# Patient Record
Sex: FEMALE | Race: WHITE | NOT HISPANIC OR LATINO | URBAN - METROPOLITAN AREA
[De-identification: names, ages, dates, MRNs, and addresses within clinical notes are randomized per-mention and may not be internally consistent; named-entity substitution may affect disease eponyms.]

---

## 2020-12-09 ENCOUNTER — INPATIENT (INPATIENT)
Facility: HOSPITAL | Age: 75
LOS: 47 days | Discharge: NURSING, REHAB OR LONG-TERM CARE | DRG: 20 | End: 2021-01-26
Attending: NEUROLOGICAL SURGERY | Admitting: NEUROLOGICAL SURGERY
Payer: MEDICARE

## 2020-12-09 ENCOUNTER — TRANSCRIPTION ENCOUNTER (OUTPATIENT)
Age: 75
End: 2020-12-09

## 2020-12-09 VITALS
HEART RATE: 77 BPM | WEIGHT: 110.01 LBS | SYSTOLIC BLOOD PRESSURE: 126 MMHG | TEMPERATURE: 98 F | RESPIRATION RATE: 18 BRPM | DIASTOLIC BLOOD PRESSURE: 89 MMHG | OXYGEN SATURATION: 96 %

## 2020-12-09 LAB
A1C WITH ESTIMATED AVERAGE GLUCOSE RESULT: 6.4 % — HIGH (ref 4–5.6)
ALBUMIN SERPL ELPH-MCNC: 3.9 G/DL — SIGNIFICANT CHANGE UP (ref 3.4–5)
ALP SERPL-CCNC: 124 U/L — HIGH (ref 40–120)
ALT FLD-CCNC: 28 U/L — SIGNIFICANT CHANGE UP (ref 12–42)
ANION GAP SERPL CALC-SCNC: 10 MMOL/L — SIGNIFICANT CHANGE UP (ref 9–16)
APTT BLD: 27.4 SEC — LOW (ref 27.5–35.5)
AST SERPL-CCNC: 32 U/L — SIGNIFICANT CHANGE UP (ref 15–37)
BASOPHILS # BLD AUTO: 0.06 K/UL — SIGNIFICANT CHANGE UP (ref 0–0.2)
BASOPHILS NFR BLD AUTO: 0.3 % — SIGNIFICANT CHANGE UP (ref 0–2)
BILIRUB SERPL-MCNC: 0.3 MG/DL — SIGNIFICANT CHANGE UP (ref 0.2–1.2)
BUN SERPL-MCNC: 22 MG/DL — SIGNIFICANT CHANGE UP (ref 7–23)
CALCIUM SERPL-MCNC: 9.4 MG/DL — SIGNIFICANT CHANGE UP (ref 8.5–10.5)
CHLORIDE SERPL-SCNC: 102 MMOL/L — SIGNIFICANT CHANGE UP (ref 96–108)
CHOLEST SERPL-MCNC: 201 MG/DL — HIGH
CO2 SERPL-SCNC: 26 MMOL/L — SIGNIFICANT CHANGE UP (ref 22–31)
CREAT SERPL-MCNC: 0.91 MG/DL — SIGNIFICANT CHANGE UP (ref 0.5–1.3)
EOSINOPHIL # BLD AUTO: 0 K/UL — SIGNIFICANT CHANGE UP (ref 0–0.5)
EOSINOPHIL NFR BLD AUTO: 0 % — SIGNIFICANT CHANGE UP (ref 0–6)
ESTIMATED AVERAGE GLUCOSE: 137 MG/DL — HIGH (ref 68–114)
GLUCOSE SERPL-MCNC: 163 MG/DL — HIGH (ref 70–99)
HCT VFR BLD CALC: 40.3 % — SIGNIFICANT CHANGE UP (ref 34.5–45)
HCT VFR BLD CALC: 41.5 % — SIGNIFICANT CHANGE UP (ref 34.5–45)
HDLC SERPL-MCNC: 66 MG/DL — SIGNIFICANT CHANGE UP
HGB BLD-MCNC: 12.4 G/DL — SIGNIFICANT CHANGE UP (ref 11.5–15.5)
HGB BLD-MCNC: 12.5 G/DL — SIGNIFICANT CHANGE UP (ref 11.5–15.5)
IMM GRANULOCYTES NFR BLD AUTO: 0.6 % — SIGNIFICANT CHANGE UP (ref 0–1.5)
INR BLD: 0.96 — SIGNIFICANT CHANGE UP (ref 0.88–1.16)
LIDOCAIN IGE QN: 121 U/L — SIGNIFICANT CHANGE UP (ref 73–393)
LIPID PNL WITH DIRECT LDL SERPL: 112 MG/DL — HIGH
LYMPHOCYTES # BLD AUTO: 1.21 K/UL — SIGNIFICANT CHANGE UP (ref 1–3.3)
LYMPHOCYTES # BLD AUTO: 6.4 % — LOW (ref 13–44)
MAGNESIUM SERPL-MCNC: 2 MG/DL — SIGNIFICANT CHANGE UP (ref 1.6–2.6)
MCHC RBC-ENTMCNC: 26.6 PG — LOW (ref 27–34)
MCHC RBC-ENTMCNC: 27.1 PG — SIGNIFICANT CHANGE UP (ref 27–34)
MCHC RBC-ENTMCNC: 30.1 GM/DL — LOW (ref 32–36)
MCHC RBC-ENTMCNC: 30.8 GM/DL — LOW (ref 32–36)
MCV RBC AUTO: 88 FL — SIGNIFICANT CHANGE UP (ref 80–100)
MCV RBC AUTO: 88.3 FL — SIGNIFICANT CHANGE UP (ref 80–100)
MONOCYTES # BLD AUTO: 0.83 K/UL — SIGNIFICANT CHANGE UP (ref 0–0.9)
MONOCYTES NFR BLD AUTO: 4.4 % — SIGNIFICANT CHANGE UP (ref 2–14)
NEUTROPHILS # BLD AUTO: 16.64 K/UL — HIGH (ref 1.8–7.4)
NEUTROPHILS NFR BLD AUTO: 88.3 % — HIGH (ref 43–77)
NON HDL CHOLESTEROL: 135 MG/DL — HIGH
NRBC # BLD: 0 /100 WBCS — SIGNIFICANT CHANGE UP (ref 0–0)
NRBC # BLD: 0 /100 WBCS — SIGNIFICANT CHANGE UP (ref 0–0)
PHOSPHATE SERPL-MCNC: 4.4 MG/DL — SIGNIFICANT CHANGE UP (ref 2.5–4.5)
PLATELET # BLD AUTO: 341 K/UL — SIGNIFICANT CHANGE UP (ref 150–400)
PLATELET # BLD AUTO: 390 K/UL — SIGNIFICANT CHANGE UP (ref 150–400)
POTASSIUM SERPL-MCNC: 4.8 MMOL/L — SIGNIFICANT CHANGE UP (ref 3.5–5.3)
POTASSIUM SERPL-SCNC: 4.8 MMOL/L — SIGNIFICANT CHANGE UP (ref 3.5–5.3)
PROT SERPL-MCNC: 8.4 G/DL — HIGH (ref 6.4–8.2)
PROTHROM AB SERPL-ACNC: 11.5 SEC — SIGNIFICANT CHANGE UP (ref 10.6–13.6)
RBC # BLD: 4.58 M/UL — SIGNIFICANT CHANGE UP (ref 3.8–5.2)
RBC # BLD: 4.7 M/UL — SIGNIFICANT CHANGE UP (ref 3.8–5.2)
RBC # FLD: 14.8 % — HIGH (ref 10.3–14.5)
RBC # FLD: 15 % — HIGH (ref 10.3–14.5)
SARS-COV-2 RNA SPEC QL NAA+PROBE: SIGNIFICANT CHANGE UP
SODIUM SERPL-SCNC: 138 MMOL/L — SIGNIFICANT CHANGE UP (ref 132–145)
TRIGL SERPL-MCNC: 114 MG/DL — SIGNIFICANT CHANGE UP
TROPONIN T SERPL-MCNC: 0.61 NG/ML — CRITICAL HIGH (ref 0–0.01)
WBC # BLD: 10.42 K/UL — SIGNIFICANT CHANGE UP (ref 3.8–10.5)
WBC # BLD: 18.86 K/UL — HIGH (ref 3.8–10.5)
WBC # FLD AUTO: 10.42 K/UL — SIGNIFICANT CHANGE UP (ref 3.8–10.5)
WBC # FLD AUTO: 18.86 K/UL — HIGH (ref 3.8–10.5)

## 2020-12-09 PROCEDURE — 71045 X-RAY EXAM CHEST 1 VIEW: CPT | Mod: 26

## 2020-12-09 PROCEDURE — 70450 CT HEAD/BRAIN W/O DYE: CPT | Mod: 26,59

## 2020-12-09 PROCEDURE — 74177 CT ABD & PELVIS W/CONTRAST: CPT | Mod: 26

## 2020-12-09 PROCEDURE — 99291 CRITICAL CARE FIRST HOUR: CPT | Mod: 24

## 2020-12-09 PROCEDURE — 99291 CRITICAL CARE FIRST HOUR: CPT

## 2020-12-09 PROCEDURE — 93010 ELECTROCARDIOGRAM REPORT: CPT

## 2020-12-09 PROCEDURE — 70498 CT ANGIOGRAPHY NECK: CPT | Mod: 26

## 2020-12-09 PROCEDURE — 70496 CT ANGIOGRAPHY HEAD: CPT | Mod: 26

## 2020-12-09 PROCEDURE — 99223 1ST HOSP IP/OBS HIGH 75: CPT

## 2020-12-09 RX ORDER — NIMODIPINE 60 MG/10ML
60 SOLUTION ORAL EVERY 4 HOURS
Refills: 0 | Status: DISCONTINUED | OUTPATIENT
Start: 2020-12-09 | End: 2020-12-10

## 2020-12-09 RX ORDER — NICARDIPINE HYDROCHLORIDE 30 MG/1
5 CAPSULE, EXTENDED RELEASE ORAL
Qty: 40 | Refills: 0 | Status: DISCONTINUED | OUTPATIENT
Start: 2020-12-09 | End: 2020-12-10

## 2020-12-09 RX ORDER — HYDROCORTISONE 20 MG
50 TABLET ORAL EVERY 8 HOURS
Refills: 0 | Status: DISCONTINUED | OUTPATIENT
Start: 2020-12-09 | End: 2020-12-11

## 2020-12-09 RX ORDER — ONDANSETRON 8 MG/1
4 TABLET, FILM COATED ORAL ONCE
Refills: 0 | Status: COMPLETED | OUTPATIENT
Start: 2020-12-09 | End: 2020-12-09

## 2020-12-09 RX ORDER — SODIUM CHLORIDE 9 MG/ML
1000 INJECTION INTRAMUSCULAR; INTRAVENOUS; SUBCUTANEOUS
Refills: 0 | Status: DISCONTINUED | OUTPATIENT
Start: 2020-12-09 | End: 2020-12-10

## 2020-12-09 RX ORDER — DEXTROSE 50 % IN WATER 50 %
15 SYRINGE (ML) INTRAVENOUS ONCE
Refills: 0 | Status: DISCONTINUED | OUTPATIENT
Start: 2020-12-09 | End: 2021-01-26

## 2020-12-09 RX ORDER — HYDROMORPHONE HYDROCHLORIDE 2 MG/ML
0.5 INJECTION INTRAMUSCULAR; INTRAVENOUS; SUBCUTANEOUS ONCE
Refills: 0 | Status: DISCONTINUED | OUTPATIENT
Start: 2020-12-09 | End: 2020-12-09

## 2020-12-09 RX ORDER — DEXTROSE 50 % IN WATER 50 %
25 SYRINGE (ML) INTRAVENOUS ONCE
Refills: 0 | Status: DISCONTINUED | OUTPATIENT
Start: 2020-12-09 | End: 2021-01-26

## 2020-12-09 RX ORDER — AMINOCAPROIC ACID 500 MG/1
5 TABLET ORAL ONCE
Refills: 0 | Status: DISCONTINUED | OUTPATIENT
Start: 2020-12-09 | End: 2020-12-10

## 2020-12-09 RX ORDER — AMINOCAPROIC ACID 500 MG/1
1 TABLET ORAL
Qty: 5 | Refills: 0 | Status: DISCONTINUED | OUTPATIENT
Start: 2020-12-10 | End: 2020-12-10

## 2020-12-09 RX ORDER — PANTOPRAZOLE SODIUM 20 MG/1
40 TABLET, DELAYED RELEASE ORAL DAILY
Refills: 0 | Status: DISCONTINUED | OUTPATIENT
Start: 2020-12-09 | End: 2020-12-11

## 2020-12-09 RX ORDER — LIDOCAINE HYDROCHLORIDE AND EPINEPHRINE 10; 10 MG/ML; UG/ML
10 INJECTION, SOLUTION INFILTRATION; PERINEURAL ONCE
Refills: 0 | Status: COMPLETED | OUTPATIENT
Start: 2020-12-09 | End: 2020-12-09

## 2020-12-09 RX ORDER — INSULIN LISPRO 100/ML
VIAL (ML) SUBCUTANEOUS
Refills: 0 | Status: DISCONTINUED | OUTPATIENT
Start: 2020-12-09 | End: 2021-01-03

## 2020-12-09 RX ORDER — LACOSAMIDE 50 MG/1
100 TABLET ORAL EVERY 12 HOURS
Refills: 0 | Status: DISCONTINUED | OUTPATIENT
Start: 2020-12-09 | End: 2020-12-10

## 2020-12-09 RX ORDER — FENTANYL CITRATE 50 UG/ML
50 INJECTION INTRAVENOUS ONCE
Refills: 0 | Status: DISCONTINUED | OUTPATIENT
Start: 2020-12-09 | End: 2020-12-09

## 2020-12-09 RX ORDER — SODIUM CHLORIDE 9 MG/ML
1000 INJECTION INTRAMUSCULAR; INTRAVENOUS; SUBCUTANEOUS ONCE
Refills: 0 | Status: COMPLETED | OUTPATIENT
Start: 2020-12-09 | End: 2020-12-09

## 2020-12-09 RX ORDER — GLUCAGON INJECTION, SOLUTION 0.5 MG/.1ML
1 INJECTION, SOLUTION SUBCUTANEOUS ONCE
Refills: 0 | Status: DISCONTINUED | OUTPATIENT
Start: 2020-12-09 | End: 2021-01-26

## 2020-12-09 RX ORDER — SODIUM CHLORIDE 9 MG/ML
1000 INJECTION, SOLUTION INTRAVENOUS
Refills: 0 | Status: DISCONTINUED | OUTPATIENT
Start: 2020-12-09 | End: 2020-12-10

## 2020-12-09 RX ORDER — ACETAMINOPHEN 500 MG
650 TABLET ORAL EVERY 6 HOURS
Refills: 0 | Status: DISCONTINUED | OUTPATIENT
Start: 2020-12-09 | End: 2020-12-16

## 2020-12-09 RX ORDER — SENNA PLUS 8.6 MG/1
2 TABLET ORAL AT BEDTIME
Refills: 0 | Status: DISCONTINUED | OUTPATIENT
Start: 2020-12-09 | End: 2020-12-21

## 2020-12-09 RX ORDER — MIDAZOLAM HYDROCHLORIDE 1 MG/ML
2 INJECTION, SOLUTION INTRAMUSCULAR; INTRAVENOUS ONCE
Refills: 0 | Status: DISCONTINUED | OUTPATIENT
Start: 2020-12-09 | End: 2020-12-09

## 2020-12-09 RX ORDER — CEFAZOLIN SODIUM 1 G
2000 VIAL (EA) INJECTION ONCE
Refills: 0 | Status: COMPLETED | OUTPATIENT
Start: 2020-12-09 | End: 2020-12-09

## 2020-12-09 RX ORDER — LEVETIRACETAM 250 MG/1
1000 TABLET, FILM COATED ORAL ONCE
Refills: 0 | Status: COMPLETED | OUTPATIENT
Start: 2020-12-09 | End: 2020-12-09

## 2020-12-09 RX ADMIN — Medication 50 MILLIGRAM(S): at 23:31

## 2020-12-09 RX ADMIN — HYDROMORPHONE HYDROCHLORIDE 0.5 MILLIGRAM(S): 2 INJECTION INTRAMUSCULAR; INTRAVENOUS; SUBCUTANEOUS at 21:24

## 2020-12-09 RX ADMIN — SODIUM CHLORIDE 1000 MILLILITER(S): 9 INJECTION INTRAMUSCULAR; INTRAVENOUS; SUBCUTANEOUS at 19:01

## 2020-12-09 RX ADMIN — LEVETIRACETAM 440 MILLIGRAM(S): 250 TABLET, FILM COATED ORAL at 21:24

## 2020-12-09 RX ADMIN — ONDANSETRON 4 MILLIGRAM(S): 8 TABLET, FILM COATED ORAL at 21:24

## 2020-12-09 NOTE — PROGRESS NOTE ADULT - ASSESSMENT
ASSESSMENT & PLAN    ASSESSMENT    75 yoF HH3 mF4, 3 mm L pcom aneurysm, communicating hydrocephalus; diffuse atherosclerosis; stunned myocardium (anterolateral, inferior T wave inversions)  PMHx:  1. HTN  2. COPD  3. asthma    PLAN -     NEURO -  -neurochecks q1h  -unsecured aneurysm  -sBP < 140 mmHg  -amicar  -if neurological deterioration, EVD tonight  -vimpat 100 mg BID for seizure prophylaxis  -XA in AM     CV -  -sBP 100-140 mmHg  -stunned myocardium on EKG  -pending troponins, TTE  -vasculopath, extensive atherosclerosis    PUL -  -on room air, oxygen saturations > 90%  -hydrocortisone 50 mg IV q8h, given COPDe  -pending CXR    ENDO -  -euglycemia goal  -lipids, thyroid panel    GI/ -  -NPO, gentle IV hydration    DIET -   -NPO    HEM/ONC -  -Hb trend ___    VTE Prophylaxis -  -SCDs    ID - afebrile, mild reactive leukocytosis, pending CXR    Social - will update son (aHrris Kelley)    *****    CORE MEASURES    SAH  1.  Nath and Jacobo Score = 3, mF4  2.  VTE Prophylaxis:  [ ] administered within 24 hours or admission OR [X] reason not done: fresh SAH  3.  Dysphagia Screening:  [X] performed before any oral meds / liquids / food  4.  Nimodipine treatment:  [X] administered within 24 hours of admission OR [] reason not done    *****    Quality Measures    FAST HUG checked    *****    ATTENDING ATTESTATION:  I was physically present for the key portions of the evaluation and management (E/M) service provided.  I agree with the above history, physical and plan, which I have reviewed and edited where appropriate.    Patient at high risk for neurological deterioriation or death due to:  bleeding, stroke, infections, MI.  Critical Care Time:  I have personally provided 60 minutes of critical care time excluding time spent on separate procedures.    *****     ASSESSMENT & PLAN    ASSESSMENT    75 yoF HH3 mF4, 3 mm L pcom aneurysm, communicating hydrocephalus; diffuse atherosclerosis; stunned myocardium (anterolateral, inferior T wave inversions)  PMHx:  1. HTN  2. COPD  3. asthma    PLAN -     NEURO -  -neurochecks q1h  -unsecured aneurysm  -sBP < 140 mmHg  -amicar  -if neurological deterioration, EVD tonight  -vimpat 100 mg BID for seizure prophylaxis  -XA in AM     CV -  -sBP 100-140 mmHg  -stunned myocardium on EKG  -pending troponins, TTE  -vasculopath, extensive atherosclerosis    PUL -  -on room air, oxygen saturations > 90%  -hydrocortisone 50 mg IV q8h, given COPDe  -pending CXR    ENDO -  -euglycemia goal  -lipids, thyroid panel    GI/ -  -NPO, gentle IV hydration    DIET -   -NPO    HEM/ONC -  -Hb trend ___    VTE Prophylaxis -  -SCDs    ID - afebrile, mild reactive leukocytosis, pending CXR    Social - will update son (Harris Kelley)    *****    CORE MEASURES    SAH  1.  Nath and Jacobo Score = 3, mF4  2.  VTE Prophylaxis:  [ ] administered within 24 hours or admission OR [X] reason not done: fresh SAH  3.  Dysphagia Screening:  [X] performed before any oral meds / liquids / food  4.  Nimodipine treatment:  [X] administered within 24 hours of admission OR [] reason not done    *****    Quality Measures    FAST HUG checked    *****   ASSESSMENT & PLAN    ASSESSMENT    75 yoF HH3 mF4, 3 mm L pcom aneurysm, communicating hydrocephalus; diffuse atherosclerosis; stunned myocardium (anterolateral, inferior T wave inversions)  PMHx:  1. HTN  2. COPD  3. asthma    PLAN -     NEURO -  -neurochecks q1h  -unsecured aneurysm  -sBP < 140 mmHg  -amicar infusion  -if neurological deterioration, EVD tonight _____  -vimpat 100 mg BID for seizure prophylaxis  -XA in AM     CV -  -sBP 100-140 mmHg  -stunned myocardium on EKG  -pending troponins, TTE  -vasculopath, extensive atherosclerosis    PUL -  -on room air, oxygen saturations > 90%  -hydrocortisone 50 mg IV q8h, given COPDe  -pending CXR    ENDO -  -euglycemia goal  -lipids, thyroid panel    GI/ -  -NPO, gentle IV hydration    DIET -   -NPO    HEM/ONC -  -Hb trend ___    VTE Prophylaxis -  -SCDs    ID - afebrile, mild reactive leukocytosis, pending CXR    Social - will update son (Harris Kelley)    *****    CORE MEASURES    SAH  1.  Nath and Jacobo Score = 3, mF4  2.  VTE Prophylaxis:  [ ] administered within 24 hours or admission OR [X] reason not done: fresh SAH  3.  Dysphagia Screening:  [X] performed before any oral meds / liquids / food  4.  Nimodipine treatment:  [X] administered within 24 hours of admission OR [] reason not done    *****    Quality Measures    FAST HUG checked    *****   ASSESSMENT & PLAN    ASSESSMENT    75 yoF HH3 mF4, 3 mm L pcom aneurysm, communicating hydrocephalus; diffuse atherosclerosis; stunned myocardium (anterolateral, inferior T wave inversions)  PMHx:  1. HTN  2. COPD  3. asthma    PLAN -     NEURO -  -neurochecks q1h  -unsecured aneurysm  -sBP < 140 mmHg  -amicar infusion  -if neurological deterioration, EVD placed, open at 15 cm (ICP < 5)  -vimpat 100 mg BID for seizure prophylaxis  -XA in AM     CV -  -sBP 100-140 mmHg  -stunned myocardium with pulmonary edema --> given lasix 20 mg IV X 1  -elevated troponins, pending TTE  -vasculopath, extensive atherosclerosis  -cardiology consult    PUL -  -nasal cannula 4 L/min, oxygen saturations > 90%  -hydrocortisone 50 mg IV q8h, given COPDe  -neuro-cardiogenic pulmonary edema as noted    ENDO -  -euglycemia goal  -, TSH 0.99, HbA1c 6.7    GI/ -  -NPO, gentle IV hydration    DIET -   -NPO    HEM/ONC -  -Hb trend 11.5    VTE Prophylaxis -  -SCDs    ID - afebrile, mild reactive leukocytosis    Social - will update son (Harris Kelley)    *****    CORE MEASURES    SAH  1.  Nath and Jacobo Score = 3, mF4  2.  VTE Prophylaxis:  [ ] administered within 24 hours or admission OR [X] reason not done: fresh SAH  3.  Dysphagia Screening:  [X] performed before any oral meds / liquids / food  4.  Nimodipine treatment:  [X] administered within 24 hours of admission OR [] reason not done    *****    Quality Measures    FAST HUG checked    *****   ASSESSMENT & PLAN    ASSESSMENT    75 yoF HH3 mF4, 3 mm L pcom aneurysm, communicating hydrocephalus; diffuse atherosclerosis; stunned myocardium (anterolateral, inferior T wave inversions)  PMHx:  1. HTN  2. COPD  3. asthma    PLAN -     NEURO -  -neurochecks q1h  -unsecured aneurysm  -sBP < 140 mmHg  -amicar infusion  -if neurological deterioration, EVD placed, open at 15 cm (ICP < 5)  -vimpat 100 mg BID for seizure prophylaxis  -XA in AM     CV -  -sBP 100-140 mmHg  -stunned myocardium with pulmonary edema --> given lasix 20 mg IV X 1  -elevated troponins, pending TTE  -vasculopath, extensive atherosclerosis  -cardiology consult    PUL -  -nasal cannula 4 L/min, oxygen saturations > 90%  -hydrocortisone 50 mg IV q8h, given COPDe  -neuro-cardiogenic pulmonary edema as noted    ENDO -  -euglycemia goal  -, TSH 0.99, HbA1c 6.7    GI/ -  -NPO, gentle IV hydration    DIET -   -NPO    HEM/ONC -  -Hb trend 11.5    VTE Prophylaxis -  -SCDs    ID - afebrile, mild reactive leukocytosis    Social - updated son (Harris Kelley)    *****    CORE MEASURES    SAH  1.  Nath and Jacobo Score = 3, mF4  2.  VTE Prophylaxis:  [ ] administered within 24 hours or admission OR [X] reason not done: fresh SAH  3.  Dysphagia Screening:  [X] performed before any oral meds / liquids / food  4.  Nimodipine treatment:  [X] administered within 24 hours of admission OR [] reason not done    *****    Quality Measures    FAST HUG checked    *****

## 2020-12-09 NOTE — CHART NOTE - NSCHARTNOTEFT_GEN_A_CORE
Neurosurgical Indications for Screening Dopplers on Admission:       1) Known hypercoagulation disorder (h/o VTE, thrombophilia, HIT, etc.)   2) Admitted from prolonged stay from another institution (straight forward ER transfers not included)  3) Presenting with significant leg immobility   4) Presenting with signs and symptoms of VTE?    5) With significant critical illness, Including "found down" for unknown period of time in HPI  6) With significant neurotrauma (TBI, SCI / TLS spine fractures)   7) Who are comatose   8) With known malignancy (e.g. glioblastoma multiforme, meningioma, etc.). Excludes IA chemo 23hr observation stays  9) On hemodialysis   10) Who have received platelet transfusion or antithrombotic reversal agents recently   11) Who have had recent major orthopedic surgery          Screening dopplers indicated?   Y X   N _    DVT Prophylaxis:  X SCD's   _ chemoprophylaxis

## 2020-12-09 NOTE — H&P ADULT - NSHPPHYSICALEXAM_GEN_ALL_CORE
GENERAL: Moderate distress, well-groomed, well-developed  HEAD:  Atraumatic, Normocephalic  EYES: EOMI, PERRLA, conjunctiva and sclera clear  ENMT: No tonsillar erythema, exudates, or enlargement; Moist mucous membranes, Good dentition, No lesions  NECK: Supple, No JVD, Normal thyroid  NERVOUS SYSTEM:  AAOx2 (self and place,) FC, speech coherent. Motor Strength 5/5 B/L upper and lower extremities; No drift. SILT throughout. DTRs 2+ intact and symmetric  CHEST/LUNG: Clear to percussion bilaterally; No rales, rhonchi, wheezing, or rubs  HEART: Regular rate and rhythm; No murmurs, rubs, or gallops  ABDOMEN: Soft, Nontender, Nondistended; Bowel sounds present  EXTREMITIES:  2+ Peripheral Pulses, No clubbing, cyanosis, or edema  LYMPH: No lymphadenopathy noted  SKIN: No rashes or lesions

## 2020-12-09 NOTE — H&P ADULT - NSHPSOCIALHISTORY_GEN_ALL_CORE
Current tobacco smoker (1-2 cigarettes daily, smoker>30 years)  Drinks alcohol socially  No illicit drug use

## 2020-12-09 NOTE — H&P ADULT - ASSESSMENT
76y/o F with PMHx sig for COPD, asthma, HLD, HTN, BIBEMS to [facility] from home after HHA discovered patient found down in floor covered in non-bloody vomitus, CTH reveals diffuse subarachnoid hemorrhage mainly at basilar cisterns with IVH and obstructive hydrocephalus, CTA concerning for 3mm left pcomm aneurysm and a 1.6mm outpouching of distal cavernous segment of the left internal carotid artery likely aneurysm. NIHSS2 HH3 MF4 BD1    PLAN:  NEURO:  -neuro/vital checks q1h  -continue vimpat for seizure prophylaxis  -stroke core measures  -nimodipine 60mg q6h  -placement of EVD, montior ICPs and output    CARDIOVASCULAR:  -elevated troponin, trend  --140  -cardene prn  -continue home lisinopril  -f/u echo    PULMONARY:  -room air no issues  -COPD: continue prednisone    RENAL:  -IVF while NPO    GI:  -bowel regimen  -PPI while on steroids    HEME:  -start amicar drip, discontinue 4 hours prior to angiogram  -coags WNL    ID:  -leukocytosis, monitor    ENDO:  -ISS    DVT PROPHYLAXIS:  [x] Venodynes                                [] Heparin/Lovenox  F/u LE doppler    DISPOSITION: ICU status, full code  D/w Dr. Serrano, Dr. Patel and Dr. Gonzalez

## 2020-12-09 NOTE — ED ADULT NURSE REASSESSMENT NOTE - NS ED NURSE REASSESS COMMENT FT1
Received pt from [facility]. Pending final CT results, endorsing headache at this time. MD Ortiz notified and aware.

## 2020-12-09 NOTE — ED ADULT NURSE REASSESSMENT NOTE - BEFAST ARM SIDE DRIFT
No - Acyclovir PO 30mg q8h  - Begin bactrim PO 5mg/kg/day divided BID on Friday  - Ethanol locks to unlocked lumen 4 hours/day

## 2020-12-09 NOTE — PROGRESS NOTE ADULT - ATTENDING COMMENTS
*****    ATTENDING ATTESTATION:  I was physically present for the key portions of the evaluation and management (E/M) service provided.  I agree with the above history, physical and plan, which I have reviewed and edited where appropriate.    Patient at high risk for neurological deterioriation or death due to:  bleeding, stroke, infections, MI.  Critical Care Time:  I have personally provided 60 minutes of critical care time excluding time spent on separate procedures.    ***** *****    ATTENDING ATTESTATION:  I was physically present for the key portions of the evaluation and management (E/M) service provided.  I agree with the above history, physical and plan, which I have reviewed and edited where appropriate.    Patient at high risk for neurological deterioration or death due to:  bleeding, stroke, infections, MI.  Critical Care Time:  I have personally provided 60 minutes of critical care time excluding time spent on separate procedures.    *****

## 2020-12-09 NOTE — ED ADULT TRIAGE NOTE - CHIEF COMPLAINT QUOTE
pt biba from home accompanied by hha for vomiting. pt arrives appearing pale; bgl on scene 242; pt states she is not diabetic

## 2020-12-09 NOTE — H&P ADULT - HISTORY OF PRESENT ILLNESS
76y/o F with PMHx sig for COPD, asthma, HLD, HTN, BIBEMS to [facility] from home after HHA discovered patient found down in floor covered in non-bloody vomitus. Perr HHA Pt went to the bathroom and was taking a long time, went in to check on her and found her sitting on floor, awake, however with vomitus on front of shirt. On arrival to [facility], patient was taken for stat head CT, which revealed diffuse subarachnoid hemorrhage mainly at basilar cisterns with IVH and obstructive hydrocephalus. Patient was given a bolus of 1g keppra and dilaudid pushes for generalized headache. CTA concerning for 3mm left pcomm aneurysm and a 1.6mm outpouching of distal cavernous segment of the left internal carotid artery likely aneurysm. NIHSS2 HH3 MF4. Patient was transferred to [facility] for further intervention. Patient currently reports headaches. Pt denies acute changes in vision, seizures, CP, SOB, weakness/paresthesias of arms or legs.

## 2020-12-09 NOTE — H&P ADULT - ATTENDING COMMENTS
Patient seen and examined by me 12/10/20. Initial plan as above, amicar until angio/embo of aneurysm, keppra seizure prophylaxis, SBP <140 until aneurysm secure, ICU care.    Bebeto Serrano M.D. Patient seen and examined by me 12/10/20. Presents with SAH, pcomm aneurysm on CTA, also hydrocephalus with altered mental status. Initial plan as above, EVD, amicar until angio/embo of aneurysm, keppra seizure prophylaxis, SBP <140 until aneurysm secure, ICU care.    Bebeto Serrano M.D.

## 2020-12-09 NOTE — ED PROVIDER NOTE - ENMT, MLM
Airway patent, Nasal mucosa clear. Mouth with normal mucosa. Throat has no vesicles, no oropharyngeal exudates and uvula is midline. Cranium NC/AT.

## 2020-12-09 NOTE — ED PROVIDER NOTE - OBJECTIVE STATEMENT
75 female pmhx htn with single episode of vomiting just pta. HHA states pt went to the br and was taking a long time, went in to check on her and found her sitting on floor, awake, but with vomitus on front of shirt (non-bloody). Pt arrives on EMS stretcher awake/alert, answering questions, c/o pancephalic ha. 75 female pmhx htn with single episode of vomiting just pta. HHA states pt went to the br and was taking a long time, went in to check on her and found her sitting on floor, awake, but with vomitus on front of shirt (non-bloody). Pt arrives on EMS stretcher awake/alert, answering questions, c/o pancephalic ha, ( not WHOL), describes as a " tightness" in her head. Denies visual disturbance or stiff neck. Denies cp/dyspnea/palpitations.

## 2020-12-09 NOTE — ED PROVIDER NOTE - CLINICAL SUMMARY MEDICAL DECISION MAKING FREE TEXT BOX
diff dx includes covid, pancreatitis, ICH, tension headache, sbo. Pt unable to tolerate po contrast, will proceed with IV contrast CT abd/pelvis. diff dx includes covid, pancreatitis, ICH, tension headache, sbo. Pt unable to tolerate po contrast, will proceed with non-contrast head CT,  IV contrast CT abd/pelvis.

## 2020-12-09 NOTE — ED ADULT NURSE NOTE - OBJECTIVE STATEMENT
75y female presents to ED c/o headache. Pt is poor historian, presents with aide, states that pt was in bathroom for prolonged time, and when aide entered bathroom, pt had vomited once. No blood or coffee ground emesis noted. Pt endorses having headache. Per aide, pt at baseline mental status. Denies diarrhea, chest pain, SOB.

## 2020-12-09 NOTE — ED ADULT NURSE REASSESSMENT NOTE - NS ED NURSE REASSESS COMMENT FT1
Second IV placed, VS per flowsheet, medicated per MAR for pain. Taken to CT for CTA, pending EMS transfer to [facility]. States improvement to headache but still some "tightness".

## 2020-12-09 NOTE — H&P ADULT - NSICDXPASTMEDICALHX_GEN_ALL_CORE_FT
PAST MEDICAL HISTORY:  Asthma     COPD (chronic obstructive pulmonary disease)     Hyperlipidemia     Hypertension

## 2020-12-09 NOTE — ED PROVIDER NOTE - PROGRESS NOTE DETAILS
S/O Dr. Ortiz with CT abd/pelvis/head pending. Pt remains awake/alert, no emesis observed. ct noted to have extensive SAH.  pt reevaluated immediately, bp 130s systolic, ao x 3, perrl, strength equal bl, pt c/o headache, additional analgesia ordered.  NSx consulted

## 2020-12-09 NOTE — PROGRESS NOTE ADULT - SUBJECTIVE AND OBJECTIVE BOX
=========================================================   NEUROCRITICAL CARE ATTENDING NOTE (WEDNESDAY, DECEMBER 9, 2020 EVENING)  =========================================================    NAME:  [CARA CANCHOLA]  MRN:  [[id number]]    SUMMARY:  75 yoF HH3 mF4, 3 mm L pcom aneurysm, communicating hydrocephalus; diffuse atherosclerosis; stunned myocardium (anterolateral, inferior T wave inversions)    [facility] HPI:  75 female pmhx HTN, COPD, asthma, vasculopath (with single episode of vomiting just pta. HHA states pt went to the br and was taking a long time, went in to check on her and found her sitting on floor, awake, but with vomitus on front of shirt (non-bloody). Pt arrives on EMS stretcher awake/alert, answering questions, c/o pancephalic ha, ( not WHOL), describes as a " tightness" in her head. Denies visual disturbance or stiff neck. Denies cp/dyspnea/palpitations.    Past Medical History:  asthma, COPD  Allergies:  None known.  Home Meds:  · 	ProAir HFA CFC free 90 mcg/inh inhalation aerosol: 2 puff(s) inhaled 4 times a day PRN  · 	predniSONE 50 mg oral tablet: 1 tab(s) orally once a day  · 	Flagyl 500 mg oral tablet: 1 tab(s) orally every 8 hours  · 	azithromycin 500 mg oral tablet: 1 tab(s) orally once a day    PHYSICAL EXAMINATION    ICU Vital Signs Last 24 Hrs  T(C): 36.5 (09 Dec 2020 21:54), Max: 36.7 (09 Dec 2020 18:03)  T(F): 97.7 (09 Dec 2020 21:54), Max: 98 (09 Dec 2020 18:03)  HR: 88 (09 Dec 2020 21:25) (76 - 88)  BP: 129/81 (09 Dec 2020 21:25) (126/89 - 140/74)  RR: 18 (09 Dec 2020 21:25) (16 - 18)  SpO2: 95% (09 Dec 2020 21:25) (95% - 96%)    NEUROLOGICAL EXAMINATION:  easily rousable, oriented X 2, speech clear, states headache throughout course of day, pupils 2.5 mm equal and reactive bilaterally, EOM intact, face symmetric, tongue midline, no pronator drift, no [place], follows 1st order commands, strength and sensation intact  EENT:  anicteric  CARDIOVASCULAR:  S1S2 JAMIE no carotid bruits  PULMONARY:  slightly decreased to bases, no adventitia  ABDOMEN:  soft, non-tender, intact bowel sounds  EXTREMITIES:  PPP, no ankle edema  SKIN:  no manifestations    I/O's      LABS:               12.4   10.42 )-----------( 341      ( 09 Dec 2020 18:43 )             40.3     12-09    138  |  102  |  22  ----------------------------<  163<H>  4.8   |  26  |  0.91    Ca    9.4      09 Dec 2020 18:43    TPro  8.4<H>  /  Alb  3.9  /  TBili  0.3  /  DBili  x   /  AST  32  /  ALT  28  /  AlkPhos  124<H>  12-09    troponins pending ___    CAPILLARY BLOOD GLUCOSE    POCT Blood Glucose.: 154 mg/dL (09 Dec 2020 18:13)    12.09 CT/CTA - left p com 3 mm aneurysm, left cav ICA infundibulum < 2 mm; extensive subarachnoid hemorrhage, concentrated at the basilar cisterns, with intraventricular extension of hemorrhage and obstructive hydrocephalus.  Sites of small vessel ischemic injury with lacunar infarcts in the right caudate nucleus, both thalami and cerebellar hemispheres. No CT evidence of territorial infarction at this time.  12.09 CXR pending  Baseline dopplers pending  TTE pending    EKG - T inversions inferior and anterolateral leads    IV FLUIDS:  []  DRIPS:  []  LINES:  []  DRAINS:  []  WOUNDS:  []    CODE STATUS:  [Full]  GOALS OF CARE:  [Aggressive]  DISPOSITION:  [ICU] =========================================================   NEUROCRITICAL CARE ATTENDING NOTE (WEDNESDAY, DECEMBER 9, 2020 EVENING)  =========================================================    NAME:  [CARA CANCHOLA]  MRN:  [[id number]]    SUMMARY:  75 yoF HH3 mF4, 3 mm L pcom aneurysm, communicating hydrocephalus; diffuse atherosclerosis; stunned myocardium (anterolateral, inferior T wave inversions)    [facility] HPI:  75 female pmhx HTN, COPD, asthma, vasculopath (with single episode of vomiting just pta. HHA states pt went to the br and was taking a long time, went in to check on her and found her sitting on floor, awake, but with vomitus on front of shirt (non-bloody). Pt arrives on EMS stretcher awake/alert, answering questions, c/o pancephalic ha, ( not WHOL), describes as a " tightness" in her head. Denies visual disturbance or stiff neck. Denies cp/dyspnea/palpitations.    Past Medical History:  asthma, COPD  Allergies:  None known.  Home Meds:  · 	ProAir HFA CFC free 90 mcg/inh inhalation aerosol: 2 puff(s) inhaled 4 times a day PRN  · 	predniSONE 50 mg oral tablet: 1 tab(s) orally once a day  · 	Flagyl 500 mg oral tablet: 1 tab(s) orally every 8 hours  · 	azithromycin 500 mg oral tablet: 1 tab(s) orally once a day    PHYSICAL EXAMINATION    ICU Vital Signs Last 24 Hrs  T(C): 36.5 (09 Dec 2020 21:54), Max: 36.7 (09 Dec 2020 18:03)  T(F): 97.7 (09 Dec 2020 21:54), Max: 98 (09 Dec 2020 18:03)  HR: 88 (09 Dec 2020 21:25) (76 - 88)  BP: 129/81 (09 Dec 2020 21:25) (126/89 - 140/74)  RR: 18 (09 Dec 2020 21:25) (16 - 18)  SpO2: 95% (09 Dec 2020 21:25) (95% - 96%)    NEUROLOGICAL EXAMINATION:  easily rousable, oriented X 2, speech clear, states headache throughout course of day, pupils 2.5 mm equal and reactive bilaterally, EOM intact, face symmetric, tongue midline, no pronator drift, no [place], follows 1st order commands, strength and sensation intact  EENT:  anicteric  CARDIOVASCULAR:  S1S2 JAMEI no carotid bruits  PULMONARY:  slightly decreased to bases, no adventitia  ABDOMEN:  soft, non-tender, intact bowel sounds  EXTREMITIES:  PPP, no ankle edema  SKIN:  no manifestations    I/O's      LABS:               12.4   10.42 )-----------( 341      ( 09 Dec 2020 18:43 )             40.3     12-09    138  |  102  |  22  ----------------------------<  163<H>  4.8   |  26  |  0.91    Ca    9.4      09 Dec 2020 18:43    TPro  8.4<H>  /  Alb  3.9  /  TBili  0.3  /  DBili  x   /  AST  32  /  ALT  28  /  AlkPhos  124<H>  12-09    troponins pending ___    CAPILLARY BLOOD GLUCOSE    POCT Blood Glucose.: 154 mg/dL (09 Dec 2020 18:13)    12.09 CT/CTA - left p com 3 mm aneurysm, left cav ICA infundibulum < 2 mm; extensive subarachnoid hemorrhage, concentrated at the basilar cisterns, with intraventricular extension of hemorrhage and obstructive hydrocephalus.  Sites of small vessel ischemic injury with lacunar infarcts in the right caudate nucleus, both thalami and cerebellar hemispheres. No CT evidence of territorial infarction at this time.  12.09 CXR pending  Baseline dopplers pending  TTE pending    EKG - T inversions inferior and anterolateral leads    IV FLUIDS:  NS 50 mL/h  DRIPS:  amicar infusion  LINES:  []  DRAINS:  []  WOUNDS:  []    CODE STATUS:  [Full]  GOALS OF CARE:  [Aggressive]  DISPOSITION:  [ICU] =========================================================   NEUROCRITICAL CARE ATTENDING NOTE (WEDNESDAY, DECEMBER 9, 2020 EVENING)  =========================================================    NAME:  [CARA CANCHOLA]  MRN:  [[id number]]    SUMMARY:  75 yoF HH3 mF4, 3 mm L pcom aneurysm, communicating hydrocephalus; diffuse atherosclerosis; stunned myocardium (anterolateral, inferior T wave inversions)    [facility] HPI:  75 female pmhx HTN, COPD, asthma, vasculopath (with single episode of vomiting just pta. HHA states pt went to the br and was taking a long time, went in to check on her and found her sitting on floor, awake, but with vomitus on front of shirt (non-bloody). Pt arrives on EMS stretcher awake/alert, answering questions, c/o pancephalic ha, ( not WHOL), describes as a " tightness" in her head. Denies visual disturbance or stiff neck. Denies cp/dyspnea/palpitations.    Past Medical History:  asthma, COPD  Allergies:  None known.  Home Meds:  · 	ProAir HFA CFC free 90 mcg/inh inhalation aerosol: 2 puff(s) inhaled 4 times a day PRN  · 	predniSONE 50 mg oral tablet: 1 tab(s) orally once a day  · 	Flagyl 500 mg oral tablet: 1 tab(s) orally every 8 hours  · 	azithromycin 500 mg oral tablet: 1 tab(s) orally once a day    Current Meds:  MEDICATIONS  (STANDING):  hydrocortisone sodium succinate Injectable 50 milliGRAM(s) IV Push every 8 hours  insulin lispro (ADMELOG) corrective regimen sliding scale   SubCutaneous three times a day before meals  lacosamide IVPB 100 milliGRAM(s) IV Intermittent every 12 hours  niCARdipine Infusion 5 mG/Hr (25 mL/Hr) IV Continuous <Continuous>  niMODipine 60 milliGRAM(s) Oral every 4 hours  senna 2 Tablet(s) Oral at bedtime  sodium chloride 0.9%. 1000 milliLiter(s) (50 mL/Hr) IV Continuous <Continuous>  pantoprazole 40 mg IV daily    MEDICATIONS  (PRN):  acetaminophen   Tablet .. 650 milliGRAM(s) Oral every 6 hours PRN Temp greater or equal to 38C (100.4F), Mild Pain (1 - 3)    PHYSICAL EXAMINATION    ICU Vital Signs Last 24 Hrs  T(C): 36.5 (09 Dec 2020 21:54), Max: 36.7 (09 Dec 2020 18:03)  T(F): 97.7 (09 Dec 2020 21:54), Max: 98 (09 Dec 2020 18:03)  HR: 88 (09 Dec 2020 21:25) (76 - 88)  BP: 129/81 (09 Dec 2020 21:25) (126/89 - 140/74)  RR: 18 (09 Dec 2020 21:25) (16 - 18)  SpO2: 95% (09 Dec 2020 21:25) (95% - 96%)    NEUROLOGICAL EXAMINATION:  easily rousable, oriented X 2, speech clear, states headache throughout course of day, pupils 2.5 mm equal and reactive bilaterally, EOM intact, face symmetric, tongue midline, no pronator drift, no [place], follows 1st order commands, strength and sensation intact  EENT:  anicteric  CARDIOVASCULAR:  S1S2 JAMIE no carotid bruits  PULMONARY:  slightly decreased to bases, no adventitia  ABDOMEN:  soft, non-tender, intact bowel sounds  EXTREMITIES:  PPP, no ankle edema  SKIN:  no manifestations    I/O's      LABS:               12.4   10.42 )-----------( 341      ( 09 Dec 2020 18:43 )             40.3     12-09    138  |  102  |  22  ----------------------------<  163<H>  4.8   |  26  |  0.91    Ca    9.4      09 Dec 2020 18:43    TPro  8.4<H>  /  Alb  3.9  /  TBili  0.3  /  DBili  x   /  AST  32  /  ALT  28  /  AlkPhos  124<H>  12-09    troponins pending ___    CAPILLARY BLOOD GLUCOSE    POCT Blood Glucose.: 154 mg/dL (09 Dec 2020 18:13)    12.09 CT/CTA - left p com 3 mm aneurysm, left cav ICA infundibulum < 2 mm; extensive subarachnoid hemorrhage, concentrated at the basilar cisterns, with intraventricular extension of hemorrhage and obstructive hydrocephalus.  Sites of small vessel ischemic injury with lacunar infarcts in the right caudate nucleus, both thalami and cerebellar hemispheres. No CT evidence of territorial infarction at this time.  12.09 CXR pending  Baseline dopplers pending  TTE pending    EKG - T inversions inferior and anterolateral leads    IV FLUIDS:  NS 50 mL/h  DRIPS:  amicar infusion  LINES:  []  DRAINS:  []  WOUNDS:  []    CODE STATUS:  [Full]  GOALS OF CARE:  [Aggressive]  DISPOSITION:  [ICU] =========================================================   NEUROCRITICAL CARE ATTENDING NOTE (WEDNESDAY, DECEMBER 9, 2020 EVENING)  =========================================================    NAME:  [CARA CANCHOLA]  MRN:  [[id number]]    SUMMARY:  75 yoF HH3 mF4, 3 mm L pcom aneurysm, communicating hydrocephalus; diffuse atherosclerosis; stunned myocardium (anterolateral, inferior T wave inversions)    [facility] HPI:  75 female pmhx HTN, COPD, asthma, vasculopath (with single episode of vomiting just pta. HHA states pt went to the br and was taking a long time, went in to check on her and found her sitting on floor, awake, but with vomitus on front of shirt (non-bloody). Pt arrives on EMS stretcher awake/alert, answering questions, c/o pancephalic ha, ( not WHOL), describes as a " tightness" in her head. Denies visual disturbance or stiff neck. Denies cp/dyspnea/palpitations.    Past Medical History:  asthma, COPD  Allergies:  None known.  Home Meds:  · 	ProAir HFA CFC free 90 mcg/inh inhalation aerosol: 2 puff(s) inhaled 4 times a day PRN  · 	predniSONE 50 mg oral tablet: 1 tab(s) orally once a day  · 	Flagyl 500 mg oral tablet: 1 tab(s) orally every 8 hours  · 	azithromycin 500 mg oral tablet: 1 tab(s) orally once a day    Current Meds:  MEDICATIONS  (STANDING):  amicar 5 g IV, 1 g/h stop at 04:00  hydrocortisone sodium succinate Injectable 50 milliGRAM(s) IV Push every 8 hours  insulin lispro (ADMELOG) corrective regimen sliding scale   SubCutaneous three times a day before meals  lacosamide IVPB 100 milliGRAM(s) IV Intermittent every 12 hours  niCARdipine Infusion 5 mG/Hr (25 mL/Hr) IV Continuous <Continuous>  niMODipine 60 milliGRAM(s) Oral every 4 hours  senna 2 Tablet(s) Oral at bedtime  sodium chloride 0.9%. 1000 milliLiter(s) (50 mL/Hr) IV Continuous <Continuous>  pantoprazole 40 mg IV daily    MEDICATIONS  (PRN):  acetaminophen   Tablet .. 650 milliGRAM(s) Oral every 6 hours PRN Temp greater or equal to 38C (100.4F), Mild Pain (1 - 3)    PHYSICAL EXAMINATION    ICU Vital Signs Last 24 Hrs  T(C): 36.5 (09 Dec 2020 21:54), Max: 36.7 (09 Dec 2020 18:03)  T(F): 97.7 (09 Dec 2020 21:54), Max: 98 (09 Dec 2020 18:03)  HR: 88 (09 Dec 2020 21:25) (76 - 88)  BP: 129/81 (09 Dec 2020 21:25) (126/89 - 140/74)  RR: 18 (09 Dec 2020 21:25) (16 - 18)  SpO2: 95% (09 Dec 2020 21:25) (95% - 96%)    NEUROLOGICAL EXAMINATION:  easily rousable, oriented X 2, speech clear, states headache throughout course of day, pupils 2.5 mm equal and reactive bilaterally, EOM intact, face symmetric, tongue midline, no pronator drift, no [place], follows 1st order commands, strength and sensation intact  EENT:  anicteric  CARDIOVASCULAR:  S1S2 JAMIE no carotid bruits  PULMONARY:  slightly decreased to bases, no adventitia  ABDOMEN:  soft, non-tender, intact bowel sounds  EXTREMITIES:  PPP, no ankle edema  SKIN:  no manifestations    I/O's      LABS:               12.4   10.42 )-----------( 341      ( 09 Dec 2020 18:43 )             40.3     12-09    138  |  102  |  22  ----------------------------<  163<H>  4.8   |  26  |  0.91    Ca    9.4      09 Dec 2020 18:43    TPro  8.4<H>  /  Alb  3.9  /  TBili  0.3  /  DBili  x   /  AST  32  /  ALT  28  /  AlkPhos  124<H>  12-09    troponins pending ___    CAPILLARY BLOOD GLUCOSE    POCT Blood Glucose.: 154 mg/dL (09 Dec 2020 18:13)    12.09 CT/CTA - left p com 3 mm aneurysm, left cav ICA infundibulum < 2 mm; extensive subarachnoid hemorrhage, concentrated at the basilar cisterns, with intraventricular extension of hemorrhage and obstructive hydrocephalus.  Sites of small vessel ischemic injury with lacunar infarcts in the right caudate nucleus, both thalami and cerebellar hemispheres. No CT evidence of territorial infarction at this time.  12.09 CXR pending  Baseline dopplers pending  TTE pending    EKG - T inversions inferior and anterolateral leads    IV FLUIDS:  NS 50 mL/h  DRIPS:  amicar infusion  LINES:  []  DRAINS:  []  WOUNDS:  []    CODE STATUS:  [Full]  GOALS OF CARE:  [Aggressive]  DISPOSITION:  [ICU] =========================================================   NEUROCRITICAL CARE ATTENDING NOTE (WEDNESDAY, DECEMBER 9, 2020 EVENING)  =========================================================    NAME:  [CARA CANCHOLA]  MRN:  [[id number]]    SUMMARY:  75 yoF HH3 mF4, 3 mm L pcom aneurysm, communicating hydrocephalus; diffuse atherosclerosis; stunned myocardium (anterolateral, inferior T wave inversions)    L[facility] HPI:  75 female pmhx HTN, COPD, asthma, vasculopath (with single episode of vomiting just pta. HHA states pt went to the br and was taking a long time, went in to check on her and found her sitting on floor, awake, but with vomitus on front of shirt (non-bloody). Pt arrives on EMS stretcher awake/alert, answering questions, c/o pancephalic ha, (not WHOL), describes as a " tightness" in her head. Denies visual disturbance or stiff neck. Denies cp/dyspnea/palpitations.    Past Medical History:  asthma, COPD  Allergies:  None known.  Home Meds:  · 	ProAir HFA CFC free 90 mcg/inh inhalation aerosol: 2 puff(s) inhaled 4 times a day PRN  · 	predniSONE 50 mg oral tablet: 1 tab(s) orally once a day  · 	Flagyl 500 mg oral tablet: 1 tab(s) orally every 8 hours  · 	azithromycin 500 mg oral tablet: 1 tab(s) orally once a day    Current Meds:  MEDICATIONS  (STANDING):  aminocaproic acid IVPB 5 Gram(s) IV Intermittent once  ceFAZolin   IVPB 2000 milliGRAM(s) IV Intermittent once  hydrocortisone sodium succinate Injectable 50 milliGRAM(s) IV Push every 8 hours  insulin lispro (ADMELOG) corrective regimen sliding scale   SubCutaneous Before meals and at bedtime  lacosamide IVPB 100 milliGRAM(s) IV Intermittent every 12 hours  niCARdipine Infusion 5 mG/Hr (25 mL/Hr) IV Continuous <Continuous>  niMODipine 60 milliGRAM(s) Oral every 4 hours  pantoprazole  Injectable 40 milliGRAM(s) IV Push daily  senna 2 Tablet(s) Oral at bedtime  sodium chloride 0.9%. 1000 milliLiter(s) (50 mL/Hr) IV Continuous <Continuous>    MEDICATIONS  (PRN):  acetaminophen   Tablet .. 650 milliGRAM(s) Oral every 6 hours PRN Temp greater or equal to 38C (100.4F), Mild Pain (1 - 3)    PHYSICAL EXAMINATION    ICU Vital Signs Last 24 Hrs  T(C): 36.5 (09 Dec 2020 21:54), Max: 36.7 (09 Dec 2020 18:03)  T(F): 97.7 (09 Dec 2020 21:54), Max: 98 (09 Dec 2020 18:03)  HR: 88 (09 Dec 2020 21:25) (76 - 88)  BP: 129/81 (09 Dec 2020 21:25) (126/89 - 140/74)  RR: 18 (09 Dec 2020 21:25) (16 - 18)  SpO2: 95% (09 Dec 2020 21:25) (95% - 96%)    NEUROLOGICAL EXAMINATION:  easily rousable, oriented X 2, speech clear, states headache throughout course of day, pupils 2.5 mm equal and reactive bilaterally, EOM intact, face symmetric, tongue midline, no pronator drift, no [place], follows 1st order commands, strength and sensation intact  EENT:  anicteric  CARDIOVASCULAR:  S1S2 JAMIE no carotid bruits  PULMONARY:  slightly decreased to bases, no adventitia  ABDOMEN:  soft, non-tender, intact bowel sounds  EXTREMITIES:  PPP, no ankle edema  SKIN:  no manifestations    I/O's      LABS:               12.4   10.42 )-----------( 341      ( 09 Dec 2020 18:43 )             40.3     12-09    138  |  102  |  22  ----------------------------<  163<H>  4.8   |  26  |  0.91    Ca    9.4      09 Dec 2020 18:43    TPro  8.4<H>  /  Alb  3.9  /  TBili  0.3  /  DBili  x   /  AST  32  /  ALT  28  /  AlkPhos  124<H>  12-09    troponins _____    CAPILLARY BLOOD GLUCOSE    POCT Blood Glucose.: 154 mg/dL (09 Dec 2020 18:13)    12.09 CT/CTA - left p com 3 mm aneurysm, left cav ICA infundibulum < 2 mm; extensive subarachnoid hemorrhage, concentrated at the basilar cisterns, with intraventricular extension of hemorrhage and obstructive hydrocephalus.  Sites of small vessel ischemic injury with lacunar infarcts in the right caudate nucleus, both thalami and cerebellar hemispheres. No CT evidence of territorial infarction at this time.  12.09 CXR pending  Baseline dopplers pending  TTE pending    EKG - T inversions inferior and anterolateral leads    IV FLUIDS:  NS 50 mL/h  DRIPS:  amicar infusion  LINES:  []  DRAINS:  []  WOUNDS:  []    CODE STATUS:  [Full]  GOALS OF CARE:  [Aggressive]  DISPOSITION:  [ICU] =========================================================   NEUROCRITICAL CARE ATTENDING NOTE (2020 EVENING)  =========================================================    NAME:  [CARA CANCHOLA]  MRN:  [[id number]]    SUMMARY:  75 yoF HH3 mF4, 3 mm L pcom aneurysm, communicating hydrocephalus; diffuse atherosclerosis; stunned myocardium (anterolateral, inferior T wave inversions) with neuro-cardiogenic pulmonary edema    LHG[facility] HPI:  75 female pmhx HTN, COPD, asthma, vasculopath (with single episode of vomiting just pta. HHA states pt went to the br and was taking a long time, went in to check on her and found her sitting on floor, awake, but with vomitus on front of shirt (non-bloody). Pt arrives on EMS stretcher awake/alert, answering questions, c/o pancephalic ha, (not WHOL), describes as a " tightness" in her head. Denies visual disturbance or stiff neck. Denies cp/dyspnea/palpitations.    Past Medical History:  asthma, COPD  Allergies:  None known.  Home Meds:  · 	ProAir HFA CFC free 90 mcg/inh inhalation aerosol: 2 puff(s) inhaled 4 times a day PRN  · 	predniSONE 50 mg oral tablet: 1 tab(s) orally once a day  · 	Flagyl 500 mg oral tablet: 1 tab(s) orally every 8 hours  · 	azithromycin 500 mg oral tablet: 1 tab(s) orally once a day    Current Meds:  MEDICATIONS  (STANDING):  aminocaproic acid IVPB 5 Gram(s) IV Intermittent once  ceFAZolin   IVPB 2000 milliGRAM(s) IV Intermittent once  hydrocortisone sodium succinate Injectable 50 milliGRAM(s) IV Push every 8 hours  insulin lispro (ADMELOG) corrective regimen sliding scale   SubCutaneous Before meals and at bedtime  lacosamide IVPB 100 milliGRAM(s) IV Intermittent every 12 hours  niCARdipine Infusion 5 mG/Hr (25 mL/Hr) IV Continuous <Continuous>  niMODipine 60 milliGRAM(s) Oral every 4 hours  pantoprazole  Injectable 40 milliGRAM(s) IV Push daily  senna 2 Tablet(s) Oral at bedtime  sodium chloride 0.9%. 1000 milliLiter(s) (50 mL/Hr) IV Continuous <Continuous>    MEDICATIONS  (PRN):  acetaminophen   Tablet .. 650 milliGRAM(s) Oral every 6 hours PRN Temp greater or equal to 38C (100.4F), Mild Pain (1 - 3)    PHYSICAL EXAMINATION    ICU Vital Signs Last 24 Hrs  T(C): 36.5 (09 Dec 2020 21:54), Max: 36.7 (09 Dec 2020 18:03)  T(F): 97.7 (09 Dec 2020 21:54), Max: 98 (09 Dec 2020 18:03)  HR: 88 (09 Dec 2020 21:25) (76 - 88)  BP: 129/81 (09 Dec 2020 21:25) (126/89 - 140/74)  RR: 18 (09 Dec 2020 21:25) (16 - 18)  SpO2: 95% (09 Dec 2020 21:25) (95% - 96%)    NEUROLOGICAL EXAMINATION:  easily rousable, oriented X 2, speech clear, states headache throughout course of day, pupils 2.5 mm equal and reactive bilaterally, EOM intact, face symmetric, tongue midline, no pronator drift, no [place], follows 1st order commands, strength and sensation intact  EENT:  anicteric  CARDIOVASCULAR:  S1S2 JAMIE no carotid bruits  PULMONARY:  slightly decreased to bases, no adventitia  ABDOMEN:  soft, non-tender, intact bowel sounds  EXTREMITIES:  PPP, no ankle edema  SKIN:  no manifestations    I/O's    20 @ 07:01  -  12-10-20 @ 05:54  --------------------------------------------------------  IN: 750 mL / OUT: 809 mL / NET: -59 mL    LABS:                        11.5   11.08 )-----------( 346      ( 10 Dec 2020 04:54 )             37.8     12-10    138  |  100  |  15  ----------------------------<  182<H>  4.7   |  25  |  0.79    Ca    9.4      10 Dec 2020 04:53  Phos  4.5     12-10  Mg     2.2     12-10    TPro  8.1  /  Alb  4.2  /  TBili  0.3  /  DBili  x   /  AST  32  /  ALT  20  /  AlkPhos  119  12-09    PT/INR - ( 09 Dec 2020 23:13 )   PT: 11.5 sec;   INR: 0.96     PTT - ( 09 Dec 2020 23:13 )  PTT:27.4 sec    Urinalysis Basic - ( 10 Dec 2020 04:17 )    Color: Yellow / Appearance: Clear / S.015 / pH: x  Gluc: x / Ketone: NEGATIVE  / Bili: Negative / Urobili: 0.2 E.U./dL   Blood: x / Protein: NEGATIVE mg/dL / Nitrite: NEGATIVE   Leuk Esterase: NEGATIVE / RBC: < 5 /HPF / WBC < 5 /HPF   Sq Epi: x / Non Sq Epi: 0-5 /HPF / Bacteria: Present /HPF    CARDIAC MARKERS ( 10 Dec 2020 04:54 )  x     / 0.69 ng/mL / 288 U/L / x     / 14.2 ng/mL  CARDIAC MARKERS ( 09 Dec 2020 23:13 )  x     / 0.61 ng/mL / x     / x     / x        CAPILLARY BLOOD GLUCOSE    POCT Blood Glucose.: 144 mg/dL (09 Dec 2020 23:50)  POCT Blood Glucose.: 154 mg/dL (09 Dec 2020 18:13)      12.09 CT/CTA - left p com 3 mm aneurysm, left cav ICA infundibulum < 2 mm; extensive subarachnoid hemorrhage, concentrated at the basilar cisterns, with intraventricular extension of hemorrhage and obstructive hydrocephalus.  Sites of small vessel ischemic injury with lacunar infarcts in the right caudate nucleus, both thalami and cerebellar hemispheres. No CT evidence of territorial infarction at this time.  12.09 CXR pulmonary edema  Baseline dopplers pending  TTE pending _____    EKG - T inversions inferior and anterolateral leads    IV FLUIDS:  NS 50 mL/h  DRIPS:  amicar infusion  LINES:  []  DRAINS:  []  WOUNDS:  []    CODE STATUS:  [Full]  GOALS OF CARE:  [Aggressive]  DISPOSITION:  [ICU] =========================================================   NEUROCRITICAL CARE ATTENDING NOTE (2020 EVENING)  =========================================================    NAME:  [CARA CANCHOLA]  MRN:  [[id number]]    SUMMARY:  75 yoF HH3 mF4, 3 mm L pcom aneurysm, communicating hydrocephalus; diffuse atherosclerosis; stunned myocardium (anterolateral, inferior T wave inversions) with neuro-cardiogenic pulmonary edema    [facility] HPI:  75 female pmhx HTN, COPD, asthma, vasculopath (with single episode of vomiting just pta. HHA states pt went to the br and was taking a long time, went in to check on her and found her sitting on floor, awake, but with vomitus on front of shirt (non-bloody). Pt arrives on EMS stretcher awake/alert, answering questions, c/o pancephalic ha, (not WHOL), describes as a " tightness" in her head. Denies visual disturbance or stiff neck. Denies cp/dyspnea/palpitations.    Past Medical History:  asthma, COPD  Allergies:  None known.  Home Meds:  · 	ProAir HFA CFC free 90 mcg/inh inhalation aerosol: 2 puff(s) inhaled 4 times a day PRN  · 	predniSONE 50 mg oral tablet: 1 tab(s) orally once a day  · 	Flagyl 500 mg oral tablet: 1 tab(s) orally every 8 hours  · 	azithromycin 500 mg oral tablet: 1 tab(s) orally once a day    Current Meds:  MEDICATIONS  (STANDING):  aminocaproic acid IVPB 5 Gram(s) IV Intermittent once  ceFAZolin   IVPB 2000 milliGRAM(s) IV Intermittent once  hydrocortisone sodium succinate Injectable 50 milliGRAM(s) IV Push every 8 hours  insulin lispro (ADMELOG) corrective regimen sliding scale   SubCutaneous Before meals and at bedtime  lacosamide IVPB 100 milliGRAM(s) IV Intermittent every 12 hours  niCARdipine Infusion 5 mG/Hr (25 mL/Hr) IV Continuous <Continuous>  niMODipine 60 milliGRAM(s) Oral every 4 hours  pantoprazole  Injectable 40 milliGRAM(s) IV Push daily  senna 2 Tablet(s) Oral at bedtime  sodium chloride 0.9%. 1000 milliLiter(s) (50 mL/Hr) IV Continuous <Continuous>    MEDICATIONS  (PRN):  acetaminophen   Tablet .. 650 milliGRAM(s) Oral every 6 hours PRN Temp greater or equal to 38C (100.4F), Mild Pain (1 - 3)    PHYSICAL EXAMINATION    ICU Vital Signs Last 24 Hrs  T(C): 36.5 (09 Dec 2020 21:54), Max: 36.7 (09 Dec 2020 18:03)  T(F): 97.7 (09 Dec 2020 21:54), Max: 98 (09 Dec 2020 18:03)  HR: 88 (09 Dec 2020 21:25) (76 - 88)  BP: 129/81 (09 Dec 2020 21:25) (126/89 - 140/74)  RR: 18 (09 Dec 2020 21:25) (16 - 18)  SpO2: 95% (09 Dec 2020 21:25) (95% - 96%)    NEUROLOGICAL EXAMINATION:  easily rousable, oriented X 2, speech clear, states headache throughout course of day, pupils 2.5 mm equal and reactive bilaterally, EOM intact, face symmetric, tongue midline, no pronator drift, no [place], follows 1st order commands, strength and sensation intact  EENT:  anicteric  CARDIOVASCULAR:  S1S2 JAMIE no carotid bruits  PULMONARY:  slightly decreased to bases, no adventitia --> repeat exam:  rhonchi bases R>L  ABDOMEN:  soft, non-tender, intact bowel sounds  EXTREMITIES:  PPP, no ankle edema  SKIN:  no manifestations    I/O's    20 @ 07:01  -  12-10-20 @ 05:54  --------------------------------------------------------  IN: 750 mL / OUT: 809 mL / NET: -59 mL    LABS:                        11.5   11.08 )-----------( 346      ( 10 Dec 2020 04:54 )             37.8     12-10    138  |  100  |  15  ----------------------------<  182<H>  4.7   |  25  |  0.79    Ca    9.4      10 Dec 2020 04:53  Phos  4.5     12-10  Mg     2.2     12-10    TPro  8.1  /  Alb  4.2  /  TBili  0.3  /  DBili  x   /  AST  32  /  ALT  20  /  AlkPhos  119  12-09    PT/INR - ( 09 Dec 2020 23:13 )   PT: 11.5 sec;   INR: 0.96     PTT - ( 09 Dec 2020 23:13 )  PTT:27.4 sec    Urinalysis Basic - ( 10 Dec 2020 04:17 )    Color: Yellow / Appearance: Clear / S.015 / pH: x  Gluc: x / Ketone: NEGATIVE  / Bili: Negative / Urobili: 0.2 E.U./dL   Blood: x / Protein: NEGATIVE mg/dL / Nitrite: NEGATIVE   Leuk Esterase: NEGATIVE / RBC: < 5 /HPF / WBC < 5 /HPF   Sq Epi: x / Non Sq Epi: 0-5 /HPF / Bacteria: Present /HPF    CARDIAC MARKERS ( 10 Dec 2020 04:54 )  x     / 0.69 ng/mL / 288 U/L / x     / 14.2 ng/mL  CARDIAC MARKERS ( 09 Dec 2020 23:13 )  x     / 0.61 ng/mL / x     / x     / x        CAPILLARY BLOOD GLUCOSE    POCT Blood Glucose.: 144 mg/dL (09 Dec 2020 23:50)  POCT Blood Glucose.: 154 mg/dL (09 Dec 2020 18:13)      12.09 CT/CTA - left p com 3 mm aneurysm, left cav ICA infundibulum < 2 mm; extensive subarachnoid hemorrhage, concentrated at the basilar cisterns, with intraventricular extension of hemorrhage and obstructive hydrocephalus.  Sites of small vessel ischemic injury with lacunar infarcts in the right caudate nucleus, both thalami and cerebellar hemispheres. No CT evidence of territorial infarction at this time.  12. CXR pulmonary edema  Baseline dopplers pending  TTE pending _____    EKG - T inversions inferior and anterolateral leads    IV FLUIDS:  NS 50 mL/h  DRIPS:  amicar infusion  LINES:  []  DRAINS:  []  WOUNDS:  []    CODE STATUS:  [Full]  GOALS OF CARE:  [Aggressive]  DISPOSITION:  [ICU] =========================================================   NEUROCRITICAL CARE ATTENDING NOTE (2020 EVENING)  =========================================================    NAME:  [CARA CANCHOLA]  MRN:  [[id number]]    SUMMARY:  75 yoF HH3 mF4, 3 mm L pcom aneurysm, communicating hydrocephalus; diffuse atherosclerosis; stunned myocardium (anterolateral, inferior T wave inversions) with neuro-cardiogenic pulmonary edema    [facility] HPI:  75 female pmhx HTN, COPD, asthma, vasculopath (with single episode of vomiting just pta. HHA states pt went to the br and was taking a long time, went in to check on her and found her sitting on floor, awake, but with vomitus on front of shirt (non-bloody). Pt arrives on EMS stretcher awake/alert, answering questions, c/o pancephalic ha, (not WHOL), describes as a " tightness" in her head. Denies visual disturbance or stiff neck. Denies cp/dyspnea/palpitations.    Past Medical History:  asthma, COPD  Allergies:  None known.  Home Meds:  · 	ProAir HFA CFC free 90 mcg/inh inhalation aerosol: 2 puff(s) inhaled 4 times a day PRN  · 	predniSONE 50 mg oral tablet: 1 tab(s) orally once a day  · 	Flagyl 500 mg oral tablet: 1 tab(s) orally every 8 hours  · 	azithromycin 500 mg oral tablet: 1 tab(s) orally once a day    Current Meds:  MEDICATIONS  (STANDING):  aminocaproic acid IVPB 5 Gram(s) IV Intermittent once  ceFAZolin   IVPB 2000 milliGRAM(s) IV Intermittent once  hydrocortisone sodium succinate Injectable 50 milliGRAM(s) IV Push every 8 hours  insulin lispro (ADMELOG) corrective regimen sliding scale   SubCutaneous Before meals and at bedtime  lacosamide IVPB 100 milliGRAM(s) IV Intermittent every 12 hours  niCARdipine Infusion 5 mG/Hr (25 mL/Hr) IV Continuous <Continuous>  niMODipine 60 milliGRAM(s) Oral every 4 hours  pantoprazole  Injectable 40 milliGRAM(s) IV Push daily  senna 2 Tablet(s) Oral at bedtime  sodium chloride 0.9%. 1000 milliLiter(s) (50 mL/Hr) IV Continuous <Continuous>    MEDICATIONS  (PRN):  acetaminophen   Tablet .. 650 milliGRAM(s) Oral every 6 hours PRN Temp greater or equal to 38C (100.4F), Mild Pain (1 - 3)    PHYSICAL EXAMINATION    ICU Vital Signs Last 24 Hrs  T(C): 36.5 (09 Dec 2020 21:54), Max: 36.7 (09 Dec 2020 18:03)  T(F): 97.7 (09 Dec 2020 21:54), Max: 98 (09 Dec 2020 18:03)  HR: 88 (09 Dec 2020 21:25) (76 - 88)  BP: 129/81 (09 Dec 2020 21:25) (126/89 - 140/74)  RR: 18 (09 Dec 2020 21:25) (16 - 18)  SpO2: 95% (09 Dec 2020 21:25) (95% - 96%)    NEUROLOGICAL EXAMINATION:  easily rousable, oriented X 2, speech clear, states headache throughout course of day, pupils 2.5 mm equal and reactive bilaterally, EOM intact, face symmetric, tongue midline, no pronator drift, no [place], follows 1st order commands, strength and sensation intact  EENT:  anicteric  CARDIOVASCULAR:  S1S2 JAMIE no carotid bruits  PULMONARY:  slightly decreased to bases, no adventitia --> repeat exam:  rhonchi bases R>L  ABDOMEN:  soft, non-tender, intact bowel sounds  EXTREMITIES:  PPP, no ankle edema  SKIN:  no manifestations    I/O's    20 @ 07:01  -  12-10-20 @ 05:54  --------------------------------------------------------  IN: 750 mL / OUT: 809 mL / NET: -59 mL    LABS:                        11.5   11.08 )-----------( 346      ( 10 Dec 2020 04:54 )             37.8     12-10    138  |  100  |  15  ----------------------------<  182<H>  4.7   |  25  |  0.79    Ca    9.4      10 Dec 2020 04:53  Phos  4.5     12-10  Mg     2.2     12-10    TPro  8.1  /  Alb  4.2  /  TBili  0.3  /  DBili  x   /  AST  32  /  ALT  20  /  AlkPhos  119  12-09    PT/INR - ( 09 Dec 2020 23:13 )   PT: 11.5 sec;   INR: 0.96     PTT - ( 09 Dec 2020 23:13 )  PTT:27.4 sec    Urinalysis Basic - ( 10 Dec 2020 04:17 )    Color: Yellow / Appearance: Clear / S.015 / pH: x  Gluc: x / Ketone: NEGATIVE  / Bili: Negative / Urobili: 0.2 E.U./dL   Blood: x / Protein: NEGATIVE mg/dL / Nitrite: NEGATIVE   Leuk Esterase: NEGATIVE / RBC: < 5 /HPF / WBC < 5 /HPF   Sq Epi: x / Non Sq Epi: 0-5 /HPF / Bacteria: Present /HPF    CARDIAC MARKERS ( 10 Dec 2020 04:54 )  x     / 0.69 ng/mL / 288 U/L / x     / 14.2 ng/mL  CARDIAC MARKERS ( 09 Dec 2020 23:13 )  x     / 0.61 ng/mL / x     / x     / x        CAPILLARY BLOOD GLUCOSE    POCT Blood Glucose.: 144 mg/dL (09 Dec 2020 23:50)  POCT Blood Glucose.: 154 mg/dL (09 Dec 2020 18:13)      12.09 CT/CTA - left p com 3 mm aneurysm, left cav ICA infundibulum < 2 mm; extensive subarachnoid hemorrhage, concentrated at the basilar cisterns, with intraventricular extension of hemorrhage and obstructive hydrocephalus.  Sites of small vessel ischemic injury with lacunar infarcts in the right caudate nucleus, both thalami and cerebellar hemispheres. No CT evidence of territorial infarction at this time.  12. CXR pulmonary edema  Baseline dopplers pending  TTE pending _____    EKG - T inversions inferior and anterolateral leads    IV FLUIDS:  NS 50 mL/h  DRIPS:  amicar infusion  LINES:  []  DRAINS:  EVD at 15 cm  WOUNDS:  []    CODE STATUS:  [Full]  GOALS OF CARE:  [Aggressive]  DISPOSITION:  [ICU]

## 2020-12-09 NOTE — H&P ADULT - NSHPLABSRESULTS_GEN_ALL_CORE
CTH 12/9/20:  Extensive subarachnoid hemorrhage, concentrated at the basilar cisterns, with intraventricular extension of hemorrhage and obstructive hydrocephalus. Findings may be secondary to ruptured aneurysm. Emergent neurosurgical consultation is advised.    Sitesof small vessel ischemic injury with lacunar infarcts in the right caudate nucleus, both thalami and cerebellar hemispheres. No CT evidence of territorial infarction at this time.    CTA head/neck 12/9/20:  3 x 3.1 mm inferolateral oriented aneurysm originating from the left P-comm. Additional 1.6 mm inferiorly oriented outpouching extending from the distal cavernous segment of the left internal carotid artery may represent an aneurysm or infundibulum, (series 602 image 43). Extensive calcified plaque of the cavernous segments of the bilateral internal carotid arteries likely resulting in degrees of mild to moderate stenosis.    Thick calcified plaque in the bilateral carotid bifurcations causing moderate to severe right and mild to moderate left stenosis. Further evaluation with carotid Doppler can be obtained further characterization.    Focal calcified plaque at the origin of the right vertebral artery off the subclavian artery resulting in high-grade stenosis/partial occlusion, (series 3 images [id number]).    Interlobular septal thickening and ground glass opacification in the bilateral upper lung zones suggestive of pulmonary edema.    Incidentally noted chronic deformity of the right humeral neck

## 2020-12-10 LAB
A1C WITH ESTIMATED AVERAGE GLUCOSE RESULT: 6.7 % — HIGH (ref 4–5.6)
ANION GAP SERPL CALC-SCNC: 13 MMOL/L — SIGNIFICANT CHANGE UP (ref 5–17)
ANION GAP SERPL CALC-SCNC: 14 MMOL/L — SIGNIFICANT CHANGE UP (ref 5–17)
BASOPHILS # BLD AUTO: 0.03 K/UL — SIGNIFICANT CHANGE UP (ref 0–0.2)
BASOPHILS NFR BLD AUTO: 0.2 % — SIGNIFICANT CHANGE UP (ref 0–2)
BLD GP AB SCN SERPL QL: NEGATIVE — SIGNIFICANT CHANGE UP
BLD GP AB SCN SERPL QL: NEGATIVE — SIGNIFICANT CHANGE UP
BUN SERPL-MCNC: 15 MG/DL — SIGNIFICANT CHANGE UP (ref 7–23)
BUN SERPL-MCNC: 16 MG/DL — SIGNIFICANT CHANGE UP (ref 7–23)
CALCIUM SERPL-MCNC: 8.5 MG/DL — SIGNIFICANT CHANGE UP (ref 8.4–10.5)
CALCIUM SERPL-MCNC: 9.4 MG/DL — SIGNIFICANT CHANGE UP (ref 8.4–10.5)
CHLORIDE SERPL-SCNC: 100 MMOL/L — SIGNIFICANT CHANGE UP (ref 96–108)
CHLORIDE SERPL-SCNC: 101 MMOL/L — SIGNIFICANT CHANGE UP (ref 96–108)
CK MB CFR SERPL CALC: 14.2 NG/ML — HIGH (ref 0–6.7)
CK SERPL-CCNC: 288 U/L — HIGH (ref 25–170)
CO2 SERPL-SCNC: 22 MMOL/L — SIGNIFICANT CHANGE UP (ref 22–31)
CO2 SERPL-SCNC: 25 MMOL/L — SIGNIFICANT CHANGE UP (ref 22–31)
CREAT SERPL-MCNC: 0.79 MG/DL — SIGNIFICANT CHANGE UP (ref 0.5–1.3)
CREAT SERPL-MCNC: 0.83 MG/DL — SIGNIFICANT CHANGE UP (ref 0.5–1.3)
EOSINOPHIL # BLD AUTO: 0 K/UL — SIGNIFICANT CHANGE UP (ref 0–0.5)
EOSINOPHIL NFR BLD AUTO: 0 % — SIGNIFICANT CHANGE UP (ref 0–6)
ESTIMATED AVERAGE GLUCOSE: 146 MG/DL — HIGH (ref 68–114)
GLUCOSE SERPL-MCNC: 174 MG/DL — HIGH (ref 70–99)
GLUCOSE SERPL-MCNC: 182 MG/DL — HIGH (ref 70–99)
HCT VFR BLD CALC: 31.9 % — LOW (ref 34.5–45)
HCT VFR BLD CALC: 37.8 % — SIGNIFICANT CHANGE UP (ref 34.5–45)
HGB BLD-MCNC: 11.5 G/DL — SIGNIFICANT CHANGE UP (ref 11.5–15.5)
HGB BLD-MCNC: 9.8 G/DL — LOW (ref 11.5–15.5)
IMM GRANULOCYTES NFR BLD AUTO: 0.4 % — SIGNIFICANT CHANGE UP (ref 0–1.5)
LYMPHOCYTES # BLD AUTO: 1.05 K/UL — SIGNIFICANT CHANGE UP (ref 1–3.3)
LYMPHOCYTES # BLD AUTO: 7.5 % — LOW (ref 13–44)
MAGNESIUM SERPL-MCNC: 2.2 MG/DL — SIGNIFICANT CHANGE UP (ref 1.6–2.6)
MCHC RBC-ENTMCNC: 26.6 PG — LOW (ref 27–34)
MCHC RBC-ENTMCNC: 27 PG — SIGNIFICANT CHANGE UP (ref 27–34)
MCHC RBC-ENTMCNC: 30.4 GM/DL — LOW (ref 32–36)
MCHC RBC-ENTMCNC: 30.7 GM/DL — LOW (ref 32–36)
MCV RBC AUTO: 86.7 FL — SIGNIFICANT CHANGE UP (ref 80–100)
MCV RBC AUTO: 88.7 FL — SIGNIFICANT CHANGE UP (ref 80–100)
MONOCYTES # BLD AUTO: 0.65 K/UL — SIGNIFICANT CHANGE UP (ref 0–0.9)
MONOCYTES NFR BLD AUTO: 4.6 % — SIGNIFICANT CHANGE UP (ref 2–14)
NEUTROPHILS # BLD AUTO: 12.28 K/UL — HIGH (ref 1.8–7.4)
NEUTROPHILS NFR BLD AUTO: 87.3 % — HIGH (ref 43–77)
NRBC # BLD: 0 /100 WBCS — SIGNIFICANT CHANGE UP (ref 0–0)
NRBC # BLD: 0 /100 WBCS — SIGNIFICANT CHANGE UP (ref 0–0)
PHOSPHATE SERPL-MCNC: 4.5 MG/DL — SIGNIFICANT CHANGE UP (ref 2.5–4.5)
PLATELET # BLD AUTO: 345 K/UL — SIGNIFICANT CHANGE UP (ref 150–400)
PLATELET # BLD AUTO: 346 K/UL — SIGNIFICANT CHANGE UP (ref 150–400)
POTASSIUM SERPL-MCNC: 4.2 MMOL/L — SIGNIFICANT CHANGE UP (ref 3.5–5.3)
POTASSIUM SERPL-MCNC: 4.7 MMOL/L — SIGNIFICANT CHANGE UP (ref 3.5–5.3)
POTASSIUM SERPL-SCNC: 4.2 MMOL/L — SIGNIFICANT CHANGE UP (ref 3.5–5.3)
POTASSIUM SERPL-SCNC: 4.7 MMOL/L — SIGNIFICANT CHANGE UP (ref 3.5–5.3)
RBC # BLD: 3.68 M/UL — LOW (ref 3.8–5.2)
RBC # BLD: 4.26 M/UL — SIGNIFICANT CHANGE UP (ref 3.8–5.2)
RBC # FLD: 15 % — HIGH (ref 10.3–14.5)
RBC # FLD: 15.2 % — HIGH (ref 10.3–14.5)
RH IG SCN BLD-IMP: POSITIVE — SIGNIFICANT CHANGE UP
RH IG SCN BLD-IMP: POSITIVE — SIGNIFICANT CHANGE UP
SODIUM SERPL-SCNC: 137 MMOL/L — SIGNIFICANT CHANGE UP (ref 135–145)
SODIUM SERPL-SCNC: 138 MMOL/L — SIGNIFICANT CHANGE UP (ref 135–145)
TROPONIN T SERPL-MCNC: 0.3 NG/ML — CRITICAL HIGH (ref 0–0.01)
TROPONIN T SERPL-MCNC: 0.69 NG/ML — CRITICAL HIGH (ref 0–0.01)
TSH SERPL-MCNC: 0.99 UIU/ML — SIGNIFICANT CHANGE UP (ref 0.35–4.94)
WBC # BLD: 11.08 K/UL — HIGH (ref 3.8–10.5)
WBC # BLD: 14.07 K/UL — HIGH (ref 3.8–10.5)
WBC # FLD AUTO: 11.08 K/UL — HIGH (ref 3.8–10.5)
WBC # FLD AUTO: 14.07 K/UL — HIGH (ref 3.8–10.5)

## 2020-12-10 PROCEDURE — 61624 TCAT PERM OCCLS/EMBOLJ CNS: CPT

## 2020-12-10 PROCEDURE — 71045 X-RAY EXAM CHEST 1 VIEW: CPT | Mod: 26

## 2020-12-10 PROCEDURE — 99223 1ST HOSP IP/OBS HIGH 75: CPT

## 2020-12-10 PROCEDURE — 99223 1ST HOSP IP/OBS HIGH 75: CPT | Mod: 57,25

## 2020-12-10 PROCEDURE — 75898 FOLLOW-UP ANGIOGRAPHY: CPT | Mod: 26

## 2020-12-10 PROCEDURE — 99291 CRITICAL CARE FIRST HOUR: CPT

## 2020-12-10 PROCEDURE — 75894 X-RAYS TRANSCATH THERAPY: CPT | Mod: 26

## 2020-12-10 PROCEDURE — 36224 PLACE CATH CAROTD ART: CPT | Mod: LT

## 2020-12-10 PROCEDURE — 70450 CT HEAD/BRAIN W/O DYE: CPT | Mod: 26

## 2020-12-10 PROCEDURE — 61107 TDH PNXR IMPLT VENTR CATH: CPT

## 2020-12-10 PROCEDURE — 76377 3D RENDER W/INTRP POSTPROCES: CPT | Mod: 26

## 2020-12-10 PROCEDURE — 99292 CRITICAL CARE ADDL 30 MIN: CPT

## 2020-12-10 PROCEDURE — 36556 INSERT NON-TUNNEL CV CATH: CPT

## 2020-12-10 PROCEDURE — 93306 TTE W/DOPPLER COMPLETE: CPT | Mod: 26

## 2020-12-10 RX ORDER — LISINOPRIL 2.5 MG/1
2.5 TABLET ORAL DAILY
Refills: 0 | Status: DISCONTINUED | OUTPATIENT
Start: 2020-12-10 | End: 2020-12-10

## 2020-12-10 RX ORDER — ALBUTEROL 90 UG/1
2 AEROSOL, METERED ORAL EVERY 6 HOURS
Refills: 0 | Status: DISCONTINUED | OUTPATIENT
Start: 2020-12-10 | End: 2020-12-10

## 2020-12-10 RX ORDER — MIDAZOLAM HYDROCHLORIDE 1 MG/ML
2 INJECTION, SOLUTION INTRAMUSCULAR; INTRAVENOUS ONCE
Refills: 0 | Status: DISCONTINUED | OUTPATIENT
Start: 2020-12-10 | End: 2020-12-10

## 2020-12-10 RX ORDER — IPRATROPIUM/ALBUTEROL SULFATE 18-103MCG
3 AEROSOL WITH ADAPTER (GRAM) INHALATION EVERY 6 HOURS
Refills: 0 | Status: DISCONTINUED | OUTPATIENT
Start: 2020-12-10 | End: 2020-12-30

## 2020-12-10 RX ORDER — MONTELUKAST 4 MG/1
10 TABLET, CHEWABLE ORAL DAILY
Refills: 0 | Status: DISCONTINUED | OUTPATIENT
Start: 2020-12-10 | End: 2021-01-26

## 2020-12-10 RX ORDER — NOREPINEPHRINE BITARTRATE/D5W 8 MG/250ML
0.05 PLASTIC BAG, INJECTION (ML) INTRAVENOUS
Qty: 8 | Refills: 0 | Status: DISCONTINUED | OUTPATIENT
Start: 2020-12-10 | End: 2020-12-12

## 2020-12-10 RX ORDER — FENTANYL CITRATE 50 UG/ML
25 INJECTION INTRAVENOUS ONCE
Refills: 0 | Status: DISCONTINUED | OUTPATIENT
Start: 2020-12-10 | End: 2020-12-10

## 2020-12-10 RX ORDER — SODIUM CHLORIDE 9 MG/ML
500 INJECTION INTRAMUSCULAR; INTRAVENOUS; SUBCUTANEOUS ONCE
Refills: 0 | Status: COMPLETED | OUTPATIENT
Start: 2020-12-10 | End: 2020-12-10

## 2020-12-10 RX ORDER — NIMODIPINE 60 MG/10ML
30 SOLUTION ORAL EVERY 4 HOURS
Refills: 0 | Status: DISCONTINUED | OUTPATIENT
Start: 2020-12-10 | End: 2020-12-11

## 2020-12-10 RX ORDER — AMINOCAPROIC ACID 500 MG/1
5 TABLET ORAL
Qty: 5 | Refills: 0 | Status: COMPLETED | OUTPATIENT
Start: 2020-12-10 | End: 2020-12-10

## 2020-12-10 RX ORDER — SIMVASTATIN 20 MG/1
40 TABLET, FILM COATED ORAL AT BEDTIME
Refills: 0 | Status: DISCONTINUED | OUTPATIENT
Start: 2020-12-10 | End: 2020-12-11

## 2020-12-10 RX ORDER — FUROSEMIDE 40 MG
20 TABLET ORAL ONCE
Refills: 0 | Status: COMPLETED | OUTPATIENT
Start: 2020-12-10 | End: 2020-12-10

## 2020-12-10 RX ORDER — SIMVASTATIN 20 MG/1
20 TABLET, FILM COATED ORAL AT BEDTIME
Refills: 0 | Status: DISCONTINUED | OUTPATIENT
Start: 2020-12-10 | End: 2020-12-10

## 2020-12-10 RX ORDER — SODIUM CHLORIDE 9 MG/ML
1000 INJECTION INTRAMUSCULAR; INTRAVENOUS; SUBCUTANEOUS
Refills: 0 | Status: DISCONTINUED | OUTPATIENT
Start: 2020-12-10 | End: 2020-12-10

## 2020-12-10 RX ORDER — MIDAZOLAM HYDROCHLORIDE 1 MG/ML
1 INJECTION, SOLUTION INTRAMUSCULAR; INTRAVENOUS ONCE
Refills: 0 | Status: DISCONTINUED | OUTPATIENT
Start: 2020-12-10 | End: 2020-12-10

## 2020-12-10 RX ORDER — LACOSAMIDE 50 MG/1
50 TABLET ORAL EVERY 12 HOURS
Refills: 0 | Status: DISCONTINUED | OUTPATIENT
Start: 2020-12-10 | End: 2020-12-11

## 2020-12-10 RX ADMIN — HYDROMORPHONE HYDROCHLORIDE 0.5 MILLIGRAM(S): 2 INJECTION INTRAMUSCULAR; INTRAVENOUS; SUBCUTANEOUS at 00:11

## 2020-12-10 RX ADMIN — FENTANYL CITRATE 25 MICROGRAM(S): 50 INJECTION INTRAVENOUS at 03:46

## 2020-12-10 RX ADMIN — Medication 20 MILLIGRAM(S): at 03:00

## 2020-12-10 RX ADMIN — NIMODIPINE 60 MILLIGRAM(S): 60 SOLUTION ORAL at 15:09

## 2020-12-10 RX ADMIN — Medication 4.68 MICROGRAM(S)/KG/MIN: at 16:10

## 2020-12-10 RX ADMIN — MIDAZOLAM HYDROCHLORIDE 2 MILLIGRAM(S): 1 INJECTION, SOLUTION INTRAMUSCULAR; INTRAVENOUS at 01:30

## 2020-12-10 RX ADMIN — MIDAZOLAM HYDROCHLORIDE 1 MILLIGRAM(S): 1 INJECTION, SOLUTION INTRAMUSCULAR; INTRAVENOUS at 18:30

## 2020-12-10 RX ADMIN — LACOSAMIDE 120 MILLIGRAM(S): 50 TABLET ORAL at 00:00

## 2020-12-10 RX ADMIN — MONTELUKAST 10 MILLIGRAM(S): 4 TABLET, CHEWABLE ORAL at 15:09

## 2020-12-10 RX ADMIN — Medication 2: at 11:21

## 2020-12-10 RX ADMIN — LIDOCAINE HYDROCHLORIDE AND EPINEPHRINE 10 MILLILITER(S): 10; 10 INJECTION, SOLUTION INFILTRATION; PERINEURAL at 02:00

## 2020-12-10 RX ADMIN — AMINOCAPROIC ACID 250 GM/HR: 500 TABLET ORAL at 01:00

## 2020-12-10 RX ADMIN — FENTANYL CITRATE 25 MICROGRAM(S): 50 INJECTION INTRAVENOUS at 18:30

## 2020-12-10 RX ADMIN — NIMODIPINE 30 MILLIGRAM(S): 60 SOLUTION ORAL at 22:00

## 2020-12-10 RX ADMIN — FENTANYL CITRATE 50 MICROGRAM(S): 50 INJECTION INTRAVENOUS at 01:30

## 2020-12-10 RX ADMIN — SODIUM CHLORIDE 500 MILLILITER(S): 9 INJECTION INTRAMUSCULAR; INTRAVENOUS; SUBCUTANEOUS at 15:40

## 2020-12-10 RX ADMIN — NIMODIPINE 30 MILLIGRAM(S): 60 SOLUTION ORAL at 19:08

## 2020-12-10 RX ADMIN — Medication 50 MILLIGRAM(S): at 06:15

## 2020-12-10 RX ADMIN — Medication 2: at 22:00

## 2020-12-10 RX ADMIN — Medication 50 MILLIGRAM(S): at 22:00

## 2020-12-10 RX ADMIN — Medication 2: at 17:15

## 2020-12-10 RX ADMIN — SIMVASTATIN 40 MILLIGRAM(S): 20 TABLET, FILM COATED ORAL at 21:55

## 2020-12-10 RX ADMIN — LACOSAMIDE 110 MILLIGRAM(S): 50 TABLET ORAL at 19:07

## 2020-12-10 RX ADMIN — Medication 100 MILLIGRAM(S): at 00:00

## 2020-12-10 RX ADMIN — PANTOPRAZOLE SODIUM 40 MILLIGRAM(S): 20 TABLET, DELAYED RELEASE ORAL at 11:16

## 2020-12-10 RX ADMIN — Medication 50 MILLIGRAM(S): at 14:21

## 2020-12-10 RX ADMIN — FENTANYL CITRATE 25 MICROGRAM(S): 50 INJECTION INTRAVENOUS at 19:09

## 2020-12-10 RX ADMIN — Medication 2: at 06:14

## 2020-12-10 RX ADMIN — FENTANYL CITRATE 25 MICROGRAM(S): 50 INJECTION INTRAVENOUS at 02:30

## 2020-12-10 RX ADMIN — SENNA PLUS 2 TABLET(S): 8.6 TABLET ORAL at 21:54

## 2020-12-10 RX ADMIN — AMINOCAPROIC ACID 50 GM/HR: 500 TABLET ORAL at 00:12

## 2020-12-10 RX ADMIN — FENTANYL CITRATE 50 MICROGRAM(S): 50 INJECTION INTRAVENOUS at 02:12

## 2020-12-10 NOTE — PROGRESS NOTE ADULT - SUBJECTIVE AND OBJECTIVE BOX
NEUROSURGERY POST OP NOTE:    POD#0 s/p angiogram, cerebral, with intracranial aneurysm embolization.       S: 76y/o F with PMHx sig for COPD, asthma, HLD, HTN, BIBEMS to [facility] from home after HHA discovered patient found down in floor covered in non-bloody vomitus. On arrival to [facility], patient was taken for stat head CT, which revealed diffuse subarachnoid hemorrhage mainly at basilar cisterns with IVH and obstructive hydrocephalus. Patient was given a bolus of 1g keppra and dilaudid pushes for generalized headache. CTA concerning for 3mm left pcomm aneurysm and a 1.6mm outpouching of distal cavernous segment of the left internal carotid artery likely aneurysm. NIHSS2 HH3 MF4. Patient was transferred to [facility] for further intervention. Patient currently reports headaches. Pt denies acute changes in vision, seizures, CP, SOB, weakness/paresthesias of arms or legs.    T(C): 36.8 (12-10-20 @ 14:03), Max: 37.3 (12-10-20 @ 10:56)  HR: 67 (12-10-20 @ 16:30) (66 - 88)  BP: 91/55 (12-10-20 @ 16:30) (75/42 - 149/64)  RR: 18 (12-10-20 @ 16:30) (16 - 22)  SpO2: 98% (12-10-20 @ 16:30) (90% - 100%)      12-09-20 @ 07:01  -  12-10-20 @ 07:00  --------------------------------------------------------  IN: 750 mL / OUT: 961 mL / NET: -211 mL    12-10-20 @ 07:01  -  12-10-20 @ 16:54  --------------------------------------------------------  IN: 250 mL / OUT: 469 mL / NET: -219 mL        acetaminophen   Tablet .. 650 milliGRAM(s) Oral every 6 hours PRN  albuterol/ipratropium for Nebulization. 3 milliLiter(s) Nebulizer every 6 hours  dextrose 40% Gel 15 Gram(s) Oral once  dextrose 50% Injectable 25 Gram(s) IV Push once  glucagon  Injectable 1 milliGRAM(s) IntraMuscular once  hydrocortisone sodium succinate Injectable 50 milliGRAM(s) IV Push every 8 hours  insulin lispro (ADMELOG) corrective regimen sliding scale   SubCutaneous Before meals and at bedtime  lacosamide IVPB 50 milliGRAM(s) IV Intermittent every 12 hours  montelukast 10 milliGRAM(s) Oral daily  niMODipine 30 milliGRAM(s) Oral every 4 hours  norepinephrine Infusion 0.05 MICROgram(s)/kG/Min IV Continuous <Continuous>  pantoprazole  Injectable 40 milliGRAM(s) IV Push daily  senna 2 Tablet(s) Oral at bedtime  simvastatin 40 milliGRAM(s) Oral at bedtime  sodium chloride 0.9% Bolus 500 milliLiter(s) IV Bolus once      RADIOLOGY:     Exam:  General: NAD, lying comfortably in bed  Skin: no rashes or lesions  HEENT: PERRL with pupil size 2mm , EOMI, conjunctiva and sclera clear  Lung: CTAB, no rales, rhonchi, or wheezing  Cardiovascular: RRR, no murmurs, gallops, or rubs  Abdomen: Soft, non-tender, non-distended, + BS  Extremities: 2+ radial pulses, 1+ PD pulses, no clubbing, cyanosis, or edema  Neurological: AAOx2 (self and year), CN II-XII intact, follows command, motor strength UE/LE 5/5 b/l, no drift, DTRs 2+ intact b/l, sensation to touch throughout    WOUND/DRAINS:  -R frontal EVD  -Peripheral lines x2 (right)  -Arterial line (left)  -R groin dressing C/D/I    DEVICES: n/a      Assessment:   This is a 76 yo Female with PMHx of COPD, asthma, HLD, HTN, BIBEMS to [facility] from home after HHA found patient down on the floor covered in non-bloody vomitus. CTH reveals diffuse subarachnoid hemorrhage mainly at basilar cisterns with IVH and obstructive hydrocephalus, CTA shows 3mm left pcomm aneurysm and a 1.6mm outpouching of distal cavernous segment of the left internal carotid artery likely aneurysm. NIHSS2 HH3 MF4 BD1. POD#0: Angiogram, cerebral, with intracranial aneurysm embolization.       Plan:  NEURO:   -Neuro checks q1h, PRN pain meds with Tylenol / Percocet  -SAH: TCD starting Day 4 for vasospasm surveillance, CTA, start nimodipine 60 mg po q4h to be given for 21 days;   -Hydrocephalus:  EVD inserted - decrease to 10 cm H20 post coiling  -Seizure prophylaxis (cortical ICH, associated SDH, unclear etiology):  lacosamide decrease to 50mg IV BID   -REHAB:  physical therapy evaluation and management      -EARLY MOB:  HOB elevated    PULM: Room air, incentive spirometry, COPD on prednisone at home, continue montelukast, albuterol PRN    CARDIO:  SBP goal 100-140mm Hg, nicardipine 5 mg/hour, titrate by 2.5 mg/hour every 15 min to a max of 15 mg/hour; baseline Echo, trend Troponin x1, cardiology consulted; -180mm Hg once aneurysm secured; d/c lisinopril     ENDO:  Blood sugar goals 140-180 mg/dL, cont insulin sliding scale, stress dose hydrocortisone x 24h then back to prednisone home dose; switch simvastatin to atorvastatin 40mg PO daily     GI:  PPI for GI prophylaxis while on steroids    DIET: NPO    RENAL: maintain euvolemia, accurate I/Os  HEM/ONC: no coagulopathy, no ASA / Plavix use  VTE Prophylaxis: SCDs, no DVT chemoprophylaxis for now as patient is high risk for bleed (fresh post-bleed), baseline LE Doppler for DVT suspected on admission (found down)  ID: afebrile, no leukocytosis  Social: will update family      Assessment:  Present when checked    []  GCS  E   V  M     Heart Failure: []Acute, [] acute on chronic , []chronic  Heart Failure:  [] Diastolic (HFpEF), [] Systolic (HFrEF), []Combined (HFpEF and HFrEF), [] RHF, [] Pulm HTN, [] Other    [] MICHAELLE, [] ATN, [] AIN, [] other  [] CKD1, [] CKD2, [] CKD 3, [] CKD 4, [] CKD 5, []ESRD    Encephalopathy: [] Metabolic, [] Hepatic, [] toxic, [] Neurological, [] Other    Abnormal Nurtitional Status: [] malnurtition (see nutrition note), [ ]underweight: BMI < 19, [] morbid obesity: BMI >40, [] Cachexia    [] Sepsis  [] hypovolemic shock,[] cardiogenic shock, [] hemorrhagic shock, [] neuogenic shock  [] Acute Respiratory Failure  []Cerebral edema, [] Brain compression/ herniation,   [] Functional quadriplegia  [] Acute blood loss anemia       NEUROSURGERY POST OP NOTE:    POD#0 s/p angiogram, cerebral, with intracranial aneurysm embolization.       S: 76y/o F with PMHx sig for COPD, asthma, HLD, HTN, BIBEMS to [facility] from home after HHA discovered patient found down in floor covered in non-bloody vomitus. On arrival to [facility], patient was taken for stat head CT, which revealed diffuse subarachnoid hemorrhage mainly at basilar cisterns with IVH and obstructive hydrocephalus. CTA concerning for 3mm left pcomm aneurysm and a 1.6mm outpouching of distal cavernous segment of the left internal carotid artery likely aneurysm. NIHSS2 HH3 MF4. Patient was transferred to [facility] for further intervention. Patient currently reports headaches. Pt denies acute changes in vision, seizures, CP, SOB, weakness/paresthesias of arms or legs.    T(C): 36.8 (12-10-20 @ 14:03), Max: 37.3 (12-10-20 @ 10:56)  HR: 67 (12-10-20 @ 16:30) (66 - 88)  BP: 91/55 (12-10-20 @ 16:30) (75/42 - 149/64)  RR: 18 (12-10-20 @ 16:30) (16 - 22)  SpO2: 98% (12-10-20 @ 16:30) (90% - 100%)      12-09-20 @ 07:01  -  12-10-20 @ 07:00  --------------------------------------------------------  IN: 750 mL / OUT: 961 mL / NET: -211 mL    12-10-20 @ 07:01  -  12-10-20 @ 16:54  --------------------------------------------------------  IN: 250 mL / OUT: 469 mL / NET: -219 mL        acetaminophen   Tablet .. 650 milliGRAM(s) Oral every 6 hours PRN  albuterol/ipratropium for Nebulization. 3 milliLiter(s) Nebulizer every 6 hours  dextrose 40% Gel 15 Gram(s) Oral once  dextrose 50% Injectable 25 Gram(s) IV Push once  glucagon  Injectable 1 milliGRAM(s) IntraMuscular once  hydrocortisone sodium succinate Injectable 50 milliGRAM(s) IV Push every 8 hours  insulin lispro (ADMELOG) corrective regimen sliding scale   SubCutaneous Before meals and at bedtime  lacosamide IVPB 50 milliGRAM(s) IV Intermittent every 12 hours  montelukast 10 milliGRAM(s) Oral daily  niMODipine 30 milliGRAM(s) Oral every 4 hours  norepinephrine Infusion 0.05 MICROgram(s)/kG/Min IV Continuous <Continuous>  pantoprazole  Injectable 40 milliGRAM(s) IV Push daily  senna 2 Tablet(s) Oral at bedtime  simvastatin 40 milliGRAM(s) Oral at bedtime  sodium chloride 0.9% Bolus 500 milliLiter(s) IV Bolus once      RADIOLOGY:     Exam:  General: NAD, lying comfortably in bed  Skin: no rashes or lesions  HEENT: PERRL with pupil size 2mm , EOMI, conjunctiva and sclera clear  Lung: CTAB, no rales, rhonchi, or wheezing  Cardiovascular: RRR, no murmurs, gallops, or rubs  Abdomen: Soft, non-tender, non-distended, + BS  Extremities: 2+ radial pulses, 1+ PD pulses, no clubbing, cyanosis, or edema  Neurological: AAOx2 (self and year), CN II-XII intact, follows command, motor strength UE/LE 5/5 b/l, no drift, DTRs 2+ intact b/l, sensation to touch throughout    WOUND/DRAINS:  -R frontal EVD  -Peripheral lines x2 (right)  -Arterial line (left)  -R groin dressing C/D/I    DEVICES: n/a      Assessment:   This is a 76 yo Female with PMHx of COPD, asthma, HLD, HTN, BIBEMS to [facility] from home after HHA found patient down on the floor covered in non-bloody vomitus. CTH reveals diffuse subarachnoid hemorrhage mainly at basilar cisterns with IVH and obstructive hydrocephalus, CTA shows 3mm left pcomm aneurysm and a 1.6mm outpouching of distal cavernous segment of the left internal carotid artery likely aneurysm. NIHSS2 HH3 MF4 BD1. POD#0: Angiogram, cerebral, with intracranial aneurysm embolization.       Plan:  NEURO:   -Neuro checks q1h, PRN pain meds with Tylenol / Percocet  -SAH: TCD starting Day 4 for vasospasm surveillance, CTA, start nimodipine 60 mg po q4h to be given for 21 days;   -Hydrocephalus:  EVD inserted - decrease to 10 cm H20 post coiling  -Seizure prophylaxis (cortical ICH, associated SDH, unclear etiology):  lacosamide decrease to 50mg IV BID   -REHAB:  physical therapy evaluation and management      -EARLY MOB:  HOB elevated    PULM: Room air, incentive spirometry, COPD on prednisone at home, continue montelukast, albuterol PRN    CARDIO:  SBP goal 100-140mm Hg, nicardipine 5 mg/hour, titrate by 2.5 mg/hour every 15 min to a max of 15 mg/hour; baseline Echo, trend Troponin x1, cardiology consulted; -180mm Hg once aneurysm secured; d/c lisinopril     ENDO:  Blood sugar goals 140-180 mg/dL, cont insulin sliding scale, stress dose hydrocortisone x 24h then back to prednisone home dose; switch simvastatin to atorvastatin 40mg PO daily     GI:  PPI for GI prophylaxis while on steroids    DIET: NPO    RENAL: maintain euvolemia, accurate I/Os  HEM/ONC: no coagulopathy, no ASA / Plavix use  VTE Prophylaxis: SCDs, no DVT chemoprophylaxis for now as patient is high risk for bleed (fresh post-bleed), baseline LE Doppler for DVT suspected on admission (found down)  ID: afebrile, no leukocytosis  Social: will update family      Assessment:  Present when checked    []  GCS  E   V  M     Heart Failure: []Acute, [] acute on chronic , []chronic  Heart Failure:  [] Diastolic (HFpEF), [] Systolic (HFrEF), []Combined (HFpEF and HFrEF), [] RHF, [] Pulm HTN, [] Other    [] MICHAELLE, [] ATN, [] AIN, [] other  [] CKD1, [] CKD2, [] CKD 3, [] CKD 4, [] CKD 5, []ESRD    Encephalopathy: [] Metabolic, [] Hepatic, [] toxic, [] Neurological, [] Other    Abnormal Nurtitional Status: [] malnurtition (see nutrition note), [ ]underweight: BMI < 19, [] morbid obesity: BMI >40, [] Cachexia    [] Sepsis  [] hypovolemic shock,[] cardiogenic shock, [] hemorrhagic shock, [] neuogenic shock  [] Acute Respiratory Failure  []Cerebral edema, [] Brain compression/ herniation,   [] Functional quadriplegia  [] Acute blood loss anemia       NEUROSURGERY POST OP NOTE:    HPI: 76y/o F with PMHx sig for COPD, asthma, HLD, HTN, BIBEMS to [facility] from home after HHA discovered patient found down in floor covered in non-bloody vomitus. On arrival to [facility], patient was taken for stat head CT, which revealed diffuse subarachnoid hemorrhage mainly at basilar cisterns with IVH and obstructive hydrocephalus. CTA concerning for 3mm left pcomm aneurysm and a 1.6mm outpouching of distal cavernous segment of the left internal carotid artery likely aneurysm. NIHSS2 HH3 MF4. Patient was transferred to [facility] for further intervention. Patient currently reports headaches. Pt denies acute changes in vision, seizures, CP, SOB, weakness/paresthesias of arms or legs.    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 Pt denies acute changes in vision, seizures, CP, SOB, weakness/paresthesias of arms or legs.    T(C): 36.8 (12-10-20 @ 14:03), Max: 37.3 (12-10-20 @ 10:56)  HR: 67 (12-10-20 @ 16:30) (66 - 88)  BP: 91/55 (12-10-20 @ 16:30) (75/42 - 149/64)  RR: 18 (12-10-20 @ 16:30) (16 - 22)  SpO2: 98% (12-10-20 @ 16:30) (90% - 100%)      12-09-20 @ 07:01  -  12-10-20 @ 07:00  --------------------------------------------------------  IN: 750 mL / OUT: 961 mL / NET: -211 mL    12-10-20 @ 07:01  -  12-10-20 @ 16:54  --------------------------------------------------------  IN: 250 mL / OUT: 469 mL / NET: -219 mL        acetaminophen   Tablet .. 650 milliGRAM(s) Oral every 6 hours PRN  albuterol/ipratropium for Nebulization. 3 milliLiter(s) Nebulizer every 6 hours  dextrose 40% Gel 15 Gram(s) Oral once  dextrose 50% Injectable 25 Gram(s) IV Push once  glucagon  Injectable 1 milliGRAM(s) IntraMuscular once  hydrocortisone sodium succinate Injectable 50 milliGRAM(s) IV Push every 8 hours  insulin lispro (ADMELOG) corrective regimen sliding scale   SubCutaneous Before meals and at bedtime  lacosamide IVPB 50 milliGRAM(s) IV Intermittent every 12 hours  montelukast 10 milliGRAM(s) Oral daily  niMODipine 30 milliGRAM(s) Oral every 4 hours  norepinephrine Infusion 0.05 MICROgram(s)/kG/Min IV Continuous <Continuous>  pantoprazole  Injectable 40 milliGRAM(s) IV Push daily  senna 2 Tablet(s) Oral at bedtime  simvastatin 40 milliGRAM(s) Oral at bedtime  sodium chloride 0.9% Bolus 500 milliLiter(s) IV Bolus once      RADIOLOGY:     Exam:  General: NAD, lying comfortably in bed  Skin: no rashes or lesions  HEENT: PERRL with pupil size 2mm , EOMI, conjunctiva and sclera clear  Lung: CTAB, no rales, rhonchi, or wheezing  Cardiovascular: RRR, no murmurs, gallops, or rubs  Abdomen: Soft, non-tender, non-distended, + BS  Extremities: 2+ radial pulses, 1+ PD pulses, no clubbing, cyanosis, or edema  Neurological: AAOx2 (self and year), CN II-XII intact, follows command, motor strength UE/LE 5/5 b/l, no drift, DTRs 2+ intact b/l, sensation to touch throughout. Speech clear.    WOUND/DRAINS:  -R frontal EVD, C/D/I.  -Peripheral lines x2 (right)  -Arterial line (left)  -R groin dressing C/D/I    DEVICES: n/a      Assessment: 76 yo Female with PMHx of COPD, asthma, HLD, HTN, BIBEMS to [facility] from home after HHA found patient down on the floor covered in non-bloody vomitus. CTH reveals diffuse subarachnoid hemorrhage mainly at basilar cisterns with IVH and obstructive hydrocephalus, CTA shows 3mm left pcomm aneurysm, now s/p bedside right frontal EVD placement 12/10, s/p cerebral angiogram for coil embolization of left PCOMM aneurysm 12/10, NIHSS2 HH3 MF4 BD#2.      Plan:  NEURO:   -Neuro checks q1h, PRN pain meds with Tylenol / Percocet  -EVD to 5cm above tragus, monitor CSF output and ICPs,  -Vimpat for seizure ppx,  -CTA BD#7,  -Nimodipine 30mg q4h ( hypotension with 60mg dose)    PULM:   Room air, incentive spirometry,  NC PRN low O2 saturation  COPD on prednisone at home, continue montelukast, albuterol PRN    CARDIO:    SBP goal 100-180mm.  Daily labs, electrolyte repletions PRN,  Continue Statin therapy for h/o HLD,  Hold anti-HTN, on Levophed for SBP > 100.  Cardiology consulted for EKG changes and Trops (now downtrending),  ECHO performed, EF 35%    ENDO:    Blood sugar goals 140-180 mg/dL, cont insulin sliding scale,   stress dose hydrocortisone x 24h then back to prednisone home dose for COPD    GI:    PPI for GI prophylaxis while on steroids  Advance PO diet as tolerated, stop IVF,  Stool softeners PRN    RENAL:  maintain euvolemia, accurate I/Os  Continue Soriano catheter at this time    ID:  Afebrile, no antibiotics at this time    DVT PROPHYLAXIS:  SCDs, no chemoppx at this time,  Baseline LE dopplers    Disposition:  COntinue NSICU care, full code,  Bedrest at this time,  Care plan d/w Family at bedside,  D/w Dr. Nino, Dr. Serrano.        Assessment:  Present when checked    []  GCS  E   V  M     Heart Failure: []Acute, [] acute on chronic , []chronic  Heart Failure:  [] Diastolic (HFpEF), [] Systolic (HFrEF), []Combined (HFpEF and HFrEF), [] RHF, [] Pulm HTN, [] Other    [] MICHAELLE, [] ATN, [] AIN, [] other  [] CKD1, [] CKD2, [] CKD 3, [] CKD 4, [] CKD 5, []ESRD    Encephalopathy: [] Metabolic, [] Hepatic, [] toxic, [] Neurological, [] Other    Abnormal Nurtitional Status: [] malnurtition (see nutrition note), [ ]underweight: BMI < 19, [] morbid obesity: BMI >40, [] Cachexia    [] Sepsis  [] hypovolemic shock,[] cardiogenic shock, [] hemorrhagic shock, [] neuogenic shock  [] Acute Respiratory Failure  []Cerebral edema, [] Brain compression/ herniation,   [] Functional quadriplegia  [] Acute blood loss anemia

## 2020-12-10 NOTE — PROCEDURE NOTE - GENERAL PROCEDURE DETAILS
Scalp Hair was clipped. The patient was prepped with ChloraPrep and Betadine. Incision and tunneling tract were infiltrated with 1% Xylocaine with epinephrine [id number]. Patient received antibiotics post procedure. She was draped in a sterile manner.

## 2020-12-10 NOTE — CONSULT NOTE ADULT - ASSESSMENT
ASSESSMENT:  75F PMH COPD, Asthma, HTN, HLD presented after being found down in vomit and admitted to neurosurgerical ICU for subarachnoid hemorrhage. Cardiology consulted in setting of elevated troponin and EKG changes to evaluate for r/o takostubo.     PLAN:  #Elevated troponin   ASSESSMENT:  75F PMH COPD, Asthma, HTN, HLD presented after being found down in vomit and admitted to neurosurgerical ICU for subarachnoid hemorrhage. Cardiology consulted in setting of elevated troponin and EKG changes to evaluate for r/o takostubo.     PLAN:  #Elevated troponin/TWI  Diffuse TWI in EKG, most likely cerebral TWI in setting of subarachnoid hemorrhage, troponin 0.61-> 0.69  Patient denies chest pain, no shortness of breath  Exam: No JVD, intermittent wheezes on exam, no LE edema  - obtain echocardiogram to evaluate for takotsubo      Recommendations final when signed by attending.   ASSESSMENT:  75F PMH COPD, Asthma, HTN, HLD presented after being found down in vomit and admitted to neurosurgerical ICU for subarachnoid hemorrhage. Cardiology consulted in setting of elevated troponin and EKG changes to evaluate for r/o takostubo.     PLAN:  #Elevated troponin/TWI  Diffuse TWI in EKG, most likely cerebral TWI in setting of subarachnoid hemorrhage, troponin 0.61-> 0.69  Patient denies chest pain, no shortness of breath  Exam: No JVD, intermittent wheezes on exam, no LE edema  - obtain echocardiogram to evaluate for takotsubo  - trend troponin to peak, serial ekgs      Recommendations final when signed by attending.

## 2020-12-10 NOTE — CONSULT NOTE ADULT - SUBJECTIVE AND OBJECTIVE BOX
Cardiology Consult    HPI: 75F PMH COPD, Asthma, HTN, HLD presented after being found down in vomit. At [facility], CTH demonstrated diffuse subarachnoid hemorrhage at basilar cisterns with IVH and obstructive hydrocephalus and CTA demonstrating 3mm L pCOMM aneurysm. Patient transferred to neurosurgical ICU for further management of subarachnoid bleed. Noted troponin elevation of 0.61 -> 0.69 with EKG demonstrating:     Telemetry:    OBJECTIVE  Vitals:  T(C): 37.1 (12-10-20 @ 04:53), Max: 37.1 (12-10-20 @ 04:53)  HR: 72 (12-10-20 @ 05:00) (72 - 88)  BP: 116/58 (12-10-20 @ 04:00) (102/57 - 149/64)  RR: 18 (12-10-20 @ 04:00) (16 - 22)  SpO2: 100% (12-10-20 @ 05:00) (94% - 100%)  Wt(kg): --    I/O:  I&O's Summary    09 Dec 2020 07:01  -  10 Dec 2020 06:35  --------------------------------------------------------  IN: 750 mL / OUT: 809 mL / NET: -59 mL        PHYSICAL EXAM:  Appearance: NAD. Speaking in full sentences.   HEENT: No pallor noted.  Conjunctiva clear b/l. Moist oral mucosa.  Cardiovascular: RRR with no murmurs.  Respiratory: Lungs CTAB.   Gastrointestinal:  Soft, nontender. Non-distended. Non-rigid.	  Extremities: No edema b/l. No erythema b/l. LE WWP b/l.  Vascular: DP intact  Neurologic:  Alert and awake. Moving all extremities. Following commands.   	  LABS:                        11.5   11.08 )-----------( 346      ( 10 Dec 2020 04:54 )             37.8     12-10    138  |  100  |  15  ----------------------------<  182<H>  4.7   |  25  |  0.79    Ca    9.4      10 Dec 2020 04:53  Phos  4.5     12-10  Mg     2.2     12-10    TPro  8.1  /  Alb  4.2  /  TBili  0.3  /  DBili  x   /  AST  32  /  ALT  20  /  AlkPhos  119  12-09    PT/INR - ( 09 Dec 2020 23:13 )   PT: 11.5 sec;   INR: 0.96          PTT - ( 09 Dec 2020 23:13 )  PTT:27.4 sec  Urinalysis Basic - ( 10 Dec 2020 04:17 )    Color: Yellow / Appearance: Clear / S.015 / pH: x  Gluc: x / Ketone: NEGATIVE  / Bili: Negative / Urobili: 0.2 E.U./dL   Blood: x / Protein: NEGATIVE mg/dL / Nitrite: NEGATIVE   Leuk Esterase: NEGATIVE / RBC: < 5 /HPF / WBC < 5 /HPF   Sq Epi: x / Non Sq Epi: 0-5 /HPF / Bacteria: Present /HPF        RADIOLOGY & ADDITIONAL TESTS:  Reviewed .    MEDICATIONS  (STANDING):  aminocaproic acid Infusion 1 Gm/Hr (50 mL/Hr) IV Continuous <Continuous>  dextrose 40% Gel 15 Gram(s) Oral once  dextrose 5%. 1000 milliLiter(s) (50 mL/Hr) IV Continuous <Continuous>  dextrose 50% Injectable 25 Gram(s) IV Push once  glucagon  Injectable 1 milliGRAM(s) IntraMuscular once  hydrocortisone sodium succinate Injectable 50 milliGRAM(s) IV Push every 8 hours  insulin lispro (ADMELOG) corrective regimen sliding scale   SubCutaneous Before meals and at bedtime  lacosamide IVPB 100 milliGRAM(s) IV Intermittent every 12 hours  lisinopril 2.5 milliGRAM(s) Oral daily  montelukast 10 milliGRAM(s) Oral daily  niCARdipine Infusion 5 mG/Hr (25 mL/Hr) IV Continuous <Continuous>  niMODipine 60 milliGRAM(s) Oral every 4 hours  pantoprazole  Injectable 40 milliGRAM(s) IV Push daily  senna 2 Tablet(s) Oral at bedtime  simvastatin 20 milliGRAM(s) Oral at bedtime    MEDICATIONS  (PRN):  acetaminophen   Tablet .. 650 milliGRAM(s) Oral every 6 hours PRN Temp greater or equal to 38C (100.4F), Mild Pain (1 - 3)  ALBUTerol    90 MICROgram(s) HFA Inhaler 2 Puff(s) Inhalation every 6 hours PRN Shortness of Breath and/or Wheezing     Cardiology Consult    HPI: 75F PMH COPD, Asthma, HTN, HLD presented after being found down in vomit. At [facility], CTH demonstrated diffuse subarachnoid hemorrhage at basilar cisterns with IVH and obstructive hydrocephalus and CTA demonstrating 3mm L pCOMM aneurysm. Patient transferred to neurosurgical ICU for further management of subarachnoid bleed. Noted troponin elevation of 0.61 -> 0.69 with EKG demonstrating: NSR with diffuse TWI- V1-6, I, II/III and avF. Cardiology consulted in setting of elevated troponin and ekg changes for evaluation to r/o takotsubo.   Patient reports feeling overall lousy.  denies chest pain, no shortness of breath.     Telemetry:    OBJECTIVE  Vitals:  T(C): 37.1 (12-10-20 @ 04:53), Max: 37.1 (12-10-20 @ 04:53)  HR: 72 (12-10-20 @ 05:00) (72 - 88)  BP: 116/58 (12-10-20 @ 04:00) (102/57 - 149/64)  RR: 18 (12-10-20 @ 04:00) (16 - 22)  SpO2: 100% (12-10-20 @ 05:00) (94% - 100%)  Wt(kg): --    I/O:  I&O's Summary    09 Dec 2020 07:01  -  10 Dec 2020 06:35  --------------------------------------------------------  IN: 750 mL / OUT: 809 mL / NET: -59 mL        PHYSICAL EXAM:  Appearance: NAD. Speaking in full sentences.   HEENT: No pallor noted.  Conjunctiva clear b/l. Moist oral mucosa.  Cardiovascular: RRR with no murmurs.  Respiratory: Lungs CTAB.   Gastrointestinal:  Soft, nontender. Non-distended. Non-rigid.	  Extremities: No edema b/l. No erythema b/l. LE WWP b/l.  Vascular: DP intact  Neurologic:  Alert and awake. Moving all extremities. Following commands.   	  LABS:                        11.5   11.08 )-----------( 346      ( 10 Dec 2020 04:54 )             37.8     12-10    138  |  100  |  15  ----------------------------<  182<H>  4.7   |  25  |  0.79    Ca    9.4      10 Dec 2020 04:53  Phos  4.5     12-10  Mg     2.2     12-10    TPro  8.1  /  Alb  4.2  /  TBili  0.3  /  DBili  x   /  AST  32  /  ALT  20  /  AlkPhos  119  12-09    PT/INR - ( 09 Dec 2020 23:13 )   PT: 11.5 sec;   INR: 0.96          PTT - ( 09 Dec 2020 23:13 )  PTT:27.4 sec  Urinalysis Basic - ( 10 Dec 2020 04:17 )    Color: Yellow / Appearance: Clear / S.015 / pH: x  Gluc: x / Ketone: NEGATIVE  / Bili: Negative / Urobili: 0.2 E.U./dL   Blood: x / Protein: NEGATIVE mg/dL / Nitrite: NEGATIVE   Leuk Esterase: NEGATIVE / RBC: < 5 /HPF / WBC < 5 /HPF   Sq Epi: x / Non Sq Epi: 0-5 /HPF / Bacteria: Present /HPF        RADIOLOGY & ADDITIONAL TESTS:  Reviewed .    MEDICATIONS  (STANDING):  aminocaproic acid Infusion 1 Gm/Hr (50 mL/Hr) IV Continuous <Continuous>  dextrose 40% Gel 15 Gram(s) Oral once  dextrose 5%. 1000 milliLiter(s) (50 mL/Hr) IV Continuous <Continuous>  dextrose 50% Injectable 25 Gram(s) IV Push once  glucagon  Injectable 1 milliGRAM(s) IntraMuscular once  hydrocortisone sodium succinate Injectable 50 milliGRAM(s) IV Push every 8 hours  insulin lispro (ADMELOG) corrective regimen sliding scale   SubCutaneous Before meals and at bedtime  lacosamide IVPB 100 milliGRAM(s) IV Intermittent every 12 hours  lisinopril 2.5 milliGRAM(s) Oral daily  montelukast 10 milliGRAM(s) Oral daily  niCARdipine Infusion 5 mG/Hr (25 mL/Hr) IV Continuous <Continuous>  niMODipine 60 milliGRAM(s) Oral every 4 hours  pantoprazole  Injectable 40 milliGRAM(s) IV Push daily  senna 2 Tablet(s) Oral at bedtime  simvastatin 20 milliGRAM(s) Oral at bedtime    MEDICATIONS  (PRN):  acetaminophen   Tablet .. 650 milliGRAM(s) Oral every 6 hours PRN Temp greater or equal to 38C (100.4F), Mild Pain (1 - 3)  ALBUTerol    90 MICROgram(s) HFA Inhaler 2 Puff(s) Inhalation every 6 hours PRN Shortness of Breath and/or Wheezing     Cardiology Consult    HPI: 75F PMH COPD, Asthma, HTN, HLD presented after being found down in vomit. At [facility], CTH demonstrated diffuse subarachnoid hemorrhage at basilar cisterns with IVH and obstructive hydrocephalus and CTA demonstrating 3mm L pCOMM aneurysm. Patient transferred to neurosurgical ICU for further management of subarachnoid bleed. Noted troponin elevation of 0.61 -> 0.69 with EKG demonstrating: NSR with diffuse TWI- V1-6, I, II/III and avF. Cardiology consulted in setting of elevated troponin and ekg changes for evaluation to r/o takotsubo.   Patient reports feeling overall lousy.  denies chest pain, no shortness of breath.     Telemetry: Episode of SVT rate up to 150 that broke spontaneously around 10-11PM    OBJECTIVE  Vitals:  T(C): 37.1 (12-10-20 @ 04:53), Max: 37.1 (12-10-20 @ 04:53)  HR: 72 (12-10-20 @ 05:00) (72 - 88)  BP: 116/58 (12-10-20 @ 04:00) (102/57 - 149/64)  RR: 18 (12-10-20 @ 04:00) (16 - 22)  SpO2: 100% (12-10-20 @ 05:00) (94% - 100%)  Wt(kg): --    I/O:  I&O's Summary    09 Dec 2020 07:01  -  10 Dec 2020 06:35  --------------------------------------------------------  IN: 750 mL / OUT: 809 mL / NET: -59 mL        PHYSICAL EXAM:  Appearance: NAD  HEENT: Drain/bandage on R side of head, no JVD  Cardiovascular: RRR, S1 S2  Respiratory: Lungs with intermittent wheezes  Extremities: No edema b/l. No erythema b/l. LE WWP b/l.DP intact  Neurologic:  Alert and awake. Following commands.   	  LABS:                        11.5   11.08 )-----------( 346      ( 10 Dec 2020 04:54 )             37.8     12-10    138  |  100  |  15  ----------------------------<  182<H>  4.7   |  25  |  0.79    Ca    9.4      10 Dec 2020 04:53  Phos  4.5     12-10  Mg     2.2     12-10    TPro  8.1  /  Alb  4.2  /  TBili  0.3  /  DBili  x   /  AST  32  /  ALT  20  /  AlkPhos  119  12-09    PT/INR - ( 09 Dec 2020 23:13 )   PT: 11.5 sec;   INR: 0.96          PTT - ( 09 Dec 2020 23:13 )  PTT:27.4 sec  Urinalysis Basic - ( 10 Dec 2020 04:17 )    Color: Yellow / Appearance: Clear / S.015 / pH: x  Gluc: x / Ketone: NEGATIVE  / Bili: Negative / Urobili: 0.2 E.U./dL   Blood: x / Protein: NEGATIVE mg/dL / Nitrite: NEGATIVE   Leuk Esterase: NEGATIVE / RBC: < 5 /HPF / WBC < 5 /HPF   Sq Epi: x / Non Sq Epi: 0-5 /HPF / Bacteria: Present /HPF        RADIOLOGY & ADDITIONAL TESTS:  Reviewed .    MEDICATIONS  (STANDING):  aminocaproic acid Infusion 1 Gm/Hr (50 mL/Hr) IV Continuous <Continuous>  dextrose 40% Gel 15 Gram(s) Oral once  dextrose 5%. 1000 milliLiter(s) (50 mL/Hr) IV Continuous <Continuous>  dextrose 50% Injectable 25 Gram(s) IV Push once  glucagon  Injectable 1 milliGRAM(s) IntraMuscular once  hydrocortisone sodium succinate Injectable 50 milliGRAM(s) IV Push every 8 hours  insulin lispro (ADMELOG) corrective regimen sliding scale   SubCutaneous Before meals and at bedtime  lacosamide IVPB 100 milliGRAM(s) IV Intermittent every 12 hours  lisinopril 2.5 milliGRAM(s) Oral daily  montelukast 10 milliGRAM(s) Oral daily  niCARdipine Infusion 5 mG/Hr (25 mL/Hr) IV Continuous <Continuous>  niMODipine 60 milliGRAM(s) Oral every 4 hours  pantoprazole  Injectable 40 milliGRAM(s) IV Push daily  senna 2 Tablet(s) Oral at bedtime  simvastatin 20 milliGRAM(s) Oral at bedtime    MEDICATIONS  (PRN):  acetaminophen   Tablet .. 650 milliGRAM(s) Oral every 6 hours PRN Temp greater or equal to 38C (100.4F), Mild Pain (1 - 3)  ALBUTerol    90 MICROgram(s) HFA Inhaler 2 Puff(s) Inhalation every 6 hours PRN Shortness of Breath and/or Wheezing

## 2020-12-10 NOTE — PROGRESS NOTE ADULT - SUBJECTIVE AND OBJECTIVE BOX
=================================  NEUROCRITICAL CARE ATTENDING NOTE  =================================    CARA CANCHOLA   MRN-[id number]  Summary:  75y/F with COPD, asthma, dyslipidemia Hypertension found down.  Brought to [facility] where imaging showed SAH basilar cisterns with IVH and obstructive hydrocephalus L Pcomm aneurysm.  Given levetiracetam, hydromorphone.  NIHSS 2 HH3 MF4, transferred to [facility] for further management.      COURSE IN THE HOSPITAL:   admitted to [facility]    Past Medical History: Hyperlipidemia Hypertension COPD (chronic obstructive pulmonary disease) Asthma  Allergies:  No Known Allergies  Home meds:   ·	famotidine 20 mg oral tablet: 1 tab(s) orally once a day  ·	lisinopril 2.5 mg oral tablet: 1 tab(s) orally once a day  ·	montelukast 10 mg oral tablet: 1 tab(s) orally once a day  ·	predniSONE 50 mg oral tablet: 1 tab(s) orally once a day  ·	ProAir HFA CFC free 90 mcg/inh inhalation aerosol: 2 puff(s) inhaled 4 times a day PRN  ·	simvastatin 20 mg oral tablet: 1 tab(s) orally once a day (at bedtime)    PHYSICAL EXAMINATION  T(C): 37.1 (12-10 @ 04:53), Max: 37.1 (12-10 @ 04:53) HR: 72 (12-10 @ 05:00) (72 - 88) BP: 116/58 (12-10 @ 04:00) (102/57 - 149/64) RR: 18 (-10 @ 04:00) (16 - 22) SpO2: 100% (12-10 @ 05:00) (94% - 100%)  NEUROLOGIC EXAMINATION:  Patient is    GENERAL: not intubated, not in cardiorespiratory distress  EENT:  anicteric  CARDIOVASCULAR: (+) S1 S2, normal rate and regular rhythm  PULMONARY: clear to auscultation bilaterally  ABDOMEN: soft, nontender with normoactive bowel sounds  EXTREMITIES: no edema  SKIN: no rash    LABS:  CAPILLARY BLOOD GLUCOSE 161 144 154                11.5   11.08 )-----------( 346      ( 10 Dec 2020 04:54 )             37.8     138  |  100  |  15  ----------------------------<  182<H>  4.7   |  25  |  0.79    Ca    9.4      10 Dec 2020 04:53  Phos  4.5     12-10  Mg     2.2     12-10    TPro  8.1  /  Alb  4.2  /  TBili  0.3  /  DBili  x   /  AST  32  /  ALT  20  /  AlkPhos  119    Tro 0.69 from 0.61     @ 07:01  -  12-10 @ 06:36  IN: 750 mL / OUT: 809 mL / NET: -59 mL    HbA1C = 6.7 (12-10) 6.4 ()  LDL = 112 ()   HDL = 66 ()  TG = 114 ()   TSH = 0.990 ()    Bacteriology:  CSF studies:  EEG:  Neuroimagin/10 CT head:  EVD placement, stable   CTA:  L pcomm aneurysm, L ICA (distal cavernous) aneurysm vs infundibulum, extensive calcified plaque cavernous bilateral ICA with mild to mod stenosis; thick plaque bilateral carotid bifurcations - mod to severe R and mild to mod L stenosis, focal calcified plaque R VA off subclavian artery - high grate stenosis / partial occlusion, upper lung bilateral opacification - pulmo edema, chronic deformity R humeral neck   CT head:  SAH, basilar cisterns, IV extension, obstructive hydrocephalusSVID, lacunar infarcts R caudate, both thalami, cerebellar hemispheres, no CT evidence of territorial infarction  Other imagin/09 CT abd:  small paraesophageal hiatal hernia, gastritis; ?impaction, septal thickening bilateral lower lobes ?pulmo edema, hepatic steatosis    MEDICATIONS: lacosamide 100 IV q12h lisinopril 2.5mg PO daily nimodipine 60mg PO q4h albuterol PRN montelukast 10mg PO daily pntoprazole 40 daily senna HS hydrocortisone 50 IV q8h mod ISS simvastatin 20mg PO HS     IV FLUIDS:  DRIPS:  ·	aminocaproic acid Infusion 1 Gm/Hr IV Continuous <Continuous>  ·	niCARdipine Infusion 5 mG/Hr IV Continuous <Continuous>  DIET:  Lines:  Drains:      Wounds:    CODE STATUS:  Full Code                       GOALS OF CARE:  aggressive                      DISPOSITION:  ICU  NIHSS 2 HH3 MF4 =================================  NEUROCRITICAL CARE ATTENDING NOTE  =================================    CARA CANCHOLA   MRN-[id number]  Summary:  75y/F with COPD, asthma, dyslipidemia Hypertension found down.  Brought to [facility] where imaging showed SAH basilar cisterns with IVH and obstructive hydrocephalus L Pcomm aneurysm.  Given levetiracetam, hydromorphone.  NIHSS 2 HH3 MF4, transferred to [facility] for further management.      COURSE IN THE HOSPITAL:   admitted to [facility], EVD inserted; given 20 lasix for pulmo edema, Tinversion on CT    Past Medical History: Hyperlipidemia Hypertension COPD (chronic obstructive pulmonary disease) Asthma  Allergies:  No Known Allergies  Home meds:   ·	famotidine 20 mg oral tablet: 1 tab(s) orally once a day  ·	lisinopril 2.5 mg oral tablet: 1 tab(s) orally once a day  ·	montelukast 10 mg oral tablet: 1 tab(s) orally once a day  ·	predniSONE 50 mg oral tablet: 1 tab(s) orally once a day  ·	ProAir HFA CFC free 90 mcg/inh inhalation aerosol: 2 puff(s) inhaled 4 times a day PRN  ·	simvastatin 20 mg oral tablet: 1 tab(s) orally once a day (at bedtime)    PHYSICAL EXAMINATION  T(C): 37.1 (12-10 @ 04:53), Max: 37.1 (12-10 @ 04:53) HR: 72 (12-10 @ 05:00) (72 - 88) BP: 116/58 (12-10 @ 04:00) (102/57 - 149/64) RR: 18 (12-10 @ 04:00) (16 - 22) SpO2: 100% (12-10 @ 05:00) (94% - 100%)  NEUROLOGIC EXAMINATION:  Patient is  alert, oriented x2, moving all 4s  GENERAL: not intubated, not in cardiorespiratory distress  EENT:  anicteric  CARDIOVASCULAR: (+) S1 S2, normal rate and regular rhythm  PULMONARY: clear to auscultation bilaterally  ABDOMEN: soft, nontender with normoactive bowel sounds  EXTREMITIES: no edema  SKIN: no rash    LABS:  CAPILLARY BLOOD GLUCOSE 161 144 154                11.5   11.08 )-----------( 346      ( 10 Dec 2020 04:54 )             37.8     138  |  100  |  15  ----------------------------<  182<H>  4.7   |  25  |  0.79    Ca    9.4      10 Dec 2020 04:53  Phos  4.5     12-10  Mg     2.2     12-10    TPro  8.1  /  Alb  4.2  /  TBili  0.3  /  DBili  x   /  AST  32  /  ALT  20  /  AlkPhos  119    Tro 0.69 from 0.61     @ 07:01  -  12-10 @ 06:36  IN: 750 mL / OUT: 809 mL / NET: -59 mL    HbA1C = 6.7 (12-10) 6.4 ()  LDL = 112 ()   HDL = 66 ()  TG = 114 ()   TSH = 0.990 ()    Bacteriology:  CSF studies:  EEG:  Neuroimagin/10 CT head:  EVD placement, stable   CTA:  L pcomm aneurysm, L ICA (distal cavernous) aneurysm vs infundibulum, extensive calcified plaque cavernous bilateral ICA with mild to mod stenosis; thick plaque bilateral carotid bifurcations - mod to severe R and mild to mod L stenosis, focal calcified plaque R VA off subclavian artery - high grate stenosis / partial occlusion, upper lung bilateral opacification - pulmo edema, chronic deformity R humeral neck   CT head:  SAH, basilar cisterns, IV extension, obstructive hydrocephalus SVID, lacunar infarcts R caudate, both thalami, cerebellar hemispheres, no CT evidence of territorial infarction  Other imagin/09 CT abd:  small paraesophageal hiatal hernia, gastritis; ?impaction, septal thickening bilateral lower lobes ?pulmo edema, hepatic steatosis    MEDICATIONS: lacosamide 100 IV q12h lisinopril 2.5mg PO daily nimodipine 60mg PO q4h albuterol PRN montelukast 10mg PO daily pantoprazole 40 daily senna HS hydrocortisone 50 IV q8h mod ISS simvastatin 20mg PO HS     IV FLUIDS:  DRIPS:  ·	aminocaproic acid Infusion 1 Gm/Hr IV Continuous <Continuous>  ·	niCARdipine Infusion 5 mG/Hr IV Continuous <Continuous>  DIET:  Lines:  Drains:      Wounds:    CODE STATUS:  Full Code                       GOALS OF CARE:  aggressive                      DISPOSITION:  ICU  NIHSS 2 HH3 MF4

## 2020-12-10 NOTE — CONSULT NOTE ADULT - ATTENDING COMMENTS
Please refer to CVD fellow note written above for details.  I reviewed the fellow's documentation, and I have personally seen and examined the patient. I have reviewed vitals, labs, medications, cardiac studies and additional imaging. I agree with the findings and plans as written above

## 2020-12-10 NOTE — PROGRESS NOTE ADULT - ASSESSMENT
75y/Female with:  aneursymal subarachnoid hemorrhage, L pcomm aneurysm, L ICA (distal cavernous aneurysm vs infundibulum, brain compression, cerebral edema  osbtructive hydrocephalus  lacunar infarcts R caudate, B thalami, cerebellar hemispheres ?artery to artery vs cardioembolic?  atherosclerotic cardiovascular disease; mod to severe bilateral ICA stenosis (R>L), R VA stenosis  Hypertension dyslipidemia   COPD asthma  newly diagnosed Diabetes Mellitus?  troponin leak    PLAN: Day 1 = 12-09 ; Day 4 =  12/12; Day 21 = 12/29  NEURO: neurochecks q1h, PRN pain meds with Tylenol / Percocet  SAH:  TCD starting Day 4 for vasospasm surveillance, CTA, conventional angio, start nimodipine 60 mg po q4h to be given for 21 days; definitive aneurysm repair per Neurosurgery or Neurointerventional  Hydrocephalus:  EVD inserted - decrease to 10 cm H20 post coiling  discontinue Aminocaproic acid  Seizure prophylaxis (cortical ICH, associated SDH, unclear etiology):  lacosamide decrease to 50mg IV BID   REHAB:  physical therapy evaluation and management    EARLY MOB:  HOB elevated    PULM:  Room air, incentive spirometry, COPD on prednisone at home, continue montelukast, albuterol PRN  CARDIO:  SBP goal 100-140mm Hg, nicardipine 5 mg/hour, titrate by 2.5 mg/hour every 15 min to a max of 15 mg/hour; baseline Echo, trend Troponin x1, cardiology consulted; -180mm Hg once aneurysm secured; d/c lisioopril   ENDO:  Blood sugar goals 140-180 mg/dL, cont insulin sliding scale, stress dose hydrocortisone x 24h then back to prednisone home dose; switch simvastatin to atorvastatin 40mg PO daily   GI:  PPI for GI prophylaxis while on steroids  DIET: NPO  RENAL: maintain euvolemia, accurate Is and Os  HEM/ONC: no coagulopathy (INR= ), no ASA / Plavix use  VTE Prophylaxis: SCDs, no DVT chemoprophylaxis for now as patient is high risk for bleed (fresh post-bleed), baseline LE Doppler for DVT suspected on admission (found down)  ID: afebrile, no leukocytosis  Social: will update family    CORE MEASURES  1.  Nath and Jacobo Score = 3  2.  VTE prophylaxis:  [ ] administered within 24 hours of admission OR [X] reason not done: fresh bleed, unsecured aneurysm.  3.  Dysphagia screening:   [X] performed before any oral meds / liquids / food  4.  Nimodipine treatment:  [X] administered within 24 hours of admission OR [ ] reason not done:    ATTENDING ATTESTATION:  I was physically present for the key portions of the evaluation and management (E/M) service provided.  I agree with the above history, physical and plan, which I have reviewed and edited where appropriate.    Patient at high risk for neurological deterioration or death due to:  ICU delirium, aspiration PNA, DVT / PE.  Critical care time:  I have personally provided 30 minutes of critical care time, excluding time spent on separate procedures.      Plan discussed with RN, house staff.

## 2020-12-10 NOTE — PROGRESS NOTE ADULT - ASSESSMENT
75y/Female with:  aneursymal subarachnoid hemorrhage, L pcomm aneurysm, L ICA (distal cavernous aneurysm vs infundibulum, brain compression, cerebral edema  osbtructive hydrocephalus  lacunar infarcts R caudate, B thalami, cerebellar hemispheres ?artery to artery vs cardioembolic?  atherosclerotic cardiovascular disease; mod to severe bilateral ICA stenosis (R>L), R VA stenosis  Hypertension dyslipidemia   COPD asthma  newly diagnosed Diabetes Mellitus?  troponin leak    PLAN: Day 1 = 12-09 ; Day 4 =  12/12; Day 21 = 12/29  NEURO: neurochecks q1h, PRN pain meds with Tylenol / Percocet  SAH:  TCD starting Day 4 for vasospasm surveillance, CTA, conventional angio, start nimodipine 60 mg po q4h to be given for 21 days; definitive aneurysm repair per Neurosurgery or Neurointerventional  Hydrocephalus:  EVD insertion   Seizure prophylaxis (cortical ICH, associated SDH, unclear etiology):  levetiracetam 500mg IV BID until aneurysm is secured  REHAB:  physical therapy evaluation and management    EARLY MOB:  HOB elevated    PULM:  Room air, incentive spirometry  CARDIO:  SBP goal 100-140mm Hg, nicardipine 5 mg/hour, titrate by 2.5 mg/hour every 15 min to a max of 15 mg/hour; [place]?; INSERT CENTRAL LINE WITHIN 12 HOURS; baseline Echo, trend Troponin x1  ENDO:  Blood sugar goals 140-180 mg/dL, cont insulin sliding scale, stress dose hydrocortisone  GI:  PPI for GI prophylaxis  DIET: NPO  RENAL: maintain euvolemia, accurate Is and Os  HEM/ONC: no coagulopathy (INR= ), no ASA / Plavix use  VTE Prophylaxis: SCDs  ID: afebrile, no leukocytosis  Social: will update family    CORE MEASURES  1.  Nath and Jacobo Score = 3  2.  VTE prophylaxis:  [ ] administered within 24 hours of admission OR [X] reason not done: fresh bleed, unsecured aneurysm.  3.  Dysphagia screening:   [X] performed before any oral meds / liquids / food  4.  Nimodipine treatment:  [X] administered within 24 hours of admission OR [ ] reason not done:    ATTENDING ATTESTATION:  I was physically present for the key portions of the evaluation and management (E/M) service provided.  I agree with the above history, physical and plan, which I have reviewed and edited where appropriate.    Patient at high risk for neurological deterioration or death due to:  ICU delirium, aspiration PNA, DVT / PE.  Critical care time:  I have personally provided 45 minutes of critical care time, excluding time spent on separate procedures.      Plan discussed with RN, house staff. 75y/Female with:  aneursymal subarachnoid hemorrhage, L pcomm aneurysm, L ICA (distal cavernous aneurysm vs infundibulum, brain compression, cerebral edema  osbtructive hydrocephalus  lacunar infarcts R caudate, B thalami, cerebellar hemispheres ?artery to artery vs cardioembolic?  atherosclerotic cardiovascular disease; mod to severe bilateral ICA stenosis (R>L), R VA stenosis  Hypertension dyslipidemia   COPD asthma  newly diagnosed Diabetes Mellitus?  troponin leak    PLAN: Day 1 = 12-09 ; Day 4 =  12/12; Day 21 = 12/29  NEURO: neurochecks q1h, PRN pain meds with Tylenol / Percocet  SAH:  TCD starting Day 4 for vasospasm surveillance, CTA, conventional angio, start nimodipine 60 mg po q4h to be given for 21 days; definitive aneurysm repair per Neurosurgery or Neurointerventional  Hydrocephalus:  EVD insertion   Seizure prophylaxis (cortical ICH, associated SDH, unclear etiology):  levetiracetam 500mg IV BID until aneurysm is secured  REHAB:  physical therapy evaluation and management    EARLY MOB:  HOB elevated    PULM:  Room air, incentive spirometry  CARDIO:  SBP goal 100-140mm Hg, nicardipine 5 mg/hour, titrate by 2.5 mg/hour every 15 min to a max of 15 mg/hour; [place]?; INSERT CENTRAL LINE WITHIN 12 HOURS; baseline Echo, trend Troponin x1, cardiology consulted  ENDO:  Blood sugar goals 140-180 mg/dL, cont insulin sliding scale, stress dose hydrocortisone  GI:  PPI for GI prophylaxis  DIET: NPO  RENAL: maintain euvolemia, accurate Is and Os  HEM/ONC: no coagulopathy (INR= ), no ASA / Plavix use  VTE Prophylaxis: SCDs  ID: afebrile, no leukocytosis  Social: will update family    CORE MEASURES  1.  Nath and Jacobo Score = 3  2.  VTE prophylaxis:  [ ] administered within 24 hours of admission OR [X] reason not done: fresh bleed, unsecured aneurysm.  3.  Dysphagia screening:   [X] performed before any oral meds / liquids / food  4.  Nimodipine treatment:  [X] administered within 24 hours of admission OR [ ] reason not done:    ATTENDING ATTESTATION:  I was physically present for the key portions of the evaluation and management (E/M) service provided.  I agree with the above history, physical and plan, which I have reviewed and edited where appropriate.    Patient at high risk for neurological deterioration or death due to:  ICU delirium, aspiration PNA, DVT / PE.  Critical care time:  I have personally provided 45 minutes of critical care time, excluding time spent on separate procedures.      Plan discussed with RN, house staff. 75y/Female with:  aneursymal subarachnoid hemorrhage, L pcomm aneurysm, L ICA (distal cavernous aneurysm vs infundibulum, brain compression, cerebral edema  osbtructive hydrocephalus  lacunar infarcts R caudate, B thalami, cerebellar hemispheres ?artery to artery vs cardioembolic?  atherosclerotic cardiovascular disease; mod to severe bilateral ICA stenosis (R>L), R VA stenosis  Hypertension dyslipidemia   COPD asthma  newly diagnosed Diabetes Mellitus?  troponin leak    PLAN: Day 1 = 12-09 ; Day 4 =  12/12; Day 21 = 12/29  NEURO: neurochecks q1h, PRN pain meds with Tylenol / Percocet  SAH:  TCD starting Day 4 for vasospasm surveillance, CTA, conventional angio, start nimodipine 60 mg po q4h to be given for 21 days; definitive aneurysm repair per Neurosurgery or Neurointerventional  Hydrocephalus:  EVD inserted - decrease to 10 cm H20 post coiling  discontinue Aminocaproic acid  Seizure prophylaxis (cortical ICH, associated SDH, unclear etiology):  lacosamide decrease to 50mg IV BID   REHAB:  physical therapy evaluation and management    EARLY MOB:  HOB elevated    PULM:  Room air, incentive spirometry, COPD on prednisone at home, continue montelukast, albuterol PRN  CARDIO:  SBP goal 100-140mm Hg, nicardipine 5 mg/hour, titrate by 2.5 mg/hour every 15 min to a max of 15 mg/hour; baseline Echo, trend Troponin x1, cardiology consulted; -180mm Hg once aneurysm secured; d/c lisioopril   ENDO:  Blood sugar goals 140-180 mg/dL, cont insulin sliding scale, stress dose hydrocortisone x 24h then back to prednisone home dose; switch simvastatin to atorvastatin 40mg PO daily   GI:  PPI for GI prophylaxis while on steroids  DIET: NPO  RENAL: maintain euvolemia, accurate Is and Os  HEM/ONC: no coagulopathy (INR= ), no ASA / Plavix use  VTE Prophylaxis: SCDs, no DVT chemoprophylaxis for now as patient is high risk for bleed (fresh post-bleed), baseline LE Doppler for DVT suspected on admission (found down)  ID: afebrile, no leukocytosis  Social: will update family    CORE MEASURES  1.  Nath and Jacobo Score = 3  2.  VTE prophylaxis:  [ ] administered within 24 hours of admission OR [X] reason not done: fresh bleed, unsecured aneurysm.  3.  Dysphagia screening:   [X] performed before any oral meds / liquids / food  4.  Nimodipine treatment:  [X] administered within 24 hours of admission OR [ ] reason not done:    ATTENDING ATTESTATION:  I was physically present for the key portions of the evaluation and management (E/M) service provided.  I agree with the above history, physical and plan, which I have reviewed and edited where appropriate.    Patient at high risk for neurological deterioration or death due to:  ICU delirium, aspiration PNA, DVT / PE.  Critical care time:  I have personally provided 45 minutes of critical care time, excluding time spent on separate procedures.      Plan discussed with RN, house staff.

## 2020-12-10 NOTE — PROCEDURE NOTE - ADDITIONAL PROCEDURE DETAILS
Incision made just to the right of the right mid pupillary line 10 cm behind the nasion. A self-retaining retractor was placed. Rosa hole was drilled with the cranial twist drill. The dura was punctured with a spinal needle. A ventricular catheter with the stylet was passed using the ipsilateral medial canthus and tragus as land marks.  The catheter was advanced to 5cm at the cortex and 6cm to the skull. CSF egress was identified.  A trochar was used to tunnel the catheter 4-5cm posterior to the insertion site.  Catheter was secured to the scalp with 3.0 nylon and staples.   Incision was closed with sutures.  The ventricular catheter was attached to the external ventricular drainage chamber in a sterile fashion

## 2020-12-10 NOTE — PROGRESS NOTE ADULT - SUBJECTIVE AND OBJECTIVE BOX
=================================  NEUROCRITICAL CARE ATTENDING NOTE  =================================    CARA CANCHOLA   MRN-[id number]  Summary:  75y/F with COPD, asthma, dyslipidemia Hypertension found down.  Brought to [facility] where imaging showed SAH basilar cisterns with IVH and obstructive hydrocephalus L Pcomm aneurysm.  Given levetiracetam, hydromorphone.  NIHSS 2 HH3 MF4, transferred to [facility] for further management.      COURSE IN THE HOSPITAL:   admitted to [facility], EVD inserted; given 20 lasix for pulmo edema, Tinversion on CT    Past Medical History: Hyperlipidemia Hypertension COPD (chronic obstructive pulmonary disease) Asthma  Allergies:  No Known Allergies  Home meds:   ·	famotidine 20 mg oral tablet: 1 tab(s) orally once a day  ·	lisinopril 2.5 mg oral tablet: 1 tab(s) orally once a day  ·	montelukast 10 mg oral tablet: 1 tab(s) orally once a day  ·	predniSONE 50 mg oral tablet: 1 tab(s) orally once a day  ·	ProAir HFA CFC free 90 mcg/inh inhalation aerosol: 2 puff(s) inhaled 4 times a day PRN  ·	simvastatin 20 mg oral tablet: 1 tab(s) orally once a day (at bedtime)    PHYSICAL EXAMINATION  T(C): 37.1 (12-10 @ 04:53), Max: 37.1 (12-10 @ 04:53) HR: 72 (12-10 @ 05:00) (72 - 88) BP: 116/58 (12-10 @ 04:00) (102/57 - 149/64) RR: 18 (12-10 @ 04:00) (16 - 22) SpO2: 100% (12-10 @ 05:00) (94% - 100%)  NEUROLOGIC EXAMINATION:  Patient is  alert, oriented x2, moving all 4s  GENERAL: not intubated, not in cardiorespiratory distress  EENT:  anicteric  CARDIOVASCULAR: (+) S1 S2, normal rate and regular rhythm  PULMONARY: clear to auscultation bilaterally  ABDOMEN: soft, nontender with normoactive bowel sounds  EXTREMITIES: no edema  SKIN: no rash    LABS:  CAPILLARY BLOOD GLUCOSE 161 144 154                11.5   11.08 )-----------( 346      ( 10 Dec 2020 04:54 )             37.8     138  |  100  |  15  ----------------------------<  182<H>  4.7   |  25  |  0.79    Ca    9.4      10 Dec 2020 04:53  Phos  4.5     12-10  Mg     2.2     12-10    TPro  8.1  /  Alb  4.2  /  TBili  0.3  /  DBili  x   /  AST  32  /  ALT  20  /  AlkPhos  119    Tro 0.69 from 0.61     @ 07:01  -  12-10 @ 06:36  IN: 750 mL / OUT: 809 mL / NET: -59 mL    HbA1C = 6.7 (12-10) 6.4 ()  LDL = 112 ()   HDL = 66 ()  TG = 114 ()   TSH = 0.990 ()    Bacteriology:  CSF studies:  EEG:  Neuroimagin/10 CT head:  EVD placement, stable   CTA:  L pcomm aneurysm, L ICA (distal cavernous) aneurysm vs infundibulum, extensive calcified plaque cavernous bilateral ICA with mild to mod stenosis; thick plaque bilateral carotid bifurcations - mod to severe R and mild to mod L stenosis, focal calcified plaque R VA off subclavian artery - high grate stenosis / partial occlusion, upper lung bilateral opacification - pulmo edema, chronic deformity R humeral neck   CT head:  SAH, basilar cisterns, IV extension, obstructive hydrocephalus SVID, lacunar infarcts R caudate, both thalami, cerebellar hemispheres, no CT evidence of territorial infarction  Other imagin/09 CT abd:  small paraesophageal hiatal hernia, gastritis; ?impaction, septal thickening bilateral lower lobes ?pulmo edema, hepatic steatosis    MEDICATIONS: lacosamide 100 IV q12h lisinopril 2.5mg PO daily nimodipine 60mg PO q4h albuterol PRN montelukast 10mg PO daily pantoprazole 40 daily senna HS hydrocortisone 50 IV q8h mod ISS simvastatin 20mg PO HS     IV FLUIDS:  DRIPS:  ·	aminocaproic acid Infusion 1 Gm/Hr IV Continuous <Continuous>  ·	niCARdipine Infusion 5 mG/Hr IV Continuous <Continuous>  DIET:  Lines:  Drains:      Wounds:    CODE STATUS:  Full Code                       GOALS OF CARE:  aggressive                      DISPOSITION:  ICU  NIHSS 2 HH3 MF4

## 2020-12-11 LAB
ANION GAP SERPL CALC-SCNC: 10 MMOL/L — SIGNIFICANT CHANGE UP (ref 5–17)
BUN SERPL-MCNC: 15 MG/DL — SIGNIFICANT CHANGE UP (ref 7–23)
CALCIUM SERPL-MCNC: 8.7 MG/DL — SIGNIFICANT CHANGE UP (ref 8.4–10.5)
CHLORIDE SERPL-SCNC: 107 MMOL/L — SIGNIFICANT CHANGE UP (ref 96–108)
CO2 SERPL-SCNC: 24 MMOL/L — SIGNIFICANT CHANGE UP (ref 22–31)
CREAT SERPL-MCNC: 0.69 MG/DL — SIGNIFICANT CHANGE UP (ref 0.5–1.3)
GLUCOSE SERPL-MCNC: 185 MG/DL — HIGH (ref 70–99)
HCT VFR BLD CALC: 27.9 % — LOW (ref 34.5–45)
HGB BLD-MCNC: 8.6 G/DL — LOW (ref 11.5–15.5)
MAGNESIUM SERPL-MCNC: 2 MG/DL — SIGNIFICANT CHANGE UP (ref 1.6–2.6)
MCHC RBC-ENTMCNC: 26.9 PG — LOW (ref 27–34)
MCHC RBC-ENTMCNC: 30.8 GM/DL — LOW (ref 32–36)
MCV RBC AUTO: 87.2 FL — SIGNIFICANT CHANGE UP (ref 80–100)
NRBC # BLD: 0 /100 WBCS — SIGNIFICANT CHANGE UP (ref 0–0)
PHOSPHATE SERPL-MCNC: 2.9 MG/DL — SIGNIFICANT CHANGE UP (ref 2.5–4.5)
PLATELET # BLD AUTO: 353 K/UL — SIGNIFICANT CHANGE UP (ref 150–400)
POTASSIUM SERPL-MCNC: 3.6 MMOL/L — SIGNIFICANT CHANGE UP (ref 3.5–5.3)
POTASSIUM SERPL-SCNC: 3.6 MMOL/L — SIGNIFICANT CHANGE UP (ref 3.5–5.3)
RBC # BLD: 3.2 M/UL — LOW (ref 3.8–5.2)
RBC # FLD: 15.3 % — HIGH (ref 10.3–14.5)
SODIUM SERPL-SCNC: 141 MMOL/L — SIGNIFICANT CHANGE UP (ref 135–145)
WBC # BLD: 16.84 K/UL — HIGH (ref 3.8–10.5)
WBC # FLD AUTO: 16.84 K/UL — HIGH (ref 3.8–10.5)

## 2020-12-11 PROCEDURE — 71045 X-RAY EXAM CHEST 1 VIEW: CPT | Mod: 26

## 2020-12-11 PROCEDURE — 99291 CRITICAL CARE FIRST HOUR: CPT

## 2020-12-11 PROCEDURE — 99233 SBSQ HOSP IP/OBS HIGH 50: CPT

## 2020-12-11 PROCEDURE — 93970 EXTREMITY STUDY: CPT | Mod: 26

## 2020-12-11 PROCEDURE — 99024 POSTOP FOLLOW-UP VISIT: CPT

## 2020-12-11 PROCEDURE — 99232 SBSQ HOSP IP/OBS MODERATE 35: CPT

## 2020-12-11 RX ORDER — POTASSIUM CHLORIDE 20 MEQ
40 PACKET (EA) ORAL ONCE
Refills: 0 | Status: COMPLETED | OUTPATIENT
Start: 2020-12-11 | End: 2020-12-11

## 2020-12-11 RX ORDER — ENOXAPARIN SODIUM 100 MG/ML
40 INJECTION SUBCUTANEOUS EVERY 24 HOURS
Refills: 0 | Status: DISCONTINUED | OUTPATIENT
Start: 2020-12-11 | End: 2020-12-29

## 2020-12-11 RX ORDER — PREGABALIN 225 MG/1
1000 CAPSULE ORAL DAILY
Refills: 0 | Status: DISCONTINUED | OUTPATIENT
Start: 2020-12-11 | End: 2021-01-26

## 2020-12-11 RX ORDER — LACOSAMIDE 50 MG/1
50 TABLET ORAL
Refills: 0 | Status: DISCONTINUED | OUTPATIENT
Start: 2020-12-11 | End: 2020-12-16

## 2020-12-11 RX ORDER — ENOXAPARIN SODIUM 100 MG/ML
40 INJECTION SUBCUTANEOUS
Refills: 0 | Status: DISCONTINUED | OUTPATIENT
Start: 2020-12-11 | End: 2020-12-11

## 2020-12-11 RX ORDER — ENOXAPARIN SODIUM 100 MG/ML
40 INJECTION SUBCUTANEOUS EVERY 24 HOURS
Refills: 0 | Status: DISCONTINUED | OUTPATIENT
Start: 2020-12-11 | End: 2020-12-11

## 2020-12-11 RX ORDER — ATORVASTATIN CALCIUM 80 MG/1
40 TABLET, FILM COATED ORAL AT BEDTIME
Refills: 0 | Status: DISCONTINUED | OUTPATIENT
Start: 2020-12-11 | End: 2021-01-26

## 2020-12-11 RX ORDER — HEPARIN SODIUM 5000 [USP'U]/ML
5000 INJECTION INTRAVENOUS; SUBCUTANEOUS EVERY 12 HOURS
Refills: 0 | Status: DISCONTINUED | OUTPATIENT
Start: 2020-12-11 | End: 2020-12-11

## 2020-12-11 RX ORDER — PANTOPRAZOLE SODIUM 20 MG/1
40 TABLET, DELAYED RELEASE ORAL
Refills: 0 | Status: DISCONTINUED | OUTPATIENT
Start: 2020-12-11 | End: 2020-12-17

## 2020-12-11 RX ADMIN — Medication 2: at 21:59

## 2020-12-11 RX ADMIN — Medication 3 MILLILITER(S): at 05:48

## 2020-12-11 RX ADMIN — Medication 4.68 MICROGRAM(S)/KG/MIN: at 10:50

## 2020-12-11 RX ADMIN — NIMODIPINE 30 MILLIGRAM(S): 60 SOLUTION ORAL at 10:50

## 2020-12-11 RX ADMIN — Medication 40 MILLIEQUIVALENT(S): at 07:00

## 2020-12-11 RX ADMIN — Medication 2: at 06:24

## 2020-12-11 RX ADMIN — Medication 650 MILLIGRAM(S): at 22:49

## 2020-12-11 RX ADMIN — NIMODIPINE 30 MILLIGRAM(S): 60 SOLUTION ORAL at 06:22

## 2020-12-11 RX ADMIN — Medication 650 MILLIGRAM(S): at 10:51

## 2020-12-11 RX ADMIN — SENNA PLUS 2 TABLET(S): 8.6 TABLET ORAL at 21:59

## 2020-12-11 RX ADMIN — Medication 650 MILLIGRAM(S): at 11:30

## 2020-12-11 RX ADMIN — Medication 650 MILLIGRAM(S): at 23:10

## 2020-12-11 RX ADMIN — MONTELUKAST 10 MILLIGRAM(S): 4 TABLET, CHEWABLE ORAL at 11:03

## 2020-12-11 RX ADMIN — ATORVASTATIN CALCIUM 40 MILLIGRAM(S): 80 TABLET, FILM COATED ORAL at 21:59

## 2020-12-11 RX ADMIN — Medication 50 MILLIGRAM(S): at 06:21

## 2020-12-11 RX ADMIN — Medication 8: at 10:56

## 2020-12-11 RX ADMIN — Medication 3 MILLILITER(S): at 15:55

## 2020-12-11 RX ADMIN — Medication 3 MILLILITER(S): at 10:51

## 2020-12-11 RX ADMIN — LACOSAMIDE 50 MILLIGRAM(S): 50 TABLET ORAL at 17:55

## 2020-12-11 RX ADMIN — Medication 2: at 16:25

## 2020-12-11 RX ADMIN — NIMODIPINE 30 MILLIGRAM(S): 60 SOLUTION ORAL at 14:05

## 2020-12-11 RX ADMIN — ENOXAPARIN SODIUM 40 MILLIGRAM(S): 100 INJECTION SUBCUTANEOUS at 21:59

## 2020-12-11 RX ADMIN — PANTOPRAZOLE SODIUM 40 MILLIGRAM(S): 20 TABLET, DELAYED RELEASE ORAL at 06:22

## 2020-12-11 RX ADMIN — LACOSAMIDE 110 MILLIGRAM(S): 50 TABLET ORAL at 06:22

## 2020-12-11 RX ADMIN — NIMODIPINE 30 MILLIGRAM(S): 60 SOLUTION ORAL at 02:17

## 2020-12-11 NOTE — PROGRESS NOTE ADULT - SUBJECTIVE AND OBJECTIVE BOX
HPI: 74y/o F with PMHx sig for COPD, asthma, HLD, HTN, BIBEMS to [facility] from home after HHA discovered patient found down in floor covered in non-bloody vomitus. On arrival to [facility], patient was taken for stat head CT, which revealed diffuse subarachnoid hemorrhage mainly at basilar cisterns with IVH and obstructive hydrocephalus. CTA concerning for 3mm left pcomm aneurysm and a 1.6mm outpouching of distal cavernous segment of the left internal carotid artery likely aneurysm. NIHSS2 HH3 MF4. Patient was transferred to [facility] for further intervention. Patient currently reports headaches. Pt denies acute changes in vision, seizures, CP, SOB, weakness/paresthesias of arms or legs.    Hospital Course:   : BD1 Patient admitted for SAH HH3, MF4.   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 EVD at 5, on Levo overnight    Vital Signs Last 24 Hrs  T(C): 37.1 (11 Dec 2020 01:35), Max: 37.3 (10 Dec 2020 10:56)  T(F): 98.8 (11 Dec 2020 01:35), Max: 99.1 (10 Dec 2020 10:56)  HR: 76 (11 Dec 2020 01:00) (66 - 88)  BP: 91/55 (10 Dec 2020 16:30) (75/42 - 149/64)  BP(mean): 68 (10 Dec 2020 16:30) (54 - 97)  RR: 16 (11 Dec 2020 01:00) (16 - 22)  SpO2: 100% (11 Dec 2020 01:00) (90% - 100%)    I&O's Detail    09 Dec 2020 07:01  -  10 Dec 2020 07:00  --------------------------------------------------------  IN:    Aminocaproic Acid: 250 mL    Aminocaproic Acid: 150 mL    IV PiggyBack: 100 mL    sodium chloride 0.9%: 250 mL  Total IN: 750 mL    OUT:    External Ventricular Device (mL): 11 mL    Indwelling Catheter - Urethral (mL): 950 mL    NiCARdipine: 0 mL  Total OUT: 961 mL    Total NET: -211 mL      10 Dec 2020 07:  -  11 Dec 2020 01:51  --------------------------------------------------------  IN:    IV PiggyBack: 50 mL    Norepinephrine: 103.4 mL    Oral Fluid: 60 mL    sodium chloride 0.9%: 100 mL    sodium chloride 0.9%: 150 mL    Sodium Chloride 0.9% Bolus: 500 mL  Total IN: 963.4 mL    OUT:    Aminocaproic Acid: 0 mL    External Ventricular Device (mL): 108 mL    Indwelling Catheter - Urethral (mL): 1115 mL    NiCARdipine: 0 mL  Total OUT: 1223 mL    Total NET: -259.6 mL        I&O's Summary    09 Dec 2020 07:  -  10 Dec 2020 07:00  --------------------------------------------------------  IN: 750 mL / OUT: 961 mL / NET: -211 mL    10 Dec 2020 07:01  -  11 Dec 2020 01:51  --------------------------------------------------------  IN: 963.4 mL / OUT: 1223 mL / NET: -259.6 mL        PHYSICAL EXAM:  Gen: laying in hospital bed, NAD  Neurological: AA+Ox2, OE spont, FC  CN II-XII: PERRL, EOMI  Motor exam: MAEx4, 5/5 strength throughout  SILT in UE and LE b/l  Cardiovascular: RRR  Respiratory: CTAB  Gastrointestinal: soft, NT/ND  Groin site C/D/I  DP 2+ b/l    TUBES/LINES:  [] CVC  [x] A-line  [] Lumbar Drain  [x] Ventriculostomy: EVD at 5  [] Other    DIET:  [] NPO  [x] Mechanical  [] Tube feeds    LABS:                        9.8    14.07 )-----------( 345      ( 10 Dec 2020 11:09 )             31.9     12-10    137  |  101  |  16  ----------------------------<  174<H>  4.2   |  22  |  0.83    Ca    8.5      10 Dec 2020 11:09  Phos  4.5     12-10  Mg     2.2     12-10    TPro  8.1  /  Alb  4.2  /  TBili  0.3  /  DBili  x   /  AST  32  /  ALT  20  /  AlkPhos  119  12-09    PT/INR - ( 09 Dec 2020 23:13 )   PT: 11.5 sec;   INR: 0.96          PTT - ( 09 Dec 2020 23:13 )  PTT:27.4 sec  Urinalysis Basic - ( 10 Dec 2020 04:17 )    Color: Yellow / Appearance: Clear / S.015 / pH: x  Gluc: x / Ketone: NEGATIVE  / Bili: Negative / Urobili: 0.2 E.U./dL   Blood: x / Protein: NEGATIVE mg/dL / Nitrite: NEGATIVE   Leuk Esterase: NEGATIVE / RBC: < 5 /HPF / WBC < 5 /HPF   Sq Epi: x / Non Sq Epi: 0-5 /HPF / Bacteria: Present /HPF      CARDIAC MARKERS ( 10 Dec 2020 15:54 )  x     / 0.30 ng/mL / x     / x     / x      CARDIAC MARKERS ( 10 Dec 2020 04:54 )  x     / 0.69 ng/mL / 288 U/L / x     / 14.2 ng/mL  CARDIAC MARKERS ( 09 Dec 2020 23:13 )  x     / 0.61 ng/mL / x     / x     / x          CAPILLARY BLOOD GLUCOSE      POCT Blood Glucose.: 163 mg/dL (10 Dec 2020 21:50)  POCT Blood Glucose.: 175 mg/dL (10 Dec 2020 16:58)  POCT Blood Glucose.: 172 mg/dL (10 Dec 2020 11:17)  POCT Blood Glucose.: 161 mg/dL (10 Dec 2020 06:07)      Drug Levels: [] N/A    CSF Analysis: [] N/A      Allergies    No Known Allergies    Intolerances      MEDICATIONS:  Antibiotics:    Neuro:  acetaminophen   Tablet .. 650 milliGRAM(s) Oral every 6 hours PRN  lacosamide IVPB 50 milliGRAM(s) IV Intermittent every 12 hours    Anticoagulation:    OTHER:  albuterol/ipratropium for Nebulization. 3 milliLiter(s) Nebulizer every 6 hours  dextrose 40% Gel 15 Gram(s) Oral once  dextrose 50% Injectable 25 Gram(s) IV Push once  glucagon  Injectable 1 milliGRAM(s) IntraMuscular once  hydrocortisone sodium succinate Injectable 50 milliGRAM(s) IV Push every 8 hours  insulin lispro (ADMELOG) corrective regimen sliding scale   SubCutaneous Before meals and at bedtime  montelukast 10 milliGRAM(s) Oral daily  niMODipine 30 milliGRAM(s) Oral every 4 hours  norepinephrine Infusion 0.05 MICROgram(s)/kG/Min IV Continuous <Continuous>  pantoprazole  Injectable 40 milliGRAM(s) IV Push daily  senna 2 Tablet(s) Oral at bedtime  simvastatin 40 milliGRAM(s) Oral at bedtime    IVF:    CULTURES:    RADIOLOGY & ADDITIONAL TESTS:      ASSESSMENT:  74 yo Female with PMHx of COPD, asthma, HLD, HTN, BIBEMS to [facility] from home after HHA found patient down on the floor covered in non-bloody vomitus. CTH reveals diffuse subarachnoid hemorrhage mainly at basilar cisterns with IVH and obstructive hydrocephalus, CTA shows 3mm left pcomm aneurysm, now s/p bedside right frontal EVD placement 12/10, s/p cerebral angiogram for coil embolization of left PCOMM aneurysm 12/10, NIHSS2 HH3 MF4 BD#3.    HEADACHE    Handoff    Hyperlipidemia    Hypertension    COPD (chronic obstructive pulmonary disease)    Asthma    COPD (chronic obstructive pulmonary disease)    Asthma    SAH (subarachnoid hemorrhage)    SAH (subarachnoid hemorrhage)    Subarachnoid bleed    Angiogram, cerebral, with intracranial aneurysm embolization    No significant past surgical history    HEADACHE    90+    [email or web address]        PLAN:  NEURO:  - neuro checks  - vitals checks  - pain control  - EVD at 5, monitor ICP and output  - cont Vimpat  - cont Nimodipine  - CTA BD#7    CARDIOVASCULAR:  - -180  - Levophed PRN  - cardiology following, recs appreciated: serial ekg's  - echo c/w takotsubo    PULMONARY:  - NS 2L PRN  - cont montelukast  - cont albuterol  - dc hydrocortisone and restart prednisone?    RENAL:  - de anda in place  - maintain euvolemia    GI:  - regular diet  - bowel regimen  - GI ppx: protonix    HEME:  - monitor H/H    ID:  - afebrile    ENDO:  - ISS    DVT PROPHYLAXIS: [x] Venodynes [] Heparin/Lovenox      DISPOSITION: ICU status, full code, dispo pending    d/w Dr. Serrano      Assessment:  Present when checked    []  GCS  E   V  M     Heart Failure: []Acute, [] acute on chronic , []chronic  Heart Failure:  [] Diastolic (HFpEF), [] Systolic (HFrEF), []Combined (HFpEF and HFrEF), [] RHF, [] Pulm HTN, [] Other    [] MICHAELLE, [] ATN, [] AIN, [] other  [] CKD1, [] CKD2, [] CKD 3, [] CKD 4, [] CKD 5, []ESRD    Encephalopathy: [] Metabolic, [] Hepatic, [] toxic, [] Neurological, [] Other    Abnormal Nurtitional Status: [] malnurtition (see nutrition note), [ ]underweight: BMI < 19, [] morbid obesity: BMI >40, [] Cachexia    [] Sepsis  [] hypovolemic shock,[] cardiogenic shock, [] hemorrhagic shock, [] neuogenic shock  [] Acute Respiratory Failure  []Cerebral edema, [] Brain compression/ herniation,   [] Functional quadriplegia  [] Acute blood loss anemia

## 2020-12-11 NOTE — PROGRESS NOTE ADULT - ASSESSMENT
75y/Female with:  aneursymal subarachnoid hemorrhage, L pcomm aneurysm, L parophthalmic aneurysm; brain compression, cerebral edema  osbtructive hydrocephalus  lacunar infarcts R caudate, B thalami, cerebellar hemispheres ?artery to artery vs cardioembolic?  atherosclerotic cardiovascular disease; mod to severe bilateral ICA stenosis (R>L), R VA stenosis  Hypertension dyslipidemia   COPD asthma  newly diagnosed Diabetes Mellitus?  troponin leak    PLAN: Day 1 = 12-09 ; Day 4 =  12/12; Day 21 = 12/29  NEURO: neurochecks q1h, PRN pain meds with Tylenol / Percocet  SAH:  TCD starting Day 4 for vasospasm surveillance, CTA, conventional angio, start nimodipine 60 mg po q4h to be given for 21 days; definitive aneurysm repair per Neurosurgery or Neurointerventional  Hydrocephalus:  EVD inserted - decrease to 10 cm H20 post coiling  discontinue Aminocaproic acid  Seizure prophylaxis (cortical ICH, associated SDH, unclear etiology):  lacosamide decrease to 50mg IV BID   REHAB:  physical therapy evaluation and management    EARLY MOB:  HOB elevated    PULM:  Room air, incentive spirometry, COPD on prednisone at home, continue montelukast, albuterol PRN  CARDIO:  SBP goal 100-140mm Hg, nicardipine 5 mg/hour, titrate by 2.5 mg/hour every 15 min to a max of 15 mg/hour; baseline Echo, trend Troponin x1, cardiology consulted; -180mm Hg once aneurysm secured; d/c lisioopril   ENDO:  Blood sugar goals 140-180 mg/dL, cont insulin sliding scale, stress dose hydrocortisone x 24h then back to prednisone home dose; switch simvastatin to atorvastatin 40mg PO daily   GI:  PPI for GI prophylaxis while on steroids  DIET: NPO  RENAL: maintain euvolemia, accurate Is and Os  HEM/ONC: no coagulopathy (INR= ), no ASA / Plavix use  VTE Prophylaxis: SCDs, no DVT chemoprophylaxis for now as patient is high risk for bleed (fresh post-bleed), baseline LE Doppler for DVT suspected on admission (found down)  ID: afebrile, no leukocytosis  Social: will update family    CORE MEASURES  1.  Nath and Jacobo Score = 3  2.  VTE prophylaxis:  [ ] administered within 24 hours of admission OR [X] reason not done: fresh bleed, unsecured aneurysm.  3.  Dysphagia screening:   [X] performed before any oral meds / liquids / food  4.  Nimodipine treatment:  [X] administered within 24 hours of admission OR [ ] reason not done:    ATTENDING ATTESTATION:  I was physically present for the key portions of the evaluation and management (E/M) service provided.  I agree with the above history, physical and plan, which I have reviewed and edited where appropriate.    Patient at high risk for neurological deterioration or death due to:  ICU delirium, aspiration PNA, DVT / PE.  Critical care time:  I have personally provided 45 minutes of critical care time, excluding time spent on separate procedures.      Plan discussed with RN, house staff. 75y/Female with:  aneursymal subarachnoid hemorrhage, L pcomm aneurysm, L parophthalmic aneurysm; brain compression, cerebral edema  osbtructive hydrocephalus  lacunar infarcts R caudate, B thalami, cerebellar hemispheres ?artery to artery vs cardioembolic?  atherosclerotic cardiovascular disease; mod to severe bilateral ICA stenosis (R>L), R VA stenosis  Hypertension dyslipidemia   COPD asthma  newly diagnosed Diabetes Mellitus?  troponin leak    PLAN: Day 1 = 12-09 ; Day 4 =  12/12; Day 21 = 12/29  NEURO: neurochecks q1h, PRN pain meds with Tylenol / Percocet  SAH:  TCD starting Day 4 for vasospasm surveillance, CTA on day 7, nimodipine 30 mg po q4h (SBP dropping) for 21 days   Hydrocephalus:  EVD 5 cm H20 open to drain, monitor ICPs  Seizure prophylaxis (cortical ICH, associated SDH, unclear etiology):  lacosamide 50mg IV BID   REHAB:  physical therapy evaluation and management    EARLY MOB:  HOB elevated    hydrocortisone sodium succinate Injectable   50 (12-10 @ 14:21)   50 (12-10 @ 22:00)   50 (12-11 @ 06:21)  niMODipine   60 (12-10 @ 15:09)   30 (12-10 @ 19:08)   30 (12-10 @ 22:00)   30 (12-11 @ 02:17)   30 (12-11 @ 06:22)      PULM:  Room air, incentive spirometry, COPD on prednisone at home, continue montelukast, albuterol PRN  CARDIO:  SBP goal 100-140mm Hg, EF 35%, troponins downtrending, levophed to SBP goals  ENDO:  Blood sugar goals 140-180 mg/dL, cont insulin sliding scale, prednisone home dose; switch simvastatin to atorvastatin 40mg PO daily   GI:  PPI for GI prophylaxis while on steroids  DIET: advance as tolerated  RENAL: maintain euvolemia, accurate Is and Os  HEM/ONC: dropping Hb, anemia work-up  VTE Prophylaxis: SCDs, no DVT chemoprophylaxis for now as patient is high risk for bleed (fresh post-bleed, hb dropping), baseline LE Doppler for DVT suspected on admission (found down)  ID: afebrile, worsening leukocytosis  Social: will update family    CORE MEASURES  1.  Nath and Jacobo Score = 3  2.  VTE prophylaxis:  [ ] administered within 24 hours of admission OR [X] reason not done: fresh bleed, unsecured aneurysm.  3.  Dysphagia screening:   [X] performed before any oral meds / liquids / food  4.  Nimodipine treatment:  [X] administered within 24 hours of admission OR [ ] reason not done:    ATTENDING ATTESTATION:  I was physically present for the key portions of the evaluation and management (E/M) service provided.  I agree with the above history, physical and plan, which I have reviewed and edited where appropriate.    Patient at high risk for neurological deterioration or death due to:  ICU delirium, aspiration PNA, DVT / PE.  Critical care time:  I have personally provided 45 minutes of critical care time, excluding time spent on separate procedures.      Plan discussed with RN, house staff. 75y/Female with:  aneursymal subarachnoid hemorrhage, L pcomm aneurysm, L parophthalmic aneurysm; brain compression, cerebral edema  osbtructive hydrocephalus  lacunar infarcts R caudate, B thalami, cerebellar hemispheres ?artery to artery vs cardioembolic?  atherosclerotic cardiovascular disease; mod to severe bilateral ICA stenosis (R>L), R VA stenosis  Hypertension dyslipidemia   COPD asthma  newly diagnosed Diabetes Mellitus?  troponin leak    PLAN: Day 1 = 12-09 ; Day 4 =  12/12; Day 21 = 12/29  NEURO: neurochecks q1h, PRN pain meds with Tylenol / Percocet  SAH:  TCD starting Day 4 for vasospasm surveillance, CTA on day 7, nimodipine 30 mg po q4h (SBP dropping) for 21 days   Hydrocephalus:  EVD 5 cm H20 open to drain, monitor ICPs  Seizure prophylaxis (cortical ICH, associated SDH, unclear etiology):  lacosamide 50mg PO BID   REHAB:  physical therapy evaluation and management    EARLY MOB:  HOB elevated    PULM:  Room air, incentive spirometry, COPD on prednisone at home, continue montelukast, ipratropium / albuterol PRN  CARDIO:  SBP goal 90-180mm Hg, EF 35%, troponins downtrending, levophed to SBP goals  ENDO:  Blood sugar goals 140-180 mg/dL, cont insulin sliding scale, prednisone home dose; switch simvastatin to atorvastatin 40mg PO daily   GI:  PPI for GI prophylaxis while on steroids  DIET: switch CCD  RENAL: maintain euvolemia, accurate Is and Os; d/c Soriano   HEM/ONC: dropping Hb, anemia work-up - FOBT iron profile b12 folate  VTE Prophylaxis: SCDs, no DVT chemoprophylaxis for now as patient is high risk for bleed (fresh post-bleed, hb dropping), baseline LE Doppler for DVT suspected on admission (found down)  ID: afebrile, worsening leukocytosis  Social: will update family    CORE MEASURES  1.  Nath and Jacobo Score = 3  2.  VTE prophylaxis:  [ ] administered within 24 hours of admission OR [X] reason not done: fresh bleed, unsecured aneurysm.  3.  Dysphagia screening:   [X] performed before any oral meds / liquids / food  4.  Nimodipine treatment:  [X] administered within 24 hours of admission OR [ ] reason not done:    ATTENDING ATTESTATION:  I was physically present for the key portions of the evaluation and management (E/M) service provided.  I agree with the above history, physical and plan, which I have reviewed and edited where appropriate.    Patient at high risk for neurological deterioration or death due to:  ICU delirium, aspiration PNA, DVT / PE.  Critical care time:  I have personally provided 45 minutes of critical care time, excluding time spent on separate procedures.      Plan discussed with RN, house staff.

## 2020-12-11 NOTE — DIETITIAN INITIAL EVALUATION ADULT. - OTHER INFO
74 yo Female with PMHx of COPD, asthma, HLD, HTN, BIBEMS to [facility] from home after HHA found patient down on the floor covered in non-bloody vomitus. CTH reveals diffuse subarachnoid hemorrhage mainly at basilar cisterns with IVH and obstructive hydrocephalus, CTA shows 3mm left pcomm aneurysm, now s/p bedside right frontal EVD placement 12/10, s/p cerebral angiogram for coil embolization of left PCOMM aneurysm 12/10, NIHSS2 HH3 MF4 BD#3.    Pt seen resting in bed, noted to be hard of hearing. Satting % on 2L nasal canula. Levophed running. MAP 72. Denies pain, N/V. Last BM was a few days ago per pt (endorses regular BM at baseline). Abdomen soft/nontender/nondistended. Greg score 14. Pt on CSTCHO diet, tolerating well, consuming >/=75% of meals. Pt endorses good appetite PTA, follows a regular diet, NKFA. Pt states her WQB=357hi, pt thinks she weighs 103lb now, noted documented weight is 110lb (12/9). Of note, A1c 6.7%, no documented hx of diabetes but when asked, pt states she has been told she has diabetes. As pt having a difficult time hearing RD at this time, provided with diet education handouts to read through; RD f/u with diet reinforcement and answer any of pt's questions on f/u. Please see below for full nutritional recommendations- d/w team. RD to monitor and f/u per protocol.

## 2020-12-11 NOTE — PROGRESS NOTE ADULT - ASSESSMENT
75F w/PMHx COPD, Asthma, HTN, HLD a/w SAH w/IVH and obstructive hydrocephalus s/p EVD followed by cerebral angiogram for coil embolization of L pcomm aneurysm on 12/10. Noted to have mild trop elev w/diffuse deep TWI in precordium and inferior leads, cards c/s for further recs.    - TTE 12/10 w/mod LVSD (EF 35%) w/akinesis of mid-myocardial and apical segments and preserved contractility in basal segments, consistent w/Takotsubo stress CM  - No active CP, no dynamic EKG changes, no ectopy on tele.  Trop peaked at 0.69, low suspicion for acute plaque rupture, no need to further trend.   - Please repeat TTE prior to d/c. Non-urgent ischemic evaluation can likely be pursued as outpatient unless e/o deterioration during admission  - PASP mildly elev at 38 mmHg, but appears WWP/euvolemic on exam. No evidence of decompensated HF. Pt autoregulating volume well, net -500cc yesterday. No indication for diuretics at this time  - Pt became hypotensive after receiving nimodipine (to prevent cerebral vasospasm), now on levo gtt  - When normotensive and stable from neuro standpoint, recommend starting GDMT for likely stress-induced CM w/HFrEF. Can start w/Lopressor 12.5mg PO BID    Pt seen and d/w consult attending.    --  Javy Bocye MD  Cardiology PGY-6

## 2020-12-11 NOTE — DIETITIAN INITIAL EVALUATION ADULT. - OTHER CALCULATIONS
ABW used to calculate energy needs due to pt's current body weight within % IBW (112%). Needs adjusted for age, post-op. Aim for higher end of kcal range. Fluid needs per team.

## 2020-12-11 NOTE — DIETITIAN INITIAL EVALUATION ADULT. - PERSON TAUGHT/METHOD
written material/patient instructed/verbal instruction/provided diet education handouts regarding diabetes/encouraged continued adequate PO intake with good emphasis on lean protein intake- pt appeared receptive

## 2020-12-11 NOTE — PROGRESS NOTE ADULT - ATTENDING COMMENTS
Patient seen and examined by me 12/11/20. Doing well post-aneurysm embo. Non-focal exam. EVD@5, working, ICP low. Continue spasm watch, EVD drainage.    Bebeto Serrano M.D.

## 2020-12-11 NOTE — PROGRESS NOTE ADULT - SUBJECTIVE AND OBJECTIVE BOX
=================================  NEUROCRITICAL CARE ATTENDING NOTE  =================================    CARA CANCHOLA   MRN-[id number]  Summary:  75y/F with COPD, asthma, dyslipidemia Hypertension found down.  Brought to [facility] where imaging showed SAH basilar cisterns with IVH and obstructive hydrocephalus L Pcomm aneurysm.  Given levetiracetam, hydromorphone.  NIHSS 2 HH3 MF4, transferred to [facility] for further management.      COURSE IN THE HOSPITAL:   admitted to [facility], EVD inserted; given 20 lasix for pulmo edema, Tinversion on CT    Past Medical History: Hyperlipidemia Hypertension COPD (chronic obstructive pulmonary disease) Asthma  Allergies:  No Known Allergies  Home meds:   ·	famotidine 20 mg oral tablet: 1 tab(s) orally once a day  ·	lisinopril 2.5 mg oral tablet: 1 tab(s) orally once a day  ·	montelukast 10 mg oral tablet: 1 tab(s) orally once a day  ·	predniSONE 50 mg oral tablet: 1 tab(s) orally once a day  ·	ProAir HFA CFC free 90 mcg/inh inhalation aerosol: 2 puff(s) inhaled 4 times a day PRN  ·	simvastatin 20 mg oral tablet: 1 tab(s) orally once a day (at bedtime)    PHYSICAL EXAMINATION  T(C): 37.1 (12-10 @ 04:53), Max: 37.1 (12-10 @ 04:53) HR: 72 (12-10 @ 05:00) (72 - 88) BP: 116/58 (12-10 @ 04:00) (102/57 - 149/64) RR: 18 (12-10 @ 04:00) (16 - 22) SpO2: 100% (12-10 @ 05:00) (94% - 100%)  NEUROLOGIC EXAMINATION:  Patient is  alert, oriented x2, moving all 4s  GENERAL: not intubated, not in cardiorespiratory distress  EENT:  anicteric  CARDIOVASCULAR: (+) S1 S2, normal rate and regular rhythm  PULMONARY: clear to auscultation bilaterally  ABDOMEN: soft, nontender with normoactive bowel sounds  EXTREMITIES: no edema  SKIN: no rash    LABS:  CAPILLARY BLOOD GLUCOSE 161 144 154                11.5   11.08 )-----------( 346      ( 10 Dec 2020 04:54 )             37.8     138  |  100  |  15  ----------------------------<  182<H>  4.7   |  25  |  0.79    Ca    9.4      10 Dec 2020 04:53  Phos  4.5     12-10  Mg     2.2     12-10    TPro  8.1  /  Alb  4.2  /  TBili  0.3  /  DBili  x   /  AST  32  /  ALT  20  /  AlkPhos  119    Tro 0.69 from 0.61     @ 07:01  -  12-10 @ 06:36  IN: 750 mL / OUT: 809 mL / NET: -59 mL    HbA1C = 6.7 (12-10) 6.4 ()  LDL = 112 ()   HDL = 66 ()  TG = 114 ()   TSH = 0.990 ()    Bacteriology:  CSF studies:  EEG:  Neuroimagin/10 CT head:  EVD placement, stable   CTA:  L pcomm aneurysm, L ICA (distal cavernous) aneurysm vs infundibulum, extensive calcified plaque cavernous bilateral ICA with mild to mod stenosis; thick plaque bilateral carotid bifurcations - mod to severe R and mild to mod L stenosis, focal calcified plaque R VA off subclavian artery - high grate stenosis / partial occlusion, upper lung bilateral opacification - pulmo edema, chronic deformity R humeral neck   CT head:  SAH, basilar cisterns, IV extension, obstructive hydrocephalus SVID, lacunar infarcts R caudate, both thalami, cerebellar hemispheres, no CT evidence of territorial infarction  Other imagin/09 CT abd:  small paraesophageal hiatal hernia, gastritis; ?impaction, septal thickening bilateral lower lobes ?pulmo edema, hepatic steatosis    MEDICATIONS: lacosamide 100 IV q12h lisinopril 2.5mg PO daily nimodipine 60mg PO q4h albuterol PRN montelukast 10mg PO daily pantoprazole 40 daily senna HS hydrocortisone 50 IV q8h mod ISS simvastatin 20mg PO HS     IV FLUIDS:  DRIPS:  ·	aminocaproic acid Infusion 1 Gm/Hr IV Continuous <Continuous>  ·	niCARdipine Infusion 5 mG/Hr IV Continuous <Continuous>  DIET:  Lines:  Drains:      Wounds:    CODE STATUS:  Full Code                       GOALS OF CARE:  aggressive                      DISPOSITION:  ICU  NIHSS 2 HH3 MF4 =================================  NEUROCRITICAL CARE ATTENDING NOTE  =================================    CARA CANCHOLA   MRN-[id number]  Summary:  75y/F with COPD, asthma, dyslipidemia Hypertension found down.  Brought to [facility] where imaging showed SAH basilar cisterns with IVH and obstructive hydrocephalus L Pcomm aneurysm.  Given levetiracetam, hydromorphone.  NIHSS 2 HH3 MF4, transferred to [facility] for further management.      COURSE IN THE HOSPITAL:   admitted to [facility], EVD inserted; given 20 lasix for pulmo edema, T inversion on CT  12/10 No significant events overnight.     Past Medical History: Hyperlipidemia Hypertension COPD (chronic obstructive pulmonary disease) Asthma  Allergies:  No Known Allergies  Home meds:   ·	famotidine 20 mg oral tablet: 1 tab(s) orally once a day  ·	lisinopril 2.5 mg oral tablet: 1 tab(s) orally once a day  ·	montelukast 10 mg oral tablet: 1 tab(s) orally once a day  ·	predniSONE 50 mg oral tablet: 1 tab(s) orally once a day  ·	ProAir HFA CFC free 90 mcg/inh inhalation aerosol: 2 puff(s) inhaled 4 times a day PRN  ·	simvastatin 20 mg oral tablet: 1 tab(s) orally once a day (at bedtime)    PHYSICAL EXAMINATION  T(C): 37.1 ( @ 05:54), Max: 37.3 (12-10 @ 10:56) HR: 77 ( @ 07:00) (66 - 82) BP: 91/55 (12-10 @ 16:30) (75/42 - 108/62) RR: 16 ( @ 07:00) (15 - 22) SpO2: 94% ( @ 07:00) (90% - 100%)   NEUROLOGIC EXAMINATION:  Patient is  alert, oriented x2, CLEOPATRA, no facial droop, moving all 4s  GENERAL: not intubated, not in cardiorespiratory distress  EENT:  anicteric  CARDIOVASCULAR: (+) S1 S2, normal rate and regular rhythm  PULMONARY: clear to auscultation bilaterally  ABDOMEN: soft, nontender with normoactive bowel sounds  EXTREMITIES: no edema  SKIN: no rash    LABS:   CAPILLARY BLOOD GLUCOSE 174 163 175 172 - received 8 units insulin lispro cover    (14)    8.6   (9.8)  16.84 )-----------( 353      ( 11 Dec 2020 05:40 )             27.9     141  |  107  |  15  ----------------------------<  185<H>  3.6   |  24  |  0.69    Ca    8.7      11 Dec 2020 05:40  Phos  2.9       Mg     2.0         TPro  8.1  /  Alb  4.2  /  TBili  0.3  /  DBili  x   /  AST  32  /  ALT  20  /  AlkPhos  119  12-09    12-10 @ 07:01  -   @ 07:00  IN: 1023.4 mL / OUT: 1516 mL / NET: -492.6 mL    HbA1C = 6.7 (12-10) 6.4 ()  LDL = 112 ()   HDL = 66 ()  TG = 114 ()   TSH = 0.990 ()    Bacteriology:  CSF studies:  EEG:  Neuroimagin/10 CT head:  EVD placement, stable   CTA:  L pcomm aneurysm, L ICA (distal cavernous) aneurysm vs infundibulum, extensive calcified plaque cavernous bilateral ICA with mild to mod stenosis; thick plaque bilateral carotid bifurcations - mod to severe R and mild to mod L stenosis, focal calcified plaque R VA off subclavian artery - high grate stenosis / partial occlusion, upper lung bilateral opacification - pulmo edema, chronic deformity R humeral neck   CT head:  SAH, basilar cisterns, IV extension, obstructive hydrocephalus SVID, lacunar infarcts R caudate, both thalami, cerebellar hemispheres, no CT evidence of territorial infarction  Other imagin/09 CT abd:  small paraesophageal hiatal hernia, gastritis; ?impaction, septal thickening bilateral lower lobes ?pulmo edema, hepatic steatosis    MEDICATIONS:   lacosamide 50mg PO BID nimodipine 30mg PO q4h ipratropium / albuterol q6h montelukast 10mg PO daily pantoprazole 40 dailiy senna HS hydrocortisone 50 IV q8h mod ISS simvastatin 40mg PO HS     IV FLUIDS:  DRIPS:  ·	norepinephrine Infusion 0.05 MICROgram(s)/kG/Min (4.68 mL/Hr) IV Continuous <Continuous>  DIET:  Lines:  Drains:      Wounds:    CODE STATUS:  Full Code                       GOALS OF CARE:  aggressive                      DISPOSITION:  ICU  NIHSS 2 HH3 MF4 =================================  NEUROCRITICAL CARE ATTENDING NOTE  =================================    CARA CANCHOLA   MRN-[id number]  Summary:  75y/F with COPD, asthma, dyslipidemia Hypertension found down.  Brought to [facility] where imaging showed SAH basilar cisterns with IVH and obstructive hydrocephalus L Pcomm aneurysm.  Given levetiracetam, hydromorphone.  NIHSS 2 HH3 MF4, transferred to [facility] for further management.      COURSE IN THE HOSPITAL:   admitted to [facility], EVD inserted; given 20 lasix for pulmo edema, T inversion on CT  12/10 No significant events overnight.     Past Medical History: Hyperlipidemia Hypertension COPD (chronic obstructive pulmonary disease) Asthma  Allergies:  No Known Allergies  Home meds:   ·	famotidine 20 mg oral tablet: 1 tab(s) orally once a day  ·	lisinopril 2.5 mg oral tablet: 1 tab(s) orally once a day  ·	montelukast 10 mg oral tablet: 1 tab(s) orally once a day  ·	predniSONE 50 mg oral tablet: 1 tab(s) orally once a day  ·	ProAir HFA CFC free 90 mcg/inh inhalation aerosol: 2 puff(s) inhaled 4 times a day PRN  ·	simvastatin 20 mg oral tablet: 1 tab(s) orally once a day (at bedtime)    PHYSICAL EXAMINATION  T(C): 37.1 ( @ 05:54), Max: 37.3 (12-10 @ 10:56) HR: 77 ( @ 07:00) (66 - 82) BP: 91/55 (12-10 @ 16:30) (75/42 - 108/62) RR: 16 ( @ 07:00) (15 - 22) SpO2: 94% ( @ 07:00) (90% - 100%)   NEUROLOGIC EXAMINATION:  Patient is  alert, oriented x2-3, CLEOPATRA, no facial droop, moving all 4s  GENERAL: not intubated, not in cardiorespiratory distress  EENT:  anicteric  CARDIOVASCULAR: (+) S1 S2, normal rate and regular rhythm  PULMONARY: clear to auscultation bilaterally  ABDOMEN: soft, nontender with normoactive bowel sounds  EXTREMITIES: no edema  SKIN: no rash    LABS:   CAPILLARY BLOOD GLUCOSE 174 163 175 172 - received 8 units insulin lispro cover    (14)    8.6   (9.8)  16.84 )-----------( 353      ( 11 Dec 2020 05:40 )             27.9     141  |  107  |  15  ----------------------------<  185<H>  3.6   |  24  |  0.69    Ca    8.7      11 Dec 2020 05:40  Phos  2.9       Mg     2.0         TPro  8.1  /  Alb  4.2  /  TBili  0.3  /  DBili  x   /  AST  32  /  ALT  20  /  AlkPhos  119  12-09    12-10 @ 07:01  -   @ 07:00  IN: 1023.4 mL / OUT: 1516 mL / NET: -492.6 mL    HbA1C = 6.7 (12-10) 6.4 ()  LDL = 112 ()   HDL = 66 ()  TG = 114 ()   TSH = 0.990 ()    Bacteriology:  CSF studies:  EEG:  Neuroimagin/10 CT head:  EVD placement, stable   CTA:  L pcomm aneurysm, L ICA (distal cavernous) aneurysm vs infundibulum, extensive calcified plaque cavernous bilateral ICA with mild to mod stenosis; thick plaque bilateral carotid bifurcations - mod to severe R and mild to mod L stenosis, focal calcified plaque R VA off subclavian artery - high grate stenosis / partial occlusion, upper lung bilateral opacification - pulmo edema, chronic deformity R humeral neck   CT head:  SAH, basilar cisterns, IV extension, obstructive hydrocephalus SVID, lacunar infarcts R caudate, both thalami, cerebellar hemispheres, no CT evidence of territorial infarction  Other imagin/09 CT abd:  small paraesophageal hiatal hernia, gastritis; ?impaction, septal thickening bilateral lower lobes ?pulmo edema, hepatic steatosis    MEDICATIONS:   lacosamide 50mg PO BID nimodipine 30mg PO q4h ipratropium / albuterol q6h montelukast 10mg PO daily pantoprazole 40 dailiy senna HS hydrocortisone 50 IV q8h mod ISS simvastatin 40mg PO HS     IV FLUIDS:  DRIPS:  ·	norepinephrine Infusion 0.05 MICROgram(s)/kG/Min (4.68 mL/Hr) IV Continuous <Continuous>  DIET:  Lines:  Drains:      Wounds:    CODE STATUS:  Full Code                       GOALS OF CARE:  aggressive                      DISPOSITION:  ICU  NIHSS 2 HH3 MF4

## 2020-12-11 NOTE — PROGRESS NOTE ADULT - SUBJECTIVE AND OBJECTIVE BOX
INTERVAL EVENTS:    Pt seen/examined at bedside, no ZAFAR o/n. S/p cerebral angio for coil embolization of L pcomm aneurysm yesterday. Reports improvement in HA. Denies CP, palp, SOB or other cardiac complaint.    MEDICATIONS  (STANDING):  albuterol/ipratropium for Nebulization. 3 milliLiter(s) Nebulizer every 6 hours  atorvastatin 40 milliGRAM(s) Oral at bedtime  cyanocobalamin 1000 MICROGram(s) Oral daily  dextrose 40% Gel 15 Gram(s) Oral once  dextrose 50% Injectable 25 Gram(s) IV Push once  enoxaparin Injectable 40 milliGRAM(s) SubCutaneous every 24 hours  glucagon  Injectable 1 milliGRAM(s) IntraMuscular once  insulin lispro (ADMELOG) corrective regimen sliding scale   SubCutaneous Before meals and at bedtime  lacosamide 50 milliGRAM(s) Oral two times a day  montelukast 10 milliGRAM(s) Oral daily  norepinephrine Infusion 0.05 MICROgram(s)/kG/Min (4.68 mL/Hr) IV Continuous <Continuous>  pantoprazole    Tablet 40 milliGRAM(s) Oral before breakfast  predniSONE   Tablet 50 milliGRAM(s) Oral daily  senna 2 Tablet(s) Oral at bedtime    MEDICATIONS  (PRN):  acetaminophen   Tablet .. 650 milliGRAM(s) Oral every 6 hours PRN Temp greater or equal to 38C (100.4F), Mild Pain (1 - 3)      Vital Signs Last 24 Hrs  T(C): 36.4 (11 Dec 2020 17:00), Max: 37.1 (11 Dec 2020 01:35)  T(F): 97.5 (11 Dec 2020 17:00), Max: 98.8 (11 Dec 2020 01:35)  HR: 82 (11 Dec 2020 18:00) (71 - 87)  BP: 105/54 (11 Dec 2020 18:00) (105/54 - 105/54)  BP(mean): 76 (11 Dec 2020 18:00) (76 - 76)  RR: 18 (11 Dec 2020 18:00) (15 - 22)  SpO2: 96% (11 Dec 2020 18:00) (92% - 100%)     PHYSICAL EXAM:  GEN: Awake, alert. NAD.   HEENT: NCAT, PERRL, EOMI. Mucosa moist. No JVD.  RESP: CTA b/l  CV: RRR. Normal S1/S2. No m/r/g.  ABD: Soft. NT/ND. BS+  EXT: Warm. No edema, clubbing, or cyanosis.   NEURO: AAOx3. No focal deficits.     LABS:                        8.6    16.84 )-----------( 353      ( 11 Dec 2020 05:40 )             27.9     12-11    141  |  107  |  15  ----------------------------<  185<H>  3.6   |  24  |  0.69    Ca    8.7      11 Dec 2020 05:40  Phos  2.9     12-11  Mg     2.0     12-11    TPro  8.1  /  Alb  4.2  /  TBili  0.3  /  DBili  x   /  AST  32  /  ALT  20  /  AlkPhos  119  12-09    CARDIAC MARKERS ( 10 Dec 2020 15:54 )  x     / 0.30 ng/mL / x     / x     / x      CARDIAC MARKERS ( 10 Dec 2020 04:54 )  x     / 0.69 ng/mL / 288 U/L / x     / 14.2 ng/mL  CARDIAC MARKERS ( 09 Dec 2020 23:13 )  x     / 0.61 ng/mL / x     / x     / x          PT/INR - ( 09 Dec 2020 23:13 )   PT: 11.5 sec;   INR: 0.96          PTT - ( 09 Dec 2020 23:13 )  PTT:27.4 sec  Urinalysis Basic - ( 10 Dec 2020 04:17 )    Color: Yellow / Appearance: Clear / S.015 / pH: x  Gluc: x / Ketone: NEGATIVE  / Bili: Negative / Urobili: 0.2 E.U./dL   Blood: x / Protein: NEGATIVE mg/dL / Nitrite: NEGATIVE   Leuk Esterase: NEGATIVE / RBC: < 5 /HPF / WBC < 5 /HPF   Sq Epi: x / Non Sq Epi: 0-5 /HPF / Bacteria: Present /HPF      I&O's Summary    10 Dec 2020 07:01  -  11 Dec 2020 07:00  --------------------------------------------------------  IN: 1023.4 mL / OUT: 1516 mL / NET: -492.6 mL    11 Dec 2020 07:01  -  11 Dec 2020 18:33  --------------------------------------------------------  IN: 118.4 mL / OUT: 392 mL / NET: -273.6 mL      BNP  RADIOLOGY & ADDITIONAL STUDIES:    TELEMETRY: NSR 70s-80s, no events    EKG:

## 2020-12-12 LAB
ANION GAP SERPL CALC-SCNC: 9 MMOL/L — SIGNIFICANT CHANGE UP (ref 5–17)
BUN SERPL-MCNC: 17 MG/DL — SIGNIFICANT CHANGE UP (ref 7–23)
CALCIUM SERPL-MCNC: 8.3 MG/DL — LOW (ref 8.4–10.5)
CHLORIDE SERPL-SCNC: 105 MMOL/L — SIGNIFICANT CHANGE UP (ref 96–108)
CO2 SERPL-SCNC: 26 MMOL/L — SIGNIFICANT CHANGE UP (ref 22–31)
CREAT SERPL-MCNC: 0.72 MG/DL — SIGNIFICANT CHANGE UP (ref 0.5–1.3)
FERRITIN SERPL-MCNC: 22 NG/ML — SIGNIFICANT CHANGE UP (ref 15–150)
GLUCOSE SERPL-MCNC: 172 MG/DL — HIGH (ref 70–99)
HCT VFR BLD CALC: 27.5 % — LOW (ref 34.5–45)
HGB BLD-MCNC: 8.5 G/DL — LOW (ref 11.5–15.5)
IRON SATN MFR SERPL: 24 UG/DL — LOW (ref 30–160)
IRON SATN MFR SERPL: 7 % — LOW (ref 14–50)
MAGNESIUM SERPL-MCNC: 2 MG/DL — SIGNIFICANT CHANGE UP (ref 1.6–2.6)
MCHC RBC-ENTMCNC: 27.6 PG — SIGNIFICANT CHANGE UP (ref 27–34)
MCHC RBC-ENTMCNC: 30.9 GM/DL — LOW (ref 32–36)
MCV RBC AUTO: 89.3 FL — SIGNIFICANT CHANGE UP (ref 80–100)
NRBC # BLD: 0 /100 WBCS — SIGNIFICANT CHANGE UP (ref 0–0)
PHOSPHATE SERPL-MCNC: 2.3 MG/DL — LOW (ref 2.5–4.5)
PLATELET # BLD AUTO: 283 K/UL — SIGNIFICANT CHANGE UP (ref 150–400)
POTASSIUM SERPL-MCNC: 3.6 MMOL/L — SIGNIFICANT CHANGE UP (ref 3.5–5.3)
POTASSIUM SERPL-SCNC: 3.6 MMOL/L — SIGNIFICANT CHANGE UP (ref 3.5–5.3)
RBC # BLD: 3.08 M/UL — LOW (ref 3.8–5.2)
RBC # FLD: 15.5 % — HIGH (ref 10.3–14.5)
SODIUM SERPL-SCNC: 140 MMOL/L — SIGNIFICANT CHANGE UP (ref 135–145)
TIBC SERPL-MCNC: 336 UG/DL — SIGNIFICANT CHANGE UP (ref 220–430)
TRANSFERRIN SERPL-MCNC: 284 MG/DL — SIGNIFICANT CHANGE UP (ref 200–360)
UIBC SERPL-MCNC: 312 UG/DL — SIGNIFICANT CHANGE UP (ref 110–370)
WBC # BLD: 13.99 K/UL — HIGH (ref 3.8–10.5)
WBC # FLD AUTO: 13.99 K/UL — HIGH (ref 3.8–10.5)

## 2020-12-12 PROCEDURE — 99233 SBSQ HOSP IP/OBS HIGH 50: CPT

## 2020-12-12 PROCEDURE — 99291 CRITICAL CARE FIRST HOUR: CPT

## 2020-12-12 RX ORDER — SODIUM,POTASSIUM PHOSPHATES 278-250MG
1 POWDER IN PACKET (EA) ORAL
Refills: 0 | Status: COMPLETED | OUTPATIENT
Start: 2020-12-12 | End: 2020-12-13

## 2020-12-12 RX ORDER — CHLORHEXIDINE GLUCONATE 213 G/1000ML
1 SOLUTION TOPICAL
Refills: 0 | Status: DISCONTINUED | OUTPATIENT
Start: 2020-12-12 | End: 2021-01-26

## 2020-12-12 RX ORDER — SODIUM CHLORIDE 9 MG/ML
250 INJECTION INTRAMUSCULAR; INTRAVENOUS; SUBCUTANEOUS ONCE
Refills: 0 | Status: COMPLETED | OUTPATIENT
Start: 2020-12-12 | End: 2020-12-12

## 2020-12-12 RX ORDER — FERROUS SULFATE 325(65) MG
325 TABLET ORAL DAILY
Refills: 0 | Status: DISCONTINUED | OUTPATIENT
Start: 2020-12-12 | End: 2021-01-26

## 2020-12-12 RX ADMIN — Medication 2: at 10:52

## 2020-12-12 RX ADMIN — Medication 1 TABLET(S): at 21:35

## 2020-12-12 RX ADMIN — SODIUM CHLORIDE 500 MILLILITER(S): 9 INJECTION INTRAMUSCULAR; INTRAVENOUS; SUBCUTANEOUS at 09:45

## 2020-12-12 RX ADMIN — LACOSAMIDE 50 MILLIGRAM(S): 50 TABLET ORAL at 17:49

## 2020-12-12 RX ADMIN — Medication 3 MILLILITER(S): at 21:50

## 2020-12-12 RX ADMIN — Medication 1 TABLET(S): at 17:49

## 2020-12-12 RX ADMIN — SENNA PLUS 2 TABLET(S): 8.6 TABLET ORAL at 21:35

## 2020-12-12 RX ADMIN — Medication 3 MILLILITER(S): at 17:00

## 2020-12-12 RX ADMIN — Medication 2: at 06:41

## 2020-12-12 RX ADMIN — Medication 3 MILLILITER(S): at 02:22

## 2020-12-12 RX ADMIN — PREGABALIN 1000 MICROGRAM(S): 225 CAPSULE ORAL at 12:00

## 2020-12-12 RX ADMIN — Medication 1 TABLET(S): at 10:48

## 2020-12-12 RX ADMIN — Medication 3 MILLILITER(S): at 06:04

## 2020-12-12 RX ADMIN — PANTOPRAZOLE SODIUM 40 MILLIGRAM(S): 20 TABLET, DELAYED RELEASE ORAL at 06:13

## 2020-12-12 RX ADMIN — Medication 50 MILLIGRAM(S): at 06:12

## 2020-12-12 RX ADMIN — Medication 325 MILLIGRAM(S): at 12:00

## 2020-12-12 RX ADMIN — MONTELUKAST 10 MILLIGRAM(S): 4 TABLET, CHEWABLE ORAL at 12:01

## 2020-12-12 RX ADMIN — Medication 1 TABLET(S): at 12:00

## 2020-12-12 RX ADMIN — LACOSAMIDE 50 MILLIGRAM(S): 50 TABLET ORAL at 06:12

## 2020-12-12 RX ADMIN — ENOXAPARIN SODIUM 40 MILLIGRAM(S): 100 INJECTION SUBCUTANEOUS at 21:35

## 2020-12-12 RX ADMIN — ATORVASTATIN CALCIUM 40 MILLIGRAM(S): 80 TABLET, FILM COATED ORAL at 21:35

## 2020-12-12 RX ADMIN — Medication 2: at 23:30

## 2020-12-12 RX ADMIN — Medication 4.68 MICROGRAM(S)/KG/MIN: at 07:19

## 2020-12-12 NOTE — PROGRESS NOTE ADULT - SUBJECTIVE AND OBJECTIVE BOX
HPI: 74y/o F with PMHx sig for COPD, asthma, HLD, HTN, BIBEMS to [facility] from home after HHA discovered patient found down in floor covered in non-bloody vomitus. On arrival to [facility], patient was taken for stat head CT, which revealed diffuse subarachnoid hemorrhage mainly at basilar cisterns with IVH and obstructive hydrocephalus. CTA concerning for 3mm left pcomm aneurysm and a 1.6mm outpouching of distal cavernous segment of the left internal carotid artery likely aneurysm. NIHSS2 HH3 MF4. Patient was transferred to [facility] for further intervention. Patient currently reports headaches. Pt denies acute changes in vision, seizures, CP, SOB, weakness/paresthesias of arms or legs.    Hospital Course:   : BD1 Patient admitted for SAH HH3, MF4.   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 EVD at 5, on Levo overnight, nimodipine discontinued d/t hypotension  : BD4 POD#2, VIVIEN overnight, neuro stable, passed TOV    Vital Signs Last 24 Hrs  T(C): 36.6 (12 Dec 2020 00:54), Max: 37.1 (11 Dec 2020 05:54)  T(F): 97.9 (12 Dec 2020 00:54), Max: 98.7 (11 Dec 2020 05:54)  HR: 85 (12 Dec 2020 03:00) (71 - 90)  BP: 134/58 (12 Dec 2020 03:00) (105/54 - 136/75)  BP(mean): 84 (12 Dec 2020 03:00) (76 - 99)  RR: 18 (12 Dec 2020 03:00) (15 - 22)  SpO2: 94% (12 Dec 2020 03:00) (92% - 100%)    I&O's Detail    10 Dec 2020 07:01  -  11 Dec 2020 07:00  --------------------------------------------------------  IN:    IV PiggyBack: 50 mL    Norepinephrine: 163.4 mL    Oral Fluid: 60 mL    sodium chloride 0.9%: 100 mL    sodium chloride 0.9%: 150 mL    Sodium Chloride 0.9% Bolus: 500 mL  Total IN: 1023.4 mL    OUT:    Aminocaproic Acid: 0 mL    External Ventricular Device (mL): 156 mL    Indwelling Catheter - Urethral (mL): 1360 mL    NiCARdipine: 0 mL  Total OUT: 1516 mL    Total NET: -492.6 mL      11 Dec 2020 07:  -  12 Dec 2020 04:01  --------------------------------------------------------  IN:    Norepinephrine: 196.5 mL  Total IN: 196.5 mL    OUT:    External Ventricular Device (mL): 155 mL    Indwelling Catheter - Urethral (mL): 310 mL    Voided (mL): 500 mL  Total OUT: 965 mL    Total NET: -768.5 mL        I&O's Summary    10 Dec 2020 07:  -  11 Dec 2020 07:00  --------------------------------------------------------  IN: 1023.4 mL / OUT: 1516 mL / NET: -492.6 mL    11 Dec 2020 07:  -  12 Dec 2020 04:01  --------------------------------------------------------  IN: 196.5 mL / OUT: 965 mL / NET: -768.5 mL        PHYSICAL EXAM:  Gen: laying in hospital bed, NAD  Neurological: AA+Ox2 (not oriented to time), OE spont, FC  CN II-XII: PERRL, EOMI  Motor exam: MAEx4, 5/5 strength throughout  SILT in UE and LE b/l  Cardiovascular: RRR  Respiratory: CTAB  Gastrointestinal: soft, NT/ND  Groin site C/D/I  DP 2+ b/l        TUBES/LINES:  [] CVC  [x] A-line  [] Lumbar Drain  [x] Ventriculostomy - EVD at 5cmH2O  [] Other    DIET:  [] NPO  [x] Mechanical  [] Tube feeds    LABS:                        8.6    16.84 )-----------( 353      ( 11 Dec 2020 05:40 )             27.9     12-11    141  |  107  |  15  ----------------------------<  185<H>  3.6   |  24  |  0.69    Ca    8.7      11 Dec 2020 05:40  Phos  2.9     12-11  Mg     2.0     12-11        Urinalysis Basic - ( 10 Dec 2020 04:17 )    Color: Yellow / Appearance: Clear / S.015 / pH: x  Gluc: x / Ketone: NEGATIVE  / Bili: Negative / Urobili: 0.2 E.U./dL   Blood: x / Protein: NEGATIVE mg/dL / Nitrite: NEGATIVE   Leuk Esterase: NEGATIVE / RBC: < 5 /HPF / WBC < 5 /HPF   Sq Epi: x / Non Sq Epi: 0-5 /HPF / Bacteria: Present /HPF      CARDIAC MARKERS ( 10 Dec 2020 15:54 )  x     / 0.30 ng/mL / x     / x     / x      CARDIAC MARKERS ( 10 Dec 2020 04:54 )  x     / 0.69 ng/mL / 288 U/L / x     / 14.2 ng/mL      CAPILLARY BLOOD GLUCOSE      POCT Blood Glucose.: 169 mg/dL (11 Dec 2020 21:11)  POCT Blood Glucose.: 198 mg/dL (11 Dec 2020 16:19)  POCT Blood Glucose.: 314 mg/dL (11 Dec 2020 10:55)  POCT Blood Glucose.: 304 mg/dL (11 Dec 2020 10:53)  POCT Blood Glucose.: 174 mg/dL (11 Dec 2020 06:10)      Drug Levels: [] N/A    CSF Analysis: [] N/A      Allergies    No Known Allergies    Intolerances      MEDICATIONS:  Antibiotics:    Neuro:  acetaminophen   Tablet .. 650 milliGRAM(s) Oral every 6 hours PRN  lacosamide 50 milliGRAM(s) Oral two times a day    Anticoagulation:  enoxaparin Injectable 40 milliGRAM(s) SubCutaneous every 24 hours    OTHER:  albuterol/ipratropium for Nebulization. 3 milliLiter(s) Nebulizer every 6 hours  atorvastatin 40 milliGRAM(s) Oral at bedtime  dextrose 40% Gel 15 Gram(s) Oral once  dextrose 50% Injectable 25 Gram(s) IV Push once  glucagon  Injectable 1 milliGRAM(s) IntraMuscular once  insulin lispro (ADMELOG) corrective regimen sliding scale   SubCutaneous Before meals and at bedtime  montelukast 10 milliGRAM(s) Oral daily  norepinephrine Infusion 0.05 MICROgram(s)/kG/Min IV Continuous <Continuous>  pantoprazole    Tablet 40 milliGRAM(s) Oral before breakfast  predniSONE   Tablet 50 milliGRAM(s) Oral daily  senna 2 Tablet(s) Oral at bedtime    IVF:  cyanocobalamin 1000 MICROGram(s) Oral daily    CULTURES:    RADIOLOGY & ADDITIONAL TESTS:      ASSESSMENT:  74 yo Female with PMHx of COPD, asthma, HLD, HTN, BIBEMS to [facility] from home after HHA found patient down on the floor covered in non-bloody vomitus. CTH reveals diffuse subarachnoid hemorrhage mainly at basilar cisterns with IVH and obstructive hydrocephalus, CTA shows 3mm left pcomm aneurysm, now s/p bedside right frontal EVD placement 12/10, s/p cerebral angiogram for coil embolization of left PCOMM aneurysm 12/10, NIHSS2 HH3 MF4 BD#4.    HEADACHE    Handoff    Hyperlipidemia    Hypertension    COPD (chronic obstructive pulmonary disease)    Asthma    COPD (chronic obstructive pulmonary disease)    Asthma    SAH (subarachnoid hemorrhage)    SAH (subarachnoid hemorrhage)    Subarachnoid bleed    Angiogram, cerebral, with intracranial aneurysm embolization    No significant past surgical history    HEADACHE    90+    [email or web address]        PLAN:  NEURO:  - neuro checks  - vitals checks  - pain control  - EVD at 5, monitor ICP and output  - cont Vimpat  - nimodipine discontinued   - CTA BD#7    CARDIOVASCULAR:  - -180  - Levophed PRN  - cardiology following, recs appreciated:  repeat TTE prior to discharge, start beta blocker (lopressor 12.5mg BID) when normotensive and neuro stable  - echo c/w takotsubo    PULMONARY:  - NS 2L PRN  - cont montelukast  - cont albuterol  - prednisone 50mg daily    RENAL:  - de anda d/c'd , passed TOV  - maintain euvolemia    GI:  - regular diet  - bowel regimen  - GI ppx: protonix    HEME:  - monitor H/H    ID:  - afebrile    ENDO:  - ISS    DVT PROPHYLAXIS: [x] Venodynes [] Heparin/Lovenox      DISPOSITION: ICU status, full code, dispo pending    d/w Dr. Serrano      Assessment:  Present when checked    []  GCS  E   V  M     Heart Failure: []Acute, [] acute on chronic , []chronic  Heart Failure:  [] Diastolic (HFpEF), [] Systolic (HFrEF), []Combined (HFpEF and HFrEF), [] RHF, [] Pulm HTN, [] Other    [] MICHAELLE, [] ATN, [] AIN, [] other  [] CKD1, [] CKD2, [] CKD 3, [] CKD 4, [] CKD 5, []ESRD    Encephalopathy: [] Metabolic, [] Hepatic, [] toxic, [] Neurological, [] Other    Abnormal Nurtitional Status: [] malnurtition (see nutrition note), [ ]underweight: BMI < 19, [] morbid obesity: BMI >40, [] Cachexia    [] Sepsis  [] hypovolemic shock,[] cardiogenic shock, [] hemorrhagic shock, [] neuogenic shock  [] Acute Respiratory Failure  []Cerebral edema, [] Brain compression/ herniation,   [] Functional quadriplegia  [] Acute blood loss anemia

## 2020-12-12 NOTE — PROGRESS NOTE ADULT - SUBJECTIVE AND OBJECTIVE BOX
=================================  NEUROCRITICAL CARE ATTENDING NOTE  =================================    CARA CANCHOLA   MRN-[id number]  Summary:  75y/F with COPD, asthma, dyslipidemia Hypertension found down.  Brought to [facility] where imaging showed SAH basilar cisterns with IVH and obstructive hydrocephalus L Pcomm aneurysm.  Given levetiracetam, hydromorphone.  NIHSS 2 HH3 MF4, transferred to [facility] for further management.      COURSE IN THE HOSPITAL:   admitted to [facility], EVD inserted; given 20 lasix for pulmo edema, T inversion on CT  12/10 No significant events overnight.    No significant events overnight.     Past Medical History: Hyperlipidemia Hypertension COPD (chronic obstructive pulmonary disease) Asthma  Allergies:  No Known Allergies  Home meds:   ·	famotidine 20 mg oral tablet: 1 tab(s) orally once a day  ·	lisinopril 2.5 mg oral tablet: 1 tab(s) orally once a day  ·	montelukast 10 mg oral tablet: 1 tab(s) orally once a day  ·	predniSONE 50 mg oral tablet: 1 tab(s) orally once a day  ·	ProAir HFA CFC free 90 mcg/inh inhalation aerosol: 2 puff(s) inhaled 4 times a day PRN  ·	simvastatin 20 mg oral tablet: 1 tab(s) orally once a day (at bedtime)    PHYSICAL EXAMINATION  T(C): 37.1 ( @ 05:00), Max: 37.1 ( @ 05:00) HR: 78 ( @ 07:00) (71 - 90) BP: 134/58 ( @ 03:00) (105/54 - 136/75) RR: 17 ( @ 07:00) (15 - 22) SpO2: 94% ( @ 07:00) (92% - 100%)   NEUROLOGIC EXAMINATION:  Patient is  alert, oriented x2-3, CLEOPATRA, no facial droop, moving all 4s  GENERAL: not intubated, not in cardiorespiratory distress  EENT:  anicteric  CARDIOVASCULAR: (+) S1 S2, normal rate and regular rhythm  PULMONARY: clear to auscultation bilaterally  ABDOMEN: soft, nontender with normoactive bowel sounds  EXTREMITIES: no edema  SKIN: no rash    LABS:   CAPILLARY BLOOD GLUCOSE [id number] -given 14 units coverage                        8.5    13.99 )-----------( 283      ( 12 Dec 2020 05:11 )             27.5     140  |  105  |  17  ----------------------------<  172<H>  3.6   |  26  |  0.72    Ca    8.3<L>      12 Dec 2020 05:11  Phos  2.3       Mg     2.0      @ 07:01  -   @ 07:00  IN: 219.5 mL / OUT: 988 mL / NET: -768.5 mL    HbA1C = 6.7 (12-10) 6.4 ()  LDL = 112 ()   HDL = 66 ()  TG = 114 ()   TSH = 0.990 ()    Bacteriology:  CSF studies:  EEG:  Neuroimagin/10 CT head:  EVD placement, stable   CTA:  L pcomm aneurysm, L ICA (distal cavernous) aneurysm vs infundibulum, extensive calcified plaque cavernous bilateral ICA with mild to mod stenosis; thick plaque bilateral carotid bifurcations - mod to severe R and mild to mod L stenosis, focal calcified plaque R VA off subclavian artery - high grate stenosis / partial occlusion, upper lung bilateral opacification - pulmo edema, chronic deformity R humeral neck   CT head:  SAH, basilar cisterns, IV extension, obstructive hydrocephalus SVID, lacunar infarcts R caudate, both thalami, cerebellar hemispheres, no CT evidence of territorial infarction  Other imagin/09 CT abd:  small paraesophageal hiatal hernia, gastritis; ?impaction, septal thickening bilateral lower lobes ?pulmo edema, hepatic steatosis    MEDICATIONS:   lacosamide 50mg PO BID nimodipine 30mg PO q4h ipratropium / albuterol q6h montelukast 10mg PO daily pantoprazole 40 dailiy senna HS hydrocortisone 50 IV q8h mod ISS simvastatin 40mg PO HS   MEDICATIONS: 12-12  SQL [id number] ipratropium / albuterol montelukast 10mgPOdailypantoprazole 40dailysennaHS atorvastatin 40mg PO HS mod ISS prednisone 50mg PO daily cyanocobalamin 1000 daily     IV FLUIDS:  DRIPS:  ·	norepinephrine Infusion 0.05 MICROgram(s)/kG/Min (4.68 mL/Hr) IV Continuous <Continuous>  DIET:  Lines:  Drains:      Wounds:    CODE STATUS:  Full Code                       GOALS OF CARE:  aggressive                      DISPOSITION:  ICU  NIHSS 2 HH3 MF4 =================================  NEUROCRITICAL CARE ATTENDING NOTE  =================================    CARA CANCHOLA   MRN-[id number]  Summary:  75y/F with COPD, asthma, dyslipidemia Hypertension found down.  Brought to [facility] where imaging showed SAH basilar cisterns with IVH and obstructive hydrocephalus L Pcomm aneurysm.  Given levetiracetam, hydromorphone.  NIHSS 2 HH3 MF4, transferred to [facility] for further management.      COURSE IN THE HOSPITAL:   admitted to [facility], EVD inserted; given 20 lasix for pulmo edema, T inversion on CT  12/10 No significant events overnight.    No significant events overnight.    No significant events overnight.     Past Medical History: Hyperlipidemia Hypertension COPD (chronic obstructive pulmonary disease) Asthma  Allergies:  No Known Allergies  Home meds:   ·	famotidine 20 mg oral tablet: 1 tab(s) orally once a day  ·	lisinopril 2.5 mg oral tablet: 1 tab(s) orally once a day  ·	montelukast 10 mg oral tablet: 1 tab(s) orally once a day  ·	predniSONE 50 mg oral tablet: 1 tab(s) orally once a day  ·	ProAir HFA CFC free 90 mcg/inh inhalation aerosol: 2 puff(s) inhaled 4 times a day PRN  ·	simvastatin 20 mg oral tablet: 1 tab(s) orally once a day (at bedtime)    PHYSICAL EXAMINATION  T(C): 37.1 ( @ 05:00), Max: 37.1 (- @ 05:00) HR: 78 ( @ 07:00) (71 - 90) BP: 134/58 ( @ 03:00) (105/54 - 136/75) RR: 17 ( @ 07:00) (15 - 22) SpO2: 94% ( @ 07:00) (92% - 100%)   NEUROLOGIC EXAMINATION:  Patient is  alert, oriented x2-3, CLEOPATRA, no facial droop, moving all 4s  GENERAL: not intubated, not in cardiorespiratory distress  EENT:  anicteric  CARDIOVASCULAR: (+) S1 S2, normal rate and regular rhythm  PULMONARY: clear to auscultation bilaterally  ABDOMEN: soft, nontender with normoactive bowel sounds  EXTREMITIES: no edema  SKIN: no rash    LABS:   CAPILLARY BLOOD GLUCOSE [id number] -given 14 units coverage             (16.4)    8.5  (8.6)  13.99 )-----------( 283      ( 12 Dec 2020 05:11 )             27.5     140  |  105  |  17  ----------------------------<  172<H>  3.6   |  26  |  0.72    Ca    8.3<L>      12 Dec 2020 05:11  Phos  2.3       Mg     2.0      @ 07:01  -   @ 07:00  IN: 219.5 mL / OUT: 988 mL / NET: -768.5 mL    HbA1C = 6.7 (12-10) 6.4 ()  LDL = 112 ()   HDL = 66 ()  TG = 114 ()   TSH = 0.990 ()    Bacteriology:  CSF studies:  EEG:  Neuroimagin/10 CT head:  EVD placement, stable   CTA:  L pcomm aneurysm, L ICA (distal cavernous) aneurysm vs infundibulum, extensive calcified plaque cavernous bilateral ICA with mild to mod stenosis; thick plaque bilateral carotid bifurcations - mod to severe R and mild to mod L stenosis, focal calcified plaque R VA off subclavian artery - high grate stenosis / partial occlusion, upper lung bilateral opacification - pulmo edema, chronic deformity R humeral neck   CT head:  SAH, basilar cisterns, IV extension, obstructive hydrocephalus SVID, lacunar infarcts R caudate, both thalami, cerebellar hemispheres, no CT evidence of territorial infarction  Other imagin/11 LE Doppler: NEG   CT abd:  small paraesophageal hiatal hernia, gastritis; ?impaction, septal thickening bilateral lower lobes ?pulmo edema, hepatic steatosis    MEDICATIONS:   SQL 40 daily lacosamide 50 mgPO BID ipratropium / albuterol montelukast 10mg PO daily pantoprazole 40 daily senna HS atorvastatin 40mg PO HS mod ISS prednisone 50mg PO daily cyanocobalamin 1000 daily     IV FLUIDS: IVL  DRIPS:  ·	norepinephrine Infusion 0.05 MICROgram(s)/kG/Min (4.68 mL/Hr) IV Continuous <Continuous> - 0.02 (weaning)  DIET: CCD  Lines: Erin R IJ  Drains:      Wounds:    CODE STATUS:  Full Code                       GOALS OF CARE:  aggressive                      DISPOSITION:  ICU  NIHSS 2 HH3 MF4

## 2020-12-12 NOTE — PROGRESS NOTE ADULT - ASSESSMENT
75y/Female with:  aneursymal subarachnoid hemorrhage, L pcomm aneurysm, L parophthalmic aneurysm; brain compression, cerebral edema  osbtructive hydrocephalus  lacunar infarcts R caudate, B thalami, cerebellar hemispheres ?artery to artery vs cardioembolic?  atherosclerotic cardiovascular disease; mod to severe bilateral ICA stenosis (R>L), R VA stenosis  Hypertension dyslipidemia   COPD asthma  newly diagnosed Diabetes Mellitus?  troponin leak    PLAN: Day 1 = 12-09 ; Day 4 =  12/12; Day 21 = 12/29  NEURO: neurochecks q1h, PRN pain meds with Tylenol / Percocet  SAH:  TCD starting Day 4 for vasospasm surveillance, CTA on day 7, nimodipine 30 mg po q4h (SBP dropping) for 21 days   Hydrocephalus:  EVD 5 cm H20 open to drain, monitor ICPs  Seizure prophylaxis (cortical ICH, associated SDH, unclear etiology):  lacosamide 50mg PO BID   REHAB:  physical therapy evaluation and management    EARLY MOB:  HOB elevated    PULM:  Room air, incentive spirometry, COPD on prednisone at home, continue montelukast, ipratropium / albuterol PRN  CARDIO:  SBP goal 90-180mm Hg, EF 35%, troponins downtrending, levophed to SBP goals  ENDO:  Blood sugar goals 140-180 mg/dL, cont insulin sliding scale, prednisone home dose; switch simvastatin to atorvastatin 40mg PO daily   GI:  PPI for GI prophylaxis while on steroids  DIET: switch CCD  RENAL: maintain euvolemia, accurate Is and Os; d/c Soriano   HEM/ONC: dropping Hb, anemia work-up - FOBT iron profile b12 folate  VTE Prophylaxis: SCDs, no DVT chemoprophylaxis for now as patient is high risk for bleed (fresh post-bleed, hb dropping), baseline LE Doppler for DVT suspected on admission (found down)  ID: afebrile, worsening leukocytosis  Social: will update family    CORE MEASURES  1.  Nath and Jacobo Score = 3  2.  VTE prophylaxis:  [ ] administered within 24 hours of admission OR [X] reason not done: fresh bleed, unsecured aneurysm.  3.  Dysphagia screening:   [X] performed before any oral meds / liquids / food  4.  Nimodipine treatment:  [X] administered within 24 hours of admission OR [ ] reason not done:    ATTENDING ATTESTATION:  I was physically present for the key portions of the evaluation and management (E/M) service provided.  I agree with the above history, physical and plan, which I have reviewed and edited where appropriate.    Patient at high risk for neurological deterioration or death due to:  ICU delirium, aspiration PNA, DVT / PE.  Critical care time:  I have personally provided 45 minutes of critical care time, excluding time spent on separate procedures.      Plan discussed with RN, house staff. 75y/Female with:  aneursymal subarachnoid hemorrhage, L pcomm aneurysm, L parophthalmic aneurysm; brain compression, cerebral edema  osbtructive hydrocephalus  lacunar infarcts R caudate, B thalami, cerebellar hemispheres ?artery to artery vs cardioembolic?  atherosclerotic cardiovascular disease; mod to severe bilateral ICA stenosis (R>L), R VA stenosis  Hypertension dyslipidemia   COPD asthma  newly diagnosed Diabetes Mellitus?  troponin leak    PLAN: Day 1 = 12-09 ; Day 4 =  12/12; Day 21 = 12/29  NEURO: neurochecks q1h, PRN pain meds with Tylenol / Percocet  SAH:  TCD starting Day 4 for vasospasm surveillance, CTA on 12/15, nimodipine discontinued due to pressor requirements  Hydrocephalus:  EVD 5 cm H20 open to drain, monitor ICPs  Seizure prophylaxis (cortical ICH, associated SDH, unclear etiology):  lacosamide 50mg PO BID   REHAB:  physical therapy evaluation and management    EARLY MOB:  HOB elevated    PULM:  Room air, incentive spirometry, continue montelukast, ipratropium / albuterol PRN; on further history - denies taking prednisone, will d/c   CARDIO:  SBP goal 90-180mm Hg, EF 35%, repeat echo next week, troponins downtrending, levophed taper to off  ENDO:  Blood sugar goals 140-180 mg/dL, cont insulin sliding scale, atorvastatin 40mg   GI:  PPI for GI prophylaxis ([facility] eds)  DIET: CCD  RENAL: maintain euvolemia, accurate Is and Os  HEM/ONC: Hb stable, iron profile c/w iron deficiency, start iron FeSO4 325 TID; FOBT  VTE Prophylaxis: SCDs, SQL  ID: afebrile, improving leukocytosis  Social: will update family    CORE MEASURES  1.  Nath and Jacobo Score = 3  2.  VTE prophylaxis:  [ ] administered within 24 hours of admission OR [X] reason not done: fresh bleed, unsecured aneurysm.  3.  Dysphagia screening:   [X] performed before any oral meds / liquids / food  4.  Nimodipine treatment:  [X] administered within 24 hours of admission OR [ ] reason not done:    ATTENDING ATTESTATION:  I was physically present for the key portions of the evaluation and management (E/M) service provided.  I agree with the above history, physical and plan, which I have reviewed and edited where appropriate.    Patient at high risk for neurological deterioration or death due to:  ICU delirium, aspiration PNA, DVT / PE.  Critical care time:  I have personally provided 45 minutes of critical care time, excluding time spent on separate procedures.      Plan discussed with RN, house staff.

## 2020-12-13 LAB
ANION GAP SERPL CALC-SCNC: 12 MMOL/L — SIGNIFICANT CHANGE UP (ref 5–17)
BUN SERPL-MCNC: 20 MG/DL — SIGNIFICANT CHANGE UP (ref 7–23)
CALCIUM SERPL-MCNC: 8.3 MG/DL — LOW (ref 8.4–10.5)
CHLORIDE SERPL-SCNC: 105 MMOL/L — SIGNIFICANT CHANGE UP (ref 96–108)
CO2 SERPL-SCNC: 25 MMOL/L — SIGNIFICANT CHANGE UP (ref 22–31)
CREAT SERPL-MCNC: 0.89 MG/DL — SIGNIFICANT CHANGE UP (ref 0.5–1.3)
GLUCOSE SERPL-MCNC: 161 MG/DL — HIGH (ref 70–99)
HCT VFR BLD CALC: 27.7 % — LOW (ref 34.5–45)
HGB BLD-MCNC: 8.6 G/DL — LOW (ref 11.5–15.5)
MAGNESIUM SERPL-MCNC: 2 MG/DL — SIGNIFICANT CHANGE UP (ref 1.6–2.6)
MCHC RBC-ENTMCNC: 27.7 PG — SIGNIFICANT CHANGE UP (ref 27–34)
MCHC RBC-ENTMCNC: 31 GM/DL — LOW (ref 32–36)
MCV RBC AUTO: 89.1 FL — SIGNIFICANT CHANGE UP (ref 80–100)
NRBC # BLD: 0 /100 WBCS — SIGNIFICANT CHANGE UP (ref 0–0)
PHOSPHATE SERPL-MCNC: 3.4 MG/DL — SIGNIFICANT CHANGE UP (ref 2.5–4.5)
PLATELET # BLD AUTO: 243 K/UL — SIGNIFICANT CHANGE UP (ref 150–400)
POTASSIUM SERPL-MCNC: 3.6 MMOL/L — SIGNIFICANT CHANGE UP (ref 3.5–5.3)
POTASSIUM SERPL-SCNC: 3.6 MMOL/L — SIGNIFICANT CHANGE UP (ref 3.5–5.3)
RBC # BLD: 3.11 M/UL — LOW (ref 3.8–5.2)
RBC # FLD: 15.5 % — HIGH (ref 10.3–14.5)
SODIUM SERPL-SCNC: 142 MMOL/L — SIGNIFICANT CHANGE UP (ref 135–145)
WBC # BLD: 10.15 K/UL — SIGNIFICANT CHANGE UP (ref 3.8–10.5)
WBC # FLD AUTO: 10.15 K/UL — SIGNIFICANT CHANGE UP (ref 3.8–10.5)

## 2020-12-13 PROCEDURE — 70450 CT HEAD/BRAIN W/O DYE: CPT | Mod: 26

## 2020-12-13 PROCEDURE — 99291 CRITICAL CARE FIRST HOUR: CPT

## 2020-12-13 RX ORDER — MIDAZOLAM HYDROCHLORIDE 1 MG/ML
1 INJECTION, SOLUTION INTRAMUSCULAR; INTRAVENOUS ONCE
Refills: 0 | Status: DISCONTINUED | OUTPATIENT
Start: 2020-12-13 | End: 2020-12-13

## 2020-12-13 RX ORDER — LIDOCAINE HCL 20 MG/ML
10 VIAL (ML) INJECTION ONCE
Refills: 0 | Status: COMPLETED | OUTPATIENT
Start: 2020-12-13 | End: 2020-12-13

## 2020-12-13 RX ORDER — SODIUM CHLORIDE 9 MG/ML
250 INJECTION INTRAMUSCULAR; INTRAVENOUS; SUBCUTANEOUS ONCE
Refills: 0 | Status: COMPLETED | OUTPATIENT
Start: 2020-12-13 | End: 2020-12-13

## 2020-12-13 RX ORDER — CEFAZOLIN SODIUM 1 G
2000 VIAL (EA) INJECTION ONCE
Refills: 0 | Status: DISCONTINUED | OUTPATIENT
Start: 2020-12-13 | End: 2020-12-13

## 2020-12-13 RX ORDER — LIDOCAINE HCL 20 MG/ML
10 VIAL (ML) INJECTION ONCE
Refills: 0 | Status: DISCONTINUED | OUTPATIENT
Start: 2020-12-13 | End: 2020-12-18

## 2020-12-13 RX ORDER — POTASSIUM CHLORIDE 20 MEQ
40 PACKET (EA) ORAL ONCE
Refills: 0 | Status: DISCONTINUED | OUTPATIENT
Start: 2020-12-13 | End: 2020-12-13

## 2020-12-13 RX ORDER — CEFAZOLIN SODIUM 1 G
2000 VIAL (EA) INJECTION ONCE
Refills: 0 | Status: COMPLETED | OUTPATIENT
Start: 2020-12-13 | End: 2020-12-13

## 2020-12-13 RX ORDER — FENTANYL CITRATE 50 UG/ML
25 INJECTION INTRAVENOUS ONCE
Refills: 0 | Status: DISCONTINUED | OUTPATIENT
Start: 2020-12-13 | End: 2020-12-13

## 2020-12-13 RX ADMIN — FENTANYL CITRATE 25 MICROGRAM(S): 50 INJECTION INTRAVENOUS at 13:18

## 2020-12-13 RX ADMIN — SENNA PLUS 2 TABLET(S): 8.6 TABLET ORAL at 22:14

## 2020-12-13 RX ADMIN — PREGABALIN 1000 MICROGRAM(S): 225 CAPSULE ORAL at 13:27

## 2020-12-13 RX ADMIN — LACOSAMIDE 50 MILLIGRAM(S): 50 TABLET ORAL at 05:36

## 2020-12-13 RX ADMIN — Medication 10 MILLILITER(S): at 13:57

## 2020-12-13 RX ADMIN — ATORVASTATIN CALCIUM 40 MILLIGRAM(S): 80 TABLET, FILM COATED ORAL at 22:14

## 2020-12-13 RX ADMIN — PANTOPRAZOLE SODIUM 40 MILLIGRAM(S): 20 TABLET, DELAYED RELEASE ORAL at 06:51

## 2020-12-13 RX ADMIN — FENTANYL CITRATE 25 MICROGRAM(S): 50 INJECTION INTRAVENOUS at 13:48

## 2020-12-13 RX ADMIN — Medication 2: at 06:52

## 2020-12-13 RX ADMIN — SODIUM CHLORIDE 250 MILLILITER(S): 9 INJECTION INTRAMUSCULAR; INTRAVENOUS; SUBCUTANEOUS at 11:00

## 2020-12-13 RX ADMIN — Medication 650 MILLIGRAM(S): at 17:00

## 2020-12-13 RX ADMIN — Medication 650 MILLIGRAM(S): at 06:58

## 2020-12-13 RX ADMIN — Medication 3 MILLILITER(S): at 05:34

## 2020-12-13 RX ADMIN — Medication 3 MILLILITER(S): at 22:22

## 2020-12-13 RX ADMIN — Medication 3 MILLILITER(S): at 15:07

## 2020-12-13 RX ADMIN — FENTANYL CITRATE 25 MICROGRAM(S): 50 INJECTION INTRAVENOUS at 13:57

## 2020-12-13 RX ADMIN — LACOSAMIDE 50 MILLIGRAM(S): 50 TABLET ORAL at 17:49

## 2020-12-13 RX ADMIN — MIDAZOLAM HYDROCHLORIDE 1 MILLIGRAM(S): 1 INJECTION, SOLUTION INTRAMUSCULAR; INTRAVENOUS at 13:18

## 2020-12-13 RX ADMIN — CHLORHEXIDINE GLUCONATE 1 APPLICATION(S): 213 SOLUTION TOPICAL at 06:53

## 2020-12-13 RX ADMIN — Medication 2: at 16:31

## 2020-12-13 RX ADMIN — Medication 100 MILLIGRAM(S): at 21:06

## 2020-12-13 RX ADMIN — Medication 650 MILLIGRAM(S): at 06:51

## 2020-12-13 RX ADMIN — Medication 1 TABLET(S): at 06:58

## 2020-12-13 RX ADMIN — Medication 325 MILLIGRAM(S): at 13:27

## 2020-12-13 RX ADMIN — Medication 650 MILLIGRAM(S): at 16:31

## 2020-12-13 RX ADMIN — SODIUM CHLORIDE 250 MILLILITER(S): 9 INJECTION INTRAMUSCULAR; INTRAVENOUS; SUBCUTANEOUS at 12:41

## 2020-12-13 RX ADMIN — MONTELUKAST 10 MILLIGRAM(S): 4 TABLET, CHEWABLE ORAL at 13:27

## 2020-12-13 RX ADMIN — FENTANYL CITRATE 25 MICROGRAM(S): 50 INJECTION INTRAVENOUS at 14:27

## 2020-12-13 RX ADMIN — ENOXAPARIN SODIUM 40 MILLIGRAM(S): 100 INJECTION SUBCUTANEOUS at 22:10

## 2020-12-13 NOTE — PROGRESS NOTE ADULT - SUBJECTIVE AND OBJECTIVE BOX
Hospital Course:   12/9: BD1 Patient admitted for SAH HH3, MF4.   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 EVD at 5, on Levo overnight, nimodipine discontinued d/t hypotension  12/12: BD4 POD#2, VIVIEN overnight, neuro stable, passed TOV  12/13: BD5 POD#3: VIVIEN x 24 hrs      Vital Signs Last 24 Hrs  T(C): 36.9 (12 Dec 2020 22:00), Max: 37.1 (12 Dec 2020 05:00)  T(F): 98.4 (12 Dec 2020 22:00), Max: 98.7 (12 Dec 2020 05:00)  HR: 94 (13 Dec 2020 00:00) (76 - 94)  BP: 126/62 (13 Dec 2020 00:00) (126/62 - 156/65)  BP(mean): 88 (13 Dec 2020 00:00) (82 - 109)  RR: 16 (13 Dec 2020 00:00) (16 - 20)  SpO2: 94% (13 Dec 2020 00:00) (92% - 100%)    I&O's Detail    11 Dec 2020 07:01  -  12 Dec 2020 07:00  --------------------------------------------------------  IN:    Norepinephrine: 239.5 mL  Total IN: 239.5 mL    OUT:    External Ventricular Device (mL): 196 mL    Indwelling Catheter - Urethral (mL): 310 mL    Voided (mL): 800 mL  Total OUT: 1306 mL    Total NET: -1066.5 mL      12 Dec 2020 07:01  -  13 Dec 2020 00:29  --------------------------------------------------------  IN:    Norepinephrine: 15.6 mL    Oral Fluid: 960 mL    Sodium Chloride 0.9% Bolus: 250 mL  Total IN: 1225.6 mL    OUT:    External Ventricular Device (mL): 153 mL    Voided (mL): 2000 mL  Total OUT: 2153 mL    Total NET: -927.4 mL        I&O's Summary    11 Dec 2020 07:01  -  12 Dec 2020 07:00  --------------------------------------------------------  IN: 239.5 mL / OUT: 1306 mL / NET: -1066.5 mL    12 Dec 2020 07:01  -  13 Dec 2020 00:29  --------------------------------------------------------  IN: 1225.6 mL / OUT: 2153 mL / NET: -927.4 mL        PHYSICAL EXAM:  Gen: laying in hospital bed, NAD  Neurological: AA+Ox2 (not oriented to time), OE spont, FC  CN II-XII: PERRL, EOMI  Motor exam: MAEx4, 5/5 strength throughout  SILT in UE and LE b/l  Cardiovascular: RRR  Respiratory: CTAB  Gastrointestinal: soft, NT/ND    TUBES/LINES:  [x] CVC  [x] A-line  [] Lumbar Drain  [xc] Ventriculostomy  [] Other    DIET:  [] NPO  [x] Mechanical  [] Tube feeds    LABS:                        8.5    13.99 )-----------( 283      ( 12 Dec 2020 05:11 )             27.5     12-12    140  |  105  |  17  ----------------------------<  172<H>  3.6   |  26  |  0.72    Ca    8.3<L>      12 Dec 2020 05:11  Phos  2.3     12-12  Mg     2.0     12-12              CAPILLARY BLOOD GLUCOSE      POCT Blood Glucose.: 159 mg/dL (12 Dec 2020 23:30)  POCT Blood Glucose.: 134 mg/dL (12 Dec 2020 17:36)  POCT Blood Glucose.: 163 mg/dL (12 Dec 2020 10:34)  POCT Blood Glucose.: 164 mg/dL (12 Dec 2020 06:38)      Drug Levels: [] N/A    CSF Analysis: [] N/A      Allergies    No Known Allergies    Intolerances      MEDICATIONS:  Antibiotics:    Neuro:  acetaminophen   Tablet .. 650 milliGRAM(s) Oral every 6 hours PRN  lacosamide 50 milliGRAM(s) Oral two times a day    Anticoagulation:  enoxaparin Injectable 40 milliGRAM(s) SubCutaneous every 24 hours    OTHER:  albuterol/ipratropium for Nebulization. 3 milliLiter(s) Nebulizer every 6 hours  atorvastatin 40 milliGRAM(s) Oral at bedtime  chlorhexidine 2% Cloths 1 Application(s) Topical <User Schedule>  dextrose 40% Gel 15 Gram(s) Oral once  dextrose 50% Injectable 25 Gram(s) IV Push once  glucagon  Injectable 1 milliGRAM(s) IntraMuscular once  insulin lispro (ADMELOG) corrective regimen sliding scale   SubCutaneous Before meals and at bedtime  montelukast 10 milliGRAM(s) Oral daily  pantoprazole    Tablet 40 milliGRAM(s) Oral before breakfast  senna 2 Tablet(s) Oral at bedtime    IVF:  cyanocobalamin 1000 MICROGram(s) Oral daily  ferrous    sulfate 325 milliGRAM(s) Oral daily  potassium phosphate / sodium phosphate Tablet (K-PHOS No. 2) 1 Tablet(s) Oral four times a day with meals    CULTURES:    RADIOLOGY & ADDITIONAL TESTS:      ASSESSMENT:  74 yo Female with PMHx of COPD, asthma, HLD, HTN, BIBEMS to [facility] from home after HHA found patient down on the floor covered in non-bloody vomitus. CTH reveals diffuse subarachnoid hemorrhage mainly at basilar cisterns with IVH and obstructive hydrocephalus, CTA shows 3mm left pcomm aneurysm, now s/p bedside right frontal EVD placement 12/10, s/p cerebral angiogram for coil embolization of left PCOMM aneurysm 12/10, NIHSS2 HH3 MF4 BD#4.    HEADACHE    Handoff    Hyperlipidemia    Hypertension    COPD (chronic obstructive pulmonary disease)    Asthma    COPD (chronic obstructive pulmonary disease)    Asthma    SAH (subarachnoid hemorrhage)    SAH (subarachnoid hemorrhage)    Subarachnoid bleed    Angiogram, cerebral, with intracranial aneurysm embolization    No significant past surgical history    HEADACHE    90+    [email or web address]        PLAN:  NEURO:  - neuro checks  - vitals checks  - pain control  - EVD at 5, monitor ICP and output  - cont Vimpat  - nimodipine discontinued 12/11  - CTA BD#7    CARDIOVASCULAR:  - -180  - Levophed PRN  - cardiology following, recs appreciated:  repeat TTE prior to discharge, start beta blocker (lopressor 12.5mg BID) when normotensive and neuro stable  - echo c/w takotsubo    PULMONARY:  - NS 2L PRN  - cont montelukast  - cont albuterol  - prednisone 50mg daily    RENAL:  - voids  - maintain euvolemia    GI:  - regular diet  - bowel regimen  - GI ppx: protonix    HEME:  - monitor H/H    ID:  - afebrile    ENDO:  - ISS    DVT PROPHYLAXIS: [x] Venodynes [x] Heparin/Lovenox      DISPOSITION: ICU status, full code, dispo pending    d/w Dr. Serrano

## 2020-12-13 NOTE — PROGRESS NOTE ADULT - SUBJECTIVE AND OBJECTIVE BOX
=================================  NEUROCRITICAL CARE ATTENDING NOTE  =================================    CARA CANCHOLA   MRN-[id number]  Summary:  75y/F with COPD, asthma, dyslipidemia Hypertension found down.  Brought to [facility] where imaging showed SAH basilar cisterns with IVH and obstructive hydrocephalus L Pcomm aneurysm.  Given levetiracetam, hydromorphone.  NIHSS 2 HH3 MF4, transferred to [facility] for further management.      COURSE IN THE HOSPITAL:   admitted to [facility], EVD inserted; given 20 lasix for pulmo edema, T inversion on CT  12/10 No significant events overnight.    No significant events overnight.    No significant events overnight.    No significant events overnight.     Past Medical History: Hyperlipidemia Hypertension COPD (chronic obstructive pulmonary disease) Asthma  Allergies:  No Known Allergies  Home meds:   ·	famotidine 20 mg oral tablet: 1 tab(s) orally once a day  ·	lisinopril 2.5 mg oral tablet: 1 tab(s) orally once a day  ·	montelukast 10 mg oral tablet: 1 tab(s) orally once a day  ·	predniSONE 50 mg oral tablet: 1 tab(s) orally once a day  ·	ProAir HFA CFC free 90 mcg/inh inhalation aerosol: 2 puff(s) inhaled 4 times a day PRN  ·	simvastatin 20 mg oral tablet: 1 tab(s) orally once a day (at bedtime)    PHYSICAL EXAMINATION  T(C): 37.2 ( @ 06:10), Max: 37.2 ( @ 02:28)  HR: 80 ( @ 07:00) (76 - 94) BP: 113/55 ( @ 07:00) (113/55 - 156/65) RR: 15 ( @ 07:00) (14 - 20) SpO2: 94% ( @ 07:00) (93% - 100%)   NEUROLOGIC EXAMINATION:  Patient is  alert, oriented x2-3, CLEOPATAR, no facial droop, moving all 4s  GENERAL: not intubated, not in cardiorespiratory distress  EENT:  anicteric  CARDIOVASCULAR: (+) S1 S2, normal rate and regular rhythm  PULMONARY: clear to auscultation bilaterally  ABDOMEN: soft, nontender with normoactive bowel sounds  EXTREMITIES: no edema  SKIN: no rash    LABS:   CAPILLARY BLOOD GLUCOSE 152 159 134 163                8.6    10.15 )-----------( 243      ( 13 Dec 2020 05:10 )             27.7     142  |  105  |  20  ----------------------------<  161<H>  3.6   |  25  |  0.89    Ca    8.3<L>      13 Dec 2020 05:10  Phos  3.4       Mg     2.0      @ 07:01  -   @ 07:00  IN: 1225.6 mL / OUT: 2194 mL / NET: -968.4 mL    HbA1C = 6.7 (12-10) 6.4 ()  LDL = 112 ()   HDL = 66 ()  TG = 114 ()   TSH = 0.990 ()    Bacteriology:  CSF studies:  EEG:  Neuroimagin/10 CT head:  EVD placement, stable   CTA:  L pcomm aneurysm, L ICA (distal cavernous) aneurysm vs infundibulum, extensive calcified plaque cavernous bilateral ICA with mild to mod stenosis; thick plaque bilateral carotid bifurcations - mod to severe R and mild to mod L stenosis, focal calcified plaque R VA off subclavian artery - high grate stenosis / partial occlusion, upper lung bilateral opacification - pulmo edema, chronic deformity R humeral neck   CT head:  SAH, basilar cisterns, IV extension, obstructive hydrocephalus SVID, lacunar infarcts R caudate, both thalami, cerebellar hemispheres, no CT evidence of territorial infarction  Other imagin/11 LE Doppler: NEG   CT abd:  small paraesophageal hiatal hernia, gastritis; ?impaction, septal thickening bilateral lower lobes ?pulmo edema, hepatic steatosis    MEDICATIONS:   SQL 40 daily lacosamide 50mg PO BID ipratropium / albuterol q6h montelukast 10mg PO daily pantoprazole 40 daily senna HS atorvastatin 40mg PO HS mod ISS Peridex cyanocobalamin 1000 PO daily ferrous sulfate 325 mg PO daily     IV FLUIDS: IVL  DRIPS:  ·	norepinephrine Infusion 0.05 MICROgram(s)/kG/Min (4.68 mL/Hr) IV Continuous <Continuous> - 0.02 (weaning)  DIET: CCD  Lines: Erin R IJ  Drains:      Wounds:    CODE STATUS:  Full Code                       GOALS OF CARE:  aggressive                      DISPOSITION:  ICU  NIHSS 2 HH3 MF4 =================================  NEUROCRITICAL CARE ATTENDING NOTE  =================================    CARA CANCHOLA   MRN-[id number]  Summary:  75y/F with COPD, asthma, dyslipidemia Hypertension found down.  Brought to [facility] where imaging showed SAH basilar cisterns with IVH and obstructive hydrocephalus L Pcomm aneurysm.  Given levetiracetam, hydromorphone.  NIHSS 2 HH3 MF4, transferred to [facility] for further management.      COURSE IN THE HOSPITAL:   admitted to [facility], EVD inserted; given 20 lasix for pulmo edema, T inversion on CT  12/10 No significant events overnight.    No significant events overnight.    No significant events overnight.    No significant events overnight. drain stopped working at 730 a.m., off levophed     Past Medical History: Hyperlipidemia Hypertension COPD (chronic obstructive pulmonary disease) Asthma  Allergies:  No Known Allergies  Home meds:   ·	famotidine 20 mg oral tablet: 1 tab(s) orally once a day  ·	lisinopril 2.5 mg oral tablet: 1 tab(s) orally once a day  ·	montelukast 10 mg oral tablet: 1 tab(s) orally once a day  ·	predniSONE 50 mg oral tablet: 1 tab(s) orally once a day  ·	ProAir HFA CFC free 90 mcg/inh inhalation aerosol: 2 puff(s) inhaled 4 times a day PRN  ·	simvastatin 20 mg oral tablet: 1 tab(s) orally once a day (at bedtime)    PHYSICAL EXAMINATION  T(C): 37.2 ( @ 06:10), Max: 37.2 ( @ 02:28)  HR: 80 ( @ 07:00) (76 - 94) BP: 113/55 (- @ 07:00) (113/55 - 156/65) RR: 15 ( @ 07:00) (14 - 20) SpO2: 94% ( @ 07:00) (93% - 100%)   NEUROLOGIC EXAMINATION:  Patient is  alert, oriented x2-3, CLEOPATRA, no facial droop, moving all 4s  GENERAL: not intubated, not in cardiorespiratory distress  EENT:  anicteric  CARDIOVASCULAR: (+) S1 S2, normal rate and regular rhythm  PULMONARY: clear to auscultation bilaterally  ABDOMEN: soft, nontender with normoactive bowel sounds  EXTREMITIES: no edema  SKIN: no rash    LABS:   CAPILLARY BLOOD GLUCOSE 152 159 134 163  - 6 units insulin lispro over 24 h               8.6    10.15 )-----------( 243      ( 13 Dec 2020 05:10 )             27.7     142  |  105  |  20  ----------------------------<  161<H>  3.6   |  25  |  0.89    Ca    8.3<L>      13 Dec 2020 05:10  Phos  3.4       Mg     2.0      @ 07:01  -   @ 07:00  IN: 1225.6 mL / OUT: 2194 mL / NET: -968.4 mL    HbA1C = 6.7 (12-10) 6.4 ()  LDL = 112 ()   HDL = 66 ()  TG = 114 ()   TSH = 0.990 ()    Bacteriology:  CSF studies:  EEG:  Neuroimagin/10 CT head:  EVD placement, stable   CTA:  L pcomm aneurysm, L ICA (distal cavernous) aneurysm vs infundibulum, extensive calcified plaque cavernous bilateral ICA with mild to mod stenosis; thick plaque bilateral carotid bifurcations - mod to severe R and mild to mod L stenosis, focal calcified plaque R VA off subclavian artery - high grate stenosis / partial occlusion, upper lung bilateral opacification - pulmo edema, chronic deformity R humeral neck   CT head:  SAH, basilar cisterns, IV extension, obstructive hydrocephalus SVID, lacunar infarcts R caudate, both thalami, cerebellar hemispheres, no CT evidence of territorial infarction  Other imagin/11 LE Doppler: NEG   CT abd:  small paraesophageal hiatal hernia, gastritis; ?impaction, septal thickening bilateral lower lobes ?pulmo edema, hepatic steatosis    MEDICATIONS:   SQL 40 daily lacosamide 50mg PO BID ipratropium / albuterol q6h montelukast 10mg PO daily pantoprazole 40 daily senna HS atorvastatin 40mg PO HS mod ISS Peridex cyanocobalamin 1000 PO daily ferrous sulfate 325 mg PO daily     IV FLUIDS: IVL  DRIPS:  ·	norepinephrine Infusion 0.05 MICROgram(s)/kG/Min (4.68 mL/Hr) IV Continuous <Continuous> - 0.02 (weaning) - OFF  DIET: CCD  Lines: Erin GUNTER  Drains:     EVD at 5cm H20  Wounds:    CODE STATUS:  Full Code                       GOALS OF CARE:  aggressive                      DISPOSITION:  ICU  NIHSS 2 HH3 MF4

## 2020-12-13 NOTE — PROGRESS NOTE ADULT - ASSESSMENT
75y/Female with:  aneursymal subarachnoid hemorrhage, L pcomm aneurysm, L parophthalmic aneurysm; brain compression, cerebral edema  osbtructive hydrocephalus  lacunar infarcts R caudate, B thalami, cerebellar hemispheres ?artery to artery vs cardioembolic?  atherosclerotic cardiovascular disease; mod to severe bilateral ICA stenosis (R>L), R VA stenosis  Hypertension dyslipidemia   COPD asthma  newly diagnosed Diabetes Mellitus?  troponin leak    PLAN: Day 1 = 12-09 ; Day 4 =  12/12; Day 21 = 12/29  NEURO: neurochecks q1h, PRN pain meds with Tylenol / Percocet  SAH:  TCD starting Day 4 for vasospasm surveillance, CTA on 12/15, nimodipine discontinued due to pressor requirements  Hydrocephalus:  EVD 5 cm H20 open to drain, monitor ICPs  Seizure prophylaxis (cortical ICH, associated SDH, unclear etiology):  lacosamide 50mg PO BID   REHAB:  physical therapy evaluation and management    EARLY MOB:  HOB elevated    PULM:  Room air, incentive spirometry, continue montelukast, ipratropium / albuterol PRN; on further history - denies taking prednisone, will d/c   CARDIO:  SBP goal 90-180mm Hg, EF 35%, repeat echo next week, troponins downtrending, levophed taper to off  ENDO:  Blood sugar goals 140-180 mg/dL, cont insulin sliding scale, atorvastatin 40mg   GI:  PPI for GI prophylaxis ([facility] eds)  DIET: CCD  RENAL: maintain euvolemia, accurate Is and Os  HEM/ONC: Hb stable, iron profile c/w iron deficiency, start iron FeSO4 325 TID; FOBT  VTE Prophylaxis: SCDs, SQL  ID: afebrile, improving leukocytosis  Social: will update family    CORE MEASURES  1.  Nath and Jacobo Score = 3  2.  VTE prophylaxis:  [ ] administered within 24 hours of admission OR [X] reason not done: fresh bleed, unsecured aneurysm.  3.  Dysphagia screening:   [X] performed before any oral meds / liquids / food  4.  Nimodipine treatment:  [X] administered within 24 hours of admission OR [ ] reason not done:    ATTENDING ATTESTATION:  I was physically present for the key portions of the evaluation and management (E/M) service provided.  I agree with the above history, physical and plan, which I have reviewed and edited where appropriate.    Patient at high risk for neurological deterioration or death due to:  ICU delirium, aspiration PNA, DVT / PE.  Critical care time:  I have personally provided 45 minutes of critical care time, excluding time spent on separate procedures.      Plan discussed with RN, house staff. 75y/Female with:  aneursymal subarachnoid hemorrhage, L pcomm aneurysm, L parophthalmic aneurysm; brain compression, cerebral edema  osbtructive hydrocephalus  lacunar infarcts R caudate, B thalami, cerebellar hemispheres ?artery to artery vs cardioembolic?  atherosclerotic cardiovascular disease; mod to severe bilateral ICA stenosis (R>L), R VA stenosis  Hypertension dyslipidemia   COPD asthma  newly diagnosed Diabetes Mellitus?  troponin leak    PLAN: Day 1 = 12-09 ; Day 4 =  12/12; Day 21 = 12/29  NEURO: neurochecks q1h, PRN pain meds with Tylenol / Percocet  SAH:  TCD starting Day 4 for vasospasm surveillance, CTA on 12/15, nimodipine discontinued due to pressor requirements  Hydrocephalus:  EVD 5 cm H20 open to drain, monitor ICPs; CT scan this morning  Seizure prophylaxis (cortical ICH, associated SDH, unclear etiology):  lacosamide 50mg PO BID   REHAB:  physical therapy evaluation and management    EARLY MOB:  HOB elevated    PULM:  Room air, incentive spirometry, continue montelukast, ipratropium / albuterol PRN; on further history - denies taking prednisone, will d/c   CARDIO:  SBP goal 90-180mm Hg, EF 35%, repeat echo next week  ENDO:  Blood sugar goals 140-180 mg/dL, cont insulin sliding scale, atorvastatin 40mg   GI:  PPI for GI prophylaxis (home meds)  DIET: CCD  RENAL: maintain euvolemia, accurate Is and Os  HEM/ONC: Hb stable, iron profile c/w iron deficiency, iron FeSO4 325 TID; FOBT  VTE Prophylaxis: SCDs, SQL  ID: afebrile, improving leukocytosis  Social: will update family    CORE MEASURES  1.  Nath and Jacobo Score = 3  2.  VTE prophylaxis:  [ ] administered within 24 hours of admission OR [X] reason not done: fresh bleed, unsecured aneurysm.  3.  Dysphagia screening:   [X] performed before any oral meds / liquids / food  4.  Nimodipine treatment:  [X] administered within 24 hours of admission OR [ ] reason not done:    ATTENDING ATTESTATION:  I was physically present for the key portions of the evaluation and management (E/M) service provided.  I agree with the above history, physical and plan, which I have reviewed and edited where appropriate.    Patient at high risk for neurological deterioration or death due to:  ICU delirium, aspiration PNA, DVT / PE.  Critical care time:  I have personally provided 45 minutes of critical care time, excluding time spent on separate procedures.      Plan discussed with RN, house staff.

## 2020-12-13 NOTE — PROCEDURE NOTE - ADDITIONAL PROCEDURE DETAILS
Risks and benefits were explained to the patient and his family and consent was obtained. Patient was placed in supine position with head elevated to 30 degrees and midline. Pre-incisional antibotic was given. Skin was prepped and draped in usual sterile fashion. Lidocaine was infiltrated in the marked area. Incision from previous EVD was opened. Through previous EVD, armida hole, a new EVD catheter was soft passes passed to 6 cm at the skin and active egress of CSF was noted after the first attempt. CSF did not appear under high pressure. The catheter was tunneled under the skin and was firmly affixed to the skin using silk suture. The incision was closed with a running lock nylon suture and occlusive dressing was placed over the insertion site. The catheter was connected to a closed collection system and set for a continuous drainage at 5 cm H2O.  The patient tolerated the procedure well.

## 2020-12-14 LAB
ANION GAP SERPL CALC-SCNC: 18 MMOL/L — HIGH (ref 5–17)
BUN SERPL-MCNC: 15 MG/DL — SIGNIFICANT CHANGE UP (ref 7–23)
CALCIUM SERPL-MCNC: 9 MG/DL — SIGNIFICANT CHANGE UP (ref 8.4–10.5)
CHLORIDE SERPL-SCNC: 98 MMOL/L — SIGNIFICANT CHANGE UP (ref 96–108)
CO2 SERPL-SCNC: 21 MMOL/L — LOW (ref 22–31)
CREAT SERPL-MCNC: 0.69 MG/DL — SIGNIFICANT CHANGE UP (ref 0.5–1.3)
GLUCOSE SERPL-MCNC: 150 MG/DL — HIGH (ref 70–99)
HCT VFR BLD CALC: 34.9 % — SIGNIFICANT CHANGE UP (ref 34.5–45)
HGB BLD-MCNC: 10.9 G/DL — LOW (ref 11.5–15.5)
MAGNESIUM SERPL-MCNC: 2 MG/DL — SIGNIFICANT CHANGE UP (ref 1.6–2.6)
MCHC RBC-ENTMCNC: 27.5 PG — SIGNIFICANT CHANGE UP (ref 27–34)
MCHC RBC-ENTMCNC: 31.2 GM/DL — LOW (ref 32–36)
MCV RBC AUTO: 87.9 FL — SIGNIFICANT CHANGE UP (ref 80–100)
NRBC # BLD: 0 /100 WBCS — SIGNIFICANT CHANGE UP (ref 0–0)
PHOSPHATE SERPL-MCNC: 3 MG/DL — SIGNIFICANT CHANGE UP (ref 2.5–4.5)
PLATELET # BLD AUTO: 351 K/UL — SIGNIFICANT CHANGE UP (ref 150–400)
POTASSIUM SERPL-MCNC: 3.8 MMOL/L — SIGNIFICANT CHANGE UP (ref 3.5–5.3)
POTASSIUM SERPL-SCNC: 3.8 MMOL/L — SIGNIFICANT CHANGE UP (ref 3.5–5.3)
RBC # BLD: 3.97 M/UL — SIGNIFICANT CHANGE UP (ref 3.8–5.2)
RBC # FLD: 15.2 % — HIGH (ref 10.3–14.5)
SODIUM SERPL-SCNC: 137 MMOL/L — SIGNIFICANT CHANGE UP (ref 135–145)
WBC # BLD: 12.11 K/UL — HIGH (ref 3.8–10.5)
WBC # FLD AUTO: 12.11 K/UL — HIGH (ref 3.8–10.5)

## 2020-12-14 PROCEDURE — 99292 CRITICAL CARE ADDL 30 MIN: CPT

## 2020-12-14 PROCEDURE — 71045 X-RAY EXAM CHEST 1 VIEW: CPT | Mod: 26

## 2020-12-14 PROCEDURE — 99291 CRITICAL CARE FIRST HOUR: CPT

## 2020-12-14 PROCEDURE — 99232 SBSQ HOSP IP/OBS MODERATE 35: CPT

## 2020-12-14 PROCEDURE — 70450 CT HEAD/BRAIN W/O DYE: CPT | Mod: 26

## 2020-12-14 PROCEDURE — 99233 SBSQ HOSP IP/OBS HIGH 50: CPT

## 2020-12-14 RX ORDER — HYDRALAZINE HCL 50 MG
10 TABLET ORAL EVERY 4 HOURS
Refills: 0 | Status: DISCONTINUED | OUTPATIENT
Start: 2020-12-14 | End: 2020-12-14

## 2020-12-14 RX ORDER — POLYETHYLENE GLYCOL 3350 17 G/17G
17 POWDER, FOR SOLUTION ORAL DAILY
Refills: 0 | Status: DISCONTINUED | OUTPATIENT
Start: 2020-12-14 | End: 2020-12-21

## 2020-12-14 RX ORDER — LABETALOL HCL 100 MG
10 TABLET ORAL EVERY 4 HOURS
Refills: 0 | Status: DISCONTINUED | OUTPATIENT
Start: 2020-12-14 | End: 2020-12-21

## 2020-12-14 RX ADMIN — Medication 3 MILLILITER(S): at 17:49

## 2020-12-14 RX ADMIN — SENNA PLUS 2 TABLET(S): 8.6 TABLET ORAL at 22:13

## 2020-12-14 RX ADMIN — LACOSAMIDE 50 MILLIGRAM(S): 50 TABLET ORAL at 19:23

## 2020-12-14 RX ADMIN — ENOXAPARIN SODIUM 40 MILLIGRAM(S): 100 INJECTION SUBCUTANEOUS at 22:13

## 2020-12-14 RX ADMIN — LACOSAMIDE 50 MILLIGRAM(S): 50 TABLET ORAL at 06:37

## 2020-12-14 RX ADMIN — Medication 325 MILLIGRAM(S): at 11:27

## 2020-12-14 RX ADMIN — Medication 10 MILLIGRAM(S): at 04:30

## 2020-12-14 RX ADMIN — PANTOPRAZOLE SODIUM 40 MILLIGRAM(S): 20 TABLET, DELAYED RELEASE ORAL at 06:37

## 2020-12-14 RX ADMIN — MONTELUKAST 10 MILLIGRAM(S): 4 TABLET, CHEWABLE ORAL at 11:27

## 2020-12-14 RX ADMIN — POLYETHYLENE GLYCOL 3350 17 GRAM(S): 17 POWDER, FOR SOLUTION ORAL at 11:27

## 2020-12-14 RX ADMIN — Medication 3 MILLILITER(S): at 23:21

## 2020-12-14 RX ADMIN — Medication 3 MILLILITER(S): at 09:50

## 2020-12-14 RX ADMIN — PREGABALIN 1000 MICROGRAM(S): 225 CAPSULE ORAL at 11:27

## 2020-12-14 RX ADMIN — ATORVASTATIN CALCIUM 40 MILLIGRAM(S): 80 TABLET, FILM COATED ORAL at 22:13

## 2020-12-14 RX ADMIN — CHLORHEXIDINE GLUCONATE 1 APPLICATION(S): 213 SOLUTION TOPICAL at 06:37

## 2020-12-14 NOTE — PROGRESS NOTE ADULT - ATTENDING COMMENTS
See CVD Fellow note written above, for details. I reviewed the fellow's documentation. I have personally seen and examined this patient today on bedside consult rounds. I have reviewed vitals, labs, medications, cardiac studies and additional imaging.  I agree with the findings and plans as written above with the following additions/amendments:  Tele: sinus tachycardia  GBMT for takotsubo Stress CM:  Recommend Toprol 25 XL daily  Recommend Losartan 25 XL daily  Consider CCTA for coronary eval prior to dc  Recommend repeat TTE on day prior to dc  MARGARETTE Betancur.  Cardiology Attending

## 2020-12-14 NOTE — PROGRESS NOTE ADULT - ASSESSMENT
75F w/PMHx COPD, Asthma, HTN, HLD a/w SAH w/IVH and obstructive hydrocephalus s/p EVD followed by cerebral angiogram for coil embolization of L pcomm aneurysm on 12/10. Cardiology following for newly diagnosed compensated HFrEF    #HFrEF: Ischemic vs Stress induced cardiomyopathy: Euvolemic, Normotensive, compensated  - Recommend starting Toprol XL 25 mg po qd, Losatan 25 mg po qd. Please space their dosing 4-6 hrs apart. Hold for SBP <110 and HR < 50  - IV labetalol PRN for additional BP control, HR is acceptable.   - Will require ischemic HAZEL with either CCTA or LHC prior to DC. Will also get a repeat TTE prior to DC now that GDMT has been initiated  - Replete electrolytes to maintain K>4, Mg>2  - Incentive Spirometry. Encourage patient to get out of bed.    Please refer to the cardiology attending addendum section for final recommendations  Thank you for allowing to participate in the care of this patient    DELORES Lance  Cardiology Fellow, PGY 7

## 2020-12-14 NOTE — PROGRESS NOTE ADULT - ATTENDING COMMENTS
Patient seen and examined by me 12/14/20. EVD clogged overnight, replaced with soft pass. Non-focal exam. Continue EVD drainage @level 5. CT today re-eval intraventricular catheter position. CTA on bleed day 7 to evaluate for spasm. Euvolemia, nimodipine, AED.    Bebeto Serrano M.D.

## 2020-12-14 NOTE — PROGRESS NOTE ADULT - SUBJECTIVE AND OBJECTIVE BOX
HPI:  76y/o F with PMHx sig for COPD, asthma, HLD, HTN, BIBEMS to [facility] from home after HHA discovered patient found down in floor covered in non-bloody vomitus. Perr HHA Pt went to the bathroom and was taking a long time, went in to check on her and found her sitting on floor, awake, however with vomitus on front of shirt. On arrival to [facility], patient was taken for stat head CT, which revealed diffuse subarachnoid hemorrhage mainly at basilar cisterns with IVH and obstructive hydrocephalus. Patient was given a bolus of 1g keppra and dilaudid pushes for generalized headache. CTA concerning for 3mm left pcomm aneurysm and a 1.6mm outpouching of distal cavernous segment of the left internal carotid artery likely aneurysm. NIHSS2 3 MF4. Patient was transferred to [facility] for further intervention. Patient currently reports headaches. Pt denies acute changes in vision, seizures, CP, SOB, weakness/paresthesias of arms or legs. (09 Dec 2020 23:37)    OVERNIGHT EVENTS: VIVIEN o/n, neuro stable    Hospital Course:   12/9: BD1 Patient admitted for SAH HH3, MF4.   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 EVD at 5, on Levo overnight, nimodipine discontinued d/t hypotension  12/12: BD4 POD#2, VIVIEN overnight, neuro stable, passed TOV  12/13: BD5 POD#3: VIVIEN x 24 hrs  12/14: BD6 POD#4. VIVIEN o/n, neuro stable. EVD at 5cm H2O    Vital Signs Last 24 Hrs  T(C): 36.4 (13 Dec 2020 22:22), Max: 37.2 (13 Dec 2020 06:10)  T(F): 97.5 (13 Dec 2020 22:22), Max: 98.9 (13 Dec 2020 06:10)  HR: 106 (14 Dec 2020 03:00) (70 - 106)  BP: 178/79 (14 Dec 2020 03:00) (113/55 - 178/79)  BP(mean): 114 (14 Dec 2020 03:00) (79 - 114)  RR: 17 (14 Dec 2020 03:00) (14 - 19)  SpO2: 95% (14 Dec 2020 03:00) (86% - 99%)    I&O's Summary    12 Dec 2020 07:01  -  13 Dec 2020 07:00  --------------------------------------------------------  IN: 1225.6 mL / OUT: 2794 mL / NET: -1568.4 mL    13 Dec 2020 07:01  -  14 Dec 2020 03:50  --------------------------------------------------------  IN: 550 mL / OUT: 742 mL / NET: -192 mL        PHYSICAL EXAM:  GEN: laying in bed, appears well, NAD  NEURO: AOx3. FC, OE spont, speech intact, face symmetric. CNII-XII intact. PERRL, EOMI. No pronator drift. MAEx4. 5/5 strength throughout. SILT  CV: RRR +S1/S2  PULM: CTAB  GI: Abd soft, NT/ND  EXT: ext warm, dry, nontender  WOUND: EVD site c/d/i    TUBES/LINES:  [] Soriano  [] Lumbar Drain  [] Wound Drains  [x] ventriculostomy      DIET:  [] NPO  [x] Mechanical  [] Tube feeds    LABS:                        8.6    10.15 )-----------( 243      ( 13 Dec 2020 05:10 )             27.7     12-13    142  |  105  |  20  ----------------------------<  161<H>  3.6   |  25  |  0.89    Ca    8.3<L>      13 Dec 2020 05:10  Phos  3.4     12-13  Mg     2.0     12-13              CAPILLARY BLOOD GLUCOSE      POCT Blood Glucose.: 103 mg/dL (13 Dec 2020 21:28)  POCT Blood Glucose.: 168 mg/dL (13 Dec 2020 16:17)  POCT Blood Glucose.: 142 mg/dL (13 Dec 2020 10:55)  POCT Blood Glucose.: 152 mg/dL (13 Dec 2020 06:47)      Drug Levels: [] N/A    CSF Analysis: [] N/A      Allergies    No Known Allergies    Intolerances      MEDICATIONS:  Antibiotics:    Neuro:  acetaminophen   Tablet .. 650 milliGRAM(s) Oral every 6 hours PRN  lacosamide 50 milliGRAM(s) Oral two times a day    Anticoagulation:  enoxaparin Injectable 40 milliGRAM(s) SubCutaneous every 24 hours    OTHER:  albuterol/ipratropium for Nebulization. 3 milliLiter(s) Nebulizer every 6 hours  atorvastatin 40 milliGRAM(s) Oral at bedtime  chlorhexidine 2% Cloths 1 Application(s) Topical <User Schedule>  dextrose 40% Gel 15 Gram(s) Oral once  dextrose 50% Injectable 25 Gram(s) IV Push once  glucagon  Injectable 1 milliGRAM(s) IntraMuscular once  insulin lispro (ADMELOG) corrective regimen sliding scale   SubCutaneous Before meals and at bedtime  lidocaine 2% (Preservative-free) Injectable 10 milliLiter(s) Local Injection once  montelukast 10 milliGRAM(s) Oral daily  pantoprazole    Tablet 40 milliGRAM(s) Oral before breakfast  senna 2 Tablet(s) Oral at bedtime    IVF:  cyanocobalamin 1000 MICROGram(s) Oral daily  ferrous    sulfate 325 milliGRAM(s) Oral daily    CULTURES:    RADIOLOGY & ADDITIONAL TESTS:      ASSESSMENT:  74 yo Female with PMHx of COPD, asthma, HLD, HTN, BIBEMS to [facility] from home after HHA found patient down on the floor covered in non-bloody vomitus. CTH reveals diffuse subarachnoid hemorrhage mainly at basilar cisterns with IVH and obstructive hydrocephalus, CTA shows 3mm left pcomm aneurysm, now s/p bedside right frontal EVD placement 12/10, s/p cerebral angiogram for coil embolization of left PCOMM aneurysm 12/10, NIHSS2 HH3 MF4    HEADACHE    Handoff    Hyperlipidemia    Hypertension    COPD (chronic obstructive pulmonary disease)    Asthma    COPD (chronic obstructive pulmonary disease)    Asthma    SAH (subarachnoid hemorrhage)    SAH (subarachnoid hemorrhage)    Subarachnoid bleed    Angiogram, cerebral, with intracranial aneurysm embolization    No significant past surgical history    HEADACHE    90+    [email or web address]        PLAN:  NEURO:  - neuro checks q1h  - pain control   - EVD at 5cm H2O, monitor ICP/output  - cont vimpat seizure ppx  - no nimodipine due to labile SBP  - CTA BD#7 12/15    CARDIOVASCULAR:  - -180  - off levo  - cardiology following for HF/takotsubo, recommend repeat TTE before d/c, lopressor 12.5 bid when hemodynamically/neuro stable  - cont lipitor    PULMONARY:  - satting well RA  - IS  - cont duonebs, singulair    RENAL:  - strict i/os  - limit fluids due to HF  - repletions prn    GI:  - regular diet  - bowel regimen  - gi ppx protonix    HEME:  - cont PO iron  - downtrending h/h, cont monitoring  - SCDs/SQL  - send fecal occult blood w/ next bm    ID:  - afebrile    ENDO:  - glucose goal 140-180  - ISS    DVT PROPHYLAXIS:  [x] Venodynes                                [x] Heparin/Lovenox    DISPOSITION: ICU status, full code, dispo pending    D/w Dr. Serrano    Assessment:  Present when checked    []  GCS  E   V  M     Heart Failure: []Acute, [] acute on chronic , []chronic  Heart Failure:  [] Diastolic (HFpEF), [] Systolic (HFrEF), []Combined (HFpEF and HFrEF), [] RHF, [] Pulm HTN, [] Other    [] MCIHAELLE, [] ATN, [] AIN, [] other  [] CKD1, [] CKD2, [] CKD 3, [] CKD 4, [] CKD 5, []ESRD    Encephalopathy: [] Metabolic, [] Hepatic, [] toxic, [] Neurological, [] Other    Abnormal Nurtitional Status: [] malnurtition (see nutrition note), [ ]underweight: BMI < 19, [] morbid obesity: BMI >40, [] Cachexia    [] Sepsis  [] hypovolemic shock,[] cardiogenic shock, [] hemorrhagic shock, [] neuogenic shock  [] Acute Respiratory Failure  []Cerebral edema, [] Brain compression/ herniation,   [] Functional quadriplegia  [] Acute blood loss anemia

## 2020-12-14 NOTE — PROGRESS NOTE ADULT - SUBJECTIVE AND OBJECTIVE BOX
Cardiology fellow Progress note    Subjective/Interval events: Overnight events reviewed. Patient denies chest pain, shortness of breath, orthopnea, paroxysmal nocturnal dyspnea, ankle swelling, lightheadedness, dizziness. Patient intermittent tachycardia. Concern for Sinus tachycardia vs Atrial tachycardia, no 12 lead EKG available at the time of the event    ROS: A 10-point review of systems was otherwise negative.    PHYSICAL EXAM:  T(C): 37.4 (12-14-20 @ 18:05), Max: 37.4 (12-14-20 @ 08:59)  HR: 98 (12-14-20 @ 19:00) (77 - 114)  BP: 116/63 (12-14-20 @ 19:00) (104/56 - 196/79)  RR: 17 (12-14-20 @ 19:00) (15 - 20)  SpO2: 96% (12-14-20 @ 19:00) (92% - 100%)    GEN: Awake, Breathing comfortably on NC. NAD.   HEENT: Mucosa moist. No JVD.   RESP: CTA b/l  CV: RRR, normal s1/s2. No m/r/g.  ABD: Soft, NTND. BS+  EXT: Warm. No edema, PP 2+  NEURO: AAOx3. No focal deficits.      I&O's Summary    13 Dec 2020 07:01  -  14 Dec 2020 07:00  --------------------------------------------------------  IN: 550 mL / OUT: 2174 mL / NET: -1624 mL    14 Dec 2020 07:01  -  14 Dec 2020 19:43  --------------------------------------------------------  IN: 0 mL / OUT: 63 mL / NET: -63 mL    Cardiac Diagnostics  TELEMETRY: Sinus vs atrial tachycardia	    ECG: None available 	  TTE 12/10: mod LVSD (EF 35%) w/akinesis of mid-myocardial and apical segments and preserved contractility in basal segments, consistent w/Takotsubo stress CM

## 2020-12-14 NOTE — PROGRESS NOTE ADULT - ASSESSMENT
75y/Female with:  aneursymal subarachnoid hemorrhage, L pcomm aneurysm, L parophthalmic aneurysm; brain compression, cerebral edema  osbtructive hydrocephalus  lacunar infarcts R caudate, B thalami, cerebellar hemispheres ?artery to artery vs cardioembolic?  atherosclerotic cardiovascular disease; mod to severe bilateral ICA stenosis (R>L), R VA stenosis  Hypertension dyslipidemia   COPD asthma  newly diagnosed Diabetes Mellitus?  troponin leak    PLAN: Day 1 = 12-09 ; Day 4 =  12/12; Day 21 = 12/29  NEURO: neurochecks q1h, PRN pain meds with Tylenol / Percocet  SAH:  TCD starting Day 4 for vasospasm surveillance, CTA on 12/15, nimodipine discontinued due to pressor requirements  Hydrocephalus:  EVD 5 cm H20 open to drain, monitor ICPs; CT scan this morning  Seizure prophylaxis (cortical ICH, associated SDH, unclear etiology):  lacosamide 50mg PO BID   REHAB:  physical therapy evaluation and management    EARLY MOB:  HOB elevated    PULM:  Room air, incentive spirometry, continue montelukast, ipratropium / albuterol PRN; on further history - denies taking prednisone, will d/c   CARDIO:  SBP goal 90-180mm Hg, EF 35%, repeat echo next week  ENDO:  Blood sugar goals 140-180 mg/dL, cont insulin sliding scale, atorvastatin 40mg   GI:  PPI for GI prophylaxis (home meds)  DIET: CCD  RENAL: maintain euvolemia, accurate Is and Os  HEM/ONC: Hb stable, iron profile c/w iron deficiency, iron FeSO4 325 TID; FOBT  VTE Prophylaxis: SCDs, SQL  ID: afebrile, improving leukocytosis  Social: will update family    CORE MEASURES  1.  Nath and Jacobo Score = 3  2.  VTE prophylaxis:  [ ] administered within 24 hours of admission OR [X] reason not done: fresh bleed, unsecured aneurysm.  3.  Dysphagia screening:   [X] performed before any oral meds / liquids / food  4.  Nimodipine treatment:  [X] administered within 24 hours of admission OR [ ] reason not done:    ATTENDING ATTESTATION:  I was physically present for the key portions of the evaluation and management (E/M) service provided.  I agree with the above history, physical and plan, which I have reviewed and edited where appropriate.    Patient at high risk for neurological deterioration or death due to:  ICU delirium, aspiration PNA, DVT / PE.  Critical care time:  I have personally provided 45 minutes of critical care time, excluding time spent on separate procedures.      Plan discussed with RN, house staff. 75y/Female with:  aneursymal subarachnoid hemorrhage, L pcomm aneurysm, L parophthalmic aneurysm; brain compression, cerebral edema  osbtructive hydrocephalus  lacunar infarcts R caudate, B thalami, cerebellar hemispheres ?artery to artery vs cardioembolic?  atherosclerotic cardiovascular disease; mod to severe bilateral ICA stenosis (R>L), R VA stenosis  Hypertension dyslipidemia   COPD asthma  newly diagnosed Diabetes Mellitus?  troponin leak    PLAN: Day 1 = 12-09 ; Day 4 =  12/12; Day 21 = 12/29  NEURO: neurochecks q1h, PRN pain meds with Tylenol / Percocet  CT scan   SAH:  TCD starting Day 4 for vasospasm surveillance, CTA on 12/15, nimodipine discontinued due to pressor requirements  Hydrocephalus:  EVD 5 cm H20 open to drain, monitor ICPs  Seizure prophylaxis (cortical ICH, associated SDH, unclear etiology):  lacosamide 50mg PO BID   REHAB:  physical therapy evaluation and management    EARLY MOB:  HOB elevated    PULM:  Room air, incentive spirometry, continue montelukast, ipratropium / albuterol PRN   CARDIO:  SBP goal 90-180mm Hg, EF 35%, repeat TTE  ENDO:  Blood sugar goals 140-180 mg/dL, cont insulin sliding scale, atorvastatin 40mg   GI:  PPI for GI prophylaxis (home meds); bowel regimen  DIET: CCD  RENAL: maintain euvolemia, accurate Is and Os  HEM/ONC: Hb stable, iron profile c/w iron deficiency, iron FeSO4 325 TID; FOBT  VTE Prophylaxis: SCDs, SQL  ID: afebrile, leukocytosis  Social: will update family    CORE MEASURES  1.  Nath and Jacobo Score = 3  2.  VTE prophylaxis:  [ ] administered within 24 hours of admission OR [X] reason not done: fresh bleed, unsecured aneurysm.  3.  Dysphagia screening:   [X] performed before any oral meds / liquids / food  4.  Nimodipine treatment:  [X] administered within 24 hours of admission OR [ ] reason not done:    ATTENDING ATTESTATION:  I was physically present for the key portions of the evaluation and management (E/M) service provided.  I agree with the above history, physical and plan, which I have reviewed and edited where appropriate.    Patient at high risk for neurological deterioration or death due to:  ICU delirium, aspiration PNA, DVT / PE.  Critical care time:  I have personally provided 45 minutes of critical care time, excluding time spent on separate procedures.      Plan discussed with RN, house staff. 75y/Female with:  aneursymal subarachnoid hemorrhage, L pcomm aneurysm, L parophthalmic aneurysm; brain compression, cerebral edema  osbtructive hydrocephalus  lacunar infarcts R caudate, B thalami, cerebellar hemispheres ?artery to artery vs cardioembolic?  atherosclerotic cardiovascular disease; mod to severe bilateral ICA stenosis (R>L), R VA stenosis  Hypertension dyslipidemia   COPD asthma  newly diagnosed Diabetes Mellitus?  troponin leak    PLAN: Day 1 = 12-09 ; Day 4 =  12/12; Day 21 = 12/29  NEURO: neurochecks q1h, PRN pain meds with Tylenol / Percocet  CT scan   SAH:  TCD starting Day 4 for vasospasm surveillance, CTA on 12/15, nimodipine discontinued due to pressor requirements  Hydrocephalus:  EVD 5 cm H20 open to drain, monitor ICPs  Seizure prophylaxis (cortical ICH, associated SDH, unclear etiology):  lacosamide 50mg PO BID   REHAB:  physical therapy evaluation and management    EARLY MOB:  HOB elevated    PULM:  Room air, incentive spirometry, continue montelukast, ipratropium / albuterol PRN   CARDIO:  SBP goal 90-180mm Hg, EF 35%, repeat TTE  ENDO:  Blood sugar goals 140-180 mg/dL, cont insulin sliding scale, atorvastatin 40mg   GI:  PPI for GI prophylaxis (home meds); bowel regimen  DIET: CCD  RENAL: maintain euvolemia, accurate Is and Os  HEM/ONC: Hb stable, iron profile c/w iron deficiency, iron FeSO4 325 TID; FOBT  VTE Prophylaxis: SCDs, SQL  ID: afebrile, leukocytosis  Social: will update family    CORE MEASURES  1.  Nath and Jacobo Score = 3  2.  VTE prophylaxis:  [ ] administered within 24 hours of admission OR [X] reason not done: fresh bleed, unsecured aneurysm.  3.  Dysphagia screening:   [X] performed before any oral meds / liquids / food  4.  Nimodipine treatment:  [X] administered within 24 hours of admission OR [ ] reason not done:    ATTENDING ATTESTATION:  I was physically present for the key portions of the evaluation and management (E/M) service provided.  I agree with the above history, physical and plan, which I have reviewed and edited where appropriate.    Patient at high risk for neurological deterioration or death due to:  ICU delirium, aspiration PNA, DVT / PE.  Critical care time:  I have personally provided 45 minutes of critical care time, excluding time spent on separate procedures.      Plan discussed with RN, house staff.    Additional critical care time:  I have personally provided 30 minutes of critical care time, excluding time spent on separate procedures.    Total critical care time: 75 minutes

## 2020-12-14 NOTE — PROGRESS NOTE ADULT - SUBJECTIVE AND OBJECTIVE BOX
=================================  NEUROCRITICAL CARE ATTENDING NOTE  =================================    CARA CANCHOLA   MRN-[id number]  Summary:  75y/F with COPD, asthma, dyslipidemia Hypertension found down.  Brought to [facility] where imaging showed SAH basilar cisterns with IVH and obstructive hydrocephalus L Pcomm aneurysm.  Given levetiracetam, hydromorphone.  NIHSS 2 HH3 MF4, transferred to [facility] for further management.      COURSE IN THE HOSPITAL:   admitted to [facility], EVD inserted; given 20 lasix for pulmo edema, T inversion on CT  12/10 No significant events overnight.    No significant events overnight.    No significant events overnight.    No significant events overnight. drain stopped working at 730 a.m., off levophed    No significant events overnight.     Past Medical History: Hyperlipidemia Hypertension COPD (chronic obstructive pulmonary disease) Asthma  Allergies:  No Known Allergies  Home meds:   ·	famotidine 20 mg oral tablet: 1 tab(s) orally once a day  ·	lisinopril 2.5 mg oral tablet: 1 tab(s) orally once a day  ·	montelukast 10 mg oral tablet: 1 tab(s) orally once a day  ·	predniSONE 50 mg oral tablet: 1 tab(s) orally once a day  ·	ProAir HFA CFC free 90 mcg/inh inhalation aerosol: 2 puff(s) inhaled 4 times a day PRN  ·	simvastatin 20 mg oral tablet: 1 tab(s) orally once a day (at bedtime)    PHYSICAL EXAMINATION  T(C): 37.3 ( @ 06:00), Max: 37.3 ( @ 06:00) HR: 99 ( @ 06:01) (70 - 114) BP: 145/65 ( @ 06:01) (113/55 - 196/79) RR: 16 ( @ 06:01) (15 - 20) SpO2: 93% ( @ 06:01) (86% - 99%)   NEUROLOGIC EXAMINATION:  Patient is  alert, oriented x2-3, CLEOPATRA, no facial droop, moving all 4s  GENERAL: not intubated, not in cardiorespiratory distress  EENT:  anicteric  CARDIOVASCULAR: (+) S1 S2, normal rate and regular rhythm  PULMONARY: clear to auscultation bilaterally  ABDOMEN: soft, nontender with normoactive bowel sounds  EXTREMITIES: no edema  SKIN: no rash    LABS:   CAPILLARY BLOOD GLUCOSE 146 103 168 142 152                8.6    10.15 )-----------( 243      ( 13 Dec 2020 05:10 )             27.7     142  |  105  |  20  ----------------------------<  161<H>  3.6   |  25  |  0.89    Ca    8.3<L>      13 Dec 2020 05:10  Phos  3.4     12  Mg     2.0      @ 07:01  -   @ 07:00  IN: 1225.6 mL / OUT: 2794 mL / NET: -1568.4 mL    HbA1C = 6.7 (12-10) 6.4 ()  LDL = 112 ()   HDL = 66 ()  TG = 114 ()   TSH = 0.990 ()    Bacteriology:  CSF studies:  EEG:  Neuroimagin/10 CT head:  EVD placement, stable   CTA:  L pcomm aneurysm, L ICA (distal cavernous) aneurysm vs infundibulum, extensive calcified plaque cavernous bilateral ICA with mild to mod stenosis; thick plaque bilateral carotid bifurcations - mod to severe R and mild to mod L stenosis, focal calcified plaque R VA off subclavian artery - high grate stenosis / partial occlusion, upper lung bilateral opacification - pulmo edema, chronic deformity R humeral neck   CT head:  SAH, basilar cisterns, IV extension, obstructive hydrocephalus SVID, lacunar infarcts R caudate, both thalami, cerebellar hemispheres, no CT evidence of territorial infarction  Other imagin/11 LE Doppler: NEG   CT abd:  small paraesophageal hiatal hernia, gastritis; ?impaction, septal thickening bilateral lower lobes ?pulmo edema, hepatic steatosis    MEDICATIONS:   SQL 40 daily lacosamide 50mg PO BID ipratropium / albuterol q6h montelukast 10mg PO daily pantoprazole 40 daily senna HS atorvastatin 40mg PO HS mod ISS Peridex cyanocobalamin 1000 PO daily ferrous sulfate 325 mg PO daily   MEDICATIONS: 12-14  SQl 40 daily lacosamide 50mg PO BID ipratropium / albuterol q6h montelukast 10mg PO daily pantoprazol e40 daily senna HS atorvastatin 40mgPO HS mod ISS Peridex cyanocobalamin 1000 PO daily ferrous sulfate 325mg PO daily     IV FLUIDS: IVL  DRIPS:  ·	norepinephrine Infusion 0.05 MICROgram(s)/kG/Min (4.68 mL/Hr) IV Continuous <Continuous> - 0.02 (weaning) - OFF  DIET: CCD  Lines: Erin GUNTER  Drains:     EVD at 5cm H20  Wounds:    CODE STATUS:  Full Code                       GOALS OF CARE:  aggressive                      DISPOSITION:  ICU  NIHSS 2 HH3 MF4 =================================  NEUROCRITICAL CARE ATTENDING NOTE  =================================    CARA CANCHOLA   MRN-[id number]  Summary:  75y/F with COPD, asthma, dyslipidemia Hypertension found down.  Brought to [facility] where imaging showed SAH basilar cisterns with IVH and obstructive hydrocephalus L Pcomm aneurysm.  Given levetiracetam, hydromorphone.  NIHSS 2 HH3 MF4, transferred to [facility] for further management.      COURSE IN THE HOSPITAL:   admitted to [facility], EVD inserted; given 20 lasix for pulmo edema, T inversion on CT  12/10 No significant events overnight.    No significant events overnight.    No significant events overnight.    No significant events overnight. drain stopped working at 730 a.m., off levophed    No significant events overnight.     Past Medical History: Hyperlipidemia Hypertension COPD (chronic obstructive pulmonary disease) Asthma  Allergies:  No Known Allergies  Home meds:   ·	famotidine 20 mg oral tablet: 1 tab(s) orally once a day  ·	lisinopril 2.5 mg oral tablet: 1 tab(s) orally once a day  ·	montelukast 10 mg oral tablet: 1 tab(s) orally once a day  ·	predniSONE 50 mg oral tablet: 1 tab(s) orally once a day  ·	ProAir HFA CFC free 90 mcg/inh inhalation aerosol: 2 puff(s) inhaled 4 times a day PRN  ·	simvastatin 20 mg oral tablet: 1 tab(s) orally once a day (at bedtime)    PHYSICAL EXAMINATION  T(C): 37.3 ( @ 06:00), Max: 37.3 ( @ 06:00) HR: 99 ( @ 06:01) (70 - 114) BP: 145/65 ( @ 06:01) (113/55 - 196/79) RR: 16 ( @ 06:01) (15 - 20) SpO2: 93% ( @ 06:01) (86% - 99%)   NEUROLOGIC EXAMINATION:  Patient is  alert, oriented x2-3, CLEOPATRA, no facial droop, moving all 4s  GENERAL: not intubated, not in cardiorespiratory distress  EENT:  anicteric  CARDIOVASCULAR: (+) S1 S2, normal rate and regular rhythm  PULMONARY: clear to auscultation bilaterally  ABDOMEN: soft, nontender with normoactive bowel sounds  EXTREMITIES: no edema  SKIN: no rash    LABS:   CAPILLARY BLOOD GLUCOSE 146 103 168 142             (10)     10.9   12.11 )-----------( 351      ( 14 Dec 2020 06:37 )             34.9     137  |  98  |  15  ----------------------------<  150<H>  3.8   |  21<L>  |  0.69    Ca    9.0      14 Dec 2020 06:37  Phos  3.0       Mg     2.0      @ 07:01  -   @ 07:00  IN: 550 mL / OUT: 2174 mL / NET: -1624 mL    HbA1C = 6.7 (12-10) 6.4 ()  LDL = 112 ()   HDL = 66 ()  TG = 114 ()   TSH = 0.990 ()    Bacteriology:  CSF studies:  EEG:  Neuroimagin/10 CT head:  EVD placement, stable   CTA:  L pcomm aneurysm, L ICA (distal cavernous) aneurysm vs infundibulum, extensive calcified plaque cavernous bilateral ICA with mild to mod stenosis; thick plaque bilateral carotid bifurcations - mod to severe R and mild to mod L stenosis, focal calcified plaque R VA off subclavian artery - high grate stenosis / partial occlusion, upper lung bilateral opacification - pulmo edema, chronic deformity R humeral neck   CT head:  SAH, basilar cisterns, IV extension, obstructive hydrocephalus SVID, lacunar infarcts R caudate, both thalami, cerebellar hemispheres, no CT evidence of territorial infarction  Other imagin/11 LE Doppler: NEG   CT abd:  small paraesophageal hiatal hernia, gastritis; ?impaction, septal thickening bilateral lower lobes ?pulmo edema, hepatic steatosis    MEDICATIONS:   SQL 40 daily lacosamide 50mg PO BID ipratropium / albuterol q6h montelukast 10mg PO daily pantoprazole 40 daily senna HS atorvastatin 40mgPO HS mod ISS Peridex cyanocobalamin 1000 PO daily ferrous sulfate 325mg PO daily     IV FLUIDS: IVL  DRIPS:  DIET: CCD  Lines: R IJ  Drains:     EVD at 5cm H20   EVD at 5cm H20 ICP < 10  Wounds:    CODE STATUS:  Full Code                       GOALS OF CARE:  aggressive                      DISPOSITION:  ICU  NIHSS 2 HH3 MF4

## 2020-12-15 ENCOUNTER — TRANSCRIPTION ENCOUNTER (OUTPATIENT)
Age: 75
End: 2020-12-15

## 2020-12-15 LAB
ANION GAP SERPL CALC-SCNC: 16 MMOL/L — SIGNIFICANT CHANGE UP (ref 5–17)
BUN SERPL-MCNC: 24 MG/DL — HIGH (ref 7–23)
CALCIUM SERPL-MCNC: 9.4 MG/DL — SIGNIFICANT CHANGE UP (ref 8.4–10.5)
CHLORIDE SERPL-SCNC: 99 MMOL/L — SIGNIFICANT CHANGE UP (ref 96–108)
CO2 SERPL-SCNC: 21 MMOL/L — LOW (ref 22–31)
CREAT SERPL-MCNC: 0.68 MG/DL — SIGNIFICANT CHANGE UP (ref 0.5–1.3)
GLUCOSE SERPL-MCNC: 125 MG/DL — HIGH (ref 70–99)
HCT VFR BLD CALC: 34.6 % — SIGNIFICANT CHANGE UP (ref 34.5–45)
HGB BLD-MCNC: 10.9 G/DL — LOW (ref 11.5–15.5)
MAGNESIUM SERPL-MCNC: 2.1 MG/DL — SIGNIFICANT CHANGE UP (ref 1.6–2.6)
MCHC RBC-ENTMCNC: 27.2 PG — SIGNIFICANT CHANGE UP (ref 27–34)
MCHC RBC-ENTMCNC: 31.5 GM/DL — LOW (ref 32–36)
MCV RBC AUTO: 86.3 FL — SIGNIFICANT CHANGE UP (ref 80–100)
NRBC # BLD: 0 /100 WBCS — SIGNIFICANT CHANGE UP (ref 0–0)
PHOSPHATE SERPL-MCNC: 3.4 MG/DL — SIGNIFICANT CHANGE UP (ref 2.5–4.5)
PLATELET # BLD AUTO: 366 K/UL — SIGNIFICANT CHANGE UP (ref 150–400)
POTASSIUM SERPL-MCNC: 4.2 MMOL/L — SIGNIFICANT CHANGE UP (ref 3.5–5.3)
POTASSIUM SERPL-SCNC: 4.2 MMOL/L — SIGNIFICANT CHANGE UP (ref 3.5–5.3)
RBC # BLD: 4.01 M/UL — SIGNIFICANT CHANGE UP (ref 3.8–5.2)
RBC # FLD: 15.6 % — HIGH (ref 10.3–14.5)
SODIUM SERPL-SCNC: 136 MMOL/L — SIGNIFICANT CHANGE UP (ref 135–145)
WBC # BLD: 15.18 K/UL — HIGH (ref 3.8–10.5)
WBC # FLD AUTO: 15.18 K/UL — HIGH (ref 3.8–10.5)

## 2020-12-15 PROCEDURE — 99291 CRITICAL CARE FIRST HOUR: CPT

## 2020-12-15 PROCEDURE — 99024 POSTOP FOLLOW-UP VISIT: CPT

## 2020-12-15 PROCEDURE — 99233 SBSQ HOSP IP/OBS HIGH 50: CPT

## 2020-12-15 PROCEDURE — 70498 CT ANGIOGRAPHY NECK: CPT | Mod: 26

## 2020-12-15 PROCEDURE — 99292 CRITICAL CARE ADDL 30 MIN: CPT

## 2020-12-15 PROCEDURE — 70496 CT ANGIOGRAPHY HEAD: CPT | Mod: 26

## 2020-12-15 RX ORDER — NOREPINEPHRINE BITARTRATE/D5W 8 MG/250ML
0.05 PLASTIC BAG, INJECTION (ML) INTRAVENOUS
Qty: 8 | Refills: 0 | Status: DISCONTINUED | OUTPATIENT
Start: 2020-12-15 | End: 2020-12-16

## 2020-12-15 RX ORDER — ACETAMINOPHEN 500 MG
750 TABLET ORAL ONCE
Refills: 0 | Status: COMPLETED | OUTPATIENT
Start: 2020-12-15 | End: 2020-12-15

## 2020-12-15 RX ADMIN — Medication 750 MILLIGRAM(S): at 20:40

## 2020-12-15 RX ADMIN — Medication 3 MILLILITER(S): at 05:27

## 2020-12-15 RX ADMIN — Medication 3 MILLILITER(S): at 16:53

## 2020-12-15 RX ADMIN — Medication 400 MILLIGRAM(S): at 19:40

## 2020-12-15 RX ADMIN — ENOXAPARIN SODIUM 40 MILLIGRAM(S): 100 INJECTION SUBCUTANEOUS at 22:57

## 2020-12-15 RX ADMIN — Medication 3 MILLILITER(S): at 09:56

## 2020-12-15 RX ADMIN — CHLORHEXIDINE GLUCONATE 1 APPLICATION(S): 213 SOLUTION TOPICAL at 06:30

## 2020-12-15 RX ADMIN — PANTOPRAZOLE SODIUM 40 MILLIGRAM(S): 20 TABLET, DELAYED RELEASE ORAL at 06:31

## 2020-12-15 RX ADMIN — LACOSAMIDE 50 MILLIGRAM(S): 50 TABLET ORAL at 06:31

## 2020-12-15 NOTE — PROGRESS NOTE ADULT - ASSESSMENT
75y/Female with:  aneursymal subarachnoid hemorrhage, L pcomm aneurysm, L parophthalmic aneurysm; brain compression, cerebral edema  osbtructive hydrocephalus  lacunar infarcts R caudate, B thalami, cerebellar hemispheres ?artery to artery vs cardioembolic?  atherosclerotic cardiovascular disease; mod to severe bilateral ICA stenosis (R>L), R VA stenosis  Hypertension dyslipidemia   COPD asthma  newly diagnosed Diabetes Mellitus?  troponin leak    PLAN: Day 1 = 12-09 ; Day 4 =  12/12; Day 21 = 12/29  NEURO: neurochecks q1h, PRN pain meds with Tylenol / Percocet  CT scan   SAH:  TCD starting Day 4 for vasospasm surveillance, CTA on 12/15, nimodipine discontinued due to pressor requirements  Hydrocephalus:  EVD 5 cm H20 open to drain, monitor ICPs  Seizure prophylaxis (cortical ICH, associated SDH, unclear etiology):  lacosamide 50mg PO BID   REHAB:  physical therapy evaluation and management    EARLY MOB:  HOB elevated    PULM:  Room air, incentive spirometry, continue montelukast, ipratropium / albuterol PRN   CARDIO:  SBP goal 90-180mm Hg, EF 35%, repeat TTE  ENDO:  Blood sugar goals 140-180 mg/dL, cont insulin sliding scale, atorvastatin 40mg   GI:  PPI for GI prophylaxis (home meds); bowel regimen  DIET: CCD  RENAL: maintain euvolemia, accurate Is and Os  HEM/ONC: Hb stable, iron profile c/w iron deficiency, iron FeSO4 325 TID; FOBT  VTE Prophylaxis: SCDs, SQL  ID: afebrile, leukocytosis  Social: will update family    CORE MEASURES  1.  Nath and Jacobo Score = 3  2.  VTE prophylaxis:  [ ] administered within 24 hours of admission OR [X] reason not done: fresh bleed, unsecured aneurysm.  3.  Dysphagia screening:   [X] performed before any oral meds / liquids / food  4.  Nimodipine treatment:  [X] administered within 24 hours of admission OR [ ] reason not done:    ATTENDING ATTESTATION:  I was physically present for the key portions of the evaluation and management (E/M) service provided.  I agree with the above history, physical and plan, which I have reviewed and edited where appropriate.    Patient at high risk for neurological deterioration or death due to:  ICU delirium, aspiration PNA, DVT / PE.  Critical care time:  I have personally provided 45 minutes of critical care time, excluding time spent on separate procedures.      Plan discussed with RN, house staff.   75y/Female with:  aneursymal subarachnoid hemorrhage, L pcomm aneurysm, L parophthalmic aneurysm; brain compression, cerebral edema  osbtructive hydrocephalus  lacunar infarcts R caudate, B thalami, cerebellar hemispheres ?artery to artery vs cardioembolic?  atherosclerotic cardiovascular disease; mod to severe bilateral ICA stenosis (R>L), R VA stenosis  Hypertension dyslipidemia   COPD asthma  newly diagnosed Diabetes Mellitus?  troponin leak    PLAN: Day 1 = 12-09 ; Day 4 =  12/12; Day 21 = 12/29  NEURO: neurochecks q1h, PRN pain meds with Tylenol / Percocet  CTA today, if VSP present, IA verapamil +/- low dose norepinephrine  SAH:  nimodipine discontinued due to pressor requirements  Hydrocephalus:  EVD 5 cm H20 open to drain, monitor ICPs  Seizure prophylaxis (cortical ICH, associated SDH, unclear etiology):  lacosamide 50mg PO BID   REHAB:  physical therapy evaluation and management    EARLY MOB:  HOB elevated    PULM:  Room air, incentive spirometry, continue montelukast, ipratropium / albuterol PRN   CARDIO:  SBP goal 90-180mm Hg, EF 35%, repeat TTE as per Cardiology (once outside VSP window, will start MTP/ARB)  ENDO:  Blood sugar goals 140-180 mg/dL, cont insulin sliding scale, atorvastatin 40mg   GI:  PPI for GI prophylaxis (home meds); bowel regimen  DIET: CCD  RENAL: maintain euvolemia, accurate Is and Os  HEM/ONC: Hb stable, iron profile c/w iron deficiency, iron FeSO4 325 TID; FOBT  VTE Prophylaxis: SCDs, SQL  ID: afebrile, leukocytosis  Social: will update family    CORE MEASURES  1.  Nath and Jacobo Score = 3  2.  VTE prophylaxis:  [ ] administered within 24 hours of admission OR [X] reason not done: fresh bleed, unsecured aneurysm.  3.  Dysphagia screening:   [X] performed before any oral meds / liquids / food  4.  Nimodipine treatment:  [X] administered within 24 hours of admission OR [ ] reason not done:    ATTENDING ATTESTATION:  I was physically present for the key portions of the evaluation and management (E/M) service provided.  I agree with the above history, physical and plan, which I have reviewed and edited where appropriate.    Patient at high risk for neurological deterioration or death due to:  ICU delirium, aspiration PNA, DVT / PE.  Critical care time:  I have personally provided 45 minutes of critical care time, excluding time spent on separate procedures.      Plan discussed with RN, house staff.   75y/Female with:  aneursymal subarachnoid hemorrhage, L pcomm aneurysm, L parophthalmic aneurysm; brain compression, cerebral edema  osbtructive hydrocephalus  lacunar infarcts R caudate, B thalami, cerebellar hemispheres ?artery to artery vs cardioembolic?  atherosclerotic cardiovascular disease; mod to severe bilateral ICA stenosis (R>L), R VA stenosis  Hypertension dyslipidemia   COPD asthma  newly diagnosed Diabetes Mellitus?  troponin leak    PLAN: Day 1 = 12-09 ; Day 4 =  12/12; Day 21 = 12/29  NEURO: neurochecks q1h, PRN pain meds with Tylenol / Percocet  CTA today, if VSP present, IA verapamil +/- low dose norepinephrine  SAH:  nimodipine discontinued due to pressor requirements  Hydrocephalus:  EVD 5 cm H20 open to drain, monitor ICPs  Seizure prophylaxis (cortical ICH, associated SDH, unclear etiology):  lacosamide 50mg PO BID   REHAB:  physical therapy evaluation and management    EARLY MOB:  HOB elevated    PULM:  Room air, incentive spirometry, continue montelukast, ipratropium / albuterol PRN   CARDIO:  SBP goal 100-180mm Hg, EF 35%, repeat TTE as per Cardiology (once outside VSP window, will start MTP/ARB)  ENDO:  Blood sugar goals 140-180 mg/dL, cont insulin sliding scale, atorvastatin 40mg   GI:  PPI for GI prophylaxis (home meds); bowel regimen  DIET: CCD  RENAL: maintain euvolemia, accurate Is and Os  HEM/ONC: Hb stable, iron profile c/w iron deficiency, iron FeSO4 325 TID; FOBT  VTE Prophylaxis: SCDs, SQL  ID: afebrile, leukocytosis  Social: will update family    CORE MEASURES  1.  Nath and Jacobo Score = 3  2.  VTE prophylaxis:  [ ] administered within 24 hours of admission OR [X] reason not done: fresh bleed, unsecured aneurysm.  3.  Dysphagia screening:   [X] performed before any oral meds / liquids / food  4.  Nimodipine treatment:  [X] administered within 24 hours of admission OR [ ] reason not done:    ATTENDING ATTESTATION:  I was physically present for the key portions of the evaluation and management (E/M) service provided.  I agree with the above history, physical and plan, which I have reviewed and edited where appropriate.    Patient at high risk for neurological deterioration or death due to:  ICU delirium, aspiration PNA, DVT / PE.  Critical care time:  I have personally provided 45 minutes of critical care time, excluding time spent on separate procedures.      Plan discussed with RN, house staff.   75y/Female with:  aneursymal subarachnoid hemorrhage, L pcomm aneurysm, L parophthalmic aneurysm; brain compression, cerebral edema  osbtructive hydrocephalus  lacunar infarcts R caudate, B thalami, cerebellar hemispheres ?artery to artery vs cardioembolic?  atherosclerotic cardiovascular disease; mod to severe bilateral ICA stenosis (R>L), R VA stenosis  Hypertension dyslipidemia   COPD asthma  newly diagnosed Diabetes Mellitus?  troponin leak    PLAN: Day 1 = 12-09 ; Day 4 =  12/12; Day 21 = 12/29  NEURO: neurochecks q1h, PRN pain meds with Tylenol / Percocet  CTA today, if VSP present (mild right M1 VSP), low dose norepinephrine +/- IA verapamil  SAH:  nimodipine discontinued due to pressor requirements  Hydrocephalus:  EVD 5 cm H20 open to drain, monitor ICPs  Seizure prophylaxis (cortical ICH, associated SDH, unclear etiology):  lacosamide 50mg PO BID   REHAB:  physical therapy evaluation and management    EARLY MOB:  HOB elevated    PULM:  Room air, incentive spirometry, continue montelukast, ipratropium / albuterol PRN   CARDIO:  SBP goal 100-180mm Hg, EF 35%, repeat TTE as per Cardiology (once outside VSP window, will start MTP/ARB)  ENDO:  Blood sugar goals 140-180 mg/dL, cont insulin sliding scale, atorvastatin 40mg   GI:  PPI for GI prophylaxis (home meds); bowel regimen  DIET: CCD  RENAL: maintain euvolemia, accurate Is and Os  HEM/ONC: Hb stable, iron profile c/w iron deficiency, iron FeSO4 325 TID; FOBT  VTE Prophylaxis: SCDs, SQL  ID: afebrile, leukocytosis  Social: will update family    CORE MEASURES  1.  Nath and Jacobo Score = 3  2.  VTE prophylaxis:  [ ] administered within 24 hours of admission OR [X] reason not done: fresh bleed, unsecured aneurysm.  3.  Dysphagia screening:   [X] performed before any oral meds / liquids / food  4.  Nimodipine treatment:  [X] administered within 24 hours of admission OR [ ] reason not done:    ATTENDING ATTESTATION:  I was physically present for the key portions of the evaluation and management (E/M) service provided.  I agree with the above history, physical and plan, which I have reviewed and edited where appropriate.    Patient at high risk for neurological deterioration or death due to:  ICU delirium, aspiration PNA, DVT / PE.  Critical care time:  I have personally provided 45 minutes of critical care time, excluding time spent on separate procedures.      Plan discussed with RN, house staff.

## 2020-12-15 NOTE — PROGRESS NOTE ADULT - ASSESSMENT
75y/Female with:  aneursymal subarachnoid hemorrhage, L pcomm aneurysm, L parophthalmic aneurysm; brain compression, cerebral edema  osbtructive hydrocephalus  lacunar infarcts R caudate, B thalami, cerebellar hemispheres ?artery to artery vs cardioembolic?  atherosclerotic cardiovascular disease; mod to severe bilateral ICA stenosis (R>L), R VA stenosis  Hypertension dyslipidemia   COPD asthma  newly diagnosed Diabetes Mellitus?  troponin leak    PLAN: Day 1 = 12-09 ; Day 4 =  12/12; Day 21 = 12/29  NEURO: neurochecks q1h, PRN pain meds with Tylenol / Percocet  s/p angio; HDA with levophed  SAH:  nimodipine discontinued due to pressor requirements  Hydrocephalus:  EVD 5 cm H20 open to drain, monitor ICPs  Seizure prophylaxis (cortical ICH, associated SDH, unclear etiology):  lacosamide 50mg PO BID   REHAB:  physical therapy evaluation and management    EARLY MOB:  HOB elevated    PULM:  Room air, incentive spirometry, continue montelukast, ipratropium / albuterol PRN   CARDIO:  SBP goal 90-180mm Hg, EF 35%, repeat TTE; levophed  ENDO:  Blood sugar goals 140-180 mg/dL, cont insulin sliding scale, atorvastatin 40mg   GI:  PPI for GI prophylaxis (home meds); bowel regimen  DIET: CCD  RENAL: maintain euvolemia, accurate Is and Os  HEM/ONC: Hb stable, iron profile c/w iron deficiency, iron FeSO4 325 TID; FOBT  VTE Prophylaxis: SCDs, SQL  ID: afebrile, leukocytosis  Social: will update family    CORE MEASURES  1.  Nath and Jacobo Score = 3  2.  VTE prophylaxis:  [ ] administered within 24 hours of admission OR [X] reason not done: fresh bleed, unsecured aneurysm.  3.  Dysphagia screening:   [X] performed before any oral meds / liquids / food  4.  Nimodipine treatment:  [X] administered within 24 hours of admission OR [ ] reason not done:    ATTENDING ATTESTATION:  I was physically present for the key portions of the evaluation and management (E/M) service provided.  I agree with the above history, physical and plan, which I have reviewed and edited where appropriate.    Patient at high risk for neurological deterioration or death due to:  ICU delirium, aspiration PNA, DVT / PE.  Critical care time:  I have personally provided 45 minutes of critical care time, excluding time spent on separate procedures.      Plan discussed with RN, house staff.    Additional critical care time:  I have personally provided 30 minutes of critical care time, excluding time spent on separate procedures.    Total critical care time: 75 minutes   75y/Female with:  aneursymal subarachnoid hemorrhage, L pcomm aneurysm, L parophthalmic aneurysm; brain compression, cerebral edema  osbtructive hydrocephalus  lacunar infarcts R caudate, B thalami, cerebellar hemispheres ?artery to artery vs cardioembolic?  atherosclerotic cardiovascular disease; mod to severe bilateral ICA stenosis (R>L), R VA stenosis  Hypertension dyslipidemia   COPD asthma  newly diagnosed Diabetes Mellitus?  troponin leak    PLAN: Day 1 = 12-09 ; Day 4 =  12/12; Day 21 = 12/29  NEURO: neurochecks q1h, PRN pain meds with Tylenol / Percocet  s/p angio; HDA with levophed  SAH:  nimodipine discontinued due to pressor requirements  Hydrocephalus:  EVD 5 cm H20 open to drain, monitor ICPs  Seizure prophylaxis (cortical ICH, associated SDH, unclear etiology):  lacosamide 50mg PO BID   REHAB:  physical therapy evaluation and management    EARLY MOB:  HOB elevated    PULM:  Room air, incentive spirometry, continue montelukast, ipratropium / albuterol PRN   CARDIO:  SBP goal 90-180mm Hg, EF 35%, repeat TTE; levophed  ENDO:  Blood sugar goals 140-180 mg/dL, cont insulin sliding scale, atorvastatin 40mg   GI:  PPI for GI prophylaxis (home meds); bowel regimen  DIET: CCD  RENAL: maintain euvolemia, accurate Is and Os  HEM/ONC: Hb stable, iron profile c/w iron deficiency, iron FeSO4 325 TID; FOBT  VTE Prophylaxis: SCDs, SQL  ID: afebrile, leukocytosis  Social: will update family    CORE MEASURES  1.  Nath and Jacobo Score = 3  2.  VTE prophylaxis:  [ ] administered within 24 hours of admission OR [X] reason not done: fresh bleed, unsecured aneurysm.  3.  Dysphagia screening:   [X] performed before any oral meds / liquids / food  4.  Nimodipine treatment:  [X] administered within 24 hours of admission OR [ ] reason not done:    ATTENDING ATTESTATION:  I was physically present for the key portions of the evaluation and management (E/M) service provided.  I agree with the above history, physical and plan, which I have reviewed and edited where appropriate.    Patient at high risk for neurological deterioration or death due to:  ICU delirium, aspiration PNA, DVT / PE.  Critical care time:  I have personally provided 30 minutes of critical care time, excluding time spent on separate procedures.      Plan discussed with RN, house staff.

## 2020-12-15 NOTE — PROCEDURE NOTE - ADDITIONAL PROCEDURE DETAILS
The Patient's right wrist was cleansed with antiseptic solution then draped in the usual fashion.  The pulse in the right radial artery was palpated.  The skin overlying the pulse was infiltrated with 1% lidocaine ( 1 ml).  Using the Seldinger technique an 20 gauge angiocatheter was inserted  into the artery with no complications.  The catheter was sutured to the skin.      Complications: none    I certify that a TIME OUT took place prior to prep and drape, verbally confirming correct patient identify, correct site and side, presence of MD/PA/NP's jake is applicable, agreement on procedure to be done, correct patient position and availability.

## 2020-12-15 NOTE — PROGRESS NOTE ADULT - SUBJECTIVE AND OBJECTIVE BOX
Incomplete Note with preliminary recs.                                                                                       Cardiology fellow Progress note    Subjective/Interval events: Overnight events reviewed. Patient denies chest pain, shortness of breath, orthopnea, paroxysmal nocturnal dyspnea, ankle swelling, lightheadedness, dizziness. HR better controlled today with the initiation of BB. Hypertensive to the 150s. Cr stable.     ROS: A 10-point review of systems was otherwise negative.    PHYSICAL EXAM:  T(C): 37.2 (12-15-20 @ 05:05), Max: 37.4 (12-14-20 @ 18:05)  HR: 103 (12-15-20 @ 08:00) (93 - 114)  BP: 151/73 (12-15-20 @ 08:00) (104/56 - 151/73)  RR: 16 (12-15-20 @ 08:00) (14 - 18)  SpO2: 92% (12-15-20 @ 08:00) (91% - 100%)    GEN: Awake, Breathing comfortably on. NAD.   HEENT: Mucosa moist. No JVD.   RESP: CTA b/l  CV: RRR, normal s1/s2. No m/r/g.  ABD: Soft, NTND. BS+  EXT: Warm. No edema, PP 2+  NEURO: AAOx3. No focal deficits.      I&O's Summary    14 Dec 2020 07:01  -  15 Dec 2020 07:00  --------------------------------------------------------  IN: 0 mL / OUT: 630 mL / NET: -630 mL    15 Dec 2020 07:01  -  15 Dec 2020 09:54  --------------------------------------------------------  IN: 0 mL / OUT: 4 mL / NET: -4 mL    Cardiac Diagnostics  TELEMETRY: Intermittent sinus tachycardia 	    ECG(12/14/20): NSR, LVH. Diffuse TWI (Wellen's sign) improving since 12/12.   	  TTE 12/10: mod LVSD (EF 35%) w/akinesis of mid-myocardial and apical segments and preserved contractility in basal segments, consistent w/Takotsubo stress CM       Incomplete Note with preliminary recs.                                                                                       Cardiology fellow Progress note    Subjective/Interval events: Patient significantly altered today, concern for cerebral vasospam and patient is scheduled for CTA. HR better controlled today with the initiation of BB. Hypertensive to the 150s. Cr stable.     ROS: A 10-point review of systems was otherwise negative.    PHYSICAL EXAM:  T(C): 37.2 (12-15-20 @ 05:05), Max: 37.4 (12-14-20 @ 18:05)  HR: 103 (12-15-20 @ 08:00) (93 - 114)  BP: 151/73 (12-15-20 @ 08:00) (104/56 - 151/73)  RR: 16 (12-15-20 @ 08:00) (14 - 18)  SpO2: 92% (12-15-20 @ 08:00) (91% - 100%)    GEN: lethargic, non-responsive to commands or painful stimuli  HEENT: Mucosa moist. No JVD.   RESP: b/l crackles. No Inspiratory effort  CV: tachycardic normal s1/s2. No m/r/g.  ABD: Soft, NTND. BS+  EXT: Warm. No edema, PP 2+        I&O's Summary    14 Dec 2020 07:01  -  15 Dec 2020 07:00  --------------------------------------------------------  IN: 0 mL / OUT: 630 mL / NET: -630 mL    15 Dec 2020 07:01  -  15 Dec 2020 09:54  --------------------------------------------------------  IN: 0 mL / OUT: 4 mL / NET: -4 mL    Cardiac Diagnostics  TELEMETRY: Intermittent sinus tachycardia 	    ECG(12/14/20): NSR, LVH. Diffuse TWI (Wellen's sign) improving since 12/12.   	  TTE 12/10: mod LVSD (EF 35%) w/akinesis of mid-myocardial and apical segments and preserved contractility in basal segments, consistent w/Takotsubo stress CM         Incomplete Note with preliminary recs.                                                                                       Cardiology fellow Progress note    Subjective/Interval events: Patient significantly altered today, concern for cerebral vasospam and patient is scheduled for Cerebral angiogram. HR better controlled today with the initiation of BB. Hypertensive to the 150s. Cr stable.     ROS: A 10-point review of systems was otherwise negative.    PHYSICAL EXAM:  T(C): 37.2 (12-15-20 @ 05:05), Max: 37.4 (12-14-20 @ 18:05)  HR: 103 (12-15-20 @ 08:00) (93 - 114)  BP: 151/73 (12-15-20 @ 08:00) (104/56 - 151/73)  RR: 16 (12-15-20 @ 08:00) (14 - 18)  SpO2: 92% (12-15-20 @ 08:00) (91% - 100%)    GEN: lethargic, non-responsive to commands or painful stimuli  HEENT: Mucosa moist. No JVD.   RESP: b/l crackles. No Inspiratory effort  CV: tachycardic normal s1/s2. No m/r/g.  ABD: Soft, NTND. BS+  EXT: Warm. No edema, PP 2+        I&O's Summary    14 Dec 2020 07:01  -  15 Dec 2020 07:00  --------------------------------------------------------  IN: 0 mL / OUT: 630 mL / NET: -630 mL    15 Dec 2020 07:01  -  15 Dec 2020 09:54  --------------------------------------------------------  IN: 0 mL / OUT: 4 mL / NET: -4 mL    Cardiac Diagnostics  TELEMETRY: Intermittent sinus tachycardia 	    ECG(12/14/20): NSR, LVH. Diffuse TWI (Wellen's sign) improving since 12/12.   	  TTE 12/10: mod LVSD (EF 35%) w/akinesis of mid-myocardial and apical segments and preserved contractility in basal segments, consistent w/Takotsubo stress CM                                                                                               Cardiology fellow Progress note    Subjective/Interval events: Patient significantly altered today, concern for cerebral vasospam and patient is scheduled for Cerebral angiogram. HR better controlled today with the initiation of BB. Hypertensive to the 150s. Cr stable.     ROS: A 10-point review of systems was otherwise negative.    PHYSICAL EXAM:  T(C): 37.2 (12-15-20 @ 05:05), Max: 37.4 (12-14-20 @ 18:05)  HR: 103 (12-15-20 @ 08:00) (93 - 114)  BP: 151/73 (12-15-20 @ 08:00) (104/56 - 151/73)  RR: 16 (12-15-20 @ 08:00) (14 - 18)  SpO2: 92% (12-15-20 @ 08:00) (91% - 100%)    GEN: lethargic, non-responsive to commands or painful stimuli  HEENT: Mucosa moist. No JVD.   RESP: b/l crackles. No Inspiratory effort  CV: tachycardic normal s1/s2. No m/r/g.  ABD: Soft, NTND. BS+  EXT: Warm. No edema, PP 2+        I&O's Summary    14 Dec 2020 07:01  -  15 Dec 2020 07:00  --------------------------------------------------------  IN: 0 mL / OUT: 630 mL / NET: -630 mL    15 Dec 2020 07:01  -  15 Dec 2020 09:54  --------------------------------------------------------  IN: 0 mL / OUT: 4 mL / NET: -4 mL    Cardiac Diagnostics  TELEMETRY: Intermittent sinus tachycardia 	    ECG(12/14/20): NSR, LVH. Diffuse TWI (Wellen's sign) improving since 12/12.   	  TTE 12/10: mod LVSD (EF 35%) w/akinesis of mid-myocardial and apical segments and preserved contractility in basal segments, consistent w/Takotsubo stress CM

## 2020-12-15 NOTE — PROGRESS NOTE ADULT - SUBJECTIVE AND OBJECTIVE BOX
=================================  NEUROCRITICAL CARE ATTENDING NOTE  =================================    CARA CANCHOLA   MRN-[id number]  Summary:  75y/F with COPD, asthma, dyslipidemia Hypertension found down.  Brought to [facility] where imaging showed SAH basilar cisterns with IVH and obstructive hydrocephalus L Pcomm aneurysm.  Given levetiracetam, hydromorphone.  NIHSS 2 HH3 MF4, transferred to [facility] for further management.      COURSE IN THE HOSPITAL:   admitted to [facility], EVD inserted; given 20 lasix for pulmo edema, T inversion on CT  12/10 No significant events overnight.    No significant events overnight.    No significant events overnight.    No significant events overnight. drain stopped working at 730 a.m., off levophed    No significant events overnight.   12/15 confusion    Past Medical History: Hyperlipidemia Hypertension COPD (chronic obstructive pulmonary disease) Asthma  Allergies:  No Known Allergies  Home meds:   ·	famotidine 20 mg oral tablet: 1 tab(s) orally once a day  ·	lisinopril 2.5 mg oral tablet: 1 tab(s) orally once a day  ·	montelukast 10 mg oral tablet: 1 tab(s) orally once a day  ·	predniSONE 50 mg oral tablet: 1 tab(s) orally once a day  ·	ProAir HFA CFC free 90 mcg/inh inhalation aerosol: 2 puff(s) inhaled 4 times a day PRN  ·	simvastatin 20 mg oral tablet: 1 tab(s) orally once a day (at bedtime)    PHYSICAL EXAMINATION  T(C): 36.7 (12-15 @ 17:52), Max: 37.3 (12-15 @ 10:00) HR: 110 (12-15 @ 19:00) (93 - 111) BP: 127/71 (12-15 @ 19:00) (102/70 - 151/73) RR: 16 (12-15 @ 19:00) (14 - 20) SpO2: 93% (12-15 @ 19:00) (81% - 97%)   NEUROLOGIC EXAMINATION:  Patient is  alert, oriented x2-3, CLEOPATRA, no facial droop, moving all 4s  GENERAL: not intubated, not in cardiorespiratory distress  EENT:  anicteric  CARDIOVASCULAR: (+) S1 S2, tachy rate and regular rhythm  PULMONARY: clear to auscultation bilaterally  ABDOMEN: soft, nontender with normoactive bowel sounds  EXTREMITIES: no edema  SKIN: no rash    LABS: 12-15  CAPILLARY BLOOD GLUCOSE 142 139 130 118     (12.11)  10.9 (10.9)  15.18 )-----------( 366      ( 15 Dec 2020 05:41 )             34.6     136  |  99  |  24<H>  ----------------------------<  125<H>  4.2   |  21<L>  |  0.68    Ca    9.4      15 Dec 2020 05:41  Phos  3.4     12-15  Mg     2.1     12-15    12-14 @ 07:01  -  12-15 @ 07:00  IN: 0 mL / OUT: 630 mL / NET: -630 mL    HbA1C = 6.7 (12-10) 6.4 ()  LDL = 112 ()   HDL = 66 ()  TG = 114 ()   TSH = 0.990 ()    Bacteriology:  CSF studies:  EEG:  Neuroimagin/10 CT head:  EVD placement, stable   CTA:  L pcomm aneurysm, L ICA (distal cavernous) aneurysm vs infundibulum, extensive calcified plaque cavernous bilateral ICA with mild to mod stenosis; thick plaque bilateral carotid bifurcations - mod to severe R and mild to mod L stenosis, focal calcified plaque R VA off subclavian artery - high grate stenosis / partial occlusion, upper lung bilateral opacification - pulmo edema, chronic deformity R humeral neck   CT head:  SAH, basilar cisterns, IV extension, obstructive hydrocephalus SVID, lacunar infarcts R caudate, both thalami, cerebellar hemispheres, no CT evidence of territorial infarction  Other imagin/11 LE Doppler: NEG   CT abd:  small paraesophageal hiatal hernia, gastritis; ?impaction, septal thickening bilateral lower lobes ?pulmo edema, hepatic steatosis    MEDICATIONS: 12-15  SQL 40 daily lacosamide 50mg PO BID ipratropium / albuterol q6h montelukast 10mg PO daily bisacodyl 5mg PO HS pantoprazole 40 daily polyethylene glycol 17G daily senna HS atorvastatin 40mgPO HS mod ISS Peridex cyanocobalamin 1000 PO daily ferrous sulfate 325mg PO daily     IV FLUIDS: IVL  DRIPS:  ·	norepinephrine Infusion 0.05 MICROgram(s)/kG/Min (4.68 mL/Hr) IV Continuous <Continuous>  DIET: CCD  Lines: R IJ  Drains:     EVD at 5cm H20   EVD at 5cm H20 ICP < 10  Wounds:    CODE STATUS:  Full Code                       GOALS OF CARE:  aggressive                      DISPOSITION:  ICU  NIHSS 2 HH3 MF4

## 2020-12-15 NOTE — PROGRESS NOTE ADULT - SUBJECTIVE AND OBJECTIVE BOX
HPI:  76y/o F with PMHx sig for COPD, asthma, HLD, HTN, BIBEMS to [facility] [place] from home after HHA discovered patient found down in floor covered in non-bloody vomitus. Perr HHA Pt went to the bathroom and was taking a long time, went in to check on her and found her sitting on floor, awake, however with vomitus on front of shirt. On arrival to [facility] [place], patient was taken for stat head CT, which revealed diffuse subarachnoid hemorrhage mainly at basilar cisterns with IVH and obstructive hydrocephalus. Patient was given a bolus of 1g keppra and dilaudid pushes for generalized headache. CTA concerning for 3mm left pcomm aneurysm and a 1.6mm outpouching of distal cavernous segment of the left internal carotid artery likely aneurysm. NIHSS2 HH3 MF4. Patient was transferred to [facility] for further intervention. Patient currently reports headaches. Pt denies acute changes in vision, seizures, CP, SOB, weakness/paresthesias of arms or legs. (09 Dec 2020 23:37)    Hospital Course:   Hospital Course:   12/9: BD1 Patient admitted for SAH HH3, MF4.   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 EVD at 5, on Levo overnight, nimodipine discontinued d/t hypotension  12/12: BD4 POD#2, VIVIEN overnight, neuro stable, passed TOV  12/13: BD5 POD#3: VIVIEN x 24 hrs  12/14: BD6 POD#4. VIVIEN o/n, neuro stable. EVD at 5cm H2O        Vital Signs Last 24 Hrs  T(C): 37 (15 Dec 2020 01:17), Max: 37.4 (14 Dec 2020 08:59)  T(F): 98.6 (15 Dec 2020 01:17), Max: 99.3 (14 Dec 2020 08:59)  HR: 100 (15 Dec 2020 03:00) (85 - 114)  BP: 143/62 (15 Dec 2020 03:00) (104/56 - 196/79)  BP(mean): 89 (15 Dec 2020 03:00) (70 - 117)  RR: 15 (15 Dec 2020 03:00) (14 - 20)  SpO2: 91% (15 Dec 2020 03:00) (91% - 100%)    I&O's Detail    13 Dec 2020 07:01  -  14 Dec 2020 07:00  --------------------------------------------------------  IN:    IV PiggyBack: 50 mL    Sodium Chloride 0.9% Bolus: 500 mL  Total IN: 550 mL    OUT:    External Ventricular Device (mL): 124 mL    Voided (mL): 2050 mL  Total OUT: 2174 mL    Total NET: -1624 mL      14 Dec 2020 07:01  -  15 Dec 2020 03:45  --------------------------------------------------------  IN:  Total IN: 0 mL    OUT:    External Ventricular Device (mL): 107 mL    Voided (mL): 500 mL  Total OUT: 607 mL    Total NET: -607 mL        I&O's Summary    13 Dec 2020 07:01  -  14 Dec 2020 07:00  --------------------------------------------------------  IN: 550 mL / OUT: 2174 mL / NET: -1624 mL    14 Dec 2020 07:01  -  15 Dec 2020 03:45  --------------------------------------------------------  IN: 0 mL / OUT: 607 mL / NET: -607 mL        PHYSICAL EXAM:  Neurological:    Motor exam:         [] Upper extremity              Bi(c5)  WE(c6)  EE(c7)   FF(c8)                                                R         5/5        5/5        5/5       5/5                                               L          5/5        5/5        5/5       5/5         [] Lower extremeity          HF(l2)   KE(l3)    TA(l4)   EHL(l5)  GS(s1)                                                 R        5/5        5/5        5/5       5/5         5/5                                               L         5/5        5/5       5/5       5/5          5/5                                                        [] warm well perfused; capillary refill <3 seconds     Sensation: [] intact to light touch  [] decreased:       Cardiovascular:  Respiratory:  Gastrointestinal:  Genitourinary:  Extremities:    Incision/Wound:    DEVICE/DRAIN DRESSING:    TUBES/LINES:  [] CVC  [] A-line  [] Lumbar Drain  [] Ventriculostomy  [] Other    DIET:  [] NPO  [] Mechanical  [] Tube feeds    LABS:                        10.9   12.11 )-----------( 351      ( 14 Dec 2020 06:37 )             34.9     12-14    137  |  98  |  15  ----------------------------<  150<H>  3.8   |  21<L>  |  0.69    Ca    9.0      14 Dec 2020 06:37  Phos  3.0     12-14  Mg     2.0     12-14              CAPILLARY BLOOD GLUCOSE      POCT Blood Glucose.: 118 mg/dL (14 Dec 2020 21:45)  POCT Blood Glucose.: 138 mg/dL (14 Dec 2020 19:07)  POCT Blood Glucose.: 160 mg/dL (14 Dec 2020 17:13)  POCT Blood Glucose.: 137 mg/dL (14 Dec 2020 12:12)  POCT Blood Glucose.: 146 mg/dL (14 Dec 2020 06:22)      Drug Levels: [] N/A    CSF Analysis: [] N/A      Allergies    No Known Allergies    Intolerances      MEDICATIONS:  Antibiotics:    Neuro:  acetaminophen   Tablet .. 650 milliGRAM(s) Oral every 6 hours PRN  lacosamide 50 milliGRAM(s) Oral two times a day    Anticoagulation:  enoxaparin Injectable 40 milliGRAM(s) SubCutaneous every 24 hours    OTHER:  albuterol/ipratropium for Nebulization. 3 milliLiter(s) Nebulizer every 6 hours  atorvastatin 40 milliGRAM(s) Oral at bedtime  chlorhexidine 2% Cloths 1 Application(s) Topical <User Schedule>  dextrose 40% Gel 15 Gram(s) Oral once  dextrose 50% Injectable 25 Gram(s) IV Push once  glucagon  Injectable 1 milliGRAM(s) IntraMuscular once  insulin lispro (ADMELOG) corrective regimen sliding scale   SubCutaneous Before meals and at bedtime  labetalol Injectable 10 milliGRAM(s) IV Push every 4 hours PRN  lidocaine 2% (Preservative-free) Injectable 10 milliLiter(s) Local Injection once  montelukast 10 milliGRAM(s) Oral daily  pantoprazole    Tablet 40 milliGRAM(s) Oral before breakfast  polyethylene glycol 3350 17 Gram(s) Oral daily  senna 2 Tablet(s) Oral at bedtime    IVF:  cyanocobalamin 1000 MICROGram(s) Oral daily  ferrous    sulfate 325 milliGRAM(s) Oral daily    CULTURES:    RADIOLOGY & ADDITIONAL TESTS:      ASSESSMENT:  75y Female s/p    HEADACHE    Handoff    Hyperlipidemia    Hypertension    COPD (chronic obstructive pulmonary disease)    Asthma    COPD (chronic obstructive pulmonary disease)    Asthma    SAH (subarachnoid hemorrhage)    SAH (subarachnoid hemorrhage)    Subarachnoid bleed    Angiogram, cerebral, with intracranial aneurysm embolization    No significant past surgical history    HEADACHE    90+    [email or web address]        PLAN:  NEURO:    CARDIOVASCULAR:    PULMONARY:    RENAL:    GI:    HEME:    ID:    ENDO:    DVT PROPHYLAXIS:  [] Venodynes                                [] Heparin/Lovenox    FALL RISK:  [] Low Risk                                    [] Impulsive    DISPOSITION:       Assessment:  Present when checked    []  GCS  E   V  M     Heart Failure: []Acute, [] acute on chronic , []chronic  Heart Failure:  [] Diastolic (HFpEF), [] Systolic (HFrEF), []Combined (HFpEF and HFrEF), [] RHF, [] Pulm HTN, [] Other    [] MICHAELLE, [] ATN, [] AIN, [] other  [] CKD1, [] CKD2, [] CKD 3, [] CKD 4, [] CKD 5, []ESRD    Encephalopathy: [] Metabolic, [] Hepatic, [] toxic, [] Neurological, [] Other    Abnormal Nurtitional Status: [] malnurtition (see nutrition note), [ ]underweight: BMI < 19, [] morbid obesity: BMI >40, [] Cachexia    [] Sepsis  [] hypovolemic shock,[] cardiogenic shock, [] hemorrhagic shock, [] neuogenic shock  [] Acute Respiratory Failure  []Cerebral edema, [] Brain compression/ herniation,   [] Functional quadriplegia  [] Acute blood loss anemia   HPI:  76y/o F with PMHx sig for COPD, asthma, HLD, HTN, BIBEMS to [facility] from home after HHA discovered patient found down in floor covered in non-bloody vomitus. Perr HHA Pt went to the bathroom and was taking a long time, went in to check on her and found her sitting on floor, awake, however with vomitus on front of shirt. On arrival to [facility], patient was taken for stat head CT, which revealed diffuse subarachnoid hemorrhage mainly at basilar cisterns with IVH and obstructive hydrocephalus. Patient was given a bolus of 1g keppra and dilaudid pushes for generalized headache. CTA concerning for 3mm left pcomm aneurysm and a 1.6mm outpouching of distal cavernous segment of the left internal carotid artery likely aneurysm. NIHSS2 HH3 MF4. Patient was transferred to [facility] for further intervention. Patient currently reports headaches. Pt denies acute changes in vision, seizures, CP, SOB, weakness/paresthesias of arms or legs. (09 Dec 2020 23:37)    Hospital Course:   Hospital Course:   12/9: BD1 Patient admitted for SAH HH3, MF4.   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 EVD at 5, on Levo overnight, nimodipine discontinued d/t hypotension  12/12: BD4 POD#2, VIVIEN overnight, neuro stable, passed TOV  12/13: BD5 POD#3: VIVIEN x 24 hrs  12/14: BD6 POD#4. VIVIEN o/n, neuro stable. EVD at 5cm H2O  12/15: BD7 POD #5 VIVIEN overnight, neuro stable. EVD remains at 5. Plan for CTA today.         Vital Signs Last 24 Hrs  T(C): 37 (15 Dec 2020 01:17), Max: 37.4 (14 Dec 2020 08:59)  T(F): 98.6 (15 Dec 2020 01:17), Max: 99.3 (14 Dec 2020 08:59)  HR: 100 (15 Dec 2020 03:00) (85 - 114)  BP: 143/62 (15 Dec 2020 03:00) (104/56 - 196/79)  BP(mean): 89 (15 Dec 2020 03:00) (70 - 117)  RR: 15 (15 Dec 2020 03:00) (14 - 20)  SpO2: 91% (15 Dec 2020 03:00) (91% - 100%)    I&O's Detail    13 Dec 2020 07:01  -  14 Dec 2020 07:00  --------------------------------------------------------  IN:    IV PiggyBack: 50 mL    Sodium Chloride 0.9% Bolus: 500 mL  Total IN: 550 mL    OUT:    External Ventricular Device (mL): 124 mL    Voided (mL): 2050 mL  Total OUT: 2174 mL    Total NET: -1624 mL      14 Dec 2020 07:01  -  15 Dec 2020 03:45  --------------------------------------------------------  IN:  Total IN: 0 mL    OUT:    External Ventricular Device (mL): 107 mL    Voided (mL): 500 mL  Total OUT: 607 mL    Total NET: -607 mL        I&O's Summary    13 Dec 2020 07:01  -  14 Dec 2020 07:00  --------------------------------------------------------  IN: 550 mL / OUT: 2174 mL / NET: -1624 mL    14 Dec 2020 07:01  -  15 Dec 2020 03:45  --------------------------------------------------------  IN: 0 mL / OUT: 607 mL / NET: -607 mL        PHYSICAL EXAM:  Gen: laying in hospital bed, NAD  Neurological: AA+Ox3, OE spont, FC  CN II-XII: PERRL, EOMI  Motor exam: MAEx4, 5/5 strength throughout  SILT in UE and LE b/l  Cardiovascular: RRR  Respiratory: CTAB  Gastrointestinal: soft, NT/ND  EVD site C/D/I    TUBES/LINES:  [] CVC  [] A-line  [] Lumbar Drain  [x] Ventriculostomy: EVD at 5  [] Other    DIET:  [] NPO  [x] Mechanical  [] Tube feeds    LABS:                        10.9   12.11 )-----------( 351      ( 14 Dec 2020 06:37 )             34.9     12-14    137  |  98  |  15  ----------------------------<  150<H>  3.8   |  21<L>  |  0.69    Ca    9.0      14 Dec 2020 06:37  Phos  3.0     12-14  Mg     2.0     12-14              CAPILLARY BLOOD GLUCOSE      POCT Blood Glucose.: 118 mg/dL (14 Dec 2020 21:45)  POCT Blood Glucose.: 138 mg/dL (14 Dec 2020 19:07)  POCT Blood Glucose.: 160 mg/dL (14 Dec 2020 17:13)  POCT Blood Glucose.: 137 mg/dL (14 Dec 2020 12:12)  POCT Blood Glucose.: 146 mg/dL (14 Dec 2020 06:22)      Drug Levels: [] N/A    CSF Analysis: [] N/A      Allergies    No Known Allergies    Intolerances      MEDICATIONS:  Antibiotics:    Neuro:  acetaminophen   Tablet .. 650 milliGRAM(s) Oral every 6 hours PRN  lacosamide 50 milliGRAM(s) Oral two times a day    Anticoagulation:  enoxaparin Injectable 40 milliGRAM(s) SubCutaneous every 24 hours    OTHER:  albuterol/ipratropium for Nebulization. 3 milliLiter(s) Nebulizer every 6 hours  atorvastatin 40 milliGRAM(s) Oral at bedtime  chlorhexidine 2% Cloths 1 Application(s) Topical <User Schedule>  dextrose 40% Gel 15 Gram(s) Oral once  dextrose 50% Injectable 25 Gram(s) IV Push once  glucagon  Injectable 1 milliGRAM(s) IntraMuscular once  insulin lispro (ADMELOG) corrective regimen sliding scale   SubCutaneous Before meals and at bedtime  labetalol Injectable 10 milliGRAM(s) IV Push every 4 hours PRN  lidocaine 2% (Preservative-free) Injectable 10 milliLiter(s) Local Injection once  montelukast 10 milliGRAM(s) Oral daily  pantoprazole    Tablet 40 milliGRAM(s) Oral before breakfast  polyethylene glycol 3350 17 Gram(s) Oral daily  senna 2 Tablet(s) Oral at bedtime    IVF:  cyanocobalamin 1000 MICROGram(s) Oral daily  ferrous    sulfate 325 milliGRAM(s) Oral daily    CULTURES:    RADIOLOGY & ADDITIONAL TESTS:      ASSESSMENT:  74 yo Female with PMHx of COPD, asthma, HLD, HTN, BIBEMS to [facility] from home after HHA found patient down on the floor covered in non-bloody vomitus. CTH reveals diffuse subarachnoid hemorrhage mainly at basilar cisterns with IVH and obstructive hydrocephalus, CTA shows 3mm left pcomm aneurysm, now s/p bedside right frontal EVD placement 12/10, s/p cerebral angiogram for coil embolization of left PCOMM aneurysm 12/10, NIHSS2 HH3 MF4      HEADACHE    Handoff    Hyperlipidemia    Hypertension    COPD (chronic obstructive pulmonary disease)    Asthma    COPD (chronic obstructive pulmonary disease)    Asthma    SAH (subarachnoid hemorrhage)    SAH (subarachnoid hemorrhage)    Subarachnoid bleed    Angiogram, cerebral, with intracranial aneurysm embolization    No significant past surgical history    HEADACHE    90+    SysAdmin_VisitLink        PLAN:  NEURO:  - neuro checks  - vitals checks  - pain control  - EVD at 5, monitor ICP and output  - cont Vimpat  - hold Nimodipine for hypotension    CARDIOVASCULAR:  - SBP goal 100-180  - cardiology following for takotsubo: repeat TTE before discharge, daily EKG    PULMONARY:  - on room air, no issues  - cont duonebs    RENAL:  - strict I's and O's  - limit fluids due to takotsubo    GI:  - regular diet  - bowel regimen    HEME:  - cont iron  - monitor H/H    ID:  - Afebrile    ENDO:  - ISS    DVT PROPHYLAXIS: [x] Venodynes   [x] Heparin/Lovenox      DISPOSITION: ICU status, full code, dispo pending    d/w Dr. Serrano       Assessment:  Present when checked    []  GCS  E   V  M     Heart Failure: []Acute, [] acute on chronic , []chronic  Heart Failure:  [] Diastolic (HFpEF), [] Systolic (HFrEF), []Combined (HFpEF and HFrEF), [] RHF, [] Pulm HTN, [] Other    [] MICHAELLE, [] ATN, [] AIN, [] other  [] CKD1, [] CKD2, [] CKD 3, [] CKD 4, [] CKD 5, []ESRD    Encephalopathy: [] Metabolic, [] Hepatic, [] toxic, [] Neurological, [] Other    Abnormal Nurtitional Status: [] malnurtition (see nutrition note), [ ]underweight: BMI < 19, [] morbid obesity: BMI >40, [] Cachexia    [] Sepsis  [] hypovolemic shock,[] cardiogenic shock, [] hemorrhagic shock, [] neuogenic shock  [] Acute Respiratory Failure  []Cerebral edema, [] Brain compression/ herniation,   [] Functional quadriplegia  [] Acute blood loss anemia

## 2020-12-15 NOTE — PROGRESS NOTE ADULT - SUBJECTIVE AND OBJECTIVE BOX
=================================  NEUROCRITICAL CARE ATTENDING NOTE  =================================    CARA CANCHOLA   MRN-[id number]  Summary:  75y/F with COPD, asthma, dyslipidemia Hypertension found down.  Brought to [facility] where imaging showed SAH basilar cisterns with IVH and obstructive hydrocephalus L Pcomm aneurysm.  Given levetiracetam, hydromorphone.  NIHSS 2 HH3 MF4, transferred to [facility] for further management.      COURSE IN THE HOSPITAL:   admitted to [facility], EVD inserted; given 20 lasix for pulmo edema, T inversion on CT  12/10 No significant events overnight.    No significant events overnight.    No significant events overnight.    No significant events overnight. drain stopped working at 730 a.m., off levophed    No significant events overnight.   12/15 CTA ___ then adjust cardiac meds as required ___ net negative secondary to cardiac ___    Past Medical History: Hyperlipidemia Hypertension COPD (chronic obstructive pulmonary disease) Asthma  Allergies:  No Known Allergies  Home meds:   ·	famotidine 20 mg oral tablet: 1 tab(s) orally once a day  ·	lisinopril 2.5 mg oral tablet: 1 tab(s) orally once a day  ·	montelukast 10 mg oral tablet: 1 tab(s) orally once a day  ·	predniSONE 50 mg oral tablet: 1 tab(s) orally once a day  ·	ProAir HFA CFC free 90 mcg/inh inhalation aerosol: 2 puff(s) inhaled 4 times a day PRN  ·	simvastatin 20 mg oral tablet: 1 tab(s) orally once a day (at bedtime)    Current Meds  MEDICATIONS  (STANDING):  albuterol/ipratropium for Nebulization. 3 milliLiter(s) Nebulizer every 6 hours  atorvastatin 40 milliGRAM(s) Oral at bedtime  cyanocobalamin 1000 MICROGram(s) Oral daily  enoxaparin Injectable 40 milliGRAM(s) SubCutaneous every 24 hours  ferrous    sulfate 325 milliGRAM(s) Oral daily  insulin lispro (ADMELOG) corrective regimen sliding scale   SubCutaneous Before meals and at bedtime  lacosamide 50 milliGRAM(s) Oral two times a day  lidocaine 2% (Preservative-free) Injectable 10 milliLiter(s) Local Injection once  montelukast 10 milliGRAM(s) Oral daily  pantoprazole    Tablet 40 milliGRAM(s) Oral before breakfast  polyethylene glycol 3350 17 Gram(s) Oral daily  senna 2 Tablet(s) Oral at bedtime  -low dose MTP as appropriate if neg CTA  -if VSP, low dose NE  -aim negative balance    MEDICATIONS  (PRN):  acetaminophen   Tablet .. 650 milliGRAM(s) Oral every 6 hours PRN Temp greater or equal to 38C (100.4F), Mild Pain (1 - 3)  labetalol Injectable 10 milliGRAM(s) IV Push every 4 hours PRN Systolic blood pressure > 180    PHYSICAL EXAMINATION         NEUROLOGIC EXAMINATION:  Patient is  alert, oriented x2-3, CLEOPATRA, no facial droop, moving all 4s  GENERAL: not intubated, not in cardiorespiratory distress  EENT:  anicteric  CARDIOVASCULAR: (+) S1 S2, normal rate and regular rhythm  PULMONARY: clear to auscultation bilaterally  ABDOMEN: soft, nontender with normoactive bowel sounds  EXTREMITIES: no edema  SKIN: no rash    LABS:         HbA1C = 6.7 (12-10) 6.4 ()  LDL = 112 ()   HDL = 66 ()  TG = 114 ()   TSH = 0.990 ()    Bacteriology:  CSF studies:  EEG:  Neuroimagin/10 CT head:  EVD placement, stable   CTA:  L pcomm aneurysm, L ICA (distal cavernous) aneurysm vs infundibulum, extensive calcified plaque cavernous bilateral ICA with mild to mod stenosis; thick plaque bilateral carotid bifurcations - mod to severe R and mild to mod L stenosis, focal calcified plaque R VA off subclavian artery - high grate stenosis / partial occlusion, upper lung bilateral opacification - pulmo edema, chronic deformity R humeral neck   CT head:  SAH, basilar cisterns, IV extension, obstructive hydrocephalus SVID, lacunar infarcts R caudate, both thalami, cerebellar hemispheres, no CT evidence of territorial infarction  Other imagin/11 LE Doppler: NEG   CT abd:  small paraesophageal hiatal hernia, gastritis; ?impaction, septal thickening bilateral lower lobes ?pulmo edema, hepatic steatosis    IV FLUIDS: IVL  DRIPS:  DIET: CCD  Lines: R IJ  Drains:     EVD at 5cm H20   EVD at 5cm H20 ICP < 10  Wounds:    CODE STATUS:  Full Code                       GOALS OF CARE:  aggressive                      DISPOSITION:  ICU  NIHSS 2 HH3 MF4 ==================================================   NEUROCRITICAL CARE ATTENDING NOTE (TUESDAY, DECEMBER 15, 2020)  ==================================================    CARA CANCHOLA   MRN-[id number]  Summary:  75y/F with COPD, asthma, dyslipidemia Hypertension found down.  Brought to [facility] where imaging showed SAH basilar cisterns with IVH and obstructive hydrocephalus L Pcomm aneurysm.  Given levetiracetam, hydromorphone.  NIHSS 2 HH3 MF4, transferred to [facility] for further management.      COURSE IN THE HOSPITAL:   admitted to [facility], EVD inserted; given 20 lasix for pulmo edema, T inversion on CT  12/10 No significant events overnight.    No significant events overnight.    No significant events overnight.    No significant events overnight. drain stopped working at 730 a.m., off levophed    No significant events overnight.   12/15 CTA ___ then adjust cardiac meds as required ___ net negative secondary to cardiac ___    Past Medical History: Hyperlipidemia Hypertension COPD (chronic obstructive pulmonary disease) Asthma  Allergies:  No Known Allergies  Home meds:   ·	famotidine 20 mg oral tablet: 1 tab(s) orally once a day  ·	lisinopril 2.5 mg oral tablet: 1 tab(s) orally once a day  ·	montelukast 10 mg oral tablet: 1 tab(s) orally once a day  ·	predniSONE 50 mg oral tablet: 1 tab(s) orally once a day  ·	ProAir HFA CFC free 90 mcg/inh inhalation aerosol: 2 puff(s) inhaled 4 times a day PRN  ·	simvastatin 20 mg oral tablet: 1 tab(s) orally once a day (at bedtime)    Current Meds  MEDICATIONS  (STANDING):  albuterol/ipratropium for Nebulization. 3 milliLiter(s) Nebulizer every 6 hours  atorvastatin 40 milliGRAM(s) Oral at bedtime  cyanocobalamin 1000 MICROGram(s) Oral daily  enoxaparin Injectable 40 milliGRAM(s) SubCutaneous every 24 hours  ferrous    sulfate 325 milliGRAM(s) Oral daily  insulin lispro (ADMELOG) corrective regimen sliding scale   SubCutaneous Before meals and at bedtime  lacosamide 50 milliGRAM(s) Oral two times a day  lidocaine 2% (Preservative-free) Injectable 10 milliLiter(s) Local Injection once  montelukast 10 milliGRAM(s) Oral daily  pantoprazole    Tablet 40 milliGRAM(s) Oral before breakfast  polyethylene glycol 3350 17 Gram(s) Oral daily  senna 2 Tablet(s) Oral at bedtime  -low dose MTP as appropriate if neg CTA  -if VSP, low dose NE  -aim negative balance    MEDICATIONS  (PRN):  acetaminophen   Tablet .. 650 milliGRAM(s) Oral every 6 hours PRN Temp greater or equal to 38C (100.4F), Mild Pain (1 - 3)  labetalol Injectable 10 milliGRAM(s) IV Push every 4 hours PRN Systolic blood pressure > 180    PHYSICAL EXAMINATION         NEUROLOGIC EXAMINATION:  Patient is  alert, oriented x2-3, CLEOPATRA, no facial droop, moving all 4s  GENERAL: not intubated, not in cardiorespiratory distress  EENT:  anicteric  CARDIOVASCULAR: (+) S1 S2, normal rate and regular rhythm  PULMONARY: clear to auscultation bilaterally  ABDOMEN: soft, nontender with normoactive bowel sounds  EXTREMITIES: no edema  SKIN: no rash    I/O's    20 @ 07:  -  20 @ 07:00  --------------------------------------------------------  IN: 550 mL / OUT: 2174 mL / NET: -1624 mL    20 @ 07:01  -  12-15-20 @ 06:33  --------------------------------------------------------  IN: 0 mL / OUT: 627 mL / NET: -627 mL    LABS:               10.9   15.18 )-----------( 366      ( 15 Dec 2020 05:41 )             34.6     -    137  |  98  |  15  ----------------------------<  150<H>  3.8   |  21<L>  |  0.69    Ca    9.0      14 Dec 2020 06:37  Phos  3.0       Mg     2.0         CAPILLARY BLOOD GLUCOSE    POCT Blood Glucose.: 130 mg/dL (15 Dec 2020 06:28)  POCT Blood Glucose.: 118 mg/dL (14 Dec 2020 21:45)  POCT Blood Glucose.: 138 mg/dL (14 Dec 2020 19:07)  POCT Blood Glucose.: 160 mg/dL (14 Dec 2020 17:13)  POCT Blood Glucose.: 137 mg/dL (14 Dec 2020 12:12)    HbA1C = 6.7 (12-10) 6.4 ()  LDL = 112 ()   HDL = 66 ()  TG = 114 ()   TSH = 0.990 ()    Bacteriology:  CSF studies:  EEG:  Neuroimagin/10 CT head:  EVD placement, stable   CTA:  L pcomm aneurysm, L ICA (distal cavernous) aneurysm vs infundibulum, extensive calcified plaque cavernous bilateral ICA with mild to mod stenosis; thick plaque bilateral carotid bifurcations - mod to severe R and mild to mod L stenosis, focal calcified plaque R VA off subclavian artery - high grate stenosis / partial occlusion, upper lung bilateral opacification - pulmo edema, chronic deformity R humeral neck   CT head:  SAH, basilar cisterns, IV extension, obstructive hydrocephalus SVID, lacunar infarcts R caudate, both thalami, cerebellar hemispheres, no CT evidence of territorial infarction  Other imagin/11 LE Doppler: NEG   CT abd:  small paraesophageal hiatal hernia, gastritis; ?impaction, septal thickening bilateral lower lobes ?pulmo edema, hepatic steatosis    IV FLUIDS: IVL  DRIPS:  DIET: CCD  Lines: R IJ  Drains:     EVD at 5cm H20   EVD at 5cm H20 ICP < 10  Wounds:    CODE STATUS:  Full Code                       GOALS OF CARE:  aggressive                      DISPOSITION:  ICU  NIHSS 2 3 4 ==================================================   NEUROCRITICAL CARE ATTENDING NOTE (TUESDAY, DECEMBER 15, 2020)  ==================================================    CARA CANCHOLA   MRN-[id number]  Summary:  75y/F with COPD, asthma, dyslipidemia Hypertension found down.  Brought to [facility] where imaging showed SAH basilar cisterns with IVH and obstructive hydrocephalus L Pcomm aneurysm.  Given levetiracetam, hydromorphone.  NIHSS 2 HH3 MF4, transferred to [facility] for further management.      COURSE IN THE HOSPITAL:   admitted to [facility], EVD inserted; given 20 lasix for pulmo edema, T inversion on CT  12/10 No significant events overnight.    No significant events overnight.    No significant events overnight.    No significant events overnight. drain stopped working at 730 a.m., off levophed    No significant events overnight.   12/15 Noted confusion, pending CTA    Past Medical History: Hyperlipidemia Hypertension COPD (chronic obstructive pulmonary disease) Asthma  Allergies:  No Known Allergies  Home meds:   ·	famotidine 20 mg oral tablet: 1 tab(s) orally once a day  ·	lisinopril 2.5 mg oral tablet: 1 tab(s) orally once a day  ·	montelukast 10 mg oral tablet: 1 tab(s) orally once a day  ·	predniSONE 50 mg oral tablet: 1 tab(s) orally once a day  ·	ProAir HFA CFC free 90 mcg/inh inhalation aerosol: 2 puff(s) inhaled 4 times a day PRN  ·	simvastatin 20 mg oral tablet: 1 tab(s) orally once a day (at bedtime)    Current Meds  MEDICATIONS  (STANDING):  albuterol/ipratropium for Nebulization. 3 milliLiter(s) Nebulizer every 6 hours  atorvastatin 40 milliGRAM(s) Oral at bedtime  bisacodyl 5 milliGRAM(s) Oral at bedtime  cyanocobalamin 1000 MICROGram(s) Oral daily  enoxaparin Injectable 40 milliGRAM(s) SubCutaneous every 24 hours  ferrous    sulfate 325 milliGRAM(s) Oral daily  glucagon  Injectable 1 milliGRAM(s) IntraMuscular once  insulin lispro (ADMELOG) corrective regimen sliding scale   SubCutaneous Before meals and at bedtime  lacosamide 50 milliGRAM(s) Oral two times a day  lidocaine 2% (Preservative-free) Injectable 10 milliLiter(s) Local Injection once  montelukast 10 milliGRAM(s) Oral daily  pantoprazole    Tablet 40 milliGRAM(s) Oral before breakfast  polyethylene glycol 3350 17 Gram(s) Oral daily  senna 2 Tablet(s) Oral at bedtime    MEDICATIONS  (PRN):  acetaminophen   Tablet .. 650 milliGRAM(s) Oral every 6 hours PRN Temp greater or equal to 38C (100.4F), Mild Pain (1 - 3)  labetalol Injectable 10 milliGRAM(s) IV Push every 4 hours PRN Systolic blood pressure > 180    PHYSICAL EXAMINATION    ICU Vital Signs Last 24 Hrs  T(C): 37.3 (15 Dec 2020 10:00), Max: 37.4 (14 Dec 2020 18:05)  T(F): 99.1 (15 Dec 2020 10:00), Max: 99.3 (14 Dec 2020 18:05)  HR: 105 (15 Dec 2020 12:00) (93 - 108)  BP: 128/62 (15 Dec 2020 12:00) (104/56 - 151/73)  BP(mean): 89 (15 Dec 2020 12:00) (70 - 118)  RR: 17 (15 Dec 2020 12:00) (14 - 20)  SpO2: 95% (15 Dec 2020 12:00) (91% - 100%)    NEUROLOGIC EXAMINATION:  alert, oriented x2-3, CLEOPATRA, EOMI, no facial droop, no pronator drift, no [place], follows simple commands X 4 bilaterally  GENERAL: not intubated, not in cardiorespiratory distress  EENT:  anicteric  CARDIOVASCULAR: (+) S1 S2, JAMIE  PULMONARY: clear to auscultation bilaterally  ABDOMEN: soft, nontender with normoactive bowel sounds  EXTREMITIES: no edema  SKIN: no rash    I/O's    20 @ 07:01  -  20 @ 07:00  --------------------------------------------------------  IN: 550 mL / OUT: 2174 mL / NET: -1624 mL    20 @ 07:01  -  12-15-20 @ 06:33  --------------------------------------------------------  IN: 0 mL / OUT: 627 mL / NET: -627 mL    LABS:               10.9   15.18 )-----------( 366      ( 15 Dec 2020 05:41 )             34.6     12-14    137  |  98  |  15  ----------------------------<  150<H>  3.8   |  21<L>  |  0.69    Ca    9.0      14 Dec 2020 06:37  Phos  3.0       Mg     2.0         CAPILLARY BLOOD GLUCOSE    POCT Blood Glucose.: 130 mg/dL (15 Dec 2020 06:28)  POCT Blood Glucose.: 118 mg/dL (14 Dec 2020 21:45)  POCT Blood Glucose.: 138 mg/dL (14 Dec 2020 19:07)  POCT Blood Glucose.: 160 mg/dL (14 Dec 2020 17:13)  POCT Blood Glucose.: 137 mg/dL (14 Dec 2020 12:12)    HbA1C = 6.7 (12-10) 6.4 ()  LDL = 112 ()   HDL = 66 ()  TG = 114 ()   TSH = 0.990 ()    Bacteriology:  CSF studies:  EEG:  Neuroimagin/10 CT head:  EVD placement, stable   CTA:  L pcomm aneurysm, L ICA (distal cavernous) aneurysm vs infundibulum, extensive calcified plaque cavernous bilateral ICA with mild to mod stenosis; thick plaque bilateral carotid bifurcations - mod to severe R and mild to mod L stenosis, focal calcified plaque R VA off subclavian artery - high grate stenosis / partial occlusion, upper lung bilateral opacification - pulmo edema, chronic deformity R humeral neck   CT head:  SAH, basilar cisterns, IV extension, obstructive hydrocephalus SVID, lacunar infarcts R caudate, both thalami, cerebellar hemispheres, no CT evidence of territorial infarction  Other imagin/11 LE Doppler: NEG   CT abd:  small paraesophageal hiatal hernia, gastritis; ?impaction, septal thickening bilateral lower lobes ?pulmo edema, hepatic steatosis    IV FLUIDS: IVL  DRIPS:  DIET: CCD  Lines: R IJ  Drains:     EVD at 5cm H20   EVD at 5cm H20 ICP < 10  12/15 EVD at 5cm H20 ICP 1-5, CSF 2-8 mL/h  Wounds:    CODE STATUS:  Full Code                       GOALS OF CARE:  aggressive                      DISPOSITION:  ICU  NIHSS 2 HH3 MF4 ==================================================   NEUROCRITICAL CARE ATTENDING NOTE (TUESDAY, DECEMBER 15, 2020)  ==================================================    CARA CANCHOLA   MRN-[id number]  Summary:  75y/F with COPD, asthma, dyslipidemia Hypertension found down.  Brought to [facility] where imaging showed SAH basilar cisterns with IVH and obstructive hydrocephalus L Pcomm aneurysm.  Given levetiracetam, hydromorphone.  NIHSS 2 HH3 MF4, transferred to [facility] for further management.      COURSE IN THE HOSPITAL:   admitted to [facility], EVD inserted; given 20 lasix for pulmo edema, T inversion on CT  12/10 No significant events overnight.    No significant events overnight.    No significant events overnight.    No significant events overnight. drain stopped working at 730 a.m., off levophed    No significant events overnight.   12/15 Noted confusion, pending CTA    Past Medical History: Hyperlipidemia Hypertension COPD (chronic obstructive pulmonary disease) Asthma  Allergies:  No Known Allergies  Home meds:   ·	famotidine 20 mg oral tablet: 1 tab(s) orally once a day  ·	lisinopril 2.5 mg oral tablet: 1 tab(s) orally once a day  ·	montelukast 10 mg oral tablet: 1 tab(s) orally once a day  ·	predniSONE 50 mg oral tablet: 1 tab(s) orally once a day  ·	ProAir HFA CFC free 90 mcg/inh inhalation aerosol: 2 puff(s) inhaled 4 times a day PRN  ·	simvastatin 20 mg oral tablet: 1 tab(s) orally once a day (at bedtime)    Current Meds  MEDICATIONS  (STANDING):  albuterol/ipratropium for Nebulization. 3 milliLiter(s) Nebulizer every 6 hours  atorvastatin 40 milliGRAM(s) Oral at bedtime  bisacodyl 5 milliGRAM(s) Oral at bedtime  cyanocobalamin 1000 MICROGram(s) Oral daily  enoxaparin Injectable 40 milliGRAM(s) SubCutaneous every 24 hours  ferrous    sulfate 325 milliGRAM(s) Oral daily  glucagon  Injectable 1 milliGRAM(s) IntraMuscular once  insulin lispro (ADMELOG) corrective regimen sliding scale   SubCutaneous Before meals and at bedtime  lacosamide 50 milliGRAM(s) Oral two times a day  lidocaine 2% (Preservative-free) Injectable 10 milliLiter(s) Local Injection once  montelukast 10 milliGRAM(s) Oral daily  pantoprazole    Tablet 40 milliGRAM(s) Oral before breakfast  polyethylene glycol 3350 17 Gram(s) Oral daily  senna 2 Tablet(s) Oral at bedtime    MEDICATIONS  (PRN):  acetaminophen   Tablet .. 650 milliGRAM(s) Oral every 6 hours PRN Temp greater or equal to 38C (100.4F), Mild Pain (1 - 3)  labetalol Injectable 10 milliGRAM(s) IV Push every 4 hours PRN Systolic blood pressure > 180    PHYSICAL EXAMINATION    ICU Vital Signs Last 24 Hrs  T(C): 37.3 (15 Dec 2020 10:00), Max: 37.4 (14 Dec 2020 18:05)  T(F): 99.1 (15 Dec 2020 10:00), Max: 99.3 (14 Dec 2020 18:05)  HR: 105 (15 Dec 2020 12:00) (93 - 108)  BP: 128/62 (15 Dec 2020 12:00) (104/56 - 151/73)  BP(mean): 89 (15 Dec 2020 12:00) (70 - 118)  RR: 17 (15 Dec 2020 12:00) (14 - 20)  SpO2: 95% (15 Dec 2020 12:00) (91% - 100%)    NEUROLOGIC EXAMINATION:  alert, oriented x2-3, CLEOPATRA, EOMI, no facial droop, no pronator drift, no [place], follows simple commands X 4 bilaterally  GENERAL: not intubated, not in cardiorespiratory distress  EENT:  anicteric  CARDIOVASCULAR: (+) S1 S2, JAMIE  PULMONARY: clear to auscultation bilaterally  ABDOMEN: soft, nontender with normoactive bowel sounds  EXTREMITIES: no edema  SKIN: no rash    I/O's    20 @ 07:01  -  20 @ 07:00  --------------------------------------------------------  IN: 550 mL / OUT: 2174 mL / NET: -1624 mL    20 @ 07:01  -  12-15-20 @ 06:33  --------------------------------------------------------  IN: 0 mL / OUT: 627 mL / NET: -627 mL    LABS:               10.9   15.18 )-----------( 366      ( 15 Dec 2020 05:41 )             34.6         137  |  98  |  15  ----------------------------<  150<H>  3.8   |  21<L>  |  0.69    Ca    9.0      14 Dec 2020 06:37  Phos  3.0       Mg     2.0         CAPILLARY BLOOD GLUCOSE    POCT Blood Glucose.: 130 mg/dL (15 Dec 2020 06:28)  POCT Blood Glucose.: 118 mg/dL (14 Dec 2020 21:45)  POCT Blood Glucose.: 138 mg/dL (14 Dec 2020 19:07)  POCT Blood Glucose.: 160 mg/dL (14 Dec 2020 17:13)  POCT Blood Glucose.: 137 mg/dL (14 Dec 2020 12:12)    HbA1C = 6.7 (12-10) 6.4 ()  LDL = 112 ()   HDL = 66 ()  TG = 114 ()   TSH = 0.990 ()    Bacteriology:  CSF studies:  EEG:  Neuroimagin/15 CTA head:  Multifocal moderate stenosis the right PCA. Mild stenosis of the left PCA which were present on prior CTA head and neck 2020 and therefore may reflect intracranial atherosclerosis rather than vasospasm.  New mild stenosis of the mid right M1 segment.  Interval increase in size of the lateral and third ventricles since prior CT head 2020.  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  12/10 CT head:  EVD placement, stable   CTA:  L pcomm aneurysm, L ICA (distal cavernous) aneurysm vs infundibulum, extensive calcified plaque cavernous bilateral ICA with mild to mod stenosis; thick plaque bilateral carotid bifurcations - mod to severe R and mild to mod L stenosis, focal calcified plaque R VA off subclavian artery - high grate stenosis / partial occlusion, upper lung bilateral opacification - pulmo edema, chronic deformity R humeral neck   CT head:  SAH, basilar cisterns, IV extension, obstructive hydrocephalus SVID, lacunar infarcts R caudate, both thalami, cerebellar hemispheres, no CT evidence of territorial infarction  Other imagin/11 LE Doppler: NEG   CT abd:  small paraesophageal hiatal hernia, gastritis; ?impaction, septal thickening bilateral lower lobes ?pulmo edema, hepatic steatosis    IV FLUIDS: IVL  DRIPS:  DIET: CCD  Lines: R IJ  Drains:     EVD at 5cm H20   EVD at 5cm H20 ICP < 10  12/15 EVD at 5cm H20 ICP 1-5, CSF 2-8 mL/h  Wounds:    CODE STATUS:  Full Code                       GOALS OF CARE:  aggressive                      DISPOSITION:  ICU  NIHSS 2 HH3 MF4

## 2020-12-15 NOTE — PROGRESS NOTE ADULT - ATTENDING COMMENTS
stress cardiomyopathy repeat echo prior to discharge and if ef still reduced will do CCTA  she is on lopressor IV  add losartan 25 mg po daily when allowable  on atorvastatin her cerebral plaque

## 2020-12-15 NOTE — PROGRESS NOTE ADULT - ASSESSMENT
75F w/PMHx COPD, Asthma, HTN, HLD a/w SAH w/IVH and obstructive hydrocephalus s/p EVD followed by cerebral angiogram for coil embolization of L pcomm aneurysm on 12/10. Cardiology following for newly diagnosed compensated HFrEF    #HFrEF: Ischemic vs Stress induced cardiomyopathy: Euvolemic, Normotensive, compensated  - Increase Toprol XL 50 mg po qd,   - c/w Losatan 25 mg po qd. Please space their dosing 4-6 hrs apart. Hold for SBP <110 and HR < 50  - IV labetalol PRN for additional BP control, HR is acceptable.   - Will require ischemic HAZEL with either CCTA or LHC prior to DC. Will also get a repeat TTE prior to DC now that GDMT has been initiated  - Replete electrolytes to maintain K>4, Mg>2  - Incentive Spirometry. Encourage patient to get out of bed.    Please refer to the cardiology attending addendum section for final recommendations  Thank you for allowing to participate in the care of this patient    DELORES Lance  Cardiology Fellow, PGY 7 75F w/PMHx COPD, Asthma, HTN, HLD a/w SAH w/IVH and obstructive hydrocephalus s/p EVD followed by cerebral angiogram for coil embolization of L pcomm aneurysm on 12/10. Cardiology following for newly diagnosed compensated HFrEF    #HFrEF: Ischemic vs Stress induced cardiomyopathy: Euvolemic, Normotensive, compensated  - If patient has cerebral vasospasm, it is reasonable to hold her ACE and BB to maintain higher cerebral perfusion pressures of SBP of 150  - Patient likely to receive Intra-cerebral verapamil if there is cerebral vasospasm. OK to use augment BP with low doses of norepinephrine if required  - Will require ischemic HAZEL with either CCTA or LHC prior to DC. Will also get a repeat TTE prior to DC now that GDMT has been initiated  - Replete electrolytes to maintain K>4, Mg>2  - Incentive Spirometry. Encourage patient to get out of bed.    Please refer to the cardiology attending addendum section for final recommendations  Thank you for allowing to participate in the care of this patient    DELORES Lance  Cardiology Fellow, PGY 7

## 2020-12-16 ENCOUNTER — RESULT REVIEW (OUTPATIENT)
Age: 75
End: 2020-12-16

## 2020-12-16 LAB
ANION GAP SERPL CALC-SCNC: 15 MMOL/L — SIGNIFICANT CHANGE UP (ref 5–17)
ANION GAP SERPL CALC-SCNC: 18 MMOL/L — HIGH (ref 5–17)
ANION GAP SERPL CALC-SCNC: 19 MMOL/L — HIGH (ref 5–17)
APPEARANCE CSF: SIGNIFICANT CHANGE UP
APPEARANCE CSF: SIGNIFICANT CHANGE UP
APPEARANCE SPUN FLD: ABNORMAL
APPEARANCE SPUN FLD: ABNORMAL
APPEARANCE UR: CLEAR — SIGNIFICANT CHANGE UP
APTT BLD: 81.3 SEC — HIGH (ref 27.5–35.5)
BACTERIA # UR AUTO: PRESENT /HPF
BILIRUB UR-MCNC: NEGATIVE — SIGNIFICANT CHANGE UP
BLD GP AB SCN SERPL QL: NEGATIVE — SIGNIFICANT CHANGE UP
BUN SERPL-MCNC: 21 MG/DL — SIGNIFICANT CHANGE UP (ref 7–23)
BUN SERPL-MCNC: 29 MG/DL — HIGH (ref 7–23)
BUN SERPL-MCNC: 31 MG/DL — HIGH (ref 7–23)
CALCIUM SERPL-MCNC: 6.9 MG/DL — LOW (ref 8.4–10.5)
CALCIUM SERPL-MCNC: 8.8 MG/DL — SIGNIFICANT CHANGE UP (ref 8.4–10.5)
CALCIUM SERPL-MCNC: 9.2 MG/DL — SIGNIFICANT CHANGE UP (ref 8.4–10.5)
CHLORIDE SERPL-SCNC: 112 MMOL/L — HIGH (ref 96–108)
CHLORIDE SERPL-SCNC: 97 MMOL/L — SIGNIFICANT CHANGE UP (ref 96–108)
CHLORIDE SERPL-SCNC: 98 MMOL/L — SIGNIFICANT CHANGE UP (ref 96–108)
CO2 SERPL-SCNC: 16 MMOL/L — LOW (ref 22–31)
CO2 SERPL-SCNC: 18 MMOL/L — LOW (ref 22–31)
CO2 SERPL-SCNC: 19 MMOL/L — LOW (ref 22–31)
COLOR CSF: ABNORMAL
COLOR CSF: ABNORMAL
COLOR SPEC: YELLOW — SIGNIFICANT CHANGE UP
COMMENT - URINE: SIGNIFICANT CHANGE UP
CREAT SERPL-MCNC: 0.69 MG/DL — SIGNIFICANT CHANGE UP (ref 0.5–1.3)
CREAT SERPL-MCNC: 0.71 MG/DL — SIGNIFICANT CHANGE UP (ref 0.5–1.3)
CREAT SERPL-MCNC: 0.79 MG/DL — SIGNIFICANT CHANGE UP (ref 0.5–1.3)
CSF COMMENTS: SIGNIFICANT CHANGE UP
CSF COMMENTS: SIGNIFICANT CHANGE UP
DIFF PNL FLD: ABNORMAL
EPI CELLS # UR: SIGNIFICANT CHANGE UP /HPF (ref 0–5)
GLUCOSE CSF-MCNC: 87 MG/DL — HIGH (ref 40–70)
GLUCOSE CSF-MCNC: 91 MG/DL — HIGH (ref 40–70)
GLUCOSE SERPL-MCNC: 133 MG/DL — HIGH (ref 70–99)
GLUCOSE SERPL-MCNC: 134 MG/DL — HIGH (ref 70–99)
GLUCOSE SERPL-MCNC: 153 MG/DL — HIGH (ref 70–99)
GLUCOSE UR QL: NEGATIVE — SIGNIFICANT CHANGE UP
GRAM STN FLD: SIGNIFICANT CHANGE UP
GRAM STN FLD: SIGNIFICANT CHANGE UP
HCT VFR BLD CALC: 34.3 % — LOW (ref 34.5–45)
HCT VFR BLD CALC: 38.3 % — SIGNIFICANT CHANGE UP (ref 34.5–45)
HGB BLD-MCNC: 10.7 G/DL — LOW (ref 11.5–15.5)
HGB BLD-MCNC: 11.8 G/DL — SIGNIFICANT CHANGE UP (ref 11.5–15.5)
INR BLD: 1.21 — HIGH (ref 0.88–1.16)
KETONES UR-MCNC: 40 MG/DL
LEUKOCYTE ESTERASE UR-ACNC: NEGATIVE — SIGNIFICANT CHANGE UP
LYMPHOCYTES # CSF: 3 % — LOW (ref 40–80)
LYMPHOCYTES # CSF: 3 % — LOW (ref 40–80)
MAGNESIUM SERPL-MCNC: 2.4 MG/DL — SIGNIFICANT CHANGE UP (ref 1.6–2.6)
MCHC RBC-ENTMCNC: 27.2 PG — SIGNIFICANT CHANGE UP (ref 27–34)
MCHC RBC-ENTMCNC: 27.2 PG — SIGNIFICANT CHANGE UP (ref 27–34)
MCHC RBC-ENTMCNC: 30.8 GM/DL — LOW (ref 32–36)
MCHC RBC-ENTMCNC: 31.2 GM/DL — LOW (ref 32–36)
MCV RBC AUTO: 87.3 FL — SIGNIFICANT CHANGE UP (ref 80–100)
MCV RBC AUTO: 88.2 FL — SIGNIFICANT CHANGE UP (ref 80–100)
MONOS+MACROS NFR CSF: 1 % — LOW (ref 15–45)
MONOS+MACROS NFR CSF: 2 % — LOW (ref 15–45)
NEUTROPHILS # CSF: 5 % — SIGNIFICANT CHANGE UP (ref 0–6)
NEUTROPHILS # CSF: 5 % — SIGNIFICANT CHANGE UP (ref 0–6)
NITRITE UR-MCNC: NEGATIVE — SIGNIFICANT CHANGE UP
NRBC # BLD: 0 /100 WBCS — SIGNIFICANT CHANGE UP (ref 0–0)
NRBC # BLD: 0 /100 WBCS — SIGNIFICANT CHANGE UP (ref 0–0)
NRBC NFR CSF: 10 /UL — HIGH (ref 0–5)
NRBC NFR CSF: 9 /UL — HIGH (ref 0–5)
PH UR: 5.5 — SIGNIFICANT CHANGE UP (ref 5–8)
PHOSPHATE SERPL-MCNC: 3 MG/DL — SIGNIFICANT CHANGE UP (ref 2.5–4.5)
PLATELET # BLD AUTO: 261 K/UL — SIGNIFICANT CHANGE UP (ref 150–400)
PLATELET # BLD AUTO: 412 K/UL — HIGH (ref 150–400)
POTASSIUM SERPL-MCNC: 3.7 MMOL/L — SIGNIFICANT CHANGE UP (ref 3.5–5.3)
POTASSIUM SERPL-MCNC: 3.8 MMOL/L — SIGNIFICANT CHANGE UP (ref 3.5–5.3)
POTASSIUM SERPL-MCNC: 3.9 MMOL/L — SIGNIFICANT CHANGE UP (ref 3.5–5.3)
POTASSIUM SERPL-SCNC: 3.7 MMOL/L — SIGNIFICANT CHANGE UP (ref 3.5–5.3)
POTASSIUM SERPL-SCNC: 3.8 MMOL/L — SIGNIFICANT CHANGE UP (ref 3.5–5.3)
POTASSIUM SERPL-SCNC: 3.9 MMOL/L — SIGNIFICANT CHANGE UP (ref 3.5–5.3)
PROT CSF-MCNC: 12 MG/DL — LOW (ref 15–45)
PROT CSF-MCNC: 29 MG/DL — SIGNIFICANT CHANGE UP (ref 15–45)
PROT UR-MCNC: NEGATIVE MG/DL — SIGNIFICANT CHANGE UP
PROTHROM AB SERPL-ACNC: 14.4 SEC — HIGH (ref 10.6–13.6)
RBC # BLD: 3.93 M/UL — SIGNIFICANT CHANGE UP (ref 3.8–5.2)
RBC # BLD: 4.34 M/UL — SIGNIFICANT CHANGE UP (ref 3.8–5.2)
RBC # CSF: 8800 /UL — HIGH (ref 0–0)
RBC # CSF: HIGH /UL (ref 0–0)
RBC # FLD: 15.7 % — HIGH (ref 10.3–14.5)
RBC # FLD: 16.2 % — HIGH (ref 10.3–14.5)
RBC CASTS # UR COMP ASSIST: < 5 /HPF — SIGNIFICANT CHANGE UP
RH IG SCN BLD-IMP: POSITIVE — SIGNIFICANT CHANGE UP
SODIUM SERPL-SCNC: 134 MMOL/L — LOW (ref 135–145)
SODIUM SERPL-SCNC: 135 MMOL/L — SIGNIFICANT CHANGE UP (ref 135–145)
SODIUM SERPL-SCNC: 143 MMOL/L — SIGNIFICANT CHANGE UP (ref 135–145)
SP GR SPEC: 1.02 — SIGNIFICANT CHANGE UP (ref 1–1.03)
SPECIMEN SOURCE: SIGNIFICANT CHANGE UP
SPECIMEN SOURCE: SIGNIFICANT CHANGE UP
TUBE TYPE: SIGNIFICANT CHANGE UP
TUBE TYPE: SIGNIFICANT CHANGE UP
UROBILINOGEN FLD QL: 0.2 E.U./DL — SIGNIFICANT CHANGE UP
VIT B12 SERPL-MCNC: 1259 PG/ML — HIGH (ref 232–1245)
WBC # BLD: 15.91 K/UL — HIGH (ref 3.8–10.5)
WBC # BLD: 18.67 K/UL — HIGH (ref 3.8–10.5)
WBC # FLD AUTO: 15.91 K/UL — HIGH (ref 3.8–10.5)
WBC # FLD AUTO: 18.67 K/UL — HIGH (ref 3.8–10.5)
WBC UR QL: < 5 /HPF — SIGNIFICANT CHANGE UP

## 2020-12-16 PROCEDURE — 71045 X-RAY EXAM CHEST 1 VIEW: CPT | Mod: 26,76

## 2020-12-16 PROCEDURE — 36245 INS CATH ABD/L-EXT ART 1ST: CPT | Mod: GC

## 2020-12-16 PROCEDURE — 70498 CT ANGIOGRAPHY NECK: CPT | Mod: 26

## 2020-12-16 PROCEDURE — 99291 CRITICAL CARE FIRST HOUR: CPT | Mod: 24

## 2020-12-16 PROCEDURE — 75710 ARTERY X-RAYS ARM/LEG: CPT | Mod: 26,59,GC

## 2020-12-16 PROCEDURE — 35372 RECHANNELING OF ARTERY: CPT | Mod: GC

## 2020-12-16 PROCEDURE — 71045 X-RAY EXAM CHEST 1 VIEW: CPT | Mod: 26,77

## 2020-12-16 PROCEDURE — 88304 TISSUE EXAM BY PATHOLOGIST: CPT | Mod: 26

## 2020-12-16 PROCEDURE — 36415 COLL VENOUS BLD VENIPUNCTURE: CPT

## 2020-12-16 PROCEDURE — 36225 PLACE CATH SUBCLAVIAN ART: CPT | Mod: LT

## 2020-12-16 PROCEDURE — 99024 POSTOP FOLLOW-UP VISIT: CPT

## 2020-12-16 PROCEDURE — 99292 CRITICAL CARE ADDL 30 MIN: CPT

## 2020-12-16 PROCEDURE — 36600 WITHDRAWAL OF ARTERIAL BLOOD: CPT

## 2020-12-16 PROCEDURE — 35371 RECHANNELING OF ARTERY: CPT | Mod: 59,GC

## 2020-12-16 PROCEDURE — 70496 CT ANGIOGRAPHY HEAD: CPT | Mod: 26

## 2020-12-16 PROCEDURE — 36223 PLACE CATH CAROTID/INOM ART: CPT | Mod: 50

## 2020-12-16 RX ORDER — LACOSAMIDE 50 MG/1
100 TABLET ORAL
Refills: 0 | Status: DISCONTINUED | OUTPATIENT
Start: 2020-12-17 | End: 2020-12-18

## 2020-12-16 RX ORDER — ACETAMINOPHEN 500 MG
650 TABLET ORAL EVERY 6 HOURS
Refills: 0 | Status: DISCONTINUED | OUTPATIENT
Start: 2020-12-16 | End: 2020-12-24

## 2020-12-16 RX ORDER — LACOSAMIDE 50 MG/1
200 TABLET ORAL ONCE
Refills: 0 | Status: DISCONTINUED | OUTPATIENT
Start: 2020-12-16 | End: 2020-12-16

## 2020-12-16 RX ORDER — SODIUM CHLORIDE 9 MG/ML
250 INJECTION INTRAMUSCULAR; INTRAVENOUS; SUBCUTANEOUS ONCE
Refills: 0 | Status: COMPLETED | OUTPATIENT
Start: 2020-12-16 | End: 2020-12-16

## 2020-12-16 RX ORDER — PROPOFOL 10 MG/ML
5 INJECTION, EMULSION INTRAVENOUS
Qty: 1000 | Refills: 0 | Status: DISCONTINUED | OUTPATIENT
Start: 2020-12-16 | End: 2020-12-17

## 2020-12-16 RX ORDER — PIPERACILLIN AND TAZOBACTAM 4; .5 G/20ML; G/20ML
3.38 INJECTION, POWDER, LYOPHILIZED, FOR SOLUTION INTRAVENOUS EVERY 6 HOURS
Refills: 0 | Status: DISCONTINUED | OUTPATIENT
Start: 2020-12-16 | End: 2020-12-21

## 2020-12-16 RX ORDER — POTASSIUM CHLORIDE 20 MEQ
40 PACKET (EA) ORAL ONCE
Refills: 0 | Status: COMPLETED | OUTPATIENT
Start: 2020-12-16 | End: 2020-12-16

## 2020-12-16 RX ORDER — CEFTRIAXONE 500 MG/1
INJECTION, POWDER, FOR SOLUTION INTRAMUSCULAR; INTRAVENOUS
Refills: 0 | Status: DISCONTINUED | OUTPATIENT
Start: 2020-12-16 | End: 2020-12-16

## 2020-12-16 RX ORDER — VANCOMYCIN HCL 1 G
1000 VIAL (EA) INTRAVENOUS EVERY 12 HOURS
Refills: 0 | Status: DISCONTINUED | OUTPATIENT
Start: 2020-12-16 | End: 2020-12-16

## 2020-12-16 RX ORDER — SODIUM CHLORIDE 9 MG/ML
2 INJECTION INTRAMUSCULAR; INTRAVENOUS; SUBCUTANEOUS EVERY 12 HOURS
Refills: 0 | Status: DISCONTINUED | OUTPATIENT
Start: 2020-12-16 | End: 2020-12-17

## 2020-12-16 RX ORDER — SODIUM CHLORIDE 9 MG/ML
1000 INJECTION INTRAMUSCULAR; INTRAVENOUS; SUBCUTANEOUS
Refills: 0 | Status: DISCONTINUED | OUTPATIENT
Start: 2020-12-16 | End: 2020-12-17

## 2020-12-16 RX ORDER — PIPERACILLIN AND TAZOBACTAM 4; .5 G/20ML; G/20ML
3.38 INJECTION, POWDER, LYOPHILIZED, FOR SOLUTION INTRAVENOUS ONCE
Refills: 0 | Status: COMPLETED | OUTPATIENT
Start: 2020-12-16 | End: 2020-12-16

## 2020-12-16 RX ORDER — SODIUM CHLORIDE 5 G/100ML
500 INJECTION, SOLUTION INTRAVENOUS
Refills: 0 | Status: DISCONTINUED | OUTPATIENT
Start: 2020-12-16 | End: 2020-12-16

## 2020-12-16 RX ORDER — SODIUM CHLORIDE 9 MG/ML
500 INJECTION INTRAMUSCULAR; INTRAVENOUS; SUBCUTANEOUS ONCE
Refills: 0 | Status: COMPLETED | OUTPATIENT
Start: 2020-12-16 | End: 2020-12-16

## 2020-12-16 RX ORDER — LACOSAMIDE 50 MG/1
100 TABLET ORAL
Refills: 0 | Status: DISCONTINUED | OUTPATIENT
Start: 2020-12-16 | End: 2020-12-16

## 2020-12-16 RX ORDER — CEFTRIAXONE 500 MG/1
1000 INJECTION, POWDER, FOR SOLUTION INTRAMUSCULAR; INTRAVENOUS ONCE
Refills: 0 | Status: COMPLETED | OUTPATIENT
Start: 2020-12-16 | End: 2020-12-16

## 2020-12-16 RX ORDER — CHLORHEXIDINE GLUCONATE 213 G/1000ML
15 SOLUTION TOPICAL EVERY 12 HOURS
Refills: 0 | Status: DISCONTINUED | OUTPATIENT
Start: 2020-12-16 | End: 2020-12-17

## 2020-12-16 RX ORDER — VANCOMYCIN HCL 1 G
750 VIAL (EA) INTRAVENOUS ONCE
Refills: 0 | Status: COMPLETED | OUTPATIENT
Start: 2020-12-16 | End: 2020-12-16

## 2020-12-16 RX ORDER — FLUDROCORTISONE ACETATE 0.1 MG/1
0.1 TABLET ORAL EVERY 24 HOURS
Refills: 0 | Status: DISCONTINUED | OUTPATIENT
Start: 2020-12-16 | End: 2020-12-19

## 2020-12-16 RX ORDER — NOREPINEPHRINE BITARTRATE/D5W 8 MG/250ML
0.05 PLASTIC BAG, INJECTION (ML) INTRAVENOUS
Qty: 8 | Refills: 0 | Status: DISCONTINUED | OUTPATIENT
Start: 2020-12-16 | End: 2020-12-17

## 2020-12-16 RX ORDER — VANCOMYCIN HCL 1 G
750 VIAL (EA) INTRAVENOUS EVERY 24 HOURS
Refills: 0 | Status: DISCONTINUED | OUTPATIENT
Start: 2020-12-17 | End: 2020-12-19

## 2020-12-16 RX ORDER — VANCOMYCIN HCL 1 G
VIAL (EA) INTRAVENOUS
Refills: 0 | Status: DISCONTINUED | OUTPATIENT
Start: 2020-12-16 | End: 2020-12-19

## 2020-12-16 RX ADMIN — SODIUM CHLORIDE 40 MILLILITER(S): 9 INJECTION INTRAMUSCULAR; INTRAVENOUS; SUBCUTANEOUS at 23:22

## 2020-12-16 RX ADMIN — Medication 2: at 07:54

## 2020-12-16 RX ADMIN — Medication 3 MILLILITER(S): at 21:30

## 2020-12-16 RX ADMIN — SODIUM CHLORIDE 1000 MILLILITER(S): 9 INJECTION INTRAMUSCULAR; INTRAVENOUS; SUBCUTANEOUS at 09:43

## 2020-12-16 RX ADMIN — SODIUM CHLORIDE 15 MILLILITER(S): 5 INJECTION, SOLUTION INTRAVENOUS at 04:03

## 2020-12-16 RX ADMIN — PIPERACILLIN AND TAZOBACTAM 200 GRAM(S): 4; .5 INJECTION, POWDER, LYOPHILIZED, FOR SOLUTION INTRAVENOUS at 13:00

## 2020-12-16 RX ADMIN — PIPERACILLIN AND TAZOBACTAM 200 GRAM(S): 4; .5 INJECTION, POWDER, LYOPHILIZED, FOR SOLUTION INTRAVENOUS at 23:08

## 2020-12-16 RX ADMIN — Medication 3 MILLILITER(S): at 13:39

## 2020-12-16 RX ADMIN — SODIUM CHLORIDE 2 GRAM(S): 9 INJECTION INTRAMUSCULAR; INTRAVENOUS; SUBCUTANEOUS at 09:42

## 2020-12-16 RX ADMIN — POLYETHYLENE GLYCOL 3350 17 GRAM(S): 17 POWDER, FOR SOLUTION ORAL at 12:04

## 2020-12-16 RX ADMIN — SODIUM CHLORIDE 250 MILLILITER(S): 9 INJECTION INTRAMUSCULAR; INTRAVENOUS; SUBCUTANEOUS at 14:50

## 2020-12-16 RX ADMIN — Medication 3 MILLILITER(S): at 05:42

## 2020-12-16 RX ADMIN — Medication 650 MILLIGRAM(S): at 13:00

## 2020-12-16 RX ADMIN — CEFTRIAXONE 100 MILLIGRAM(S): 500 INJECTION, POWDER, FOR SOLUTION INTRAMUSCULAR; INTRAVENOUS at 09:42

## 2020-12-16 RX ADMIN — CHLORHEXIDINE GLUCONATE 1 APPLICATION(S): 213 SOLUTION TOPICAL at 07:30

## 2020-12-16 RX ADMIN — FLUDROCORTISONE ACETATE 0.1 MILLIGRAM(S): 0.1 TABLET ORAL at 09:42

## 2020-12-16 RX ADMIN — SENNA PLUS 2 TABLET(S): 8.6 TABLET ORAL at 22:57

## 2020-12-16 RX ADMIN — ATORVASTATIN CALCIUM 40 MILLIGRAM(S): 80 TABLET, FILM COATED ORAL at 22:57

## 2020-12-16 RX ADMIN — Medication 10 MILLIGRAM(S): at 21:25

## 2020-12-16 RX ADMIN — SODIUM CHLORIDE 500 MILLILITER(S): 9 INJECTION INTRAMUSCULAR; INTRAVENOUS; SUBCUTANEOUS at 21:45

## 2020-12-16 RX ADMIN — Medication 250 MILLIGRAM(S): at 14:49

## 2020-12-16 RX ADMIN — PREGABALIN 1000 MICROGRAM(S): 225 CAPSULE ORAL at 12:04

## 2020-12-16 RX ADMIN — MONTELUKAST 10 MILLIGRAM(S): 4 TABLET, CHEWABLE ORAL at 12:04

## 2020-12-16 RX ADMIN — PROPOFOL 1.5 MICROGRAM(S)/KG/MIN: 10 INJECTION, EMULSION INTRAVENOUS at 23:16

## 2020-12-16 RX ADMIN — Medication 10 MILLIGRAM(S): at 12:04

## 2020-12-16 RX ADMIN — ENOXAPARIN SODIUM 40 MILLIGRAM(S): 100 INJECTION SUBCUTANEOUS at 22:57

## 2020-12-16 RX ADMIN — Medication 650 MILLIGRAM(S): at 11:53

## 2020-12-16 NOTE — CHART NOTE - NSCHARTNOTEFT_GEN_A_CORE
Admitting Diagnosis:   Patient is a 75y old  Female who presents with a chief complaint of SAH (16 Dec 2020 03:03)    PAST MEDICAL & SURGICAL HISTORY:  Hyperlipidemia    Hypertension    COPD (chronic obstructive pulmonary disease)    Asthma    No significant past surgical history    Current Nutrition Order: CSTCHO diet with Glucerna TID (660 kcal, 30gm protein)    PO Intake: Good (%) [   ]  Fair (50-75%) [   ] Poor (<25%) [   ]-N/A    GI Issues: no N/V noted, no BM during admission (ordered for dulcolax, miralax, senna), abdomen soft/nontender/nondistended per flow sheet    Pain: none apparent at this time    Skin Integrity: Greg score 13    Labs:   12-16    135  |  98  |  29<H>  ----------------------------<  134<H>  3.9   |  18<L>  |  0.71    Ca    8.8      16 Dec 2020 05:31  Phos  3.0     12-16  Mg     2.4     12-16    CAPILLARY BLOOD GLUCOSE  POCT Blood Glucose.: 156 mg/dL (16 Dec 2020 07:45)  POCT Blood Glucose.: 134 mg/dL (15 Dec 2020 22:32)  POCT Blood Glucose.: 142 mg/dL (15 Dec 2020 16:47)  POCT Blood Glucose.: 139 mg/dL (15 Dec 2020 11:32)    Medications:  MEDICATIONS  (STANDING):  albuterol/ipratropium for Nebulization. 3 milliLiter(s) Nebulizer every 6 hours  atorvastatin 40 milliGRAM(s) Oral at bedtime  bisacodyl 5 milliGRAM(s) Oral at bedtime  chlorhexidine 2% Cloths 1 Application(s) Topical <User Schedule>  cyanocobalamin 1000 MICROGram(s) Oral daily  dextrose 40% Gel 15 Gram(s) Oral once  dextrose 50% Injectable 25 Gram(s) IV Push once  enoxaparin Injectable 40 milliGRAM(s) SubCutaneous every 24 hours  ferrous    sulfate 325 milliGRAM(s) Oral daily  glucagon  Injectable 1 milliGRAM(s) IntraMuscular once  insulin lispro (ADMELOG) corrective regimen sliding scale   SubCutaneous Before meals and at bedtime  lacosamide 50 milliGRAM(s) Oral two times a day  lidocaine 2% (Preservative-free) Injectable 10 milliLiter(s) Local Injection once  montelukast 10 milliGRAM(s) Oral daily  norepinephrine Infusion 0.05 MICROgram(s)/kG/Min (4.68 mL/Hr) IV Continuous <Continuous>  pantoprazole    Tablet 40 milliGRAM(s) Oral before breakfast  polyethylene glycol 3350 17 Gram(s) Oral daily  potassium chloride   Solution 40 milliEquivalent(s) Oral once  senna 2 Tablet(s) Oral at bedtime  sodium chloride 3%. 500 milliLiter(s) (15 mL/Hr) IV Continuous <Continuous>    MEDICATIONS  (PRN):  acetaminophen   Tablet .. 650 milliGRAM(s) Oral every 6 hours PRN Temp greater or equal to 38C (100.4F), Mild Pain (1 - 3)  labetalol Injectable 10 milliGRAM(s) IV Push every 4 hours PRN Systolic blood pressure > 180    Weight:  Daily     Daily     Weight Change:     Nutrition Focused Physical Exam: Completed [   ]  Not Pertinent [   ]  Muscle Wasting- Temporal [   ]  Clavicle/Pectoral [   ]  Shoulder/Deltoid [   ]  Scapula [   ]  Interosseous [   ]  Quadriceps [   ]  Gastrocnemius [   ]  Fat Wasting- Orbital [   ]  Buccal [   ]  Triceps [   ]  Rib [   ]  Suspect [PCM] 2/2 to physical assessment, [poor intake], and [wt loss]; please see malnutrition chart note.    Anthropometric Measurements:    Weight Assessment:  · Height for BMI (FEET)	4 Feet  · Height for BMI (INCHES)	11 Inch(s)  · Height for BMI (CENTIMETERS)	149.86 Centimeter(s)  · Weight for BMI (lbs)	110 lb  · Weight for BMI (kg)	49.9 kg  · Body Mass Index	22.2  · Ideal Body Weight (lbs)	98  · Ideal Body Weight (kg)	44.4    Nutrition Prescription:   Estimated Energy Needs:  ABW used to calculate energy needs due to pt's current body weight within % IBW (112%). Needs adjusted for age, post-op. Aim for higher end of kcal range. Fluid needs per team.  · Weight (lbs)	110 lb  · Weight (kg)	49.8 kg  · Enter From (mulu/kg)	20  · Enter To (mulu/kg)	25  · Calculated From (mulu/kg)	996  · Calculated To (mulu/kg)	1245     Estimated Protein Needs:  · Weight (lbs)	110 lb  · Weight (kg)	49.8 kg  · Enter From (g/kg)	1.2  · Enter To (g/kg)	1.4  · Calculated From (g/kg)	59.76  · Calculated To (g/kg)	69.72    Subjective: 74 yo Female with PMHx of COPD, asthma, HLD, HTN, BIBEMS to [facility] from home after HHA found patient down on the floor covered in non-bloody vomitus. CTH reveals diffuse subarachnoid hemorrhage mainly at basilar cisterns with IVH and obstructive hydrocephalus, CTA shows 3mm left pcomm aneurysm, now s/p bedside right frontal EVD placement 12/10, s/p cerebral angiogram for coil embolization of left PCOMM aneurysm 12/10, NIHSS2 HH3 MF4. EVD at 0.    Pt seen resting in bed, noted to be hard of hearing. Satting 90-94% on room air. Infusions running: 3% at 15mL/hr, Levophed. MAP 99.     Previous Nutrition Diagnosis:  Food & Nutrition Related Knowledge Deficit RT organ dysfunction AEB possibly new dx diabetes, A1c 6.7%  Active [   ]  Resolved [   ]    Goal: Pt to state 2 main concepts from diet education by f/u    Recommendations:    Education:     Risk Level: High [   ] Moderate [   ] Low [   ] Admitting Diagnosis:   Patient is a 75y old  Female who presents with a chief complaint of SAH (16 Dec 2020 03:03)    PAST MEDICAL & SURGICAL HISTORY:  Hyperlipidemia    Hypertension    COPD (chronic obstructive pulmonary disease)    Asthma    No significant past surgical history    Current Nutrition Order: CSTCHO diet with Glucerna TID (660 kcal, 30gm protein)    PO Intake: Good (%) [   ]  Fair (50-75%) [   ] Poor (<25%) [   ]-N/A    GI Issues: no N/V noted, no BM during admission (ordered for dulcolax, miralax, senna), abdomen soft/nontender/nondistended per flow sheet    Pain: none apparent at this time    Skin Integrity: Greg score 13    Labs:   12-16    135  |  98  |  29<H>  ----------------------------<  134<H>  3.9   |  18<L>  |  0.71    Ca    8.8      16 Dec 2020 05:31  Phos  3.0     12-16  Mg     2.4     12-16    CAPILLARY BLOOD GLUCOSE  POCT Blood Glucose.: 156 mg/dL (16 Dec 2020 07:45)  POCT Blood Glucose.: 134 mg/dL (15 Dec 2020 22:32)  POCT Blood Glucose.: 142 mg/dL (15 Dec 2020 16:47)  POCT Blood Glucose.: 139 mg/dL (15 Dec 2020 11:32)    Medications:  MEDICATIONS  (STANDING):  albuterol/ipratropium for Nebulization. 3 milliLiter(s) Nebulizer every 6 hours  atorvastatin 40 milliGRAM(s) Oral at bedtime  bisacodyl 5 milliGRAM(s) Oral at bedtime  chlorhexidine 2% Cloths 1 Application(s) Topical <User Schedule>  cyanocobalamin 1000 MICROGram(s) Oral daily  dextrose 40% Gel 15 Gram(s) Oral once  dextrose 50% Injectable 25 Gram(s) IV Push once  enoxaparin Injectable 40 milliGRAM(s) SubCutaneous every 24 hours  ferrous    sulfate 325 milliGRAM(s) Oral daily  glucagon  Injectable 1 milliGRAM(s) IntraMuscular once  insulin lispro (ADMELOG) corrective regimen sliding scale   SubCutaneous Before meals and at bedtime  lacosamide 50 milliGRAM(s) Oral two times a day  lidocaine 2% (Preservative-free) Injectable 10 milliLiter(s) Local Injection once  montelukast 10 milliGRAM(s) Oral daily  norepinephrine Infusion 0.05 MICROgram(s)/kG/Min (4.68 mL/Hr) IV Continuous <Continuous>  pantoprazole    Tablet 40 milliGRAM(s) Oral before breakfast  polyethylene glycol 3350 17 Gram(s) Oral daily  potassium chloride   Solution 40 milliEquivalent(s) Oral once  senna 2 Tablet(s) Oral at bedtime  sodium chloride 3%. 500 milliLiter(s) (15 mL/Hr) IV Continuous <Continuous>    MEDICATIONS  (PRN):  acetaminophen   Tablet .. 650 milliGRAM(s) Oral every 6 hours PRN Temp greater or equal to 38C (100.4F), Mild Pain (1 - 3)  labetalol Injectable 10 milliGRAM(s) IV Push every 4 hours PRN Systolic blood pressure > 180    Weight:  49.9kg (12/9)    Weight Change: no updated weights, please obtain current weight and trend bi-weekly weights    Nutrition Focused Physical Exam: Completed [   ]  Not Pertinent [ X ]    Anthropometric Measurements:    Weight Assessment:  · Height for BMI (FEET)	4 Feet  · Height for BMI (INCHES)	11 Inch(s)  · Height for BMI (CENTIMETERS)	149.86 Centimeter(s)  · Weight for BMI (lbs)	110 lb  · Weight for BMI (kg)	49.9 kg  · Body Mass Index	22.2  · Ideal Body Weight (lbs)	98  · Ideal Body Weight (kg)	44.4    Nutrition Prescription:   Estimated Energy Needs:  ABW used to calculate energy needs due to pt's current body weight within % IBW (112%). Needs adjusted for age, post-op. Aim for higher end of kcal range. Fluid needs per team.  · Weight (lbs)	110 lb  · Weight (kg)	49.8 kg  · Enter From (mulu/kg)	20  · Enter To (mulu/kg)	25  · Calculated From (mulu/kg)	996  · Calculated To (mulu/kg)	1245     Estimated Protein Needs:  · Weight (lbs)	110 lb  · Weight (kg)	49.8 kg  · Enter From (g/kg)	1.2  · Enter To (g/kg)	1.4  · Calculated From (g/kg)	59.76  · Calculated To (g/kg)	69.72    Subjective: 76 yo Female with PMHx of COPD, asthma, HLD, HTN, BIBEMS to [facility] from home after HHA found patient down on the floor covered in non-bloody vomitus. CTH reveals diffuse subarachnoid hemorrhage mainly at basilar cisterns with IVH and obstructive hydrocephalus, CTA shows 3mm left pcomm aneurysm, now s/p bedside right frontal EVD placement 12/10, s/p cerebral angiogram for coil embolization of left PCOMM aneurysm 12/10, NIHSS2 HH3 MF4. EVD at 0.     Pt seen resting in bed, noted to be hard of hearing. Satting 90-94% on room air. Infusions running: Levophed. MAP 99. Plan to give salt tabs via NGT per RN, Na goal unclear at this time, current level=135. Altered mental status per RN. Failed RN dysphagia screen yesterday after CTA, NGT placed this AM for medications and TF. Please see below for full nutritional recommendations- d/w team. RD to monitor and f/u per protocol.    Previous Nutrition Diagnosis:  Food & Nutrition Related Knowledge Deficit RT organ dysfunction AEB possibly new dx diabetes, A1c 6.7%  Active [   ]  Resolved [   ]-N/A    PES: Inadequate energy intake RT inability to safely swallow with current mental status AEB pt failed RN dysphagia screen 12/15    Goal: Meet >/=75%EER via most appropriate route with good tolerance within 24-48h    Recommendations:  1. As medically appropriate, recommend Jevity 1.2 start at 20mL/hr increase 10-20mL q4h or as tolerated to goal of 40mL/hr x24h with Prostat SF 1x/day via NGT (960mL TV, 1252 kcal, 68gm protein, 774mL free water, 96% RDI,     Education:     Risk Level: High [   ] Moderate [   ] Low [   ] Admitting Diagnosis:   Patient is a 75y old  Female who presents with a chief complaint of SAH (16 Dec 2020 03:03)    PAST MEDICAL & SURGICAL HISTORY:  Hyperlipidemia    Hypertension    COPD (chronic obstructive pulmonary disease)    Asthma    No significant past surgical history    Current Nutrition Order: CSTCHO diet with Glucerna TID (660 kcal, 30gm protein)- pt not eating, NGT placed this AM and plan to start TF    PO Intake: Good (%) [   ]  Fair (50-75%) [   ] Poor (<25%) [   ]-N/A    GI Issues: no vomiting noted, unable to assess for nausea 2/2 clinical status, no BM during admission (ordered for dulcolax, miralax, senna), abdomen soft/nontender/nondistended per flow sheet    Pain: none apparent at this time    Skin Integrity: Greg score 13    Labs:   12-16    135  |  98  |  29<H>  ----------------------------<  134<H>  3.9   |  18<L>  |  0.71    Ca    8.8      16 Dec 2020 05:31  Phos  3.0     12-16  Mg     2.4     12-16    CAPILLARY BLOOD GLUCOSE  POCT Blood Glucose.: 156 mg/dL (16 Dec 2020 07:45)  POCT Blood Glucose.: 134 mg/dL (15 Dec 2020 22:32)  POCT Blood Glucose.: 142 mg/dL (15 Dec 2020 16:47)  POCT Blood Glucose.: 139 mg/dL (15 Dec 2020 11:32)    Medications:  MEDICATIONS  (STANDING):  albuterol/ipratropium for Nebulization. 3 milliLiter(s) Nebulizer every 6 hours  atorvastatin 40 milliGRAM(s) Oral at bedtime  bisacodyl 5 milliGRAM(s) Oral at bedtime  chlorhexidine 2% Cloths 1 Application(s) Topical <User Schedule>  cyanocobalamin 1000 MICROGram(s) Oral daily  dextrose 40% Gel 15 Gram(s) Oral once  dextrose 50% Injectable 25 Gram(s) IV Push once  enoxaparin Injectable 40 milliGRAM(s) SubCutaneous every 24 hours  ferrous    sulfate 325 milliGRAM(s) Oral daily  glucagon  Injectable 1 milliGRAM(s) IntraMuscular once  insulin lispro (ADMELOG) corrective regimen sliding scale   SubCutaneous Before meals and at bedtime  lacosamide 50 milliGRAM(s) Oral two times a day  lidocaine 2% (Preservative-free) Injectable 10 milliLiter(s) Local Injection once  montelukast 10 milliGRAM(s) Oral daily  norepinephrine Infusion 0.05 MICROgram(s)/kG/Min (4.68 mL/Hr) IV Continuous <Continuous>  pantoprazole    Tablet 40 milliGRAM(s) Oral before breakfast  polyethylene glycol 3350 17 Gram(s) Oral daily  potassium chloride   Solution 40 milliEquivalent(s) Oral once  senna 2 Tablet(s) Oral at bedtime  sodium chloride 3%. 500 milliLiter(s) (15 mL/Hr) IV Continuous <Continuous>    MEDICATIONS  (PRN):  acetaminophen   Tablet .. 650 milliGRAM(s) Oral every 6 hours PRN Temp greater or equal to 38C (100.4F), Mild Pain (1 - 3)  labetalol Injectable 10 milliGRAM(s) IV Push every 4 hours PRN Systolic blood pressure > 180    Weight:  49.9kg (12/9)    Weight Change: no updated weights, please obtain current weight and trend bi-weekly weights    Nutrition Focused Physical Exam: Completed [   ]  Not Pertinent [ X ]    Anthropometric Measurements:    Weight Assessment:  · Height for BMI (FEET)	4 Feet  · Height for BMI (INCHES)	11 Inch(s)  · Height for BMI (CENTIMETERS)	149.86 Centimeter(s)  · Weight for BMI (lbs)	110 lb  · Weight for BMI (kg)	49.9 kg  · Body Mass Index	22.2  · Ideal Body Weight (lbs)	98  · Ideal Body Weight (kg)	44.4    Nutrition Prescription:   Estimated Energy Needs:  ABW used to calculate energy needs due to pt's current body weight within % IBW (112%). Needs adjusted for age, post-op. Aim for higher end of kcal range. Fluid needs per team.  · Weight (lbs)	110 lb  · Weight (kg)	49.8 kg  · Enter From (mulu/kg)	20  · Enter To (mulu/kg)	25  · Calculated From (mulu/kg)	996  · Calculated To (mulu/kg)	1245     Estimated Protein Needs:  · Weight (lbs)	110 lb  · Weight (kg)	49.8 kg  · Enter From (g/kg)	1.2  · Enter To (g/kg)	1.4  · Calculated From (g/kg)	59.76  · Calculated To (g/kg)	69.72    Subjective: 74 yo Female with PMHx of COPD, asthma, HLD, HTN, BIBEMS to [facility] from home after HHA found patient down on the floor covered in non-bloody vomitus. CTH reveals diffuse subarachnoid hemorrhage mainly at basilar cisterns with IVH and obstructive hydrocephalus, CTA shows 3mm left pcomm aneurysm, now s/p bedside right frontal EVD placement 12/10, s/p cerebral angiogram for coil embolization of left PCOMM aneurysm 12/10, NIHSS2 HH3 MF4. EVD at 0.     Pt seen resting in bed, noted to be hard of hearing. Satting 90-94% on room air. Infusions running: Levophed. MAP 99. Plan to give salt tabs via NGT per RN, Na goal unclear at this time, current level=135. Altered mental status per RN. Failed RN dysphagia screen yesterday after CTA, NGT placed this AM for medications and TF. Please see below for full nutritional recommendations- d/w team. RD to monitor and f/u per protocol.    Previous Nutrition Diagnosis:  Food & Nutrition Related Knowledge Deficit RT organ dysfunction AEB possibly new dx diabetes, A1c 6.7%  Active [   ]  Resolved [   ]-N/A    PES: Inadequate energy intake RT inability to safely swallow with current mental status AEB pt failed RN dysphagia screen 12/15    Goal: Meet >/=75%EER via most appropriate route with good tolerance within 24-48h    Recommendations:  1. As medically appropriate, recommend Jevity 1.2 start at 20mL/hr increase 10-20mL q4h or as tolerated to goal of 40mL/hr x24h with Prostat SF 1x/day via NGT (960mL TV, 1252 kcal, 68gm protein, 774mL free water, 96% RDI, ~25 kcal/kg ABW, ~1.4gm protein/kg ABW)  >>water flushes per team  >>order for volume based feeding protocol, monitor tolerance, maintain aspiration precautions  2. Monitor labs (BMP, lytes, [id number]; replete lytes prn  3. Obtain current weight and trend bi-weekly weights  4. BG control/insulin regimen per team    Education: N/A 2/2 clinical status    Risk Level: High [ X ] Moderate [   ] Low [   ]

## 2020-12-16 NOTE — PROGRESS NOTE ADULT - ASSESSMENT
75y/Female with:  aneursymal subarachnoid hemorrhage, L pcomm aneurysm, L parophthalmic aneurysm; brain compression, cerebral edema  osbtructive hydrocephalus  lacunar infarcts R caudate, B thalami, cerebellar hemispheres ?artery to artery vs cardioembolic?  atherosclerotic cardiovascular disease; mod to severe bilateral ICA stenosis (R>L), R VA stenosis  Hypertension dyslipidemia   COPD asthma  newly diagnosed Diabetes Mellitus?  troponin leak    PLAN: Day 1 = 12-09 ; Day 4 =  12/12; Day 21 = 12/29  NEURO: neurochecks q1h, PRN pain meds with Tylenol / Percocet  CTA today, if VSP present (mild right M1 VSP), low dose norepinephrine +/- IA verapamil  SAH:  nimodipine discontinued due to pressor requirements  Hydrocephalus:  EVD 5 cm H20 open to drain, monitor ICPs  Seizure prophylaxis (cortical ICH, associated SDH, unclear etiology):  lacosamide 50mg PO BID   REHAB:  physical therapy evaluation and management    EARLY MOB:  HOB elevated    PULM:  Room air, incentive spirometry, continue montelukast, ipratropium / albuterol PRN   CARDIO:  SBP goal 100-180mm Hg, EF 35%, repeat TTE as per Cardiology (once outside VSP window, will start MTP/ARB)  ENDO:  Blood sugar goals 140-180 mg/dL, cont insulin sliding scale, atorvastatin 40mg   GI:  PPI for GI prophylaxis (home meds); bowel regimen  DIET: CCD  RENAL: maintain euvolemia, accurate Is and Os  HEM/ONC: Hb stable, iron profile c/w iron deficiency, iron FeSO4 325 TID; FOBT  VTE Prophylaxis: SCDs, SQL  ID: afebrile, leukocytosis  Social: will update family    CORE MEASURES  1.  Nath and Jacobo Score = 3  2.  VTE prophylaxis:  [ ] administered within 24 hours of admission OR [X] reason not done: fresh bleed, unsecured aneurysm.  3.  Dysphagia screening:   [X] performed before any oral meds / liquids / food  4.  Nimodipine treatment:  [X] administered within 24 hours of admission OR [ ] reason not done:    ATTENDING ATTESTATION:  I was physically present for the key portions of the evaluation and management (E/M) service provided.  I agree with the above history, physical and plan, which I have reviewed and edited where appropriate.    Patient at high risk for neurological deterioration or death due to:  ICU delirium, aspiration PNA, DVT / PE.  Critical care time:  I have personally provided 45 minutes of critical care time, excluding time spent on separate procedures.      Plan discussed with RN, house staff.   75y/Female with:  aneursymal subarachnoid hemorrhage, L pcomm aneurysm, L parophthalmic aneurysm; brain compression, cerebral edema  osbtructive hydrocephalus  lacunar infarcts R caudate, B thalami, cerebellar hemispheres ?artery to artery vs cardioembolic?  atherosclerotic cardiovascular disease; mod to severe bilateral ICA stenosis (R>L), R VA stenosis  Hypertension dyslipidemia   COPD asthma  newly diagnosed Diabetes Mellitus?  troponin leak    PLAN: Day 1 = 12-09 ; Day 4 =  12/12; Day 21 = 12/29  NEURO: neurochecks q1h, PRN pain meds with Tylenol / Percocet  CTA repeat today - lack of response yesterday with BP augmentation with norepinephrine infusion - may require IA verapamil  SAH:  nimodipine discontinued due to pressor requirements  Hydrocephalus:  EVD 0 cm H20 open to drain, monitor ICPs  Seizure prophylaxis (cortical ICH, associated SDH, unclear etiology):  lacosamide 50mg PO BID  - vEEG  REHAB:  physical therapy evaluation and management    EARLY MOB:  HOB elevated    PULM:  Room air, incentive spirometry, continue montelukast, ipratropium / albuterol PRN   CARDIO:  SBP goal 100-180mm Hg, EF 35%, repeat TTE as per Cardiology (once outside VSP window, will start MTP/ARB)  ENDO:  Blood sugar goals 140-180 mg/dL, cont insulin sliding scale, atorvastatin 40mg   GI:  PPI for GI prophylaxis (home meds); bowel regimen  DIET: CCD  RENAL: IV NS 40 mL/h  HEM/ONC: Hb stable, iron profile c/w iron deficiency, iron FeSO4 325 TID; FOBT  VTE Prophylaxis: SCDs, SQL  ID: febrile, leukocytosis, pancultures negative so far, empiric vancomycin, piperacillin-tazobactam started  Social: will update family    CORE MEASURES  1.  Nath and Jacobo Score = 3  2.  VTE prophylaxis:  [ ] administered within 24 hours of admission OR [X] reason not done: fresh bleed, unsecured aneurysm.  3.  Dysphagia screening:   [X] performed before any oral meds / liquids / food  4.  Nimodipine treatment:  [X] administered within 24 hours of admission OR [ ] reason not done:    ATTENDING ATTESTATION:  I was physically present for the key portions of the evaluation and management (E/M) service provided.  I agree with the above history, physical and plan, which I have reviewed and edited where appropriate.    Patient at high risk for neurological deterioration or death due to:  ICU delirium, aspiration PNA, DVT / PE.  Critical care time:  I have personally provided 45 minutes of critical care time, excluding time spent on separate procedures.      Plan discussed with RN, house staff.   75y/Female with:  aneursymal subarachnoid hemorrhage, L pcomm aneurysm, L parophthalmic aneurysm; brain compression, cerebral edema  osbtructive hydrocephalus  lacunar infarcts R caudate, B thalami, cerebellar hemispheres ?artery to artery vs cardioembolic?  atherosclerotic cardiovascular disease; mod to severe bilateral ICA stenosis (R>L), R VA stenosis  Hypertension dyslipidemia   COPD asthma  newly diagnosed Diabetes Mellitus?  troponin leak    PLAN: Day 1 = 12-09 ; Day 4 =  12/12; Day 21 = 12/29  NEURO: neurochecks q1h, PRN pain meds with Tylenol / Percocet  CTA repeat today - lack of response yesterday with BP augmentation with norepinephrine infusion - may require IA verapamil  SAH:  nimodipine discontinued due to pressor requirements  Hydrocephalus:  EVD 0 cm H20 open to drain, monitor ICPs  Seizure prophylaxis (cortical ICH, associated SDH, unclear etiology):  lacosamide 200 mg load, increase dose to 100 mg BID (multifocal spikes, no epileptiform activity)  REHAB:  physical therapy evaluation and management    EARLY MOB:  HOB elevated    PULM:  Room air, incentive spirometry, continue montelukast, ipratropium / albuterol PRN   CARDIO:  SBP goal 100-180mm Hg, EF 35%, repeat TTE as per Cardiology (once outside VSP window, will start MTP/ARB)  ENDO:  Blood sugar goals 140-180 mg/dL, cont insulin sliding scale, atorvastatin 40mg   GI:  PPI for GI prophylaxis (home meds); bowel regimen  DIET: CCD  RENAL: IV NS 40 mL/h  HEM/ONC: Hb stable, iron profile c/w iron deficiency, iron FeSO4 325 TID; FOBT  VTE Prophylaxis: SCDs, SQL  ID: febrile, leukocytosis, pancultures negative so far, empiric vancomycin, piperacillin-tazobactam started  Social: will update family    CORE MEASURES  1.  Nath and Jacobo Score = 3  2.  VTE prophylaxis:  [ ] administered within 24 hours of admission OR [X] reason not done: fresh bleed, unsecured aneurysm.  3.  Dysphagia screening:   [X] performed before any oral meds / liquids / food  4.  Nimodipine treatment:  [X] administered within 24 hours of admission OR [ ] reason not done:    ATTENDING ATTESTATION:  I was physically present for the key portions of the evaluation and management (E/M) service provided.  I agree with the above history, physical and plan, which I have reviewed and edited where appropriate.    Patient at high risk for neurological deterioration or death due to:  ICU delirium, aspiration PNA, DVT / PE.  Critical care time:  I have personally provided 45 minutes of critical care time, excluding time spent on separate procedures.      Plan discussed with RN, house staff.

## 2020-12-16 NOTE — PROGRESS NOTE ADULT - SUBJECTIVE AND OBJECTIVE BOX
Cardiology fellow Progress note    Subjective/Interval events: Patient febrile o/n with a Tmax of 101. Unclear source. Started on broad spectrum Abx. Not requiring pressors at this time.     ROS: A 10-point review of systems was otherwise negative.    ICU Vital Signs Last 24 Hrs  T(C): 38.5 (16 Dec 2020 11:41), Max: 38.9 (15 Dec 2020 19:00)  T(F): 101.3 (16 Dec 2020 11:41), Max: 102.1 (15 Dec 2020 19:00)  HR: 104 (16 Dec 2020 12:00) (86 - 116)  BP: 161/69 (16 Dec 2020 12:00) (125/90 - 173/77)  BP(mean): 99 (16 Dec 2020 12:00) (87 - 124)  ABP: 92/57 (16 Dec 2020 12:00) (84/57 - 209/201)  ABP(mean): 73 (16 Dec 2020 12:00) (69 - 206)  RR: 25 (16 Dec 2020 12:00) (16 - 34)  SpO2: 94% (16 Dec 2020 12:00) (81% - 98%)    GEN: lethargic, non-responsive to commands or painful stimuli  HEENT: Mucosa moist. No JVD.   RESP: b/l crackles. No Inspiratory effort  CV: tachycardic normal s1/s2. No m/r/g.  ABD: Soft, NTND. BS+  EXT: Warm. No edema, PP 2+    I&O's Summary    15 Dec 2020 07:01  -  16 Dec 2020 07:00  --------------------------------------------------------  IN: 86.6 mL / OUT: 1123 mL / NET: -1036.4 mL    16 Dec 2020 07:01  -  16 Dec 2020 13:56  --------------------------------------------------------  IN: 1107.2 mL / OUT: 320 mL / NET: 787.2 mL    Cardiac Diagnostics  TELEMETRY: Intermittent sinus tachycardia 	    ECG(12/14/20): NSR, LVH. Diffuse TWI (Wellen's sign) improving since 12/12.   	  TTE 12/10: mod LVSD (EF 35%) w/akinesis of mid-myocardial and apical segments and preserved contractility in basal segments, consistent w/Takotsubo stress CM    CXR: Clear lungs

## 2020-12-16 NOTE — PROGRESS NOTE ADULT - ASSESSMENT
75F w/PMHx COPD, Asthma, HTN, HLD a/w SAH w/IVH and obstructive hydrocephalus s/p EVD followed by cerebral angiogram for coil embolization of L pcomm aneurysm on 12/10. 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   - Will also get a repeat TTE prior to DC to assess changes in LV function  - c.w lipitor 40 mg for cerebral artery plaque  - Replete electrolytes to maintain K>4, Mg>2  - Incentive Spirometry. Encourage patient to get out of bed.    Incomplete Note with preliminary recs. Please refer to Attending addendum for final recs  Thank you for allowing to participate in the care of this patient    DELORES Lance  Cardiology Fellow, PGY 7

## 2020-12-16 NOTE — PROGRESS NOTE ADULT - SUBJECTIVE AND OBJECTIVE BOX
=================================  NEUROCRITICAL CARE ATTENDING NOTE  =================================    CARA CANCHOLA   MRN-[id number]  Summary:  75y/F with COPD, asthma, dyslipidemia Hypertension found down.  Brought to [facility] where imaging showed SAH basilar cisterns with IVH and obstructive hydrocephalus L Pcomm aneurysm.  Given levetiracetam, hydromorphone.  NIHSS 2 HH3 MF4, transferred to [facility] for further management.      COURSE IN THE HOSPITAL:   admitted to [facility], EVD inserted; given 20 lasix for pulmo edema, T inversion on CT  12/10 No significant events overnight.    No significant events overnight.    No significant events overnight.    No significant events overnight. drain stopped working at 730 a.m., off levophed    No significant events overnight.   12/15 confusion    Past Medical History: Hyperlipidemia Hypertension COPD (chronic obstructive pulmonary disease) Asthma  Allergies:  No Known Allergies  Home meds:   ·	famotidine 20 mg oral tablet: 1 tab(s) orally once a day  ·	lisinopril 2.5 mg oral tablet: 1 tab(s) orally once a day  ·	montelukast 10 mg oral tablet: 1 tab(s) orally once a day  ·	predniSONE 50 mg oral tablet: 1 tab(s) orally once a day  ·	ProAir HFA CFC free 90 mcg/inh inhalation aerosol: 2 puff(s) inhaled 4 times a day PRN  ·	simvastatin 20 mg oral tablet: 1 tab(s) orally once a day (at bedtime)    PHYSICAL EXAMINATION  T(C): 36.7 (12-15 @ 17:52), Max: 37.3 (12-15 @ 10:00) HR: 110 (12-15 @ 19:00) (93 - 111) BP: 127/71 (12-15 @ 19:00) (102/70 - 151/73) RR: 16 (12-15 @ 19:00) (14 - 20) SpO2: 93% (12-15 @ 19:00) (81% - 97%)   NEUROLOGIC EXAMINATION:  Patient is  alert, oriented x2-3, CLEOPATRA, no facial droop, moving all 4s  GENERAL: not intubated, not in cardiorespiratory distress  EENT:  anicteric  CARDIOVASCULAR: (+) S1 S2, tachy rate and regular rhythm  PULMONARY: clear to auscultation bilaterally  ABDOMEN: soft, nontender with normoactive bowel sounds  EXTREMITIES: no edema  SKIN: no rash    LABS: 12-15  CAPILLARY BLOOD GLUCOSE 142 139 130 118     (12.11)  10.9 (10.9)  15.18 )-----------( 366      ( 15 Dec 2020 05:41 )             34.6     136  |  99  |  24<H>  ----------------------------<  125<H>  4.2   |  21<L>  |  0.68    Ca    9.4      15 Dec 2020 05:41  Phos  3.4     12-15  Mg     2.1     12-15    12-14 @ 07:01  -  12-15 @ 07:00  IN: 0 mL / OUT: 630 mL / NET: -630 mL    HbA1C = 6.7 (12-10) 6.4 ()  LDL = 112 ()   HDL = 66 ()  TG = 114 ()   TSH = 0.990 ()    Bacteriology:  CSF studies:  EEG:  Neuroimagin/10 CT head:  EVD placement, stable   CTA:  L pcomm aneurysm, L ICA (distal cavernous) aneurysm vs infundibulum, extensive calcified plaque cavernous bilateral ICA with mild to mod stenosis; thick plaque bilateral carotid bifurcations - mod to severe R and mild to mod L stenosis, focal calcified plaque R VA off subclavian artery - high grate stenosis / partial occlusion, upper lung bilateral opacification - pulmo edema, chronic deformity R humeral neck   CT head:  SAH, basilar cisterns, IV extension, obstructive hydrocephalus SVID, lacunar infarcts R caudate, both thalami, cerebellar hemispheres, no CT evidence of territorial infarction  Other imagin/11 LE Doppler: NEG   CT abd:  small paraesophageal hiatal hernia, gastritis; ?impaction, septal thickening bilateral lower lobes ?pulmo edema, hepatic steatosis    MEDICATIONS: 12-15  SQL 40 daily lacosamide 50mg PO BID ipratropium / albuterol q6h montelukast 10mg PO daily bisacodyl 5mg PO HS pantoprazole 40 daily polyethylene glycol 17G daily senna HS atorvastatin 40mgPO HS mod ISS Peridex cyanocobalamin 1000 PO daily ferrous sulfate 325mg PO daily     IV FLUIDS: IVL  DRIPS:  ·	norepinephrine Infusion 0.05 MICROgram(s)/kG/Min (4.68 mL/Hr) IV Continuous <Continuous>  DIET: CCD  Lines: R IJ  Drains:     EVD at 5cm H20   EVD at 5cm H20 ICP < 10  Wounds:    CODE STATUS:  Full Code                       GOALS OF CARE:  aggressive                      DISPOSITION:  ICU  NIHSS 2 HH3 MF4 =================================  NEUROCRITICAL CARE ATTENDING NOTE  =================================    CARA CANCHOLA   MRN-[id number]  Summary:  75y/F with COPD, asthma, dyslipidemia Hypertension found down.  Brought to [facility] where imaging showed SAH basilar cisterns with IVH and obstructive hydrocephalus L Pcomm aneurysm.  Given levetiracetam, hydromorphone.  NIHSS 2 HH3 MF4, transferred to [facility] for further management.      COURSE IN THE HOSPITAL:   admitted to [facility], EVD inserted; given 20 lasix for pulmo edema, T inversion on CT  12/10 No significant events overnight.    No significant events overnight.    No significant events overnight.    No significant events overnight. drain stopped working at 730 a.m., off levophed    No significant events overnight.   12/15 confusion   lethargic, angio - no severe spasm, given IA verapamil, complicated by femoral artery injury from retained Proglide catheter, underwent R groin cutdown, repair with patch angioplasty     Past Medical History: Hyperlipidemia Hypertension COPD (chronic obstructive pulmonary disease) Asthma  Allergies:  No Known Allergies  Home meds:   ·	famotidine 20 mg oral tablet: 1 tab(s) orally once a day  ·	lisinopril 2.5 mg oral tablet: 1 tab(s) orally once a day  ·	montelukast 10 mg oral tablet: 1 tab(s) orally once a day  ·	predniSONE 50 mg oral tablet: 1 tab(s) orally once a day  ·	ProAir HFA CFC free 90 mcg/inh inhalation aerosol: 2 puff(s) inhaled 4 times a day PRN  ·	simvastatin 20 mg oral tablet: 1 tab(s) orally once a day (at bedtime)    PHYSICAL EXAMINATION  T(C): 36.9 ( @ 21:30), Max: 38.5 ( @ 11:41) HR: 73 ( @ 23:30) (60 - 116) BP: 147/65 ( @ 23:30) (88/50 - 211/99) RR: 22 ( @ 23:30) (13 - 37) SpO2: 100% ( @ 23:30) (90% - 100%)   NEUROLOGIC EXAMINATION:  Patient is  awake, alert, follows commands, pupils equal and oriented, moving all 4s  GENERAL: intubated, full vent support  EENT:  anicteric  CARDIOVASCULAR: (+) S1 S2, tachy rate and regular rhythm  PULMONARY: clear to auscultation bilaterally  ABDOMEN: soft, nontender with normoactive bowel sounds  EXTREMITIES: no edema, R groin no hematoma, no R DP/TP pulses, but able to insonate TP pulse with doppler device  SKIN: no rash    LABS:   CAPILLARY BLOOD GLUCOSE 120 122 156      (18.67)  11.8   15.91 )-----------( 261      ( 16 Dec 2020 21:25 )             38.3     143  |  112<H>  |  21  ----------------------------<  153<H>  3.7   |  16<L>  |  0.69    Ca    6.9<L>      16 Dec 2020 21:25  Phos  3.0       Mg     2.4     12-16    12-15 @ 07:01  -   @ 07:00  IN: 86.6 mL / OUT: 1123 mL / NET: -1036.4 mL    HbA1C = 6.7 (12-10) 6.4 ()  LDL = 112 ()   HDL = 66 ()  TG = 114 ()   TSH = 0.990 ()    Bacteriology:  CSF studies:  EEG:  Neuroimagin/10 CT head:  EVD placement, stable   CTA:  L pcomm aneurysm, L ICA (distal cavernous) aneurysm vs infundibulum, extensive calcified plaque cavernous bilateral ICA with mild to mod stenosis; thick plaque bilateral carotid bifurcations - mod to severe R and mild to mod L stenosis, focal calcified plaque R VA off subclavian artery - high grate stenosis / partial occlusion, upper lung bilateral opacification - pulmo edema, chronic deformity R humeral neck   CT head:  SAH, basilar cisterns, IV extension, obstructive hydrocephalus SVID, lacunar infarcts R caudate, both thalami, cerebellar hemispheres, no CT evidence of territorial infarction  Other imagin/11 LE Doppler: NEG   CT abd:  small paraesophageal hiatal hernia, gastritis; ?impaction, septal thickening bilateral lower lobes ?pulmo edema, hepatic steatosis    MEDICATIONS: 12-15  SQL 40 daily lacosamide 50mg PO BID ipratropium / albuterol q6h montelukast 10mg PO daily bisacodyl 5mg PO HS pantoprazole 40 daily polyethylene glycol 17G daily senna HS atorvastatin 40mgPO HS mod ISS Peridex cyanocobalamin 1000 PO daily ferrous sulfate 325mg PO daily     MEDICATIONS: 12-16  SQL 40 daily piperacillin/tazobactam 3.375 IV q6h vancomycin 750 IV q24h lacosamide 100mg PO BID ipratropium / albuterol q6h montelukast 10mg PO daily bisacodyl 10 [place] daily pantoprazole 40 daily polyethylene glycol 17G daily senna HS atorvastatin 10mg PO HS fludrocortisone 0.1mg PO daily mod ISS Peridex cyanocobalamin 1G PO daily ferrous sulfate 325mg PO daily salt 2G PO q12h     IV FLUIDS: IVL  DRIPS:  ·	norepinephrine Infusion 0.05 MICROgram(s)/kG/Min (4.68 mL/Hr) IV Continuous <Continuous>  ·	propofol Infusion 5 MICROgram(s)/kG/Min (1.5 mL/Hr) IV Continuous <Continuous>  DIET: CCD  Lines: R IJ  Drains:     EVD at 5cm H20   EVD at 5cm H20 ICP < 10   EVD at 5cm H20   Wounds:    CODE STATUS:  Full Code                       GOALS OF CARE:  aggressive                      DISPOSITION:  ICU  NIHSS 2 HH3 MF4

## 2020-12-16 NOTE — PROGRESS NOTE ADULT - SUBJECTIVE AND OBJECTIVE BOX
====================================================   NEUROCRITICAL CARE ATTENDING NOTE (2020)  ====================================================    CARA CANCHOLA   MRN-[id number]  Summary:  75y/F with COPD, asthma, dyslipidemia Hypertension found down.  Brought to [facility] where imaging showed SAH basilar cisterns with IVH and obstructive hydrocephalus L Pcomm aneurysm.  Given levetiracetam, hydromorphone.  NIHSS 2 HH3 MF4, transferred to [facility] for further management.      COURSE IN THE HOSPITAL:   admitted to [facility], EVD inserted; given 20 lasix for pulmo edema, T inversion on CT  12/10 No significant events overnight.    No significant events overnight.    No significant events overnight.    No significant events overnight. drain stopped working at 730 a.m., off levophed    No significant events overnight.   12/15 Noted confusion, CTA mild spasm   _____    Past Medical History: Hyperlipidemia Hypertension COPD (chronic obstructive pulmonary disease) Asthma  Allergies:  No Known Allergies  Home meds:   ·	famotidine 20 mg oral tablet: 1 tab(s) orally once a day  ·	lisinopril 2.5 mg oral tablet: 1 tab(s) orally once a day  ·	montelukast 10 mg oral tablet: 1 tab(s) orally once a day  ·	predniSONE 50 mg oral tablet: 1 tab(s) orally once a day  ·	ProAir HFA CFC free 90 mcg/inh inhalation aerosol: 2 puff(s) inhaled 4 times a day PRN  ·	simvastatin 20 mg oral tablet: 1 tab(s) orally once a day (at bedtime)    Current Meds        PHYSICAL EXAMINATION    ICU Vital Signs Last 24 Hrs      NEUROLOGIC EXAMINATION:  alert, oriented x2-3, CLEOPATRA, EOMI, no facial droop, no pronator drift, no [place], follows simple commands X 4 bilaterally  GENERAL: not intubated, not in cardiorespiratory distress  EENT:  anicteric  CARDIOVASCULAR: (+) S1 S2, JAMIE  PULMONARY: clear to auscultation bilaterally  ABDOMEN: soft, nontender with normoactive bowel sounds  EXTREMITIES: no edema  SKIN: no rash          HbA1C = 6.7 (12-10) 6.4 ()  LDL = 112 ()   HDL = 66 (-)  TG = 114 ()   TSH = 0.990 ()    Bacteriology:  CSF studies:  EEG:  Neuroimagin/15 CTA head:  Multifocal moderate stenosis the right PCA. Mild stenosis of the left PCA which were present on prior CTA head and neck 2020 and therefore may reflect intracranial atherosclerosis rather than vasospasm.  New mild stenosis of the mid right M1 segment.  Interval increase in size of the lateral and third ventricles since prior CT head 2020.  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  12/10 CT head:  EVD placement, stable   CTA:  L pcomm aneurysm, L ICA (distal cavernous) aneurysm vs infundibulum, extensive calcified plaque cavernous bilateral ICA with mild to mod stenosis; thick plaque bilateral carotid bifurcations - mod to severe R and mild to mod L stenosis, focal calcified plaque R VA off subclavian artery - high grate stenosis / partial occlusion, upper lung bilateral opacification - pulmo edema, chronic deformity R humeral neck   CT head:  SAH, basilar cisterns, IV extension, obstructive hydrocephalus SVID, lacunar infarcts R caudate, both thalami, cerebellar hemispheres, no CT evidence of territorial infarction  Other imagin/11 LE Doppler: NEG   CT abd:  small paraesophageal hiatal hernia, gastritis; ?impaction, septal thickening bilateral lower lobes ?pulmo edema, hepatic steatosis    IV FLUIDS: IVL  DRIPS:  DIET: CCD  Lines: R IJ  Drains:     EVD at 5cm H20   EVD at 5cm H20 ICP < 10  12/15 EVD at 5cm H20 ICP 1-5, CSF 2-8 mL/h  Wounds:    CODE STATUS:  Full Code                       GOALS OF CARE:  aggressive                      DISPOSITION:  ICU  NIHSS 2 HH3 MF4 ====================================================   NEUROCRITICAL CARE ATTENDING NOTE (2020)  ====================================================    CARA CANCHOLA   MRN-[id number]  Summary:  75y/F with COPD, asthma, dyslipidemia Hypertension found down.  Brought to [facility] where imaging showed SAH basilar cisterns with IVH and obstructive hydrocephalus L Pcomm aneurysm.  Given levetiracetam, hydromorphone.  NIHSS 2 HH3 MF4, transferred to [facility] for further management.      COURSE IN THE HOSPITAL:   admitted to [facility], EVD inserted; given 20 lasix for pulmo edema, T inversion on CT  12/10 No significant events overnight.    No significant events overnight.    No significant events overnight.    No significant events overnight. drain stopped working at 730 a.m., off levophed    No significant events overnight.   12/15 Noted confusion, CTA mild spasm   _____    Past Medical History: Hyperlipidemia Hypertension COPD (chronic obstructive pulmonary disease) Asthma  Allergies:  No Known Allergies  Home meds:   ·	famotidine 20 mg oral tablet: 1 tab(s) orally once a day  ·	lisinopril 2.5 mg oral tablet: 1 tab(s) orally once a day  ·	montelukast 10 mg oral tablet: 1 tab(s) orally once a day  ·	predniSONE 50 mg oral tablet: 1 tab(s) orally once a day  ·	ProAir HFA CFC free 90 mcg/inh inhalation aerosol: 2 puff(s) inhaled 4 times a day PRN  ·	simvastatin 20 mg oral tablet: 1 tab(s) orally once a day (at bedtime)    Current Meds        PHYSICAL EXAMINATION    ICU Vital Signs Last 24 Hrs  T(C): 36.8 (16 Dec 2020 03:53), Max: 38.9 (15 Dec 2020 19:00)  T(F): 98.2 (16 Dec 2020 03:53), Max: 102.1 (15 Dec 2020 19:00)  HR: 103 (16 Dec 2020 05:00) (86 - 111)  BP: 160/70 (16 Dec 2020 05:00) (102/70 - 169/74)  BP(mean): 100 (16 Dec 2020 05:00) (81 - 124)  ABP: 162/65 (16 Dec 2020 05:00) (102/62 - 164/60)  ABP(mean): 98 (16 Dec 2020 05:00) (82 - 98)  RR: 31 (16 Dec 2020 05:00) (16 - 31)  SpO2: 91% (16 Dec 2020 05:00) (81% - 98%)    NEUROLOGIC EXAMINATION:  alert, oriented x2-3, CLEOPATRA, EOMI, no facial droop, no pronator drift, no [place], follows simple commands X 4 bilaterally  GENERAL: not intubated, not in cardiorespiratory distress  EENT:  anicteric  CARDIOVASCULAR: (+) S1 S2, JAMIE  PULMONARY: clear to auscultation bilaterally  ABDOMEN: soft, nontender with normoactive bowel sounds  EXTREMITIES: no edema  SKIN: no rash    I/O's    20 @ 07:01  -  12-15-20 @ 07:00  --------------------------------------------------------  IN: 0 mL / OUT: 630 mL / NET: -630 mL    12-15-20 @ 07:01  -  20 @ 06:44  --------------------------------------------------------  IN: 86.6 mL / OUT: 1098 mL / NET: -1011.4 mL    LABS:                        10.7   18.67 )-----------( 412      ( 16 Dec 2020 05:31 )             34.3     12-16    135  |  98  |  29<H>  ----------------------------<  134<H>  3.9   |  18<L>  |  0.71    Ca    8.8      16 Dec 2020 05:31  Phos  3.0       Mg     2.4         CAPILLARY BLOOD GLUCOSE    POCT Blood Glucose.: 134 mg/dL (15 Dec 2020 22:32)  POCT Blood Glucose.: 142 mg/dL (15 Dec 2020 16:47)  POCT Blood Glucose.: 139 mg/dL (15 Dec 2020 11:32)    HbA1C = 6.7 (12-10) 6.4 ()  LDL = 112 ()   HDL = 66 ()  TG = 114 ()   TSH = 0.990 ()    Bacteriology:    Urinalysis Basic - ( 16 Dec 2020 03:26 )    Color: Yellow / Appearance: Clear / S.020 / pH: x  Gluc: x / Ketone: 40 mg/dL  / Bili: Negative / Urobili: 0.2 E.U./dL   Blood: x / Protein: NEGATIVE mg/dL / Nitrite: NEGATIVE   Leuk Esterase: NEGATIVE / RBC: < 5 /HPF / WBC < 5 /HPF   Sq Epi: x / Non Sq Epi: 0-5 /HPF / Bacteria: Present /HPF    Culture - CSF with Gram Stain (collected 20 @ 00:47)  Source: .CSF CSF  Gram Stain (20 @ 01:16):    Rare White blood cells    No organisms seen    CSF studies:  EEG:  Neuroimagin/15 CTA head:  Multifocal moderate stenosis the right PCA. Mild stenosis of the left PCA which were present on prior CTA head and neck 2020 and therefore may reflect intracranial atherosclerosis rather than vasospasm.  New mild stenosis of the mid right M1 segment.  Interval increase in size of the lateral and third ventricles since prior CT head 2020.  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  12/10 CT head:  EVD placement, stable   CTA:  L pcomm aneurysm, L ICA (distal cavernous) aneurysm vs infundibulum, extensive calcified plaque cavernous bilateral ICA with mild to mod stenosis; thick plaque bilateral carotid bifurcations - mod to severe R and mild to mod L stenosis, focal calcified plaque R VA off subclavian artery - high grate stenosis / partial occlusion, upper lung bilateral opacification - pulmo edema, chronic deformity R humeral neck   CT head:  SAH, basilar cisterns, IV extension, obstructive hydrocephalus SVID, lacunar infarcts R caudate, both thalami, cerebellar hemispheres, no CT evidence of territorial infarction  Other imagin/11 LE Doppler: NEG   CT abd:  small paraesophageal hiatal hernia, gastritis; ?impaction, septal thickening bilateral lower lobes ?pulmo edema, hepatic steatosis    IV FLUIDS: IVL  DRIPS:  DIET: CCD  Lines: R IJ  Drains:     EVD at 5cm H20   EVD at 5cm H20 ICP < 10  12/15 EVD at 5cm H20 ICP 1-5, CSF 2-8 mL/h  Wounds:    CODE STATUS:  Full Code                       GOALS OF CARE:  aggressive                      DISPOSITION:  ICU  NIHSS 2 HH3 MF4 ====================================================   NEUROCRITICAL CARE ATTENDING NOTE (2020)  ====================================================    CARA CANCHOLA   MRN-[id number]  Summary:  75y/F with COPD, asthma, dyslipidemia Hypertension found down.  Brought to [facility] where imaging showed SAH basilar cisterns with IVH and obstructive hydrocephalus L Pcomm aneurysm.  Given levetiracetam, hydromorphone.  NIHSS 2 HH3 MF4, transferred to [facility] for further management.      COURSE IN THE HOSPITAL:   admitted to [facility], EVD inserted; given 20 lasix for pulmo edema, T inversion on CT  12/10 No significant events overnight.    No significant events overnight.    No significant events overnight.    No significant events overnight. drain stopped working at 730 a.m., off levophed    No significant events overnight.   12/15 Noted confusion, CTA mild spasm   Increasing lethargy, febrile    Past Medical History: Hyperlipidemia Hypertension COPD (chronic obstructive pulmonary disease) Asthma  Allergies:  No Known Allergies  Home meds:   ·	famotidine 20 mg oral tablet: 1 tab(s) orally once a day  ·	lisinopril 2.5 mg oral tablet: 1 tab(s) orally once a day  ·	montelukast 10 mg oral tablet: 1 tab(s) orally once a day  ·	predniSONE 50 mg oral tablet: 1 tab(s) orally once a day  ·	ProAir HFA CFC free 90 mcg/inh inhalation aerosol: 2 puff(s) inhaled 4 times a day PRN  ·	simvastatin 20 mg oral tablet: 1 tab(s) orally once a day (at bedtime)    Current Meds  MEDICATIONS  (STANDING):  albuterol/ipratropium for Nebulization. 3 milliLiter(s) Nebulizer every 6 hours  atorvastatin 40 milliGRAM(s) Oral at bedtime  bisacodyl Suppository 10 milliGRAM(s) Rectal daily  cyanocobalamin 1000 MICROGram(s) Oral daily  enoxaparin Injectable 40 milliGRAM(s) SubCutaneous every 24 hours  ferrous    sulfate 325 milliGRAM(s) Oral daily  fludroCORTISONE 0.1 milliGRAM(s) Oral every 24 hours  insulin lispro (ADMELOG) corrective regimen sliding scale   SubCutaneous Before meals and at bedtime  lacosamide 50 milliGRAM(s) Oral two times a day  montelukast 10 milliGRAM(s) Oral daily  norepinephrine Infusion 0.05 MICROgram(s)/kG/Min (4.68 mL/Hr) IV Continuous <Continuous>  pantoprazole    Tablet 40 milliGRAM(s) Oral before breakfast  piperacillin/tazobactam IVPB.. 3.375 Gram(s) IV Intermittent every 6 hours  polyethylene glycol 3350 17 Gram(s) Oral daily  senna 2 Tablet(s) Oral at bedtime  sodium chloride 2 Gram(s) Oral every 12 hours  sodium chloride 0.9% Bolus 250 milliLiter(s) IV Bolus once  sodium chloride 0.9%. 1000 milliLiter(s) (40 mL/Hr) IV Continuous <Continuous>  vancomycin  IVPB 750 milliGRAM(s) IV Intermittent once    MEDICATIONS  (PRN):  acetaminophen    Suspension .. 650 milliGRAM(s) Oral every 6 hours PRN Temp greater or equal to 38C (100.4F), Mild Pain (1 - 3)  labetalol Injectable 10 milliGRAM(s) IV Push every 4 hours PRN Systolic blood pressure > 180    PHYSICAL EXAMINATION    ICU Vital Signs Last 24 Hrs  T(C): 36.8 (16 Dec 2020 03:53), Max: 38.9 (15 Dec 2020 19:00)  T(F): 98.2 (16 Dec 2020 03:53), Max: 102.1 (15 Dec 2020 19:00)  HR: 103 (16 Dec 2020 05:00) (86 - 111)  BP: 160/70 (16 Dec 2020 05:00) (102/70 - 169/74)  BP(mean): 100 (16 Dec 2020 05:00) (81 - 124)  ABP: 162/65 (16 Dec 2020 05:00) (102/62 - 164/60)  ABP(mean): 98 (16 Dec 2020 05:00) (82 - 98)  RR: 31 (16 Dec 2020 05:00) (16 - 31)  SpO2: 91% (16 Dec 2020 05:00) (81% - 98%)    NEUROLOGIC EXAMINATION:  lethargic, mumbles, oriented to self in AM, CLEOPATRA, intermittent left gaze (non-sustained), no facial droop, no pronator drift, no [place], did not follow commands  GENERAL: not intubated, not in cardiorespiratory distress  EENT:  anicteric  CARDIOVASCULAR: (+) S1 S2, JAMIE  PULMONARY: clear to auscultation bilaterally  ABDOMEN: soft, nontender with normoactive bowel sounds  EXTREMITIES: no edema  SKIN: no rash    I/O's    20 @ 07:01  -  12-15-20 @ 07:00  --------------------------------------------------------  IN: 0 mL / OUT: 630 mL / NET: -630 mL    12-15-20 @ 07:01  -  20 @ 06:44  --------------------------------------------------------  IN: 86.6 mL / OUT: 1098 mL / NET: -1011.4 mL    LABS:                        10.7   18.67 )-----------( 412      ( 16 Dec 2020 05:31 )             34.3     12-    135  |  98  |  29<H>  ----------------------------<  134<H>  3.9   |  18<L>  |  0.71    Ca    8.8      16 Dec 2020 05:31  Phos  3.0       Mg     2.4         CAPILLARY BLOOD GLUCOSE    POCT Blood Glucose.: 134 mg/dL (15 Dec 2020 22:32)  POCT Blood Glucose.: 142 mg/dL (15 Dec 2020 16:47)  POCT Blood Glucose.: 139 mg/dL (15 Dec 2020 11:32)    HbA1C = 6.7 (12-10) 6.4 ()  LDL = 112 ()   HDL = 66 ()  TG = 114 ()   TSH = 0.990 ()    Bacteriology:    Urinalysis Basic - ( 16 Dec 2020 03:26 )    Color: Yellow / Appearance: Clear / S.020 / pH: x  Gluc: x / Ketone: 40 mg/dL  / Bili: Negative / Urobili: 0.2 E.U./dL   Blood: x / Protein: NEGATIVE mg/dL / Nitrite: NEGATIVE   Leuk Esterase: NEGATIVE / RBC: < 5 /HPF / WBC < 5 /HPF   Sq Epi: x / Non Sq Epi: 0-5 /HPF / Bacteria: Present /HPF    Culture - CSF with Gram Stain (collected 20 @ 00:47)  Source: .CSF CSF  Gram Stain (20 @ 01:16):    Rare White blood cells    No organisms seen    CSF studies:  EEG:  Neuroimagin/15 CTA head:  Multifocal moderate stenosis the right PCA. Mild stenosis of the left PCA which were present on prior CTA head and neck 2020 and therefore may reflect intracranial atherosclerosis rather than vasospasm.  New mild stenosis of the mid right M1 segment.  Interval increase in size of the lateral and third ventricles since prior CT head 2020.  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  12/10 CT head:  EVD placement, stable   CTA:  L pcomm aneurysm, L ICA (distal cavernous) aneurysm vs infundibulum, extensive calcified plaque cavernous bilateral ICA with mild to mod stenosis; thick plaque bilateral carotid bifurcations - mod to severe R and mild to mod L stenosis, focal calcified plaque R VA off subclavian artery - high grate stenosis / partial occlusion, upper lung bilateral opacification - pulmo edema, chronic deformity R humeral neck   CT head:  SAH, basilar cisterns, IV extension, obstructive hydrocephalus SVID, lacunar infarcts R caudate, both thalami, cerebellar hemispheres, no CT evidence of territorial infarction  Other imagin/11 LE Doppler: NEG   CT abd:  small paraesophageal hiatal hernia, gastritis; ?impaction, septal thickening bilateral lower lobes ?pulmo edema, hepatic steatosis    IV FLUIDS: IV NS 40 mL/h  DRIPS:  DIET: CCD  Lines: R IJ  Drains:     EVD at 5cm H20   EVD at 5cm H20 ICP < 10  12/15 EVD at 5cm H20 ICP 1-5, CSF 2-8 mL/h   EVD at 0cm H20 ICP 2-7 CSF 4-20 mL/h    CODE STATUS:  Full Code                       GOALS OF CARE:  aggressive                      DISPOSITION:  ICU  NIHSS 2 HH3 MF4 ====================================================   NEUROCRITICAL CARE ATTENDING NOTE (2020)  ====================================================    CARA CANCHOLA   MRN-[id number]  Summary:  75y/F with COPD, asthma, dyslipidemia Hypertension found down.  Brought to [facility] where imaging showed SAH basilar cisterns with IVH and obstructive hydrocephalus L Pcomm aneurysm.  Given levetiracetam, hydromorphone.  NIHSS 2 HH3 MF4, transferred to [facility] for further management.      COURSE IN THE HOSPITAL:   admitted to [facility], EVD inserted; given 20 lasix for pulmo edema, T inversion on CT  12/10 No significant events overnight.    No significant events overnight.    No significant events overnight.    No significant events overnight. drain stopped working at 730 a.m., off levophed    No significant events overnight.   12/15 Noted confusion, CTA mild spasm   Increasing lethargy, febrile    Past Medical History: Hyperlipidemia Hypertension COPD (chronic obstructive pulmonary disease) Asthma  Allergies:  No Known Allergies  Home meds:   ·	famotidine 20 mg oral tablet: 1 tab(s) orally once a day  ·	lisinopril 2.5 mg oral tablet: 1 tab(s) orally once a day  ·	montelukast 10 mg oral tablet: 1 tab(s) orally once a day  ·	predniSONE 50 mg oral tablet: 1 tab(s) orally once a day  ·	ProAir HFA CFC free 90 mcg/inh inhalation aerosol: 2 puff(s) inhaled 4 times a day PRN  ·	simvastatin 20 mg oral tablet: 1 tab(s) orally once a day (at bedtime)    Current Meds  MEDICATIONS  (STANDING):  albuterol/ipratropium for Nebulization. 3 milliLiter(s) Nebulizer every 6 hours  atorvastatin 40 milliGRAM(s) Oral at bedtime  bisacodyl Suppository 10 milliGRAM(s) Rectal daily  cyanocobalamin 1000 MICROGram(s) Oral daily  enoxaparin Injectable 40 milliGRAM(s) SubCutaneous every 24 hours  ferrous    sulfate 325 milliGRAM(s) Oral daily  fludroCORTISONE 0.1 milliGRAM(s) Oral every 24 hours  insulin lispro (ADMELOG) corrective regimen sliding scale   SubCutaneous Before meals and at bedtime  lacosamide 50 milliGRAM(s) Oral two times a day  montelukast 10 milliGRAM(s) Oral daily  norepinephrine Infusion 0.05 MICROgram(s)/kG/Min (4.68 mL/Hr) IV Continuous <Continuous>  pantoprazole    Tablet 40 milliGRAM(s) Oral before breakfast  piperacillin/tazobactam IVPB.. 3.375 Gram(s) IV Intermittent every 6 hours  polyethylene glycol 3350 17 Gram(s) Oral daily  senna 2 Tablet(s) Oral at bedtime  sodium chloride 2 Gram(s) Oral every 12 hours  sodium chloride 0.9% Bolus 250 milliLiter(s) IV Bolus once  sodium chloride 0.9%. 1000 milliLiter(s) (40 mL/Hr) IV Continuous <Continuous>  vancomycin  IVPB 750 milliGRAM(s) IV Intermittent once    MEDICATIONS  (PRN):  acetaminophen    Suspension .. 650 milliGRAM(s) Oral every 6 hours PRN Temp greater or equal to 38C (100.4F), Mild Pain (1 - 3)  labetalol Injectable 10 milliGRAM(s) IV Push every 4 hours PRN Systolic blood pressure > 180    PHYSICAL EXAMINATION    ICU Vital Signs Last 24 Hrs  T(C): 36.8 (16 Dec 2020 03:53), Max: 38.9 (15 Dec 2020 19:00)  T(F): 98.2 (16 Dec 2020 03:53), Max: 102.1 (15 Dec 2020 19:00)  HR: 103 (16 Dec 2020 05:00) (86 - 111)  BP: 160/70 (16 Dec 2020 05:00) (102/70 - 169/74)  BP(mean): 100 (16 Dec 2020 05:00) (81 - 124)  ABP: 162/65 (16 Dec 2020 05:00) (102/62 - 164/60)  ABP(mean): 98 (16 Dec 2020 05:00) (82 - 98)  RR: 31 (16 Dec 2020 05:00) (16 - 31)  SpO2: 91% (16 Dec 2020 05:00) (81% - 98%)    NEUROLOGIC EXAMINATION:  lethargic, mumbles, oriented to self in AM, CLEOPATRA, intermittent left gaze (non-sustained), no facial droop, no pronator drift, no [place], did not follow commands  GENERAL: not intubated, not in cardiorespiratory distress  EENT:  anicteric  CARDIOVASCULAR: (+) S1 S2, JAMIE  PULMONARY: clear to auscultation bilaterally  ABDOMEN: soft, nontender with normoactive bowel sounds  EXTREMITIES: no edema  SKIN: no rash    I/O's    20 @ 07:01  -  12-15-20 @ 07:00  --------------------------------------------------------  IN: 0 mL / OUT: 630 mL / NET: -630 mL    12-15-20 @ 07:01  -  20 @ 06:44  --------------------------------------------------------  IN: 86.6 mL / OUT: 1098 mL / NET: -1011.4 mL    LABS:                        10.7   18.67 )-----------( 412      ( 16 Dec 2020 05:31 )             34.3     12-    135  |  98  |  29<H>  ----------------------------<  134<H>  3.9   |  18<L>  |  0.71    Ca    8.8      16 Dec 2020 05:31  Phos  3.0       Mg     2.4         CAPILLARY BLOOD GLUCOSE    POCT Blood Glucose.: 134 mg/dL (15 Dec 2020 22:32)  POCT Blood Glucose.: 142 mg/dL (15 Dec 2020 16:47)  POCT Blood Glucose.: 139 mg/dL (15 Dec 2020 11:32)    HbA1C = 6.7 (12-10) 6.4 ()  LDL = 112 ()   HDL = 66 ()  TG = 114 ()   TSH = 0.990 ()    Bacteriology:    Urinalysis Basic - ( 16 Dec 2020 03:26 )    Color: Yellow / Appearance: Clear / S.020 / pH: x  Gluc: x / Ketone: 40 mg/dL  / Bili: Negative / Urobili: 0.2 E.U./dL   Blood: x / Protein: NEGATIVE mg/dL / Nitrite: NEGATIVE   Leuk Esterase: NEGATIVE / RBC: < 5 /HPF / WBC < 5 /HPF   Sq Epi: x / Non Sq Epi: 0-5 /HPF / Bacteria: Present /HPF    Culture - CSF with Gram Stain (collected 20 @ 00:47)  Source: .CSF CSF  Gram Stain (20 @ 01:16):    Rare White blood cells    No organisms seen    CSF studies:  EE/16:  Multifocal spikes on EEG, no epileptiform activity  Neuroimagin/15 CTA head:  Multifocal moderate stenosis the right PCA. Mild stenosis of the left PCA which were present on prior CTA head and neck 2020 and therefore may reflect intracranial atherosclerosis rather than vasospasm.  New mild stenosis of the mid right M1 segment.  Interval increase in size of the lateral and third ventricles since prior CT head 2020.  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  12/10 CT head:  EVD placement, stable   CTA:  L pcomm aneurysm, L ICA (distal cavernous) aneurysm vs infundibulum, extensive calcified plaque cavernous bilateral ICA with mild to mod stenosis; thick plaque bilateral carotid bifurcations - mod to severe R and mild to mod L stenosis, focal calcified plaque R VA off subclavian artery - high grate stenosis / partial occlusion, upper lung bilateral opacification - pulmo edema, chronic deformity R humeral neck   CT head:  SAH, basilar cisterns, IV extension, obstructive hydrocephalus SVID, lacunar infarcts R caudate, both thalami, cerebellar hemispheres, no CT evidence of territorial infarction  Other imagin/11 LE Doppler: NEG   CT abd:  small paraesophageal hiatal hernia, gastritis; ?impaction, septal thickening bilateral lower lobes ?pulmo edema, hepatic steatosis    IV FLUIDS: IV NS 40 mL/h  DRIPS:  DIET: CCD  Lines: R IJ  Drains:     EVD at 5cm H20   EVD at 5cm H20 ICP < 10  12/15 EVD at 5cm H20 ICP 1-5, CSF 2-8 mL/h   EVD at 0cm H20 ICP 2-7 CSF 4-20 mL/h    CODE STATUS:  Full Code                       GOALS OF CARE:  aggressive                      DISPOSITION:  ICU  NIHSS 2 HH3 MF4

## 2020-12-16 NOTE — PROGRESS NOTE ADULT - SUBJECTIVE AND OBJECTIVE BOX
Vascular Surgery Post-Op Note    Procedure: right groin cutdown with removal of fractured Proglide catheter in its entirety. R deep femoral artery was injured. R CFA/PFA was repaired with patch angioplasty with Photofix patch after endarterectomy. Completion angio showed occlusion of PFA distal to patch, so this area was endarterectomized again with new photofix patch angioplasty. Palpable pulse confirmed in R SFA     Diagnosis/Indication: Injury CFA and DF    Surgeon: Maykel    S: Pt has no complaints. Denies CP, SOB, EPPS, calf tenderness. Pain controlled with medication.    O:  T(C): 36.5 (12-17-20 @ 01:00), Max: 36.5 (12-17-20 @ 01:00)  T(F): 97.7 (12-17-20 @ 01:00), Max: 97.7 (12-17-20 @ 01:00)  HR: 78 (12-17-20 @ 03:00) (73 - 81)  BP: 128/59 (12-17-20 @ 03:00) (105/55 - 155/67)  RR: 20 (12-17-20 @ 03:00) (16 - 23)  SpO2: 100% (12-17-20 @ 03:00) (100% - 100%)  Wt(kg): --                        11.8   15.91 )-----------( 261      ( 16 Dec 2020 21:25 )             38.3     12-16    143  |  112<H>  |  21  ----------------------------<  153<H>  3.7   |  16<L>  |  0.69    Ca    6.9<L>      16 Dec 2020 21:25  Phos  3.0     12-16  Mg     2.4     12-16      Gen: NAD, resting comfortably in bed  C/V: NSR  Pulm: Nonlabored breathing, no respiratory distress  Abd: soft, NT/ND  Extrem: WWP, no calf edema, SCDs in place to LLE  vascular: Biphasic PT no DP RLE    A/P: 75yFemale s/p above procedure  Diet: per primary/icu team

## 2020-12-16 NOTE — PROGRESS NOTE ADULT - SUBJECTIVE AND OBJECTIVE BOX
HPI:  76y/o F with PMHx sig for COPD, asthma, HLD, HTN, BIBEMS to [facility] from home after HHA discovered patient found down in floor covered in non-bloody vomitus. Perr HHA Pt went to the bathroom and was taking a long time, went in to check on her and found her sitting on floor, awake, however with vomitus on front of shirt. On arrival to [facility], patient was taken for stat head CT, which revealed diffuse subarachnoid hemorrhage mainly at basilar cisterns with IVH and obstructive hydrocephalus. Patient was given a bolus of 1g keppra and dilaudid pushes for generalized headache. CTA concerning for 3mm left pcomm aneurysm and a 1.6mm outpouching of distal cavernous segment of the left internal carotid artery likely aneurysm. NIHSS2 HH3 MF4. Patient was transferred to [facility] for further intervention. Patient currently reports headaches. Pt denies acute changes in vision, seizures, CP, SOB, weakness/paresthesias of arms or legs. (09 Dec 2020 23:37)      Hospital Course:   12/9: BD1 Patient admitted for SAH HH3, MF4.   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 EVD at 5, on Levo overnight, nimodipine discontinued d/t hypotension  12/12: BD4 POD#2, VIVIEN overnight, neuro stable, passed TOV  12/13: BD5 POD#3: VIVIEN x 24 hrs  12/14: BD6 POD#4. VIVIEN o/n, neuro stable. EVD at 5cm H2O  12/15: BD7 POD #5 VIVIEN overnight, neuro stable. EVD remains at 5. Plan for CTA today.   12/16: BD8 POD #6 VIVIEN overnight, neuro stable, EVD at 0, on Levo, started on 3% at 15 overnight       Vital Signs Last 24 Hrs  T(C): 37.5 (15 Dec 2020 22:00), Max: 38.9 (15 Dec 2020 19:00)  T(F): 99.5 (15 Dec 2020 22:00), Max: 102.1 (15 Dec 2020 19:00)  HR: 99 (16 Dec 2020 02:02) (86 - 111)  BP: 157/70 (16 Dec 2020 02:02) (102/70 - 165/68)  BP(mean): 100 (16 Dec 2020 02:02) (81 - 118)  RR: 18 (16 Dec 2020 02:02) (14 - 20)  SpO2: 92% (16 Dec 2020 02:02) (81% - 98%)    I&O's Detail    14 Dec 2020 07:01  -  15 Dec 2020 07:00  --------------------------------------------------------  IN:  Total IN: 0 mL    OUT:    External Ventricular Device (mL): 130 mL    Voided (mL): 500 mL  Total OUT: 630 mL    Total NET: -630 mL      15 Dec 2020 07:01  -  16 Dec 2020 03:03  --------------------------------------------------------  IN:    Norepinephrine: 32.8 mL  Total IN: 32.8 mL    OUT:    External Ventricular Device (mL): 126 mL    Intermittent Catheterization - Urethral (mL): 450 mL  Total OUT: 576 mL    Total NET: -543.2 mL        I&O's Summary    14 Dec 2020 07:01  -  15 Dec 2020 07:00  --------------------------------------------------------  IN: 0 mL / OUT: 630 mL / NET: -630 mL    15 Dec 2020 07:01  -  16 Dec 2020 03:03  --------------------------------------------------------  IN: 32.8 mL / OUT: 576 mL / NET: -543.2 mL        PHYSICAL EXAM:  Gen: laying in hospital bed, NAD  Neurological: lethargic, easily arousable, AA+Ox3, OE spont, FC  CN II-XII: PERRL, EOMI  Motor exam: MAEx4, 5/5 strength throughout  SILT in UE and LE b/l  Cardiovascular: RRR  Respiratory: CTAB  Gastrointestinal: soft, NT/ND  EVD site C/D/I        TUBES/LINES:  [] CVC  [x] A-line  [] Lumbar Drain  [x] Ventriculostomy, EVD at 0  [] Other    DIET:  [] NPO  [x] Mechanical  [] Tube feeds    LABS:                        10.9   15.18 )-----------( 366      ( 15 Dec 2020 05:41 )             34.6     12-16    134<L>  |  97  |  31<H>  ----------------------------<  133<H>  3.8   |  19<L>  |  0.79    Ca    9.2      16 Dec 2020 00:11  Phos  3.4     12-15  Mg     2.1     12-15              CAPILLARY BLOOD GLUCOSE      POCT Blood Glucose.: 134 mg/dL (15 Dec 2020 22:32)  POCT Blood Glucose.: 142 mg/dL (15 Dec 2020 16:47)  POCT Blood Glucose.: 139 mg/dL (15 Dec 2020 11:32)  POCT Blood Glucose.: 130 mg/dL (15 Dec 2020 06:28)      Drug Levels: [] N/A    CSF Analysis: [] N/A  RBC Count - Spinal Fluid: 8800 /uL (12-16 @ 00:39)  CSF Lymphocytes: 3 % (12-16 @ 00:39)  Glucose, CSF: 87 mg/dL (12-16 @ 00:39)  Protein, CSF: 12 mg/dL (12-16 @ 00:39)      Allergies    No Known Allergies    Intolerances      MEDICATIONS:  Antibiotics:    Neuro:  acetaminophen   Tablet .. 650 milliGRAM(s) Oral every 6 hours PRN  lacosamide 50 milliGRAM(s) Oral two times a day    Anticoagulation:  enoxaparin Injectable 40 milliGRAM(s) SubCutaneous every 24 hours    OTHER:  albuterol/ipratropium for Nebulization. 3 milliLiter(s) Nebulizer every 6 hours  atorvastatin 40 milliGRAM(s) Oral at bedtime  bisacodyl 5 milliGRAM(s) Oral at bedtime  chlorhexidine 2% Cloths 1 Application(s) Topical <User Schedule>  dextrose 40% Gel 15 Gram(s) Oral once  dextrose 50% Injectable 25 Gram(s) IV Push once  glucagon  Injectable 1 milliGRAM(s) IntraMuscular once  insulin lispro (ADMELOG) corrective regimen sliding scale   SubCutaneous Before meals and at bedtime  labetalol Injectable 10 milliGRAM(s) IV Push every 4 hours PRN  lidocaine 2% (Preservative-free) Injectable 10 milliLiter(s) Local Injection once  montelukast 10 milliGRAM(s) Oral daily  norepinephrine Infusion 0.05 MICROgram(s)/kG/Min IV Continuous <Continuous>  pantoprazole    Tablet 40 milliGRAM(s) Oral before breakfast  polyethylene glycol 3350 17 Gram(s) Oral daily  senna 2 Tablet(s) Oral at bedtime    IVF:  cyanocobalamin 1000 MICROGram(s) Oral daily  ferrous    sulfate 325 milliGRAM(s) Oral daily    CULTURES:    RADIOLOGY & ADDITIONAL TESTS:      ASSESSMENT:  74 yo Female with PMHx of COPD, asthma, HLD, HTN, BIBEMS to [facility] from home after HHA found patient down on the floor covered in non-bloody vomitus. CTH reveals diffuse subarachnoid hemorrhage mainly at basilar cisterns with IVH and obstructive hydrocephalus, CTA shows 3mm left pcomm aneurysm, now s/p bedside right frontal EVD placement 12/10, s/p cerebral angiogram for coil embolization of left PCOMM aneurysm 12/10, NIHSS2 HH3 MF4      HEADACHE    Handoff    Hyperlipidemia    Hypertension    COPD (chronic obstructive pulmonary disease)    Asthma    COPD (chronic obstructive pulmonary disease)    Asthma    SAH (subarachnoid hemorrhage)    SAH (subarachnoid hemorrhage)    Subarachnoid bleed    Angiogram, cerebral, with intracranial aneurysm embolization    No significant past surgical history    HEADACHE    90+    [email or web address]        PLAN:  NEURO:  - neuro checks  - vitals checks  - pain control  - EVD at 0, monitor ICP and output  - cont Vimpat   - Hold Nimodipine for hypotension    CARDIOVASCULAR:  - Levo for -160  - cardiology following for takotusbo: TTE before discharge, daily EKG, ischemic work up with CCTA or LHC before discharge     PULMONARY:  - on room air, no issues    RENAL:  - 3% at 15  - limit fluids due to takotsubo    GI:  - regular diet  - bowel regimen    HEME:  - cont iron  - monitor H/H    ID:  - febrile overnight, f/u pan cx    ENDO:  - iSS    DVT PROPHYLAXIS:  [x] Venodynes                                [x] Heparin/Lovenox      DISPOSITION: ICU status, full code, dispo pending    d/w Dr. Serrano      Assessment:  Present when checked    []  GCS  E   V  M     Heart Failure: []Acute, [] acute on chronic , []chronic  Heart Failure:  [] Diastolic (HFpEF), [] Systolic (HFrEF), []Combined (HFpEF and HFrEF), [] RHF, [] Pulm HTN, [] Other    [] MICHAELLE, [] ATN, [] AIN, [] other  [] CKD1, [] CKD2, [] CKD 3, [] CKD 4, [] CKD 5, []ESRD    Encephalopathy: [] Metabolic, [] Hepatic, [] toxic, [] Neurological, [] Other    Abnormal Nurtitional Status: [] malnurtition (see nutrition note), [ ]underweight: BMI < 19, [] morbid obesity: BMI >40, [] Cachexia    [] Sepsis  [] hypovolemic shock,[] cardiogenic shock, [] hemorrhagic shock, [] neuogenic shock  [] Acute Respiratory Failure  []Cerebral edema, [] Brain compression/ herniation,   [] Functional quadriplegia  [] Acute blood loss anemia

## 2020-12-16 NOTE — PROGRESS NOTE ADULT - ASSESSMENT
75y/Female with:  aneursymal subarachnoid hemorrhage, L pcomm aneurysm, L parophthalmic aneurysm; brain compression, cerebral edema  osbtructive hydrocephalus  lacunar infarcts R caudate, B thalami, cerebellar hemispheres ?artery to artery vs cardioembolic?  atherosclerotic cardiovascular disease; mod to severe bilateral ICA stenosis (R>L), R VA stenosis  Hypertension dyslipidemia   COPD asthma  newly diagnosed Diabetes Mellitus?  troponin leak    PLAN: Day 1 = 12-09 ; Day 4 =  12/12; Day 21 = 12/29  NEURO: neurochecks q1h, PRN pain meds with Tylenol / Percocet  s/p angio; HDA with levophed  SAH:  nimodipine discontinued due to pressor requirements  Hydrocephalus:  EVD 5 cm H20 open to drain, monitor ICPs  Seizure prophylaxis (cortical ICH, associated SDH, unclear etiology):  lacosamide 50mg PO BID   REHAB:  physical therapy evaluation and management    EARLY MOB:  HOB elevated    PULM:  Room air, incentive spirometry, continue montelukast, ipratropium / albuterol PRN   CARDIO:  SBP goal 90-180mm Hg, EF 35%, repeat TTE; levophed  ENDO:  Blood sugar goals 140-180 mg/dL, cont insulin sliding scale, atorvastatin 40mg   GI:  PPI for GI prophylaxis (home meds); bowel regimen  DIET: CCD  RENAL: maintain euvolemia, accurate Is and Os  HEM/ONC: Hb stable, iron profile c/w iron deficiency, iron FeSO4 325 TID; FOBT  VTE Prophylaxis: SCDs, SQL  ID: afebrile, leukocytosis  Social: will update family    CORE MEASURES  1.  Nath and Jacobo Score = 3  2.  VTE prophylaxis:  [ ] administered within 24 hours of admission OR [X] reason not done: fresh bleed, unsecured aneurysm.  3.  Dysphagia screening:   [X] performed before any oral meds / liquids / food  4.  Nimodipine treatment:  [X] administered within 24 hours of admission OR [ ] reason not done:    ATTENDING ATTESTATION:  I was physically present for the key portions of the evaluation and management (E/M) service provided.  I agree with the above history, physical and plan, which I have reviewed and edited where appropriate.    Patient at high risk for neurological deterioration or death due to:  ICU delirium, aspiration PNA, DVT / PE.  Critical care time:  I have personally provided 30 minutes of critical care time, excluding time spent on separate procedures.      Plan discussed with RN, house staff.   75y/Female with:  aneursymal subarachnoid hemorrhage, L pcomm aneurysm, L parophthalmic aneurysm; brain compression, cerebral edema  osbtructive hydrocephalus  lacunar infarcts R caudate, B thalami, cerebellar hemispheres ?artery to artery vs cardioembolic?  atherosclerotic cardiovascular disease; mod to severe bilateral ICA stenosis (R>L), R VA stenosis  Hypertension dyslipidemia   COPD asthma  newly diagnosed Diabetes Mellitus?  troponin leak    PLAN: Day 1 = 12-09 ; Day 4 =  12/12; Day 21 = 12/29  NEURO: neurochecks q1h, PRN pain meds with Tylenol / Percocet  s/p angio; no vasospasm - no indication for HDA  SAH:  nimodipine discontinued due to pressor requirements  Hydrocephalus:  EVD 5 cm H20 open to drain, monitor ICPs  Seizure prophylaxis (cortical ICH, associated SDH, unclear etiology):  lacosamide 100mg PO BID   REHAB:  physical therapy evaluation and management    EARLY MOB:  HOB elevated    PULM:  Room air, incentive spirometry, continue montelukast, ipratropium / albuterol standing x24h  CARDIO:  SBP goal 100-180mm Hg, EF 35%, repeat TTE; levophed to keep MAP goals  ENDO:  Blood sugar goals 140-180 mg/dL, cont insulin sliding scale, atorvastatin 40mg   GI:  PPI for GI prophylaxis (home meds); bowel regimen  DIET: NPO for now  RENAL: maintain euvolemia, accurate Is and Os, NS@40cc/hr; Na goal 140-145, continue salt tabs and fludrocortisone  HEM/ONC: Hb stable, iron profile c/w iron deficiency, iron FeSO4 325 TID; FOBT  VTE Prophylaxis: SCDs, no DVT chemoprophylaxis for now as patient is high risk for bleed (fresh post-vascular repair), recheck PTT  ID: afebrile, leukocytosis; continue piperacillin/tazobactam vancomuycin; f/u culture results  Social: will update family    CORE MEASURES  1.  Nath and Jacobo Score = 3  2.  VTE prophylaxis:  [ ] administered within 24 hours of admission OR [X] reason not done: fresh bleed, unsecured aneurysm.  3.  Dysphagia screening:   [X] performed before any oral meds / liquids / food  4.  Nimodipine treatment:  [X] administered within 24 hours of admission OR [ ] reason not done:    ATTENDING ATTESTATION:  I was physically present for the key portions of the evaluation and management (E/M) service provided.  I agree with the above history, physical and plan, which I have reviewed and edited where appropriate.    Patient at high risk for neurological deterioration or death due to:  ICU delirium, aspiration PNA, DVT / PE.  Critical care time:  I have personally provided 30 minutes of critical care time, excluding time spent on separate procedures.      Plan discussed with RN, house staff.

## 2020-12-17 LAB
ANION GAP SERPL CALC-SCNC: 15 MMOL/L — SIGNIFICANT CHANGE UP (ref 5–17)
ANION GAP SERPL CALC-SCNC: 17 MMOL/L — SIGNIFICANT CHANGE UP (ref 5–17)
APTT BLD: 28.3 SEC — SIGNIFICANT CHANGE UP (ref 27.5–35.5)
BUN SERPL-MCNC: 16 MG/DL — SIGNIFICANT CHANGE UP (ref 7–23)
BUN SERPL-MCNC: 18 MG/DL — SIGNIFICANT CHANGE UP (ref 7–23)
CALCIUM SERPL-MCNC: 7.4 MG/DL — LOW (ref 8.4–10.5)
CALCIUM SERPL-MCNC: 7.6 MG/DL — LOW (ref 8.4–10.5)
CHLORIDE SERPL-SCNC: 114 MMOL/L — HIGH (ref 96–108)
CHLORIDE SERPL-SCNC: 116 MMOL/L — HIGH (ref 96–108)
CO2 SERPL-SCNC: 15 MMOL/L — LOW (ref 22–31)
CO2 SERPL-SCNC: 16 MMOL/L — LOW (ref 22–31)
CREAT SERPL-MCNC: 0.64 MG/DL — SIGNIFICANT CHANGE UP (ref 0.5–1.3)
CREAT SERPL-MCNC: 0.68 MG/DL — SIGNIFICANT CHANGE UP (ref 0.5–1.3)
GLUCOSE SERPL-MCNC: 138 MG/DL — HIGH (ref 70–99)
GLUCOSE SERPL-MCNC: 161 MG/DL — HIGH (ref 70–99)
HCT VFR BLD CALC: 37.5 % — SIGNIFICANT CHANGE UP (ref 34.5–45)
HGB BLD-MCNC: 11.8 G/DL — SIGNIFICANT CHANGE UP (ref 11.5–15.5)
MAGNESIUM SERPL-MCNC: 2.1 MG/DL — SIGNIFICANT CHANGE UP (ref 1.6–2.6)
MCHC RBC-ENTMCNC: 27.2 PG — SIGNIFICANT CHANGE UP (ref 27–34)
MCHC RBC-ENTMCNC: 31.5 GM/DL — LOW (ref 32–36)
MCV RBC AUTO: 86.4 FL — SIGNIFICANT CHANGE UP (ref 80–100)
NRBC # BLD: 0 /100 WBCS — SIGNIFICANT CHANGE UP (ref 0–0)
PHOSPHATE SERPL-MCNC: 2.2 MG/DL — LOW (ref 2.5–4.5)
PLATELET # BLD AUTO: 262 K/UL — SIGNIFICANT CHANGE UP (ref 150–400)
POTASSIUM SERPL-MCNC: 3.4 MMOL/L — LOW (ref 3.5–5.3)
POTASSIUM SERPL-MCNC: 3.5 MMOL/L — SIGNIFICANT CHANGE UP (ref 3.5–5.3)
POTASSIUM SERPL-SCNC: 3.4 MMOL/L — LOW (ref 3.5–5.3)
POTASSIUM SERPL-SCNC: 3.5 MMOL/L — SIGNIFICANT CHANGE UP (ref 3.5–5.3)
PROCALCITONIN SERPL-MCNC: 0.23 NG/ML — HIGH (ref 0.02–0.1)
RBC # BLD: 4.34 M/UL — SIGNIFICANT CHANGE UP (ref 3.8–5.2)
RBC # FLD: 17.4 % — HIGH (ref 10.3–14.5)
SODIUM SERPL-SCNC: 146 MMOL/L — HIGH (ref 135–145)
SODIUM SERPL-SCNC: 147 MMOL/L — HIGH (ref 135–145)
WBC # BLD: 13.63 K/UL — HIGH (ref 3.8–10.5)
WBC # FLD AUTO: 13.63 K/UL — HIGH (ref 3.8–10.5)

## 2020-12-17 PROCEDURE — 95720 EEG PHY/QHP EA INCR W/VEEG: CPT

## 2020-12-17 PROCEDURE — 99291 CRITICAL CARE FIRST HOUR: CPT | Mod: 24

## 2020-12-17 PROCEDURE — 93970 EXTREMITY STUDY: CPT | Mod: 26

## 2020-12-17 PROCEDURE — 99233 SBSQ HOSP IP/OBS HIGH 50: CPT

## 2020-12-17 RX ORDER — POTASSIUM CHLORIDE 20 MEQ
20 PACKET (EA) ORAL ONCE
Refills: 0 | Status: COMPLETED | OUTPATIENT
Start: 2020-12-17 | End: 2020-12-17

## 2020-12-17 RX ORDER — MILRINONE LACTATE 1 MG/ML
2000 INJECTION, SOLUTION INTRAVENOUS ONCE
Refills: 0 | Status: COMPLETED | OUTPATIENT
Start: 2020-12-17 | End: 2020-12-17

## 2020-12-17 RX ORDER — SODIUM CHLORIDE 9 MG/ML
1 INJECTION INTRAMUSCULAR; INTRAVENOUS; SUBCUTANEOUS EVERY 12 HOURS
Refills: 0 | Status: DISCONTINUED | OUTPATIENT
Start: 2020-12-17 | End: 2020-12-18

## 2020-12-17 RX ORDER — POTASSIUM PHOSPHATE, MONOBASIC POTASSIUM PHOSPHATE, DIBASIC 236; 224 MG/ML; MG/ML
30 INJECTION, SOLUTION INTRAVENOUS ONCE
Refills: 0 | Status: COMPLETED | OUTPATIENT
Start: 2020-12-17 | End: 2020-12-17

## 2020-12-17 RX ORDER — MILRINONE LACTATE 1 MG/ML
2000 INJECTION, SOLUTION INTRAVENOUS ONCE
Refills: 0 | Status: DISCONTINUED | OUTPATIENT
Start: 2020-12-17 | End: 2020-12-17

## 2020-12-17 RX ORDER — MILRINONE LACTATE 1 MG/ML
0.5 INJECTION, SOLUTION INTRAVENOUS
Qty: 20 | Refills: 0 | Status: DISCONTINUED | OUTPATIENT
Start: 2020-12-17 | End: 2020-12-18

## 2020-12-17 RX ORDER — AMANTADINE HCL 100 MG
200 CAPSULE ORAL
Refills: 0 | Status: DISCONTINUED | OUTPATIENT
Start: 2020-12-17 | End: 2020-12-18

## 2020-12-17 RX ORDER — MILRINONE LACTATE 1 MG/ML
0.38 INJECTION, SOLUTION INTRAVENOUS
Qty: 20 | Refills: 0 | Status: DISCONTINUED | OUTPATIENT
Start: 2020-12-17 | End: 2020-12-17

## 2020-12-17 RX ORDER — MILRINONE LACTATE 1 MG/ML
0.04 INJECTION, SOLUTION INTRAVENOUS
Qty: 20 | Refills: 0 | Status: DISCONTINUED | OUTPATIENT
Start: 2020-12-17 | End: 2020-12-17

## 2020-12-17 RX ADMIN — SODIUM CHLORIDE 2 GRAM(S): 9 INJECTION INTRAMUSCULAR; INTRAVENOUS; SUBCUTANEOUS at 06:27

## 2020-12-17 RX ADMIN — PIPERACILLIN AND TAZOBACTAM 200 GRAM(S): 4; .5 INJECTION, POWDER, LYOPHILIZED, FOR SOLUTION INTRAVENOUS at 11:49

## 2020-12-17 RX ADMIN — MILRINONE LACTATE 312 MICROGRAM(S): 1 INJECTION, SOLUTION INTRAVENOUS at 08:40

## 2020-12-17 RX ADMIN — MILRINONE LACTATE 5.61 MICROGRAM(S)/KG/MIN: 1 INJECTION, SOLUTION INTRAVENOUS at 08:40

## 2020-12-17 RX ADMIN — Medication 325 MILLIGRAM(S): at 11:48

## 2020-12-17 RX ADMIN — LACOSAMIDE 100 MILLIGRAM(S): 50 TABLET ORAL at 19:01

## 2020-12-17 RX ADMIN — CHLORHEXIDINE GLUCONATE 1 APPLICATION(S): 213 SOLUTION TOPICAL at 06:27

## 2020-12-17 RX ADMIN — PIPERACILLIN AND TAZOBACTAM 200 GRAM(S): 4; .5 INJECTION, POWDER, LYOPHILIZED, FOR SOLUTION INTRAVENOUS at 06:26

## 2020-12-17 RX ADMIN — Medication 3 MILLILITER(S): at 10:37

## 2020-12-17 RX ADMIN — Medication 2: at 11:20

## 2020-12-17 RX ADMIN — ATORVASTATIN CALCIUM 40 MILLIGRAM(S): 80 TABLET, FILM COATED ORAL at 22:23

## 2020-12-17 RX ADMIN — Medication 20 MILLIEQUIVALENT(S): at 15:14

## 2020-12-17 RX ADMIN — Medication 650 MILLIGRAM(S): at 12:15

## 2020-12-17 RX ADMIN — POTASSIUM PHOSPHATE, MONOBASIC POTASSIUM PHOSPHATE, DIBASIC 83.33 MILLIMOLE(S): 236; 224 INJECTION, SOLUTION INTRAVENOUS at 10:37

## 2020-12-17 RX ADMIN — Medication 200 MILLIGRAM(S): at 15:14

## 2020-12-17 RX ADMIN — Medication 650 MILLIGRAM(S): at 12:49

## 2020-12-17 RX ADMIN — LACOSAMIDE 100 MILLIGRAM(S): 50 TABLET ORAL at 06:26

## 2020-12-17 RX ADMIN — ENOXAPARIN SODIUM 40 MILLIGRAM(S): 100 INJECTION SUBCUTANEOUS at 22:23

## 2020-12-17 RX ADMIN — MILRINONE LACTATE 7.49 MICROGRAM(S)/KG/MIN: 1 INJECTION, SOLUTION INTRAVENOUS at 21:26

## 2020-12-17 RX ADMIN — FLUDROCORTISONE ACETATE 0.1 MILLIGRAM(S): 0.1 TABLET ORAL at 07:48

## 2020-12-17 RX ADMIN — PANTOPRAZOLE SODIUM 40 MILLIGRAM(S): 20 TABLET, DELAYED RELEASE ORAL at 06:26

## 2020-12-17 RX ADMIN — MONTELUKAST 10 MILLIGRAM(S): 4 TABLET, CHEWABLE ORAL at 11:49

## 2020-12-17 RX ADMIN — PREGABALIN 1000 MICROGRAM(S): 225 CAPSULE ORAL at 11:49

## 2020-12-17 RX ADMIN — MILRINONE LACTATE 312 MICROGRAM(S): 1 INJECTION, SOLUTION INTRAVENOUS at 10:52

## 2020-12-17 RX ADMIN — CHLORHEXIDINE GLUCONATE 15 MILLILITER(S): 213 SOLUTION TOPICAL at 06:26

## 2020-12-17 RX ADMIN — Medication 3 MILLILITER(S): at 22:24

## 2020-12-17 RX ADMIN — POLYETHYLENE GLYCOL 3350 17 GRAM(S): 17 POWDER, FOR SOLUTION ORAL at 11:48

## 2020-12-17 RX ADMIN — Medication 3 MILLILITER(S): at 04:11

## 2020-12-17 RX ADMIN — PIPERACILLIN AND TAZOBACTAM 200 GRAM(S): 4; .5 INJECTION, POWDER, LYOPHILIZED, FOR SOLUTION INTRAVENOUS at 19:01

## 2020-12-17 RX ADMIN — Medication 250 MILLIGRAM(S): at 11:47

## 2020-12-17 RX ADMIN — Medication 4: at 22:50

## 2020-12-17 RX ADMIN — SODIUM CHLORIDE 1 GRAM(S): 9 INJECTION INTRAMUSCULAR; INTRAVENOUS; SUBCUTANEOUS at 19:00

## 2020-12-17 NOTE — PROGRESS NOTE ADULT - ASSESSMENT
75y/Female with:  aneursymal subarachnoid hemorrhage, L pcomm aneurysm, L parophthalmic aneurysm; brain compression, cerebral edema  osbtructive hydrocephalus  lacunar infarcts R caudate, B thalami, cerebellar hemispheres ?artery to artery vs cardioembolic?  atherosclerotic cardiovascular disease; mod to severe bilateral ICA stenosis (R>L), R VA stenosis  Hypertension dyslipidemia   COPD asthma  newly diagnosed Diabetes Mellitus?  troponin leak    PLAN: Day 1 = 12-09 ; Day 4 =  12/12; Day 21 = 12/29  NEURO: neurochecks q1h, PRN pain meds with Tylenol / Percocet  CTA repeat today - lack of response yesterday with BP augmentation with norepinephrine infusion - received IA verapamil (will bridge with _____)  SAH:  nimodipine discontinued due to pressor requirements  Hydrocephalus:  EVD 0 cm H20 open to drain, monitor ICPs  Seizure prophylaxis (cortical ICH, associated SDH, unclear etiology):  lacosamide 200 mg load, increase dose to 100 mg BID (multifocal spikes, no epileptiform activity)  REHAB:  physical therapy evaluation and management    EARLY MOB:  HOB elevated    PULM:  Room air, incentive spirometry, continue montelukast, ipratropium / albuterol PRN   CARDIO:  SBP goal 100-180mm Hg, EF 35%, repeat TTE as per Cardiology (once outside VSP window, will start MTP/ARB)  ENDO:  Blood sugar goals 140-180 mg/dL, cont insulin sliding scale, atorvastatin 40mg   GI:  PPI for GI prophylaxis (home meds); bowel regimen  DIET: CCD  RENAL: IV NS 40 mL/h  HEM/ONC: Hb stable, iron profile c/w iron deficiency, iron FeSO4 325 TID; FOBT  VTE Prophylaxis: SCDs, SQL  ID: febrile, leukocytosis, pancultures negative so far, empiric vancomycin, piperacillin-tazobactam started  Social: will update family    CORE MEASURES  1.  Nath and Jacobo Score = 3  2.  VTE prophylaxis:  [ ] administered within 24 hours of admission OR [X] reason not done: fresh bleed, unsecured aneurysm.  3.  Dysphagia screening:   [X] performed before any oral meds / liquids / food  4.  Nimodipine treatment:  [X] administered within 24 hours of admission OR [ ] reason not done:    ATTENDING ATTESTATION:  I was physically present for the key portions of the evaluation and management (E/M) service provided.  I agree with the above history, physical and plan, which I have reviewed and edited where appropriate.    Patient at high risk for neurological deterioration or death due to:  ICU delirium, aspiration PNA, DVT / PE.  Critical care time:  I have personally provided 45 minutes of critical care time, excluding time spent on separate procedures.      Plan discussed with RN, house staff.   75y/Female with:  aneursymal subarachnoid hemorrhage, L pcomm aneurysm, L parophthalmic aneurysm; brain compression, cerebral edema  osbtructive hydrocephalus  lacunar infarcts R caudate, B thalami, cerebellar hemispheres ?artery to artery vs cardioembolic?  atherosclerotic cardiovascular disease; mod to severe bilateral ICA stenosis (R>L), R VA stenosis  Hypertension dyslipidemia   COPD asthma  newly diagnosed Diabetes Mellitus?  troponin leak    PLAN: Day 1 = 12-09 ; Day 4 =  12/12; Day 21 = 12/29  NEURO: neurochecks q1h, PRN pain meds with Tylenol / Percocet  Clinical improvement with IA verapamil overnight, transitioned to IV milrinone (tolerates well), start neurostimulant amantidine 200 mg BID  SAH:  nimodipine discontinued due to pressor requirements  Hydrocephalus:  EVD 0 cm H20 open to drain  Seizure prophylaxis (cortical ICH, associated SDH, unclear etiology):  lacosamide 200 mg load, increase dose to 100 mg BID (multifocal spikes, no epileptiform activity)  REHAB:  physical therapy evaluation and management    EARLY MOB:  HOB elevated    PULM:  Room air, incentive spirometry, continue montelukast, ipratropium / albuterol PRN   CARDIO:  SBP goal 100-180mm Hg, EF 35%, repeat TTE as per Cardiology (once outside VSP window, will start MTP/ARB)  ENDO:  Blood sugar goals 140-180 mg/dL, cont insulin sliding scale, atorvastatin 40mg   GI:  PPI for GI prophylaxis (home meds); bowel regimen  DIET: CCD  RENAL: IV NS 40 mL/h  HEM/ONC: Hb stable, iron profile c/w iron deficiency, iron FeSO4 325 TID  VTE Prophylaxis: SCDs, SQL  ID: febrile, leukocytosis, pancultures negative so far, empiric vancomycin, piperacillin-tazobactam (assess daily)  Social: updated family    *****    CORE MEASURES  1.  Nath and Jacobo Score = 3  2.  VTE prophylaxis:  [ ] administered within 24 hours of admission OR [X] reason not done: fresh bleed, unsecured aneurysm.  3.  Dysphagia screening:   [X] performed before any oral meds / liquids / food  4.  Nimodipine treatment:  [X] administered within 24 hours of admission OR [ ] reason not done:    ATTENDING ATTESTATION:  I was physically present for the key portions of the evaluation and management (E/M) service provided.  I agree with the above history, physical and plan, which I have reviewed and edited where appropriate.    Patient at high risk for neurological deterioration or death due to:  ICU delirium, aspiration PNA, DVT / PE.  Critical care time:  I have personally provided 45 minutes of critical care time, excluding time spent on separate procedures.      Plan discussed with RN, house staff.

## 2020-12-17 NOTE — PROGRESS NOTE ADULT - ASSESSMENT
Plan per Neuro:    NEURO:  - neuro checks  - vitals checks  - pain control  - EVD at 0, monitor ICP and output  - cont Vimpat   - Hold Nimodipine for hypotension  - propofol for sedation    CARDIOVASCULAR:  - -180  - cardiology following for takotusbo: TTE before discharge, daily EKG, ischemic work up with CCTA or LHC before discharge     PULMONARY:  - on room air, no issues    RENAL:  - de anda in place  - NS at 40    GI:  - NPO for extubation  - bowel regimen    HEME:  - cont iron  - monitor H/H    ID:  - cont Vanc/Zosyn    ENDO:    DVT PROPHYLAXIS:  [x] Venodynes                                [x] Heparin/Lovenox   76yo F POD#1 s/p cerebral angio for verapamil c/b device malfunction of Proglide, s/p right groin cutdown for removal of proglide catheter and femoral artery repair.  VIVIEN overnight, neuro stable, EVD at 0. patient remained intubated overnight, on propofol for sedation, and vEEG    -Right groin monitoring  -Pulse checks (Monophasic PT at baseline)  -Continue vancomycin x48hrs post op

## 2020-12-17 NOTE — PROGRESS NOTE ADULT - SUBJECTIVE AND OBJECTIVE BOX
Neurosurgery progress note/ post op check:    HPI:  74y/o F with PMHx sig for COPD, asthma, HLD, HTN, BIBEMS to [facility] from home after HHA discovered patient found down in floor covered in non-bloody vomitus. Perr HHA Pt went to the bathroom and was taking a long time, went in to check on her and found her sitting on floor, awake, however with vomitus on front of shirt. On arrival to [facility], patient was taken for stat head CT, which revealed diffuse subarachnoid hemorrhage mainly at basilar cisterns with IVH and obstructive hydrocephalus. Patient was given a bolus of 1g keppra and dilaudid pushes for generalized headache. CTA concerning for 3mm left pcomm aneurysm and a 1.6mm outpouching of distal cavernous segment of the left internal carotid artery likely aneurysm. NIHSS2 3 MF4. Patient was transferred to [facility] for further intervention. Patient currently reports headaches. Pt denies acute changes in vision, seizures, CP, SOB, weakness/paresthesias of arms or legs. (09 Dec 2020 23:37)    Hospital Course:   : BD1 Patient admitted for SAH HH3, MF4.   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 EVD at 5, on Levo overnight, nimodipine discontinued d/t hypotension  12: BD4 POD#2, VIVIEN overnight, neuro stable, passed TOV  12: BD5 POD#3: VIVIEN x 24 hrs  : BD6 POD#4. VIVIEN o/n, neuro stable. EVD at 5cm H2O  12/15: BD7 POD #5 VIVIEN overnight, neuro stable. EVD remains at 5. Plan for CTA today.   : BD8 POD #6 VIVIEN overnight, neuro stable, EVD at 0, on Levo, started on 3% at 15 overnight   : BD9 POD#1 s/p cerebral angio for verapamil c/b device malfunction of Proglide, s/p right groin cutdown for removal of proglide catheter and femoral artery repair.  VIVIEN overnight, neuro stable, EVD at 0. patient remained intubated overnight, on propofol for sedation, and vEEG    Vital Signs Last 24 Hrs  T(C): 37.8 (17 Dec 2020 04:06), Max: 38.5 (16 Dec 2020 11:41)  T(F): 100.1 (17 Dec 2020 04:06), Max: 101.3 (16 Dec 2020 11:41)  HR: 84 (17 Dec 2020 04:16) (60 - 116)  BP: 141/67 (17 Dec 2020 04:00) (88/50 - 211/99)  BP(mean): 96 (17 Dec 2020 04:00) (65 - 142)  RR: 20 (17 Dec 2020 04:16) (13 - 37)  SpO2: 100% (17 Dec 2020 04:16) (90% - 100%)    I&O's Detail    15 Dec 2020 07:01  -  16 Dec 2020 07:00  --------------------------------------------------------  IN:    Norepinephrine: 41.6 mL    sodium chloride 3%: 45 mL  Total IN: 86.6 mL    OUT:    External Ventricular Device (mL): 173 mL    Intermittent Catheterization - Urethral (mL): 950 mL  Total OUT: 1123 mL    Total NET: -1036.4 mL      16 Dec 2020 07:01  -  17 Dec 2020 05:02  --------------------------------------------------------  IN:    Enteral Tube Flush: 180 mL    IV PiggyBack: 750 mL    Norepinephrine: 1.5 mL    Norepinephrine: 2.2 mL    Propofol: 22.5 mL    sodium chloride 0.9%: 360 mL    Sodium Chloride 0.9% Bolus: 250 mL    sodium chloride 3%: 15 mL  Total IN: 1581.2 mL    OUT:    External Ventricular Device (mL): 129 mL    Indwelling Catheter - Urethral (mL): 925 mL    Intermittent Catheterization - Urethral (mL): 275 mL  Total OUT: 1329 mL    Total NET: 252.2 mL        I&O's Summary    15 Dec 2020 07:01  -  16 Dec 2020 07:00  --------------------------------------------------------  IN: 86.6 mL / OUT: 1123 mL / NET: -1036.4 mL    16 Dec 2020 07:01  -  17 Dec 2020 05:02  --------------------------------------------------------  IN: 1581.2 mL / OUT: 1329 mL / NET: 252.2 mL        PHYSICAL EXAM:  Gen: laying in hospital bed, NAD, +intubated, sedated on propofol  Neurological: OE to verbal stimulus, FC  CN II-XII: PERRL, EOMI  Motor exam: MAEx4, good strength throughout  SILT in UE and LE b/l  Cardiovascular: RRR  Respiratory: CTAB  Gastrointestinal: soft, NT/ND  EVD site C/D/I  Groin site: C/D/I  Pulses?    TUBES/LINES:  [] CVC  [] A-line  [] Lumbar Drain  [x] Ventriculostomy: EVD at 0  [] Other    DIET:  [x] NPO  [] Mechanical  [] Tube feeds    LABS:                        11.8   15.91 )-----------( 261      ( 16 Dec 2020 21:25 )             38.3     12-16    143  |  112<H>  |  21  ----------------------------<  153<H>  3.7   |  16<L>  |  0.69    Ca    6.9<L>      16 Dec 2020 21:25  Phos  3.0     12-16  Mg     2.4     12-16      PT/INR - ( 16 Dec 2020 21:25 )   PT: 14.4 sec;   INR: 1.21          PTT - ( 16 Dec 2020 21:25 )  PTT:81.3 sec  Urinalysis Basic - ( 16 Dec 2020 03:26 )    Color: Yellow / Appearance: Clear / S.020 / pH: x  Gluc: x / Ketone: 40 mg/dL  / Bili: Negative / Urobili: 0.2 E.U./dL   Blood: x / Protein: NEGATIVE mg/dL / Nitrite: NEGATIVE   Leuk Esterase: NEGATIVE / RBC: < 5 /HPF / WBC < 5 /HPF   Sq Epi: x / Non Sq Epi: 0-5 /HPF / Bacteria: Present /HPF          CAPILLARY BLOOD GLUCOSE      POCT Blood Glucose.: 120 mg/dL (16 Dec 2020 23:12)  POCT Blood Glucose.: 122 mg/dL (16 Dec 2020 11:12)  POCT Blood Glucose.: 156 mg/dL (16 Dec 2020 07:45)      Drug Levels: [] N/A    CSF Analysis: [] N/A  Glucose, CSF: 91 mg/dL ( @ 12:03)  Protein, CSF: 29 mg/dL ( @ 12:03)  RBC Count - Spinal Fluid: 86584 /uL ( @ 12:03)  CSF Lymphocytes: 3 % ( @ 12:03)      Allergies    No Known Allergies    Intolerances      MEDICATIONS:  Antibiotics:  piperacillin/tazobactam IVPB.. 3.375 Gram(s) IV Intermittent every 6 hours  vancomycin  IVPB 750 milliGRAM(s) IV Intermittent every 24 hours  vancomycin  IVPB        Neuro:  acetaminophen    Suspension .. 650 milliGRAM(s) Oral every 6 hours PRN  lacosamide 100 milliGRAM(s) Oral two times a day  propofol Infusion 5 MICROgram(s)/kG/Min IV Continuous <Continuous>    Anticoagulation:  enoxaparin Injectable 40 milliGRAM(s) SubCutaneous every 24 hours    OTHER:  albuterol/ipratropium for Nebulization. 3 milliLiter(s) Nebulizer every 6 hours  atorvastatin 40 milliGRAM(s) Oral at bedtime  bisacodyl Suppository 10 milliGRAM(s) Rectal daily  chlorhexidine 0.12% Liquid 15 milliLiter(s) Oral Mucosa every 12 hours  chlorhexidine 2% Cloths 1 Application(s) Topical <User Schedule>  dextrose 40% Gel 15 Gram(s) Oral once  dextrose 50% Injectable 25 Gram(s) IV Push once  fludroCORTISONE 0.1 milliGRAM(s) Oral every 24 hours  glucagon  Injectable 1 milliGRAM(s) IntraMuscular once  insulin lispro (ADMELOG) corrective regimen sliding scale   SubCutaneous Before meals and at bedtime  labetalol Injectable 10 milliGRAM(s) IV Push every 4 hours PRN  lidocaine 2% (Preservative-free) Injectable 10 milliLiter(s) Local Injection once  montelukast 10 milliGRAM(s) Oral daily  norepinephrine Infusion 0.05 MICROgram(s)/kG/Min IV Continuous <Continuous>  pantoprazole    Tablet 40 milliGRAM(s) Oral before breakfast  polyethylene glycol 3350 17 Gram(s) Oral daily  senna 2 Tablet(s) Oral at bedtime    IVF:  cyanocobalamin 1000 MICROGram(s) Oral daily  ferrous    sulfate 325 milliGRAM(s) Oral daily  sodium chloride 2 Gram(s) Oral every 12 hours  sodium chloride 0.9%. 1000 milliLiter(s) IV Continuous <Continuous>    CULTURES:  Culture Results:   No growth at 1 day. ( @ 01:18)  Culture Results:   No growth at 1 day. ( @ 01:18)    RADIOLOGY & ADDITIONAL TESTS:      ASSESSMENT:  76 yo Female with PMHx of COPD, asthma, HLD, HTN, BIBEMS to [facility] from home after HHA found patient down on the floor covered in non-bloody vomitus. CTH reveals diffuse subarachnoid hemorrhage mainly at basilar cisterns with IVH and obstructive hydrocephalus, CTA shows 3mm left pcomm aneurysm, now s/p bedside right frontal EVD placement 12/10, s/p cerebral angiogram for coil embolization of left PCOMM aneurysm 12/10, now POD #1 s/p cerebral angio for verapamil c/b device malfunction of Proglide, s/p right groin cutdown for removal of proglide catheter and femoral artery repair (2020), NIHSS2 HH3 MF4      HEADACHE    Handoff    Hyperlipidemia    Hypertension    COPD (chronic obstructive pulmonary disease)    Asthma    COPD (chronic obstructive pulmonary disease)    Asthma    Injury of right femoral artery    Cerebral artery vasospasm    SAH (subarachnoid hemorrhage)    Injury of femoral artery    Cerebral artery vasospasm    SAH (subarachnoid hemorrhage)    Subarachnoid bleed    Vascular surgery procedure    Angiogram, carotid and cerebral, bilateral    Angiogram, cerebral, with intracranial aneurysm embolization    No significant past surgical history    HEADACHE    90+    SysAdmin_VisitLink        PLAN:  NEURO:  - neuro checks  - vitals checks  - pain control  - EVD at 0, monitor ICP and output  - cont Vimpat   - Hold Nimodipine for hypotension  - propofol for sedation    CARDIOVASCULAR:  - -180  - cardiology following for takotusbo: TTE before discharge, daily EKG, ischemic work up with CCTA or LHC before discharge     PULMONARY:  - on room air, no issues    RENAL:  - de anda in place  - NS at 40    GI:  - NPO for extubation  - bowel regimen    HEME:  - cont iron  - monitor H/H    ID:  - cont Vanc/Zosyn    ENDO:    DVT PROPHYLAXIS:  [x] Venodynes                                [x] Heparin/Lovenox    DISPOSITION:     d/w Dr. Serrano    Assessment:  Present when checked    []  GCS  E   V  M     Heart Failure: []Acute, [] acute on chronic , []chronic  Heart Failure:  [] Diastolic (HFpEF), [] Systolic (HFrEF), []Combined (HFpEF and HFrEF), [] RHF, [] Pulm HTN, [] Other    [] MICHAELLE, [] ATN, [] AIN, [] other  [] CKD1, [] CKD2, [] CKD 3, [] CKD 4, [] CKD 5, []ESRD    Encephalopathy: [] Metabolic, [] Hepatic, [] toxic, [] Neurological, [] Other    Abnormal Nurtitional Status: [] malnurtition (see nutrition note), [ ]underweight: BMI < 19, [] morbid obesity: BMI >40, [] Cachexia    [] Sepsis  [] hypovolemic shock,[] cardiogenic shock, [] hemorrhagic shock, [] neuogenic shock  [] Acute Respiratory Failure  []Cerebral edema, [] Brain compression/ herniation,   [] Functional quadriplegia  [] Acute blood loss anemia   Neurosurgery progress note/ post op check:    HPI:  74y/o F with PMHx sig for COPD, asthma, HLD, HTN, BIBEMS to [facility] from home after HHA discovered patient found down in floor covered in non-bloody vomitus. Perr HHA Pt went to the bathroom and was taking a long time, went in to check on her and found her sitting on floor, awake, however with vomitus on front of shirt. On arrival to [facility], patient was taken for stat head CT, which revealed diffuse subarachnoid hemorrhage mainly at basilar cisterns with IVH and obstructive hydrocephalus. Patient was given a bolus of 1g keppra and dilaudid pushes for generalized headache. CTA concerning for 3mm left pcomm aneurysm and a 1.6mm outpouching of distal cavernous segment of the left internal carotid artery likely aneurysm. NIHSS2 3 MF4. Patient was transferred to [facility] for further intervention. Patient currently reports headaches. Pt denies acute changes in vision, seizures, CP, SOB, weakness/paresthesias of arms or legs. (09 Dec 2020 23:37)    Hospital Course:   : BD1 Patient admitted for SAH HH3, MF4.   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 EVD at 5, on Levo overnight, nimodipine discontinued d/t hypotension  12: BD4 POD#2, VIVIEN overnight, neuro stable, passed TOV  12: BD5 POD#3: VIVIEN x 24 hrs  : BD6 POD#4. VIVIEN o/n, neuro stable. EVD at 5cm H2O  12/15: BD7 POD #5 VIVIEN overnight, neuro stable. EVD remains at 5. Plan for CTA today.   : BD8 POD #6 VIVIEN overnight, neuro stable, EVD at 0, on Levo, started on 3% at 15 overnight   : BD9 POD#1 s/p cerebral angio for verapamil c/b device malfunction of Proglide, s/p right groin cutdown for removal of proglide catheter and femoral artery repair.  VIVIEN overnight, neuro stable, EVD at 0. patient remained intubated overnight, on propofol for sedation, and vEEG    Vital Signs Last 24 Hrs  T(C): 37.8 (17 Dec 2020 04:06), Max: 38.5 (16 Dec 2020 11:41)  T(F): 100.1 (17 Dec 2020 04:06), Max: 101.3 (16 Dec 2020 11:41)  HR: 84 (17 Dec 2020 04:16) (60 - 116)  BP: 141/67 (17 Dec 2020 04:00) (88/50 - 211/99)  BP(mean): 96 (17 Dec 2020 04:00) (65 - 142)  RR: 20 (17 Dec 2020 04:16) (13 - 37)  SpO2: 100% (17 Dec 2020 04:16) (90% - 100%)    I&O's Detail    15 Dec 2020 07:01  -  16 Dec 2020 07:00  --------------------------------------------------------  IN:    Norepinephrine: 41.6 mL    sodium chloride 3%: 45 mL  Total IN: 86.6 mL    OUT:    External Ventricular Device (mL): 173 mL    Intermittent Catheterization - Urethral (mL): 950 mL  Total OUT: 1123 mL    Total NET: -1036.4 mL      16 Dec 2020 07:01  -  17 Dec 2020 05:02  --------------------------------------------------------  IN:    Enteral Tube Flush: 180 mL    IV PiggyBack: 750 mL    Norepinephrine: 1.5 mL    Norepinephrine: 2.2 mL    Propofol: 22.5 mL    sodium chloride 0.9%: 360 mL    Sodium Chloride 0.9% Bolus: 250 mL    sodium chloride 3%: 15 mL  Total IN: 1581.2 mL    OUT:    External Ventricular Device (mL): 129 mL    Indwelling Catheter - Urethral (mL): 925 mL    Intermittent Catheterization - Urethral (mL): 275 mL  Total OUT: 1329 mL    Total NET: 252.2 mL        I&O's Summary    15 Dec 2020 07:01  -  16 Dec 2020 07:00  --------------------------------------------------------  IN: 86.6 mL / OUT: 1123 mL / NET: -1036.4 mL    16 Dec 2020 07:01  -  17 Dec 2020 05:02  --------------------------------------------------------  IN: 1581.2 mL / OUT: 1329 mL / NET: 252.2 mL        PHYSICAL EXAM:  Gen: laying in hospital bed, NAD, +intubated, sedated on propofol  Neurological: OE to verbal stimulus, FC  CN II-XII: PERRL, EOMI  Motor exam: MAEx4, good strength throughout  SILT in UE and LE b/l  Cardiovascular: RRR  Respiratory: CTAB  Gastrointestinal: soft, NT/ND  EVD site C/D/I  Groin site: C/D/I  Pulses: b/l PT not palpable, pulse detected with doppler    TUBES/LINES:  [] CVC  [] A-line  [] Lumbar Drain  [x] Ventriculostomy: EVD at 0  [] Other    DIET:  [x] NPO  [] Mechanical  [] Tube feeds    LABS:                        11.8   15.91 )-----------( 261      ( 16 Dec 2020 21:25 )             38.3     12-16    143  |  112<H>  |  21  ----------------------------<  153<H>  3.7   |  16<L>  |  0.69    Ca    6.9<L>      16 Dec 2020 21:25  Phos  3.0     12-16  Mg     2.4     12-16      PT/INR - ( 16 Dec 2020 21:25 )   PT: 14.4 sec;   INR: 1.21          PTT - ( 16 Dec 2020 21:25 )  PTT:81.3 sec  Urinalysis Basic - ( 16 Dec 2020 03:26 )    Color: Yellow / Appearance: Clear / S.020 / pH: x  Gluc: x / Ketone: 40 mg/dL  / Bili: Negative / Urobili: 0.2 E.U./dL   Blood: x / Protein: NEGATIVE mg/dL / Nitrite: NEGATIVE   Leuk Esterase: NEGATIVE / RBC: < 5 /HPF / WBC < 5 /HPF   Sq Epi: x / Non Sq Epi: 0-5 /HPF / Bacteria: Present /HPF          CAPILLARY BLOOD GLUCOSE      POCT Blood Glucose.: 120 mg/dL (16 Dec 2020 23:12)  POCT Blood Glucose.: 122 mg/dL (16 Dec 2020 11:12)  POCT Blood Glucose.: 156 mg/dL (16 Dec 2020 07:45)      Drug Levels: [] N/A    CSF Analysis: [] N/A  Glucose, CSF: 91 mg/dL ( @ 12:03)  Protein, CSF: 29 mg/dL ( @ 12:03)  RBC Count - Spinal Fluid: 59321 /uL ( @ 12:03)  CSF Lymphocytes: 3 % ( @ 12:03)      Allergies    No Known Allergies    Intolerances      MEDICATIONS:  Antibiotics:  piperacillin/tazobactam IVPB.. 3.375 Gram(s) IV Intermittent every 6 hours  vancomycin  IVPB 750 milliGRAM(s) IV Intermittent every 24 hours  vancomycin  IVPB        Neuro:  acetaminophen    Suspension .. 650 milliGRAM(s) Oral every 6 hours PRN  lacosamide 100 milliGRAM(s) Oral two times a day  propofol Infusion 5 MICROgram(s)/kG/Min IV Continuous <Continuous>    Anticoagulation:  enoxaparin Injectable 40 milliGRAM(s) SubCutaneous every 24 hours    OTHER:  albuterol/ipratropium for Nebulization. 3 milliLiter(s) Nebulizer every 6 hours  atorvastatin 40 milliGRAM(s) Oral at bedtime  bisacodyl Suppository 10 milliGRAM(s) Rectal daily  chlorhexidine 0.12% Liquid 15 milliLiter(s) Oral Mucosa every 12 hours  chlorhexidine 2% Cloths 1 Application(s) Topical <User Schedule>  dextrose 40% Gel 15 Gram(s) Oral once  dextrose 50% Injectable 25 Gram(s) IV Push once  fludroCORTISONE 0.1 milliGRAM(s) Oral every 24 hours  glucagon  Injectable 1 milliGRAM(s) IntraMuscular once  insulin lispro (ADMELOG) corrective regimen sliding scale   SubCutaneous Before meals and at bedtime  labetalol Injectable 10 milliGRAM(s) IV Push every 4 hours PRN  lidocaine 2% (Preservative-free) Injectable 10 milliLiter(s) Local Injection once  montelukast 10 milliGRAM(s) Oral daily  norepinephrine Infusion 0.05 MICROgram(s)/kG/Min IV Continuous <Continuous>  pantoprazole    Tablet 40 milliGRAM(s) Oral before breakfast  polyethylene glycol 3350 17 Gram(s) Oral daily  senna 2 Tablet(s) Oral at bedtime    IVF:  cyanocobalamin 1000 MICROGram(s) Oral daily  ferrous    sulfate 325 milliGRAM(s) Oral daily  sodium chloride 2 Gram(s) Oral every 12 hours  sodium chloride 0.9%. 1000 milliLiter(s) IV Continuous <Continuous>    CULTURES:  Culture Results:   No growth at 1 day. ( @ 01:18)  Culture Results:   No growth at 1 day. ( @ 01:18)    RADIOLOGY & ADDITIONAL TESTS:      ASSESSMENT:  74 yo Female with PMHx of COPD, asthma, HLD, HTN, BIBEMS to [facility] from home after HHA found patient down on the floor covered in non-bloody vomitus. CTH reveals diffuse subarachnoid hemorrhage mainly at basilar cisterns with IVH and obstructive hydrocephalus, CTA shows 3mm left pcomm aneurysm, now s/p bedside right frontal EVD placement 12/10, s/p cerebral angiogram for coil embolization of left PCOMM aneurysm 12/10, now POD #1 s/p cerebral angio for verapamil c/b device malfunction of Proglide, s/p right groin cutdown for removal of proglide catheter and femoral artery repair (2020), NIHSS2 HH3 MF4      HEADACHE    Handoff    Hyperlipidemia    Hypertension    COPD (chronic obstructive pulmonary disease)    Asthma    COPD (chronic obstructive pulmonary disease)    Asthma    Injury of right femoral artery    Cerebral artery vasospasm    SAH (subarachnoid hemorrhage)    Injury of femoral artery    Cerebral artery vasospasm    SAH (subarachnoid hemorrhage)    Subarachnoid bleed    Vascular surgery procedure    Angiogram, carotid and cerebral, bilateral    Angiogram, cerebral, with intracranial aneurysm embolization    No significant past surgical history    HEADACHE    90+    SysAdmin_VisitLink        PLAN:  NEURO:  - neuro checks  - vitals checks  - pain control  - EVD at 0, monitor ICP and output  - cont Vimpat   - Hold Nimodipine for hypotension  - propofol for sedation    CARDIOVASCULAR:  - -180  - cardiology following for takotusbo: TTE before discharge, daily EKG, ischemic work up with CCTA or LHC before discharge     PULMONARY:  - on room air, no issues    RENAL:  - de anda in place  - NS at 40    GI:  - NPO for extubation  - bowel regimen    HEME:  - cont iron  - monitor H/H    ID:  - cont Vanc/Zosyn    ENDO:    DVT PROPHYLAXIS:  [x] Venodynes                                [x] Heparin/Lovenox    DISPOSITION:     d/w Dr. Serrano    Assessment:  Present when checked    []  GCS  E   V  M     Heart Failure: []Acute, [] acute on chronic , []chronic  Heart Failure:  [] Diastolic (HFpEF), [] Systolic (HFrEF), []Combined (HFpEF and HFrEF), [] RHF, [] Pulm HTN, [] Other    [] MICHAELLE, [] ATN, [] AIN, [] other  [] CKD1, [] CKD2, [] CKD 3, [] CKD 4, [] CKD 5, []ESRD    Encephalopathy: [] Metabolic, [] Hepatic, [] toxic, [] Neurological, [] Other    Abnormal Nurtitional Status: [] malnurtition (see nutrition note), [ ]underweight: BMI < 19, [] morbid obesity: BMI >40, [] Cachexia    [] Sepsis  [] hypovolemic shock,[] cardiogenic shock, [] hemorrhagic shock, [] neuogenic shock  [] Acute Respiratory Failure  []Cerebral edema, [] Brain compression/ herniation,   [] Functional quadriplegia  [] Acute blood loss anemia

## 2020-12-17 NOTE — PROGRESS NOTE ADULT - SUBJECTIVE AND OBJECTIVE BOX
Patient seen and examined at bedside.      Vital Signs Last 24 Hrs  T(C): 37.8 (17 Dec 2020 04:06), Max: 38.5 (16 Dec 2020 11:41)  T(F): 100.1 (17 Dec 2020 04:06), Max: 101.3 (16 Dec 2020 11:41)  HR: 83 (17 Dec 2020 08:00) (60 - 116)  BP: 113/55 (17 Dec 2020 08:00) (88/50 - 211/99)  BP(mean): 77 (17 Dec 2020 08:00) (65 - 142)  RR: 20 (17 Dec 2020 08:00) (13 - 37)  SpO2: 100% (17 Dec 2020 08:00) (91% - 100%)  I&O's Summary    16 Dec 2020 07:01  -  17 Dec 2020 07:00  --------------------------------------------------------  IN: 1702.7 mL / OUT: 1454 mL / NET: 248.7 mL    17 Dec 2020 07:01  -  17 Dec 2020 09:02  --------------------------------------------------------  IN: 90 mL / OUT: 41 mL / NET: 49 mL      Gen: laying in hospital bed, NAD, +intubated, sedated on propofol  C/V: NSR  Pulm: Nonlabored breathing, vented  Abd: soft, NT/ND  Extrem: right groin soft, dressing with serous saturation, no active bleeding  Vascular: Right foot warm, biphasic PT, no DP RLE.     TUBES/LINES:  [] CVC  [] A-line  [] Lumbar Drain  [x] Ventriculostomy: EVD at 0  [] Other    DIET:  [x] NPO  [] Mechanical  [] Tube feeds    LABS:                        11.8   13.63 )-----------( 262      ( 17 Dec 2020 05:25 )             37.5     12-17    146<H>  |  114<H>  |  18  ----------------------------<  138<H>  3.5   |  15<L>  |  0.64    Ca    7.6<L>      17 Dec 2020 05:25  Phos  2.2     12-17  Mg     2.1     12-17      PT/INR - ( 16 Dec 2020 21:25 )   PT: 14.4 sec;   INR: 1.21          PTT - ( 17 Dec 2020 05:25 )  PTT:28.3 sec    Radiology and Additional Studies:

## 2020-12-17 NOTE — PROGRESS NOTE ADULT - SUBJECTIVE AND OBJECTIVE BOX
Cardiology fellow Progress note    Subjective/Interval events: Patient still is in cerebral vasospam, primary team initiated Milrinone for vasodilation. HRs are reasonable and acceptable.     ROS: A 10-point review of systems was otherwise negative.    ICU Vital Signs Last 24 Hrs  T(C): 38.2 (17 Dec 2020 11:35), Max: 38.2 (17 Dec 2020 11:35)  T(F): 100.7 (17 Dec 2020 11:35), Max: 100.7 (17 Dec 2020 11:35)  HR: 102 (17 Dec 2020 12:00) (60 - 105)  BP: 117/56 (17 Dec 2020 12:00) (88/50 - 211/99)  BP(mean): 80 (17 Dec 2020 12:00) (65 - 142)  RR: 24 (17 Dec 2020 12:00) (13 - 37)  SpO2: 96% (17 Dec 2020 12:00) (91% - 100%)      GEN: lethargic, non-responsive to commands or painful stimuli  HEENT: Mucosa moist. No JVD.   RESP: No crackles, b/l expiratory wheezing, Decreased BS on the right lower lung base.   CV: tachycardic normal s1/s2. No m/r/g.  ABD: Soft, NTND. BS+  EXT: Warm. No edema, PP 2+    I&O's Summary    16 Dec 2020 07:01  -  17 Dec 2020 07:00  --------------------------------------------------------  IN: 1702.7 mL / OUT: 1454 mL / NET: 248.7 mL    17 Dec 2020 07:01  -  17 Dec 2020 12:37  --------------------------------------------------------  IN: 892.6 mL / OUT: 184 mL / NET: 708.6 mL      Cardiac Diagnostics  TELEMETRY: Intermittent sinus tachycardia 	    ECG(12/14/20): NSR, LVH. Diffuse TWI (Wellen's sign) improving since 12/12.   	  TTE 12/10: mod LVSD (EF 35%) w/akinesis of mid-myocardial and apical segments and preserved contractility in basal segments, consistent w/Takotsubo stress CM    CXR: New RLL pleural Effusion, may have hidden infiltrate.

## 2020-12-17 NOTE — EEG REPORT - NS EEG TEXT BOX
Day 1: 12/16/2020 @ 9:38:09 AM to next morning @ 07:00 am Background:  continuous, with predominantly theta>delta frequencies. Symmetry:  No persistent asymmetries of voltage or frequency. Posterior Dominant Rhythm:  absent  Organization: Rudimentary  Voltage:  Normal (20+ uV) Variability: Yes. 		Reactivity: Yes. N2 sleep: Rudimentary but likely N2 transients present. Spontaneous Activity:  Frequent right frontal sharp transients, maximal at Fp2/F4 which fluctuate through the recording and are present more when awake. Rare multifocal sharp transients.  Periodic/rhythmic activity:  Rare right frontal lateralized rhythmic delta activity at 1.5 Hz for very brief duration.  Events:  No electrographic seizures or significant clinical events. Provocations:  Hyperventilation and Photic stimulation: was not performed.  Daily Summary:   Rare right frontal lateralized rhythmic delta activity (LRDA) for very brief duration, suggestive of right frontal cortical hyper-excitability.  Occasional – frequent right frontal sharp waves suggestive of cortical hyper-excitability in this region. Occasional multifocal sharp waves suggestive of multifocal epileptiform potentials.  Moderate-severe generalized slowing suggestive of a similar degree of diffuse or multifocal  dysfunction.

## 2020-12-17 NOTE — PROGRESS NOTE ADULT - ASSESSMENT
75F w/PMHx COPD, Asthma, HTN, HLD a/w SAH w/IVH and obstructive hydrocephalus s/p EVD followed by cerebral angiogram for coil embolization of L pcomm aneurysm on 12/10. 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   - Can continue Milrinone 0.5 mcg/kg/min. Watch out for tachyarrhythmias.   - Recommend daily CXRs to monitor for pulm edema. has a new RLL pleural effusion, suspicious for an underlying infiltrate.   - Will also get a repeat TTE prior to DC to assess changes in LV function  - c.w lipitor 40 mg for cerebral artery plaque  - Replete electrolytes to maintain K>4, Mg>2  - Incentive Spirometry. Encourage patient to get out of bed.    Incomplete Note with preliminary recs. Please refer to Attending addendum for final recs  Thank you for allowing to participate in the care of this patient    DELORES Lance  Cardiology Fellow, PGY 7 75F w/PMHx COPD, Asthma, HTN, HLD a/w SAH w/IVH and obstructive hydrocephalus s/p EVD followed by cerebral angiogram for coil embolization of L pcomm aneurysm on 12/10. 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   - Can continue Milrinone 0.5 mcg/kg/min for cerebral vasodilatation to help the cerebral vasospasm. Watch out for tachyarrhythmias.   - Recommend daily CXRs to monitor for pulm edema. has a new RLL pleural effusion, suspicious for an underlying infiltrate.   - Will also get a repeat TTE prior to DC to assess changes in LV function  - c.w lipitor 40 mg for cerebral artery plaque  - Replete electrolytes to maintain K>4, Mg>2  - Incentive Spirometry. Encourage patient to get out of bed.    Incomplete Note with preliminary recs. Please refer to Attending addendum for final recs  Thank you for allowing to participate in the care of this patient    DELORES Lance  Cardiology Fellow, PGY 7 75F w/PMHx COPD, Asthma, HTN, HLD a/w SAH w/IVH and obstructive hydrocephalus s/p EVD followed by cerebral angiogram for coil embolization of L pcomm aneurysm on 12/10. 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   - Can continue Milrinone 0.5 mcg/kg/min for cerebral vasodilatation to help the cerebral vasospasm. Watch out for tachyarrhythmias.   - Recommend daily CXRs to monitor for pulm edema. has a new RLL pleural effusion, suspicious for an underlying infiltrate.   - Will also get a repeat TTE prior to DC to assess changes in LV function  - c.w lipitor 40 mg for cerebral artery plaque  - Replete electrolytes to maintain K>4, Mg>2  - Incentive Spirometry. Encourage patient to get out of bed when patient has a mental status and safe    Incomplete Note with preliminary recs. Please refer to Attending addendum for final recs  Thank you for allowing to participate in the care of this patient    DELORES Lance  Cardiology Fellow, PGY 7

## 2020-12-17 NOTE — PROGRESS NOTE ADULT - ATTENDING COMMENTS
on high dose milrinone for vasospasm no arrhythmias appear to tolerate it well  will continue to follow

## 2020-12-17 NOTE — PROGRESS NOTE ADULT - SUBJECTIVE AND OBJECTIVE BOX
===================================================   NEUROCRITICAL CARE ATTENDING NOTE (THURSDAY, 2020)  ===================================================    CARA CANCHOLA   MRN-[id number]  Summary:  75y/F with COPD, asthma, dyslipidemia Hypertension found down.  Brought to [facility] where imaging showed SAH basilar cisterns with IVH and obstructive hydrocephalus L Pcomm aneurysm.  Given levetiracetam, hydromorphone.  NIHSS 2 HH3 MF4, transferred to [facility] for further management.      COURSE IN THE HOSPITAL:   admitted to [facility], EVD inserted; given 20 lasix for pulmo edema, T inversion on CT  12/10 No significant events overnight.    No significant events overnight.    No significant events overnight.    No significant events overnight. drain stopped working at 730 a.m., off levophed    No significant events overnight.   12/15 Noted confusion, CTA mild spasm   lethargic, angio - no severe spasm, given IA verapamil, complicated by femoral artery injury from retained Proglide catheter, underwent R groin cutdown, repair with patch angioplasty   _____    Past Medical History: Hyperlipidemia Hypertension COPD (chronic obstructive pulmonary disease) Asthma  Allergies:  No Known Allergies  Home meds:   ·	famotidine 20 mg oral tablet: 1 tab(s) orally once a day  ·	lisinopril 2.5 mg oral tablet: 1 tab(s) orally once a day  ·	montelukast 10 mg oral tablet: 1 tab(s) orally once a day  ·	predniSONE 50 mg oral tablet: 1 tab(s) orally once a day  ·	ProAir HFA CFC free 90 mcg/inh inhalation aerosol: 2 puff(s) inhaled 4 times a day PRN  ·	simvastatin 20 mg oral tablet: 1 tab(s) orally once a day (at bedtime)    Current Meds:  MEDICATIONS  (STANDING):  albuterol/ipratropium for Nebulization. 3 milliLiter(s) Nebulizer every 6 hours  atorvastatin 40 milliGRAM(s) Oral at bedtime  bisacodyl Suppository 10 milliGRAM(s) Rectal daily  cyanocobalamin 1000 MICROGram(s) Oral daily  enoxaparin Injectable 40 milliGRAM(s) SubCutaneous every 24 hours  ferrous    sulfate 325 milliGRAM(s) Oral daily  fludroCORTISONE 0.1 milliGRAM(s) Oral every 24 hours  insulin lispro (ADMELOG) corrective regimen sliding scale   SubCutaneous Before meals and at bedtime  lacosamide 100 milliGRAM(s) Oral two times a day  lidocaine 2% (Preservative-free) Injectable 10 milliLiter(s) Local Injection once  montelukast 10 milliGRAM(s) Oral daily  norepinephrine Infusion 0.05 MICROgram(s)/kG/Min (4.68 mL/Hr) IV Continuous <Continuous>  pantoprazole    Tablet 40 milliGRAM(s) Oral before breakfast  piperacillin/tazobactam IVPB.. 3.375 Gram(s) IV Intermittent every 6 hours  polyethylene glycol 3350 17 Gram(s) Oral daily  propofol Infusion 5 MICROgram(s)/kG/Min (1.5 mL/Hr) IV Continuous <Continuous>  senna 2 Tablet(s) Oral at bedtime  sodium chloride 2 Gram(s) Oral every 12 hours  sodium chloride 0.9%. 1000 milliLiter(s) (40 mL/Hr) IV Continuous <Continuous>  vancomycin  IVPB 750 milliGRAM(s) IV Intermittent every 24 hours    MEDICATIONS  (PRN):  acetaminophen    Suspension .. 650 milliGRAM(s) Oral every 6 hours PRN Temp greater or equal to 38C (100.4F), Mild Pain (1 - 3)  labetalol Injectable 10 milliGRAM(s) IV Push every 4 hours PRN Systolic blood pressure > 180    PHYSICAL EXAMINATION    ICU Vital Signs Last 24 Hrs      NEUROLOGIC EXAMINATION:  lethargic, mumbles, oriented to self in AM, CLEOPATRA, intermittent left gaze (non-sustained), no facial droop, no pronator drift, no [place], did not follow commands  GENERAL:   Mode: AC  RR (machine): 12  TV (machine): 400  FiO2: 50  PEEP: 10  ITime: 1  MAP: 14  PIP: 24    EENT:  anicteric  CARDIOVASCULAR: (+) S1 S2, JAMIE  PULMONARY: clear to auscultation bilaterally  ABDOMEN: soft, nontender with normoactive bowel sounds  EXTREMITIES: no edema  SKIN: no rash    I/O's    20 @ 07:01  -  12-15-20 @ 07:00  --------------------------------------------------------  IN: 0 mL / OUT: 630 mL / NET: -630 mL    12-15-20 @ 07:01  -  20 @ 06:44  --------------------------------------------------------  IN: 86.6 mL / OUT: 1098 mL / NET: -1011.4 mL    LABS:                   HbA1C = 6.7 (12-10) 6.4 ()  LDL = 112 ()   HDL = 66 ()  TG = 114 ()   TSH = 0.990 ()    Bacteriology:    Urinalysis Basic - ( 16 Dec 2020 03:26 )    Color: Yellow / Appearance: Clear / S.020 / pH: x  Gluc: x / Ketone: 40 mg/dL  / Bili: Negative / Urobili: 0.2 E.U./dL   Blood: x / Protein: NEGATIVE mg/dL / Nitrite: NEGATIVE   Leuk Esterase: NEGATIVE / RBC: < 5 /HPF / WBC < 5 /HPF   Sq Epi: x / Non Sq Epi: 0-5 /HPF / Bacteria: Present /HPF    Culture - CSF with Gram Stain (collected 20 @ 00:47)  Source: .CSF CSF  Gram Stain (20 @ 01:16):    Rare White blood cells    No organisms seen    CSF studies:  EE/16:  Multifocal spikes on EEG, no epileptiform activity  Neuroimagin/16 XA:  complete and persistent left Pcomm aneurysm occlusion with microcoils; mild vasospasm in right A1 and mild vasospasm in left MAGGIE.  Intra arterial verapamil infusion (10 mg right CCA and 15 mg left CCA)  12/15 CTA head:  Multifocal moderate stenosis the right PCA. Mild stenosis of the left PCA which were present on prior CTA head and neck 2020 and therefore may reflect intracranial atherosclerosis rather than vasospasm.  New mild stenosis of the mid right M1 segment.  Interval increase in size of the lateral and third ventricles since prior CT head 2020.  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  12/10 CT head:  EVD placement, stable   CTA:  L pcomm aneurysm, L ICA (distal cavernous) aneurysm vs infundibulum, extensive calcified plaque cavernous bilateral ICA with mild to mod stenosis; thick plaque bilateral carotid bifurcations - mod to severe R and mild to mod L stenosis, focal calcified plaque R VA off subclavian artery - high grate stenosis / partial occlusion, upper lung bilateral opacification - pulmo edema, chronic deformity R humeral neck   CT head:  SAH, basilar cisterns, IV extension, obstructive hydrocephalus SVID, lacunar infarcts R caudate, both thalami, cerebellar hemispheres, no CT evidence of territorial infarction  Other imagin/11 LE Doppler: NEG   CT abd:  small paraesophageal hiatal hernia, gastritis; ?impaction, septal thickening bilateral lower lobes ?pulmo edema, hepatic steatosis    IV FLUIDS: IV NS 40 mL/h  DRIPS:  DIET: CCD  Lines: R IJ  Drains:     EVD at 5cm H20   EVD at 5cm H20 ICP < 10  12/15 EVD at 5cm H20 ICP 1-5, CSF 2-8 mL/h   EVD at 0cm H20 ICP 2-7 CSF 4-20 mL/h   _____    CODE STATUS:  Full Code                       GOALS OF CARE:  aggressive                      DISPOSITION:  ICU  NIHSS 2 HH3 MF4 ===================================================   NEUROCRITICAL CARE ATTENDING NOTE (THURSDAY, 2020)  ===================================================    CARA CANCHOLA   MRN-[id number]  Summary:  75y/F with COPD, asthma, dyslipidemia Hypertension found down.  Brought to [facility] where imaging showed SAH basilar cisterns with IVH and obstructive hydrocephalus L Pcomm aneurysm.  Given levetiracetam, hydromorphone.  NIHSS 2 HH3 MF4, transferred to [facility] for further management.      COURSE IN THE HOSPITAL:   admitted to [facility], EVD inserted; given 20 lasix for pulmo edema, T inversion on CT  12/10 No significant events overnight.    No significant events overnight.    No significant events overnight.    No significant events overnight. drain stopped working at 730 a.m., off levophed    No significant events overnight.   12/15 Noted confusion, CTA mild spasm   lethargic, angio - no severe spasm, given IA verapamil, complicated by femoral artery injury from retained Proglide catheter, underwent R groin cutdown, repair with patch angioplasty   _____    Past Medical History: Hyperlipidemia Hypertension COPD (chronic obstructive pulmonary disease) Asthma  Allergies:  No Known Allergies  Home meds:   ·	famotidine 20 mg oral tablet: 1 tab(s) orally once a day  ·	lisinopril 2.5 mg oral tablet: 1 tab(s) orally once a day  ·	montelukast 10 mg oral tablet: 1 tab(s) orally once a day  ·	predniSONE 50 mg oral tablet: 1 tab(s) orally once a day  ·	ProAir HFA CFC free 90 mcg/inh inhalation aerosol: 2 puff(s) inhaled 4 times a day PRN  ·	simvastatin 20 mg oral tablet: 1 tab(s) orally once a day (at bedtime)    Current Meds:  MEDICATIONS  (STANDING):      PHYSICAL EXAMINATION    ICU Vital Signs Last 24 Hrs  T(C): 37.8 (17 Dec 2020 04:06), Max: 38.5 (16 Dec 2020 11:41)  T(F): 100.1 (17 Dec 2020 04:06), Max: 101.3 (16 Dec 2020 11:41)  HR: 90 (17 Dec 2020 06:00) (60 - 116)  BP: 154/68 (17 Dec 2020 06:00) (88/50 - 211/99)  BP(mean): 98 (17 Dec 2020 06:00) (65 - 142)  ABP: 92/57 (16 Dec 2020 12:00) (84/57 - 209/201)  ABP(mean): 73 (16 Dec 2020 12:00) (69 - 206)  RR: 24 (17 Dec 2020 06:00) (13 - 37)  SpO2: 99% (17 Dec 2020 06:00) (91% - 100%)    NEUROLOGIC EXAMINATION:  lethargic, mumbles, oriented to self in AM, CLEOPATRA, intermittent left gaze (non-sustained), no facial droop, no pronator drift, no [place], did not follow commands  GENERAL: Mode: AC, RR (machine): 12, TV (machine): 400, FiO2: 50, PEEP: 10, ITime: 1, MAP: 15, PIP: 24  EENT:  anicteric  CARDIOVASCULAR: (+) S1 S2, JAMIE  PULMONARY: clear to auscultation bilaterally  ABDOMEN: soft, nontender with normoactive bowel sounds  EXTREMITIES: no edema  SKIN: no rash    I/O's    12-15-20 @ 07:01  -  20 @ 07:00  --------------------------------------------------------  IN: 86.6 mL / OUT: 1123 mL / NET: -1036.4 mL    20 @ 07:01  -  20 @ 06:35  --------------------------------------------------------  IN: 1702.7 mL / OUT: 1354 mL / NET: 348.7 mL    LABS:                        11.8   13.63 )-----------( 262      ( 17 Dec 2020 05:25 )             37.5         146<H>  |  114<H>  |  18  ----------------------------<  138<H>  3.5   |  15<L>  |  0.64    Ca    7.6<L>      17 Dec 2020 05:25  Phos  2.2       Mg     2.1         PT/INR - ( 16 Dec 2020 21:25 )   PT: 14.4 sec;   INR: 1.21     PTT - ( 17 Dec 2020 05:25 )  PTT:28.3 sec    CAPILLARY BLOOD GLUCOSE    POCT Blood Glucose.: 136 mg/dL (17 Dec 2020 06:17)  POCT Blood Glucose.: 120 mg/dL (16 Dec 2020 23:12)  POCT Blood Glucose.: 122 mg/dL (16 Dec 2020 11:12)  POCT Blood Glucose.: 156 mg/dL (16 Dec 2020 07:45)    HbA1C = 6.7 (12-10) 6.4 ()  LDL = 112 ()   HDL = 66 ()  TG = 114 ()   TSH = 0.990 ()    Bacteriology:    Urinalysis Basic - ( 16 Dec 2020 03:26 )    Color: Yellow / Appearance: Clear / S.020 / pH: x  Gluc: x / Ketone: 40 mg/dL  / Bili: Negative / Urobili: 0.2 E.U./dL   Blood: x / Protein: NEGATIVE mg/dL / Nitrite: NEGATIVE   Leuk Esterase: NEGATIVE / RBC: < 5 /HPF / WBC < 5 /HPF   Sq Epi: x / Non Sq Epi: 0-5 /HPF / Bacteria: Present /HPF    Culture - CSF with Gram Stain (collected 20 @ 12:10)  Source: .CSF CSF  Gram Stain (20 @ 12:40):    No organisms seen    No polymorphonuclear cells seen    CSF studies:  EE/16:  Multifocal spikes on EEG, no epileptiform activity  Neuroimagin/16 XA:  complete and persistent left Pcomm aneurysm occlusion with microcoils; mild vasospasm in right A1 and mild vasospasm in left MAGGIE.  Intra arterial verapamil infusion (10 mg right CCA and 15 mg left CCA)  12/15 CTA head:  Multifocal moderate stenosis the right PCA. Mild stenosis of the left PCA which were present on prior CTA head and neck 2020 and therefore may reflect intracranial atherosclerosis rather than vasospasm.  New mild stenosis of the mid right M1 segment.  Interval increase in size of the lateral and third ventricles since prior CT head 2020.  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  12/10 CT head:  EVD placement, stable   CTA:  L pcomm aneurysm, L ICA (distal cavernous) aneurysm vs infundibulum, extensive calcified plaque cavernous bilateral ICA with mild to mod stenosis; thick plaque bilateral carotid bifurcations - mod to severe R and mild to mod L stenosis, focal calcified plaque R VA off subclavian artery - high grate stenosis / partial occlusion, upper lung bilateral opacification - pulmo edema, chronic deformity R humeral neck   CT head:  SAH, basilar cisterns, IV extension, obstructive hydrocephalus SVID, lacunar infarcts R caudate, both thalami, cerebellar hemispheres, no CT evidence of territorial infarction  Other imagin/11 LE Doppler: NEG   CT abd:  small paraesophageal hiatal hernia, gastritis; ?impaction, septal thickening bilateral lower lobes ?pulmo edema, hepatic steatosis    IV FLUIDS: IV NS 40 mL/h  DRIPS:  DIET: CCD  Lines: R IJ  Drains:     EVD at 5cm H20   EVD at 5cm H20 ICP < 10  12/15 EVD at 5cm H20 ICP 1-5, CSF 2-8 mL/h   EVD at 0cm H20 ICP 2-7 CSF 4-20 mL/h   _____    CODE STATUS:  Full Code                       GOALS OF CARE:  aggressive                      DISPOSITION:  ICU  NIHSS 2 HH3 MF4 ===================================================   NEUROCRITICAL CARE ATTENDING NOTE (THURSDAY, 2020)  ===================================================    CARA CANCHOLA   MRN-[id number]  Summary:  75y/F with COPD, asthma, dyslipidemia Hypertension found down.  Brought to [facility] where imaging showed SAH basilar cisterns with IVH and obstructive hydrocephalus L Pcomm aneurysm.  Given levetiracetam, hydromorphone.  NIHSS 2 HH3 MF4, transferred to [facility] for further management.      COURSE IN THE HOSPITAL:   admitted to [facility], EVD inserted; given 20 lasix for pulmo edema, T inversion on CT  12/10 No significant events overnight.    No significant events overnight.    No significant events overnight.    No significant events overnight. drain stopped working at 730 a.m., off levophed    No significant events overnight.   12/15 Noted confusion, CTA mild spasm   lethargic, angio - no severe spasm, given IA verapamil, underwent femoral endarterectomy with repair with patch angioplasty   improved post verapamil, transitioned to milrinone, currently tolerating extubation    Past Medical History: Hyperlipidemia Hypertension COPD (chronic obstructive pulmonary disease) Asthma  Allergies:  No Known Allergies  Home meds:   ·	famotidine 20 mg oral tablet: 1 tab(s) orally once a day  ·	lisinopril 2.5 mg oral tablet: 1 tab(s) orally once a day  ·	montelukast 10 mg oral tablet: 1 tab(s) orally once a day  ·	predniSONE 50 mg oral tablet: 1 tab(s) orally once a day  ·	ProAir HFA CFC free 90 mcg/inh inhalation aerosol: 2 puff(s) inhaled 4 times a day PRN  ·	simvastatin 20 mg oral tablet: 1 tab(s) orally once a day (at bedtime)    Current Meds:  MEDICATIONS  (STANDING):  albuterol/ipratropium for Nebulization. 3 milliLiter(s) Nebulizer every 6 hours  amantadine 200 milliGRAM(s) Oral <User Schedule>  atorvastatin 40 milliGRAM(s) Oral at bedtime  bisacodyl Suppository 10 milliGRAM(s) Rectal daily  cyanocobalamin 1000 MICROGram(s) Oral daily  enoxaparin Injectable 40 milliGRAM(s) SubCutaneous every 24 hours  ferrous    sulfate 325 milliGRAM(s) Oral daily  fludroCORTISONE 0.1 milliGRAM(s) Oral every 24 hours  insulin lispro (ADMELOG) corrective regimen sliding scale   SubCutaneous Before meals and at bedtime  lacosamide 100 milliGRAM(s) Oral two times a day  milrinone Infusion 0.5 MICROgram(s)/kG/Min (7.49 mL/Hr) IV Continuous <Continuous>  montelukast 10 milliGRAM(s) Oral daily  pantoprazole    Tablet 40 milliGRAM(s) Oral before breakfast  piperacillin/tazobactam IVPB.. 3.375 Gram(s) IV Intermittent every 6 hours  polyethylene glycol 3350 17 Gram(s) Oral daily  potassium chloride   Solution 20 milliEquivalent(s) Oral once  senna 2 Tablet(s) Oral at bedtime  sodium chloride 1 Gram(s) Oral every 12 hours  vancomycin  IVPB 750 milliGRAM(s) IV Intermittent every 24 hours    MEDICATIONS  (PRN):  acetaminophen    Suspension .. 650 milliGRAM(s) Oral every 6 hours PRN Temp greater or equal to 38C (100.4F), Mild Pain (1 - 3)  labetalol Injectable 10 milliGRAM(s) IV Push every 4 hours PRN Systolic blood pressure > 180    PHYSICAL EXAMINATION    ICU Vital Signs Last 24 Hrs  T(C): 37.8 (17 Dec 2020 04:06), Max: 38.5 (16 Dec 2020 11:41)  T(F): 100.1 (17 Dec 2020 04:06), Max: 101.3 (16 Dec 2020 11:41)  HR: 90 (17 Dec 2020 06:00) (60 - 116)  BP: 154/68 (17 Dec 2020 06:00) (88/50 - 211/99)  BP(mean): 98 (17 Dec 2020 06:00) (65 - 142)  ABP: 92/57 (16 Dec 2020 12:00) (84/57 - 209/201)  ABP(mean): 73 (16 Dec 2020 12:00) (69 - 206)  RR: 24 (17 Dec 2020 06:00) (13 - 37)  SpO2: 99% (17 Dec 2020 06:00) (91% - 100%)    NEUROLOGIC EXAMINATION:  oriented to self in AM, CLEOPATRA, resolved left gaze, no facial droop, intermittent follows 1st order commands  Mode: AC, RR (machine): 12, TV (machine): 400, FiO2: 50, PEEP: 10, ITime: 1, MAP: 15, PIP: 24  EENT:  anicteric  CARDIOVASCULAR: (+) S1 S2, JAMIE  PULMONARY: clear to auscultation bilaterally, no [place]  ABDOMEN: soft, nontender with normoactive bowel sounds  EXTREMITIES: no edema, pulses present by Dopplers  SKIN: no rash    I/O's    12-15-20 @ 07:01  -  20 @ 07:00  --------------------------------------------------------  IN: 86.6 mL / OUT: 1123 mL / NET: -1036.4 mL    20 @ 07:01  -  20 @ 06:35  --------------------------------------------------------  IN: 1702.7 mL / OUT: 1354 mL / NET: 348.7 mL    LABS:                        11.8   13.63 )-----------( 262      ( 17 Dec 2020 05:25 )             37.5     12-17    146<H>  |  114<H>  |  18  ----------------------------<  138<H>  3.5   |  15<L>  |  0.64    Ca    7.6<L>      17 Dec 2020 05:25  Phos  2.2     -  Mg     2.1     -17    PT/INR - ( 16 Dec 2020 21:25 )   PT: 14.4 sec;   INR: 1.21     PTT - ( 17 Dec 2020 05:25 )  PTT:28.3 sec    CAPILLARY BLOOD GLUCOSE    POCT Blood Glucose.: 136 mg/dL (17 Dec 2020 06:17)  POCT Blood Glucose.: 120 mg/dL (16 Dec 2020 23:12)  POCT Blood Glucose.: 122 mg/dL (16 Dec 2020 11:12)  POCT Blood Glucose.: 156 mg/dL (16 Dec 2020 07:45)    HbA1C = 6.7 (-10) 6.4 ()  LDL = 112 ()   HDL = 66 (-)  TG = 114 ()   TSH = 0.990 ()    Bacteriology:    Culture - CSF with Gram Stain . (20 @ 12:10)    Gram Stain:   No organisms seen  No polymorphonuclear cells seen    Specimen Source: .CSF CSF    Culture Results:   No growth to date    Culture - Blood (20 @ 01:18)    Specimen Source: .Blood Blood X 2    Culture Results:   No growth at 1 day.    Urinalysis Basic - ( 16 Dec 2020 03:26 )    Color: Yellow / Appearance: Clear / S.020 / pH: x  Gluc: x / Ketone: 40 mg/dL  / Bili: Negative / Urobili: 0.2 E.U./dL   Blood: x / Protein: NEGATIVE mg/dL / Nitrite: NEGATIVE   Leuk Esterase: NEGATIVE / RBC: < 5 /HPF / WBC < 5 /HPF   Sq Epi: x / Non Sq Epi: 0-5 /HPF / Bacteria: Present /HPF    CSF studies:  EE/16:  Multifocal spikes on EEG, no epileptiform activity  Neuroimagin/16 XA:  complete and persistent left Pcomm aneurysm occlusion with microcoils; mild vasospasm in right A1 and mild vasospasm in left MAGGIE.  Intra arterial verapamil infusion (10 mg right CCA and 15 mg left CCA)  12/15 CTA head:  Multifocal moderate stenosis the right PCA. Mild stenosis of the left PCA which were present on prior CTA head and neck 2020 and therefore may reflect intracranial atherosclerosis rather than vasospasm.  New mild stenosis of the mid right M1 segment.  Interval increase in size of the lateral and third ventricles since prior CT head 2020.  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  12/10 CT head:  EVD placement, stable   CTA:  L pcomm aneurysm, L ICA (distal cavernous) aneurysm vs infundibulum, extensive calcified plaque cavernous bilateral ICA with mild to mod stenosis; thick plaque bilateral carotid bifurcations - mod to severe R and mild to mod L stenosis, focal calcified plaque R VA off subclavian artery - high grate stenosis / partial occlusion, upper lung bilateral opacification - pulmo edema, chronic deformity R humeral neck   CT head:  SAH, basilar cisterns, IV extension, obstructive hydrocephalus SVID, lacunar infarcts R caudate, both thalami, cerebellar hemispheres, no CT evidence of territorial infarction  Other imagin/11 LE Doppler: NEG   CT abd:  small paraesophageal hiatal hernia, gastritis; ?impaction, septal thickening bilateral lower lobes ?pulmo edema, hepatic steatosis    IV FLUIDS: IV NS 40 mL/h  DRIPS:  DIET: CCD  Lines: R IJ  Drains:     EVD at 5cm H20   EVD at 5cm H20 ICP < 10  12/15 EVD at 5cm H20 ICP 1-5, CSF 2-8 mL/h   EVD at 0cm H20 ICP 2-7 CSF 4-20 mL/h   EVD at 0cm H20 ICP 1-6 CSF 4-20 mL/h    CODE STATUS:  Full Code                       GOALS OF CARE:  aggressive                      DISPOSITION:  ICU  NIHSS 2 HH3 MF4

## 2020-12-18 LAB
ANION GAP SERPL CALC-SCNC: 13 MMOL/L — SIGNIFICANT CHANGE UP (ref 5–17)
BUN SERPL-MCNC: 9 MG/DL — SIGNIFICANT CHANGE UP (ref 7–23)
CALCIUM SERPL-MCNC: 7.9 MG/DL — LOW (ref 8.4–10.5)
CHLORIDE SERPL-SCNC: 113 MMOL/L — HIGH (ref 96–108)
CO2 SERPL-SCNC: 20 MMOL/L — LOW (ref 22–31)
CREAT SERPL-MCNC: 0.55 MG/DL — SIGNIFICANT CHANGE UP (ref 0.5–1.3)
GLUCOSE SERPL-MCNC: 176 MG/DL — HIGH (ref 70–99)
HCT VFR BLD CALC: 32.5 % — LOW (ref 34.5–45)
HGB BLD-MCNC: 10.4 G/DL — LOW (ref 11.5–15.5)
MAGNESIUM SERPL-MCNC: 2.1 MG/DL — SIGNIFICANT CHANGE UP (ref 1.6–2.6)
MCHC RBC-ENTMCNC: 27.6 PG — SIGNIFICANT CHANGE UP (ref 27–34)
MCHC RBC-ENTMCNC: 32 GM/DL — SIGNIFICANT CHANGE UP (ref 32–36)
MCV RBC AUTO: 86.2 FL — SIGNIFICANT CHANGE UP (ref 80–100)
NRBC # BLD: 0 /100 WBCS — SIGNIFICANT CHANGE UP (ref 0–0)
PHOSPHATE SERPL-MCNC: 1 MG/DL — CRITICAL LOW (ref 2.5–4.5)
PLATELET # BLD AUTO: 251 K/UL — SIGNIFICANT CHANGE UP (ref 150–400)
POTASSIUM SERPL-MCNC: 3.3 MMOL/L — LOW (ref 3.5–5.3)
POTASSIUM SERPL-SCNC: 3.3 MMOL/L — LOW (ref 3.5–5.3)
RBC # BLD: 3.77 M/UL — LOW (ref 3.8–5.2)
RBC # FLD: 17.6 % — HIGH (ref 10.3–14.5)
SODIUM SERPL-SCNC: 146 MMOL/L — HIGH (ref 135–145)
VANCOMYCIN TROUGH SERPL-MCNC: <4 UG/ML — LOW (ref 10–20)
WBC # BLD: 11.94 K/UL — HIGH (ref 3.8–10.5)
WBC # FLD AUTO: 11.94 K/UL — HIGH (ref 3.8–10.5)

## 2020-12-18 PROCEDURE — 99291 CRITICAL CARE FIRST HOUR: CPT | Mod: 24

## 2020-12-18 PROCEDURE — 99024 POSTOP FOLLOW-UP VISIT: CPT

## 2020-12-18 PROCEDURE — 99233 SBSQ HOSP IP/OBS HIGH 50: CPT

## 2020-12-18 PROCEDURE — 71045 X-RAY EXAM CHEST 1 VIEW: CPT | Mod: 26

## 2020-12-18 PROCEDURE — 36620 INSERTION CATHETER ARTERY: CPT

## 2020-12-18 PROCEDURE — 99232 SBSQ HOSP IP/OBS MODERATE 35: CPT

## 2020-12-18 PROCEDURE — 95720 EEG PHY/QHP EA INCR W/VEEG: CPT

## 2020-12-18 RX ORDER — POTASSIUM CHLORIDE 20 MEQ
20 PACKET (EA) ORAL
Refills: 0 | Status: COMPLETED | OUTPATIENT
Start: 2020-12-18 | End: 2020-12-18

## 2020-12-18 RX ORDER — SODIUM CHLORIDE 9 MG/ML
1000 INJECTION INTRAMUSCULAR; INTRAVENOUS; SUBCUTANEOUS
Refills: 0 | Status: DISCONTINUED | OUTPATIENT
Start: 2020-12-18 | End: 2020-12-19

## 2020-12-18 RX ORDER — SODIUM CHLORIDE 9 MG/ML
1000 INJECTION INTRAMUSCULAR; INTRAVENOUS; SUBCUTANEOUS
Refills: 0 | Status: DISCONTINUED | OUTPATIENT
Start: 2020-12-18 | End: 2020-12-18

## 2020-12-18 RX ORDER — MILRINONE LACTATE 1 MG/ML
3000 INJECTION, SOLUTION INTRAVENOUS ONCE
Refills: 0 | Status: DISCONTINUED | OUTPATIENT
Start: 2020-12-18 | End: 2020-12-18

## 2020-12-18 RX ORDER — ALBUMIN HUMAN 25 %
100 VIAL (ML) INTRAVENOUS ONCE
Refills: 0 | Status: DISCONTINUED | OUTPATIENT
Start: 2020-12-18 | End: 2020-12-18

## 2020-12-18 RX ORDER — MILRINONE LACTATE 1 MG/ML
0.75 INJECTION, SOLUTION INTRAVENOUS
Qty: 20 | Refills: 0 | Status: DISCONTINUED | OUTPATIENT
Start: 2020-12-18 | End: 2020-12-18

## 2020-12-18 RX ORDER — MILRINONE LACTATE 1 MG/ML
0.5 INJECTION, SOLUTION INTRAVENOUS
Qty: 20 | Refills: 0 | Status: DISCONTINUED | OUTPATIENT
Start: 2020-12-18 | End: 2020-12-25

## 2020-12-18 RX ORDER — POTASSIUM PHOSPHATE, MONOBASIC POTASSIUM PHOSPHATE, DIBASIC 236; 224 MG/ML; MG/ML
30 INJECTION, SOLUTION INTRAVENOUS ONCE
Refills: 0 | Status: COMPLETED | OUTPATIENT
Start: 2020-12-18 | End: 2020-12-18

## 2020-12-18 RX ORDER — LACOSAMIDE 50 MG/1
150 TABLET ORAL
Refills: 0 | Status: DISCONTINUED | OUTPATIENT
Start: 2020-12-18 | End: 2020-12-22

## 2020-12-18 RX ORDER — METHYLPHENIDATE HCL 5 MG
5 TABLET ORAL EVERY 24 HOURS
Refills: 0 | Status: DISCONTINUED | OUTPATIENT
Start: 2020-12-18 | End: 2020-12-23

## 2020-12-18 RX ADMIN — Medication 50 MILLIEQUIVALENT(S): at 07:59

## 2020-12-18 RX ADMIN — POLYETHYLENE GLYCOL 3350 17 GRAM(S): 17 POWDER, FOR SOLUTION ORAL at 12:39

## 2020-12-18 RX ADMIN — Medication 3 MILLILITER(S): at 17:34

## 2020-12-18 RX ADMIN — PIPERACILLIN AND TAZOBACTAM 200 GRAM(S): 4; .5 INJECTION, POWDER, LYOPHILIZED, FOR SOLUTION INTRAVENOUS at 12:39

## 2020-12-18 RX ADMIN — Medication 3 MILLILITER(S): at 11:08

## 2020-12-18 RX ADMIN — FLUDROCORTISONE ACETATE 0.1 MILLIGRAM(S): 0.1 TABLET ORAL at 07:02

## 2020-12-18 RX ADMIN — Medication 5 MILLIGRAM(S): at 09:59

## 2020-12-18 RX ADMIN — Medication 2: at 12:40

## 2020-12-18 RX ADMIN — PIPERACILLIN AND TAZOBACTAM 200 GRAM(S): 4; .5 INJECTION, POWDER, LYOPHILIZED, FOR SOLUTION INTRAVENOUS at 17:09

## 2020-12-18 RX ADMIN — Medication 250 MILLIGRAM(S): at 14:48

## 2020-12-18 RX ADMIN — SENNA PLUS 2 TABLET(S): 8.6 TABLET ORAL at 21:48

## 2020-12-18 RX ADMIN — PIPERACILLIN AND TAZOBACTAM 200 GRAM(S): 4; .5 INJECTION, POWDER, LYOPHILIZED, FOR SOLUTION INTRAVENOUS at 07:00

## 2020-12-18 RX ADMIN — Medication 325 MILLIGRAM(S): at 12:39

## 2020-12-18 RX ADMIN — CHLORHEXIDINE GLUCONATE 1 APPLICATION(S): 213 SOLUTION TOPICAL at 07:00

## 2020-12-18 RX ADMIN — SODIUM CHLORIDE 1 GRAM(S): 9 INJECTION INTRAMUSCULAR; INTRAVENOUS; SUBCUTANEOUS at 07:00

## 2020-12-18 RX ADMIN — ENOXAPARIN SODIUM 40 MILLIGRAM(S): 100 INJECTION SUBCUTANEOUS at 21:47

## 2020-12-18 RX ADMIN — PIPERACILLIN AND TAZOBACTAM 200 GRAM(S): 4; .5 INJECTION, POWDER, LYOPHILIZED, FOR SOLUTION INTRAVENOUS at 00:18

## 2020-12-18 RX ADMIN — ATORVASTATIN CALCIUM 40 MILLIGRAM(S): 80 TABLET, FILM COATED ORAL at 21:47

## 2020-12-18 RX ADMIN — PREGABALIN 1000 MICROGRAM(S): 225 CAPSULE ORAL at 17:31

## 2020-12-18 RX ADMIN — MONTELUKAST 10 MILLIGRAM(S): 4 TABLET, CHEWABLE ORAL at 12:43

## 2020-12-18 RX ADMIN — Medication 2: at 22:02

## 2020-12-18 RX ADMIN — Medication 2: at 07:33

## 2020-12-18 RX ADMIN — Medication 10 MILLIGRAM(S): at 12:39

## 2020-12-18 RX ADMIN — Medication 50 MILLIEQUIVALENT(S): at 12:39

## 2020-12-18 RX ADMIN — Medication 3 MILLILITER(S): at 04:58

## 2020-12-18 RX ADMIN — LACOSAMIDE 100 MILLIGRAM(S): 50 TABLET ORAL at 07:00

## 2020-12-18 RX ADMIN — LACOSAMIDE 150 MILLIGRAM(S): 50 TABLET ORAL at 17:09

## 2020-12-18 RX ADMIN — Medication 50 MILLIEQUIVALENT(S): at 10:25

## 2020-12-18 RX ADMIN — POTASSIUM PHOSPHATE, MONOBASIC POTASSIUM PHOSPHATE, DIBASIC 83.33 MILLIMOLE(S): 236; 224 INJECTION, SOLUTION INTRAVENOUS at 09:27

## 2020-12-18 RX ADMIN — SODIUM CHLORIDE 30 MILLILITER(S): 9 INJECTION INTRAMUSCULAR; INTRAVENOUS; SUBCUTANEOUS at 09:22

## 2020-12-18 NOTE — PROGRESS NOTE ADULT - SUBJECTIVE AND OBJECTIVE BOX
76y/o F with PMHx sig for COPD, asthma, HLD, HTN, BIBEMS to [facility] from home after HHA discovered patient found down in floor covered in non-bloody vomitus. Perr HHA Pt went to the bathroom and was taking a long time, went in to check on her and found her sitting on floor, awake, however with vomitus on front of shirt. On arrival to [facility], patient was taken for stat head CT, which revealed diffuse subarachnoid hemorrhage mainly at basilar cisterns with IVH and obstructive hydrocephalus. Patient was given a bolus of 1g keppra and dilaudid pushes for generalized headache. CTA concerning for 3mm left pcomm aneurysm and a 1.6mm outpouching of distal cavernous segment of the left internal carotid artery likely aneurysm. NIHSS2 3 MF4. Patient was transferred to [facility] for further intervention. Patient currently reports headaches. Pt denies acute changes in vision, seizures, CP, SOB, weakness/paresthesias of arms or legs. (09 Dec 2020 23:37)    Hospital Course:   : BD1 Patient admitted for SAH HH3, MF4.   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 EVD at 5, on Levo overnight, nimodipine discontinued d/t hypotension  12: BD4 POD#2, VIVIEN overnight, neuro stable, passed TOV  12: BD5 POD#3: VIVIEN x 24 hrs  : BD6 POD#4. VIVIEN o/n, neuro stable. EVD at 5cm H2O  12/15: BD7 POD #5 VIVIEN overnight, neuro stable. EVD remains at 5. Plan for CTA today.   : BD8 POD #6 VIVIEN overnight, neuro stable, EVD at 0, on Levo, started on 3% at 15 overnight   : BD9 POD#1 s/p cerebral angio for verapamil c/b device malfunction of Proglide, s/p right groin cutdown for removal of proglide catheter and femoral artery repair.  VIVIEN overnight, neuro stable, EVD at 0. patient remained intubated overnight, on propofol for sedation, and vEEG  12/18: BD#10, POD#8 s/p coil/embo of L pcomm aneurysm, POD#2 s/p IA verapamil, POD#2 R groin cutdown for removal of proglide catheter w/ repair of femoral artery.       Vital Signs Last 24 Hrs  T(C): 36.6 (17 Dec 2020 21:00), Max: 38.2 (17 Dec 2020 11:35)  T(F): 97.8 (17 Dec 2020 21:00), Max: 100.7 (17 Dec 2020 11:35)  HR: 100 (18 Dec 2020 01:00) (78 - 105)  BP: 153/67 (18 Dec 2020 01:00) (96/46 - 165/73)  BP(mean): 97 (18 Dec 2020 01:) (66 - 107)  RR: 26 (18 Dec 2020 01:00) (12 - 37)  SpO2: 95% (18 Dec 2020 01:00) (93% - 100%)    I&O's Detail    16 Dec 2020 07:01  -  17 Dec 2020 07:00  --------------------------------------------------------  IN:    Enteral Tube Flush: 180 mL    IV PiggyBack: 750 mL    Norepinephrine: 1.5 mL    Norepinephrine: 2.2 mL    Propofol: 24 mL    sodium chloride 0.9%: 480 mL    Sodium Chloride 0.9% Bolus: 250 mL    sodium chloride 3%: 15 mL  Total IN: 1702.7 mL    OUT:    External Ventricular Device (mL): 164 mL    Indwelling Catheter - Urethral (mL): 1015 mL    Intermittent Catheterization - Urethral (mL): 275 mL  Total OUT: 1454 mL    Total NET: 248.7 mL      17 Dec 2020 07:01  -  18 Dec 2020 01:48  --------------------------------------------------------  IN:    Enteral Tube Flush: 210 mL    IV PiggyBack: 1169.8 mL    Jevity 1.2: 470 mL    Milrinone: 11.2 mL    Milrinone: 118.4 mL    sodium chloride 0.9%: 120 mL  Total IN: 2099.4 mL    OUT:    External Ventricular Device (mL): 181 mL    Indwelling Catheter - Urethral (mL): 135 mL    Intermittent Catheterization - Urethral (mL): 300 mL    Propofol: 0 mL    Propofol: 0 mL  Total OUT: 616 mL    Total NET: 1483.4 mL        I&O's Summary    16 Dec 2020 07:  -  17 Dec 2020 07:00  --------------------------------------------------------  IN: 1702.7 mL / OUT: 1454 mL / NET: 248.7 mL    17 Dec 2020 07:01  -  18 Dec 2020 01:48  --------------------------------------------------------  IN: 2099.4 mL / OUT: 616 mL / NET: 1483.4 mL        PHYSICAL EXAM:  General: laying in hospital bed NAD, A&O x1-2, on RA, off sedation   HEENT: PERRL 3mm, EOMI b/l, face symmetric, tongue midline, + hard of hearing   Cardiovascular: RRR, normal S1 and S2   Respiratory: lungs CTAB, no wheezing, rhonchi, or crackles, extubated  GI: normoactive BS to auscultation, abd soft, NTND   Neuro: no aphasia, speech clear, no dysmetria, no pronator drift  ZAFAR x4 spontaneously and with good strength   EVD site: C/D/I   R groin site dressing: some serosanguinous drainage noted on dressing, no evidence of hematoma, mild TTP   Extremities: b/l PT pulses detected w/ doppler, not palpable       TUBES/LINES:  [] CVC  [] A-line  [] Lumbar Drain  [X] Ventriculostomy - EVD @ 0cmH2O   [] Other    DIET:  [] NPO  [] Mechanical  [X] Tube feeds - NGT feeds, Jevity 1.2    LABS:                        11.8   13.63 )-----------( 262      ( 17 Dec 2020 05:25 )             37.5     12-17    147<H>  |  116<H>  |  16  ----------------------------<  161<H>  3.4<L>   |  16<L>  |  0.68    Ca    7.4<L>      17 Dec 2020 13:36  Phos  2.2     12-  Mg     2.1     12-      PT/INR - ( 16 Dec 2020 21:25 )   PT: 14.4 sec;   INR: 1.21          PTT - ( 17 Dec 2020 05:25 )  PTT:28.3 sec  Urinalysis Basic - ( 16 Dec 2020 03:26 )    Color: Yellow / Appearance: Clear / S.020 / pH: x  Gluc: x / Ketone: 40 mg/dL  / Bili: Negative / Urobili: 0.2 E.U./dL   Blood: x / Protein: NEGATIVE mg/dL / Nitrite: NEGATIVE   Leuk Esterase: NEGATIVE / RBC: < 5 /HPF / WBC < 5 /HPF   Sq Epi: x / Non Sq Epi: 0-5 /HPF / Bacteria: Present /HPF          CAPILLARY BLOOD GLUCOSE      POCT Blood Glucose.: 202 mg/dL (17 Dec 2020 22:07)  POCT Blood Glucose.: 177 mg/dL (17 Dec 2020 18:31)  POCT Blood Glucose.: 155 mg/dL (17 Dec 2020 11:00)  POCT Blood Glucose.: 136 mg/dL (17 Dec 2020 06:17)      Drug Levels: [] N/A    CSF Analysis: [] N/A      Allergies    No Known Allergies    Intolerances      MEDICATIONS:  Antibiotics:  piperacillin/tazobactam IVPB.. 3.375 Gram(s) IV Intermittent every 6 hours  vancomycin  IVPB 750 milliGRAM(s) IV Intermittent every 24 hours  vancomycin  IVPB        Neuro:  acetaminophen    Suspension .. 650 milliGRAM(s) Oral every 6 hours PRN  amantadine 200 milliGRAM(s) Oral <User Schedule>  lacosamide 100 milliGRAM(s) Oral two times a day    Anticoagulation:  enoxaparin Injectable 40 milliGRAM(s) SubCutaneous every 24 hours    OTHER:  albuterol/ipratropium for Nebulization. 3 milliLiter(s) Nebulizer every 6 hours  atorvastatin 40 milliGRAM(s) Oral at bedtime  bisacodyl Suppository 10 milliGRAM(s) Rectal daily  chlorhexidine 2% Cloths 1 Application(s) Topical <User Schedule>  dextrose 40% Gel 15 Gram(s) Oral once  dextrose 50% Injectable 25 Gram(s) IV Push once  fludroCORTISONE 0.1 milliGRAM(s) Oral every 24 hours  glucagon  Injectable 1 milliGRAM(s) IntraMuscular once  insulin lispro (ADMELOG) corrective regimen sliding scale   SubCutaneous Before meals and at bedtime  labetalol Injectable 10 milliGRAM(s) IV Push every 4 hours PRN  lidocaine 2% (Preservative-free) Injectable 10 milliLiter(s) Local Injection once  milrinone Infusion 0.5 MICROgram(s)/kG/Min IV Continuous <Continuous>  montelukast 10 milliGRAM(s) Oral daily  polyethylene glycol 3350 17 Gram(s) Oral daily  senna 2 Tablet(s) Oral at bedtime    IVF:  cyanocobalamin 1000 MICROGram(s) Oral daily  ferrous    sulfate 325 milliGRAM(s) Oral daily  sodium chloride 1 Gram(s) Oral every 12 hours    CULTURES:  Culture Results:   No growth to date ( @ 12:10)  Culture Results:   No growth at 1 day. ( @ 01:18)    RADIOLOGY & ADDITIONAL TESTS:      ASSESSMENT:  74 yo Female with PMHx of COPD, asthma, HLD, HTN, BIBEMS to [facility] from home after HHA found patient down on the floor covered in non-bloody vomitus. CTH reveals diffuse subarachnoid hemorrhage mainly at basilar cisterns with IVH and obstructive hydrocephalus, CTA shows 3mm left pcomm aneurysm, now s/p bedside right frontal EVD placement 12/10, s/p cerebral angiogram for coil embolization of left PCOMM aneurysm 12/10, now POD #2 s/p cerebral angio for verapamil c/b device malfunction of Proglide, s/p right groin cutdown for removal of proglide catheter and femoral artery repair (2020), NIHSS2 HH3 MF4      HEADACHE    Handoff    Hyperlipidemia    Hypertension    COPD (chronic obstructive pulmonary disease)    Asthma    COPD (chronic obstructive pulmonary disease)    Asthma    Injury of right femoral artery    Cerebral artery vasospasm    SAH (subarachnoid hemorrhage)    Injury of femoral artery    Cerebral artery vasospasm    SAH (subarachnoid hemorrhage)    Subarachnoid bleed    Vascular surgery procedure    Angiogram, carotid and cerebral, bilateral    Angiogram, cerebral, with intracranial aneurysm embolization    No significant past surgical history    HEADACHE    90+    [email or web address]        PLAN:    NEURO:  - neuro checks  - vitals checks  - pain control  - EVD at 0, monitor ICP and output  - cont Vimpat   - Hold Nimodipine for hypotension  - propofol for sedation  - R groin checks   - cont milrinone and amantadine     CARDIOVASCULAR:  - -180  - cardiology following for [place]bo: TTE before discharge, daily EKG, ischemic work up with CCTA or LHC before discharge     PULMONARY:  - on room air, no issues    RENAL:  - de anda removed, straight cath x1, pend TOV   - electrolyte repletion PRN     GI:  - cont NGT feeds, pend S&S re-eval   - bowel regimen    HEME:  - cont IV iron per Dr. Reddy   - monitor H/H    ID:  - cont Vanc/Zosyn for empiric coverage until    - f/u vanc trough  @1pm   - afebrile overnight     ENDO:  - hgb A1c 6.7   - switched tube feeds to glucerna from jevity   - cont ISS     DVT PROPHYLAXIS:  [x] Venodynes                                [x] Heparin/Lovenox      DISPOSITION: ICU status, full code, dispo pend     Assessment and plan discussed w/ Dr. Serrano    Assessment:  Present when checked    []  GCS  E   V  M     Heart Failure: []Acute, [] acute on chronic , []chronic  Heart Failure:  [] Diastolic (HFpEF), [] Systolic (HFrEF), []Combined (HFpEF and HFrEF), [] RHF, [] Pulm HTN, [] Other    [] MICHAELLE, [] ATN, [] AIN, [] other  [] CKD1, [] CKD2, [] CKD 3, [] CKD 4, [] CKD 5, []ESRD    Encephalopathy: [] Metabolic, [] Hepatic, [] toxic, [] Neurological, [] Other    Abnormal Nurtitional Status: [] malnurtition (see nutrition note), [ ]underweight: BMI < 19, [] morbid obesity: BMI >40, [] Cachexia    [] Sepsis  [] hypovolemic shock,[] cardiogenic shock, [] hemorrhagic shock, [] neuogenic shock  [] Acute Respiratory Failure  []Cerebral edema, [] Brain compression/ herniation,   [] Functional quadriplegia  [] Acute blood loss anemia

## 2020-12-18 NOTE — PROGRESS NOTE ADULT - SUBJECTIVE AND OBJECTIVE BOX
===================================================   NEUROCRITICAL CARE ATTENDING NOTE (THURSDAY, 2020)  ===================================================    CARA CANCHOLA   MRN-[id number]  Summary:  75y/F with COPD, asthma, dyslipidemia Hypertension found down.  Brought to [facility] where imaging showed SAH basilar cisterns with IVH and obstructive hydrocephalus L Pcomm aneurysm.  Given levetiracetam, hydromorphone.  NIHSS 2 HH3 MF4, transferred to [facility] for further management.      COURSE IN THE HOSPITAL:   admitted to [facility], EVD inserted; given 20 lasix for pulmo edema, T inversion on CT  12/10 No significant events overnight.    No significant events overnight.    No significant events overnight.    No significant events overnight. drain stopped working at 730 a.m., off levophed    No significant events overnight.   12/15 Noted confusion, CTA mild spasm   lethargic, angio - no severe spasm, given IA verapamil, underwent femoral endarterectomy with repair with patch angioplasty   improved post verapamil, transitioned to milrinone, currently tolerating extubation  1218: BD#10, POD#8 s/p coil/embo of L pcomm aneurysm, POD#2 s/p IA verapamil, POD#2 R groin cutdown for removal of proglide catheter w/ repair of femoral artery.     Past Medical History: Hyperlipidemia Hypertension COPD (chronic obstructive pulmonary disease) Asthma  Allergies:  No Known Allergies  Home meds:   ·	famotidine 20 mg oral tablet: 1 tab(s) orally once a day  ·	lisinopril 2.5 mg oral tablet: 1 tab(s) orally once a day  ·	montelukast 10 mg oral tablet: 1 tab(s) orally once a day  ·	predniSONE 50 mg oral tablet: 1 tab(s) orally once a day  ·	ProAir HFA CFC free 90 mcg/inh inhalation aerosol: 2 puff(s) inhaled 4 times a day PRN  ·	simvastatin 20 mg oral tablet: 1 tab(s) orally once a day (at bedtime)    Current Meds:  MEDICATIONS  (STANDING):  albuterol/ipratropium for Nebulization. 3 milliLiter(s) Nebulizer every 6 hours  amantadine 200 milliGRAM(s) Oral <User Schedule>  atorvastatin 40 milliGRAM(s) Oral at bedtime  bisacodyl Suppository 10 milliGRAM(s) Rectal daily  cyanocobalamin 1000 MICROGram(s) Oral daily  enoxaparin Injectable 40 milliGRAM(s) SubCutaneous every 24 hours  ferrous    sulfate 325 milliGRAM(s) Oral daily  fludroCORTISONE 0.1 milliGRAM(s) Oral every 24 hours  insulin lispro (ADMELOG) corrective regimen sliding scale   SubCutaneous Before meals and at bedtime  lacosamide 100 milliGRAM(s) Oral two times a day  lidocaine 2% (Preservative-free) Injectable 10 milliLiter(s) Local Injection once  milrinone Infusion 0.5 MICROgram(s)/kG/Min (7.49 mL/Hr) IV Continuous <Continuous> + (assess:  3/2+2 mg bolus)  montelukast 10 milliGRAM(s) Oral daily  piperacillin/tazobactam IVPB.. 3.375 Gram(s) IV Intermittent every 6 hours  polyethylene glycol 3350 17 Gram(s) Oral daily  senna 2 Tablet(s) Oral at bedtime  sodium chloride 1 Gram(s) Oral every 12 hours  vancomycin  IVPB 750 milliGRAM(s) IV Intermittent every 24 hours    MEDICATIONS  (PRN):  acetaminophen    Suspension .. 650 milliGRAM(s) Oral every 6 hours PRN Temp greater or equal to 38C (100.4F), Mild Pain (1 - 3)  labetalol Injectable 10 milliGRAM(s) IV Push every 4 hours PRN Systolic blood pressure > 180      PHYSICAL EXAMINATION    ICU Vital Signs Last 24 Hrs      NEUROLOGIC EXAMINATION:  oriented to self in AM, CLEOPATRA, resolved left gaze, no facial droop, intermittent follows 1st order commands  Mode: AC, RR (machine): 12, TV (machine): 400, FiO2: 50, PEEP: 10, ITime: 1, MAP: 15, PIP: 24  EENT:  anicteric  CARDIOVASCULAR: (+) S1 S2, JAMIE  PULMONARY: clear to auscultation bilaterally, no adventitia  ABDOMEN: soft, nontender with normoactive bowel sounds  EXTREMITIES: no edema, pulses present by Dopplers  SKIN: no rash          HbA1C = 6.7 (-10) 6.4 (-)  LDL = 112 (-)   HDL = 66 (-)  TG = 114 (-)   TSH = 0.990 ()    Bacteriology:    Culture - CSF with Gram Stain . (20 @ 12:10)    Gram Stain:   No organisms seen  No polymorphonuclear cells seen    Specimen Source: .CSF CSF    Culture Results:   No growth to date    Culture - Blood (20 @ 01:18)    Specimen Source: .Blood Blood X 2    Culture Results:   No growth at 1 day.    Urinalysis Basic - ( 16 Dec 2020 03:26 )    Color: Yellow / Appearance: Clear / S.020 / pH: x  Gluc: x / Ketone: 40 mg/dL  / Bili: Negative / Urobili: 0.2 E.U./dL   Blood: x / Protein: NEGATIVE mg/dL / Nitrite: NEGATIVE   Leuk Esterase: NEGATIVE / RBC: < 5 /HPF / WBC < 5 /HPF   Sq Epi: x / Non Sq Epi: 0-5 /HPF / Bacteria: Present /HPF    CSF studies:  EE/16:  Multifocal spikes on EEG, no epileptiform activity  Neuroimagin/16 XA:  complete and persistent left Pcomm aneurysm occlusion with microcoils; mild vasospasm in right A1 and mild vasospasm in left MAGGIE.  Intra arterial verapamil infusion (10 mg right CCA and 15 mg left CCA)  12/15 CTA head:  Multifocal moderate stenosis the right PCA. Mild stenosis of the left PCA which were present on prior CTA head and neck 2020 and therefore may reflect intracranial atherosclerosis rather than vasospasm.  New mild stenosis of the mid right M1 segment.  Interval increase in size of the lateral and third ventricles since prior CT head 2020.  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  12/10 CT head:  EVD placement, stable   CTA:  L pcomm aneurysm, L ICA (distal cavernous) aneurysm vs infundibulum, extensive calcified plaque cavernous bilateral ICA with mild to mod stenosis; thick plaque bilateral carotid bifurcations - mod to severe R and mild to mod L stenosis, focal calcified plaque R VA off subclavian artery - high grate stenosis / partial occlusion, upper lung bilateral opacification - pulmo edema, chronic deformity R humeral neck   CT head:  SAH, basilar cisterns, IV extension, obstructive hydrocephalus SVID, lacunar infarcts R caudate, both thalami, cerebellar hemispheres, no CT evidence of territorial infarction  Other imagin/11 LE Doppler: NEG   CT abd:  small paraesophageal hiatal hernia, gastritis; ?impaction, septal thickening bilateral lower lobes ?pulmo edema, hepatic steatosis    IV FLUIDS: IV NS 40 mL/h  DRIPS:  DIET: CCD  Lines: R IJ  Drains:     EVD at 5cm H20   EVD at 5cm H20 ICP < 10  12/15 EVD at 5cm H20 ICP 1-5, CSF 2-8 mL/h   EVD at 0cm H20 ICP 2-7 CSF 4-20 mL/h   EVD at 0cm H20 ICP 1-6 CSF 4-20 mL/h    CODE STATUS:  Full Code                       GOALS OF CARE:  aggressive                      DISPOSITION:  ICU  NIHSS 2 HH3 MF4 ===================================================   NEUROCRITICAL CARE ATTENDING NOTE (THURSDAY, 2020)  ===================================================    CARA CANCHOLA   MRN-[id number]  Summary:  75y/F with COPD, asthma, dyslipidemia Hypertension found down.  Brought to [facility] where imaging showed SAH basilar cisterns with IVH and obstructive hydrocephalus L Pcomm aneurysm.  Given levetiracetam, hydromorphone.  NIHSS 2 HH3 MF4, transferred to [facility] for further management.      COURSE IN THE HOSPITAL:   admitted to [facility], EVD inserted; given 20 lasix for pulmo edema, T inversion on CT  12/10 No significant events overnight.    No significant events overnight.    No significant events overnight.    No significant events overnight. drain stopped working at 730 a.m., off levophed    No significant events overnight.   12/15 Noted confusion, CTA mild spasm   lethargic, angio - no severe spasm, given IA verapamil, underwent femoral endarterectomy with repair with patch angioplasty   improved post verapamil, transitioned to milrinone, currently tolerating extubation  1218: BD#10, POD#8 s/p coil/embo of L pcomm aneurysm, POD#2 s/p IA verapamil, POD#2 R groin cutdown for removal of proglide catheter w/ repair of femoral artery.     Past Medical History: Hyperlipidemia Hypertension COPD (chronic obstructive pulmonary disease) Asthma  Allergies:  No Known Allergies  Home meds:   ·	famotidine 20 mg oral tablet: 1 tab(s) orally once a day  ·	lisinopril 2.5 mg oral tablet: 1 tab(s) orally once a day  ·	montelukast 10 mg oral tablet: 1 tab(s) orally once a day  ·	predniSONE 50 mg oral tablet: 1 tab(s) orally once a day  ·	ProAir HFA CFC free 90 mcg/inh inhalation aerosol: 2 puff(s) inhaled 4 times a day PRN  ·	simvastatin 20 mg oral tablet: 1 tab(s) orally once a day (at bedtime)    Current Meds:  MEDICATIONS  (STANDING):  albuterol/ipratropium for Nebulization. 3 milliLiter(s) Nebulizer every 6 hours  amantadine 200 milliGRAM(s) Oral <User Schedule>  atorvastatin 40 milliGRAM(s) Oral at bedtime  bisacodyl Suppository 10 milliGRAM(s) Rectal daily  cyanocobalamin 1000 MICROGram(s) Oral daily  enoxaparin Injectable 40 milliGRAM(s) SubCutaneous every 24 hours  ferrous    sulfate 325 milliGRAM(s) Oral daily  fludroCORTISONE 0.1 milliGRAM(s) Oral every 24 hours  insulin lispro (ADMELOG) corrective regimen sliding scale   SubCutaneous Before meals and at bedtime  lacosamide 100 milliGRAM(s) Oral two times a day  lidocaine 2% (Preservative-free) Injectable 10 milliLiter(s) Local Injection once  milrinone Infusion 0.5 MICROgram(s)/kG/Min (7.49 mL/Hr) IV Continuous <Continuous> + (assess:  3/2+2 mg bolus)  montelukast 10 milliGRAM(s) Oral daily  piperacillin/tazobactam IVPB.. 3.375 Gram(s) IV Intermittent every 6 hours  polyethylene glycol 3350 17 Gram(s) Oral daily  senna 2 Tablet(s) Oral at bedtime  sodium chloride 1 Gram(s) Oral every 12 hours  vancomycin  IVPB 750 milliGRAM(s) IV Intermittent every 24 hours    MEDICATIONS  (PRN):  acetaminophen    Suspension .. 650 milliGRAM(s) Oral every 6 hours PRN Temp greater or equal to 38C (100.4F), Mild Pain (1 - 3)  labetalol Injectable 10 milliGRAM(s) IV Push every 4 hours PRN Systolic blood pressure > 180    PHYSICAL EXAMINATION    ICU Vital Signs Last 24 Hrs  T(C): 37.9 (18 Dec 2020 05:15), Max: 38.2 (17 Dec 2020 11:35)  T(F): 100.3 (18 Dec 2020 05:15), Max: 100.7 (17 Dec 2020 11:35)  HR: 101 (18 Dec 2020 05:00) (81 - 105)  BP: 160/70 (18 Dec 2020 05:00) (96/46 - 165/73)  BP(mean): 101 (18 Dec 2020 05:00) (66 - 107)  RR: 24 (18 Dec 2020 05:00) (12 - 37)  SpO2: 97% (18 Dec 2020 05:00) (93% - 100%)    NEUROLOGIC EXAMINATION:  oriented to self in AM, CLEOPATRA, resolved left gaze, no facial droop, intermittent follows 1st order commands  EENT:  anicteric  CARDIOVASCULAR: (+) S1 S2, JAMIE  PULMONARY: clear to auscultation bilaterally, no adventitia  ABDOMEN: soft, nontender with normoactive bowel sounds  EXTREMITIES: no edema, pulses present by Dopplers  SKIN: no rash    I/O's    20 @ 07:01  -  20 @ 07:00  --------------------------------------------------------  IN: 1702.7 mL / OUT: 1454 mL / NET: 248.7 mL    20 @ 07:01  -  20 @ 06:35  --------------------------------------------------------  IN: 2161.6 mL / OUT: 665 mL / NET: 1496.6 mL    LABS:                        11.8   13.63 )-----------( 262      ( 17 Dec 2020 05:25 )             37.5     12-17    147<H>  |  116<H>  |  16  ----------------------------<  161<H>  3.4<L>   |  16<L>  |  0.68    Ca    7.4<L>      17 Dec 2020 13:36  Phos  2.2     12-17  Mg     2.1     12-17    PT/INR - ( 16 Dec 2020 21:25 )   PT: 14.4 sec;   INR: 1.21     PTT - ( 17 Dec 2020 05:25 )  PTT:28.3 sec    CAPILLARY BLOOD GLUCOSE    POCT Blood Glucose.: 202 mg/dL (17 Dec 2020 22:07)  POCT Blood Glucose.: 177 mg/dL (17 Dec 2020 18:31)  POCT Blood Glucose.: 155 mg/dL (17 Dec 2020 11:00)    HbA1C = 6.7 (12-10) 6.4 (-09)  LDL = 112 (-)   HDL = 66 (-)  TG = 114 ()   TSH = 0.990 ()    Bacteriology:    Culture - CSF with Gram Stain . (20 @ 12:10)    Gram Stain:   No organisms seen  No polymorphonuclear cells seen    Specimen Source: .CSF CSF    Culture Results:   No growth to date    Culture - Blood (20 @ 01:18)    Specimen Source: .Blood Blood X 2    Culture Results:   No growth at 1 day.    Urinalysis Basic - ( 16 Dec 2020 03:26 )    Color: Yellow / Appearance: Clear / S.020 / pH: x  Gluc: x / Ketone: 40 mg/dL  / Bili: Negative / Urobili: 0.2 E.U./dL   Blood: x / Protein: NEGATIVE mg/dL / Nitrite: NEGATIVE   Leuk Esterase: NEGATIVE / RBC: < 5 /HPF / WBC < 5 /HPF   Sq Epi: x / Non Sq Epi: 0-5 /HPF / Bacteria: Present /HPF    CSF studies:  EE/16:  Multifocal spikes on EEG, no epileptiform activity  Neuroimagin/16 XA:  complete and persistent left Pcomm aneurysm occlusion with microcoils; mild vasospasm in right A1 and mild vasospasm in left MAGGIE.  Intra arterial verapamil infusion (10 mg right CCA and 15 mg left CCA)  12/15 CTA head:  Multifocal moderate stenosis the right PCA. Mild stenosis of the left PCA which were present on prior CTA head and neck 2020 and therefore may reflect intracranial atherosclerosis rather than vasospasm.  New mild stenosis of the mid right M1 segment.  Interval increase in size of the lateral and third ventricles since prior CT head 2020.  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  12/10 CT head:  EVD placement, stable   CTA:  L pcomm aneurysm, L ICA (distal cavernous) aneurysm vs infundibulum, extensive calcified plaque cavernous bilateral ICA with mild to mod stenosis; thick plaque bilateral carotid bifurcations - mod to severe R and mild to mod L stenosis, focal calcified plaque R VA off subclavian artery - high grate stenosis / partial occlusion, upper lung bilateral opacification - pulmo edema, chronic deformity R humeral neck   CT head:  SAH, basilar cisterns, IV extension, obstructive hydrocephalus SVID, lacunar infarcts R caudate, both thalami, cerebellar hemispheres, no CT evidence of territorial infarction  Other imagin/11 LE Doppler: NEG   CT abd:  small paraesophageal hiatal hernia, gastritis; ?impaction, septal thickening bilateral lower lobes ?pulmo edema, hepatic steatosis    IV FLUIDS: IV NS 40 mL/h  DRIPS:  DIET: CCD  Lines: R IJ  Drains:     EVD at 5cm H20   EVD at 5cm H20 ICP < 10  12/15 EVD at 5cm H20 ICP 1-5, CSF 2-8 mL/h   EVD at 0cm H20 ICP 2-7 CSF 4-20 mL/h   EVD at 0cm H20 ICP 1-6 CSF 4-20 mL/h    CODE STATUS:  Full Code                       GOALS OF CARE:  aggressive                      DISPOSITION:  ICU  NIHSS 2 HH3 MF4 ===================================================   NEUROCRITICAL CARE ATTENDING NOTE (THURSDAY, 2020)  ===================================================    CARA CANCHOLA   MRN-[id number]  Summary:  75y/F with COPD, asthma, dyslipidemia Hypertension found down.  Brought to [facility] where imaging showed SAH basilar cisterns with IVH and obstructive hydrocephalus L Pcomm aneurysm.  Given levetiracetam, hydromorphone.  NIHSS 2 HH3 MF4, transferred to [facility] for further management.      COURSE IN THE HOSPITAL:   admitted to [facility], EVD inserted; given 20 lasix for pulmo edema, T inversion on CT  12/10 No significant events overnight.    No significant events overnight.    No significant events overnight.    No significant events overnight. drain stopped working at 730 a.m., off levophed    No significant events overnight.   12/15 Noted confusion, CTA mild spasm   lethargic, angio - no severe spasm, given IA verapamil, underwent femoral endarterectomy with repair with patch angioplasty   improved post verapamil, transitioned to milrinone, currently tolerating extubation  1218: BD#10, POD#8 s/p coil/embo of L pcomm aneurysm, POD#2 s/p IA verapamil, POD#2 R groin cutdown for removal of proglide catheter w/ repair of femoral artery.     Past Medical History: Hyperlipidemia Hypertension COPD (chronic obstructive pulmonary disease) Asthma  Allergies:  No Known Allergies  Home meds:   ·	famotidine 20 mg oral tablet: 1 tab(s) orally once a day  ·	lisinopril 2.5 mg oral tablet: 1 tab(s) orally once a day  ·	montelukast 10 mg oral tablet: 1 tab(s) orally once a day  ·	predniSONE 50 mg oral tablet: 1 tab(s) orally once a day  ·	ProAir HFA CFC free 90 mcg/inh inhalation aerosol: 2 puff(s) inhaled 4 times a day PRN  ·	simvastatin 20 mg oral tablet: 1 tab(s) orally once a day (at bedtime)    Current Meds:  MEDICATIONS  (STANDING):  albuterol/ipratropium for Nebulization. 3 milliLiter(s) Nebulizer every 6 hours  amantadine 200 milliGRAM(s) Oral <User Schedule>  atorvastatin 40 milliGRAM(s) Oral at bedtime  bisacodyl Suppository 10 milliGRAM(s) Rectal daily  cyanocobalamin 1000 MICROGram(s) Oral daily  enoxaparin Injectable 40 milliGRAM(s) SubCutaneous every 24 hours  ferrous    sulfate 325 milliGRAM(s) Oral daily  fludroCORTISONE 0.1 milliGRAM(s) Oral every 24 hours  insulin lispro (ADMELOG) corrective regimen sliding scale   SubCutaneous Before meals and at bedtime  lacosamide 100 milliGRAM(s) Oral two times a day  lidocaine 2% (Preservative-free) Injectable 10 milliLiter(s) Local Injection once  milrinone Infusion 0.5 MICROgram(s)/kG/Min (7.49 mL/Hr) IV Continuous <Continuous> + (assess:  3/2+2 mg bolus)  montelukast 10 milliGRAM(s) Oral daily  piperacillin/tazobactam IVPB.. 3.375 Gram(s) IV Intermittent every 6 hours  polyethylene glycol 3350 17 Gram(s) Oral daily  senna 2 Tablet(s) Oral at bedtime  sodium chloride 1 Gram(s) Oral every 12 hours  vancomycin  IVPB 750 milliGRAM(s) IV Intermittent every 24 hours    MEDICATIONS  (PRN):  acetaminophen    Suspension .. 650 milliGRAM(s) Oral every 6 hours PRN Temp greater or equal to 38C (100.4F), Mild Pain (1 - 3)  labetalol Injectable 10 milliGRAM(s) IV Push every 4 hours PRN Systolic blood pressure > 180    PHYSICAL EXAMINATION    ICU Vital Signs Last 24 Hrs  T(C): 37.9 (18 Dec 2020 05:15), Max: 38.2 (17 Dec 2020 11:35)  T(F): 100.3 (18 Dec 2020 05:15), Max: 100.7 (17 Dec 2020 11:35)  HR: 103 (18 Dec 2020 06:00) (81 - 105)  BP: 166/74 (18 Dec 2020 06:00) (96/46 - 166/74)  BP(mean): 106 (18 Dec 2020 06:00) (66 - 107)  RR: 26 (18 Dec 2020 06:00) (12 - 37)  SpO2: 95% (18 Dec 2020 06:00) (93% - 100%)    NEUROLOGIC EXAMINATION:  oriented to self in AM, CLEOPATRA, resolved left gaze, no facial droop, intermittent follows 1st order commands  EENT:  anicteric  CARDIOVASCULAR: (+) S1 S2, JAMIE  PULMONARY: clear to auscultation bilaterally, no adventitia  ABDOMEN: soft, nontender with normoactive bowel sounds  EXTREMITIES: no edema, pulses present by Dopplers  SKIN: no rash    I/O's    20 @ 07:01  -  20 @ 07:00  --------------------------------------------------------  IN: 2336.4 mL / OUT: 1290 mL / NET: 1046.4 mL    LABS:                        10.4   11.94 )-----------( 251      ( 18 Dec 2020 06:36 )             32.5     12-17    147<H>  |  116<H>  |  16  ----------------------------<  161<H>  3.4<L>   |  16<L>  |  0.68    Ca    7.4<L>      17 Dec 2020 13:36  Phos  2.2     12-  Mg     2.1     12-17      PT/INR - ( 16 Dec 2020 21:25 )   PT: 14.4 sec;   INR: 1.21     PTT - ( 17 Dec 2020 05:25 )  PTT:28.3 sec    CAPILLARY BLOOD GLUCOSE    POCT Blood Glucose.: 202 mg/dL (17 Dec 2020 22:07)  POCT Blood Glucose.: 177 mg/dL (17 Dec 2020 18:31)  POCT Blood Glucose.: 155 mg/dL (17 Dec 2020 11:00)    HbA1C = 6.7 (12-10) 6.4 (12-09)  LDL = 112 (12-09)   HDL = 66 (12-09)  TG = 114 ()   TSH = 0.990 ()    Bacteriology:    Culture - CSF with Gram Stain . (20 @ 12:10)    Gram Stain:   No organisms seen  No polymorphonuclear cells seen    Specimen Source: .CSF CSF    Culture Results:   No growth to date    Culture - Blood (20 @ 01:18)    Specimen Source: .Blood Blood X 2    Culture Results:   No growth at 1 day.    Urinalysis Basic - ( 16 Dec 2020 03:26 )    Color: Yellow / Appearance: Clear / S.020 / pH: x  Gluc: x / Ketone: 40 mg/dL  / Bili: Negative / Urobili: 0.2 E.U./dL   Blood: x / Protein: NEGATIVE mg/dL / Nitrite: NEGATIVE   Leuk Esterase: NEGATIVE / RBC: < 5 /HPF / WBC < 5 /HPF   Sq Epi: x / Non Sq Epi: 0-5 /HPF / Bacteria: Present /HPF    CSF studies:  EE/16:  Multifocal spikes on EEG, no epileptiform activity  Neuroimagin/16 XA:  complete and persistent left Pcomm aneurysm occlusion with microcoils; mild vasospasm in right A1 and mild vasospasm in left MAGGIE.  Intra arterial verapamil infusion (10 mg right CCA and 15 mg left CCA)  12/15 CTA head:  Multifocal moderate stenosis the right PCA. Mild stenosis of the left PCA which were present on prior CTA head and neck 2020 and therefore may reflect intracranial atherosclerosis rather than vasospasm.  New mild stenosis of the mid right M1 segment.  Interval increase in size of the lateral and third ventricles since prior CT head 2020.  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  12/10 CT head:  EVD placement, stable   CTA:  L pcomm aneurysm, L ICA (distal cavernous) aneurysm vs infundibulum, extensive calcified plaque cavernous bilateral ICA with mild to mod stenosis; thick plaque bilateral carotid bifurcations - mod to severe R and mild to mod L stenosis, focal calcified plaque R VA off subclavian artery - high grate stenosis / partial occlusion, upper lung bilateral opacification - pulmo edema, chronic deformity R humeral neck   CT head:  SAH, basilar cisterns, IV extension, obstructive hydrocephalus SVID, lacunar infarcts R caudate, both thalami, cerebellar hemispheres, no CT evidence of territorial infarction  Other imagin/11 LE Doppler: NEG   CT abd:  small paraesophageal hiatal hernia, gastritis; ?impaction, septal thickening bilateral lower lobes ?pulmo edema, hepatic steatosis    IV FLUIDS: IV NS 40 mL/h  DRIPS:  DIET: CCD  Lines: R IJ  Drains:     EVD at 5cm H20   EVD at 5cm H20 ICP < 10  12/15 EVD at 5cm H20 ICP 1-5, CSF 2-8 mL/h   EVD at 0cm H20 ICP 2-7 CSF 4-20 mL/h   EVD at 0cm H20 ICP 1-6 CSF 4-20 mL/h    CODE STATUS:  Full Code                       GOALS OF CARE:  aggressive                      DISPOSITION:  ICU  NIHSS 2 HH3 MF4 ===================================================   NEUROCRITICAL CARE ATTENDING NOTE (THURSDAY, 2020)  ===================================================    CARA CANCHOLA   MRN-[id number]  Summary:  75y/F with COPD, asthma, dyslipidemia Hypertension found down.  Brought to [facility] where imaging showed SAH basilar cisterns with IVH and obstructive hydrocephalus L Pcomm aneurysm.  Given levetiracetam, hydromorphone.  NIHSS 2 HH3 MF4, transferred to [facility] for further management.      COURSE IN THE HOSPITAL:   admitted to [facility], EVD inserted; given 20 lasix for pulmo edema, T inversion on CT  12/10 No significant events overnight.    No significant events overnight.    No significant events overnight.    No significant events overnight. drain stopped working at 730 a.m., off levophed    No significant events overnight.   12/15 Noted confusion, CTA mild spasm   lethargic, angio - no severe spasm, given IA verapamil, underwent femoral endarterectomy with repair with patch angioplasty   improved post verapamil, transitioned to milrinone, currently tolerating extubation  1218: BD#10, POD#8 s/p coil/embo of L pcomm aneurysm, POD#2 s/p IA verapamil, POD#2 R groin cutdown for removal of proglide catheter w/ repair of femoral artery.     Past Medical History: Hyperlipidemia Hypertension COPD (chronic obstructive pulmonary disease) Asthma  Allergies:  No Known Allergies  Home meds:   ·	famotidine 20 mg oral tablet: 1 tab(s) orally once a day  ·	lisinopril 2.5 mg oral tablet: 1 tab(s) orally once a day  ·	montelukast 10 mg oral tablet: 1 tab(s) orally once a day  ·	predniSONE 50 mg oral tablet: 1 tab(s) orally once a day  ·	ProAir HFA CFC free 90 mcg/inh inhalation aerosol: 2 puff(s) inhaled 4 times a day PRN  ·	simvastatin 20 mg oral tablet: 1 tab(s) orally once a day (at bedtime)    Current Meds:  MEDICATIONS  (STANDING):  albuterol/ipratropium for Nebulization. 3 milliLiter(s) Nebulizer every 6 hours  atorvastatin 40 milliGRAM(s) Oral at bedtime  bisacodyl Suppository 10 milliGRAM(s) Rectal daily  cyanocobalamin 1000 MICROGram(s) Oral daily  enoxaparin Injectable 40 milliGRAM(s) SubCutaneous every 24 hours  ferrous    sulfate 325 milliGRAM(s) Oral daily  fludroCORTISONE 0.1 milliGRAM(s) Oral every 24 hours  insulin lispro (ADMELOG) corrective regimen sliding scale   SubCutaneous Before meals and at bedtime  lacosamide 150 milliGRAM(s) Oral two times a day  methylphenidate 5 milliGRAM(s) Oral every 24 hours  milrinone Infusion 0.5 MICROgram(s)/kG/Min (7.49 mL/Hr) IV Continuous <Continuous>  montelukast 10 milliGRAM(s) Oral daily  piperacillin/tazobactam IVPB.. 3.375 Gram(s) IV Intermittent every 6 hours  polyethylene glycol 3350 17 Gram(s) Oral daily  propranolol 5 milliGRAM(s) Oral once  senna 2 Tablet(s) Oral at bedtime  sodium chloride 0.9%. 1000 milliLiter(s) (30 mL/Hr) IV Continuous <Continuous>  vancomycin  IVPB 750 milliGRAM(s) IV Intermittent every 24 hours     MEDICATIONS  (PRN):  acetaminophen    Suspension .. 650 milliGRAM(s) Oral every 6 hours PRN Temp greater or equal to 38C (100.4F), Mild Pain (1 - 3)  labetalol Injectable 10 milliGRAM(s) IV Push every 4 hours PRN Systolic blood pressure > 180    PHYSICAL EXAMINATION    ICU Vital Signs Last 24 Hrs  T(C): 37.9 (18 Dec 2020 05:15), Max: 38.2 (17 Dec 2020 11:35)  T(F): 100.3 (18 Dec 2020 05:15), Max: 100.7 (17 Dec 2020 11:35)  HR: 103 (18 Dec 2020 06:00) (81 - 105)  BP: 166/74 (18 Dec 2020 06:00) (96/46 - 166/74)  BP(mean): 106 (18 Dec 2020 06:00) (66 - 107)  RR: 26 (18 Dec 2020 06:00) (12 - 37)  SpO2: 95% (18 Dec 2020 06:00) (93% - 100%)    NEUROLOGIC EXAMINATION:  oriented to self in AM, CLEOPATRA, resolved left gaze, no facial droop, intermittent follows 1st order commands  EENT:  anicteric  CARDIOVASCULAR: (+) S1 S2, JAMIE  PULMONARY: clear to auscultation bilaterally, slight expiratory wheezes  ABDOMEN: soft, nontender with normoactive bowel sounds  EXTREMITIES: no edema, pulses present by Dopplers  SKIN: no rash    I/O's    20 @ 07:01  -  20 @ 07:00  --------------------------------------------------------  IN: 2336.4 mL / OUT: 1290 mL / NET: 1046.4 mL    LABS:                        10.4   11.94 )-----------( 251      ( 18 Dec 2020 06:36 )             32.5     12-17    147<H>  |  116<H>  |  16  ----------------------------<  161<H>  3.4<L>   |  16<L>  |  0.68    Ca    7.4<L>      17 Dec 2020 13:36  Phos  2.2     -  Mg     2.1     12-17      PT/INR - ( 16 Dec 2020 21:25 )   PT: 14.4 sec;   INR: 1.21     PTT - ( 17 Dec 2020 05:25 )  PTT:28.3 sec    CAPILLARY BLOOD GLUCOSE    POCT Blood Glucose.: 202 mg/dL (17 Dec 2020 22:07)  POCT Blood Glucose.: 177 mg/dL (17 Dec 2020 18:31)  POCT Blood Glucose.: 155 mg/dL (17 Dec 2020 11:00)    HbA1C = 6.7 (12-10) 6.4 (12-09)  LDL = 112 (12-09)   HDL = 66 (12-09)  TG = 114 (12-09)   TSH = 0.990 ()    Bacteriology:    Culture - CSF with Gram Stain . (20 @ 12:10)    Gram Stain:   No organisms seen  No polymorphonuclear cells seen    Specimen Source: .CSF CSF    Culture Results:   No growth to date    Culture - Blood (20 @ 01:18)    Specimen Source: .Blood Blood X 2    Culture Results:   No growth at 1 day.    Urinalysis Basic - ( 16 Dec 2020 03:26 )    Color: Yellow / Appearance: Clear / S.020 / pH: x  Gluc: x / Ketone: 40 mg/dL  / Bili: Negative / Urobili: 0.2 E.U./dL   Blood: x / Protein: NEGATIVE mg/dL / Nitrite: NEGATIVE   Leuk Esterase: NEGATIVE / RBC: < 5 /HPF / WBC < 5 /HPF   Sq Epi: x / Non Sq Epi: 0-5 /HPF / Bacteria: Present /HPF    CSF studies:  EE/16:   1)	Rare right frontal lateralized rhythmic delta activity (LRDA) for very brief duration, suggestive of right frontal cortical hyper-excitability.   2)	Frequent right frontal sharp waves, as well as rare left frontoparietal and left temporal sharp waves, suggestive of focal cortical hyper-excitability in these regions.  Neuroimagin/16 XA:  complete and persistent left Pcomm aneurysm occlusion with microcoils; mild vasospasm in right A1 and mild vasospasm in left MAGGIE.  Intra arterial verapamil infusion (10 mg right CCA and 15 mg left CCA)  12/15 CTA head:  Multifocal moderate stenosis the right PCA. Mild stenosis of the left PCA which were present on prior CTA head and neck 2020 and therefore may reflect intracranial atherosclerosis rather than vasospasm.  New mild stenosis of the mid right M1 segment.  Interval increase in size of the lateral and third ventricles since prior CT head 2020.  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  12/10 CT head:  EVD placement, stable   CTA:  L pcomm aneurysm, L ICA (distal cavernous) aneurysm vs infundibulum, extensive calcified plaque cavernous bilateral ICA with mild to mod stenosis; thick plaque bilateral carotid bifurcations - mod to severe R and mild to mod L stenosis, focal calcified plaque R VA off subclavian artery - high grate stenosis / partial occlusion, upper lung bilateral opacification - pulmo edema, chronic deformity R humeral neck   CT head:  SAH, basilar cisterns, IV extension, obstructive hydrocephalus SVID, lacunar infarcts R caudate, both thalami, cerebellar hemispheres, no CT evidence of territorial infarction  Other imagin/18 CXR: improve right basilar opacity/pleural effusion   LE Doppler: NEG   CT abd:  small paraesophageal hiatal hernia, gastritis; ?impaction, septal thickening bilateral lower lobes ?pulmo edema, hepatic steatosis    IV FLUIDS: IV NS 40 mL/h  DRIPS:  DIET: CCD  Lines: R IJ  Drains:     EVD at 5cm H20   EVD at 5cm H20 ICP < 10  12/15 EVD at 5cm H20 ICP 1-5, CSF 2-8 mL/h   EVD at 0cm H20 ICP 2-7 CSF 4-20 mL/h   EVD at 0cm H20 ICP 1-6 CSF 4-20 mL/h   EVD at 0cm H20 ICP 1-10 CSF 7-18 mL/h    CODE STATUS:  Full Code                       GOALS OF CARE:  aggressive                      DISPOSITION:  ICU  NIHSS 2 HH3 MF4 ================================================   NEUROCRITICAL CARE ATTENDING NOTE (2020)  ================================================    CARA CANCHOLA   MRN-[id number]  Summary:  75y/F with COPD, asthma, dyslipidemia Hypertension found down.  Brought to [facility] where imaging showed SAH basilar cisterns with IVH and obstructive hydrocephalus L Pcomm aneurysm.  Given levetiracetam, hydromorphone.  NIHSS 2 HH3 MF4, transferred to [facility] for further management.      COURSE IN THE HOSPITAL:   admitted to [facility], EVD inserted; given 20 lasix for pulmo edema, T inversion on CT  12/10 No significant events overnight.    No significant events overnight.    No significant events overnight.    No significant events overnight. drain stopped working at 730 a.m., off levophed    No significant events overnight.   12/15 Noted confusion, CTA mild spasm   lethargic, angio - no severe spasm, given IA verapamil, underwent femoral endarterectomy with repair with patch angioplasty   improved post verapamil, transitioned to milrinone, currently tolerating extubation  12: BD#10, POD#8 s/p coil/embo of L pcomm aneurysm, POD#2 s/p IA verapamil, POD#2 R groin cutdown for removal of proglide catheter w/ repair of femoral artery.     Past Medical History: Hyperlipidemia Hypertension COPD (chronic obstructive pulmonary disease) Asthma  Allergies:  No Known Allergies  Home meds:   ·	famotidine 20 mg oral tablet: 1 tab(s) orally once a day  ·	lisinopril 2.5 mg oral tablet: 1 tab(s) orally once a day  ·	montelukast 10 mg oral tablet: 1 tab(s) orally once a day  ·	predniSONE 50 mg oral tablet: 1 tab(s) orally once a day  ·	ProAir HFA CFC free 90 mcg/inh inhalation aerosol: 2 puff(s) inhaled 4 times a day PRN  ·	simvastatin 20 mg oral tablet: 1 tab(s) orally once a day (at bedtime)    Current Meds:  MEDICATIONS  (STANDING):  albuterol/ipratropium for Nebulization. 3 milliLiter(s) Nebulizer every 6 hours  atorvastatin 40 milliGRAM(s) Oral at bedtime  bisacodyl Suppository 10 milliGRAM(s) Rectal daily  cyanocobalamin 1000 MICROGram(s) Oral daily  enoxaparin Injectable 40 milliGRAM(s) SubCutaneous every 24 hours  ferrous    sulfate 325 milliGRAM(s) Oral daily  fludroCORTISONE 0.1 milliGRAM(s) Oral every 24 hours  insulin lispro (ADMELOG) corrective regimen sliding scale   SubCutaneous Before meals and at bedtime  lacosamide 150 milliGRAM(s) Oral two times a day  methylphenidate 5 milliGRAM(s) Oral every 24 hours  milrinone Infusion 0.5 MICROgram(s)/kG/Min (7.49 mL/Hr) IV Continuous <Continuous>  montelukast 10 milliGRAM(s) Oral daily  piperacillin/tazobactam IVPB.. 3.375 Gram(s) IV Intermittent every 6 hours  polyethylene glycol 3350 17 Gram(s) Oral daily  propranolol 5 milliGRAM(s) Oral once  senna 2 Tablet(s) Oral at bedtime  sodium chloride 0.9%. 1000 milliLiter(s) (30 mL/Hr) IV Continuous <Continuous>  vancomycin  IVPB 750 milliGRAM(s) IV Intermittent every 24 hours     MEDICATIONS  (PRN):  acetaminophen    Suspension .. 650 milliGRAM(s) Oral every 6 hours PRN Temp greater or equal to 38C (100.4F), Mild Pain (1 - 3)  labetalol Injectable 10 milliGRAM(s) IV Push every 4 hours PRN Systolic blood pressure > 180    PHYSICAL EXAMINATION    ICU Vital Signs Last 24 Hrs  T(C): 37.9 (18 Dec 2020 05:15), Max: 38.2 (17 Dec 2020 11:35)  T(F): 100.3 (18 Dec 2020 05:15), Max: 100.7 (17 Dec 2020 11:35)  HR: 103 (18 Dec 2020 06:00) (81 - 105)  BP: 166/74 (18 Dec 2020 06:00) (96/46 - 166/74)  BP(mean): 106 (18 Dec 2020 06:00) (66 - 107)  RR: 26 (18 Dec 2020 06:00) (12 - 37)  SpO2: 95% (18 Dec 2020 06:00) (93% - 100%)    NEUROLOGIC EXAMINATION:  oriented to self in AM, CLEOPATRA, resolved left gaze, no facial droop, intermittent follows 1st order commands  EENT:  anicteric  CARDIOVASCULAR: (+) S1 S2, JAMIE  PULMONARY: clear to auscultation bilaterally, slight expiratory wheezes  ABDOMEN: soft, nontender with normoactive bowel sounds  EXTREMITIES: no edema, pulses present by Dopplers  SKIN: no rash    I/O's    20 @ 07:01  -  20 @ 07:00  --------------------------------------------------------  IN: 2336.4 mL / OUT: 1290 mL / NET: 1046.4 mL    LABS:                        10.4   11.94 )-----------( 251      ( 18 Dec 2020 06:36 )             32.5     12-17    147<H>  |  116<H>  |  16  ----------------------------<  161<H>  3.4<L>   |  16<L>  |  0.68    Ca    7.4<L>      17 Dec 2020 13:36  Phos  2.2     12-  Mg     2.1     12-17      PT/INR - ( 16 Dec 2020 21:25 )   PT: 14.4 sec;   INR: 1.21     PTT - ( 17 Dec 2020 05:25 )  PTT:28.3 sec    CAPILLARY BLOOD GLUCOSE    POCT Blood Glucose.: 202 mg/dL (17 Dec 2020 22:07)  POCT Blood Glucose.: 177 mg/dL (17 Dec 2020 18:31)  POCT Blood Glucose.: 155 mg/dL (17 Dec 2020 11:00)    HbA1C = 6.7 (12-10) 6.4 (12-09)  LDL = 112 (12-09)   HDL = 66 (12-09)  TG = 114 (12-09)   TSH = 0.990 ()    Bacteriology:    Culture - CSF with Gram Stain . (20 @ 12:10)    Gram Stain:   No organisms seen  No polymorphonuclear cells seen    Specimen Source: .CSF CSF    Culture Results:   No growth to date    Culture - Blood (20 @ 01:18)    Specimen Source: .Blood Blood X 2    Culture Results:   No growth at 1 day.    Urinalysis Basic - ( 16 Dec 2020 03:26 )    Color: Yellow / Appearance: Clear / S.020 / pH: x  Gluc: x / Ketone: 40 mg/dL  / Bili: Negative / Urobili: 0.2 E.U./dL   Blood: x / Protein: NEGATIVE mg/dL / Nitrite: NEGATIVE   Leuk Esterase: NEGATIVE / RBC: < 5 /HPF / WBC < 5 /HPF   Sq Epi: x / Non Sq Epi: 0-5 /HPF / Bacteria: Present /HPF    CSF studies:  EE/16:   1)	Rare right frontal lateralized rhythmic delta activity (LRDA) for very brief duration, suggestive of right frontal cortical hyper-excitability.   2)	Frequent right frontal sharp waves, as well as rare left frontoparietal and left temporal sharp waves, suggestive of focal cortical hyper-excitability in these regions.  Neuroimagin/16 XA:  complete and persistent left Pcomm aneurysm occlusion with microcoils; mild vasospasm in right A1 and mild vasospasm in left MAGGIE.  Intra arterial verapamil infusion (10 mg right CCA and 15 mg left CCA)  12/15 CTA head:  Multifocal moderate stenosis the right PCA. Mild stenosis of the left PCA which were present on prior CTA head and neck 2020 and therefore may reflect intracranial atherosclerosis rather than vasospasm.  New mild stenosis of the mid right M1 segment.  Interval increase in size of the lateral and third ventricles since prior CT head 2020.  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  12/10 CT head:  EVD placement, stable   CTA:  L pcomm aneurysm, L ICA (distal cavernous) aneurysm vs infundibulum, extensive calcified plaque cavernous bilateral ICA with mild to mod stenosis; thick plaque bilateral carotid bifurcations - mod to severe R and mild to mod L stenosis, focal calcified plaque R VA off subclavian artery - high grate stenosis / partial occlusion, upper lung bilateral opacification - pulmo edema, chronic deformity R humeral neck   CT head:  SAH, basilar cisterns, IV extension, obstructive hydrocephalus SVID, lacunar infarcts R caudate, both thalami, cerebellar hemispheres, no CT evidence of territorial infarction  Other imagin/18 CXR: improve right basilar opacity/pleural effusion   LE Doppler: NEG   CT abd:  small paraesophageal hiatal hernia, gastritis; ?impaction, septal thickening bilateral lower lobes ?pulmo edema, hepatic steatosis    IV FLUIDS: IV NS 40 mL/h  DRIPS:  DIET: CCD  Lines: R IJ  Drains:     EVD at 5cm H20   EVD at 5cm H20 ICP < 10  12/15 EVD at 5cm H20 ICP 1-5, CSF 2-8 mL/h   EVD at 0cm H20 ICP 2-7 CSF 4-20 mL/h   EVD at 0cm H20 ICP 1-6 CSF 4-20 mL/h   EVD at 0cm H20 ICP 1-10 CSF 7-18 mL/h    CODE STATUS:  Full Code                       GOALS OF CARE:  aggressive                      DISPOSITION:  ICU  NIHSS 2 HH3 MF4

## 2020-12-18 NOTE — PROGRESS NOTE ADULT - ASSESSMENT
74yo F POD#2 s/p cerebral angio for verapamil c/b device malfunction of Proglide, s/p right groin cutdown for removal of proglide catheter and femoral artery repair on 12/16/20     -Right groin monitoring, incision C/D/I  -Pulse checks- Biphasic PT, Monophasic DP//peroneal - improved from POD#1  -Continue vancomycin for 48 hours post op

## 2020-12-18 NOTE — PROGRESS NOTE ADULT - SUBJECTIVE AND OBJECTIVE BOX
Patient seen and examined at bedside        Vital Signs Last 24 Hrs  T(C): 37.9 (18 Dec 2020 05:15), Max: 38.2 (17 Dec 2020 11:35)  T(F): 100.3 (18 Dec 2020 05:15), Max: 100.7 (17 Dec 2020 11:35)  HR: 107 (18 Dec 2020 08:00) (88 - 107)  BP: 104/- (18 Dec 2020 08:00) (96/46 - 166/74)  BP(mean): 76 (18 Dec 2020 08:00) (66 - 107)  RR: 28 (18 Dec 2020 08:00) (12 - 37)  SpO2: 93% (18 Dec 2020 08:00) (93% - 100%)  I&O's Summary    17 Dec 2020 07:01  -  18 Dec 2020 07:00  --------------------------------------------------------  IN: 2496.4 mL / OUT: 1305 mL / NET: 1191.4 mL    18 Dec 2020 07:01  -  18 Dec 2020 08:33  --------------------------------------------------------  IN: 47.4 mL / OUT: 11 mL / NET: 36.4 mL        Physical Exam:  General: awake, NAD  Pulmonary: no respiratory distress  Cardiovascular: RRR  Extremities: Right groin soft, no hematoma, incision with staples, clean/dry/intact, no surrounding erythema/warmth  Pulses: RLE: biphasic PT, monophasic DP, monophasic peroneal      LABS:                        10.4   11.94 )-----------( 251      ( 18 Dec 2020 06:36 )             32.5     12-18    146<H>  |  113<H>  |  9   ----------------------------<  176<H>  3.3<L>   |  20<L>  |  0.55    Ca    7.9<L>      18 Dec 2020 06:36  Phos  1.0     12-18  Mg     2.1     12-18      PT/INR - ( 16 Dec 2020 21:25 )   PT: 14.4 sec;   INR: 1.21          PTT - ( 17 Dec 2020 05:25 )  PTT:28.3 sec    Radiology and Additional Studies:

## 2020-12-18 NOTE — PROGRESS NOTE ADULT - ASSESSMENT
75y/Female with:  aneursymal subarachnoid hemorrhage, L pcomm aneurysm, L parophthalmic aneurysm; brain compression, cerebral edema  osbtructive hydrocephalus  lacunar infarcts R caudate, B thalami, cerebellar hemispheres ?artery to artery vs cardioembolic?  atherosclerotic cardiovascular disease; mod to severe bilateral ICA stenosis (R>L), R VA stenosis  Hypertension dyslipidemia   COPD asthma  newly diagnosed Diabetes Mellitus?  troponin leak    PLAN: Day 1 = 12-09 ; Day 4 =  12/12; Day 21 = 12/29  NEURO: neurochecks q1h, PRN pain meds with Tylenol / Percocet  Clinical improvement with IA verapamil overnight, transitioned to IV milrinone (tolerates well), start neurostimulant amantidine 200 mg BID  SAH:  nimodipine discontinued due to pressor requirements  Hydrocephalus:  EVD 0 cm H20 open to drain  Seizure prophylaxis (cortical ICH, associated SDH, unclear etiology):  lacosamide 200 mg load, increase dose to 100 mg BID (multifocal spikes, no epileptiform activity)  REHAB:  physical therapy evaluation and management    EARLY MOB:  HOB elevated    PULM:  Room air, incentive spirometry, continue montelukast, ipratropium / albuterol PRN   CARDIO:  SBP goal 100-180mm Hg, EF 35%, repeat TTE as per Cardiology (once outside VSP window, will start MTP/ARB)  ENDO:  Blood sugar goals 140-180 mg/dL, cont insulin sliding scale, atorvastatin 40mg   GI:  PPI for GI prophylaxis (home meds); bowel regimen  DIET: CCD  RENAL: IV NS 40 mL/h  HEM/ONC: Hb stable, iron profile c/w iron deficiency, iron FeSO4 325 TID  VTE Prophylaxis: SCDs, SQL  ID: febrile, leukocytosis, pancultures negative so far, empiric vancomycin, piperacillin-tazobactam (assess daily)  Social: updated family    *****    CORE MEASURES  1.  Nath and Jacobo Score = 3  2.  VTE prophylaxis:  [ ] administered within 24 hours of admission OR [X] reason not done: fresh bleed, unsecured aneurysm.  3.  Dysphagia screening:   [X] performed before any oral meds / liquids / food  4.  Nimodipine treatment:  [X] administered within 24 hours of admission OR [ ] reason not done:    ATTENDING ATTESTATION:  I was physically present for the key portions of the evaluation and management (E/M) service provided.  I agree with the above history, physical and plan, which I have reviewed and edited where appropriate.    Patient at high risk for neurological deterioration or death due to:  ICU delirium, aspiration PNA, DVT / PE.  Critical care time:  I have personally provided 45 minutes of critical care time, excluding time spent on separate procedures.      Plan discussed with RN, house staff.   75y/Female with:  aneursymal subarachnoid hemorrhage, L pcomm aneurysm, L parophthalmic aneurysm; brain compression, cerebral edema  osbtructive hydrocephalus  lacunar infarcts R caudate, B thalami, cerebellar hemispheres ?artery to artery vs cardioembolic?  atherosclerotic cardiovascular disease; mod to severe bilateral ICA stenosis (R>L), R VA stenosis  Hypertension dyslipidemia   COPD asthma  newly diagnosed Diabetes Mellitus?  troponin leak    PLAN: Day 1 = 12-09 ; Day 4 =  12/12; Day 21 = 12/29  NEURO: neurochecks q1h, PRN pain meds with Tylenol / Percocet  Clinical improvement with IA verapamil, now transitioned to IV milrinone, start neurostimulant amantidine 200 mg BID  SAH:  nimodipine discontinued due to pressor requirements  Hydrocephalus:  EVD 0 cm H20 open to drain  Seizure (LRDA/sharp waves):  lacosamide 200 mg load, 100 mg --> 150 mg BID  REHAB:  physical therapy evaluation and management    EARLY MOB:  HOB elevated    PULM:  Room air, incentive spirometry, continue montelukast, ipratropium / albuterol PRN   CARDIO:  SBP goal 100-180mm Hg, EF 35%, repeat TTE as per Cardiology (once outside VSP window, will start MTP/ARB) - more tachycardic today (multifactorial, including neurogenic) - will begin low dose propranolol  ENDO:  Blood sugar goals 140-180 mg/dL, cont insulin sliding scale, atorvastatin 40mg   GI:  PPI for GI prophylaxis (home meds); bowel regimen  DIET: decrease feeding rate to limit fluid overload  RENAL: IV NS 20 mL/h  HEM/ONC: Hb stable, iron profile c/w iron deficiency, iron FeSO4 325 TID  VTE Prophylaxis: SCDs, SQL  ID: febrile, leukocytosis, pancultures negative so far; vanco & zosyn course  Social: updated family    *****    CORE MEASURES  1.  Nath and Jacobo Score = 3  2.  VTE prophylaxis:  [ ] administered within 24 hours of admission OR [X] reason not done: fresh bleed, unsecured aneurysm.  3.  Dysphagia screening:   [X] performed before any oral meds / liquids / food  4.  Nimodipine treatment:  [X] administered within 24 hours of admission OR [ ] reason not done:    ATTENDING ATTESTATION:  I was physically present for the key portions of the evaluation and management (E/M) service provided.  I agree with the above history, physical and plan, which I have reviewed and edited where appropriate.    Patient at high risk for neurological deterioration or death due to:  ICU delirium, aspiration PNA, DVT / PE.  Critical care time:  I have personally provided 45 minutes of critical care time, excluding time spent on separate procedures.      Plan discussed with RN, house staff.

## 2020-12-18 NOTE — PROGRESS NOTE ADULT - SUBJECTIVE AND OBJECTIVE BOX
Incomplete Note                                                                                      Cardiology fellow Progress note    Subjective/Interval events:     ROS: A 10-point review of systems was otherwise negative.    ICU Vital Signs Last 24 Hrs  T(C): 37.7 (18 Dec 2020 10:20), Max: 37.9 (18 Dec 2020 05:15)  T(F): 99.9 (18 Dec 2020 10:20), Max: 100.3 (18 Dec 2020 05:15)  HR: 113 (18 Dec 2020 10:00) (92 - 113)  BP: 158/67 (18 Dec 2020 10:00) (96/46 - 166/74)  BP(mean): 96 (18 Dec 2020 10:00) (66 - 106)  RR: 41 (18 Dec 2020 10:00) (12 - 41)  SpO2: 95% (18 Dec 2020 10:00) (93% - 98%)      (will be Updated)  GEN: lethargic, non-responsive to commands or painful stimuli  HEENT: Mucosa moist. No JVD.   RESP: No crackles, b/l expiratory wheezing, Decreased BS on the right lower lung base.   CV: tachycardic normal s1/s2. No m/r/g.  ABD: Soft, NTND. BS+  EXT: Warm. No edema, PP 2+    I&O's Summary    17 Dec 2020 07:01  -  18 Dec 2020 07:00  --------------------------------------------------------  IN: 2496.4 mL / OUT: 1305 mL / NET: 1191.4 mL    18 Dec 2020 07:01  -  18 Dec 2020 11:50  --------------------------------------------------------  IN: 432.6 mL / OUT: 39 mL / NET: 393.6 mL    Cardiac Diagnostics  TELEMETRY: will be updated	    ECG(12/14/20): NSR, LVH. Diffuse TWI (Wellen's sign) improving since 12/12.   	  TTE 12/10: mod LVSD (EF 35%) w/akinesis of mid-myocardial and apical segments and preserved contractility in basal segments, consistent w/Takotsubo stress CM    CXR: New RLL pleural Effusion, may have hidden infiltrate.

## 2020-12-18 NOTE — EEG REPORT - NS EEG TEXT BOX
Day 2  12/17/2020- 12/18/2020   Pertinent medications: lacosamide 150 mg BID  Background:  continuous, with predominantly theta>delta frequencies.  Symmetry:  Frequent (10-49%) right central and occasional left centro-temporal slowing.   Posterior Dominant Rhythm:  fragmented, poorly sustained 7 Hz  Organization: Rudimentary   Voltage:  Normal (20+ uV)  Variability: Yes. 		Reactivity: Yes.  N2 sleep: Absent.  Spontaneous Activity:    1)	Frequent right frontal sharp transients, maximal at Fp2/F4 which fluctuate through the recording and are present more when awake.   2)	Rare left frontoparietal and left temporal sharp transients.    Events:  No electrographic seizures or significant clinical events.  Provocations:  Hyperventilation and Photic stimulation: was not performed.    Daily Summary:    1)	Rare right frontal lateralized rhythmic delta activity (LRDA) for very brief duration, suggestive of right frontal cortical hyper-excitability.   2)	Frequent right frontal sharp waves, as well as rare left frontoparietal and left temporal sharp waves, suggestive of focal cortical hyper-excitability in these regions.  3)	Frequent right frontocentral and occasional left centro-temporal slowing slowing, indicates focal cerebral dysfunction.  4)	Moderate generalized slowing suggestive of a similar degree of diffuse or multifocal  dysfunction.          Lacey Rahman MD  Clinical Neurophysiology Fellow    Pamella Burtno MD   Attending Neurologist, [facility]

## 2020-12-18 NOTE — PROGRESS NOTE ADULT - ATTENDING COMMENTS
Patient seen and examined by me 12/18/20. Alert to light stim, some confusion. EVD@zero. Continue spasm watch, supportive ICU care, EEG.    Bebeto Serrano M.D.

## 2020-12-18 NOTE — PROGRESS NOTE ADULT - ASSESSMENT
75F w/PMHx COPD, Asthma, HTN, HLD a/w SAH w/IVH and obstructive hydrocephalus s/p EVD followed by cerebral angiogram for coil embolization of L pcomm aneurysm on 12/10. 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   - Can continue Milrinone 0.5 mcg/kg/min for cerebral vasodilatation to help the cerebral vasospasm. Watch out for tachyarrhythmias.   - Recommend daily CXRs to monitor for pulm edema. has a new RLL pleural effusion, suspicious for an underlying infiltrate. Patient is Net positive today, repeated lung checks, can consider 20 mg IV lasix if patient has increased O2 requirements.   - Will also get a repeat TTE prior to DC to assess changes in LV function  - c.w lipitor 40 mg for cerebral artery plaque  - Replete electrolytes to maintain K>4, Mg>2  - Incentive Spirometry. Encourage patient to get out of bed when patient has a mental status and safe    Incomplete Note with preliminary recs. Please refer to Attending addendum for final recs  Thank you for allowing to participate in the care of this patient    DELORES Lance  Cardiology Fellow, PGY 7 75F w/PMHx COPD, Asthma, HTN, HLD a/w SAH w/IVH and obstructive hydrocephalus s/p EVD followed by cerebral angiogram for coil embolization of L pcomm aneurysm on 12/10. 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 Watch out for tachyarrhythmias.   - Recommend daily CXRs to monitor for pulm edema. has a new RLL pleural effusion, suspicious for an underlying infiltrate. Patient is Net positive today, repeated lung checks, can consider 20 mg IV lasix if patient has increased O2 requirements.   - Will also get a repeat TTE prior to DC to assess changes in LV function  - c.w lipitor 40 mg for cerebral artery plaque  - Recommend starting Toprol XL 25 po q24 and Losartan 25 mg po q24 prior to discharge, once clinically, neurologically and hemodynamically stable for GDMT  - Will require ischemic HAZEL with CCTA prior to DC.   - Replete electrolytes to maintain K>4, Mg>2  - Incentive Spirometry. Encourage patient to get out of bed when patient has a mental status and safe    Incomplete Note with preliminary recs. Please refer to Attending addendum for final recs  Thank you for allowing to participate in the care of this patient    DELORES Lance  Cardiology Fellow, PGY 7

## 2020-12-19 LAB
ANION GAP SERPL CALC-SCNC: 11 MMOL/L — SIGNIFICANT CHANGE UP (ref 5–17)
ANION GAP SERPL CALC-SCNC: 12 MMOL/L — SIGNIFICANT CHANGE UP (ref 5–17)
ANION GAP SERPL CALC-SCNC: 12 MMOL/L — SIGNIFICANT CHANGE UP (ref 5–17)
ANION GAP SERPL CALC-SCNC: 13 MMOL/L — SIGNIFICANT CHANGE UP (ref 5–17)
BUN SERPL-MCNC: 6 MG/DL — LOW (ref 7–23)
BUN SERPL-MCNC: 7 MG/DL — SIGNIFICANT CHANGE UP (ref 7–23)
BUN SERPL-MCNC: 7 MG/DL — SIGNIFICANT CHANGE UP (ref 7–23)
BUN SERPL-MCNC: 8 MG/DL — SIGNIFICANT CHANGE UP (ref 7–23)
CALCIUM SERPL-MCNC: 8 MG/DL — LOW (ref 8.4–10.5)
CALCIUM SERPL-MCNC: 8.1 MG/DL — LOW (ref 8.4–10.5)
CALCIUM SERPL-MCNC: 8.2 MG/DL — LOW (ref 8.4–10.5)
CALCIUM SERPL-MCNC: 8.3 MG/DL — LOW (ref 8.4–10.5)
CHLORIDE SERPL-SCNC: 100 MMOL/L — SIGNIFICANT CHANGE UP (ref 96–108)
CHLORIDE SERPL-SCNC: 100 MMOL/L — SIGNIFICANT CHANGE UP (ref 96–108)
CHLORIDE SERPL-SCNC: 101 MMOL/L — SIGNIFICANT CHANGE UP (ref 96–108)
CHLORIDE SERPL-SCNC: 102 MMOL/L — SIGNIFICANT CHANGE UP (ref 96–108)
CO2 SERPL-SCNC: 17 MMOL/L — LOW (ref 22–31)
CO2 SERPL-SCNC: 21 MMOL/L — LOW (ref 22–31)
CREAT SERPL-MCNC: 0.47 MG/DL — LOW (ref 0.5–1.3)
CREAT SERPL-MCNC: 0.47 MG/DL — LOW (ref 0.5–1.3)
CREAT SERPL-MCNC: 0.48 MG/DL — LOW (ref 0.5–1.3)
CREAT SERPL-MCNC: 0.5 MG/DL — SIGNIFICANT CHANGE UP (ref 0.5–1.3)
GLUCOSE SERPL-MCNC: 148 MG/DL — HIGH (ref 70–99)
GLUCOSE SERPL-MCNC: 153 MG/DL — HIGH (ref 70–99)
GLUCOSE SERPL-MCNC: 154 MG/DL — HIGH (ref 70–99)
GLUCOSE SERPL-MCNC: 195 MG/DL — HIGH (ref 70–99)
HCT VFR BLD CALC: 33.1 % — LOW (ref 34.5–45)
HGB BLD-MCNC: 10.6 G/DL — LOW (ref 11.5–15.5)
MAGNESIUM SERPL-MCNC: 1.8 MG/DL — SIGNIFICANT CHANGE UP (ref 1.6–2.6)
MCHC RBC-ENTMCNC: 27.1 PG — SIGNIFICANT CHANGE UP (ref 27–34)
MCHC RBC-ENTMCNC: 32 GM/DL — SIGNIFICANT CHANGE UP (ref 32–36)
MCV RBC AUTO: 84.7 FL — SIGNIFICANT CHANGE UP (ref 80–100)
NRBC # BLD: 0 /100 WBCS — SIGNIFICANT CHANGE UP (ref 0–0)
PHOSPHATE SERPL-MCNC: 2.1 MG/DL — LOW (ref 2.5–4.5)
PLATELET # BLD AUTO: 292 K/UL — SIGNIFICANT CHANGE UP (ref 150–400)
POTASSIUM SERPL-MCNC: 3.5 MMOL/L — SIGNIFICANT CHANGE UP (ref 3.5–5.3)
POTASSIUM SERPL-MCNC: 3.7 MMOL/L — SIGNIFICANT CHANGE UP (ref 3.5–5.3)
POTASSIUM SERPL-MCNC: 3.9 MMOL/L — SIGNIFICANT CHANGE UP (ref 3.5–5.3)
POTASSIUM SERPL-MCNC: 4 MMOL/L — SIGNIFICANT CHANGE UP (ref 3.5–5.3)
POTASSIUM SERPL-SCNC: 3.5 MMOL/L — SIGNIFICANT CHANGE UP (ref 3.5–5.3)
POTASSIUM SERPL-SCNC: 3.7 MMOL/L — SIGNIFICANT CHANGE UP (ref 3.5–5.3)
POTASSIUM SERPL-SCNC: 3.9 MMOL/L — SIGNIFICANT CHANGE UP (ref 3.5–5.3)
POTASSIUM SERPL-SCNC: 4 MMOL/L — SIGNIFICANT CHANGE UP (ref 3.5–5.3)
RBC # BLD: 3.91 M/UL — SIGNIFICANT CHANGE UP (ref 3.8–5.2)
RBC # FLD: 17 % — HIGH (ref 10.3–14.5)
SODIUM SERPL-SCNC: 132 MMOL/L — LOW (ref 135–145)
SODIUM SERPL-SCNC: 132 MMOL/L — LOW (ref 135–145)
SODIUM SERPL-SCNC: 133 MMOL/L — LOW (ref 135–145)
SODIUM SERPL-SCNC: 134 MMOL/L — LOW (ref 135–145)
WBC # BLD: 13.71 K/UL — HIGH (ref 3.8–10.5)
WBC # FLD AUTO: 13.71 K/UL — HIGH (ref 3.8–10.5)

## 2020-12-19 PROCEDURE — 95720 EEG PHY/QHP EA INCR W/VEEG: CPT

## 2020-12-19 PROCEDURE — 99024 POSTOP FOLLOW-UP VISIT: CPT

## 2020-12-19 PROCEDURE — 99232 SBSQ HOSP IP/OBS MODERATE 35: CPT

## 2020-12-19 PROCEDURE — 71045 X-RAY EXAM CHEST 1 VIEW: CPT | Mod: 26

## 2020-12-19 PROCEDURE — 99291 CRITICAL CARE FIRST HOUR: CPT | Mod: 24

## 2020-12-19 RX ORDER — SODIUM CHLORIDE 9 MG/ML
1 INJECTION INTRAMUSCULAR; INTRAVENOUS; SUBCUTANEOUS
Refills: 0 | Status: DISCONTINUED | OUTPATIENT
Start: 2020-12-19 | End: 2020-12-20

## 2020-12-19 RX ORDER — POTASSIUM PHOSPHATE, MONOBASIC POTASSIUM PHOSPHATE, DIBASIC 236; 224 MG/ML; MG/ML
30 INJECTION, SOLUTION INTRAVENOUS ONCE
Refills: 0 | Status: COMPLETED | OUTPATIENT
Start: 2020-12-19 | End: 2020-12-19

## 2020-12-19 RX ORDER — POTASSIUM CHLORIDE 20 MEQ
20 PACKET (EA) ORAL ONCE
Refills: 0 | Status: COMPLETED | OUTPATIENT
Start: 2020-12-19 | End: 2020-12-19

## 2020-12-19 RX ORDER — SODIUM CHLORIDE 9 MG/ML
1000 INJECTION INTRAMUSCULAR; INTRAVENOUS; SUBCUTANEOUS
Refills: 0 | Status: DISCONTINUED | OUTPATIENT
Start: 2020-12-19 | End: 2020-12-22

## 2020-12-19 RX ORDER — SODIUM CHLORIDE 5 G/100ML
500 INJECTION, SOLUTION INTRAVENOUS
Refills: 0 | Status: DISCONTINUED | OUTPATIENT
Start: 2020-12-19 | End: 2020-12-20

## 2020-12-19 RX ORDER — MAGNESIUM SULFATE 500 MG/ML
2 VIAL (ML) INJECTION ONCE
Refills: 0 | Status: COMPLETED | OUTPATIENT
Start: 2020-12-19 | End: 2020-12-19

## 2020-12-19 RX ORDER — FLUDROCORTISONE ACETATE 0.1 MG/1
0.2 TABLET ORAL EVERY 24 HOURS
Refills: 0 | Status: DISCONTINUED | OUTPATIENT
Start: 2020-12-19 | End: 2020-12-29

## 2020-12-19 RX ORDER — FLUDROCORTISONE ACETATE 0.1 MG/1
0.1 TABLET ORAL ONCE
Refills: 0 | Status: COMPLETED | OUTPATIENT
Start: 2020-12-19 | End: 2020-12-19

## 2020-12-19 RX ADMIN — Medication 3 MILLILITER(S): at 23:29

## 2020-12-19 RX ADMIN — Medication 10 MILLIGRAM(S): at 20:17

## 2020-12-19 RX ADMIN — Medication 2: at 12:21

## 2020-12-19 RX ADMIN — Medication 325 MILLIGRAM(S): at 11:35

## 2020-12-19 RX ADMIN — Medication 20 MILLIEQUIVALENT(S): at 11:39

## 2020-12-19 RX ADMIN — Medication 10 MILLIGRAM(S): at 11:40

## 2020-12-19 RX ADMIN — MONTELUKAST 10 MILLIGRAM(S): 4 TABLET, CHEWABLE ORAL at 11:40

## 2020-12-19 RX ADMIN — Medication 3 MILLILITER(S): at 16:08

## 2020-12-19 RX ADMIN — ENOXAPARIN SODIUM 40 MILLIGRAM(S): 100 INJECTION SUBCUTANEOUS at 22:59

## 2020-12-19 RX ADMIN — Medication 650 MILLIGRAM(S): at 07:57

## 2020-12-19 RX ADMIN — Medication 3 MILLILITER(S): at 00:00

## 2020-12-19 RX ADMIN — POLYETHYLENE GLYCOL 3350 17 GRAM(S): 17 POWDER, FOR SOLUTION ORAL at 11:35

## 2020-12-19 RX ADMIN — PREGABALIN 1000 MICROGRAM(S): 225 CAPSULE ORAL at 11:40

## 2020-12-19 RX ADMIN — Medication 5 MILLIGRAM(S): at 08:58

## 2020-12-19 RX ADMIN — PIPERACILLIN AND TAZOBACTAM 200 GRAM(S): 4; .5 INJECTION, POWDER, LYOPHILIZED, FOR SOLUTION INTRAVENOUS at 23:18

## 2020-12-19 RX ADMIN — SODIUM CHLORIDE 15 MILLILITER(S): 5 INJECTION, SOLUTION INTRAVENOUS at 11:33

## 2020-12-19 RX ADMIN — Medication 3 MILLILITER(S): at 10:08

## 2020-12-19 RX ADMIN — PIPERACILLIN AND TAZOBACTAM 200 GRAM(S): 4; .5 INJECTION, POWDER, LYOPHILIZED, FOR SOLUTION INTRAVENOUS at 07:16

## 2020-12-19 RX ADMIN — Medication 3 MILLILITER(S): at 05:28

## 2020-12-19 RX ADMIN — LACOSAMIDE 150 MILLIGRAM(S): 50 TABLET ORAL at 07:16

## 2020-12-19 RX ADMIN — PIPERACILLIN AND TAZOBACTAM 200 GRAM(S): 4; .5 INJECTION, POWDER, LYOPHILIZED, FOR SOLUTION INTRAVENOUS at 17:20

## 2020-12-19 RX ADMIN — PIPERACILLIN AND TAZOBACTAM 200 GRAM(S): 4; .5 INJECTION, POWDER, LYOPHILIZED, FOR SOLUTION INTRAVENOUS at 12:15

## 2020-12-19 RX ADMIN — Medication 650 MILLIGRAM(S): at 14:20

## 2020-12-19 RX ADMIN — ATORVASTATIN CALCIUM 40 MILLIGRAM(S): 80 TABLET, FILM COATED ORAL at 22:59

## 2020-12-19 RX ADMIN — MILRINONE LACTATE 7.49 MICROGRAM(S)/KG/MIN: 1 INJECTION, SOLUTION INTRAVENOUS at 12:39

## 2020-12-19 RX ADMIN — CHLORHEXIDINE GLUCONATE 1 APPLICATION(S): 213 SOLUTION TOPICAL at 06:10

## 2020-12-19 RX ADMIN — LACOSAMIDE 150 MILLIGRAM(S): 50 TABLET ORAL at 17:21

## 2020-12-19 RX ADMIN — Medication 50 GRAM(S): at 12:13

## 2020-12-19 RX ADMIN — FLUDROCORTISONE ACETATE 0.1 MILLIGRAM(S): 0.1 TABLET ORAL at 13:38

## 2020-12-19 RX ADMIN — Medication 650 MILLIGRAM(S): at 07:45

## 2020-12-19 RX ADMIN — FLUDROCORTISONE ACETATE 0.1 MILLIGRAM(S): 0.1 TABLET ORAL at 08:57

## 2020-12-19 RX ADMIN — SENNA PLUS 2 TABLET(S): 8.6 TABLET ORAL at 23:00

## 2020-12-19 RX ADMIN — PIPERACILLIN AND TAZOBACTAM 200 GRAM(S): 4; .5 INJECTION, POWDER, LYOPHILIZED, FOR SOLUTION INTRAVENOUS at 01:45

## 2020-12-19 RX ADMIN — POTASSIUM PHOSPHATE, MONOBASIC POTASSIUM PHOSPHATE, DIBASIC 83.33 MILLIMOLE(S): 236; 224 INJECTION, SOLUTION INTRAVENOUS at 13:12

## 2020-12-19 RX ADMIN — Medication 650 MILLIGRAM(S): at 13:50

## 2020-12-19 NOTE — PROGRESS NOTE ADULT - ATTENDING COMMENTS
Patient seen and examined by me 12/19/20. Improving neurologically, follows commands, ZAFAR. EVD@zero, draining. f/u EEG read. Will likely start EVD wean soon pending progress. Spasm watch.    Bebeto Serrano M.D.

## 2020-12-19 NOTE — PROGRESS NOTE ADULT - SUBJECTIVE AND OBJECTIVE BOX
76y/o F with PMHx sig for COPD, asthma, HLD, HTN, BIBEMS to [facility] from home after HHA discovered patient found down in floor covered in non-bloody vomitus. Perr HHA Pt went to the bathroom and was taking a long time, went in to check on her and found her sitting on floor, awake, however with vomitus on front of shirt. On arrival to [facility], patient was taken for stat head CT, which revealed diffuse subarachnoid hemorrhage mainly at basilar cisterns with IVH and obstructive hydrocephalus. Patient was given a bolus of 1g keppra and dilaudid pushes for generalized headache. CTA concerning for 3mm left pcomm aneurysm and a 1.6mm outpouching of distal cavernous segment of the left internal carotid artery likely aneurysm. NIHSS2 3 MF4. Patient was transferred to [facility] for further intervention. Patient currently reports headaches. Pt denies acute changes in vision, seizures, CP, SOB, weakness/paresthesias of arms or legs. (09 Dec 2020 23:37)      Hospital Course:   12/9: BD1 Patient admitted for SAH HH3, MF4.   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 EVD at 5, on Levo overnight, nimodipine discontinued d/t hypotension  12/12: BD4 POD#2, VIVIEN overnight, neuro stable, passed TOV  12/13: BD5 POD#3: VIVIEN x 24 hrs  12/14: BD6 POD#4. VIVIEN o/n, neuro stable. EVD at 5cm H2O  12/15: BD7 POD #5 VIVIEN overnight, neuro stable. EVD remains at 5. Plan for CTA today.   12/16: BD8 POD #6 VIVIEN overnight, neuro stable, EVD at 0, on Levo, started on 3% at 15 overnight   12/17: BD9 POD#1 s/p cerebral angio for verapamil c/b device malfunction of Proglide, s/p right groin cutdown for removal of proglide catheter and femoral artery repair.  VIVIEN overnight, neuro stable, EVD at 0. patient remained intubated overnight, on propofol for sedation, and vEEG  12/18: BD#10, POD#8 s/p coil/embo of L pcomm aneurysm, POD#2 s/p IA verapamil, POD#2 R groin cutdown for removal of proglide catheter w/ repair of femoral artery. VIVIEN overnight. EVD remains open @0cmH2O   12/19: BD#11, POD#9 s/p coil/embo of L pcomm aneurysm. POD#3 s/p IA verapamil, POD#3 s/p R femoral artery cutdown of proglide catheter w/ femoral artery repair. VIVIEN overnight. EVD remains open @0cmH2O. EEG shows b/l frontal sharp discharges, cont monitoring, vimpat was increased yesterday. Gentle hydration, total IVF 50cc/hr. Cont vanc/zosyn for empiric coverage, next vanc trough due @1pm on 12/19. F/u daily CXR.     Vital Signs Last 24 Hrs  T(C): 37.7 (19 Dec 2020 01:43), Max: 38.1 (18 Dec 2020 14:33)  T(F): 99.8 (19 Dec 2020 01:43), Max: 100.6 (18 Dec 2020 14:33)  HR: 88 (19 Dec 2020 02:00) (88 - 128)  BP: 168/77 (19 Dec 2020 02:00) (104/- - 181/82)  BP(mean): 110 (19 Dec 2020 02:00) (76 - 123)  RR: 32 (19 Dec 2020 02:00) (24 - 41)  SpO2: 93% (19 Dec 2020 02:00) (90% - 97%)    I&O's Detail    17 Dec 2020 07:01  -  18 Dec 2020 07:00  --------------------------------------------------------  IN:    Enteral Tube Flush: 270 mL    Glucerna: 160 mL    IV PiggyBack: 1269.8 mL    Jevity 1.2: 510 mL    Milrinone: 11.2 mL    Milrinone: 155.4 mL    sodium chloride 0.9%: 120 mL  Total IN: 2496.4 mL    OUT:    Estimated Blood Loss (mL): 10 mL    External Ventricular Device (mL): 260 mL    Indwelling Catheter - Urethral (mL): 135 mL    Intermittent Catheterization - Urethral (mL): 900 mL    Propofol: 0 mL    Propofol: 0 mL  Total OUT: 1305 mL    Total NET: 1191.4 mL      18 Dec 2020 07:01  -  19 Dec 2020 03:46  --------------------------------------------------------  IN:    Glucerna: 360 mL    IV PiggyBack: 1049.8 mL    Milrinone: 7.4 mL    Milrinone: 41 mL    Milrinone: 111 mL    sodium chloride 0.9%: 570 mL  Total IN: 2139.2 mL    OUT:    External Ventricular Device (mL): 263 mL    Intermittent Catheterization - Urethral (mL): 1550 mL  Total OUT: 1813 mL    Total NET: 326.2 mL        I&O's Summary    17 Dec 2020 07:01  -  18 Dec 2020 07:00  --------------------------------------------------------  IN: 2496.4 mL / OUT: 1305 mL / NET: 1191.4 mL    18 Dec 2020 07:01  -  19 Dec 2020 03:46  --------------------------------------------------------  IN: 2139.2 mL / OUT: 1813 mL / NET: 326.2 mL        PHYSICAL EXAM:  General: laying in hospital bed NAD, A&O x1-2, on RA, off sedation   HEENT: PERRL 3mm, EOMI b/l, face symmetric, tongue midline, + hard of hearing   Cardiovascular: RRR, normal S1 and S2   Respiratory: lungs CTAB, no wheezing, rhonchi, or crackles, extubated  GI: normoactive BS to auscultation, abd soft, NTND   Neuro: no aphasia, speech clear, no dysmetria, no pronator drift  ZAFAR x4 spontaneously and with good strength   EVD site: C/D/I   R groin site: no evidence of hematoma, no ecchymosis or edema   Extremities: b/l PT pulses detected w/ doppler, not palpable     TUBES/LINES:  [] CVC  [X] A-line  [] Lumbar Drain  [X] Ventriculostomy - EVD @0cmH2O   [X] Other - de anda     DIET:  [] NPO  [] Mechanical  [X] Tube feeds    LABS:                        10.4   11.94 )-----------( 251      ( 18 Dec 2020 06:36 )             32.5     12-18    146<H>  |  113<H>  |  9   ----------------------------<  176<H>  3.3<L>   |  20<L>  |  0.55    Ca    7.9<L>      18 Dec 2020 06:36  Phos  1.0     12-18  Mg     2.1     12-18      PTT - ( 17 Dec 2020 05:25 )  PTT:28.3 sec        CAPILLARY BLOOD GLUCOSE      POCT Blood Glucose.: 154 mg/dL (18 Dec 2020 21:19)  POCT Blood Glucose.: 142 mg/dL (18 Dec 2020 17:04)  POCT Blood Glucose.: 183 mg/dL (18 Dec 2020 12:34)  POCT Blood Glucose.: 168 mg/dL (18 Dec 2020 07:29)      Drug Levels: [] N/A  Vancomycin Level, Trough: <4.0 ug/mL (12-18 @ 13:37)    CSF Analysis: [] N/A      Allergies    No Known Allergies    Intolerances      MEDICATIONS:  Antibiotics:  piperacillin/tazobactam IVPB.. 3.375 Gram(s) IV Intermittent every 6 hours  vancomycin  IVPB 750 milliGRAM(s) IV Intermittent every 24 hours  vancomycin  IVPB        Neuro:  acetaminophen    Suspension .. 650 milliGRAM(s) Oral every 6 hours PRN  lacosamide 150 milliGRAM(s) Oral two times a day  methylphenidate 5 milliGRAM(s) Oral every 24 hours    Anticoagulation:  enoxaparin Injectable 40 milliGRAM(s) SubCutaneous every 24 hours    OTHER:  albuterol/ipratropium for Nebulization. 3 milliLiter(s) Nebulizer every 6 hours  atorvastatin 40 milliGRAM(s) Oral at bedtime  bisacodyl Suppository 10 milliGRAM(s) Rectal daily  chlorhexidine 2% Cloths 1 Application(s) Topical <User Schedule>  dextrose 40% Gel 15 Gram(s) Oral once  dextrose 50% Injectable 25 Gram(s) IV Push once  fludroCORTISONE 0.1 milliGRAM(s) Oral every 24 hours  glucagon  Injectable 1 milliGRAM(s) IntraMuscular once  insulin lispro (ADMELOG) corrective regimen sliding scale   SubCutaneous Before meals and at bedtime  labetalol Injectable 10 milliGRAM(s) IV Push every 4 hours PRN  milrinone Infusion 0.5 MICROgram(s)/kG/Min IV Continuous <Continuous>  montelukast 10 milliGRAM(s) Oral daily  polyethylene glycol 3350 17 Gram(s) Oral daily  propranolol 5 milliGRAM(s) Oral every 8 hours  senna 2 Tablet(s) Oral at bedtime    IVF:  cyanocobalamin 1000 MICROGram(s) Oral daily  ferrous    sulfate 325 milliGRAM(s) Oral daily  sodium chloride 0.9%. 1000 milliLiter(s) IV Continuous <Continuous>    CULTURES:  Culture Results:   No growth to date (12-16 @ 12:10)  Culture Results:   No growth at 3 days. (12-16 @ 01:18)    RADIOLOGY & ADDITIONAL TESTS:      ASSESSMENT:  74 yo Female with PMHx of COPD, asthma, HLD, HTN, BIBEMS to [facility] from home after HHA found patient down on the floor covered in non-bloody vomitus. CTH reveals diffuse subarachnoid hemorrhage mainly at basilar cisterns with IVH and obstructive hydrocephalus, CTA shows 3mm left pcomm aneurysm, now s/p bedside right frontal EVD placement 12/10, s/p cerebral angiogram for coil embolization of left PCOMM aneurysm 12/10, now POD #3 s/p cerebral angio for verapamil c/b device malfunction of Proglide, s/p right groin cutdown for removal of proglide catheter and femoral artery repair (12/17/2020), NIHSS2 HH3 MF4).       HEADACHE    Handoff    Hyperlipidemia    Hypertension    COPD (chronic obstructive pulmonary disease)    Asthma    COPD (chronic obstructive pulmonary disease)    Asthma    Injury of right femoral artery    Cerebral artery vasospasm    SAH (subarachnoid hemorrhage)    Injury of femoral artery    Cerebral artery vasospasm    SAH (subarachnoid hemorrhage)    Subarachnoid bleed    Vascular surgery procedure    Angiogram, carotid and cerebral, bilateral    Angiogram, cerebral, with intracranial aneurysm embolization    No significant past surgical history    HEADACHE    90+    SysAdmin_VisitLink        PLAN:  NEURO:  - neuro checks  - vitals checks  - pain control  - EVD at 0, monitor ICP and output  - cont Vimpat, dose increased to   - Hold Nimodipine for hypotension  - propofol for sedation  - R groin checks   - cont milrinone and amantadine     CARDIOVASCULAR:  - -180  - cardiology following for takotusbo: TTE before discharge, daily EKG, ischemic work up with CCTA or LHC before discharge     PULMONARY:  - on room air, no issues    RENAL:  - de anda removed, straight cath x1, pend TOV   - electrolyte repletion PRN     GI:  - cont NGT feeds, pend S&S re-eval   - bowel regimen    HEME:  - cont IV iron per Dr. Reddy   - monitor H/H    ID:  - cont Vanc/Zosyn for empiric coverage until 12/23   - f/u vanc trough 12/18 @1pm   - afebrile overnight     ENDO:  - hgb A1c 6.7   - switched tube feeds to glucerna from jevity   - cont ISS     DVT PROPHYLAXIS:  [x] Venodynes                                [x] Heparin/Lovenox      DISPOSITION: ICU status, full code, dispo pend     Assessment and plan discussed w/ Dr. Serrano    Assessment:  Present when checked    []  GCS  E   V  M     Heart Failure: []Acute, [] acute on chronic , []chronic  Heart Failure:  [] Diastolic (HFpEF), [] Systolic (HFrEF), []Combined (HFpEF and HFrEF), [] RHF, [] Pulm HTN, [] Other    [] MICHAELLE, [] ATN, [] AIN, [] other  [] CKD1, [] CKD2, [] CKD 3, [] CKD 4, [] CKD 5, []ESRD    Encephalopathy: [] Metabolic, [] Hepatic, [] toxic, [] Neurological, [] Other    Abnormal Nurtitional Status: [] malnurtition (see nutrition note), [ ]underweight: BMI < 19, [] morbid obesity: BMI >40, [] Cachexia    [] Sepsis  [] hypovolemic shock,[] cardiogenic shock, [] hemorrhagic shock, [] neuogenic shock  [] Acute Respiratory Failure  []Cerebral edema, [] Brain compression/ herniation,   [] Functional quadriplegia  [] Acute blood loss anemia 74y/o F with PMHx sig for COPD, asthma, HLD, HTN, BIBEMS to [place] from home after HHA discovered patient found down in floor covered in non-bloody vomitus. Perr HHA Pt went to the bathroom and was taking a long time, went in to check on her and found her sitting on floor, awake, however with vomitus on front of shirt. On arrival to [place], patient was taken for stat head CT, which revealed diffuse subarachnoid hemorrhage mainly at basilar cisterns with IVH and obstructive hydrocephalus. Patient was given a bolus of 1g keppra and dilaudid pushes for generalized headache. CTA concerning for 3mm left pcomm aneurysm and a 1.6mm outpouching of distal cavernous segment of the left internal carotid artery likely aneurysm. NIHSS2 3 MF4. Patient was transferred to [facility] for further intervention. Patient currently reports headaches. Pt denies acute changes in vision, seizures, CP, SOB, weakness/paresthesias of arms or legs. (09 Dec 2020 23:37)      Hospital Course:   12/9: BD1 Patient admitted for SAH HH3, MF4.   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 EVD at 5, on Levo overnight, nimodipine discontinued d/t hypotension  12/12: BD4 POD#2, VIVIEN overnight, neuro stable, passed TOV  12/13: BD5 POD#3: VIVIEN x 24 hrs  12/14: BD6 POD#4. VIVIEN o/n, neuro stable. EVD at 5cm H2O  12/15: BD7 POD #5 VIVIEN overnight, neuro stable. EVD remains at 5. Plan for CTA today.   12/16: BD8 POD #6 VIVIEN overnight, neuro stable, EVD at 0, on Levo, started on 3% at 15 overnight   12/17: BD9 POD#1 s/p cerebral angio for verapamil c/b device malfunction of Proglide, s/p right groin cutdown for removal of proglide catheter and femoral artery repair.  VIVIEN overnight, neuro stable, EVD at 0. patient remained intubated overnight, on propofol for sedation, and vEEG  12/18: BD#10, POD#8 s/p coil/embo of L pcomm aneurysm, POD#2 s/p IA verapamil, POD#2 R groin cutdown for removal of proglide catheter w/ repair of femoral artery. VIVIEN overnight. EVD remains open @0cmH2O   12/19: BD#11, POD#9 s/p coil/embo of L pcomm aneurysm. POD#3 s/p IA verapamil, POD#3 s/p R femoral artery cutdown of proglide catheter w/ femoral artery repair. VIVIEN overnight. EVD remains open @0cmH2O. EEG shows b/l frontal sharp discharges, cont monitoring, vimpat was increased yesterday. Gentle hydration, total IVF 50cc/hr. Cont vanc/zosyn for empiric coverage, next vanc trough due @1pm on 12/19. F/u daily CXR.     Vital Signs Last 24 Hrs  T(C): 37.7 (19 Dec 2020 01:43), Max: 38.1 (18 Dec 2020 14:33)  T(F): 99.8 (19 Dec 2020 01:43), Max: 100.6 (18 Dec 2020 14:33)  HR: 88 (19 Dec 2020 02:00) (88 - 128)  BP: 168/77 (19 Dec 2020 02:00) (104/- - 181/82)  BP(mean): 110 (19 Dec 2020 02:00) (76 - 123)  RR: 32 (19 Dec 2020 02:00) (24 - 41)  SpO2: 93% (19 Dec 2020 02:00) (90% - 97%)    I&O's Detail    17 Dec 2020 07:01  -  18 Dec 2020 07:00  --------------------------------------------------------  IN:    Enteral Tube Flush: 270 mL    Glucerna: 160 mL    IV PiggyBack: 1269.8 mL    Jevity 1.2: 510 mL    Milrinone: 11.2 mL    Milrinone: 155.4 mL    sodium chloride 0.9%: 120 mL  Total IN: 2496.4 mL    OUT:    Estimated Blood Loss (mL): 10 mL    External Ventricular Device (mL): 260 mL    Indwelling Catheter - Urethral (mL): 135 mL    Intermittent Catheterization - Urethral (mL): 900 mL    Propofol: 0 mL    Propofol: 0 mL  Total OUT: 1305 mL    Total NET: 1191.4 mL      18 Dec 2020 07:01  -  19 Dec 2020 03:46  --------------------------------------------------------  IN:    Glucerna: 360 mL    IV PiggyBack: 1049.8 mL    Milrinone: 7.4 mL    Milrinone: 41 mL    Milrinone: 111 mL    sodium chloride 0.9%: 570 mL  Total IN: 2139.2 mL    OUT:    External Ventricular Device (mL): 263 mL    Intermittent Catheterization - Urethral (mL): 1550 mL  Total OUT: 1813 mL    Total NET: 326.2 mL        I&O's Summary    17 Dec 2020 07:01  -  18 Dec 2020 07:00  --------------------------------------------------------  IN: 2496.4 mL / OUT: 1305 mL / NET: 1191.4 mL    18 Dec 2020 07:01  -  19 Dec 2020 03:46  --------------------------------------------------------  IN: 2139.2 mL / OUT: 1813 mL / NET: 326.2 mL        PHYSICAL EXAM:  General: laying in hospital bed NAD, A&O x1-2, on RA, off sedation   HEENT: PERRL 3mm, EOMI b/l, face symmetric, tongue midline, + hard of hearing   Cardiovascular: RRR, normal S1 and S2   Respiratory: lungs CTAB, no wheezing, rhonchi, or crackles, extubated  GI: normoactive BS to auscultation, abd soft, NTND   Neuro: no aphasia, speech clear, no dysmetria, no pronator drift  ZAFAR x4 spontaneously and with good strength   EVD site: C/D/I   R groin site: no evidence of hematoma, no ecchymosis or edema   Extremities: b/l PT pulses detected w/ doppler, not palpable     TUBES/LINES:  [] CVC  [X] A-line  [] Lumbar Drain  [X] Ventriculostomy - EVD @0cmH2O   [X] Other - de anda     DIET:  [] NPO  [] Mechanical  [X] Tube feeds    LABS:                        10.4   11.94 )-----------( 251      ( 18 Dec 2020 06:36 )             32.5     12-18    146<H>  |  113<H>  |  9   ----------------------------<  176<H>  3.3<L>   |  20<L>  |  0.55    Ca    7.9<L>      18 Dec 2020 06:36  Phos  1.0     12-18  Mg     2.1     12-18      PTT - ( 17 Dec 2020 05:25 )  PTT:28.3 sec        CAPILLARY BLOOD GLUCOSE      POCT Blood Glucose.: 154 mg/dL (18 Dec 2020 21:19)  POCT Blood Glucose.: 142 mg/dL (18 Dec 2020 17:04)  POCT Blood Glucose.: 183 mg/dL (18 Dec 2020 12:34)  POCT Blood Glucose.: 168 mg/dL (18 Dec 2020 07:29)      Drug Levels: [] N/A  Vancomycin Level, Trough: <4.0 ug/mL (12-18 @ 13:37)    CSF Analysis: [] N/A      Allergies    No Known Allergies    Intolerances      MEDICATIONS:  Antibiotics:  piperacillin/tazobactam IVPB.. 3.375 Gram(s) IV Intermittent every 6 hours  vancomycin  IVPB 750 milliGRAM(s) IV Intermittent every 24 hours  vancomycin  IVPB        Neuro:  acetaminophen    Suspension .. 650 milliGRAM(s) Oral every 6 hours PRN  lacosamide 150 milliGRAM(s) Oral two times a day  methylphenidate 5 milliGRAM(s) Oral every 24 hours    Anticoagulation:  enoxaparin Injectable 40 milliGRAM(s) SubCutaneous every 24 hours    OTHER:  albuterol/ipratropium for Nebulization. 3 milliLiter(s) Nebulizer every 6 hours  atorvastatin 40 milliGRAM(s) Oral at bedtime  bisacodyl Suppository 10 milliGRAM(s) Rectal daily  chlorhexidine 2% Cloths 1 Application(s) Topical <User Schedule>  dextrose 40% Gel 15 Gram(s) Oral once  dextrose 50% Injectable 25 Gram(s) IV Push once  fludroCORTISONE 0.1 milliGRAM(s) Oral every 24 hours  glucagon  Injectable 1 milliGRAM(s) IntraMuscular once  insulin lispro (ADMELOG) corrective regimen sliding scale   SubCutaneous Before meals and at bedtime  labetalol Injectable 10 milliGRAM(s) IV Push every 4 hours PRN  milrinone Infusion 0.5 MICROgram(s)/kG/Min IV Continuous <Continuous>  montelukast 10 milliGRAM(s) Oral daily  polyethylene glycol 3350 17 Gram(s) Oral daily  propranolol 5 milliGRAM(s) Oral every 8 hours  senna 2 Tablet(s) Oral at bedtime    IVF:  cyanocobalamin 1000 MICROGram(s) Oral daily  ferrous    sulfate 325 milliGRAM(s) Oral daily  sodium chloride 0.9%. 1000 milliLiter(s) IV Continuous <Continuous>    CULTURES:  Culture Results:   No growth to date (12-16 @ 12:10)  Culture Results:   No growth at 3 days. (12-16 @ 01:18)    RADIOLOGY & ADDITIONAL TESTS:      ASSESSMENT:  76 yo Female with PMHx of COPD, asthma, HLD, HTN, BIBEMS to [place] from home after HHA found patient down on the floor covered in non-bloody vomitus. CTH reveals diffuse subarachnoid hemorrhage mainly at basilar cisterns with IVH and obstructive hydrocephalus, CTA shows 3mm left pcomm aneurysm, now s/p bedside right frontal EVD placement 12/10, s/p cerebral angiogram for coil embolization of left PCOMM aneurysm 12/10, now POD #3 s/p cerebral angio for verapamil c/b device malfunction of Proglide, s/p right groin cutdown for removal of proglide catheter and femoral artery repair (12/17/2020), NIHSS2 HH3 MF4).       HEADACHE    Handoff    Hyperlipidemia    Hypertension    COPD (chronic obstructive pulmonary disease)    Asthma    COPD (chronic obstructive pulmonary disease)    Asthma    Injury of right femoral artery    Cerebral artery vasospasm    SAH (subarachnoid hemorrhage)    Injury of femoral artery    Cerebral artery vasospasm    SAH (subarachnoid hemorrhage)    Subarachnoid bleed    Vascular surgery procedure    Angiogram, carotid and cerebral, bilateral    Angiogram, cerebral, with intracranial aneurysm embolization    No significant past surgical history    HEADACHE    90+    [email or web address]        PLAN:  NEURO:  - neuro checks  - vitals checks  - pain control  - EVD at 0, monitor ICP and output  - cont Vimpat, dose increased to 150mg BID  - Hold Nimodipine for hypotension  - propofol for sedation  - R groin checks   - cont milrinone and amantadine     CARDIOVASCULAR:  - -180  - cardiology following for takotusbo: TTE before discharge, daily EKG, ischemic work up with CCTA or LHC before discharge     PULMONARY:  - on room air, no issues    RENAL:  - de anda removed, straight cath x1, pend TOV   - electrolyte repletion PRN     GI:  - cont NGT feeds, pend S&S re-eval   - bowel regimen    HEME:  - cont IV iron per Dr. Reddy   - monitor H/H    ID:  - cont Vanc/Zosyn for empiric coverage until 12/23   - f/u vanc trough 12/18 @1pm   - afebrile overnight     ENDO:  - hgb A1c 6.7   - switched tube feeds to glucerna from jevity   - cont ISS     DVT PROPHYLAXIS:  [x] Venodynes                                [x] Heparin/Lovenox      DISPOSITION: ICU status, full code, dispo pend     Assessment and plan discussed w/ Dr. Serrano    Assessment:  Present when checked    []  GCS  E   V  M     Heart Failure: []Acute, [] acute on chronic , []chronic  Heart Failure:  [] Diastolic (HFpEF), [] Systolic (HFrEF), []Combined (HFpEF and HFrEF), [] RHF, [] Pulm HTN, [] Other    [] MICHAELLE, [] ATN, [] AIN, [] other  [] CKD1, [] CKD2, [] CKD 3, [] CKD 4, [] CKD 5, []ESRD    Encephalopathy: [] Metabolic, [] Hepatic, [] toxic, [] Neurological, [] Other    Abnormal Nurtitional Status: [] malnurtition (see nutrition note), [ ]underweight: BMI < 19, [] morbid obesity: BMI >40, [] Cachexia    [] Sepsis  [] hypovolemic shock,[] cardiogenic shock, [] hemorrhagic shock, [] neuogenic shock  [] Acute Respiratory Failure  []Cerebral edema, [] Brain compression/ herniation,   [] Functional quadriplegia  [] Acute blood loss anemia

## 2020-12-19 NOTE — PROVIDER CONTACT NOTE (OTHER) - BACKGROUND
Patient had angio through this groin site and currently is gauze with tegaderm in place- [facility].
Patient increasingly confused d/t vasospasm over course of shift
Was confused at beginning of shift (not answering questions). When son came to visit, was A&Ox4. Since son left, she is now confused to situation.

## 2020-12-19 NOTE — PROGRESS NOTE ADULT - SUBJECTIVE AND OBJECTIVE BOX
===================================================   NEUROCRITICAL CARE ATTENDING NOTE (SATURDAY, 2020)  ===================================================    CARA CANCHOLA   MRN-[id number]  Summary:  75y/F with COPD, asthma, dyslipidemia Hypertension found down.  Brought to [facility] where imaging showed SAH basilar cisterns with IVH and obstructive hydrocephalus L Pcomm aneurysm.  Given levetiracetam, hydromorphone.  NIHSS 2 HH3 MF4, transferred to [facility] for further management.      COURSE IN THE HOSPITAL:   admitted to [facility], EVD inserted; given 20 lasix for pulmo edema, T inversion on CT  12/10 No significant events overnight.    No significant events overnight.    No significant events overnight.    No significant events overnight. drain stopped working at 730 a.m., off levophed    No significant events overnight.   12/15 Noted confusion, CTA mild spasm   lethargic, angio - no severe spasm, given IA verapamil, underwent femoral endarterectomy with repair with patch angioplasty   improved post verapamil, transitioned to milrinone, currently tolerating extubation  : BD#10, POD#8 s/p coil/embo of L pcomm aneurysm, POD#2 s/p IA verapamil, POD#2 R groin cutdown for removal of proglide catheter w/ repair of femoral artery.   : ___    Past Medical History: Hyperlipidemia Hypertension COPD (chronic obstructive pulmonary disease) Asthma  Allergies:  No Known Allergies  Home meds:   ·	famotidine 20 mg oral tablet: 1 tab(s) orally once a day  ·	lisinopril 2.5 mg oral tablet: 1 tab(s) orally once a day  ·	montelukast 10 mg oral tablet: 1 tab(s) orally once a day  ·	predniSONE 50 mg oral tablet: 1 tab(s) orally once a day  ·	ProAir HFA CFC free 90 mcg/inh inhalation aerosol: 2 puff(s) inhaled 4 times a day PRN  ·	simvastatin 20 mg oral tablet: 1 tab(s) orally once a day (at bedtime)    Current Meds:  MEDICATIONS  (STANDING):  albuterol/ipratropium for Nebulization. 3 milliLiter(s) Nebulizer every 6 hours  atorvastatin 40 milliGRAM(s) Oral at bedtime  bisacodyl Suppository 10 milliGRAM(s) Rectal daily  cyanocobalamin 1000 MICROGram(s) Oral daily  enoxaparin Injectable 40 milliGRAM(s) SubCutaneous every 24 hours  ferrous    sulfate 325 milliGRAM(s) Oral daily  fludroCORTISONE 0.1 milliGRAM(s) Oral every 24 hours  insulin lispro (ADMELOG) corrective regimen sliding scale   SubCutaneous Before meals and at bedtime  lacosamide 150 milliGRAM(s) Oral two times a day  methylphenidate 5 milliGRAM(s) Oral every 24 hours  milrinone Infusion 0.5 MICROgram(s)/kG/Min (7.49 mL/Hr) IV Continuous <Continuous>  montelukast 10 milliGRAM(s) Oral daily  piperacillin/tazobactam IVPB.. 3.375 Gram(s) IV Intermittent every 6 hours  polyethylene glycol 3350 17 Gram(s) Oral daily  propranolol 5 milliGRAM(s) Oral every 8 hours  senna 2 Tablet(s) Oral at bedtime  sodium chloride 0.9%. 1000 milliLiter(s) (30 mL/Hr) IV Continuous <Continuous>  vancomycin  IVPB 750 milliGRAM(s) IV Intermittent every 24 hours    MEDICATIONS  (PRN):  acetaminophen    Suspension .. 650 milliGRAM(s) Oral every 6 hours PRN Temp greater or equal to 38C (100.4F), Mild Pain (1 - 3)  labetalol Injectable 10 milliGRAM(s) IV Push every 4 hours PRN Systolic blood pressure > 180    PHYSICAL EXAMINATION    ICU Vital Signs Last 24 Hrs          NEUROLOGIC EXAMINATION:  oriented to self in AM, CLEOPATRA, resolved left gaze, no facial droop, intermittent follows 1st order commands  EENT:  anicteric  CARDIOVASCULAR: (+) S1 S2, JAMIE  PULMONARY: clear to auscultation bilaterally, slight expiratory wheezes  ABDOMEN: soft, nontender with normoactive bowel sounds  EXTREMITIES: no edema, pulses present by Dopplers  SKIN: no rash          HbA1C = 6.7 (-10) 6.4 (-)  LDL = 112 (-)   HDL = 66 (-)  TG = 114 ()   TSH = 0.990 ()    Bacteriology:    Culture - CSF with Gram Stain . (20 @ 12:10)    Gram Stain:   No organisms seen  No polymorphonuclear cells seen    Specimen Source: .CSF CSF    Culture Results:   No growth to date    Culture - Blood (20 @ 01:18)    Specimen Source: .Blood Blood X 2    Culture Results:   No growth at 1 day.    Urinalysis Basic - ( 16 Dec 2020 03:26 )    Color: Yellow / Appearance: Clear / S.020 / pH: x  Gluc: x / Ketone: 40 mg/dL  / Bili: Negative / Urobili: 0.2 E.U./dL   Blood: x / Protein: NEGATIVE mg/dL / Nitrite: NEGATIVE   Leuk Esterase: NEGATIVE / RBC: < 5 /HPF / WBC < 5 /HPF   Sq Epi: x / Non Sq Epi: 0-5 /HPF / Bacteria: Present /HPF    CSF studies:  EE/16:   1)	Rare right frontal lateralized rhythmic delta activity (LRDA) for very brief duration, suggestive of right frontal cortical hyper-excitability.   2)	Frequent right frontal sharp waves, as well as rare left frontoparietal and left temporal sharp waves, suggestive of focal cortical hyper-excitability in these regions.  Neuroimagin/16 XA:  complete and persistent left Pcomm aneurysm occlusion with microcoils; mild vasospasm in right A1 and mild vasospasm in left MAGGIE.  Intra arterial verapamil infusion (10 mg right CCA and 15 mg left CCA)  12/15 CTA head:  Multifocal moderate stenosis the right PCA. Mild stenosis of the left PCA which were present on prior CTA head and neck 2020 and therefore may reflect intracranial atherosclerosis rather than vasospasm.  New mild stenosis of the mid right M1 segment.  Interval increase in size of the lateral and third ventricles since prior CT head 2020.  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  12/10 CT head:  EVD placement, stable   CTA:  L pcomm aneurysm, L ICA (distal cavernous) aneurysm vs infundibulum, extensive calcified plaque cavernous bilateral ICA with mild to mod stenosis; thick plaque bilateral carotid bifurcations - mod to severe R and mild to mod L stenosis, focal calcified plaque R VA off subclavian artery - high grate stenosis / partial occlusion, upper lung bilateral opacification - pulmo edema, chronic deformity R humeral neck   CT head:  SAH, basilar cisterns, IV extension, obstructive hydrocephalus SVID, lacunar infarcts R caudate, both thalami, cerebellar hemispheres, no CT evidence of territorial infarction  Other imagin/18 CXR: improve right basilar opacity/pleural effusion   LE Doppler: NEG   CT abd:  small paraesophageal hiatal hernia, gastritis; ?impaction, septal thickening bilateral lower lobes ?pulmo edema, hepatic steatosis    IV FLUIDS: IV NS 40 mL/h  DRIPS:  DIET: CCD  Lines: R IJ  Drains:     EVD at 5cm H20   EVD at 5cm H20 ICP < 10  12/15 EVD at 5cm H20 ICP 1-5, CSF 2-8 mL/h   EVD at 0cm H20 ICP 2-7 CSF 4-20 mL/h   EVD at 0cm H20 ICP 1-6 CSF 4-20 mL/h   EVD at 0cm H20 ICP 1-10 CSF 7-18 mL/h   EVD _____    CODE STATUS:  Full Code                       GOALS OF CARE:  aggressive                      DISPOSITION:  ICU  NIHSS 2 HH3 MF4 ===================================================   NEUROCRITICAL CARE ATTENDING NOTE (SATURDAY, 2020)  ===================================================    CARA CANCHOLA   MRN-[id number]  Summary:  75y/F with COPD, asthma, dyslipidemia Hypertension found down.  Brought to [facility] where imaging showed SAH basilar cisterns with IVH and obstructive hydrocephalus L Pcomm aneurysm.  Given levetiracetam, hydromorphone.  NIHSS 2 HH3 MF4, transferred to [facility] for further management.      COURSE IN THE HOSPITAL:   admitted to [facility], EVD inserted; given 20 lasix for pulmo edema, T inversion on CT  12/10 No significant events overnight.    No significant events overnight.    No significant events overnight.    No significant events overnight. drain stopped working at 730 a.m., off levophed    No significant events overnight.   12/15 Noted confusion, CTA mild spasm   lethargic, angio - no severe spasm, given IA verapamil, underwent femoral endarterectomy with repair with patch angioplasty   improved post verapamil, transitioned to milrinone, currently tolerating extubation  : BD#10, POD#8 s/p coil/embo of L pcomm aneurysm, POD#2 s/p IA verapamil, POD#2 R groin cutdown for removal of proglide catheter w/ repair of femoral artery.   : ___ (preload + afterload reduction required, maintenance on milrinone)    Past Medical History: Hyperlipidemia Hypertension COPD (chronic obstructive pulmonary disease) Asthma  Allergies:  No Known Allergies  Home meds:   ·	famotidine 20 mg oral tablet: 1 tab(s) orally once a day  ·	lisinopril 2.5 mg oral tablet: 1 tab(s) orally once a day  ·	montelukast 10 mg oral tablet: 1 tab(s) orally once a day  ·	predniSONE 50 mg oral tablet: 1 tab(s) orally once a day  ·	ProAir HFA CFC free 90 mcg/inh inhalation aerosol: 2 puff(s) inhaled 4 times a day PRN  ·	simvastatin 20 mg oral tablet: 1 tab(s) orally once a day (at bedtime)    Current Meds:  MEDICATIONS  (STANDING):  albuterol/ipratropium for Nebulization. 3 milliLiter(s) Nebulizer every 6 hours  atorvastatin 40 milliGRAM(s) Oral at bedtime  bisacodyl Suppository 10 milliGRAM(s) Rectal daily  cyanocobalamin 1000 MICROGram(s) Oral daily  enoxaparin Injectable 40 milliGRAM(s) SubCutaneous every 24 hours  ferrous    sulfate 325 milliGRAM(s) Oral daily  fludroCORTISONE 0.1 milliGRAM(s) Oral every 24 hours  insulin lispro (ADMELOG) corrective regimen sliding scale   SubCutaneous Before meals and at bedtime  lacosamide 150 milliGRAM(s) Oral two times a day  methylphenidate 5 milliGRAM(s) Oral every 24 hours  milrinone Infusion 0.5 MICROgram(s)/kG/Min (7.49 mL/Hr) IV Continuous <Continuous>  montelukast 10 milliGRAM(s) Oral daily  piperacillin/tazobactam IVPB.. 3.375 Gram(s) IV Intermittent every 6 hours  polyethylene glycol 3350 17 Gram(s) Oral daily  propranolol 5 milliGRAM(s) Oral every 8 hours  senna 2 Tablet(s) Oral at bedtime  sodium chloride 0.9%. 1000 milliLiter(s) (30 mL/Hr) IV Continuous <Continuous>  vancomycin  IVPB 750 milliGRAM(s) IV Intermittent every 24 hours    MEDICATIONS  (PRN):  acetaminophen    Suspension .. 650 milliGRAM(s) Oral every 6 hours PRN Temp greater or equal to 38C (100.4F), Mild Pain (1 - 3)  labetalol Injectable 10 milliGRAM(s) IV Push every 4 hours PRN Systolic blood pressure > 180    PHYSICAL EXAMINATION    ICU Vital Signs Last 24 Hrs          NEUROLOGIC EXAMINATION:  oriented to self in AM, CLEOPATRA, resolved left gaze, no facial droop, intermittent follows 1st order commands  EENT:  anicteric  CARDIOVASCULAR: (+) S1 S2, JAMIE  PULMONARY: clear to auscultation bilaterally, slight expiratory wheezes  ABDOMEN: soft, nontender with normoactive bowel sounds  EXTREMITIES: no edema, pulses present by Dopplers  SKIN: no rash          HbA1C = 6.7 (-10) 6.4 (-)  LDL = 112 (-)   HDL = 66 (-)  TG = 114 ()   TSH = 0.990 ()    Bacteriology:    Culture - CSF with Gram Stain . (20 @ 12:10)    Gram Stain:   No organisms seen  No polymorphonuclear cells seen    Specimen Source: .CSF CSF    Culture Results:   No growth to date    Culture - Blood (20 @ 01:18)    Specimen Source: .Blood Blood X 2    Culture Results:   No growth at 1 day.    Urinalysis Basic - ( 16 Dec 2020 03:26 )    Color: Yellow / Appearance: Clear / S.020 / pH: x  Gluc: x / Ketone: 40 mg/dL  / Bili: Negative / Urobili: 0.2 E.U./dL   Blood: x / Protein: NEGATIVE mg/dL / Nitrite: NEGATIVE   Leuk Esterase: NEGATIVE / RBC: < 5 /HPF / WBC < 5 /HPF   Sq Epi: x / Non Sq Epi: 0-5 /HPF / Bacteria: Present /HPF    CSF studies:  EE/16:   1)	Rare right frontal lateralized rhythmic delta activity (LRDA) for very brief duration, suggestive of right frontal cortical hyper-excitability.   2)	Frequent right frontal sharp waves, as well as rare left frontoparietal and left temporal sharp waves, suggestive of focal cortical hyper-excitability in these regions.  Neuroimagin/16 XA:  complete and persistent left Pcomm aneurysm occlusion with microcoils; mild vasospasm in right A1 and mild vasospasm in left MAGGIE.  Intra arterial verapamil infusion (10 mg right CCA and 15 mg left CCA)  12/15 CTA head:  Multifocal moderate stenosis the right PCA. Mild stenosis of the left PCA which were present on prior CTA head and neck 2020 and therefore may reflect intracranial atherosclerosis rather than vasospasm.  New mild stenosis of the mid right M1 segment.  Interval increase in size of the lateral and third ventricles since prior CT head 2020.  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  12/10 CT head:  EVD placement, stable   CTA:  L pcomm aneurysm, L ICA (distal cavernous) aneurysm vs infundibulum, extensive calcified plaque cavernous bilateral ICA with mild to mod stenosis; thick plaque bilateral carotid bifurcations - mod to severe R and mild to mod L stenosis, focal calcified plaque R VA off subclavian artery - high grate stenosis / partial occlusion, upper lung bilateral opacification - pulmo edema, chronic deformity R humeral neck   CT head:  SAH, basilar cisterns, IV extension, obstructive hydrocephalus SVID, lacunar infarcts R caudate, both thalami, cerebellar hemispheres, no CT evidence of territorial infarction  Other imagin/18 CXR: improve right basilar opacity/pleural effusion   LE Doppler: NEG   CT abd:  small paraesophageal hiatal hernia, gastritis; ?impaction, septal thickening bilateral lower lobes ?pulmo edema, hepatic steatosis    IV FLUIDS: IV NS 40 mL/h  DRIPS:  DIET: CCD  Lines: R IJ  Drains:     EVD at 5cm H20   EVD at 5cm H20 ICP < 10  12/15 EVD at 5cm H20 ICP 1-5, CSF 2-8 mL/h   EVD at 0cm H20 ICP 2-7 CSF 4-20 mL/h   EVD at 0cm H20 ICP 1-6 CSF 4-20 mL/h   EVD at 0cm H20 ICP 1-10 CSF 7-18 mL/h   EVD _____    CODE STATUS:  Full Code                       GOALS OF CARE:  aggressive                      DISPOSITION:  ICU  NIHSS 2 HH3 MF4 ===================================================   NEUROCRITICAL CARE ATTENDING NOTE (SATURDAY, 2020)  ===================================================    CARA CANCHOLA   MRN-[id number]  Summary:  75y/F with COPD, asthma, dyslipidemia Hypertension found down.  Brought to [facility] where imaging showed SAH basilar cisterns with IVH and obstructive hydrocephalus L Pcomm aneurysm.  Given levetiracetam, hydromorphone.  NIHSS 2 HH3 MF4, transferred to [facility] for further management.      COURSE IN THE HOSPITAL:   admitted to [facility], EVD inserted; given 20 lasix for pulmo edema, T inversion on CT  12/10 No significant events overnight.    No significant events overnight.    No significant events overnight.    No significant events overnight. drain stopped working at 730 a.m., off levophed    No significant events overnight.   12/15 Noted confusion, CTA mild spasm   lethargic, angio - no severe spasm, given IA verapamil, underwent femoral endarterectomy with repair with patch angioplasty   improved post verapamil, transitioned to milrinone, currently tolerating extubation  : BD#10, POD#8 s/p coil/embo of L pcomm aneurysm, POD#2 s/p IA verapamil, POD#2 R groin cutdown for removal of proglide catheter w/ repair of femoral artery.   : ___ (preload + afterload reduction required, maintenance on milrinone)    Past Medical History: Hyperlipidemia Hypertension COPD (chronic obstructive pulmonary disease) Asthma  Allergies:  No Known Allergies  Home meds:   ·	famotidine 20 mg oral tablet: 1 tab(s) orally once a day  ·	lisinopril 2.5 mg oral tablet: 1 tab(s) orally once a day  ·	montelukast 10 mg oral tablet: 1 tab(s) orally once a day  ·	predniSONE 50 mg oral tablet: 1 tab(s) orally once a day  ·	ProAir HFA CFC free 90 mcg/inh inhalation aerosol: 2 puff(s) inhaled 4 times a day PRN  ·	simvastatin 20 mg oral tablet: 1 tab(s) orally once a day (at bedtime)    Current Meds:  MEDICATIONS  (STANDING):  albuterol/ipratropium for Nebulization. 3 milliLiter(s) Nebulizer every 6 hours  atorvastatin 40 milliGRAM(s) Oral at bedtime  bisacodyl Suppository 10 milliGRAM(s) Rectal daily  cyanocobalamin 1000 MICROGram(s) Oral daily  enoxaparin Injectable 40 milliGRAM(s) SubCutaneous every 24 hours  ferrous    sulfate 325 milliGRAM(s) Oral daily  fludroCORTISONE 0.1 milliGRAM(s) Oral every 24 hours  insulin lispro (ADMELOG) corrective regimen sliding scale   SubCutaneous Before meals and at bedtime  lacosamide 150 milliGRAM(s) Oral two times a day  methylphenidate 5 milliGRAM(s) Oral every 24 hours  milrinone Infusion 0.5 MICROgram(s)/kG/Min (7.49 mL/Hr) IV Continuous <Continuous>  montelukast 10 milliGRAM(s) Oral daily  piperacillin/tazobactam IVPB.. 3.375 Gram(s) IV Intermittent every 6 hours  polyethylene glycol 3350 17 Gram(s) Oral daily  propranolol 5 milliGRAM(s) Oral every 8 hours  senna 2 Tablet(s) Oral at bedtime  sodium chloride 0.9%. 1000 milliLiter(s) (30 mL/Hr) IV Continuous <Continuous>  vancomycin  IVPB 750 milliGRAM(s) IV Intermittent every 24 hours    MEDICATIONS  (PRN):  acetaminophen    Suspension .. 650 milliGRAM(s) Oral every 6 hours PRN Temp greater or equal to 38C (100.4F), Mild Pain (1 - 3)  labetalol Injectable 10 milliGRAM(s) IV Push every 4 hours PRN Systolic blood pressure > 180    PHYSICAL EXAMINATION    ICU Vital Signs Last 24 Hrs  T(C): 37.4 (19 Dec 2020 05:51), Max: 38.1 (18 Dec 2020 14:33)  T(F): 99.3 (19 Dec 2020 05:51), Max: 100.6 (18 Dec 2020 14:33)  HR: 96 (19 Dec 2020 06:00) (87 - 128)  BP: 173/86 (19 Dec 2020 06:00) (104/- - 181/82)  BP(mean): 123 (19 Dec 2020 06:00) (76 - 123)  ABP: 147/112 (19 Dec 2020 06:00) (100/70 - 147/112)  ABP(mean): 125 (19 Dec 2020 06:00) (80 - 125)  RR: 31 (19 Dec 2020 06:00) (24 - 41)  SpO2: 93% (19 Dec 2020 06:00) (90% - 96%)    NEUROLOGIC EXAMINATION:  oriented to self in AM, CLEOPATRA, resolved left gaze, no facial droop, intermittent follows 1st order commands  EENT:  anicteric  CARDIOVASCULAR: (+) S1 S2, JAMIE  PULMONARY: clear to auscultation bilaterally, slight expiratory wheezes  ABDOMEN: soft, nontender with normoactive bowel sounds  EXTREMITIES: no edema, pulses present by Dopplers  SKIN: no rash    I/O's    20 @ 07:01  -  20 @ 07:00  --------------------------------------------------------  IN: 2531.4 mL / OUT: 1876 mL / NET: 655.4 mL    LABS:               10.4   11.94 )-----------( 251      ( 18 Dec 2020 06:36 )             32.5     12-18    146<H>  |  113<H>  |  9   ----------------------------<  176<H>  3.3<L>   |  20<L>  |  0.55    Ca    7.9<L>      18 Dec 2020 06:36  Phos  1.0     12-18  Mg     2.1     12-18    CAPILLARY BLOOD GLUCOSE    POCT Blood Glucose.: 144 mg/dL (19 Dec 2020 06:27)  POCT Blood Glucose.: 154 mg/dL (18 Dec 2020 21:19)  POCT Blood Glucose.: 142 mg/dL (18 Dec 2020 17:04)  POCT Blood Glucose.: 183 mg/dL (18 Dec 2020 12:34)  POCT Blood Glucose.: 168 mg/dL (18 Dec 2020 07:29)    HbA1C = 6.7 (-10) 6.4 (-)  LDL = 112 (-)   HDL = 66 (-)  TG = 114 ()   TSH = 0.990 ()    Bacteriology:    Culture - CSF with Gram Stain . (20 @ 12:10)    Gram Stain:   No organisms seen  No polymorphonuclear cells seen    Specimen Source: .CSF CSF    Culture Results:   No growth to date    Culture - Blood (20 @ 01:18)    Specimen Source: .Blood Blood X 2    Culture Results:   No growth at 1 day.    Urinalysis Basic - ( 16 Dec 2020 03:26 )    Color: Yellow / Appearance: Clear / S.020 / pH: x  Gluc: x / Ketone: 40 mg/dL  / Bili: Negative / Urobili: 0.2 E.U./dL   Blood: x / Protein: NEGATIVE mg/dL / Nitrite: NEGATIVE   Leuk Esterase: NEGATIVE / RBC: < 5 /HPF / WBC < 5 /HPF   Sq Epi: x / Non Sq Epi: 0-5 /HPF / Bacteria: Present /HPF    CSF studies:  EE/16:   1)	Rare right frontal lateralized rhythmic delta activity (LRDA) for very brief duration, suggestive of right frontal cortical hyper-excitability.   2)	Frequent right frontal sharp waves, as well as rare left frontoparietal and left temporal sharp waves, suggestive of focal cortical hyper-excitability in these regions.  Neuroimagin/16 XA:  complete and persistent left Pcomm aneurysm occlusion with microcoils; mild vasospasm in right A1 and mild vasospasm in left MAGGIE.  Intra arterial verapamil infusion (10 mg right CCA and 15 mg left CCA)  12/15 CTA head:  Multifocal moderate stenosis the right PCA. Mild stenosis of the left PCA which were present on prior CTA head and neck 2020 and therefore may reflect intracranial atherosclerosis rather than vasospasm.  New mild stenosis of the mid right M1 segment.  Interval increase in size of the lateral and third ventricles since prior CT head 2020.  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  12/10 CT head:  EVD placement, stable   CTA:  L pcomm aneurysm, L ICA (distal cavernous) aneurysm vs infundibulum, extensive calcified plaque cavernous bilateral ICA with mild to mod stenosis; thick plaque bilateral carotid bifurcations - mod to severe R and mild to mod L stenosis, focal calcified plaque R VA off subclavian artery - high grate stenosis / partial occlusion, upper lung bilateral opacification - pulmo edema, chronic deformity R humeral neck   CT head:  SAH, basilar cisterns, IV extension, obstructive hydrocephalus SVID, lacunar infarcts R caudate, both thalami, cerebellar hemispheres, no CT evidence of territorial infarction  Other imagin/18 CXR: improve right basilar opacity/pleural effusion   LE Doppler: NEG   CT abd:  small paraesophageal hiatal hernia, gastritis; ?impaction, septal thickening bilateral lower lobes ?pulmo edema, hepatic steatosis    IV FLUIDS: IV NS 40 mL/h  DRIPS:  DIET: CCD  Lines: R IJ  Drains:     EVD at 5cm H20   EVD at 5cm H20 ICP < 10  12/15 EVD at 5cm H20 ICP 1-5, CSF 2-8 mL/h   EVD at 0cm H20 ICP 2-7 CSF 4-20 mL/h   EVD at 0cm H20 ICP 1-6 CSF 4-20 mL/h   EVD at 0cm H20 ICP 1-10 CSF 7-18 mL/h   EVD _____    CODE STATUS:  Full Code                       GOALS OF CARE:  aggressive                      DISPOSITION:  ICU  NIHSS 2 HH3 MF4 ===================================================   NEUROCRITICAL CARE ATTENDING NOTE (SATURDAY, 2020)  ===================================================    CARA CANCHOLA   MRN-[id number]  Summary:  75y/F with COPD, asthma, dyslipidemia Hypertension found down.  Brought to [facility] where imaging showed SAH basilar cisterns with IVH and obstructive hydrocephalus L Pcomm aneurysm.  Given levetiracetam, hydromorphone.  NIHSS 2 HH3 MF4, transferred to [facility] for further management.      COURSE IN THE HOSPITAL:   admitted to [facility], EVD inserted; given 20 lasix for pulmo edema, T inversion on CT  12/10 No significant events overnight.    No significant events overnight.    No significant events overnight.    No significant events overnight. drain stopped working at 730 a.m., off levophed    No significant events overnight.   12/15 Noted confusion, CTA mild spasm   lethargic, angio - no severe spasm, given IA verapamil, underwent femoral endarterectomy with repair with patch angioplasty   improved post verapamil, transitioned to milrinone, currently tolerating extubation  : BD#10, POD#8 s/p coil/embo of L pcomm aneurysm, POD#2 s/p IA verapamil, POD#2 R groin cutdown for removal of proglide catheter w/ repair of femoral artery.   : ___     Past Medical History: Hyperlipidemia Hypertension COPD (chronic obstructive pulmonary disease) Asthma  Allergies:  No Known Allergies  Home meds:   ·	famotidine 20 mg oral tablet: 1 tab(s) orally once a day  ·	lisinopril 2.5 mg oral tablet: 1 tab(s) orally once a day  ·	montelukast 10 mg oral tablet: 1 tab(s) orally once a day  ·	predniSONE 50 mg oral tablet: 1 tab(s) orally once a day  ·	ProAir HFA CFC free 90 mcg/inh inhalation aerosol: 2 puff(s) inhaled 4 times a day PRN  ·	simvastatin 20 mg oral tablet: 1 tab(s) orally once a day (at bedtime)    Current Meds:  MEDICATIONS  (STANDING):  albuterol/ipratropium for Nebulization. 3 milliLiter(s) Nebulizer every 6 hours  atorvastatin 40 milliGRAM(s) Oral at bedtime  bisacodyl Suppository 10 milliGRAM(s) Rectal daily  cyanocobalamin 1000 MICROGram(s) Oral daily  enoxaparin Injectable 40 milliGRAM(s) SubCutaneous every 24 hours  ferrous    sulfate 325 milliGRAM(s) Oral daily  fludroCORTISONE 0.1 milliGRAM(s) Oral every 24 hours  insulin lispro (ADMELOG) corrective regimen sliding scale   SubCutaneous Before meals and at bedtime  lacosamide 150 milliGRAM(s) Oral two times a day  methylphenidate 5 milliGRAM(s) Oral every 24 hours  milrinone Infusion 0.5 MICROgram(s)/kG/Min (7.49 mL/Hr) IV Continuous <Continuous>  montelukast 10 milliGRAM(s) Oral daily  piperacillin/tazobactam IVPB.. 3.375 Gram(s) IV Intermittent every 6 hours  polyethylene glycol 3350 17 Gram(s) Oral daily  propranolol 5 milliGRAM(s) Oral every 8 hours  senna 2 Tablet(s) Oral at bedtime  sodium chloride 0.9%. 1000 milliLiter(s) (30 mL/Hr) IV Continuous <Continuous>  vancomycin  IVPB 750 milliGRAM(s) IV Intermittent every 24 hours    MEDICATIONS  (PRN):  acetaminophen    Suspension .. 650 milliGRAM(s) Oral every 6 hours PRN Temp greater or equal to 38C (100.4F), Mild Pain (1 - 3)  labetalol Injectable 10 milliGRAM(s) IV Push every 4 hours PRN Systolic blood pressure > 180    PHYSICAL EXAMINATION    ICU Vital Signs Last 24 Hrs  T(C): 37.4 (19 Dec 2020 05:51), Max: 38.1 (18 Dec 2020 14:33)  T(F): 99.3 (19 Dec 2020 05:51), Max: 100.6 (18 Dec 2020 14:33)  HR: 96 (19 Dec 2020 06:00) (87 - 128)  BP: 173/86 (19 Dec 2020 06:00) (104/- - 181/82)  BP(mean): 123 (19 Dec 2020 06:00) (76 - 123)  ABP: 147/112 (19 Dec 2020 06:00) (100/70 - 147/112)  ABP(mean): 125 (19 Dec 2020 06:00) (80 - 125)  RR: 31 (19 Dec 2020 06:00) (24 - 41)  SpO2: 93% (19 Dec 2020 06:00) (90% - 96%)    NEUROLOGIC EXAMINATION:  oriented to self in AM, CLEOPATRA, resolved left gaze, no facial droop, intermittent follows 1st order commands  EENT:  anicteric  CARDIOVASCULAR: (+) S1 S2, JAMIE  PULMONARY: clear to auscultation bilaterally, slight expiratory wheezes  ABDOMEN: soft, nontender with normoactive bowel sounds  EXTREMITIES: no edema, pulses present by Dopplers  SKIN: no rash    I/O's    20 @ 07:01  -  20 @ 07:00  --------------------------------------------------------  IN: 2531.4 mL / OUT: 1876 mL / NET: 655.4 mL    LABS:               10.4   11.94 )-----------( 251      ( 18 Dec 2020 06:36 )             32.5     12-18    146<H>  |  113<H>  |  9   ----------------------------<  176<H>  3.3<L>   |  20<L>  |  0.55    Ca    7.9<L>      18 Dec 2020 06:36  Phos  1.0     12-18  Mg     2.1     12-18    CAPILLARY BLOOD GLUCOSE    POCT Blood Glucose.: 144 mg/dL (19 Dec 2020 06:27)  POCT Blood Glucose.: 154 mg/dL (18 Dec 2020 21:19)  POCT Blood Glucose.: 142 mg/dL (18 Dec 2020 17:04)  POCT Blood Glucose.: 183 mg/dL (18 Dec 2020 12:34)  POCT Blood Glucose.: 168 mg/dL (18 Dec 2020 07:29)    HbA1C = 6.7 (-10) 6.4 (-)  LDL = 112 (-)   HDL = 66 (-)  TG = 114 ()   TSH = 0.990 ()    Bacteriology:    Culture - CSF with Gram Stain . (20 @ 12:10)    Gram Stain:   No organisms seen  No polymorphonuclear cells seen    Specimen Source: .CSF CSF    Culture Results:   No growth to date    Culture - Blood (20 @ 01:18)    Specimen Source: .Blood Blood X 2    Culture Results:   No growth at 1 day.    Urinalysis Basic - ( 16 Dec 2020 03:26 )    Color: Yellow / Appearance: Clear / S.020 / pH: x  Gluc: x / Ketone: 40 mg/dL  / Bili: Negative / Urobili: 0.2 E.U./dL   Blood: x / Protein: NEGATIVE mg/dL / Nitrite: NEGATIVE   Leuk Esterase: NEGATIVE / RBC: < 5 /HPF / WBC < 5 /HPF   Sq Epi: x / Non Sq Epi: 0-5 /HPF / Bacteria: Present /HPF    CSF studies:  EE/16:   1)	Rare right frontal lateralized rhythmic delta activity (LRDA) for very brief duration, suggestive of right frontal cortical hyper-excitability.   2)	Frequent right frontal sharp waves, as well as rare left frontoparietal and left temporal sharp waves, suggestive of focal cortical hyper-excitability in these regions.  Neuroimagin/16 XA:  complete and persistent left Pcomm aneurysm occlusion with microcoils; mild vasospasm in right A1 and mild vasospasm in left MAGGIE.  Intra arterial verapamil infusion (10 mg right CCA and 15 mg left CCA)  12/15 CTA head:  Multifocal moderate stenosis the right PCA. Mild stenosis of the left PCA which were present on prior CTA head and neck 2020 and therefore may reflect intracranial atherosclerosis rather than vasospasm.  New mild stenosis of the mid right M1 segment.  Interval increase in size of the lateral and third ventricles since prior CT head 2020.  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  12/10 CT head:  EVD placement, stable   CTA:  L pcomm aneurysm, L ICA (distal cavernous) aneurysm vs infundibulum, extensive calcified plaque cavernous bilateral ICA with mild to mod stenosis; thick plaque bilateral carotid bifurcations - mod to severe R and mild to mod L stenosis, focal calcified plaque R VA off subclavian artery - high grate stenosis / partial occlusion, upper lung bilateral opacification - pulmo edema, chronic deformity R humeral neck   CT head:  SAH, basilar cisterns, IV extension, obstructive hydrocephalus SVID, lacunar infarcts R caudate, both thalami, cerebellar hemispheres, no CT evidence of territorial infarction  Other imagin/18 CXR: improve right basilar opacity/pleural effusion   LE Doppler: NEG   CT abd:  small paraesophageal hiatal hernia, gastritis; ?impaction, septal thickening bilateral lower lobes ?pulmo edema, hepatic steatosis    IV FLUIDS: IV NS 40 mL/h  DRIPS:  DIET: CCD  Lines: R IJ  Drains:     EVD at 5cm H20   EVD at 5cm H20 ICP < 10  12/15 EVD at 5cm H20 ICP 1-5, CSF 2-8 mL/h   EVD at 0cm H20 ICP 2-7 CSF 4-20 mL/h   EVD at 0cm H20 ICP 1-6 CSF 4-20 mL/h   EVD at 0cm H20 ICP 1-10 CSF 7-18 mL/h   EVD _____    CODE STATUS:  Full Code                       GOALS OF CARE:  aggressive                      DISPOSITION:  ICU  NIHSS 2 HH3 MF4 ===================================================   NEUROCRITICAL CARE ATTENDING NOTE (SATURDAY, 2020)  ===================================================    CARA CANCHOLA   MRN-[id number]  Summary:  75y/F with COPD, asthma, dyslipidemia Hypertension found down.  Brought to [facility] where imaging showed SAH basilar cisterns with IVH and obstructive hydrocephalus L Pcomm aneurysm.  Given levetiracetam, hydromorphone.  NIHSS 2 HH3 MF4, transferred to [facility] for further management.      COURSE IN THE HOSPITAL:   admitted to [facility], EVD inserted; given 20 lasix for pulmo edema, T inversion on CT  12/10 No significant events overnight.    No significant events overnight.    No significant events overnight.    No significant events overnight. drain stopped working at 730 a.m., off levophed    No significant events overnight.   12/15 Noted confusion, CTA mild spasm   lethargic, angio - no severe spasm, given IA verapamil, underwent femoral endarterectomy with repair with patch angioplasty   improved post verapamil, transitioned to milrinone, currently tolerating extubation  : BD#10, POD#8 s/p coil/embo of L pcomm aneurysm, POD#2 s/p IA verapamil, POD#2 R groin cutdown for removal of proglide catheter w/ repair of femoral artery.   : neurological improvement, stable hemodynamic status    Past Medical History: Hyperlipidemia Hypertension COPD (chronic obstructive pulmonary disease) Asthma  Allergies:  No Known Allergies  Home meds:   ·	famotidine 20 mg oral tablet: 1 tab(s) orally once a day  ·	lisinopril 2.5 mg oral tablet: 1 tab(s) orally once a day  ·	montelukast 10 mg oral tablet: 1 tab(s) orally once a day  ·	predniSONE 50 mg oral tablet: 1 tab(s) orally once a day  ·	ProAir HFA CFC free 90 mcg/inh inhalation aerosol: 2 puff(s) inhaled 4 times a day PRN  ·	simvastatin 20 mg oral tablet: 1 tab(s) orally once a day (at bedtime)    Current Meds:  MEDICATIONS  (STANDING):  albuterol/ipratropium for Nebulization. 3 milliLiter(s) Nebulizer every 6 hours  atorvastatin 40 milliGRAM(s) Oral at bedtime  bisacodyl Suppository 10 milliGRAM(s) Rectal daily  cyanocobalamin 1000 MICROGram(s) Oral daily  enoxaparin Injectable 40 milliGRAM(s) SubCutaneous every 24 hours  ferrous    sulfate 325 milliGRAM(s) Oral daily  fludroCORTISONE 0.2 milliGRAM(s) Oral every 24 hours  insulin lispro (ADMELOG) corrective regimen sliding scale   SubCutaneous Before meals and at bedtime  lacosamide 150 milliGRAM(s) Oral two times a day  magnesium sulfate  IVPB 2 Gram(s) IV Intermittent once  methylphenidate 5 milliGRAM(s) Oral every 24 hours  milrinone Infusion 0.5 MICROgram(s)/kG/Min (7.49 mL/Hr) IV Continuous <Continuous>  montelukast 10 milliGRAM(s) Oral daily  piperacillin/tazobactam IVPB.. 3.375 Gram(s) IV Intermittent every 6 hours  polyethylene glycol 3350 17 Gram(s) Oral daily  potassium chloride   Powder 20 milliEquivalent(s) Oral once  potassium phosphate IVPB 30 milliMole(s) IV Intermittent once  propranolol 5 milliGRAM(s) Oral every 8 hours  senna 2 Tablet(s) Oral at bedtime  sodium chloride 3%. 500 milliLiter(s) (15 mL/Hr) IV Continuous <Continuous>    MEDICATIONS  (PRN):  acetaminophen    Suspension .. 650 milliGRAM(s) Oral every 6 hours PRN Temp greater or equal to 38C (100.4F), Mild Pain (1 - 3)  labetalol Injectable 10 milliGRAM(s) IV Push every 4 hours PRN Systolic blood pressure > 180    PHYSICAL EXAMINATION    ICU Vital Signs Last 24 Hrs  T(C): 37.4 (19 Dec 2020 05:51), Max: 38.1 (18 Dec 2020 14:33)  T(F): 99.3 (19 Dec 2020 05:51), Max: 100.6 (18 Dec 2020 14:33)  HR: 96 (19 Dec 2020 06:00) (87 - 128)  BP: 173/86 (19 Dec 2020 06:00) (104/- - 181/82)  BP(mean): 123 (19 Dec 2020 06:00) (76 - 123)  ABP: 147/112 (19 Dec 2020 06:00) (100/70 - 147/112)  ABP(mean): 125 (19 Dec 2020 06:00) (80 - 125)  RR: 31 (19 Dec 2020 06:00) (24 - 41)  SpO2: 93% (19 Dec 2020 06:00) (90% - 96%)    NEUROLOGIC EXAMINATION:  oriented to self in AM, CLEOPATRA, resolved left gaze, no facial droop, intermittent follows 1st order commands  EENT:  anicteric  CARDIOVASCULAR: (+) S1 S2, JAMIE  PULMONARY: clear to auscultation bilaterally, slight expiratory wheezes  ABDOMEN: soft, nontender with normoactive bowel sounds  EXTREMITIES: no edema, pulses present by Dopplers  SKIN: no rash    I/O's    20 @ 07:01  -  20 @ 07:00  --------------------------------------------------------  IN: 2531.4 mL / OUT: 1876 mL / NET: 655.4 mL    LABS:               10.4   11.94 )-----------( 251      ( 18 Dec 2020 06:36 )             32.5     12-18    146<H>  |  113<H>  |  9   ----------------------------<  176<H>  3.3<L>   |  20<L>  |  0.55    Ca    7.9<L>      18 Dec 2020 06:36  Phos  1.0       Mg     2.1     -18    CAPILLARY BLOOD GLUCOSE    POCT Blood Glucose.: 144 mg/dL (19 Dec 2020 06:27)  POCT Blood Glucose.: 154 mg/dL (18 Dec 2020 21:19)  POCT Blood Glucose.: 142 mg/dL (18 Dec 2020 17:04)  POCT Blood Glucose.: 183 mg/dL (18 Dec 2020 12:34)  POCT Blood Glucose.: 168 mg/dL (18 Dec 2020 07:29)    HbA1C = 6.7 (-10) 6.4 ()  LDL = 112 (-)   HDL = 66 (-)  TG = 114 ()   TSH = 0.990 ()    Bacteriology:    Culture - CSF with Gram Stain . (20 @ 12:10)    Gram Stain:   No organisms seen  No polymorphonuclear cells seen    Specimen Source: .CSF CSF    Culture Results:   No growth to date    Culture - Blood (20 @ 01:18)    Specimen Source: .Blood Blood X 2    Culture Results:   No growth at 1 day.    Urinalysis Basic - ( 16 Dec 2020 03:26 )    Color: Yellow / Appearance: Clear / S.020 / pH: x  Gluc: x / Ketone: 40 mg/dL  / Bili: Negative / Urobili: 0.2 E.U./dL   Blood: x / Protein: NEGATIVE mg/dL / Nitrite: NEGATIVE   Leuk Esterase: NEGATIVE / RBC: < 5 /HPF / WBC < 5 /HPF   Sq Epi: x / Non Sq Epi: 0-5 /HPF / Bacteria: Present /HPF    CSF studies:  EE/16:   1)	Rare right frontal lateralized rhythmic delta activity (LRDA) for very brief duration, suggestive of right frontal cortical hyper-excitability.   2)	Frequent right frontal sharp waves, as well as rare left frontoparietal and left temporal sharp waves, suggestive of focal cortical hyper-excitability in these regions.  Neuroimagin/16 XA:  complete and persistent left Pcomm aneurysm occlusion with microcoils; mild vasospasm in right A1 and mild vasospasm in left MAGGIE.  Intra arterial verapamil infusion (10 mg right CCA and 15 mg left CCA)  12/15 CTA head:  Multifocal moderate stenosis the right PCA. Mild stenosis of the left PCA which were present on prior CTA head and neck 2020 and therefore may reflect intracranial atherosclerosis rather than vasospasm.  New mild stenosis of the mid right M1 segment.  Interval increase in size of the lateral and third ventricles since prior CT head 2020.  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  12/10 CT head:  EVD placement, stable   CTA:  L pcomm aneurysm, L ICA (distal cavernous) aneurysm vs infundibulum, extensive calcified plaque cavernous bilateral ICA with mild to mod stenosis; thick plaque bilateral carotid bifurcations - mod to severe R and mild to mod L stenosis, focal calcified plaque R VA off subclavian artery - high grate stenosis / partial occlusion, upper lung bilateral opacification - pulmo edema, chronic deformity R humeral neck   CT head:  SAH, basilar cisterns, IV extension, obstructive hydrocephalus SVID, lacunar infarcts R caudate, both thalami, cerebellar hemispheres, no CT evidence of territorial infarction  Other imagin/19 CXR: Discoid change right lung base. Chronic interstitial changes.   CXR: improve right basilar opacity/pleural effusion   LE Doppler: NEG   CT abd:  small paraesophageal hiatal hernia, gastritis; ?impaction, septal thickening bilateral lower lobes ?pulmo edema, hepatic steatosis    IV FLUIDS: 3% NaCl 15 mL/h  DRIPS:  DIET: CCD  Lines: R IJ  Drains:     EVD at 5cm H20   EVD at 5cm H20 ICP < 10  12/15 EVD at 5cm H20 ICP 1-5, CSF 2-8 mL/h   EVD at 0cm H20 ICP 2-7 CSF 4-20 mL/h   EVD at 0cm H20 ICP 1-6 CSF 4-20 mL/h   EVD at 0cm H20 ICP 1-10 CSF 7-18 mL/h   EVD at 0cm H20 ICP 1-10 CSF 3-14 mL/h    CODE STATUS:  Full Code                       GOALS OF CARE:  aggressive                      DISPOSITION:  ICU  NIHSS 2 HH3 MF4

## 2020-12-19 NOTE — PROGRESS NOTE ADULT - SUBJECTIVE AND OBJECTIVE BOX
SUBJECTIVE: Patient seen and examined bedside by chief resident, feels better than yesterday, no complain of abdominal/back/groin/leg pain.     enoxaparin Injectable 40 milliGRAM(s) SubCutaneous every 24 hours  labetalol Injectable 10 milliGRAM(s) IV Push every 4 hours PRN  milrinone Infusion 0.5 MICROgram(s)/kG/Min IV Continuous <Continuous>  piperacillin/tazobactam IVPB.. 3.375 Gram(s) IV Intermittent every 6 hours  propranolol 5 milliGRAM(s) Oral every 8 hours    MEDICATIONS  (PRN):  acetaminophen    Suspension .. 650 milliGRAM(s) Oral every 6 hours PRN Temp greater or equal to 38C (100.4F), Mild Pain (1 - 3)  labetalol Injectable 10 milliGRAM(s) IV Push every 4 hours PRN Systolic blood pressure > 180    I&O's Detail    18 Dec 2020 07:01  -  19 Dec 2020 07:00  --------------------------------------------------------  IN:    Enteral Tube Flush: 60 mL    Glucerna: 500 mL    IV PiggyBack: 1249.8 mL    Milrinone: 140.6 mL    Milrinone: 7.4 mL    Milrinone: 41 mL    sodium chloride 0.9%: 690 mL  Total IN: 2688.8 mL    OUT:    External Ventricular Device (mL): 326 mL    Intermittent Catheterization - Urethral (mL): 1550 mL    Voided (mL): 100 mL  Total OUT: 1976 mL    Total NET: 712.8 mL    19 Dec 2020 07:01  -  19 Dec 2020 14:13  --------------------------------------------------------  IN:    Glucerna: 140 mL    IV PiggyBack: 366.6 mL    Milrinone: 51.8 mL    sodium chloride 0.9%: 90 mL    sodium chloride 3%: 60 mL  Total IN: 708.4 mL    OUT:    External Ventricular Device (mL): 53 mL    Indwelling Catheter - Urethral (mL): 1350 mL  Total OUT: 1403 mL    Total NET: -694.6 mL    Vital Signs Last 24 Hrs  T(C): 37.1 (19 Dec 2020 08:59), Max: 38.1 (18 Dec 2020 14:33)  T(F): 98.7 (19 Dec 2020 08:59), Max: 100.6 (18 Dec 2020 14:33)  HR: 92 (19 Dec 2020 13:00) (83 - 115)  BP: 171/81 (19 Dec 2020 13:00) (122/66 - 181/82)  BP(mean): 116 (19 Dec 2020 13:00) (88 - 123)  RR: 25 (19 Dec 2020 13:00) (24 - 38)  SpO2: 97% (19 Dec 2020 13:00) (90% - 97%)    NEUROLOGIC EXAMINATION:  oriented to self in AM, CLEOPATRA, follows commands  CARDIOVASCULAR: (+) S1 S2, JAMIE  PULMONARY: clear to auscultation bilaterally, slight expiratory wheezes  ABDOMEN: soft, nontender   EXTREMITIES: no edema, sensory function intact, motor function intact.   GROIN: Soft, non tender, staples intact, no evidence of hematoma or infection  VASCULAR: RLE Triphasic PT, DP      LABS:                        10.6   13.71 )-----------( 292      ( 19 Dec 2020 08:53 )             33.1     12-19    134<L>  |  101  |  7   ----------------------------<  195<H>  3.5   |  21<L>  |  0.48<L>    Ca    8.2<L>      19 Dec 2020 08:53  Phos  2.1     12-19  Mg     1.8     12-19            RADIOLOGY & ADDITIONAL STUDIES:      Culture - CSF with Gram Stain (collected 12-16-20 @ 12:10)  Source: .CSF CSF  Gram Stain (12-16-20 @ 12:40):    No organisms seen    No polymorphonuclear cells seen  Preliminary Report (12-17-20 @ 09:03):    No growth to date    Culture - Blood (collected 12-16-20 @ 01:18)  Source: .Blood Blood  Preliminary Report (12-19-20 @ 02:01):    No growth at 3 days.    Culture - Blood (collected 12-16-20 @ 01:18)  Source: .Blood Blood  Preliminary Report (12-19-20 @ 02:01):    No growth at 3 days.    Culture - CSF with Gram Stain (collected 12-16-20 @ 00:47)  Source: .CSF CSF  Gram Stain (12-16-20 @ 01:16):    Rare White blood cells    No organisms seen  Preliminary Report (12-17-20 @ 09:11):    No growth to date

## 2020-12-19 NOTE — PROGRESS NOTE ADULT - ASSESSMENT
75y/Female with:  aneursymal subarachnoid hemorrhage, L pcomm aneurysm, L parophthalmic aneurysm; brain compression, cerebral edema  osbtructive hydrocephalus  lacunar infarcts R caudate, B thalami, cerebellar hemispheres ?artery to artery vs cardioembolic?  atherosclerotic cardiovascular disease; mod to severe bilateral ICA stenosis (R>L), R VA stenosis  Hypertension dyslipidemia   COPD asthma  newly diagnosed Diabetes Mellitus?  troponin leak    PLAN: Day 1 = 12-09 ; Day 4 =  12/12; Day 21 = 12/29  NEURO: neurochecks q1h, PRN pain meds with Tylenol / Percocet  Clinical improvement with IA verapamil, now transitioned to IV milrinone, start neurostimulant amantidine 200 mg BID  SAH:  nimodipine discontinued due to pressor requirements  Hydrocephalus:  EVD 0 cm H20 open to drain  Seizure (LRDA/sharp waves):  lacosamide 200 mg load, 100 mg --> 150 mg BID  REHAB:  physical therapy evaluation and management    EARLY MOB:  HOB elevated    PULM:  Room air, incentive spirometry, continue montelukast, ipratropium / albuterol PRN   CARDIO:  SBP goal 100-180mm Hg, EF 35%, repeat TTE as per Cardiology (once outside VSP window, will start MTP/ARB) - more tachycardic today (multifactorial, including neurogenic) - will begin low dose propranolol  ENDO:  Blood sugar goals 140-180 mg/dL, cont insulin sliding scale, atorvastatin 40mg   GI:  PPI for GI prophylaxis (home meds); bowel regimen  DIET: decrease feeding rate to limit fluid overload  RENAL: IV NS 20 mL/h  HEM/ONC: Hb stable, iron profile c/w iron deficiency, iron FeSO4 325 TID  VTE Prophylaxis: SCDs, SQL  ID: febrile, leukocytosis, pancultures negative so far; vanco & zosyn course  Social: updated family    *****    CORE MEASURES  1.  Nath and Jacobo Score = 3  2.  VTE prophylaxis:  [ ] administered within 24 hours of admission OR [X] reason not done: fresh bleed, unsecured aneurysm.  3.  Dysphagia screening:   [X] performed before any oral meds / liquids / food  4.  Nimodipine treatment:  [X] administered within 24 hours of admission OR [ ] reason not done:    ATTENDING ATTESTATION:  I was physically present for the key portions of the evaluation and management (E/M) service provided.  I agree with the above history, physical and plan, which I have reviewed and edited where appropriate.    Patient at high risk for neurological deterioration or death due to:  ICU delirium, aspiration PNA, DVT / PE.  Critical care time:  I have personally provided 45 minutes of critical care time, excluding time spent on separate procedures.      Plan discussed with RN, house staff.   75y/Female with:  aneursymal subarachnoid hemorrhage, L pcomm aneurysm, L parophthalmic aneurysm; brain compression, cerebral edema  osbtructive hydrocephalus  lacunar infarcts R caudate, B thalami, cerebellar hemispheres ?artery to artery vs cardioembolic?  atherosclerotic cardiovascular disease; mod to severe bilateral ICA stenosis (R>L), R VA stenosis  Hypertension dyslipidemia   COPD asthma  newly diagnosed Diabetes Mellitus?  troponin leak    PLAN: Day 1 = 12-09 ; Day 4 =  12/12; Day 21 = 12/29  NEURO: neurochecks q1h, PRN pain meds with Tylenol / Percocet  Clinical improvement with IA verapamil, now transitioned to IV milrinone, neurostimulant ritalin 5 mg daily  SAH:  nimodipine discontinued due to pressor requirements  Hydrocephalus:  EVD 0 cm H20 open to drain  Seizure (LRDA/sharp waves):  lacosamide 200 mg load, 100 mg --> 150 mg BID  REHAB:  physical therapy evaluation and management    EARLY MOB:  HOB elevated    PULM:  Room air, incentive spirometry, continue montelukast, ipratropium / albuterol PRN   CARDIO:  SBP goal 100-180mm Hg, EF 35%, repeat TTE as per Cardiology (once outside VSP window, will start MTP/ARB) - tolerating low dose propranolol  ENDO:  Blood sugar goals 140-180 mg/dL, cont insulin sliding scale, atorvastatin 40mg   GI:  PPI for GI prophylaxis (home meds); bowel regimen  DIET: decrease feeding rate to limit fluid overload  RENAL: 3% NaCl 15 mL/h (Na drop 132) [VSP-natriuresis]  HEM/ONC: Hb stable, iron profile c/w iron deficiency, iron FeSO4 325 TID  VTE Prophylaxis: SCDs, SQL  ID: febrile, leukocytosis, pancultures negative so far; vanco & zosyn course  Social: updated family    *****    CORE MEASURES  1.  Nath and Jacobo Score = 3  2.  VTE prophylaxis:  [ ] administered within 24 hours of admission OR [X] reason not done: fresh bleed, unsecured aneurysm.  3.  Dysphagia screening:   [X] performed before any oral meds / liquids / food  4.  Nimodipine treatment:  [X] administered within 24 hours of admission OR [ ] reason not done:    ATTENDING ATTESTATION:  I was physically present for the key portions of the evaluation and management (E/M) service provided.  I agree with the above history, physical and plan, which I have reviewed and edited where appropriate.    Patient at high risk for neurological deterioration or death due to:  ICU delirium, aspiration PNA, DVT / PE.  Critical care time:  I have personally provided 45 minutes of critical care time, excluding time spent on separate procedures.      Plan discussed with RN, house staff.

## 2020-12-19 NOTE — PROGRESS NOTE ADULT - ASSESSMENT
76yo F POD#2 s/p cerebral angio for verapamil c/b device malfunction of Proglide, s/p right groin cutdown for removal of proglide catheter and femoral artery repair on 12/16/20     -Right groin monitoring, look for worsening of erythema/signs of infection  -Pulse checks  -Can give 2 more doses of vancomycin   - Vascular surgery will continue to follow

## 2020-12-20 LAB
ANION GAP SERPL CALC-SCNC: 10 MMOL/L — SIGNIFICANT CHANGE UP (ref 5–17)
ANION GAP SERPL CALC-SCNC: 13 MMOL/L — SIGNIFICANT CHANGE UP (ref 5–17)
BUN SERPL-MCNC: 8 MG/DL — SIGNIFICANT CHANGE UP (ref 7–23)
BUN SERPL-MCNC: 9 MG/DL — SIGNIFICANT CHANGE UP (ref 7–23)
CALCIUM SERPL-MCNC: 8.3 MG/DL — LOW (ref 8.4–10.5)
CALCIUM SERPL-MCNC: 8.5 MG/DL — SIGNIFICANT CHANGE UP (ref 8.4–10.5)
CHLORIDE SERPL-SCNC: 100 MMOL/L — SIGNIFICANT CHANGE UP (ref 96–108)
CHLORIDE SERPL-SCNC: 102 MMOL/L — SIGNIFICANT CHANGE UP (ref 96–108)
CO2 SERPL-SCNC: 20 MMOL/L — LOW (ref 22–31)
CO2 SERPL-SCNC: 23 MMOL/L — SIGNIFICANT CHANGE UP (ref 22–31)
CREAT SERPL-MCNC: 0.5 MG/DL — SIGNIFICANT CHANGE UP (ref 0.5–1.3)
CREAT SERPL-MCNC: 0.53 MG/DL — SIGNIFICANT CHANGE UP (ref 0.5–1.3)
GLUCOSE SERPL-MCNC: 152 MG/DL — HIGH (ref 70–99)
GLUCOSE SERPL-MCNC: 156 MG/DL — HIGH (ref 70–99)
HCT VFR BLD CALC: 32.6 % — LOW (ref 34.5–45)
HGB BLD-MCNC: 10.5 G/DL — LOW (ref 11.5–15.5)
MAGNESIUM SERPL-MCNC: 2.1 MG/DL — SIGNIFICANT CHANGE UP (ref 1.6–2.6)
MCHC RBC-ENTMCNC: 27.1 PG — SIGNIFICANT CHANGE UP (ref 27–34)
MCHC RBC-ENTMCNC: 32.2 GM/DL — SIGNIFICANT CHANGE UP (ref 32–36)
MCV RBC AUTO: 84 FL — SIGNIFICANT CHANGE UP (ref 80–100)
NRBC # BLD: 0 /100 WBCS — SIGNIFICANT CHANGE UP (ref 0–0)
PHOSPHATE SERPL-MCNC: 3 MG/DL — SIGNIFICANT CHANGE UP (ref 2.5–4.5)
PLATELET # BLD AUTO: 300 K/UL — SIGNIFICANT CHANGE UP (ref 150–400)
POTASSIUM SERPL-MCNC: 3.8 MMOL/L — SIGNIFICANT CHANGE UP (ref 3.5–5.3)
POTASSIUM SERPL-MCNC: 3.9 MMOL/L — SIGNIFICANT CHANGE UP (ref 3.5–5.3)
POTASSIUM SERPL-SCNC: 3.8 MMOL/L — SIGNIFICANT CHANGE UP (ref 3.5–5.3)
POTASSIUM SERPL-SCNC: 3.9 MMOL/L — SIGNIFICANT CHANGE UP (ref 3.5–5.3)
RBC # BLD: 3.88 M/UL — SIGNIFICANT CHANGE UP (ref 3.8–5.2)
RBC # FLD: 16.4 % — HIGH (ref 10.3–14.5)
SODIUM SERPL-SCNC: 133 MMOL/L — LOW (ref 135–145)
SODIUM SERPL-SCNC: 135 MMOL/L — SIGNIFICANT CHANGE UP (ref 135–145)
WBC # BLD: 13.95 K/UL — HIGH (ref 3.8–10.5)
WBC # FLD AUTO: 13.95 K/UL — HIGH (ref 3.8–10.5)

## 2020-12-20 PROCEDURE — 71045 X-RAY EXAM CHEST 1 VIEW: CPT | Mod: 26

## 2020-12-20 PROCEDURE — 99291 CRITICAL CARE FIRST HOUR: CPT | Mod: 24

## 2020-12-20 PROCEDURE — 95720 EEG PHY/QHP EA INCR W/VEEG: CPT

## 2020-12-20 RX ORDER — POTASSIUM CHLORIDE 20 MEQ
40 PACKET (EA) ORAL ONCE
Refills: 0 | Status: COMPLETED | OUTPATIENT
Start: 2020-12-20 | End: 2020-12-20

## 2020-12-20 RX ORDER — SODIUM CHLORIDE 9 MG/ML
2 INJECTION INTRAMUSCULAR; INTRAVENOUS; SUBCUTANEOUS EVERY 12 HOURS
Refills: 0 | Status: DISCONTINUED | OUTPATIENT
Start: 2020-12-20 | End: 2020-12-24

## 2020-12-20 RX ORDER — SODIUM CHLORIDE 9 MG/ML
125 INJECTION INTRAMUSCULAR; INTRAVENOUS; SUBCUTANEOUS ONCE
Refills: 0 | Status: COMPLETED | OUTPATIENT
Start: 2020-12-20 | End: 2020-12-20

## 2020-12-20 RX ORDER — SODIUM CHLORIDE 5 G/100ML
500 INJECTION, SOLUTION INTRAVENOUS
Refills: 0 | Status: DISCONTINUED | OUTPATIENT
Start: 2020-12-20 | End: 2020-12-24

## 2020-12-20 RX ADMIN — FLUDROCORTISONE ACETATE 0.2 MILLIGRAM(S): 0.1 TABLET ORAL at 11:09

## 2020-12-20 RX ADMIN — POLYETHYLENE GLYCOL 3350 17 GRAM(S): 17 POWDER, FOR SOLUTION ORAL at 11:05

## 2020-12-20 RX ADMIN — Medication 3 MILLILITER(S): at 14:25

## 2020-12-20 RX ADMIN — Medication 650 MILLIGRAM(S): at 21:36

## 2020-12-20 RX ADMIN — Medication 325 MILLIGRAM(S): at 11:06

## 2020-12-20 RX ADMIN — Medication 3 MILLILITER(S): at 23:59

## 2020-12-20 RX ADMIN — SENNA PLUS 2 TABLET(S): 8.6 TABLET ORAL at 21:37

## 2020-12-20 RX ADMIN — PIPERACILLIN AND TAZOBACTAM 200 GRAM(S): 4; .5 INJECTION, POWDER, LYOPHILIZED, FOR SOLUTION INTRAVENOUS at 23:56

## 2020-12-20 RX ADMIN — PIPERACILLIN AND TAZOBACTAM 200 GRAM(S): 4; .5 INJECTION, POWDER, LYOPHILIZED, FOR SOLUTION INTRAVENOUS at 18:11

## 2020-12-20 RX ADMIN — LACOSAMIDE 150 MILLIGRAM(S): 50 TABLET ORAL at 18:39

## 2020-12-20 RX ADMIN — SODIUM CHLORIDE 30 MILLILITER(S): 5 INJECTION, SOLUTION INTRAVENOUS at 16:18

## 2020-12-20 RX ADMIN — SODIUM CHLORIDE 2 GRAM(S): 9 INJECTION INTRAMUSCULAR; INTRAVENOUS; SUBCUTANEOUS at 18:11

## 2020-12-20 RX ADMIN — CHLORHEXIDINE GLUCONATE 1 APPLICATION(S): 213 SOLUTION TOPICAL at 06:38

## 2020-12-20 RX ADMIN — LACOSAMIDE 150 MILLIGRAM(S): 50 TABLET ORAL at 07:03

## 2020-12-20 RX ADMIN — ENOXAPARIN SODIUM 40 MILLIGRAM(S): 100 INJECTION SUBCUTANEOUS at 21:36

## 2020-12-20 RX ADMIN — ATORVASTATIN CALCIUM 40 MILLIGRAM(S): 80 TABLET, FILM COATED ORAL at 21:36

## 2020-12-20 RX ADMIN — SODIUM CHLORIDE 1 GRAM(S): 9 INJECTION INTRAMUSCULAR; INTRAVENOUS; SUBCUTANEOUS at 06:36

## 2020-12-20 RX ADMIN — Medication 3 MILLILITER(S): at 06:10

## 2020-12-20 RX ADMIN — PREGABALIN 1000 MICROGRAM(S): 225 CAPSULE ORAL at 11:06

## 2020-12-20 RX ADMIN — Medication 40 MILLIEQUIVALENT(S): at 10:11

## 2020-12-20 RX ADMIN — Medication 10 MILLIGRAM(S): at 16:16

## 2020-12-20 RX ADMIN — PIPERACILLIN AND TAZOBACTAM 200 GRAM(S): 4; .5 INJECTION, POWDER, LYOPHILIZED, FOR SOLUTION INTRAVENOUS at 06:36

## 2020-12-20 RX ADMIN — MILRINONE LACTATE 7.49 MICROGRAM(S)/KG/MIN: 1 INJECTION, SOLUTION INTRAVENOUS at 10:47

## 2020-12-20 RX ADMIN — SODIUM CHLORIDE 500 MILLILITER(S): 9 INJECTION INTRAMUSCULAR; INTRAVENOUS; SUBCUTANEOUS at 10:10

## 2020-12-20 RX ADMIN — PIPERACILLIN AND TAZOBACTAM 200 GRAM(S): 4; .5 INJECTION, POWDER, LYOPHILIZED, FOR SOLUTION INTRAVENOUS at 11:06

## 2020-12-20 RX ADMIN — Medication 3 MILLILITER(S): at 09:51

## 2020-12-20 RX ADMIN — Medication 5 MILLIGRAM(S): at 13:18

## 2020-12-20 RX ADMIN — MONTELUKAST 10 MILLIGRAM(S): 4 TABLET, CHEWABLE ORAL at 11:05

## 2020-12-20 RX ADMIN — Medication 650 MILLIGRAM(S): at 22:00

## 2020-12-20 NOTE — PROGRESS NOTE ADULT - SUBJECTIVE AND OBJECTIVE BOX
piperacillin/tazobactam IVPB.. 3.375  enoxaparin Injectable 40  labetalol Injectable 10 PRN  milrinone Infusion 0.5  piperacillin/tazobactam IVPB.. 3.375  propranolol 5        Vital Signs Last 24 Hrs  T(C): 36.9 (20 Dec 2020 01:03), Max: 37.7 (19 Dec 2020 01:43)  T(F): 98.5 (20 Dec 2020 01:03), Max: 99.8 (19 Dec 2020 01:43)  HR: 80 (20 Dec 2020 01:00) (80 - 99)  BP: 156/70 (20 Dec 2020 01:00) (122/66 - 208/93)  BP(mean): 101 (20 Dec 2020 01:00) (88 - 134)  RR: 32 (20 Dec 2020 01:00) (20 - 32)  SpO2: 94% (20 Dec 2020 01:00) (91% - 100%)  I&O's Summary    18 Dec 2020 07:01  -  19 Dec 2020 07:00  --------------------------------------------------------  IN: 2688.8 mL / OUT: 1976 mL / NET: 712.8 mL    19 Dec 2020 07:01  -  20 Dec 2020 01:41  --------------------------------------------------------  IN: 1770.4 mL / OUT: 2548 mL / NET: -777.6 mL        Physical Exam:  General: NAD, resting comfortably in bed  Pulmonary: Nonlabored breathing, no respiratory distress  Cardiovascular:  Abdominal:  Extremities:  Vascular:      LABS:                        10.6   13.71 )-----------( 292      ( 19 Dec 2020 08:53 )             33.1     12-19    132<L>  |  100  |  8   ----------------------------<  153<H>  4.0   |  21<L>  |  0.47<L>    Ca    8.1<L>      19 Dec 2020 22:13  Phos  2.1     12-19  Mg     1.8     12-19         subjective:  seen and examined at bedside with chief  feeling ok, no groin, leg pain.         piperacillin/tazobactam IVPB.. 3.375  enoxaparin Injectable 40  labetalol Injectable 10 PRN  milrinone Infusion 0.5  piperacillin/tazobactam IVPB.. 3.375  propranolol 5        Vital Signs Last 24 Hrs  T(C): 36.9 (20 Dec 2020 01:03), Max: 37.7 (19 Dec 2020 01:43)  T(F): 98.5 (20 Dec 2020 01:03), Max: 99.8 (19 Dec 2020 01:43)  HR: 80 (20 Dec 2020 01:00) (80 - 99)  BP: 156/70 (20 Dec 2020 01:00) (122/66 - 208/93)  BP(mean): 101 (20 Dec 2020 01:00) (88 - 134)  RR: 32 (20 Dec 2020 01:00) (20 - 32)  SpO2: 94% (20 Dec 2020 01:00) (91% - 100%)  I&O's Summary    18 Dec 2020 07:01  -  19 Dec 2020 07:00  --------------------------------------------------------  IN: 2688.8 mL / OUT: 1976 mL / NET: 712.8 mL    19 Dec 2020 07:01  -  20 Dec 2020 01:41  --------------------------------------------------------  IN: 1770.4 mL / OUT: 2548 mL / NET: -777.6 mL        Physical Exam:  General: NAD, resting comfortably in bed  Pulmonary: Nonlabored breathing, no respiratory distress  Cardiovascular: rrr  Abdominal: soft, NT, ND, positive BS  Extremities: right groin clean, dry, intact, soft   Vascular: palpable right DP/PT      LABS:                        10.6   13.71 )-----------( 292      ( 19 Dec 2020 08:53 )             33.1     12-19    132<L>  |  100  |  8   ----------------------------<  153<H>  4.0   |  21<L>  |  0.47<L>    Ca    8.1<L>      19 Dec 2020 22:13  Phos  2.1     12-19  Mg     1.8     12-19

## 2020-12-20 NOTE — PROGRESS NOTE ADULT - SUBJECTIVE AND OBJECTIVE BOX
74y/o F with PMHx sig for COPD, asthma, HLD, HTN, BIBEMS to [facility] from home after HHA discovered patient found down in floor covered in non-bloody vomitus. Perr HHA Pt went to the bathroom and was taking a long time, went in to check on her and found her sitting on floor, awake, however with vomitus on front of shirt. On arrival to [facility], patient was taken for stat head CT, which revealed diffuse subarachnoid hemorrhage mainly at basilar cisterns with IVH and obstructive hydrocephalus. Patient was given a bolus of 1g keppra and dilaudid pushes for generalized headache. CTA concerning for 3mm left pcomm aneurysm and a 1.6mm outpouching of distal cavernous segment of the left internal carotid artery likely aneurysm. NIHSS2 3 MF4. Patient was transferred to [facility] for further intervention. Patient currently reports headaches. Pt denies acute changes in vision, seizures, CP, SOB, weakness/paresthesias of arms or legs. (09 Dec 2020 23:37)      Hospital Course:   12/9: BD1 Patient admitted for SAH HH3, MF4.   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 EVD at 5, on Levo overnight, nimodipine discontinued d/t hypotension  12/12: BD4 POD#2, VIVIEN overnight, neuro stable, passed TOV  12/13: BD5 POD#3: VIVIEN x 24 hrs  12/14: BD6 POD#4. VIVIEN o/n, neuro stable. EVD at 5cm H2O  12/15: BD7 POD #5 VIVIEN overnight, neuro stable. EVD remains at 5. Plan for CTA today.   12/16: BD8 POD #6 VIVIEN overnight, neuro stable, EVD at 0, on Levo, started on 3% at 15 overnight   12/17: BD9 POD#1 s/p cerebral angio for verapamil c/b device malfunction of Proglide, s/p right groin cutdown for removal of proglide catheter and femoral artery repair.  VIVIEN overnight, neuro stable, EVD at 0. patient remained intubated overnight, on propofol for sedation, and vEEG  12/18: BD#10, POD#8 s/p coil/embo of L pcomm aneurysm, POD#2 s/p IA verapamil, POD#2 R groin cutdown for removal of proglide catheter w/ repair of femoral artery. VIVIEN overnight. EVD remains open @0cmH2O   12/19: BD#11, POD#9 s/p coil/embo of L pcomm aneurysm. POD#3 s/p IA verapamil, POD#3 s/p R femoral artery cutdown of proglide catheter w/ femoral artery repair. VIVIEN overnight. EVD remains open @0cmH2O. EEG shows b/l frontal sharp discharges, cont monitoring, vimpat was increased yesterday. Gentle hydration, total IVF 50cc/hr. Cont vanc/zosyn for empiric coverage, next vanc trough due @1pm on 12/19. F/u daily CXR.   12/20 BD#12 POD#10 VIVIEN overnight, Neuro exam stable, EVD @ 0cm H2O, vEEG, 3% @ 15 goal WNL, TF via NGT      ICU Vital Signs Last 24 Hrs  T(C): 37.6 (20 Dec 2020 05:34), Max: 37.6 (20 Dec 2020 05:34)  T(F): 99.6 (20 Dec 2020 05:34), Max: 99.6 (20 Dec 2020 05:34)  HR: 87 (20 Dec 2020 05:00) (80 - 99)  BP: 164/70 (20 Dec 2020 05:00) (115/58 - 208/93)  BP(mean): 110 (20 Dec 2020 05:00) (83 - 134)  ABP: 160/156 (19 Dec 2020 08:00) (156/115 - 160/156)  ABP(mean): 158 (19 Dec 2020 08:00) (131 - 158)  RR: 20 (20 Dec 2020 05:00) (20 - 32)  SpO2: 95% (20 Dec 2020 05:00) (91% - 100%)          PHYSICAL EXAM:  General: laying in hospital bed NAD, A&O x1-2, on RA, off sedation   HEENT: PERRL 3mm, EOMI b/l, face symmetric, tongue midline, + hard of hearing   Cardiovascular: RRR, normal S1 and S2   Respiratory: lungs CTAB, no wheezing, rhonchi, or crackles, extubated  GI: normoactive BS to auscultation, abd soft, NTND   Neuro: no aphasia, speech clear, no dysmetria, no pronator drift  ZAFAR x4 spontaneously and with good strength   EVD site: C/D/I   R groin site: no evidence of hematoma, no ecchymosis or edema   Extremities: b/l PT pulses detected w/ doppler, not palpable     TUBES/LINES:  [] CVC  [X] A-line  [] Lumbar Drain  [X] Ventriculostomy - EVD @0cmH2O   [X] Other - de anda     DIET:  [] NPO  [] Mechanical  [X] Tube feeds    LABS:                     Pending AM Collection        CAPILLARY BLOOD GLUCOSE      POCT Blood Glucose.: 154 mg/dL (18 Dec 2020 21:19)  POCT Blood Glucose.: 142 mg/dL (18 Dec 2020 17:04)  POCT Blood Glucose.: 183 mg/dL (18 Dec 2020 12:34)  POCT Blood Glucose.: 168 mg/dL (18 Dec 2020 07:29)      Drug Levels: [] N/A  Vancomycin Level, Trough: <4.0 ug/mL (12-18 @ 13:37)    CSF Analysis: [] N/A      Allergies    No Known Allergies    Intolerances      MEDICATIONS:  Antibiotics:  piperacillin/tazobactam IVPB.. 3.375 Gram(s) IV Intermittent every 6 hours  vancomycin  IVPB 750 milliGRAM(s) IV Intermittent every 24 hours  vancomycin  IVPB        Neuro:  acetaminophen    Suspension .. 650 milliGRAM(s) Oral every 6 hours PRN  lacosamide 150 milliGRAM(s) Oral two times a day  methylphenidate 5 milliGRAM(s) Oral every 24 hours    Anticoagulation:  enoxaparin Injectable 40 milliGRAM(s) SubCutaneous every 24 hours    OTHER:  albuterol/ipratropium for Nebulization. 3 milliLiter(s) Nebulizer every 6 hours  atorvastatin 40 milliGRAM(s) Oral at bedtime  bisacodyl Suppository 10 milliGRAM(s) Rectal daily  chlorhexidine 2% Cloths 1 Application(s) Topical <User Schedule>  dextrose 40% Gel 15 Gram(s) Oral once  dextrose 50% Injectable 25 Gram(s) IV Push once  fludroCORTISONE 0.1 milliGRAM(s) Oral every 24 hours  glucagon  Injectable 1 milliGRAM(s) IntraMuscular once  insulin lispro (ADMELOG) corrective regimen sliding scale   SubCutaneous Before meals and at bedtime  labetalol Injectable 10 milliGRAM(s) IV Push every 4 hours PRN  milrinone Infusion 0.5 MICROgram(s)/kG/Min IV Continuous <Continuous>  montelukast 10 milliGRAM(s) Oral daily  polyethylene glycol 3350 17 Gram(s) Oral daily  propranolol 5 milliGRAM(s) Oral every 8 hours  senna 2 Tablet(s) Oral at bedtime    IVF:  cyanocobalamin 1000 MICROGram(s) Oral daily  ferrous    sulfate 325 milliGRAM(s) Oral daily  sodium chloride 0.9%. 1000 milliLiter(s) IV Continuous <Continuous>    CULTURES:  Culture Results:   No growth to date (12-16 @ 12:10)  Culture Results:   No growth at 3 days. (12-16 @ 01:18)    RADIOLOGY & ADDITIONAL TESTS:      ASSESSMENT:  74 yo Female with PMHx of COPD, asthma, HLD, HTN, BIBEMS to [facility] from home after HHA found patient down on the floor covered in non-bloody vomitus. CTH reveals diffuse subarachnoid hemorrhage mainly at basilar cisterns with IVH and obstructive hydrocephalus, CTA shows 3mm left pcomm aneurysm, now s/p bedside right frontal EVD placement 12/10, s/p cerebral angiogram for coil embolization of left PCOMM aneurysm 12/10, now POD #3 s/p cerebral angio for verapamil c/b device malfunction of Proglide, s/p right groin cutdown for removal of proglide catheter and femoral artery repair (12/17/2020), NIHSS2 HH3 MF4).       HEADACHE    Handoff    Hyperlipidemia    Hypertension    COPD (chronic obstructive pulmonary disease)    Asthma    COPD (chronic obstructive pulmonary disease)    Asthma    Injury of right femoral artery    Cerebral artery vasospasm    SAH (subarachnoid hemorrhage)    Injury of femoral artery    Cerebral artery vasospasm    SAH (subarachnoid hemorrhage)    Subarachnoid bleed    Vascular surgery procedure    Angiogram, carotid and cerebral, bilateral    Angiogram, cerebral, with intracranial aneurysm embolization    No significant past surgical history    HEADACHE    90+    [email or web address]        PLAN:  NEURO:  - neuro checks  - vitals checks  - pain control  - EVD at 0, monitor ICP and output  - cont Vimpat, dose increased to 150mg BID, vEEG  - Hold Nimodipine for hypotension  - propofol for sedation  - R groin checks   - cont milrinone and amantadine     CARDIOVASCULAR:  - -180  - cardiology following for tak[place]usbo: TTE before discharge, daily EKG, ischemic work up with CCTA or LHC before discharge     PULMONARY:  - on room air, no issues    RENAL:  - 3% @ 15 cc/hr, Goal WNL  - de anda  - electrolyte repletion PRN     GI:  - cont NGT feeds, pend S&S re-eval   - bowel regimen    HEME:  - cont IV iron per Dr. Reddy   - monitor H/H    ID:  - cont Zosyn for empiric coverage until 12/23   - f/u vanc trough 12/18 @1pm   - afebrile overnight     ENDO:  - hgb A1c 6.7   - switched tube feeds to glucerna from jevity   - cont ISS     DVT PROPHYLAXIS:  [x] Venodynes                                [x] Heparin/Lovenox      DISPOSITION: ICU status, full code, dispo pend     Assessment and plan discussed w/ Dr. Serrano    Assessment:  Present when checked    []  GCS  E   V  M     Heart Failure: []Acute, [] acute on chronic , []chronic  Heart Failure:  [] Diastolic (HFpEF), [] Systolic (HFrEF), []Combined (HFpEF and HFrEF), [] RHF, [] Pulm HTN, [] Other    [] MICHAELLE, [] ATN, [] AIN, [] other  [] CKD1, [] CKD2, [] CKD 3, [] CKD 4, [] CKD 5, []ESRD    Encephalopathy: [] Metabolic, [] Hepatic, [] toxic, [] Neurological, [] Other    Abnormal Nurtitional Status: [] malnurtition (see nutrition note), [ ]underweight: BMI < 19, [] morbid obesity: BMI >40, [] Cachexia    [] Sepsis  [] hypovolemic shock,[] cardiogenic shock, [] hemorrhagic shock, [] neuogenic shock  [] Acute Respiratory Failure  []Cerebral edema, [] Brain compression/ herniation,   [] Functional quadriplegia  [] Acute blood loss anemia

## 2020-12-20 NOTE — PROGRESS NOTE ADULT - SUBJECTIVE AND OBJECTIVE BOX
=================================================   NEUROCRITICAL CARE ATTENDING NOTE (2020)  =================================================    CARA CANCHOLA   MRN-[id number]  Summary:  75y/F with COPD, asthma, dyslipidemia Hypertension found down.  Brought to [facility] where imaging showed SAH basilar cisterns with IVH and obstructive hydrocephalus L Pcomm aneurysm.  Given levetiracetam, hydromorphone.  NIHSS 2 HH3 MF4, transferred to [facility] for further management.      COURSE IN THE HOSPITAL:   admitted to [facility], EVD inserted; given 20 lasix for pulmo edema, T inversion on CT  12/10 No significant events overnight.    No significant events overnight.    No significant events overnight.    No significant events overnight. drain stopped working at 730 a.m., off levophed    No significant events overnight.   12/15 Noted confusion, CTA mild spasm   lethargic, angio - no severe spasm, given IA verapamil, underwent femoral endarterectomy with repair with patch angioplasty   improved post verapamil, transitioned to milrinone, currently tolerating extubation  : BD#10, POD#8 s/p coil/embo of L pcomm aneurysm, POD#2 s/p IA verapamil, POD#2 R groin cutdown for removal of proglide catheter w/ repair of femoral artery.   : neurological improvement, stable hemodynamic status  : _____    Past Medical History: Hyperlipidemia Hypertension COPD (chronic obstructive pulmonary disease) Asthma  Allergies:  No Known Allergies  Home meds:   ·	famotidine 20 mg oral tablet: 1 tab(s) orally once a day  ·	lisinopril 2.5 mg oral tablet: 1 tab(s) orally once a day  ·	montelukast 10 mg oral tablet: 1 tab(s) orally once a day  ·	predniSONE 50 mg oral tablet: 1 tab(s) orally once a day  ·	ProAir HFA CFC free 90 mcg/inh inhalation aerosol: 2 puff(s) inhaled 4 times a day PRN  ·	simvastatin 20 mg oral tablet: 1 tab(s) orally once a day (at bedtime)    Current Meds:  MEDICATIONS  (STANDING):        PHYSICAL EXAMINATION    ICU Vital Signs Last 24 Hrs      NEUROLOGIC EXAMINATION:  oriented to self in AM, CLEOPATRA, resolved left gaze, no facial droop, intermittent follows 1st order commands  EENT:  anicteric  CARDIOVASCULAR: (+) S1 S2, JAMIE  PULMONARY: clear to auscultation bilaterally, slight expiratory wheezes  ABDOMEN: soft, nontender with normoactive bowel sounds  EXTREMITIES: no edema, pulses present by Dopplers  SKIN: no rash          HbA1C = 6.7 (12-10) 6.4 ()  LDL = 112 ()   HDL = 66 ()  TG = 114 ()   TSH = 0.990 ()    Bacteriology:    Culture - CSF with Gram Stain . (20 @ 12:10)    Gram Stain:   No organisms seen  No polymorphonuclear cells seen    Specimen Source: .CSF CSF    Culture Results:   No growth to date    Culture - Blood (20 @ 01:18)    Specimen Source: .Blood Blood X 2    Culture Results:   No growth at 1 day.    Urinalysis Basic - ( 16 Dec 2020 03:26 )    Color: Yellow / Appearance: Clear / S.020 / pH: x  Gluc: x / Ketone: 40 mg/dL  / Bili: Negative / Urobili: 0.2 E.U./dL   Blood: x / Protein: NEGATIVE mg/dL / Nitrite: NEGATIVE   Leuk Esterase: NEGATIVE / RBC: < 5 /HPF / WBC < 5 /HPF   Sq Epi: x / Non Sq Epi: 0-5 /HPF / Bacteria: Present /HPF    CSF studies:  EE/16:   1)	Rare right frontal lateralized rhythmic delta activity (LRDA) for very brief duration, suggestive of right frontal cortical hyper-excitability.   2)	Frequent right frontal sharp waves, as well as rare left frontoparietal and left temporal sharp waves, suggestive of focal cortical hyper-excitability in these regions.  Neuroimagin/16 XA:  complete and persistent left Pcomm aneurysm occlusion with microcoils; mild vasospasm in right A1 and mild vasospasm in left MAGGIE.  Intra arterial verapamil infusion (10 mg right CCA and 15 mg left CCA)  12/15 CTA head:  Multifocal moderate stenosis the right PCA. Mild stenosis of the left PCA which were present on prior CTA head and neck 2020 and therefore may reflect intracranial atherosclerosis rather than vasospasm.  New mild stenosis of the mid right M1 segment.  Interval increase in size of the lateral and third ventricles since prior CT head 2020.  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  12/10 CT head:  EVD placement, stable   CTA:  L pcomm aneurysm, L ICA (distal cavernous) aneurysm vs infundibulum, extensive calcified plaque cavernous bilateral ICA with mild to mod stenosis; thick plaque bilateral carotid bifurcations - mod to severe R and mild to mod L stenosis, focal calcified plaque R VA off subclavian artery - high grate stenosis / partial occlusion, upper lung bilateral opacification - pulmo edema, chronic deformity R humeral neck   CT head:  SAH, basilar cisterns, IV extension, obstructive hydrocephalus SVID, lacunar infarcts R caudate, both thalami, cerebellar hemispheres, no CT evidence of territorial infarction  Other imagin/19 CXR: Discoid change right lung base. Chronic interstitial changes.   CXR: improve right basilar opacity/pleural effusion   LE Doppler: NEG   CT abd:  small paraesophageal hiatal hernia, gastritis; ?impaction, septal thickening bilateral lower lobes ?pulmo edema, hepatic steatosis    IV FLUIDS: 3% NaCl 15 mL/h  DRIPS:  DIET: CCD  Lines: R IJ  Drains:     EVD at 5cm H20   EVD at 5cm H20 ICP < 10  12/15 EVD at 5cm H20 ICP 1-5, CSF 2-8 mL/h   EVD at 0cm H20 ICP 2-7 CSF 4-20 mL/h   EVD at 0cm H20 ICP 1-6 CSF 4-20 mL/h   EVD at 0cm H20 ICP 1-10 CSF 7-18 mL/h   EVD at 0cm H20 ICP 1-10 CSF 3-14 mL/h    CODE STATUS:  Full Code                       GOALS OF CARE:  aggressive                      DISPOSITION:  ICU  NIHSS 2 HH3 MF4 =================================================   NEUROCRITICAL CARE ATTENDING NOTE (2020)  =================================================    CARA CANCHOLA   MRN-[id number]  Summary:  75y/F with COPD, asthma, dyslipidemia Hypertension found down.  Brought to [facility] where imaging showed SAH basilar cisterns with IVH and obstructive hydrocephalus L Pcomm aneurysm.  Given levetiracetam, hydromorphone.  NIHSS 2 HH3 MF4, transferred to [facility] for further management.      COURSE IN THE HOSPITAL:   admitted to [facility], EVD inserted; given 20 lasix for pulmo edema, T inversion on CT  12/10 No significant events overnight.    No significant events overnight.    No significant events overnight.    No significant events overnight. drain stopped working at 730 a.m., off levophed    No significant events overnight.   12/15 Noted confusion, CTA mild spasm   lethargic, angio - no severe spasm, given IA verapamil, underwent femoral endarterectomy with repair with patch angioplasty   improved post verapamil, transitioned to milrinone, currently tolerating extubation  : BD#10, POD#8 s/p coil/embo of L pcomm aneurysm, POD#2 s/p IA verapamil, POD#2 R groin cutdown for removal of proglide catheter w/ repair of femoral artery.   : neurological improvement, stable hemodynamic status  : _____    Past Medical History: Hyperlipidemia Hypertension COPD (chronic obstructive pulmonary disease) Asthma  Allergies:  No Known Allergies  Home meds:   ·	famotidine 20 mg oral tablet: 1 tab(s) orally once a day  ·	lisinopril 2.5 mg oral tablet: 1 tab(s) orally once a day  ·	montelukast 10 mg oral tablet: 1 tab(s) orally once a day  ·	predniSONE 50 mg oral tablet: 1 tab(s) orally once a day  ·	ProAir HFA CFC free 90 mcg/inh inhalation aerosol: 2 puff(s) inhaled 4 times a day PRN  ·	simvastatin 20 mg oral tablet: 1 tab(s) orally once a day (at bedtime)    Current Meds:  MEDICATIONS  (STANDING):        PHYSICAL EXAMINATION    ICU Vital Signs Last 24 Hrs  T(C): 37.6 (20 Dec 2020 05:34), Max: 37.6 (20 Dec 2020 05:34)  T(F): 99.6 (20 Dec 2020 05:34), Max: 99.6 (20 Dec 2020 05:34)  HR: 84 (20 Dec 2020 06:00) (80 - 99)  BP: 160/74 (20 Dec 2020 06:00) (115/58 - 208/93)  BP(mean): 106 (20 Dec 2020 06:00) (83 - 134)  ABP: 160/156 (19 Dec 2020 08:00) (160/156 - 160/156)  ABP(mean): 158 (19 Dec 2020 08:00) (158 - 158)  RR: 23 (20 Dec 2020 06:00) (20 - 32)  SpO2: 92% (20 Dec 2020 06:00) (91% - 100%)    NEUROLOGIC EXAMINATION:  oriented to self in AM, CLEOPATRA, resolved left gaze, no facial droop, intermittent follows 1st order commands  EENT:  anicteric  CARDIOVASCULAR: (+) S1 S2, JAMIE  PULMONARY: clear to auscultation bilaterally, slight expiratory wheezes  ABDOMEN: soft, nontender with normoactive bowel sounds  EXTREMITIES: no edema, pulses present by Dopplers  SKIN: no rash    I/O's    20 @ 07:01  -  20 @ 07:00  --------------------------------------------------------  IN: 2057.4 mL / OUT: 2948 mL / NET: -890.6 mL    LABS:               10.5   13.95 )-----------( 300      ( 20 Dec 2020 04:47 )             32.6         133<L>  |  100  |  8   ----------------------------<  152<H>  3.8   |  23  |  0.53    Ca    8.5      20 Dec 2020 04:47  Phos  3.0       Mg     2.1         CAPILLARY BLOOD GLUCOSE    POCT Blood Glucose.: 133 mg/dL (20 Dec 2020 06:56)  POCT Blood Glucose.: 141 mg/dL (19 Dec 2020 22:15)  POCT Blood Glucose.: 122 mg/dL (19 Dec 2020 16:19)  POCT Blood Glucose.: 154 mg/dL (19 Dec 2020 11:49)    HbA1C = 6.7 (-10) 6.4 (-)  LDL = 112 ()   HDL = 66 (-)  TG = 114 ()   TSH = 0.990 ()    Bacteriology:    Culture - CSF with Gram Stain . (20 @ 12:10)    Gram Stain:   No organisms seen  No polymorphonuclear cells seen    Specimen Source: .CSF CSF    Culture Results:   No growth to date    Culture - Blood (20 @ 01:18)    Specimen Source: .Blood Blood X 2    Culture Results:   No growth at 1 day.    Urinalysis Basic - ( 16 Dec 2020 03:26 )    Color: Yellow / Appearance: Clear / S.020 / pH: x  Gluc: x / Ketone: 40 mg/dL  / Bili: Negative / Urobili: 0.2 E.U./dL   Blood: x / Protein: NEGATIVE mg/dL / Nitrite: NEGATIVE   Leuk Esterase: NEGATIVE / RBC: < 5 /HPF / WBC < 5 /HPF   Sq Epi: x / Non Sq Epi: 0-5 /HPF / Bacteria: Present /HPF    CSF studies:  EE/16:   1)	Rare right frontal lateralized rhythmic delta activity (LRDA) for very brief duration, suggestive of right frontal cortical hyper-excitability.   2)	Frequent right frontal sharp waves, as well as rare left frontoparietal and left temporal sharp waves, suggestive of focal cortical hyper-excitability in these regions.  Neuroimagin/16 XA:  complete and persistent left Pcomm aneurysm occlusion with microcoils; mild vasospasm in right A1 and mild vasospasm in left MAGGIE.  Intra arterial verapamil infusion (10 mg right CCA and 15 mg left CCA)  12/15 CTA head:  Multifocal moderate stenosis the right PCA. Mild stenosis of the left PCA which were present on prior CTA head and neck 2020 and therefore may reflect intracranial atherosclerosis rather than vasospasm.  New mild stenosis of the mid right M1 segment.  Interval increase in size of the lateral and third ventricles since prior CT head 2020.  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  12/10 CT head:  EVD placement, stable   CTA:  L pcomm aneurysm, L ICA (distal cavernous) aneurysm vs infundibulum, extensive calcified plaque cavernous bilateral ICA with mild to mod stenosis; thick plaque bilateral carotid bifurcations - mod to severe R and mild to mod L stenosis, focal calcified plaque R VA off subclavian artery - high grate stenosis / partial occlusion, upper lung bilateral opacification - pulmo edema, chronic deformity R humeral neck   CT head:  SAH, basilar cisterns, IV extension, obstructive hydrocephalus SVID, lacunar infarcts R caudate, both thalami, cerebellar hemispheres, no CT evidence of territorial infarction  Other imagin/19 CXR: Discoid change right lung base. Chronic interstitial changes.   CXR: improve right basilar opacity/pleural effusion   LE Doppler: NEG   CT abd:  small paraesophageal hiatal hernia, gastritis; ?impaction, septal thickening bilateral lower lobes ?pulmo edema, hepatic steatosis    IV FLUIDS: 3% NaCl 15 mL/h  DRIPS:  DIET: CCD  Lines: R IJ  Drains:     EVD at 5cm H20   EVD at 5cm H20 ICP < 10  12/15 EVD at 5cm H20 ICP 1-5, CSF 2-8 mL/h   EVD at 0cm H20 ICP 2-7 CSF 4-20 mL/h   EVD at 0cm H20 ICP 1-6 CSF 4-20 mL/h   EVD at 0cm H20 ICP 1-10 CSF 7-18 mL/h   EVD at 0cm H20 ICP 1-10 CSF 3-14 mL/h    CODE STATUS:  Full Code                       GOALS OF CARE:  aggressive                      DISPOSITION:  ICU  NIHSS 2 HH3 MF4 =================================================   NEUROCRITICAL CARE ATTENDING NOTE (2020)  =================================================    CARA CANCHOLA   MRN-[id number]  Summary:  75y/F with COPD, asthma, dyslipidemia Hypertension found down.  Brought to [facility] where imaging showed SAH basilar cisterns with IVH and obstructive hydrocephalus L Pcomm aneurysm.  Given levetiracetam, hydromorphone.  NIHSS 2 HH3 MF4, transferred to [facility] for further management.      COURSE IN THE HOSPITAL:   admitted to [facility], EVD inserted; given 20 lasix for pulmo edema, T inversion on CT  12/10 No significant events overnight.    No significant events overnight.    No significant events overnight.    No significant events overnight. drain stopped working at 730 a.m., off levophed    No significant events overnight.   12/15 Noted confusion, CTA mild spasm   lethargic, angio - no severe spasm, given IA verapamil, underwent femoral endarterectomy with repair with patch angioplasty   improved post verapamil, transitioned to milrinone, currently tolerating extubation  : BD#10, POD#8 s/p coil/embo of L pcomm aneurysm, POD#2 s/p IA verapamil, POD#2 R groin cutdown for removal of proglide catheter w/ repair of femoral artery.   : neurological improvement, stable hemodynamic status  : BD#12, POD#9, EVD raise to 5 cm    Past Medical History: Hyperlipidemia Hypertension COPD (chronic obstructive pulmonary disease) Asthma  Allergies:  No Known Allergies  Home meds:   ·	famotidine 20 mg oral tablet: 1 tab(s) orally once a day  ·	lisinopril 2.5 mg oral tablet: 1 tab(s) orally once a day  ·	montelukast 10 mg oral tablet: 1 tab(s) orally once a day  ·	predniSONE 50 mg oral tablet: 1 tab(s) orally once a day  ·	ProAir HFA CFC free 90 mcg/inh inhalation aerosol: 2 puff(s) inhaled 4 times a day PRN  ·	simvastatin 20 mg oral tablet: 1 tab(s) orally once a day (at bedtime)    Current Meds:  MEDICATIONS  (STANDING):  albuterol/ipratropium for Nebulization. 3 milliLiter(s) Nebulizer every 6 hours  atorvastatin 40 milliGRAM(s) Oral at bedtime  bisacodyl Suppository 10 milliGRAM(s) Rectal daily  cyanocobalamin 1000 MICROGram(s) Oral daily  enoxaparin Injectable 40 milliGRAM(s) SubCutaneous every 24 hours  ferrous    sulfate 325 milliGRAM(s) Oral daily  fludroCORTISONE 0.2 milliGRAM(s) Oral every 24 hours  insulin lispro (ADMELOG) corrective regimen sliding scale   SubCutaneous Before meals and at bedtime  lacosamide 150 milliGRAM(s) Oral two times a day  methylphenidate 5 milliGRAM(s) Oral every 24 hours  milrinone Infusion 0.5 MICROgram(s)/kG/Min (7.49 mL/Hr) IV Continuous <Continuous>  montelukast 10 milliGRAM(s) Oral daily  piperacillin/tazobactam IVPB.. 3.375 Gram(s) IV Intermittent every 6 hours  polyethylene glycol 3350 17 Gram(s) Oral daily  propranolol 5 milliGRAM(s) Oral every 8 hours  senna 2 Tablet(s) Oral at bedtime  sodium chloride 2 Gram(s) Oral every 12 hours  sodium chloride 0.9%. 1000 milliLiter(s) (20 mL/Hr) IV Continuous <Continuous>  sodium chloride 3%. 500 milliLiter(s) (30 mL/Hr) IV Continuous <Continuous>    MEDICATIONS  (PRN):  acetaminophen    Suspension .. 650 milliGRAM(s) Oral every 6 hours PRN Temp greater or equal to 38C (100.4F), Mild Pain (1 - 3)  labetalol Injectable 10 milliGRAM(s) IV Push every 4 hours PRN Systolic blood pressure > 180    PHYSICAL EXAMINATION    ICU Vital Signs Last 24 Hrs  T(C): 37.6 (20 Dec 2020 05:34), Max: 37.6 (20 Dec 2020 05:34)  T(F): 99.6 (20 Dec 2020 05:34), Max: 99.6 (20 Dec 2020 05:34)  HR: 84 (20 Dec 2020 06:00) (80 - 99)  BP: 160/74 (20 Dec 2020 06:00) (115/58 - 208/93)  BP(mean): 106 (20 Dec 2020 06:00) (83 - 134)  ABP: 160/156 (19 Dec 2020 08:00) (160/156 - 160/156)  ABP(mean): 158 (19 Dec 2020 08:00) (158 - 158)  RR: 23 (20 Dec 2020 06:00) (20 - 32)  SpO2: 92% (20 Dec 2020 06:00) (91% - 100%)    NEUROLOGIC EXAMINATION:  oriented to self in AM, CLEOPATRA, resolved left gaze, no facial droop, intermittent follows 1st order commands X 4 extremities  EENT:  anicteric  CARDIOVASCULAR: (+) S1 S2, soft JAMIE  PULMONARY: clear to auscultation bilaterally, slight expiratory wheezes  ABDOMEN: soft, nontender with normoactive bowel sounds  EXTREMITIES: no edema, pulses present by Dopplers  SKIN: no rash    I/O's    20 @ 07:01  -  20 @ 07:00  --------------------------------------------------------  IN: 2057.4 mL / OUT: 2948 mL / NET: -890.6 mL    LABS:               10.5   13.95 )-----------( 300      ( 20 Dec 2020 04:47 )             32.6     12-20    133<L>  |  100  |  8   ----------------------------<  152<H>  3.8   |  23  |  0.53    Ca    8.5      20 Dec 2020 04:47  Phos  3.0     12-20  Mg     2.1     12-20    CAPILLARY BLOOD GLUCOSE    POCT Blood Glucose.: 133 mg/dL (20 Dec 2020 06:56)  POCT Blood Glucose.: 141 mg/dL (19 Dec 2020 22:15)  POCT Blood Glucose.: 122 mg/dL (19 Dec 2020 16:19)  POCT Blood Glucose.: 154 mg/dL (19 Dec 2020 11:49)    HbA1C = 6.7 (-10) 6.4 ()  LDL = 112 ()   HDL = 66 ()  TG = 114 ()   TSH = 0.990 ()    Bacteriology:    Culture - CSF with Gram Stain . (20 @ 12:10)    Gram Stain:   No organisms seen  No polymorphonuclear cells seen    Specimen Source: .CSF CSF    Culture Results:   No growth to date    Culture - Blood (20 @ 01:18)    Specimen Source: .Blood Blood    Culture Results:   No growth at 4 days.    Culture - Blood (20 @ 01:18)    Specimen Source: .Blood Blood    Culture Results:   No growth at 4 days.    CSF studies:  EE/16:   1)	Rare right frontal lateralized rhythmic delta activity (LRDA) for very brief duration, suggestive of right frontal cortical hyper-excitability.   2)	Frequent right frontal sharp waves, as well as rare left frontoparietal and left temporal sharp waves, suggestive of focal cortical hyper-excitability in these regions.  Neuroimagin/16 XA:  complete and persistent left Pcomm aneurysm occlusion with microcoils; mild vasospasm in right A1 and mild vasospasm in left MAGGIE.  Intra arterial verapamil infusion (10 mg right CCA and 15 mg left CCA)  12/15 CTA head:  Multifocal moderate stenosis the right PCA. Mild stenosis of the left PCA which were present on prior CTA head and neck 2020 and therefore may reflect intracranial atherosclerosis rather than vasospasm.  New mild stenosis of the mid right M1 segment.  Interval increase in size of the lateral and third ventricles since prior CT head 2020.  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  12/10 CT head:  EVD placement, stable   CTA:  L pcomm aneurysm, L ICA (distal cavernous) aneurysm vs infundibulum, extensive calcified plaque cavernous bilateral ICA with mild to mod stenosis; thick plaque bilateral carotid bifurcations - mod to severe R and mild to mod L stenosis, focal calcified plaque R VA off subclavian artery - high grate stenosis / partial occlusion, upper lung bilateral opacification - pulmo edema, chronic deformity R humeral neck   CT head:  SAH, basilar cisterns, IV extension, obstructive hydrocephalus SVID, lacunar infarcts R caudate, both thalami, cerebellar hemispheres, no CT evidence of territorial infarction  Other imagin/19 CXR: Discoid change right lung base. Chronic interstitial changes.   CXR: improve right basilar opacity/pleural effusion   LE Doppler: NEG   CT abd:  small paraesophageal hiatal hernia, gastritis; ?impaction, septal thickening bilateral lower lobes ?pulmo edema, hepatic steatosis    IV FLUIDS: 3% NaCl 15 mL/h  DRIPS:  DIET: CCD  Lines: R IJ  Drains:     EVD at 5cm H20   EVD at 5cm H20 ICP < 10  12/15 EVD at 5cm H20 ICP 1-5, CSF 2-8 mL/h   EVD at 0cm H20 ICP 2-7 CSF 4-20 mL/h   EVD at 0cm H20 ICP 1-6 CSF 4-20 mL/h   EVD at 0cm H20 ICP 1-10 CSF 7-18 mL/h   EVD at 0cm H20 ICP 1-10 CSF 3-14 mL/h   EVD at 0cm H20 ICP < 10, CSF 90/180 mL (will be raised to 5 cm)    CODE STATUS:  Full Code                       GOALS OF CARE:  aggressive                      DISPOSITION:  ICU  NIHSS 2 HH3 MF4

## 2020-12-20 NOTE — PROGRESS NOTE ADULT - ASSESSMENT
Assessment and Plan:   · Assessment	  74yo F POD#2 s/p cerebral angio for verapamil c/b device malfunction of Proglide, s/p right groin cutdown for removal of proglide catheter and femoral artery repair on 12/16/20     -Right groin monitoring, look for worsening of erythema/signs of infection, improved today   -Pulse checks  -Can give 2 more doses of vancomycin   - Vascular surgery will continue to follow

## 2020-12-20 NOTE — PROGRESS NOTE ADULT - ASSESSMENT
75y/Female with:  aneursymal subarachnoid hemorrhage, L pcomm aneurysm, L parophthalmic aneurysm; brain compression, cerebral edema  osbtructive hydrocephalus  lacunar infarcts R caudate, B thalami, cerebellar hemispheres ?artery to artery vs cardioembolic?  atherosclerotic cardiovascular disease; mod to severe bilateral ICA stenosis (R>L), R VA stenosis  Hypertension dyslipidemia   COPD asthma  newly diagnosed Diabetes Mellitus?  troponin leak    PLAN: Day 1 = 12-09 ; Day 4 =  12/12; Day 21 = 12/29  NEURO: neurochecks q1h, PRN pain meds with Tylenol / Percocet  Clinical improvement with IA verapamil, now transitioned to IV milrinone, neurostimulant ritalin 5 mg daily  SAH:  nimodipine discontinued due to pressor requirements  Hydrocephalus:  EVD 0 cm H20 open to drain  Seizure (LRDA/sharp waves):  lacosamide 200 mg load, 100 mg --> 150 mg BID  REHAB:  physical therapy evaluation and management    EARLY MOB:  HOB elevated    PULM:  Room air, incentive spirometry, continue montelukast, ipratropium / albuterol PRN   CARDIO:  SBP goal 100-180mm Hg, EF 35%, repeat TTE as per Cardiology (once outside VSP window, will start MTP/ARB) - tolerating low dose propranolol  ENDO:  Blood sugar goals 140-180 mg/dL, cont insulin sliding scale, atorvastatin 40mg   GI:  PPI for GI prophylaxis (home meds); bowel regimen  DIET: decrease feeding rate to limit fluid overload  RENAL: 3% NaCl 15 mL/h (Na drop 132) [VSP-natriuresis]  HEM/ONC: Hb stable, iron profile c/w iron deficiency, iron FeSO4 325 TID  VTE Prophylaxis: SCDs, SQL  ID: febrile, leukocytosis, pancultures negative so far; vanco & zosyn course  Social: updated family    *****    CORE MEASURES  1.  Nath and Jacobo Score = 3  2.  VTE prophylaxis:  [ ] administered within 24 hours of admission OR [X] reason not done: fresh bleed, unsecured aneurysm.  3.  Dysphagia screening:   [X] performed before any oral meds / liquids / food  4.  Nimodipine treatment:  [X] administered within 24 hours of admission OR [ ] reason not done:    ATTENDING ATTESTATION:  I was physically present for the key portions of the evaluation and management (E/M) service provided.  I agree with the above history, physical and plan, which I have reviewed and edited where appropriate.    Patient at high risk for neurological deterioration or death due to:  ICU delirium, aspiration PNA, DVT / PE.  Critical care time:  I have personally provided 45 minutes of critical care time, excluding time spent on separate procedures.      Plan discussed with RN, house staff.   75y/Female with:  aneursymal subarachnoid hemorrhage, L pcomm aneurysm, L parophthalmic aneurysm; brain compression, cerebral edema  osbtructive hydrocephalus  lacunar infarcts R caudate, B thalami, cerebellar hemispheres ?artery to artery vs cardioembolic?  atherosclerotic cardiovascular disease; mod to severe bilateral ICA stenosis (R>L), R VA stenosis  Hypertension dyslipidemia   COPD asthma  newly diagnosed Diabetes Mellitus?  troponin leak    PLAN: Day 1 = 12-09 ; Day 4 =  12/12; Day 21 = 12/29  NEURO: neurochecks q1h, PRN pain meds with Tylenol / Percocet  Clinical improvement with IA verapamil, now transitioned to IV milrinone (slow taper over next week)  neurostimulant ritalin 5 mg daily  SAH:  nimodipine discontinued due to pressor requirements  Hydrocephalus:  EVD 5 cm H20 open to drain  Seizure (LRDA/sharp waves):  lacosamide 200 mg load, 100 mg --> 150 mg BID  REHAB:  physical therapy evaluation and management    EARLY MOB:  HOB elevated    PULM:  Room air, incentive spirometry, continue montelukast, ipratropium / albuterol PRN   CARDIO:  SBP goal 100-180mm Hg (currently autoregulates to sBP 170-180's-VSP), EF 35%, repeat TTE as per Cardiology (once outside VSP window, will start MTP/ARB) - tolerating low dose propranolol  ENDO:  Blood sugar goals 140-180 mg/dL, cont insulin sliding scale, atorvastatin 40mg   GI:  PPI for GI prophylaxis (home meds); bowel regimen  DIET: decrease feeding rate to limit fluid overload  RENAL: 3% NaCl 30 mL/h (stable Na 133) [VSP-natriuresis]  HEM/ONC: Hb stable, iron profile c/w iron deficiency, iron FeSO4 325 TID  VTE Prophylaxis: SCDs, SQL  ID: febrile, leukocytosis, zosyn end date 12.23.2020 (resolved RLL pneumonia)  Social: updated family    *****    CORE MEASURES  1.  Nath and Jacobo Score = 3  2.  VTE prophylaxis:  [ ] administered within 24 hours of admission OR [X] reason not done: fresh bleed, unsecured aneurysm.  3.  Dysphagia screening:   [X] performed before any oral meds / liquids / food  4.  Nimodipine treatment:  [X] administered within 24 hours of admission OR [ ] reason not done:    *****    ATTENDING ATTESTATION:  I was physically present for the key portions of the evaluation and management (E/M) service provided.  I agree with the above history, physical and plan, which I have reviewed and edited where appropriate.    Patient at high risk for neurological deterioration or death due to:  ICU delirium, aspiration PNA, DVT / PE.  Critical care time:  I have personally provided 45 minutes of critical care time, excluding time spent on separate procedures.      Plan discussed with RN, house staff.

## 2020-12-21 LAB
ANION GAP SERPL CALC-SCNC: 11 MMOL/L — SIGNIFICANT CHANGE UP (ref 5–17)
ANION GAP SERPL CALC-SCNC: 11 MMOL/L — SIGNIFICANT CHANGE UP (ref 5–17)
ANION GAP SERPL CALC-SCNC: 9 MMOL/L — SIGNIFICANT CHANGE UP (ref 5–17)
BUN SERPL-MCNC: 10 MG/DL — SIGNIFICANT CHANGE UP (ref 7–23)
BUN SERPL-MCNC: 7 MG/DL — SIGNIFICANT CHANGE UP (ref 7–23)
BUN SERPL-MCNC: 9 MG/DL — SIGNIFICANT CHANGE UP (ref 7–23)
CALCIUM SERPL-MCNC: 8.2 MG/DL — LOW (ref 8.4–10.5)
CALCIUM SERPL-MCNC: 8.2 MG/DL — LOW (ref 8.4–10.5)
CALCIUM SERPL-MCNC: 8.6 MG/DL — SIGNIFICANT CHANGE UP (ref 8.4–10.5)
CHLORIDE SERPL-SCNC: 102 MMOL/L — SIGNIFICANT CHANGE UP (ref 96–108)
CHLORIDE SERPL-SCNC: 104 MMOL/L — SIGNIFICANT CHANGE UP (ref 96–108)
CHLORIDE SERPL-SCNC: 104 MMOL/L — SIGNIFICANT CHANGE UP (ref 96–108)
CO2 SERPL-SCNC: 21 MMOL/L — LOW (ref 22–31)
CO2 SERPL-SCNC: 22 MMOL/L — SIGNIFICANT CHANGE UP (ref 22–31)
CO2 SERPL-SCNC: 22 MMOL/L — SIGNIFICANT CHANGE UP (ref 22–31)
CREAT SERPL-MCNC: 0.4 MG/DL — LOW (ref 0.5–1.3)
CREAT SERPL-MCNC: 0.49 MG/DL — LOW (ref 0.5–1.3)
CREAT SERPL-MCNC: 0.5 MG/DL — SIGNIFICANT CHANGE UP (ref 0.5–1.3)
CULTURE RESULTS: SIGNIFICANT CHANGE UP
CULTURE RESULTS: SIGNIFICANT CHANGE UP
GLUCOSE SERPL-MCNC: 147 MG/DL — HIGH (ref 70–99)
GLUCOSE SERPL-MCNC: 157 MG/DL — HIGH (ref 70–99)
GLUCOSE SERPL-MCNC: 171 MG/DL — HIGH (ref 70–99)
HCT VFR BLD CALC: 32.3 % — LOW (ref 34.5–45)
HGB BLD-MCNC: 10.2 G/DL — LOW (ref 11.5–15.5)
MAGNESIUM SERPL-MCNC: 1.8 MG/DL — SIGNIFICANT CHANGE UP (ref 1.6–2.6)
MCHC RBC-ENTMCNC: 26.8 PG — LOW (ref 27–34)
MCHC RBC-ENTMCNC: 31.6 GM/DL — LOW (ref 32–36)
MCV RBC AUTO: 84.8 FL — SIGNIFICANT CHANGE UP (ref 80–100)
NRBC # BLD: 0 /100 WBCS — SIGNIFICANT CHANGE UP (ref 0–0)
PHOSPHATE SERPL-MCNC: 2.5 MG/DL — SIGNIFICANT CHANGE UP (ref 2.5–4.5)
PLATELET # BLD AUTO: 324 K/UL — SIGNIFICANT CHANGE UP (ref 150–400)
POTASSIUM SERPL-MCNC: 3.1 MMOL/L — LOW (ref 3.5–5.3)
POTASSIUM SERPL-MCNC: 3.6 MMOL/L — SIGNIFICANT CHANGE UP (ref 3.5–5.3)
POTASSIUM SERPL-MCNC: 4.1 MMOL/L — SIGNIFICANT CHANGE UP (ref 3.5–5.3)
POTASSIUM SERPL-SCNC: 3.1 MMOL/L — LOW (ref 3.5–5.3)
POTASSIUM SERPL-SCNC: 3.6 MMOL/L — SIGNIFICANT CHANGE UP (ref 3.5–5.3)
POTASSIUM SERPL-SCNC: 4.1 MMOL/L — SIGNIFICANT CHANGE UP (ref 3.5–5.3)
RBC # BLD: 3.81 M/UL — SIGNIFICANT CHANGE UP (ref 3.8–5.2)
RBC # FLD: 16.2 % — HIGH (ref 10.3–14.5)
SODIUM SERPL-SCNC: 135 MMOL/L — SIGNIFICANT CHANGE UP (ref 135–145)
SODIUM SERPL-SCNC: 135 MMOL/L — SIGNIFICANT CHANGE UP (ref 135–145)
SODIUM SERPL-SCNC: 136 MMOL/L — SIGNIFICANT CHANGE UP (ref 135–145)
SPECIMEN SOURCE: SIGNIFICANT CHANGE UP
SPECIMEN SOURCE: SIGNIFICANT CHANGE UP
SURGICAL PATHOLOGY STUDY: SIGNIFICANT CHANGE UP
WBC # BLD: 12.48 K/UL — HIGH (ref 3.8–10.5)
WBC # FLD AUTO: 12.48 K/UL — HIGH (ref 3.8–10.5)

## 2020-12-21 PROCEDURE — 71045 X-RAY EXAM CHEST 1 VIEW: CPT | Mod: 26

## 2020-12-21 PROCEDURE — 95720 EEG PHY/QHP EA INCR W/VEEG: CPT

## 2020-12-21 PROCEDURE — 99233 SBSQ HOSP IP/OBS HIGH 50: CPT

## 2020-12-21 PROCEDURE — 99231 SBSQ HOSP IP/OBS SF/LOW 25: CPT

## 2020-12-21 PROCEDURE — 99291 CRITICAL CARE FIRST HOUR: CPT

## 2020-12-21 RX ORDER — LABETALOL HCL 100 MG
10 TABLET ORAL EVERY 4 HOURS
Refills: 0 | Status: DISCONTINUED | OUTPATIENT
Start: 2020-12-21 | End: 2020-12-21

## 2020-12-21 RX ORDER — SODIUM CHLORIDE 9 MG/ML
1000 INJECTION INTRAMUSCULAR; INTRAVENOUS; SUBCUTANEOUS ONCE
Refills: 0 | Status: COMPLETED | OUTPATIENT
Start: 2020-12-21 | End: 2020-12-21

## 2020-12-21 RX ORDER — LABETALOL HCL 100 MG
5 TABLET ORAL EVERY 4 HOURS
Refills: 0 | Status: DISCONTINUED | OUTPATIENT
Start: 2020-12-21 | End: 2020-12-23

## 2020-12-21 RX ORDER — MAGNESIUM SULFATE 500 MG/ML
2 VIAL (ML) INJECTION ONCE
Refills: 0 | Status: COMPLETED | OUTPATIENT
Start: 2020-12-21 | End: 2020-12-21

## 2020-12-21 RX ORDER — POTASSIUM CHLORIDE 20 MEQ
20 PACKET (EA) ORAL
Refills: 0 | Status: COMPLETED | OUTPATIENT
Start: 2020-12-21 | End: 2020-12-21

## 2020-12-21 RX ADMIN — Medication 2: at 21:46

## 2020-12-21 RX ADMIN — Medication 3 MILLILITER(S): at 06:12

## 2020-12-21 RX ADMIN — Medication 10 MILLIGRAM(S): at 09:10

## 2020-12-21 RX ADMIN — Medication 3 MILLILITER(S): at 15:43

## 2020-12-21 RX ADMIN — Medication 5 MILLIGRAM(S): at 12:00

## 2020-12-21 RX ADMIN — ATORVASTATIN CALCIUM 40 MILLIGRAM(S): 80 TABLET, FILM COATED ORAL at 21:05

## 2020-12-21 RX ADMIN — LACOSAMIDE 150 MILLIGRAM(S): 50 TABLET ORAL at 18:25

## 2020-12-21 RX ADMIN — Medication 50 MILLIEQUIVALENT(S): at 05:14

## 2020-12-21 RX ADMIN — PREGABALIN 1000 MICROGRAM(S): 225 CAPSULE ORAL at 11:44

## 2020-12-21 RX ADMIN — SODIUM CHLORIDE 2 GRAM(S): 9 INJECTION INTRAMUSCULAR; INTRAVENOUS; SUBCUTANEOUS at 05:51

## 2020-12-21 RX ADMIN — Medication 50 MILLIEQUIVALENT(S): at 03:15

## 2020-12-21 RX ADMIN — Medication 50 MILLIEQUIVALENT(S): at 01:11

## 2020-12-21 RX ADMIN — MONTELUKAST 10 MILLIGRAM(S): 4 TABLET, CHEWABLE ORAL at 11:44

## 2020-12-21 RX ADMIN — FLUDROCORTISONE ACETATE 0.2 MILLIGRAM(S): 0.1 TABLET ORAL at 11:44

## 2020-12-21 RX ADMIN — MILRINONE LACTATE 7.49 MICROGRAM(S)/KG/MIN: 1 INJECTION, SOLUTION INTRAVENOUS at 01:13

## 2020-12-21 RX ADMIN — LACOSAMIDE 150 MILLIGRAM(S): 50 TABLET ORAL at 06:11

## 2020-12-21 RX ADMIN — Medication 3 MILLILITER(S): at 23:39

## 2020-12-21 RX ADMIN — ENOXAPARIN SODIUM 40 MILLIGRAM(S): 100 INJECTION SUBCUTANEOUS at 21:05

## 2020-12-21 RX ADMIN — SODIUM CHLORIDE 2 GRAM(S): 9 INJECTION INTRAMUSCULAR; INTRAVENOUS; SUBCUTANEOUS at 18:25

## 2020-12-21 RX ADMIN — CHLORHEXIDINE GLUCONATE 1 APPLICATION(S): 213 SOLUTION TOPICAL at 06:06

## 2020-12-21 RX ADMIN — SODIUM CHLORIDE 333.33 MILLILITER(S): 9 INJECTION INTRAMUSCULAR; INTRAVENOUS; SUBCUTANEOUS at 12:57

## 2020-12-21 RX ADMIN — Medication 325 MILLIGRAM(S): at 11:44

## 2020-12-21 RX ADMIN — Medication 50 GRAM(S): at 07:51

## 2020-12-21 RX ADMIN — Medication 3 MILLILITER(S): at 09:39

## 2020-12-21 RX ADMIN — Medication 5 MILLIGRAM(S): at 14:39

## 2020-12-21 NOTE — PROGRESS NOTE ADULT - ASSESSMENT
75F w/PMHx COPD, Asthma, HTN, HLD a/w SAH w/IVH and obstructive hydrocephalus s/p EVD followed by cerebral angiogram for coil embolization of L pcomm aneurysm on 12/10. 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 Watch out for tachyarrhythmias.   - Recommend daily CXRs to monitor for pulm edema. has a new RLL pleural effusion, suspicious for an underlying infiltrate. Patient is Net positive today, repeated lung checks, can consider 20 mg IV lasix if patient has increased O2 requirements.   - Will also get a repeat TTE prior to DC to assess changes in LV function  - c.w lipitor 40 mg for cerebral artery plaque  - Recommend starting Toprol XL 25 po q24 and Losartan 25 mg po q24 prior to discharge, once clinically, neurologically and hemodynamically stable for GDMT  - Will require ischemic HAZEL with CCTA prior to DC.   - Replete electrolytes to maintain K>4, Mg>2  - Incentive Spirometry. Encourage patient to get out of bed when patient has a mental status and safe  - Please call us back once she gets her repeat TTE for Discharge and or OP ischemic HAZEL recs    Plan d/w primary team after rounds with cardiology attending  Will sign off at this time as there no active cardiac issues that are being managed by the cardiology consult service.   Thank you for allowing to participate in the care of this patient    DELORES Lance  Cardiology Fellow, PGY 7

## 2020-12-21 NOTE — PROGRESS NOTE ADULT - SUBJECTIVE AND OBJECTIVE BOX
Neuroendovascular Note    Patient has no major complaints today. Stable neurological exam. She is awake and alert, oriented to person and place. No events overnight.

## 2020-12-21 NOTE — PROGRESS NOTE ADULT - SUBJECTIVE AND OBJECTIVE BOX
=================================================   NEUROCRITICAL CARE ATTENDING NOTE (2020)  =================================================    CARA CANCHOLA   MRN-[id number]  Summary:  75y/F with COPD, asthma, dyslipidemia Hypertension found down.  Brought to [facility] where imaging showed SAH basilar cisterns with IVH and obstructive hydrocephalus L Pcomm aneurysm.  Given levetiracetam, hydromorphone.  NIHSS 2 HH3 MF4, transferred to [facility] for further management.      Past Medical History: Hyperlipidemia Hypertension COPD (chronic obstructive pulmonary disease) Asthma  Allergies:  No Known Allergies  Home meds:   ·	famotidine 20 mg oral tablet: 1 tab(s) orally once a day  ·	lisinopril 2.5 mg oral tablet: 1 tab(s) orally once a day  ·	montelukast 10 mg oral tablet: 1 tab(s) orally once a day  ·	predniSONE 50 mg oral tablet: 1 tab(s) orally once a day  ·	ProAir HFA CFC free 90 mcg/inh inhalation aerosol: 2 puff(s) inhaled 4 times a day PRN  ·	simvastatin 20 mg oral tablet: 1 tab(s) orally once a day (at bedtime)    COURSE IN THE HOSPITAL:   admitted to [facility], EVD inserted; given 20 lasix for pulmo edema, T inversion on CT  12/10 No significant events overnight.    No significant events overnight.    No significant events overnight.    No significant events overnight. drain stopped working at 730 a.m., off levophed    No significant events overnight.   12/15 Noted confusion, CTA mild spasm   lethargic, angio - no severe spasm, given IA verapamil, underwent femoral endarterectomy with repair with patch angioplasty   improved post verapamil, transitioned to milrinone, currently tolerating extubation  : BD#10, POD#8 s/p coil/embo of L pcomm aneurysm, POD#2 s/p IA verapamil, POD#2 R groin cutdown for removal of proglide catheter w/ repair of femoral artery.   : neurological improvement, stable hemodynamic status  : BD#12, POD#9, EVD raise to 5 cm          NEUROLOGIC EXAMINATION:  oriented to self in AM, CLEOPATRA, resolved left gaze, no facial droop, intermittent follows 1st order commands X 4 extremities  EENT:  anicteric  CARDIOVASCULAR: (+) S1 S2, soft JAMIE  PULMONARY: clear to auscultation bilaterally, slight expiratory wheezes  ABDOMEN: soft, nontender with normoactive bowel sounds  EXTREMITIES: no edema, pulses present by Dopplers  SKIN: no rash      CSF studies:  EE/16:   1)	Rare right frontal lateralized rhythmic delta activity (LRDA) for very brief duration, suggestive of right frontal cortical hyper-excitability.   2)	Frequent right frontal sharp waves, as well as rare left frontoparietal and left temporal sharp waves, suggestive of focal cortical hyper-excitability in these regions.  Neuroimagin/16 XA:  complete and persistent left Pcomm aneurysm occlusion with microcoils; mild vasospasm in right A1 and mild vasospasm in left MAGGIE.  Intra arterial verapamil infusion (10 mg right CCA and 15 mg left CCA)  12/15 CTA head:  Multifocal moderate stenosis the right PCA. Mild stenosis of the left PCA which were present on prior CTA head and neck 2020 and therefore may reflect intracranial atherosclerosis rather than vasospasm.  New mild stenosis of the mid right M1 segment.  Interval increase in size of the lateral and third ventricles since prior CT head 2020.  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  12/10 CT head:  EVD placement, stable   CTA:  L pcomm aneurysm, L ICA (distal cavernous) aneurysm vs infundibulum, extensive calcified plaque cavernous bilateral ICA with mild to mod stenosis; thick plaque bilateral carotid bifurcations - mod to severe R and mild to mod L stenosis, focal calcified plaque R VA off subclavian artery - high grate stenosis / partial occlusion, upper lung bilateral opacification - pulmo edema, chronic deformity R humeral neck   CT head:  SAH, basilar cisterns, IV extension, obstructive hydrocephalus SVID, lacunar infarcts R caudate, both thalami, cerebellar hemispheres, no CT evidence of territorial infarction  Other imagin/19 CXR: Discoid change right lung base. Chronic interstitial changes.   CXR: improve right basilar opacity/pleural effusion   LE Doppler: NEG   CT abd:  small paraesophageal hiatal hernia, gastritis; ?impaction, septal thickening bilateral lower lobes ?pulmo edema, hepatic steatosis    IV FLUIDS: 3% NaCl 15 mL/h  DRIPS:  DIET: CCD  Lines: R IJ  Drains:     EVD at 5cm H20   EVD at 5cm H20 ICP < 10  12/15 EVD at 5cm H20 ICP 1-5, CSF 2-8 mL/h   EVD at 0cm H20 ICP 2-7 CSF 4-20 mL/h   EVD at 0cm H20 ICP 1-6 CSF 4-20 mL/h   EVD at 0cm H20 ICP 1-10 CSF 7-18 mL/h   EVD at 0cm H20 ICP 1-10 CSF 3-14 mL/h   EVD at 0cm H20 ICP < 10, CSF 90/180 mL (will be raised to 5 cm) =================================================   NEUROCRITICAL CARE ATTENDING NOTE  =================================================    CARA CANCHOLA   MRN-[id number]  Summary:  75y/F with COPD, asthma, dyslipidemia Hypertension found down.  Brought to [facility] where imaging showed SAH basilar cisterns with IVH and obstructive hydrocephalus L Pcomm aneurysm.  Given levetiracetam, hydromorphone.  NIHSS 2 HH3 MF4, transferred to [facility] for further management.      Past Medical History: Hyperlipidemia Hypertension COPD (chronic obstructive pulmonary disease) Asthma  Allergies:  No Known Allergies  Home meds:   ·	famotidine 20 mg oral tablet: 1 tab(s) orally once a day  ·	lisinopril 2.5 mg oral tablet: 1 tab(s) orally once a day  ·	montelukast 10 mg oral tablet: 1 tab(s) orally once a day  ·	predniSONE 50 mg oral tablet: 1 tab(s) orally once a day  ·	ProAir HFA CFC free 90 mcg/inh inhalation aerosol: 2 puff(s) inhaled 4 times a day PRN  ·	simvastatin 20 mg oral tablet: 1 tab(s) orally once a day (at bedtime)    COURSE IN THE HOSPITAL:   admitted to [facility], EVD inserted; given 20 lasix for pulmo edema, T inversion on CT  12/10 No significant events overnight.    No significant events overnight.    No significant events overnight.    No significant events overnight. drain stopped working at 730 a.m., off levophed    No significant events overnight.   12/15 Noted confusion, CTA mild spasm   lethargic, angio - no severe spasm, given IA verapamil, underwent femoral endarterectomy with repair with patch angioplasty   improved post verapamil, transitioned to milrinone, currently tolerating extubation  : BD#10, POD#8 s/p coil/embo of L pcomm aneurysm, POD#2 s/p IA verapamil, POD#2 R groin cutdown for removal of proglide catheter w/ repair of femoral artery.   : neurological improvement, stable hemodynamic status  : BD#12, POD#9, EVD raise to 5 cm    24h events: no acute events, but fluctuating level of alerness this morniong.  SBP in 190's given labetalol    EVD at 10  CSF output    NEUROLOGIC EXAMINATION:      MSE: Very somnolent, oriented to person and place with multiple choice, minimally verbal but langueg grossly intact.    CN: pupils 3mmg b/l reactive, EOMI, face symmetric  motor: does not participate in exam but moves b/l arms antigravity, and legs withdraws in plane of bed.     EENT:  anicteric  CARDIOVASCULAR: (+) S1 S2, soft JAMIE  PULMONARY: clear to auscultation bilaterally, slight expiratory wheezes  ABDOMEN: soft, nontender with normoactive bowel sounds  EXTREMITIES: no edema, pulses present by Dopplers  SKIN: no rash    Allergies: No Known Allergies      EXAM:  VITALS:  T(C): 36.9 (20 @ 08:54), Max: 37.9 (20 @ 22:00)  HR: 83 (20 @ 11:00) (75 - 91)  BP: 179/86 (20 @ 11:00) (112/56 - 196/91)  RR: 27 (20 @ 11:00) (14 - 33)  SpO2: 97% (20 @ 11:00) (92% - 97%)    MEDICATIONS:  acetaminophen    Suspension .. 650 milliGRAM(s) Oral every 6 hours PRN  albuterol/ipratropium for Nebulization. 3 milliLiter(s) Nebulizer every 6 hours  atorvastatin 40 milliGRAM(s) Oral at bedtime  bisacodyl Suppository 10 milliGRAM(s) Rectal daily  chlorhexidine 2% Cloths 1 Application(s) Topical <User Schedule>  cyanocobalamin 1000 MICROGram(s) Oral daily  dextrose 40% Gel 15 Gram(s) Oral once  dextrose 50% Injectable 25 Gram(s) IV Push once  enoxaparin Injectable 40 milliGRAM(s) SubCutaneous every 24 hours  ferrous    sulfate 325 milliGRAM(s) Oral daily  fludroCORTISONE 0.2 milliGRAM(s) Oral every 24 hours  glucagon  Injectable 1 milliGRAM(s) IntraMuscular once  insulin lispro (ADMELOG) corrective regimen sliding scale   SubCutaneous Before meals and at bedtime  labetalol Injectable 10 milliGRAM(s) IV Push every 4 hours PRN  lacosamide 150 milliGRAM(s) Oral two times a day  methylphenidate 5 milliGRAM(s) Oral every 24 hours  milrinone Infusion 0.5 MICROgram(s)/kG/Min IV Continuous <Continuous>  0.5mg/hr  montelukast 10 milliGRAM(s) Oral daily  propranolol 5 milliGRAM(s) Oral every 8 hours  sodium chloride 2 Gram(s) Oral every 12 hours  sodium chloride 0.9%. 1000 milliLiter(s) IV Continuous <Continuous>  sodium chloride 3%. 500 milliLiter(s) IV Continuous <Continuous> 30cc/hr      I/O's    20 @ 07:01  -  20 @ 07:00  --------------------------------------------------------  IN: 2603.7 mL / OUT: 2190 mL / NET: 413.7 mL    20 @ 07:01  -  20 @ 12:08  --------------------------------------------------------  IN: 290 mL / OUT: 1170 mL / NET: -880 mL      LABS:                          10.2   12.48 )-----------( 324      ( 21 Dec 2020 04:45 )             32.3         135  |  104  |  9   ----------------------------<  147<H>  4.1   |  22  |  0.50    Ca    8.2<L>      21 Dec 2020 04:45  Phos  2.5       Mg     1.8                   CAPILLARY BLOOD GLUCOSE      POCT Blood Glucose.: 150 mg/dL (21 Dec 2020 11:48)  POCT Blood Glucose.: 143 mg/dL (21 Dec 2020 07:56)  POCT Blood Glucose.: 146 mg/dL (20 Dec 2020 21:51)  POCT Blood Glucose.: 145 mg/dL (20 Dec 2020 16:42)                CSF studies:  EE/16:   1)	Rare right frontal lateralized rhythmic delta activity (LRDA) for very brief duration, suggestive of right frontal cortical hyper-excitability.   2)	Frequent right frontal sharp waves, as well as rare left frontoparietal and left temporal sharp waves, suggestive of focal cortical hyper-excitability in these regions.  Neuroimagin/16 XA:  complete and persistent left Pcomm aneurysm occlusion with microcoils; mild vasospasm in right A1 and mild vasospasm in left MAGGIE.  Intra arterial verapamil infusion (10 mg right CCA and 15 mg left CCA)  12/15 CTA head:  Multifocal moderate stenosis the right PCA. Mild stenosis of the left PCA which were present on prior CTA head and neck 2020 and therefore may reflect intracranial atherosclerosis rather than vasospasm.  New mild stenosis of the mid right M1 segment.  Interval increase in size of the lateral and third ventricles since prior CT head 2020.  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  12/10 CT head:  EVD placement, stable   CTA:  L pcomm aneurysm, L ICA (distal cavernous) aneurysm vs infundibulum, extensive calcified plaque cavernous bilateral ICA with mild to mod stenosis; thick plaque bilateral carotid bifurcations - mod to severe R and mild to mod L stenosis, focal calcified plaque R VA off subclavian artery - high grate stenosis / partial occlusion, upper lung bilateral opacification - pulmo edema, chronic deformity R humeral neck   CT head:  SAH, basilar cisterns, IV extension, obstructive hydrocephalus SVID, lacunar infarcts R caudate, both thalami, cerebellar hemispheres, no CT evidence of territorial infarction  Other imagin/19 CXR: Discoid change right lung base. Chronic interstitial changes.   CXR: improve right basilar opacity/pleural effusion   LE Doppler: NEG   CT abd:  small paraesophageal hiatal hernia, gastritis; ?impaction, septal thickening bilateral lower lobes ?pulmo edema, hepatic steatosis    IV FLUIDS: 3% NaCl 15 mL/h  DRIPS:  DIET: CCD  Lines: R IJ  Drains:     EVD at 5cm H20   EVD at 5cm H20 ICP < 10  15 EVD at 5cm H20 ICP 1-5, CSF 2-8 mL/h  16 EVD at 0cm H20 ICP 2-7 CSF 4-20 mL/h  17 EVD at 0cm H20 ICP 1-6 CSF 4-20 mL/h  18 EVD at 0cm H20 ICP 1-10 CSF 7-18 mL/h   EVD at 0cm H20 ICP 1-10 CSF 3-14 mL/h  /20 EVD at 0cm H20 ICP < 10, CSF 90/180 mL (will be raised to 5 cm)

## 2020-12-21 NOTE — PROGRESS NOTE ADULT - ATTENDING COMMENTS
Attending Attestation:    I have personally seen, examined, and participated in the care of this patient.  I have reviewed all pertinent clinical information, including history, physical exam, plan and the Fellow’s note and agree except as noted.  - no events on telemetry, HR controlled  - permissive HTN per Neuro  - please call after repeat TTE  I was physically present for the key portions of the evaluation and management (E/M) service provided.  I agree with the above history, physical, and plan which I have reviewed and edited where appropriate.     35 minutes spent on total encounter; more than 50% of the visit was spent counseling and/or coordinating care by the attending physician.     Plan discussed with primary team.

## 2020-12-21 NOTE — PROGRESS NOTE ADULT - ASSESSMENT
75y/Female with:  aneursymal subarachnoid hemorrhage, L pcomm aneurysm, L parophthalmic aneurysm; brain compression, cerebral edema  osbtructive hydrocephalus  lacunar infarcts R caudate, B thalami, cerebellar hemispheres ?artery to artery vs cardioembolic?  atherosclerotic cardiovascular disease; mod to severe bilateral ICA stenosis (R>L), R VA stenosis  Hypertension dyslipidemia   COPD asthma  newly diagnosed Diabetes Mellitus?  troponin leak    PLAN: Day 1 = 12-09 ; Day 4 =  12/12; Day 21 = 12/29  NEURO: neurochecks q1h, PRN pain meds with Tylenol / Percocet  Clinical improvement with IA verapamil, now transitioned to IV milrinone (slow taper over next week)  neurostimulant ritalin 5 mg daily  SAH:  nimodipine discontinued due to pressor requirements  Hydrocephalus:  EVD 5 cm H20 open to drain  Seizure (LRDA/sharp waves):  lacosamide 200 mg load, 100 mg --> 150 mg BID  REHAB:  physical therapy evaluation and management    EARLY MOB:  HOB elevated    PULM:  Room air, incentive spirometry, continue montelukast, ipratropium / albuterol PRN   CARDIO:  SBP goal 100-180mm Hg (currently autoregulates to sBP 170-180's-VSP), EF 35%, repeat TTE as per Cardiology (once outside VSP window, will start MTP/ARB) - tolerating low dose propranolol  ENDO:  Blood sugar goals 140-180 mg/dL, cont insulin sliding scale, atorvastatin 40mg   GI:  PPI for GI prophylaxis (home meds); bowel regimen  DIET: decrease feeding rate to limit fluid overload  RENAL: 3% NaCl 30 mL/h (stable Na 133) [VSP-natriuresis]  HEM/ONC: Hb stable, iron profile c/w iron deficiency, iron FeSO4 325 TID  VTE Prophylaxis: SCDs, SQL  ID: febrile, leukocytosis, zosyn end date 12.23.2020 (resolved RLL pneumonia)  Social: updated family    *****    CORE MEASURES  1.  Nath and Jacobo Score = 3  2.  VTE prophylaxis:  [ ] administered within 24 hours of admission OR [X] reason not done: fresh bleed, unsecured aneurysm.  3.  Dysphagia screening:   [X] performed before any oral meds / liquids / food  4.  Nimodipine treatment:  [X] administered within 24 hours of admission OR [ ] reason not done:    *****    ATTENDING ATTESTATION:  I was physically present for the key portions of the evaluation and management (E/M) service provided.  I agree with the above history, physical and plan, which I have reviewed and edited where appropriate.    Patient at high risk for neurological deterioration or death due to:  ICU delirium, aspiration PNA, DVT / PE.  Critical care time:  I have personally provided 45 minutes of critical care time, excluding time spent on separate procedures.      Plan discussed with RN, house staff.   75y/Female with:  aneursymal subarachnoid hemorrhage, L pcomm aneurysm, L parophthalmic aneurysm; brain compression, cerebral edema  osbtructive hydrocephalus  lacunar infarcts R caudate, B thalami, cerebellar hemispheres ?artery to artery vs cardioembolic?  atherosclerotic cardiovascular disease; mod to severe bilateral ICA stenosis (R>L), R VA stenosis  Hypertension dyslipidemia   COPD asthma  newly diagnosed Diabetes Mellitus?  troponin leak    BLEED DAY 13  PLAN: Day 1 = 12-09 ; Day 4 =  12/12; Day 21 = 12/29  NEURO:1) SAH   neurochecks q1h, PRN pain meds with Tylenol / Percocet  neurostimulant ritalin 5 mg daily  nSAH:  nimodipine discontinued due to pressor requirements  Hydrocephalus:  EVD 10 cm H20 open to drain  SBP <200, liberalized, autoregulation  LAbetalol 5mg only for sbp>200  -if not improved by later this afternoon with BP improved, then repeat CTA-perfusion  IV Milrinone for ? 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%  SBP goal 100-200mm Hg (currently autoregulates to sBP 170-180's-VSP), EF 35%, repeat TTE as per Cardiology (once outside VSP window, will start MTP/ARB) - tolerating low dose propranolol      ENDO:  Blood sugar goals 140-180 mg/dL, cont insulin sliding scale, atorvastatin 40mg   GI:  PPI for GI prophylaxis (home meds); bowel regimen  DIET: decrease feeding rate to limit fluid overload    RENAL: 3% NaCl 30 mL/h (stable Na 133) [VSP-natriuresis]    HEM/ONC: Hb stable, iron profile c/w iron deficiency, iron FeSO4 325 TID  superficial intramuscular LE clot --> indication for full a/c will defer until EVD is out, d/w NSGy  VTE Prophylaxis: SCDs, SQL  -will start ASA for high grade stenosis b/l carotids    ID: febrile, leukocytosis, zosyn end date 12.23.2020 (resolved RLL pneumonia)    Social: updated family

## 2020-12-21 NOTE — CHART NOTE - NSCHARTNOTEFT_GEN_A_CORE
Admitting Diagnosis:   Patient is a 75y old  Female who presents with a chief complaint of SAH (21 Dec 2020 06:47)    PAST MEDICAL & SURGICAL HISTORY:  Hyperlipidemia    Hypertension    COPD (chronic obstructive pulmonary disease)    Asthma    No significant past surgical history    Current Nutrition Order:  NPO with TF via NGT  Glucerna 1.2 goal 15mL/hr x24h      PO Intake: Good (%) [   ]  Fair (50-75%) [   ] Poor (<25%) [   ]-N/A    GI Issues: no vomiting noted, unable to assess for nausea 2/2 clinical status, +watery BM this AM (rectal tube placed 12/21)    Pain: none apparent at this time    Skin Integrity: Greg score 114; no edema noted    Labs:   12-21    135  |  104  |  9   ----------------------------<  147<H>  4.1   |  22  |  0.50    Ca    8.2<L>      21 Dec 2020 04:45  Phos  2.5     12-21  Mg     1.8     12-21    CAPILLARY BLOOD GLUCOSE  POCT Blood Glucose.: 143 mg/dL (21 Dec 2020 07:56)  POCT Blood Glucose.: 146 mg/dL (20 Dec 2020 21:51)  POCT Blood Glucose.: 145 mg/dL (20 Dec 2020 16:42)  POCT Blood Glucose.: 142 mg/dL (20 Dec 2020 11:14)    Medications:  MEDICATIONS  (STANDING):  albuterol/ipratropium for Nebulization. 3 milliLiter(s) Nebulizer every 6 hours  atorvastatin 40 milliGRAM(s) Oral at bedtime  bisacodyl Suppository 10 milliGRAM(s) Rectal daily  chlorhexidine 2% Cloths 1 Application(s) Topical <User Schedule>  cyanocobalamin 1000 MICROGram(s) Oral daily  dextrose 40% Gel 15 Gram(s) Oral once  dextrose 50% Injectable 25 Gram(s) IV Push once  enoxaparin Injectable 40 milliGRAM(s) SubCutaneous every 24 hours  ferrous    sulfate 325 milliGRAM(s) Oral daily  fludroCORTISONE 0.2 milliGRAM(s) Oral every 24 hours  glucagon  Injectable 1 milliGRAM(s) IntraMuscular once  insulin lispro (ADMELOG) corrective regimen sliding scale   SubCutaneous Before meals and at bedtime  lacosamide 150 milliGRAM(s) Oral two times a day  methylphenidate 5 milliGRAM(s) Oral every 24 hours  milrinone Infusion 0.5 MICROgram(s)/kG/Min (7.49 mL/Hr) IV Continuous <Continuous>  montelukast 10 milliGRAM(s) Oral daily  propranolol 5 milliGRAM(s) Oral every 8 hours  sodium chloride 2 Gram(s) Oral every 12 hours  sodium chloride 0.9%. 1000 milliLiter(s) (20 mL/Hr) IV Continuous <Continuous>  sodium chloride 3%. 500 milliLiter(s) (30 mL/Hr) IV Continuous <Continuous>    MEDICATIONS  (PRN):  acetaminophen    Suspension .. 650 milliGRAM(s) Oral every 6 hours PRN Temp greater or equal to 38C (100.4F), Mild Pain (1 - 3)  labetalol Injectable 10 milliGRAM(s) IV Push every 4 hours PRN Systolic blood pressure > 180    Weight:  49.9kg (12/9)    Weight Change: no updated weights, please obtain current weight and trend bi-weekly weights    Nutrition Focused Physical Exam: Completed [   ]  Not Pertinent [ X ]    Anthropometric Measurements:    Weight Assessment:  · Height for BMI (FEET)	4 Feet  · Height for BMI (INCHES)	11 Inch(s)  · Height for BMI (CENTIMETERS)	149.86 Centimeter(s)  · Weight for BMI (lbs)	110 lb  · Weight for BMI (kg)	49.9 kg  · Body Mass Index	22.2  · Ideal Body Weight (lbs)	98  · Ideal Body Weight (kg)	44.4    Nutrition Prescription:   Estimated Energy Needs:  ABW used to calculate energy needs due to pt's current body weight within % IBW (112%). Needs adjusted for age, post-op. Fluid needs per team.  · Weight (lbs)	110 lb  · Weight (kg)	49.8 kg  · Enter From (mulu/kg)	25  · Enter To (mulu/kg)	30  · Calculated From (mulu/kg)	1245  · Calculated To (mulu/kg)	1494     Estimated Protein Needs:  · Weight (lbs)	110 lb  · Weight (kg)	49.8 kg  · Enter From (g/kg)	1.2  · Enter To (g/kg)	1.4  · Calculated From (g/kg)	59.76  · Calculated To (g/kg)	69.72    Subjective: 74 yo Female with PMHx of COPD, asthma, HLD, HTN, BIBEMS to [facility] from home after HHA found patient down on the floor covered in non-bloody vomitus. CTH reveals diffuse subarachnoid hemorrhage mainly at basilar cisterns with IVH and obstructive hydrocephalus, CTA shows 3mm left pcomm aneurysm, now s/p bedside right frontal EVD placement 12/10, s/p cerebral angiogram for coil embolization of left PCOMM aneurysm 12/10, now POD #5 s/p cerebral angio for verapamil c/b device malfunction of Proglide, s/p right groin cutdown for removal of proglide catheter and femoral artery repair (12/17/2020), NIHSS2 HH3 MF4).     Pt seen resting in bed, satting 95-97% on room air. Infusions running: 3% at 30mL/hr, Milrinone. Mean . Pt ordered for salt tabs, Na goal WNL, current level=135 (12/21). NGT placed for medications and TF on 12/16 2/2 failed RN dysphagia screen. SLP eval failed on 12/20. TF changed from Jevity 1.2 to Glucerna 1.2 on 12/18. TF currently running at 15mL/hr, pt noted to have liquid BM. Please see below for full nutritional recommendations- d/w team, placed pending order for verification. RD to monitor and f/u per protocol.    Previous Nutrition Diagnosis:  Food & Nutrition Related Knowledge Deficit RT organ dysfunction AEB possibly new dx diabetes, A1c 6.7%  Active [   ]  Resolved [   ]-N/A    PES: Inadequate energy intake RT inability to safely swallow with current mental status AEB pt failed RN dysphagia screen 12/15- ACTIVE    Goal: Meet >/=75%EER via most appropriate route with good tolerance    Recommendations:  1. As medically appropriate, recommend change TF regimen to Jevity 1.2 start at 20mL/hr increase 10-20mL q4h or as tolerated to goal of 50mL/hr x24h via NGT (1200mL TV, 1440 kcal, 66gm protein, 968mL free water, 120% RDI, ~29 kcal/kg ABW, ~1.3gm protein/kg ABW)  >>water flushes per team  >>continue order for volume based feeding protocol, monitor tolerance, maintain aspiration precautions  2. Monitor labs (BMP, lytes, [id number]; replete lytes prn  3. Obtain current weight and trend bi-weekly weights  4. BG control/insulin regimen per team  5. If diarrhea continues, recommend r/o cdiff and/or add Metamucil/probiotic  6. Repeat SLP eval as appropriate    Education: N/A 2/2 clinical status    Risk Level: High [ X ] Moderate [   ] Low [   ] Admitting Diagnosis:   Patient is a 75y old  Female who presents with a chief complaint of SAH (21 Dec 2020 06:47)    PAST MEDICAL & SURGICAL HISTORY:  Hyperlipidemia    Hypertension    COPD (chronic obstructive pulmonary disease)    Asthma    No significant past surgical history    Current Nutrition Order:  NPO with TF via NGT  Glucerna 1.2 goal 15mL/hr x24h (360mL TV, 432 kcal, 21gm protein, 289mL free water, 28% RDI)     PO Intake: Good (%) [   ]  Fair (50-75%) [   ] Poor (<25%) [   ]-N/A    GI Issues: no vomiting noted, unable to assess for nausea 2/2 clinical status, +watery BM this AM (rectal tube placed 12/21)    Pain: none apparent at this time    Skin Integrity: Greg score 114; no edema noted    Labs:   12-21    135  |  104  |  9   ----------------------------<  147<H>  4.1   |  22  |  0.50    Ca    8.2<L>      21 Dec 2020 04:45  Phos  2.5     12-21  Mg     1.8     12-21    CAPILLARY BLOOD GLUCOSE  POCT Blood Glucose.: 143 mg/dL (21 Dec 2020 07:56)  POCT Blood Glucose.: 146 mg/dL (20 Dec 2020 21:51)  POCT Blood Glucose.: 145 mg/dL (20 Dec 2020 16:42)  POCT Blood Glucose.: 142 mg/dL (20 Dec 2020 11:14)    Medications:  MEDICATIONS  (STANDING):  albuterol/ipratropium for Nebulization. 3 milliLiter(s) Nebulizer every 6 hours  atorvastatin 40 milliGRAM(s) Oral at bedtime  bisacodyl Suppository 10 milliGRAM(s) Rectal daily  chlorhexidine 2% Cloths 1 Application(s) Topical <User Schedule>  cyanocobalamin 1000 MICROGram(s) Oral daily  dextrose 40% Gel 15 Gram(s) Oral once  dextrose 50% Injectable 25 Gram(s) IV Push once  enoxaparin Injectable 40 milliGRAM(s) SubCutaneous every 24 hours  ferrous    sulfate 325 milliGRAM(s) Oral daily  fludroCORTISONE 0.2 milliGRAM(s) Oral every 24 hours  glucagon  Injectable 1 milliGRAM(s) IntraMuscular once  insulin lispro (ADMELOG) corrective regimen sliding scale   SubCutaneous Before meals and at bedtime  lacosamide 150 milliGRAM(s) Oral two times a day  methylphenidate 5 milliGRAM(s) Oral every 24 hours  milrinone Infusion 0.5 MICROgram(s)/kG/Min (7.49 mL/Hr) IV Continuous <Continuous>  montelukast 10 milliGRAM(s) Oral daily  propranolol 5 milliGRAM(s) Oral every 8 hours  sodium chloride 2 Gram(s) Oral every 12 hours  sodium chloride 0.9%. 1000 milliLiter(s) (20 mL/Hr) IV Continuous <Continuous>  sodium chloride 3%. 500 milliLiter(s) (30 mL/Hr) IV Continuous <Continuous>    MEDICATIONS  (PRN):  acetaminophen    Suspension .. 650 milliGRAM(s) Oral every 6 hours PRN Temp greater or equal to 38C (100.4F), Mild Pain (1 - 3)  labetalol Injectable 10 milliGRAM(s) IV Push every 4 hours PRN Systolic blood pressure > 180    Weight:  49.9kg (12/9)    Weight Change: no updated weights, please obtain current weight and trend bi-weekly weights    Nutrition Focused Physical Exam: Completed [   ]  Not Pertinent [ X ]    Anthropometric Measurements:    Weight Assessment:  · Height for BMI (FEET)	4 Feet  · Height for BMI (INCHES)	11 Inch(s)  · Height for BMI (CENTIMETERS)	149.86 Centimeter(s)  · Weight for BMI (lbs)	110 lb  · Weight for BMI (kg)	49.9 kg  · Body Mass Index	22.2  · Ideal Body Weight (lbs)	98  · Ideal Body Weight (kg)	44.4    Nutrition Prescription:   Estimated Energy Needs:  ABW used to calculate energy needs due to pt's current body weight within % IBW (112%). Needs adjusted for age, post-op. Fluid needs per team.  · Weight (lbs)	110 lb  · Weight (kg)	49.8 kg  · Enter From (mulu/kg)	25  · Enter To (mulu/kg)	30  · Calculated From (mulu/kg)	1245  · Calculated To (mulu/kg)	1494     Estimated Protein Needs:  · Weight (lbs)	110 lb  · Weight (kg)	49.8 kg  · Enter From (g/kg)	1.2  · Enter To (g/kg)	1.4  · Calculated From (g/kg)	59.76  · Calculated To (g/kg)	69.72    Subjective: 76 yo Female with PMHx of COPD, asthma, HLD, HTN, BIBEMS to [facility] from home after HHA found patient down on the floor covered in non-bloody vomitus. CTH reveals diffuse subarachnoid hemorrhage mainly at basilar cisterns with IVH and obstructive hydrocephalus, CTA shows 3mm left pcomm aneurysm, now s/p bedside right frontal EVD placement 12/10, s/p cerebral angiogram for coil embolization of left PCOMM aneurysm 12/10, now POD #5 s/p cerebral angio for verapamil c/b device malfunction of Proglide, s/p right groin cutdown for removal of proglide catheter and femoral artery repair (12/17/2020), NIHSS2 HH3 MF4).     Pt seen resting in bed, satting 95-97% on room air. Infusions running: 3% at 30mL/hr, Milrinone. Mean . Pt ordered for salt tabs, Na goal WNL, current level=135 (12/21). NGT placed for medications and TF on 12/16 2/2 failed RN dysphagia screen. SLP eval failed on 12/20. TF changed from Jevity 1.2 to Glucerna 1.2 on 12/18. TF currently running at 15mL/hr, pt noted to have liquid BM. Please see below for full nutritional recommendations- d/w team, placed pending order for verification. RD to monitor and f/u per protocol.    Previous Nutrition Diagnosis:  Food & Nutrition Related Knowledge Deficit RT organ dysfunction AEB possibly new dx diabetes, A1c 6.7%  Active [   ]  Resolved [   ]-N/A    PES: Inadequate energy intake RT inability to safely swallow with current mental status AEB pt failed RN dysphagia screen 12/15- ACTIVE    Goal: Meet >/=75%EER via most appropriate route with good tolerance    Recommendations:  1. As medically appropriate, recommend change TF regimen to Jevity 1.2 start at 20mL/hr increase 10-20mL q4h or as tolerated to goal of 50mL/hr x24h via NGT (1200mL TV, 1440 kcal, 66gm protein, 968mL free water, 120% RDI, ~29 kcal/kg ABW, ~1.3gm protein/kg ABW)  >>water flushes per team  >>continue order for volume based feeding protocol, monitor tolerance, maintain aspiration precautions  2. Monitor labs (BMP, lytes, [id number]; replete lytes prn  3. Obtain current weight and trend bi-weekly weights  4. BG control/insulin regimen per team  5. If diarrhea continues, recommend r/o cdiff and/or add Metamucil/probiotic  6. Repeat SLP eval as appropriate    Education: N/A 2/2 clinical status    Risk Level: High [ X ] Moderate [   ] Low [   ] Admitting Diagnosis:   Patient is a 75y old  Female who presents with a chief complaint of SAH (21 Dec 2020 06:47)    PAST MEDICAL & SURGICAL HISTORY:  Hyperlipidemia    Hypertension    COPD (chronic obstructive pulmonary disease)    Asthma    No significant past surgical history    Current Nutrition Order:  NPO with TF via NGT  Glucerna 1.2 goal 15mL/hr x24h (360mL TV, 432 kcal, 21gm protein, 289mL free water, 28% RDI)     PO Intake: Good (%) [   ]  Fair (50-75%) [   ] Poor (<25%) [   ]-N/A    GI Issues: no vomiting noted, unable to assess for nausea 2/2 clinical status, +watery BM this AM (rectal tube placed 12/20 per RN)    Pain: none apparent at this time    Skin Integrity: Greg score 114; no edema noted    Labs:   12-21    135  |  104  |  9   ----------------------------<  147<H>  4.1   |  22  |  0.50    Ca    8.2<L>      21 Dec 2020 04:45  Phos  2.5     12-21  Mg     1.8     12-21    CAPILLARY BLOOD GLUCOSE  POCT Blood Glucose.: 143 mg/dL (21 Dec 2020 07:56)  POCT Blood Glucose.: 146 mg/dL (20 Dec 2020 21:51)  POCT Blood Glucose.: 145 mg/dL (20 Dec 2020 16:42)  POCT Blood Glucose.: 142 mg/dL (20 Dec 2020 11:14)    Medications:  MEDICATIONS  (STANDING):  albuterol/ipratropium for Nebulization. 3 milliLiter(s) Nebulizer every 6 hours  atorvastatin 40 milliGRAM(s) Oral at bedtime  bisacodyl Suppository 10 milliGRAM(s) Rectal daily  chlorhexidine 2% Cloths 1 Application(s) Topical <User Schedule>  cyanocobalamin 1000 MICROGram(s) Oral daily  dextrose 40% Gel 15 Gram(s) Oral once  dextrose 50% Injectable 25 Gram(s) IV Push once  enoxaparin Injectable 40 milliGRAM(s) SubCutaneous every 24 hours  ferrous    sulfate 325 milliGRAM(s) Oral daily  fludroCORTISONE 0.2 milliGRAM(s) Oral every 24 hours  glucagon  Injectable 1 milliGRAM(s) IntraMuscular once  insulin lispro (ADMELOG) corrective regimen sliding scale   SubCutaneous Before meals and at bedtime  lacosamide 150 milliGRAM(s) Oral two times a day  methylphenidate 5 milliGRAM(s) Oral every 24 hours  milrinone Infusion 0.5 MICROgram(s)/kG/Min (7.49 mL/Hr) IV Continuous <Continuous>  montelukast 10 milliGRAM(s) Oral daily  propranolol 5 milliGRAM(s) Oral every 8 hours  sodium chloride 2 Gram(s) Oral every 12 hours  sodium chloride 0.9%. 1000 milliLiter(s) (20 mL/Hr) IV Continuous <Continuous>  sodium chloride 3%. 500 milliLiter(s) (30 mL/Hr) IV Continuous <Continuous>    MEDICATIONS  (PRN):  acetaminophen    Suspension .. 650 milliGRAM(s) Oral every 6 hours PRN Temp greater or equal to 38C (100.4F), Mild Pain (1 - 3)  labetalol Injectable 10 milliGRAM(s) IV Push every 4 hours PRN Systolic blood pressure > 180    Weight:  49.9kg (12/9)    Weight Change: no updated weights, please obtain current weight and trend bi-weekly weights    Nutrition Focused Physical Exam: Completed [   ]  Not Pertinent [ X ]    Anthropometric Measurements:    Weight Assessment:  · Height for BMI (FEET)	4 Feet  · Height for BMI (INCHES)	11 Inch(s)  · Height for BMI (CENTIMETERS)	149.86 Centimeter(s)  · Weight for BMI (lbs)	110 lb  · Weight for BMI (kg)	49.9 kg  · Body Mass Index	22.2  · Ideal Body Weight (lbs)	98  · Ideal Body Weight (kg)	44.4    Nutrition Prescription:   Estimated Energy Needs:  ABW used to calculate energy needs due to pt's current body weight within % IBW (112%). Needs adjusted for age, post-op. Fluid needs per team.  · Weight (lbs)	110 lb  · Weight (kg)	49.8 kg  · Enter From (mulu/kg)	25  · Enter To (mulu/kg)	30  · Calculated From (mulu/kg)	1245  · Calculated To (mulu/kg)	1494     Estimated Protein Needs:  · Weight (lbs)	110 lb  · Weight (kg)	49.8 kg  · Enter From (g/kg)	1.2  · Enter To (g/kg)	1.4  · Calculated From (g/kg)	59.76  · Calculated To (g/kg)	69.72    Subjective: 74 yo Female with PMHx of COPD, asthma, HLD, HTN, BIBEMS to [facility] from home after HHA found patient down on the floor covered in non-bloody vomitus. CTH reveals diffuse subarachnoid hemorrhage mainly at basilar cisterns with IVH and obstructive hydrocephalus, CTA shows 3mm left pcomm aneurysm, now s/p bedside right frontal EVD placement 12/10, s/p cerebral angiogram for coil embolization of left PCOMM aneurysm 12/10, now POD #5 s/p cerebral angio for verapamil c/b device malfunction of Proglide, s/p right groin cutdown for removal of proglide catheter and femoral artery repair (12/17/2020), NIHSS2 HH3 MF4).     Pt seen resting in bed, satting 95-97% on room air. Infusions running: 3% at 30mL/hr, Milrinone. Mean . Pt ordered for salt tabs, Na goal WNL, current level=135 (12/21). NGT placed for medications and TF on 12/16 2/2 failed RN dysphagia screen. SLP eval failed on 12/20. TF changed from Jevity 1.2 to Glucerna 1.2 on 12/18. TF currently running at 15mL/hr, pt noted to have liquid BM. Please see below for full nutritional recommendations- d/w team, placed pending order for verification. RD to monitor and f/u per protocol.    Previous Nutrition Diagnosis:  Food & Nutrition Related Knowledge Deficit RT organ dysfunction AEB possibly new dx diabetes, A1c 6.7%  Active [   ]  Resolved [   ]-N/A    PES: Inadequate energy intake RT inability to safely swallow with current mental status AEB pt failed RN dysphagia screen 12/15- ACTIVE    Goal: Meet >/=75%EER via most appropriate route with good tolerance    Recommendations:  1. As medically appropriate, recommend change TF regimen to Jevity 1.2 start at 20mL/hr increase 10-20mL q4h or as tolerated to goal of 50mL/hr x24h via NGT (1200mL TV, 1440 kcal, 66gm protein, 968mL free water, 120% RDI, ~29 kcal/kg ABW, ~1.3gm protein/kg ABW)  >>water flushes per team  >>continue order for volume based feeding protocol, monitor tolerance, maintain aspiration precautions  2. Monitor labs (BMP, lytes, [id number]; replete lytes prn  3. Obtain current weight and trend bi-weekly weights  4. BG control/insulin regimen per team  5. If diarrhea continues, recommend r/o cdiff and/or add Metamucil/probiotic  6. Repeat SLP eval as appropriate    Education: N/A 2/2 clinical status    Risk Level: High [ X ] Moderate [   ] Low [   ]

## 2020-12-21 NOTE — PROGRESS NOTE ADULT - SUBJECTIVE AND OBJECTIVE BOX
Pt seen and examined at bedside with chief resident, no acute event overnight, haemodynamically stable.    MEDICATIONS  (STANDING):  albuterol/ipratropium for Nebulization. 3 milliLiter(s) Nebulizer every 6 hours  atorvastatin 40 milliGRAM(s) Oral at bedtime  bisacodyl Suppository 10 milliGRAM(s) Rectal daily  chlorhexidine 2% Cloths 1 Application(s) Topical <User Schedule>  cyanocobalamin 1000 MICROGram(s) Oral daily  dextrose 40% Gel 15 Gram(s) Oral once  dextrose 50% Injectable 25 Gram(s) IV Push once  enoxaparin Injectable 40 milliGRAM(s) SubCutaneous every 24 hours  ferrous    sulfate 325 milliGRAM(s) Oral daily  fludroCORTISONE 0.2 milliGRAM(s) Oral every 24 hours  glucagon  Injectable 1 milliGRAM(s) IntraMuscular once  insulin lispro (ADMELOG) corrective regimen sliding scale   SubCutaneous Before meals and at bedtime  lacosamide 150 milliGRAM(s) Oral two times a day  methylphenidate 5 milliGRAM(s) Oral every 24 hours  milrinone Infusion 0.5 MICROgram(s)/kG/Min (7.49 mL/Hr) IV Continuous <Continuous>  montelukast 10 milliGRAM(s) Oral daily  propranolol 5 milliGRAM(s) Oral every 8 hours  sodium chloride 2 Gram(s) Oral every 12 hours  sodium chloride 0.9%. 1000 milliLiter(s) (20 mL/Hr) IV Continuous <Continuous>  sodium chloride 3%. 500 milliLiter(s) (30 mL/Hr) IV Continuous <Continuous>    MEDICATIONS  (PRN):  acetaminophen    Suspension .. 650 milliGRAM(s) Oral every 6 hours PRN Temp greater or equal to 38C (100.4F), Mild Pain (1 - 3)  labetalol Injectable 10 milliGRAM(s) IV Push every 4 hours PRN Systolic blood pressure > 180    ICU Vital Signs Last 24 Hrs  T(C): 36.9 (21 Dec 2020 08:54), Max: 37.9 (20 Dec 2020 22:00)  T(F): 98.5 (21 Dec 2020 08:54), Max: 100.3 (20 Dec 2020 22:00)  HR: 84 (21 Dec 2020 09:00) (81 - 91)  BP: 192/88 (21 Dec 2020 09:00) (112/56 - 196/91)  BP(mean): 127 (21 Dec 2020 09:00) (80 - 131)  ABP: --  ABP(mean): --  RR: 25 (21 Dec 2020 09:00) (14 - 33)  SpO2: 95% (21 Dec 2020 09:00) (92% - 100%)  I&O's Detail    20 Dec 2020 07:01  -  21 Dec 2020 07:00  --------------------------------------------------------  IN:    Enteral Tube Flush: 120 mL    Glucerna: 360 mL    IV PiggyBack: 650 mL    Milrinone: 178.7 mL    sodium chloride 0.9%: 480 mL    Sodium Chloride 0.9% Bolus: 125 mL    sodium chloride 3%: 30 mL    sodium chloride 3%: 660 mL  Total IN: 2603.7 mL    OUT:    External Ventricular Device (mL): 170 mL    Indwelling Catheter - Urethral (mL): 2020 mL  Total OUT: 2190 mL    Total NET: 413.7 mL      21 Dec 2020 07:01  -  21 Dec 2020 09:13  --------------------------------------------------------  IN:    Glucerna: 30 mL    Milrinone: 15 mL    sodium chloride 0.9%: 40 mL    sodium chloride 3%: 60 mL  Total IN: 145 mL    OUT:    External Ventricular Device (mL): 16 mL    Indwelling Catheter - Urethral (mL): 725 mL  Total OUT: 741 mL    Total NET: -596 mL          Physical Exam:  General: NAD, resting comfortably in bed  Pulmonary: Nonlabored breathing, no respiratory distress  Cardiovascular: rrr  Abdominal: soft, NT, ND, positive BS  Extremities: right groin clean, dry, intact, soft. No haematoma or erythema.   Vascular: Triphasic right DP/PT      LABS:                                           10.2   12.48 )-----------( 324      ( 21 Dec 2020 04:45 )             32.3   12-21    135  |  104  |  9   ----------------------------<  147<H>  4.1   |  22  |  0.50    Ca    8.2<L>      21 Dec 2020 04:45  Phos  2.5     12-21  Mg     1.8     12-21

## 2020-12-21 NOTE — PROGRESS NOTE ADULT - ATTENDING COMMENTS
ATTENDING ATTESTATION:  I was physically present for the key portions of the evaluation and management (E/M) service provided.  I agree with the above history, physical and plan, which I have reviewed and edited where appropriate.  Patient at high risk for neurological deterioration or death due to:  subarachnoid hemorrhage, vasospasm , cerebral edema    Critical care time:  I have personally provided  50  minutes of critical care time, excluding time spent on separately-billable procedures.      Plan discussed with RN, PA team.

## 2020-12-21 NOTE — PROGRESS NOTE ADULT - SUBJECTIVE AND OBJECTIVE BOX
Cardiology fellow Progress note    Subjective/Interval events: No acute events, patient tolerating High dose milrinone    ROS: A 10-point review of systems was otherwise negative.      ICU Vital Signs Last 24 Hrs  T(C): 36.8 (21 Dec 2020 14:09), Max: 37.9 (20 Dec 2020 22:00)  T(F): 98.2 (21 Dec 2020 14:09), Max: 100.3 (20 Dec 2020 22:00)  HR: 74 (21 Dec 2020 16:00) (74 - 91)  BP: 157/73 (21 Dec 2020 16:00) (112/56 - 201/91)  BP(mean): 105 (21 Dec 2020 16:00) (80 - 135)    RR: 26 (21 Dec 2020 16:00) (14 - 33)  SpO2: 97% (21 Dec 2020 16:00) (92% - 98%)        GEN: lethargic, But awake today, participating minimally in physical exam  HEENT: Mucosa moist. No JVD.   RESP: No crackles, b/l expiratory wheezing, Decreased BS on the right lower lung base.   CV: tachycardic normal s1/s2. No m/r/g.  ABD: Soft, NTND. BS+  EXT: Warm. No edema, PP 2+    I&O's Summary    20 Dec 2020 07:01  -  21 Dec 2020 07:00  --------------------------------------------------------  IN: 2603.7 mL / OUT: 2190 mL / NET: 413.7 mL    21 Dec 2020 07:01  -  21 Dec 2020 16:49  --------------------------------------------------------  IN: 792.5 mL / OUT: 1878 mL / NET: -1085.5 mL        Cardiac Diagnostics  TELEMETRY: No acute tele events    ECG(12/14/20): NSR, LVH. Diffuse TWI (Wellen's sign) improving since 12/12.   	  TTE 12/10: mod LVSD (EF 35%) w/akinesis of mid-myocardial and apical segments and preserved contractility in basal segments, consistent w/Takotsubo stress CM    CXR(12/18): New RLL pleural Effusion, may have hidden infiltrate.

## 2020-12-21 NOTE — EEG REPORT - NS EEG TEXT BOX
EEG Report  Day 5 12/20/2020- 12/21/2020     Medication: Vimpat 150 mg BID    Background:  continuous, with predominantly alpha/theta > delta frequency activity.  Symmetry:  Frequent (10-49%) right frontotemporal polymorphic theta/delta frequency slowing, at times sharply contoured.  Posterior Dominant Rhythm: well-regulated 8-9 Hz.  Organization: Normal.  Voltage:  Normal (20+ uV)  Variability: Yes. 		Reactivity: Yes.  N2 sleep: Rudimentary but likely N2 transients present.  Spontaneous Activity:  Rare right frontotemporal sharp waves.     Periodic/rhythmic activity:  None  Events:  No electrographic seizures or significant clinical events.  Provocations:  Hyperventilation and Photic stimulation: was not performed.    Daily Summary:  1)	Rare right frontotemporal sharp waves, indicating increased epileptic potential in this region.  2)	Frequent right frontotemporal focal slowing, indicating focal cerebral dysfunction.  3)	Mild-to-moderate generalized background slowing suggestive of a similar degree of diffuse or multifocal  dysfunction.     Lacey Rahman MD  Clinical Neurophysiology Fellow    Simona Gan MD  Attending Neurologist, [facility]

## 2020-12-21 NOTE — PROGRESS NOTE ADULT - SUBJECTIVE AND OBJECTIVE BOX
HPI:  74y/o F with PMHx sig for COPD, asthma, HLD, HTN, BIBEMS to [facility] from home after HHA discovered patient found down in floor covered in non-bloody vomitus. Perr HHA Pt went to the bathroom and was taking a long time, went in to check on her and found her sitting on floor, awake, however with vomitus on front of shirt. On arrival to [facility], patient was taken for stat head CT, which revealed diffuse subarachnoid hemorrhage mainly at basilar cisterns with IVH and obstructive hydrocephalus. Patient was given a bolus of 1g keppra and dilaudid pushes for generalized headache. CTA concerning for 3mm left pcomm aneurysm and a 1.6mm outpouching of distal cavernous segment of the left internal carotid artery likely aneurysm. NIHSS2 HH3 MF4. Patient was transferred to [facility] for further intervention. Patient currently reports headaches. Pt denies acute changes in vision, seizures, CP, SOB, weakness/paresthesias of arms or legs. (09 Dec 2020 23:37)        Hospital Course:   12/9: BD1 Patient admitted for SAH HH3, MF4.   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 EVD at 5, on Levo overnight, nimodipine discontinued d/t hypotension  12/12: BD4 POD#2, VIVIEN overnight, neuro stable, passed TOV  12/13: BD5 POD#3: VIVIEN x 24 hrs  12/14: BD6 POD#4. VIVIEN o/n, neuro stable. EVD at 5cm H2O  12/15: BD7 POD #5 VIVIEN overnight, neuro stable. EVD remains at 5. Plan for CTA today.   12/16: BD8 POD #6 VIVIEN overnight, neuro stable, EVD at 0, on Levo, started on 3% at 15 overnight   12/17: BD9 POD#1 s/p cerebral angio for verapamil c/b device malfunction of Proglide, s/p right groin cutdown for removal of proglide catheter and femoral artery repair.  VIVIEN overnight, neuro stable, EVD at 0. patient remained intubated overnight, on propofol for sedation, and vEEG  12/18: BD#10, POD#8 s/p coil/embo of L pcomm aneurysm, POD#2 s/p IA verapamil, POD#2 R groin cutdown for removal of proglide catheter w/ repair of femoral artery. VIVIEN overnight. EVD remains open @0cmH2O   12/19: BD#11, POD#9 s/p coil/embo of L pcomm aneurysm. POD#3 s/p IA verapamil, POD#3 s/p R femoral artery cutdown of proglide catheter w/ femoral artery repair. VIVIEN overnight. EVD remains open @0cmH2O. EEG shows b/l frontal sharp discharges, cont monitoring, vimpat was increased yesterday. Gentle hydration, total IVF 50cc/hr. Cont vanc/zosyn for empiric coverage, next vanc trough due @1pm on 12/19. F/u daily CXR.   12/20 BD#12 POD#10 VIVIEN overnight, Neuro exam stable, EVD @ 0cm H2O, vEEG, 3% @ 15 goal WNL, TF via NGT  12/21: BD13, POD11 VIVIEN overnight. EVD at 5cmH20 (raised yesterday), vEEG in place      Vital Signs Last 24 Hrs  T(C): 36.9 (21 Dec 2020 00:36), Max: 37.9 (20 Dec 2020 22:00)  T(F): 98.5 (21 Dec 2020 00:36), Max: 100.3 (20 Dec 2020 22:00)  HR: 87 (21 Dec 2020 03:00) (74 - 91)  BP: 166/75 (21 Dec 2020 03:00) (112/56 - 194/90)  BP(mean): 108 (21 Dec 2020 03:00) (80 - 131)  RR: 20 (21 Dec 2020 03:00) (14 - 33)  SpO2: 96% (21 Dec 2020 03:00) (92% - 100%)    I&O's Detail    19 Dec 2020 07:01  -  20 Dec 2020 07:00  --------------------------------------------------------  IN:    Glucerna: 415 mL    IV PiggyBack: 799.8 mL    Milrinone: 177.6 mL    sodium chloride 0.9%: 90 mL    sodium chloride 0.9%: 260 mL    sodium chloride 3%: 315 mL  Total IN: 2057.4 mL    OUT:    External Ventricular Device (mL): 243 mL    Indwelling Catheter - Urethral (mL): 2785 mL  Total OUT: 3028 mL    Total NET: -970.6 mL      20 Dec 2020 07:01  -  21 Dec 2020 03:56  --------------------------------------------------------  IN:    Enteral Tube Flush: 120 mL    Glucerna: 300 mL    IV PiggyBack: 500 mL    Milrinone: 148.7 mL    sodium chloride 0.9%: 400 mL    Sodium Chloride 0.9% Bolus: 125 mL    sodium chloride 3%: 540 mL    sodium chloride 3%: 30 mL  Total IN: 2163.7 mL    OUT:    External Ventricular Device (mL): 151 mL    Indwelling Catheter - Urethral (mL): 1555 mL  Total OUT: 1706 mL    Total NET: 457.7 mL        I&O's Summary    19 Dec 2020 07:01  -  20 Dec 2020 07:00  --------------------------------------------------------  IN: 2057.4 mL / OUT: 3028 mL / NET: -970.6 mL    20 Dec 2020 07:01  -  21 Dec 2020 03:56  --------------------------------------------------------  IN: 2163.7 mL / OUT: 1706 mL / NET: 457.7 mL        PHYSICAL EXAM:  General: laying in hospital bed NAD, A&O x1-2, on RA, off sedation   HEENT: PERRL 3mm, EOMI b/l, face symmetric, tongue midline, + hard of hearing   Cardiovascular: RRR, normal S1 and S2   Respiratory: lungs CTAB, no wheezing, rhonchi, or crackles, extubated  GI: normoactive BS to auscultation, abd soft, NTND   Neuro: no aphasia, speech clear, no dysmetria, no pronator drift  ZAFAR x4 spontaneously and with good strength   EVD site: C/D/I   R groin site: no evidence of hematoma, no ecchymosis or edema   Extremities: b/l PT pulses detected w/ doppler, not palpable       TUBES/LINES:  [] CVC  [] A-line  [] Lumbar Drain  [] Ventriculostomy  [] ADIN  [] De Anda  [] NGT   [] Other    DIET:  [] NPO  [] Mechanical  [] Tube feeds    LABS:                        10.5   13.95 )-----------( 300      ( 20 Dec 2020 04:47 )             32.6     12-21    136  |  104  |  10  ----------------------------<  157<H>  3.1<L>   |  21<L>  |  0.49<L>    Ca    8.2<L>      21 Dec 2020 00:02  Phos  3.0     12-20  Mg     2.1     12-20              CAPILLARY BLOOD GLUCOSE      POCT Blood Glucose.: 146 mg/dL (20 Dec 2020 21:51)  POCT Blood Glucose.: 145 mg/dL (20 Dec 2020 16:42)  POCT Blood Glucose.: 142 mg/dL (20 Dec 2020 11:14)  POCT Blood Glucose.: 133 mg/dL (20 Dec 2020 06:56)      Drug Levels: [] N/A  Vancomycin Level, Trough: <4.0 ug/mL (12-18 @ 13:37)    CSF Analysis: [] N/A      Allergies    No Known Allergies    Intolerances        Home Medications:  famotidine 20 mg oral tablet: 1 tab(s) orally once a day (10 Dec 2020 00:38)  lisinopril 2.5 mg oral tablet: 1 tab(s) orally once a day (10 Dec 2020 00:38)  montelukast 10 mg oral tablet: 1 tab(s) orally once a day (10 Dec 2020 00:38)  omeprazole 20 mg oral delayed release capsule: 1 cap(s) orally once a day (12 Dec 2020 16:14)  simvastatin 20 mg oral tablet: 1 tab(s) orally once a day (at bedtime) (10 Dec 2020 00:38)      MEDICATIONS:  Antibiotics:    Neuro:  acetaminophen    Suspension .. 650 milliGRAM(s) Oral every 6 hours PRN  lacosamide 150 milliGRAM(s) Oral two times a day  methylphenidate 5 milliGRAM(s) Oral every 24 hours    Anticoagulation:  enoxaparin Injectable 40 milliGRAM(s) SubCutaneous every 24 hours    OTHER:  albuterol/ipratropium for Nebulization. 3 milliLiter(s) Nebulizer every 6 hours  atorvastatin 40 milliGRAM(s) Oral at bedtime  bisacodyl Suppository 10 milliGRAM(s) Rectal daily  chlorhexidine 2% Cloths 1 Application(s) Topical <User Schedule>  dextrose 40% Gel 15 Gram(s) Oral once  dextrose 50% Injectable 25 Gram(s) IV Push once  fludroCORTISONE 0.2 milliGRAM(s) Oral every 24 hours  glucagon  Injectable 1 milliGRAM(s) IntraMuscular once  insulin lispro (ADMELOG) corrective regimen sliding scale   SubCutaneous Before meals and at bedtime  labetalol Injectable 10 milliGRAM(s) IV Push every 4 hours PRN  milrinone Infusion 0.5 MICROgram(s)/kG/Min IV Continuous <Continuous>  montelukast 10 milliGRAM(s) Oral daily  polyethylene glycol 3350 17 Gram(s) Oral daily  propranolol 5 milliGRAM(s) Oral every 8 hours  senna 2 Tablet(s) Oral at bedtime    IVF:  cyanocobalamin 1000 MICROGram(s) Oral daily  ferrous    sulfate 325 milliGRAM(s) Oral daily  potassium chloride  20 mEq/100 mL IVPB 20 milliEquivalent(s) IV Intermittent every 2 hours  sodium chloride 2 Gram(s) Oral every 12 hours  sodium chloride 0.9%. 1000 milliLiter(s) IV Continuous <Continuous>  sodium chloride 3%. 500 milliLiter(s) IV Continuous <Continuous>    CULTURES:  Culture Results:   No growth to date (12-16 @ 12:10)  Culture Results:   No growth at 5 days. (12-16 @ 01:18)    RADIOLOGY & ADDITIONAL TESTS:      ASSESSMENT:  74 yo Female with PMHx of COPD, asthma, HLD, HTN, BIBEMS to [facility] from home after HHA found patient down on the floor covered in non-bloody vomitus. CTH reveals diffuse subarachnoid hemorrhage mainly at basilar cisterns with IVH and obstructive hydrocephalus, CTA shows 3mm left pcomm aneurysm, now s/p bedside right frontal EVD placement 12/10, s/p cerebral angiogram for coil embolization of left PCOMM aneurysm 12/10, now POD #5 s/p cerebral angio for verapamil c/b device malfunction of Proglide, s/p right groin cutdown for removal of proglide catheter and femoral artery repair (12/17/2020), NIHSS2 HH3 MF4).       HEADACHE    Handoff    Hyperlipidemia    Hypertension    COPD (chronic obstructive pulmonary disease)    Asthma    COPD (chronic obstructive pulmonary disease)    Asthma    Injury of right femoral artery    Cerebral artery vasospasm    SAH (subarachnoid hemorrhage)    Injury of femoral artery    Cerebral artery vasospasm    SAH (subarachnoid hemorrhage)    Subarachnoid bleed    Vascular surgery procedure    Angiogram, carotid and cerebral, bilateral    Angiogram, cerebral, with intracranial aneurysm embolization    No significant past surgical history    HEADACHE    90+    [email or web address]        PLAN:  NEURO:  - neuro checks  - vitals checks  - pain control  - EVD at 5, monitor ICP and output  - cont Vimpat dose, vEEG  - Hold Nimodipine for hypotension  - propofol for sedation  - R groin checks   - cont milrinone and amantadine     CARDIOVASCULAR:  - -180  - cardiology following for takotusbo: TTE before discharge, daily EKG, ischemic work up with CCTA or LHC before discharge     PULMONARY:  - on room air, no issues    RENAL:  - 3% @30 cc/hr, Goal WNL  - de anda  - electrolyte repletion PRN     GI:  - cont NGT feeds, pend S&S re-eval   - bowel regimen    HEME:  - cont IV iron per Dr. Reddy   - monitor H/H    ID:  - afebrile  -abx completed     ENDO:  - hgb A1c 6.7   - switched tube feeds to glucerna from jevity   - cont ISS     DVT PROPHYLAXIS:  [x] Venodynes                                [x] Heparin/Lovenox      DISPOSITION: ICU status, full code, dispo pend     Assessment and plan discussed w/ Dr. Serrano    Assessment:  Present when checked    []  GCS  E   V  M     Heart Failure: []Acute, [] acute on chronic , []chronic  Heart Failure:  [] Diastolic (HFpEF), [] Systolic (HFrEF), []Combined (HFpEF and HFrEF), [] RHF, [] Pulm HTN, [] Other    [] MICHAELLE, [] ATN, [] AIN, [] other  [] CKD1, [] CKD2, [] CKD 3, [] CKD 4, [] CKD 5, []ESRD    Encephalopathy: [] Metabolic, [] Hepatic, [] toxic, [] Neurological, [] Other    Abnormal Nurtitional Status: [] malnurtition (see nutrition note), [ ]underweight: BMI < 19, [] morbid obesity: BMI >40, [] Cachexia    [] Sepsis  [] hypovolemic shock,[] cardiogenic shock, [] hemorrhagic shock, [] neuogenic shock  [] Acute Respiratory Failure  []Cerebral edema, [] Brain compression/ herniation,   [] Functional quadriplegia  [] Acute blood loss anemia

## 2020-12-21 NOTE — PROGRESS NOTE ADULT - ASSESSMENT
76 yo F s/p endovascular coiling of ruptured left pcomm aneurysm PPD#11. Doing very well. No evidence of vasospasm. Patient also had paraophthalmic aneurysm on initial angiogram, unruptured.     Continue with current ICU care.    Patient will need follow up cerebral angiogram in 6 months.

## 2020-12-21 NOTE — PROGRESS NOTE ADULT - ASSESSMENT
Assessment and Plan:   · Assessment	  74yo F POD#2 s/p cerebral angio for verapamil c/b device malfunction of Proglide, s/p right groin cutdown for removal of proglide catheter and femoral artery repair on 12/16/20     -Right groin monitoring, look for worsening of erythema/signs of infection  -Regular Pulse checks RLE  - Vascular surgery will continue to follow

## 2020-12-22 LAB
ANION GAP SERPL CALC-SCNC: 11 MMOL/L — SIGNIFICANT CHANGE UP (ref 5–17)
ANION GAP SERPL CALC-SCNC: 12 MMOL/L — SIGNIFICANT CHANGE UP (ref 5–17)
BUN SERPL-MCNC: 6 MG/DL — LOW (ref 7–23)
BUN SERPL-MCNC: 8 MG/DL — SIGNIFICANT CHANGE UP (ref 7–23)
CALCIUM SERPL-MCNC: 8.3 MG/DL — LOW (ref 8.4–10.5)
CALCIUM SERPL-MCNC: 8.8 MG/DL — SIGNIFICANT CHANGE UP (ref 8.4–10.5)
CHLORIDE SERPL-SCNC: 100 MMOL/L — SIGNIFICANT CHANGE UP (ref 96–108)
CHLORIDE SERPL-SCNC: 102 MMOL/L — SIGNIFICANT CHANGE UP (ref 96–108)
CO2 SERPL-SCNC: 23 MMOL/L — SIGNIFICANT CHANGE UP (ref 22–31)
CO2 SERPL-SCNC: 23 MMOL/L — SIGNIFICANT CHANGE UP (ref 22–31)
CREAT SERPL-MCNC: 0.41 MG/DL — LOW (ref 0.5–1.3)
CREAT SERPL-MCNC: 0.41 MG/DL — LOW (ref 0.5–1.3)
GLUCOSE SERPL-MCNC: 207 MG/DL — HIGH (ref 70–99)
GLUCOSE SERPL-MCNC: 216 MG/DL — HIGH (ref 70–99)
HCT VFR BLD CALC: 32.3 % — LOW (ref 34.5–45)
HGB BLD-MCNC: 10.2 G/DL — LOW (ref 11.5–15.5)
MAGNESIUM SERPL-MCNC: 1.9 MG/DL — SIGNIFICANT CHANGE UP (ref 1.6–2.6)
MCHC RBC-ENTMCNC: 26.9 PG — LOW (ref 27–34)
MCHC RBC-ENTMCNC: 31.6 GM/DL — LOW (ref 32–36)
MCV RBC AUTO: 85.2 FL — SIGNIFICANT CHANGE UP (ref 80–100)
NRBC # BLD: 0 /100 WBCS — SIGNIFICANT CHANGE UP (ref 0–0)
PHOSPHATE SERPL-MCNC: 1.8 MG/DL — LOW (ref 2.5–4.5)
PLATELET # BLD AUTO: 337 K/UL — SIGNIFICANT CHANGE UP (ref 150–400)
POTASSIUM SERPL-MCNC: 3.2 MMOL/L — LOW (ref 3.5–5.3)
POTASSIUM SERPL-MCNC: 3.6 MMOL/L — SIGNIFICANT CHANGE UP (ref 3.5–5.3)
POTASSIUM SERPL-SCNC: 3.2 MMOL/L — LOW (ref 3.5–5.3)
POTASSIUM SERPL-SCNC: 3.6 MMOL/L — SIGNIFICANT CHANGE UP (ref 3.5–5.3)
RBC # BLD: 3.79 M/UL — LOW (ref 3.8–5.2)
RBC # FLD: 15.9 % — HIGH (ref 10.3–14.5)
SODIUM SERPL-SCNC: 135 MMOL/L — SIGNIFICANT CHANGE UP (ref 135–145)
SODIUM SERPL-SCNC: 136 MMOL/L — SIGNIFICANT CHANGE UP (ref 135–145)
WBC # BLD: 15.27 K/UL — HIGH (ref 3.8–10.5)
WBC # FLD AUTO: 15.27 K/UL — HIGH (ref 3.8–10.5)

## 2020-12-22 PROCEDURE — 70496 CT ANGIOGRAPHY HEAD: CPT | Mod: 26

## 2020-12-22 PROCEDURE — 0042T: CPT

## 2020-12-22 PROCEDURE — 70498 CT ANGIOGRAPHY NECK: CPT | Mod: 26

## 2020-12-22 PROCEDURE — 99291 CRITICAL CARE FIRST HOUR: CPT

## 2020-12-22 PROCEDURE — 71045 X-RAY EXAM CHEST 1 VIEW: CPT | Mod: 26

## 2020-12-22 PROCEDURE — 95720 EEG PHY/QHP EA INCR W/VEEG: CPT

## 2020-12-22 PROCEDURE — 99024 POSTOP FOLLOW-UP VISIT: CPT

## 2020-12-22 RX ORDER — POTASSIUM PHOSPHATE, MONOBASIC POTASSIUM PHOSPHATE, DIBASIC 236; 224 MG/ML; MG/ML
30 INJECTION, SOLUTION INTRAVENOUS ONCE
Refills: 0 | Status: COMPLETED | OUTPATIENT
Start: 2020-12-22 | End: 2020-12-22

## 2020-12-22 RX ORDER — ALBUMIN HUMAN 25 %
250 VIAL (ML) INTRAVENOUS ONCE
Refills: 0 | Status: COMPLETED | OUTPATIENT
Start: 2020-12-22 | End: 2020-12-22

## 2020-12-22 RX ORDER — LACOSAMIDE 50 MG/1
150 TABLET ORAL EVERY 12 HOURS
Refills: 0 | Status: DISCONTINUED | OUTPATIENT
Start: 2020-12-22 | End: 2020-12-28

## 2020-12-22 RX ORDER — POTASSIUM CHLORIDE 20 MEQ
20 PACKET (EA) ORAL
Refills: 0 | Status: COMPLETED | OUTPATIENT
Start: 2020-12-22 | End: 2020-12-22

## 2020-12-22 RX ORDER — SODIUM CHLORIDE 9 MG/ML
250 INJECTION INTRAMUSCULAR; INTRAVENOUS; SUBCUTANEOUS ONCE
Refills: 0 | Status: COMPLETED | OUTPATIENT
Start: 2020-12-22 | End: 2020-12-22

## 2020-12-22 RX ADMIN — Medication 3 MILLILITER(S): at 23:53

## 2020-12-22 RX ADMIN — Medication 5 MILLIGRAM(S): at 12:17

## 2020-12-22 RX ADMIN — PREGABALIN 1000 MICROGRAM(S): 225 CAPSULE ORAL at 12:16

## 2020-12-22 RX ADMIN — Medication 5 MILLIGRAM(S): at 07:13

## 2020-12-22 RX ADMIN — Medication 4: at 07:00

## 2020-12-22 RX ADMIN — MONTELUKAST 10 MILLIGRAM(S): 4 TABLET, CHEWABLE ORAL at 12:17

## 2020-12-22 RX ADMIN — LACOSAMIDE 150 MILLIGRAM(S): 50 TABLET ORAL at 05:57

## 2020-12-22 RX ADMIN — Medication 50 MILLIEQUIVALENT(S): at 12:17

## 2020-12-22 RX ADMIN — Medication 3 MILLILITER(S): at 09:54

## 2020-12-22 RX ADMIN — Medication 4: at 21:48

## 2020-12-22 RX ADMIN — ENOXAPARIN SODIUM 40 MILLIGRAM(S): 100 INJECTION SUBCUTANEOUS at 21:12

## 2020-12-22 RX ADMIN — Medication 50 MILLIEQUIVALENT(S): at 09:54

## 2020-12-22 RX ADMIN — CHLORHEXIDINE GLUCONATE 1 APPLICATION(S): 213 SOLUTION TOPICAL at 06:57

## 2020-12-22 RX ADMIN — Medication 50 MILLIEQUIVALENT(S): at 08:41

## 2020-12-22 RX ADMIN — FLUDROCORTISONE ACETATE 0.2 MILLIGRAM(S): 0.1 TABLET ORAL at 10:40

## 2020-12-22 RX ADMIN — Medication 2: at 10:41

## 2020-12-22 RX ADMIN — SODIUM CHLORIDE 250 MILLILITER(S): 9 INJECTION INTRAMUSCULAR; INTRAVENOUS; SUBCUTANEOUS at 00:28

## 2020-12-22 RX ADMIN — ATORVASTATIN CALCIUM 40 MILLIGRAM(S): 80 TABLET, FILM COATED ORAL at 21:12

## 2020-12-22 RX ADMIN — Medication 3 MILLILITER(S): at 06:39

## 2020-12-22 RX ADMIN — SODIUM CHLORIDE 2 GRAM(S): 9 INJECTION INTRAMUSCULAR; INTRAVENOUS; SUBCUTANEOUS at 17:18

## 2020-12-22 RX ADMIN — Medication 5 MILLIGRAM(S): at 23:04

## 2020-12-22 RX ADMIN — MILRINONE LACTATE 7.49 MICROGRAM(S)/KG/MIN: 1 INJECTION, SOLUTION INTRAVENOUS at 05:58

## 2020-12-22 RX ADMIN — POTASSIUM PHOSPHATE, MONOBASIC POTASSIUM PHOSPHATE, DIBASIC 83.33 MILLIMOLE(S): 236; 224 INJECTION, SOLUTION INTRAVENOUS at 09:54

## 2020-12-22 RX ADMIN — Medication 2: at 16:35

## 2020-12-22 RX ADMIN — Medication 325 MILLIGRAM(S): at 12:16

## 2020-12-22 RX ADMIN — LACOSAMIDE 150 MILLIGRAM(S): 50 TABLET ORAL at 17:18

## 2020-12-22 RX ADMIN — SODIUM CHLORIDE 30 MILLILITER(S): 5 INJECTION, SOLUTION INTRAVENOUS at 00:35

## 2020-12-22 RX ADMIN — Medication 125 MILLILITER(S): at 01:10

## 2020-12-22 RX ADMIN — SODIUM CHLORIDE 2 GRAM(S): 9 INJECTION INTRAMUSCULAR; INTRAVENOUS; SUBCUTANEOUS at 05:57

## 2020-12-22 NOTE — PROGRESS NOTE ADULT - ASSESSMENT
75y/Female with:  HH3 MF4, aneursymal subarachnoid hemorrhage, L pcomm aneurysm, L parophthalmic aneurysm; brain compression, cerebral edema  osbtructive hydrocephalus  lacunar infarcts R caudate, B thalami, cerebellar hemispheres ?artery to artery vs cardioembolic?  atherosclerotic cardiovascular disease; mod to severe bilateral ICA stenosis (R>L), R VA stenosis  Hypertension dyslipidemia   COPD asthma  newly diagnosed Diabetes Mellitus?  troponin leak    BLEED DAY 14  PLAN: Day 1 = 12-09 ; Day 4 =  12/12; Day 21 = 12/29  NEURO:1) SAH   neurochecks q1h, PRN pain meds with Tylenol / Percocet  neurostimulant ritalin 5 mg daily  nSAH:  nimodipine discontinued due to pressor requirements  Hydrocephalus:  EVD clamped --> do not pull until symptomatic spasm can be confirmed  SBP <200, liberalized, autoregulation  LAbetalol 5mg only for sbp>200  Fluctuating exam appears pressure-dependant, may be related to high grade b/l ICA stenosis plus vasospasm  --> repeat CTA with perfusion today, consider augmenting goal for over 160.  -dc propranolol  IV Milrinone for ? 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%  -repeat echo prior to discharge  SBP goal 100-200mm Hg (currently autoregulates to sBP 170-180's-VSP), EF 35%, repeat TTE as per Cardiology (once outside VSP window, will start MTP/ARB)  -hold propranolol    ENDO:  Blood sugar goals 140-180 mg/dL, cont insulin sliding scale, atorvastatin 40mg     GI:  PPI for GI prophylaxis (home meds); bowel regimen  DIET: decrease feeding rate to limit fluid overload  -rectal tube for BM's  -abdomen slightly distended    RENAL: 3% NaCl at 30  -hold NS    HEM/ONC: Hb stable, iron profile c/w iron deficiency, iron FeSO4 325 TID  superficial intramuscular LE clot --> indication for full a/c will defer until EVD is out, d/w NSGy  VTE Prophylaxis: SCDs, SQL  -will start ASA for high grade stenosis b/l carotids once EVD out    ID: afebrile, leukocytosis   s/p course zosyn end date 12.21.2020 (resolved RLL pneumonia)    Social: updated family

## 2020-12-22 NOTE — PROGRESS NOTE ADULT - SUBJECTIVE AND OBJECTIVE BOX
=================================================   NEUROCRITICAL CARE ATTENDING NOTE  =================================================    CARA CANCHOLA   MRN-[id number]  Summary:  75y/F with COPD, asthma, dyslipidemia Hypertension found down.  Brought to [facility] where imaging showed SAH basilar cisterns with IVH and obstructive hydrocephalus L Pcomm aneurysm.  Given levetiracetam, hydromorphone.  NIHSS 2 HH3 MF4, transferred to [facility] for further management.      Past Medical History: Hyperlipidemia Hypertension COPD (chronic obstructive pulmonary disease) Asthma  Allergies:  No Known Allergies  Home meds:   ·	famotidine 20 mg oral tablet: 1 tab(s) orally once a day  ·	lisinopril 2.5 mg oral tablet: 1 tab(s) orally once a day  ·	montelukast 10 mg oral tablet: 1 tab(s) orally once a day  ·	predniSONE 50 mg oral tablet: 1 tab(s) orally once a day  ·	ProAir HFA CFC free 90 mcg/inh inhalation aerosol: 2 puff(s) inhaled 4 times a day PRN  ·	simvastatin 20 mg oral tablet: 1 tab(s) orally once a day (at bedtime)    COURSE IN THE HOSPITAL:   admitted to [facility], EVD inserted; given 20 lasix for pulmo edema, T inversion on CT  12/10 No significant events overnight.    No significant events overnight.    No significant events overnight.    No significant events overnight. drain stopped working at 730 a.m., off levophed    No significant events overnight.   12/15 Noted confusion, CTA mild spasm   lethargic, angio - no severe spasm, given IA verapamil, underwent femoral endarterectomy with repair with patch angioplasty   improved post verapamil, transitioned to milrinone, currently tolerating extubation  : BD#10, POD#8 s/p coil/embo of L pcomm aneurysm, POD#2 s/p IA verapamil, POD#2 R groin cutdown for removal of proglide catheter w/ repair of femoral artery.   : neurological improvement, stable hemodynamic status  : BD#12, POD#9, EVD raise to 5 cm  : no acute events, but fluctuating level of alerness this morniong.  SBP in 190's given labetalol    24h events: EVD clamped this morning.      EVD at clamp  ICPs 1-6    NEUROLOGIC EXAMINATION:      MSE: Very somnolent, not speaking much today - says few words, not orientation questions - fluctuating exam appears worse when below 's  CN: pupils 3mmg b/l reactive, EOMI, face symmetric  motor: arms grossly antigravity, full  strength    EENT:  anicteric  CARDIOVASCULAR: (+) S1 S2, soft JAMIE  PULMONARY: clear to auscultation bilaterally, slight expiratory wheezes  ABDOMEN: soft, nontender with normoactive bowel sounds  EXTREMITIES: no edema, pulses present by Dopplers  SKIN: no rash      Allergies: No Known Allergies      EXAM:  VITALS:  T(C): 37.2 (20 @ 08:56), Max: 37.5 (20 @ 01:00)  HR: 86 (20 @ 10:00) (73 - 87)  BP: 192/84 (20 @ 10:00) (115/59 - 208/88)  RR: 29 (20 @ 10:00) (24 - 33)  SpO2: 100% (20 @ 10:00) (94% - 100%)    MEDICATIONS:  acetaminophen    Suspension .. 650 milliGRAM(s) Oral every 6 hours PRN  albuterol/ipratropium for Nebulization. 3 milliLiter(s) Nebulizer every 6 hours  atorvastatin 40 milliGRAM(s) Oral at bedtime  bisacodyl Suppository 10 milliGRAM(s) Rectal daily  chlorhexidine 2% Cloths 1 Application(s) Topical <User Schedule>  cyanocobalamin 1000 MICROGram(s) Oral daily  dextrose 40% Gel 15 Gram(s) Oral once  dextrose 50% Injectable 25 Gram(s) IV Push once  enoxaparin Injectable 40 milliGRAM(s) SubCutaneous every 24 hours  ferrous    sulfate 325 milliGRAM(s) Oral daily  fludroCORTISONE 0.2 milliGRAM(s) Oral every 24 hours  glucagon  Injectable 1 milliGRAM(s) IntraMuscular once  insulin lispro (ADMELOG) corrective regimen sliding scale   SubCutaneous Before meals and at bedtime  labetalol Injectable 5 milliGRAM(s) IV Push every 4 hours PRN  lacosamide 150 milliGRAM(s) Oral two times a day  methylphenidate 5 milliGRAM(s) Oral every 24 hours  milrinone Infusion 0.5 MICROgram(s)/kG/Min IV Continuous <Continuous>  montelukast 10 milliGRAM(s) Oral daily  potassium chloride  20 mEq/100 mL IVPB 20 milliEquivalent(s) IV Intermittent every 2 hours  propranolol 5 milliGRAM(s) Oral every 8 hours  sodium chloride 2 Gram(s) Oral every 12 hours  sodium chloride 0.9%. 1000 milliLiter(s) IV Continuous <Continuous>  sodium chloride 3%. 500 milliLiter(s) IV Continuous <Continuous>      I/O's    20 @ 07:01  -  20 @ 07:00  --------------------------------------------------------  IN: 2985 mL / OUT: 3339 mL / NET: -354 mL    20 @ 07:01  -  20 @ 10:45  --------------------------------------------------------  IN: 322.5 mL / OUT: 253 mL / NET: 69.5 mL        LABS:                        10.2   15.27 )-----------( 337      ( 22 Dec 2020 05:57 )             32.3         136  |  102  |  8   ----------------------------<  216<H>  3.2<L>   |  23  |  0.41<L>    Ca    8.3<L>      22 Dec 2020 05:57  Phos  1.8     12-22  Mg     1.9     -              CAPILLARY BLOOD GLUCOSE      POCT Blood Glucose.: 164 mg/dL (22 Dec 2020 10:24)  POCT Blood Glucose.: 210 mg/dL (22 Dec 2020 06:51)  POCT Blood Glucose.: 159 mg/dL (21 Dec 2020 21:35)  POCT Blood Glucose.: 144 mg/dL (21 Dec 2020 18:08)  POCT Blood Glucose.: 150 mg/dL (21 Dec 2020 11:48)              CSF studies:  EE/16:   1)	Rare right frontal lateralized rhythmic delta activity (LRDA) for very brief duration, suggestive of right frontal cortical hyper-excitability.   2)	Frequent right frontal sharp waves, as well as rare left frontoparietal and left temporal sharp waves, suggestive of focal cortical hyper-excitability in these regions.  Neuroimagin/16 XA:  complete and persistent left Pcomm aneurysm occlusion with microcoils; mild vasospasm in right A1 and mild vasospasm in left MAGGIE.  Intra arterial verapamil infusion (10 mg right CCA and 15 mg left CCA)  12/15 CTA head:  Multifocal moderate stenosis the right PCA. Mild stenosis of the left PCA which were present on prior CTA head and neck 2020 and therefore may reflect intracranial atherosclerosis rather than vasospasm.  New mild stenosis of the mid right M1 segment.  Interval increase in size of the lateral and third ventricles since prior CT head 2020.  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  12/10 CT head:  EVD placement, stable   CTA:  L pcomm aneurysm, L ICA (distal cavernous) aneurysm vs infundibulum, extensive calcified plaque cavernous bilateral ICA with mild to mod stenosis; thick plaque bilateral carotid bifurcations - mod to severe R and mild to mod L stenosis, focal calcified plaque R VA off subclavian artery - high grate stenosis / partial occlusion, upper lung bilateral opacification - pulmo edema, chronic deformity R humeral neck   CT head:  SAH, basilar cisterns, IV extension, obstructive hydrocephalus SVID, lacunar infarcts R caudate, both thalami, cerebellar hemispheres, no CT evidence of territorial infarction  Other imagin/19 CXR: Discoid change right lung base. Chronic interstitial changes.   CXR: improve right basilar opacity/pleural effusion   LE Doppler: NEG   CT abd:  small paraesophageal hiatal hernia, gastritis; ?impaction, septal thickening bilateral lower lobes ?pulmo edema, hepatic steatosis    IV FLUIDS: 3% NaCl 15 mL/h  DRIPS:  DIET: CCD  Lines: R IJ  Drains:     EVD at 5cm H20   EVD at 5cm H20 ICP < 10  12/15 EVD at 5cm H20 ICP 1-5, CSF 2-8 mL/h   EVD at 0cm H20 ICP 2-7 CSF 4-20 mL/h   EVD at 0cm H20 ICP 1-6 CSF 4-20 mL/h   EVD at 0cm H20 ICP 1-10 CSF 7-18 mL/h   EVD at 0cm H20 ICP 1-10 CSF 3-14 mL/h   EVD at 0cm H20 ICP < 10, CSF 90/180 mL (will be raised to 5 cm)

## 2020-12-22 NOTE — PROGRESS NOTE ADULT - SUBJECTIVE AND OBJECTIVE BOX
HPI:  74y/o F with PMHx sig for COPD, asthma, HLD, HTN, BIBEMS to [facility] from home after HHA discovered patient found down in floor covered in non-bloody vomitus. Perr HHA Pt went to the bathroom and was taking a long time, went in to check on her and found her sitting on floor, awake, however with vomitus on front of shirt. On arrival to [facility], patient was taken for stat head CT, which revealed diffuse subarachnoid hemorrhage mainly at basilar cisterns with IVH and obstructive hydrocephalus. Patient was given a bolus of 1g keppra and dilaudid pushes for generalized headache. CTA concerning for 3mm left pcomm aneurysm and a 1.6mm outpouching of distal cavernous segment of the left internal carotid artery likely aneurysm. NIHSS2 HH3 MF4. Patient was transferred to [facility] for further intervention. Patient currently reports headaches. Pt denies acute changes in vision, seizures, CP, SOB, weakness/paresthesias of arms or legs. (09 Dec 2020 23:37)        Hospital Course:   12/9: BD1 Patient admitted for SAH HH3, MF4.   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 EVD at 5, on Levo overnight, nimodipine discontinued d/t hypotension  12/12: BD4 POD#2, VIVIEN overnight, neuro stable, passed TOV  12/13: BD5 POD#3: VIVIEN x 24 hrs  12/14: BD6 POD#4. VIVIEN o/n, neuro stable. EVD at 5cm H2O  12/15: BD7 POD #5 VIVIEN overnight, neuro stable. EVD remains at 5. Plan for CTA today.   12/16: BD8 POD #6 VIVIEN overnight, neuro stable, EVD at 0, on Levo, started on 3% at 15 overnight   12/17: BD9 POD#1 s/p cerebral angio for verapamil c/b device malfunction of Proglide, s/p right groin cutdown for removal of proglide catheter and femoral artery repair.  VIVIEN overnight, neuro stable, EVD at 0. patient remained intubated overnight, on propofol for sedation, and vEEG  12/18: BD#10, POD#8 s/p coil/embo of L pcomm aneurysm, POD#2 s/p IA verapamil, POD#2 R groin cutdown for removal of proglide catheter w/ repair of femoral artery. VIVIEN overnight. EVD remains open @0cmH2O   12/19: BD#11, POD#9 s/p coil/embo of L pcomm aneurysm. POD#3 s/p IA verapamil, POD#3 s/p R femoral artery cutdown of proglide catheter w/ femoral artery repair. VIVIEN overnight. EVD remains open @0cmH2O. EEG shows b/l frontal sharp discharges, cont monitoring, vimpat was increased yesterday. Gentle hydration, total IVF 50cc/hr. Cont vanc/zosyn for empiric coverage, next vanc trough due @1pm on 12/19. F/u daily CXR.   12/20 BD#12 POD#10 VIVIEN overnight, Neuro exam stable, EVD @ 0cm H2O, vEEG, 3% @ 15 goal WNL, TF via NGT  12/21: BD13, POD11 VIVIEN overnight. EVD at 5cmH20 (raised yesterday), vEEG in place  12/22 BD#14, EVD raised to 15 yesterday, 3% @ 30cc Goal WNL, -180, Milrinone, TTE prior to DC as per Card for takotsubo cardiomyopathy, failed S&S pending reeval, TF via NGT, Neuro exam stable      ICU Vital Signs Last 24 Hrs  T(C): 37.1 (21 Dec 2020 22:00), Max: 37.4 (21 Dec 2020 05:48)  T(F): 98.8 (21 Dec 2020 22:00), Max: 99.4 (21 Dec 2020 05:48)  HR: 76 (21 Dec 2020 21:00) (74 - 90)  BP: 141/65 (21 Dec 2020 21:00) (112/56 - 201/91)  BP(mean): 93 (21 Dec 2020 21:00) (80 - 135)  ABP: --  ABP(mean): --  RR: 25 (21 Dec 2020 21:00) (17 - 33)  SpO2: 94% (21 Dec 2020 21:00) (94% - 98%)        PHYSICAL EXAM:  General: laying in hospital bed NAD, A&O x1-2, on RA, off sedation   HEENT: PERRL 3mm, EOMI b/l, face symmetric, tongue midline, + hard of hearing   Cardiovascular: RRR, normal S1 and S2   GI: normoactive BS to auscultation, abd soft, NTND   Neuro: no aphasia, speech clear, no dysmetria, no pronator drift  ZAFAR x4 spontaneously and with good strength   EVD site: C/D/I   R groin site: no evidence of hematoma, no ecchymosis or edema   Extremities: b/l PT pulses detected w/ doppler, not palpable       TUBES/LINES:  [] CVC  [] A-line  [] Lumbar Drain  [x] Ventriculostomy  [] ADIN  [] De Anda  [] NGT   [] Other    DIET:  [] NPO  [] Mechanical  [x] Tube feeds    LABS:                        Pending AM Collection              CAPILLARY BLOOD GLUCOSE      POCT Blood Glucose.: 146 mg/dL (20 Dec 2020 21:51)  POCT Blood Glucose.: 145 mg/dL (20 Dec 2020 16:42)  POCT Blood Glucose.: 142 mg/dL (20 Dec 2020 11:14)  POCT Blood Glucose.: 133 mg/dL (20 Dec 2020 06:56)      Drug Levels: [] N/A  Vancomycin Level, Trough: <4.0 ug/mL (12-18 @ 13:37)    CSF Analysis: [] N/A      Allergies    No Known Allergies    Intolerances        Home Medications:  famotidine 20 mg oral tablet: 1 tab(s) orally once a day (10 Dec 2020 00:38)  lisinopril 2.5 mg oral tablet: 1 tab(s) orally once a day (10 Dec 2020 00:38)  montelukast 10 mg oral tablet: 1 tab(s) orally once a day (10 Dec 2020 00:38)  omeprazole 20 mg oral delayed release capsule: 1 cap(s) orally once a day (12 Dec 2020 16:14)  simvastatin 20 mg oral tablet: 1 tab(s) orally once a day (at bedtime) (10 Dec 2020 00:38)      MEDICATIONS:  Antibiotics:    Neuro:  acetaminophen    Suspension .. 650 milliGRAM(s) Oral every 6 hours PRN  lacosamide 150 milliGRAM(s) Oral two times a day  methylphenidate 5 milliGRAM(s) Oral every 24 hours    Anticoagulation:  enoxaparin Injectable 40 milliGRAM(s) SubCutaneous every 24 hours    OTHER:  albuterol/ipratropium for Nebulization. 3 milliLiter(s) Nebulizer every 6 hours  atorvastatin 40 milliGRAM(s) Oral at bedtime  bisacodyl Suppository 10 milliGRAM(s) Rectal daily  chlorhexidine 2% Cloths 1 Application(s) Topical <User Schedule>  dextrose 40% Gel 15 Gram(s) Oral once  dextrose 50% Injectable 25 Gram(s) IV Push once  fludroCORTISONE 0.2 milliGRAM(s) Oral every 24 hours  glucagon  Injectable 1 milliGRAM(s) IntraMuscular once  insulin lispro (ADMELOG) corrective regimen sliding scale   SubCutaneous Before meals and at bedtime  labetalol Injectable 10 milliGRAM(s) IV Push every 4 hours PRN  milrinone Infusion 0.5 MICROgram(s)/kG/Min IV Continuous <Continuous>  montelukast 10 milliGRAM(s) Oral daily  polyethylene glycol 3350 17 Gram(s) Oral daily  propranolol 5 milliGRAM(s) Oral every 8 hours  senna 2 Tablet(s) Oral at bedtime    IVF:  cyanocobalamin 1000 MICROGram(s) Oral daily  ferrous    sulfate 325 milliGRAM(s) Oral daily  potassium chloride  20 mEq/100 mL IVPB 20 milliEquivalent(s) IV Intermittent every 2 hours  sodium chloride 2 Gram(s) Oral every 12 hours  sodium chloride 0.9%. 1000 milliLiter(s) IV Continuous <Continuous>  sodium chloride 3%. 500 milliLiter(s) IV Continuous <Continuous>    CULTURES:  Culture Results:   No growth to date (12-16 @ 12:10)  Culture Results:   No growth at 5 days. (12-16 @ 01:18)    RADIOLOGY & ADDITIONAL TESTS:      ASSESSMENT:  76 yo Female with PMHx of COPD, asthma, HLD, HTN, BIBEMS to [facility] from home after HHA found patient down on the floor covered in non-bloody vomitus. CTH reveals diffuse subarachnoid hemorrhage mainly at basilar cisterns with IVH and obstructive hydrocephalus, CTA shows 3mm left pcomm aneurysm, now s/p bedside right frontal EVD placement 12/10, s/p cerebral angiogram for coil embolization of left PCOMM aneurysm 12/10, now POD #6 s/p cerebral angio for verapamil c/b device malfunction of Proglide, s/p right groin cutdown for removal of proglide catheter and femoral artery repair (12/17/2020), NIHSS2 HH3 MF4).       HEADACHE    Handoff    Hyperlipidemia    Hypertension    COPD (chronic obstructive pulmonary disease)    Asthma    COPD (chronic obstructive pulmonary disease)    Asthma    Injury of right femoral artery    Cerebral artery vasospasm    SAH (subarachnoid hemorrhage)    Injury of femoral artery    Cerebral artery vasospasm    SAH (subarachnoid hemorrhage)    Subarachnoid bleed    Vascular surgery procedure    Angiogram, carotid and cerebral, bilateral    Angiogram, cerebral, with intracranial aneurysm embolization    No significant past surgical history    HEADACHE    90+    [email or web address]        PLAN:  NEURO:  - neuro checks  - vitals checks  - pain control  - EVD at 10, monitor ICP and output  - cont Vimpat   - Hold Nimodipine for hypotension  - R groin checks   - cont milrinone and amantadine     CARDIOVASCULAR:  - -180  - cardiology following for takotusbo: TTE before discharge, daily EKG, ischemic work up with CCTA or LHC before discharge     PULMONARY:  - on room air, no issues    RENAL:  - 3% @30 cc/hr, Goal WNL  - de anda  - electrolyte repletion PRN     GI:  - cont NGT feeds, pend S&S re-eval   - bowel regimen    HEME:  - cont IV iron per Dr. Reddy   - monitor H/H    ID:  - afebrile  -abx completed     ENDO:  - hgb A1c 6.7   - switched tube feeds to glucerna from jevity   - cont ISS     DVT PROPHYLAXIS:  [x] Venodynes                                [x] Heparin/Lovenox      DISPOSITION: ICU status, full code, dispo pend     Assessment and plan discussed w/ Dr. Serrano    Assessment:  Present when checked    []  GCS  E   V  M     Heart Failure: []Acute, [] acute on chronic , []chronic  Heart Failure:  [] Diastolic (HFpEF), [] Systolic (HFrEF), []Combined (HFpEF and HFrEF), [] RHF, [] Pulm HTN, [] Other    [] MICHAELLE, [] ATN, [] AIN, [] other  [] CKD1, [] CKD2, [] CKD 3, [] CKD 4, [] CKD 5, []ESRD    Encephalopathy: [] Metabolic, [] Hepatic, [] toxic, [] Neurological, [] Other    Abnormal Nurtitional Status: [] malnurtition (see nutrition note), [ ]underweight: BMI < 19, [] morbid obesity: BMI >40, [] Cachexia    [] Sepsis  [] hypovolemic shock,[] cardiogenic shock, [] hemorrhagic shock, [] neuogenic shock  [] Acute Respiratory Failure  []Cerebral edema, [] Brain compression/ herniation,   [] Functional quadriplegia  [] Acute blood loss anemia

## 2020-12-22 NOTE — PROGRESS NOTE ADULT - ATTENDING COMMENTS
ATTENDING ATTESTATION:  I was physically present for the key portions of the evaluation and management (E/M) service provided.  I agree with the above history, physical and plan, which I have reviewed and edited where appropriate.  Patient at high risk for neurological deterioration or death due to:  subarachnoid hemorrhage, vasospasm , cerebral edema    Critical care time:  I have personally provided  45  minutes of critical care time, excluding time spent on separately-billable procedures.      Plan discussed with RN, PA team.

## 2020-12-22 NOTE — PROGRESS NOTE ADULT - ASSESSMENT
74yo F POD#2 s/p cerebral angio for verapamil c/b device malfunction of Proglide, s/p right groin cutdown for removal of proglide catheter and femoral artery repair on 12/16/20     -Right groin monitoring, look for worsening of erythema/signs of infection  -Regular Pulse checks RLE  - Vascular surgery will continue to follow

## 2020-12-22 NOTE — PROGRESS NOTE ADULT - SUBJECTIVE AND OBJECTIVE BOX
Pt seen and examined at bedside with chief resident, no acute event overnight, hemodynamically stable.    enoxaparin Injectable 40  labetalol Injectable 5 PRN  milrinone Infusion 0.5  propranolol 5        Vital Signs Last 24 Hrs  T(C): 36.9 (22 Dec 2020 05:43), Max: 37.5 (22 Dec 2020 01:00)  T(F): 98.4 (22 Dec 2020 05:43), Max: 99.5 (22 Dec 2020 01:00)  HR: 74 (22 Dec 2020 08:00) (73 - 87)  BP: 157/68 (22 Dec 2020 08:00) (115/59 - 208/88)  BP(mean): 98 (22 Dec 2020 08:00) (84 - 135)  RR: 27 (22 Dec 2020 08:00) (22 - 33)  SpO2: 97% (22 Dec 2020 08:00) (94% - 98%)  I&O's Summary    21 Dec 2020 07:01  -  22 Dec 2020 07:00  --------------------------------------------------------  IN: 2985 mL / OUT: 3339 mL / NET: -354 mL    22 Dec 2020 07:01  -  22 Dec 2020 08:34  --------------------------------------------------------  IN: 107.5 mL / OUT: 128 mL / NET: -20.5 mL        Physical Exam:  General: NAD, resting comfortably in bed  Pulmonary: Nonlabored breathing, no respiratory distress  Abdominal: soft, NT, ND, positive BS  Extremities: right groin clean, dry, intact, soft. No hematoma or erythema.   Vascular: Triphasic right DP/PT      LABS:                        10.2   15.27 )-----------( 337      ( 22 Dec 2020 05:57 )             32.3     12-22    136  |  102  |  8   ----------------------------<  216<H>  3.2<L>   |  23  |  0.41<L>    Ca    8.3<L>      22 Dec 2020 05:57  Phos  1.8     12-22  Mg     1.9     12-22

## 2020-12-23 LAB
ANION GAP SERPL CALC-SCNC: 10 MMOL/L — SIGNIFICANT CHANGE UP (ref 5–17)
ANION GAP SERPL CALC-SCNC: 11 MMOL/L — SIGNIFICANT CHANGE UP (ref 5–17)
APPEARANCE CSF: SIGNIFICANT CHANGE UP
APPEARANCE SPUN FLD: ABNORMAL
APPEARANCE UR: CLEAR — SIGNIFICANT CHANGE UP
BACTERIA # UR AUTO: PRESENT /HPF
BILIRUB UR-MCNC: NEGATIVE — SIGNIFICANT CHANGE UP
BUN SERPL-MCNC: 6 MG/DL — LOW (ref 7–23)
BUN SERPL-MCNC: 7 MG/DL — SIGNIFICANT CHANGE UP (ref 7–23)
CALCIUM SERPL-MCNC: 8.2 MG/DL — LOW (ref 8.4–10.5)
CALCIUM SERPL-MCNC: 8.8 MG/DL — SIGNIFICANT CHANGE UP (ref 8.4–10.5)
CHLORIDE SERPL-SCNC: 101 MMOL/L — SIGNIFICANT CHANGE UP (ref 96–108)
CHLORIDE SERPL-SCNC: 97 MMOL/L — SIGNIFICANT CHANGE UP (ref 96–108)
CO2 SERPL-SCNC: 22 MMOL/L — SIGNIFICANT CHANGE UP (ref 22–31)
CO2 SERPL-SCNC: 26 MMOL/L — SIGNIFICANT CHANGE UP (ref 22–31)
COLOR CSF: ABNORMAL
COLOR SPEC: YELLOW — SIGNIFICANT CHANGE UP
CREAT SERPL-MCNC: 0.38 MG/DL — LOW (ref 0.5–1.3)
CREAT SERPL-MCNC: 0.41 MG/DL — LOW (ref 0.5–1.3)
CSF COMMENTS: SIGNIFICANT CHANGE UP
DIFF PNL FLD: ABNORMAL
EPI CELLS # UR: SIGNIFICANT CHANGE UP /HPF (ref 0–5)
GLUCOSE CSF-MCNC: 91 MG/DL — HIGH (ref 40–70)
GLUCOSE SERPL-MCNC: 230 MG/DL — HIGH (ref 70–99)
GLUCOSE SERPL-MCNC: 247 MG/DL — HIGH (ref 70–99)
GLUCOSE UR QL: >=1000
GRAM STN FLD: SIGNIFICANT CHANGE UP
HCT VFR BLD CALC: 31.4 % — LOW (ref 34.5–45)
HGB BLD-MCNC: 10.2 G/DL — LOW (ref 11.5–15.5)
KETONES UR-MCNC: NEGATIVE — SIGNIFICANT CHANGE UP
LEUKOCYTE ESTERASE UR-ACNC: NEGATIVE — SIGNIFICANT CHANGE UP
LYMPHOCYTES # CSF: 13 % — SIGNIFICANT CHANGE UP (ref 40–80)
MAGNESIUM SERPL-MCNC: 1.8 MG/DL — SIGNIFICANT CHANGE UP (ref 1.6–2.6)
MCHC RBC-ENTMCNC: 27.3 PG — SIGNIFICANT CHANGE UP (ref 27–34)
MCHC RBC-ENTMCNC: 32.5 GM/DL — SIGNIFICANT CHANGE UP (ref 32–36)
MCV RBC AUTO: 84.2 FL — SIGNIFICANT CHANGE UP (ref 80–100)
MONOS+MACROS NFR CSF: 4 % — SIGNIFICANT CHANGE UP (ref 15–45)
NEUTROPHILS # CSF: 7 % — SIGNIFICANT CHANGE UP (ref 0–6)
NITRITE UR-MCNC: NEGATIVE — SIGNIFICANT CHANGE UP
NRBC # BLD: 0 /100 WBCS — SIGNIFICANT CHANGE UP (ref 0–0)
NRBC NFR CSF: 24 /UL — HIGH (ref 0–5)
PH UR: 7.5 — SIGNIFICANT CHANGE UP (ref 5–8)
PHOSPHATE SERPL-MCNC: 2.4 MG/DL — LOW (ref 2.5–4.5)
PLATELET # BLD AUTO: 343 K/UL — SIGNIFICANT CHANGE UP (ref 150–400)
POTASSIUM SERPL-MCNC: 3.3 MMOL/L — LOW (ref 3.5–5.3)
POTASSIUM SERPL-MCNC: 3.5 MMOL/L — SIGNIFICANT CHANGE UP (ref 3.5–5.3)
POTASSIUM SERPL-SCNC: 3.3 MMOL/L — LOW (ref 3.5–5.3)
POTASSIUM SERPL-SCNC: 3.5 MMOL/L — SIGNIFICANT CHANGE UP (ref 3.5–5.3)
PROT CSF-MCNC: 29 MG/DL — SIGNIFICANT CHANGE UP (ref 15–45)
PROT UR-MCNC: 30 MG/DL
RBC # BLD: 3.73 M/UL — LOW (ref 3.8–5.2)
RBC # CSF: 8000 /UL — HIGH (ref 0–0)
RBC # FLD: 16.1 % — HIGH (ref 10.3–14.5)
RBC CASTS # UR COMP ASSIST: ABNORMAL /HPF
SODIUM SERPL-SCNC: 133 MMOL/L — LOW (ref 135–145)
SODIUM SERPL-SCNC: 134 MMOL/L — LOW (ref 135–145)
SP GR SPEC: 1.02 — SIGNIFICANT CHANGE UP (ref 1–1.03)
SPECIMEN SOURCE: SIGNIFICANT CHANGE UP
TUBE TYPE: SIGNIFICANT CHANGE UP
UROBILINOGEN FLD QL: 0.2 E.U./DL — SIGNIFICANT CHANGE UP
WBC # BLD: 14.67 K/UL — HIGH (ref 3.8–10.5)
WBC # FLD AUTO: 14.67 K/UL — HIGH (ref 3.8–10.5)
WBC UR QL: < 5 /HPF — SIGNIFICANT CHANGE UP

## 2020-12-23 PROCEDURE — 36600 WITHDRAWAL OF ARTERIAL BLOOD: CPT

## 2020-12-23 PROCEDURE — 36620 INSERTION CATHETER ARTERY: CPT

## 2020-12-23 PROCEDURE — 70450 CT HEAD/BRAIN W/O DYE: CPT | Mod: 26

## 2020-12-23 PROCEDURE — 71045 X-RAY EXAM CHEST 1 VIEW: CPT | Mod: 26

## 2020-12-23 PROCEDURE — 99232 SBSQ HOSP IP/OBS MODERATE 35: CPT

## 2020-12-23 PROCEDURE — 99291 CRITICAL CARE FIRST HOUR: CPT

## 2020-12-23 PROCEDURE — 74019 RADEX ABDOMEN 2 VIEWS: CPT | Mod: 26

## 2020-12-23 RX ORDER — POTASSIUM PHOSPHATE, MONOBASIC POTASSIUM PHOSPHATE, DIBASIC 236; 224 MG/ML; MG/ML
30 INJECTION, SOLUTION INTRAVENOUS ONCE
Refills: 0 | Status: COMPLETED | OUTPATIENT
Start: 2020-12-23 | End: 2020-12-23

## 2020-12-23 RX ORDER — NOREPINEPHRINE BITARTRATE/D5W 8 MG/250ML
0.05 PLASTIC BAG, INJECTION (ML) INTRAVENOUS
Qty: 8 | Refills: 0 | Status: DISCONTINUED | OUTPATIENT
Start: 2020-12-23 | End: 2020-12-24

## 2020-12-23 RX ORDER — SODIUM CHLORIDE 5 G/100ML
200 INJECTION, SOLUTION INTRAVENOUS
Refills: 0 | Status: DISCONTINUED | OUTPATIENT
Start: 2020-12-23 | End: 2020-12-23

## 2020-12-23 RX ORDER — POTASSIUM CHLORIDE 20 MEQ
40 PACKET (EA) ORAL
Refills: 0 | Status: COMPLETED | OUTPATIENT
Start: 2020-12-23 | End: 2020-12-23

## 2020-12-23 RX ORDER — SODIUM CHLORIDE 9 MG/ML
1000 INJECTION, SOLUTION INTRAVENOUS
Refills: 0 | Status: DISCONTINUED | OUTPATIENT
Start: 2020-12-23 | End: 2021-01-10

## 2020-12-23 RX ORDER — METHYLPHENIDATE HCL 5 MG
5 TABLET ORAL
Refills: 0 | Status: DISCONTINUED | OUTPATIENT
Start: 2020-12-24 | End: 2020-12-24

## 2020-12-23 RX ORDER — MAGNESIUM SULFATE 500 MG/ML
1 VIAL (ML) INJECTION ONCE
Refills: 0 | Status: COMPLETED | OUTPATIENT
Start: 2020-12-23 | End: 2020-12-23

## 2020-12-23 RX ORDER — INSULIN GLARGINE 100 [IU]/ML
4 INJECTION, SOLUTION SUBCUTANEOUS AT BEDTIME
Refills: 0 | Status: DISCONTINUED | OUTPATIENT
Start: 2020-12-23 | End: 2020-12-24

## 2020-12-23 RX ORDER — METHYLPHENIDATE HCL 5 MG
5 TABLET ORAL ONCE
Refills: 0 | Status: DISCONTINUED | OUTPATIENT
Start: 2020-12-23 | End: 2020-12-23

## 2020-12-23 RX ORDER — SODIUM CHLORIDE 9 MG/ML
500 INJECTION INTRAMUSCULAR; INTRAVENOUS; SUBCUTANEOUS ONCE
Refills: 0 | Status: COMPLETED | OUTPATIENT
Start: 2020-12-23 | End: 2020-12-23

## 2020-12-23 RX ORDER — NOREPINEPHRINE BITARTRATE/D5W 8 MG/250ML
0.05 PLASTIC BAG, INJECTION (ML) INTRAVENOUS
Qty: 16 | Refills: 0 | Status: DISCONTINUED | OUTPATIENT
Start: 2020-12-23 | End: 2020-12-23

## 2020-12-23 RX ORDER — NOREPINEPHRINE BITARTRATE/D5W 8 MG/250ML
0.05 PLASTIC BAG, INJECTION (ML) INTRAVENOUS
Qty: 8 | Refills: 0 | Status: DISCONTINUED | OUTPATIENT
Start: 2020-12-23 | End: 2020-12-23

## 2020-12-23 RX ORDER — LABETALOL HCL 100 MG
2.5 TABLET ORAL EVERY 6 HOURS
Refills: 0 | Status: DISCONTINUED | OUTPATIENT
Start: 2020-12-23 | End: 2020-12-24

## 2020-12-23 RX ORDER — LABETALOL HCL 100 MG
2.5 TABLET ORAL ONCE
Refills: 0 | Status: DISCONTINUED | OUTPATIENT
Start: 2020-12-23 | End: 2020-12-23

## 2020-12-23 RX ADMIN — ATORVASTATIN CALCIUM 40 MILLIGRAM(S): 80 TABLET, FILM COATED ORAL at 21:51

## 2020-12-23 RX ADMIN — SODIUM CHLORIDE 2 GRAM(S): 9 INJECTION INTRAMUSCULAR; INTRAVENOUS; SUBCUTANEOUS at 06:19

## 2020-12-23 RX ADMIN — MILRINONE LACTATE 7.49 MICROGRAM(S)/KG/MIN: 1 INJECTION, SOLUTION INTRAVENOUS at 09:40

## 2020-12-23 RX ADMIN — Medication 3 MILLILITER(S): at 21:30

## 2020-12-23 RX ADMIN — Medication 100 GRAM(S): at 08:55

## 2020-12-23 RX ADMIN — Medication 40 MILLIEQUIVALENT(S): at 21:51

## 2020-12-23 RX ADMIN — LACOSAMIDE 150 MILLIGRAM(S): 50 TABLET ORAL at 17:31

## 2020-12-23 RX ADMIN — SODIUM CHLORIDE 166.67 MILLILITER(S): 9 INJECTION INTRAMUSCULAR; INTRAVENOUS; SUBCUTANEOUS at 05:16

## 2020-12-23 RX ADMIN — Medication 4: at 21:51

## 2020-12-23 RX ADMIN — SODIUM CHLORIDE 30 MILLILITER(S): 5 INJECTION, SOLUTION INTRAVENOUS at 09:01

## 2020-12-23 RX ADMIN — SODIUM CHLORIDE 2 GRAM(S): 9 INJECTION INTRAMUSCULAR; INTRAVENOUS; SUBCUTANEOUS at 17:31

## 2020-12-23 RX ADMIN — SODIUM CHLORIDE 30 MILLILITER(S): 5 INJECTION, SOLUTION INTRAVENOUS at 19:50

## 2020-12-23 RX ADMIN — Medication 4.68 MICROGRAM(S)/KG/MIN: at 09:47

## 2020-12-23 RX ADMIN — Medication 4: at 11:35

## 2020-12-23 RX ADMIN — Medication 6: at 07:02

## 2020-12-23 RX ADMIN — Medication 650 MILLIGRAM(S): at 06:19

## 2020-12-23 RX ADMIN — FLUDROCORTISONE ACETATE 0.2 MILLIGRAM(S): 0.1 TABLET ORAL at 11:35

## 2020-12-23 RX ADMIN — Medication 650 MILLIGRAM(S): at 15:35

## 2020-12-23 RX ADMIN — CHLORHEXIDINE GLUCONATE 1 APPLICATION(S): 213 SOLUTION TOPICAL at 06:25

## 2020-12-23 RX ADMIN — Medication 40 MILLIEQUIVALENT(S): at 23:51

## 2020-12-23 RX ADMIN — Medication 3 MILLILITER(S): at 09:13

## 2020-12-23 RX ADMIN — Medication 650 MILLIGRAM(S): at 07:01

## 2020-12-23 RX ADMIN — LACOSAMIDE 150 MILLIGRAM(S): 50 TABLET ORAL at 06:19

## 2020-12-23 RX ADMIN — Medication 3 MILLILITER(S): at 06:23

## 2020-12-23 RX ADMIN — SODIUM CHLORIDE 66.67 MILLILITER(S): 5 INJECTION, SOLUTION INTRAVENOUS at 07:51

## 2020-12-23 RX ADMIN — PREGABALIN 1000 MICROGRAM(S): 225 CAPSULE ORAL at 11:35

## 2020-12-23 RX ADMIN — Medication 5 MILLIGRAM(S): at 06:27

## 2020-12-23 RX ADMIN — Medication 4: at 16:35

## 2020-12-23 RX ADMIN — Medication 650 MILLIGRAM(S): at 15:55

## 2020-12-23 RX ADMIN — MONTELUKAST 10 MILLIGRAM(S): 4 TABLET, CHEWABLE ORAL at 11:35

## 2020-12-23 RX ADMIN — ENOXAPARIN SODIUM 40 MILLIGRAM(S): 100 INJECTION SUBCUTANEOUS at 21:51

## 2020-12-23 RX ADMIN — INSULIN GLARGINE 4 UNIT(S): 100 INJECTION, SOLUTION SUBCUTANEOUS at 21:52

## 2020-12-23 RX ADMIN — Medication 5 MILLIGRAM(S): at 11:35

## 2020-12-23 RX ADMIN — Medication 325 MILLIGRAM(S): at 11:35

## 2020-12-23 RX ADMIN — Medication 3 MILLILITER(S): at 16:59

## 2020-12-23 RX ADMIN — POTASSIUM PHOSPHATE, MONOBASIC POTASSIUM PHOSPHATE, DIBASIC 83.33 MILLIMOLE(S): 236; 224 INJECTION, SOLUTION INTRAVENOUS at 10:18

## 2020-12-23 NOTE — PROGRESS NOTE ADULT - SUBJECTIVE AND OBJECTIVE BOX
Pt seen and examined at bedside with chief resident, no acute event overnight, hemodynamically stable.    Vital Signs Last 24 Hrs  T(C): 38.6 (23 Dec 2020 06:00), Max: 38.6 (23 Dec 2020 06:00)  T(F): 101.5 (23 Dec 2020 06:00), Max: 101.5 (23 Dec 2020 06:00)  HR: 78 (23 Dec 2020 07:00) (74 - 97)  BP: 154/68 (23 Dec 2020 07:00) (154/68 - 229/97)  BP(mean): 98 (23 Dec 2020 07:00) (98 - 151)  RR: 30 (23 Dec 2020 07:00) (19 - 35)  SpO2: 100% (23 Dec 2020 07:00) (94% - 100%)  I&O's Summary    22 Dec 2020 07:01  -  23 Dec 2020 07:00  --------------------------------------------------------  IN: 2630.5 mL / OUT: 3453 mL / NET: -822.5 mL    23 Dec 2020 07:01  -  23 Dec 2020 07:31  --------------------------------------------------------  IN: 87.5 mL / OUT: 0 mL / NET: 87.5 mL          Physical Exam:  General: NAD, resting comfortably in bed  Pulmonary: Nonlabored breathing, no respiratory distress  Abdominal: soft, NT, ND, positive BS  Extremities: right groin incision with staples clean, dry, intact, soft. No hematoma or surrounding erythema.   Vascular: RLE: biphasic DP/PT        LABS:                        10.2   14.67 )-----------( 343      ( 23 Dec 2020 05:26 )             31.4     12-23    133<L>  |  97  |  6<L>  ----------------------------<  247<H>  3.5   |  26  |  0.41<L>    Ca    8.8      23 Dec 2020 05:26  Phos  2.4     12-23  Mg     1.8     12-23

## 2020-12-23 NOTE — PROGRESS NOTE ADULT - ASSESSMENT
74yo F POD#2 s/p cerebral angio for verapamil c/b device malfunction of Proglide, s/p right groin cutdown for removal of proglide catheter and femoral artery repair on 12/16/20     -Right groin monitoring, look for signs of infection  -Regular Pulse checks RLE  -Vascular surgery will continue to follow

## 2020-12-23 NOTE — PROGRESS NOTE ADULT - SUBJECTIVE AND OBJECTIVE BOX
=================================================   NEUROCRITICAL CARE ATTENDING NOTE  =================================================    CARA CANCHOLA   [id number]  Summary:  75y/F with COPD, asthma, dyslipidemia Hypertension found down.  Brought to [facility] where imaging showed SAH basilar cisterns with IVH and obstructive hydrocephalus L Pcomm aneurysm.  Given levetiracetam, hydromorphone.  NIHSS 2 HH3 MF4, transferred to [facility] for further management.      Past Medical History: Hyperlipidemia Hypertension COPD (chronic obstructive pulmonary disease) Asthma  Allergies:  No Known Allergies  Home meds:   ·	famotidine 20 mg oral tablet: 1 tab(s) orally once a day  ·	lisinopril 2.5 mg oral tablet: 1 tab(s) orally once a day  ·	montelukast 10 mg oral tablet: 1 tab(s) orally once a day  ·	predniSONE 50 mg oral tablet: 1 tab(s) orally once a day  ·	ProAir HFA CFC free 90 mcg/inh inhalation aerosol: 2 puff(s) inhaled 4 times a day PRN  ·	simvastatin 20 mg oral tablet: 1 tab(s) orally once a day (at bedtime)    COURSE IN THE HOSPITAL:   admitted to [facility], EVD inserted; given 20 lasix for pulmo edema, T inversion on CT  12/10 No significant events overnight.    No significant events overnight.    No significant events overnight.    No significant events overnight. drain stopped working at 730 a.m., off levophed    No significant events overnight.   12/15 Noted confusion, CTA mild spasm   lethargic, angio - no severe spasm, given IA verapamil, underwent femoral endarterectomy with repair with patch angioplasty   improved post verapamil, transitioned to milrinone, currently tolerating extubation  : BD#10, POD#8 s/p coil/embo of L pcomm aneurysm, POD#2 s/p IA verapamil, POD#2 R groin cutdown for removal of proglide catheter w/ repair of femoral artery.   : neurological improvement, stable hemodynamic status  : BD#12, POD#9, EVD raise to 5 cm  : no acute events, but fluctuating level of alerness this morniong.  SBP in 190's given labetalol  :     24h events: EVD clamped yesterday, febrile overnight 101.5.    this morning given 10 labetalol, BP dropped to  minimum - clearly worse mental status, somenolent and mumbling.  Started levophed to correct.      EVD at clamp  ICPs single digits    NEUROLOGIC EXAMINATION:      MSE: Very somnolent, not speaking much, says a few words, mumbling.   fluctuating exam appears worse when below 's  CN: pupils 3mmg b/l reactive, EOMI, face symmetric  motor: arms grossly antigravity, full  strength b/l, not participatipating in exam  EENT:  anicteric  CARDIOVASCULAR: (+) S1 S2, tachycardic  PULMONARY: clear to auscultation bilaterally, slight expiratory wheezes  ABDOMEN: soft, nontender but someasht distended  +BS  EXTREMITIES: no edema, pulses present by Dopplers  SKIN: no rash      Allergies: No Known Allergies      VITALS:  T(C): 36.9 (20 @ 09:08), Max: 38.6 (20 @ 06:00)  HR: 79 (20 @ 10:00) (78 - 97)  BP: 153/67 (20 @ 10:00) (103/52 - 229/97)  RR: 24 (20 @ 09:00) (19 - 35)  SpO2: 100% (20 @ 10:00) (94% - 100%)    MEDICATIONS:  acetaminophen    Suspension .. 650 milliGRAM(s) Oral every 6 hours PRN  albuterol/ipratropium for Nebulization. 3 milliLiter(s) Nebulizer every 6 hours  atorvastatin 40 milliGRAM(s) Oral at bedtime  chlorhexidine 2% Cloths 1 Application(s) Topical <User Schedule>  cyanocobalamin 1000 MICROGram(s) Oral daily  dextrose 40% Gel 15 Gram(s) Oral once  dextrose 50% Injectable 25 Gram(s) IV Push once  enoxaparin Injectable 40 milliGRAM(s) SubCutaneous every 24 hours  ferrous    sulfate 325 milliGRAM(s) Oral daily  fludroCORTISONE 0.2 milliGRAM(s) Oral every 24 hours  glucagon  Injectable 1 milliGRAM(s) IntraMuscular once  insulin lispro (ADMELOG) corrective regimen sliding scale   SubCutaneous Before meals and at bedtime  lacosamide Solution 150 milliGRAM(s) Oral every 12 hours  methylphenidate 5 milliGRAM(s) Oral every 24 hours  milrinone Infusion 0.5 MICROgram(s)/kG/Min IV Continuous <Continuous>  montelukast 10 milliGRAM(s) Oral daily  norepinephrine Infusion 0.05 MICROgram(s)/kG/Min IV Continuous <Continuous>  sodium chloride 2 Gram(s) Oral every 12 hours  sodium chloride 3%. 500 milliLiter(s) IV Continuous <Continuous>  30cc/hr  sodium chloride 3%. 200 milliLiter(s) IV Continuous <Continuous>      I/O's    20 @ 07:  -  20 @ 07:00  --------------------------------------------------------  IN: 2630.5 mL / OUT: 3503 mL / NET: -872.5 mL    20 @ 07:01  -  20 @ 10:30  --------------------------------------------------------  IN: 248.4 mL / OUT: 90 mL / NET: 158.4 mL        Ventilator data:      LABS:                          10.2   14.67 )-----------( 343      ( 23 Dec 2020 05:26 )             31.4         133<L>  |  97  |  6<L>  ----------------------------<  247<H>  3.5   |  26  |  0.41<L>    Ca    8.8      23 Dec 2020 05:26  Phos  2.4       Mg     1.8             Urinalysis Basic - ( 23 Dec 2020 06:07 )    Color: Yellow / Appearance: Clear / S.025 / pH: x  Gluc: x / Ketone: NEGATIVE  / Bili: Negative / Urobili: 0.2 E.U./dL   Blood: x / Protein: 30 mg/dL / Nitrite: NEGATIVE   Leuk Esterase: NEGATIVE / RBC: 5-10 /HPF / WBC < 5 /HPF   Sq Epi: x / Non Sq Epi: 0-5 /HPF / Bacteria: Present /HPF          CAPILLARY BLOOD GLUCOSE      POCT Blood Glucose.: 270 mg/dL (23 Dec 2020 06:48)  POCT Blood Glucose.: 128 mg/dL (22 Dec 2020 22:12)  POCT Blood Glucose.: 234 mg/dL (22 Dec 2020 21:46)  POCT Blood Glucose.: 180 mg/dL (22 Dec 2020 16:10)            CSF studies:  EE/16:   1)	Rare right frontal lateralized rhythmic delta activity (LRDA) for very brief duration, suggestive of right frontal cortical hyper-excitability.   2)	Frequent right frontal sharp waves, as well as rare left frontoparietal and left temporal sharp waves, suggestive of focal cortical hyper-excitability in these regions.  Neuroimagin/16 XA:  complete and persistent left Pcomm aneurysm occlusion with microcoils; mild vasospasm in right A1 and mild vasospasm in left MAGGIE.  Intra arterial verapamil infusion (10 mg right CCA and 15 mg left CCA)  12/15 CTA head:  Multifocal moderate stenosis the right PCA. Mild stenosis of the left PCA which were present on prior CTA head and neck 2020 and therefore may reflect intracranial atherosclerosis rather than vasospasm.  New mild stenosis of the mid right M1 segment.  Interval increase in size of the lateral and third ventricles since prior CT head 2020.  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  12/10 CT head:  EVD placement, stable   CTA:  L pcomm aneurysm, L ICA (distal cavernous) aneurysm vs infundibulum, extensive calcified plaque cavernous bilateral ICA with mild to mod stenosis; thick plaque bilateral carotid bifurcations - mod to severe R and mild to mod L stenosis, focal calcified plaque R VA off subclavian artery - high grate stenosis / partial occlusion, upper lung bilateral opacification - pulmo edema, chronic deformity R humeral neck   CT head:  SAH, basilar cisterns, IV extension, obstructive hydrocephalus SVID, lacunar infarcts R caudate, both thalami, cerebellar hemispheres, no CT evidence of territorial infarction  Other imagin/19 CXR: Discoid change right lung base. Chronic interstitial changes.   CXR: improve right basilar opacity/pleural effusion   LE Doppler: NEG   CT abd:  small paraesophageal hiatal hernia, gastritis; ?impaction, septal thickening bilateral lower lobes ?pulmo edema, hepatic steatosis    IV FLUIDS: 3% NaCl 15 mL/h  DRIPS:  DIET: CCD  Lines: R IJ  Drains:     EVD at 5cm H20   EVD at 5cm H20 ICP < 10  12/15 EVD at 5cm H20 ICP 1-5, CSF 2-8 mL/h   EVD at 0cm H20 ICP 2-7 CSF 4-20 mL/h   EVD at 0cm H20 ICP 1-6 CSF 4-20 mL/h   EVD at 0cm H20 ICP 1-10 CSF 7-18 mL/h   EVD at 0cm H20 ICP 1-10 CSF 3-14 mL/h   EVD at 0cm H20 ICP < 10, CSF 90/180 mL (will be raised to 5 cm)

## 2020-12-23 NOTE — PROVIDER CONTACT NOTE (CHANGE IN STATUS NOTIFICATION) - BACKGROUND
Patient received labetalol IVP 5mg at 6am.
Patient is day 7 s/p EVD placement, day 6 s/p r-groin angiogram
s/p cerebral angiogram with intracranial aneurysm embolization

## 2020-12-23 NOTE — CHART NOTE - NSCHARTNOTEFT_GEN_A_CORE
Admitting Diagnosis:   Patient is a 75y old  Female who presents with a chief complaint of SAH (23 Dec 2020 07:28)    PAST MEDICAL & SURGICAL HISTORY:  Hyperlipidemia    Hypertension    COPD (chronic obstructive pulmonary disease)    Asthma    No significant past surgical history    Current Nutrition Order:  NPO with TF via NGT  Jevity 1.2 @50mL/hr x24h     PO Intake: Good (%) [   ]  Fair (50-75%) [   ] Poor (<25%) [   ]-N/A    GI Issues: no vomiting noted, unable to assess for nausea 2/2 clinical status, +rectal tube (minimal output)    Pain:    Skin Integrity:    Labs:   12-23    133<L>  |  97  |  6<L>  ----------------------------<  247<H>  3.5   |  26  |  0.41<L>    Ca    8.8      23 Dec 2020 05:26  Phos  2.4     12-23  Mg     1.8     12-23      CAPILLARY BLOOD GLUCOSE      POCT Blood Glucose.: 270 mg/dL (23 Dec 2020 06:48)  POCT Blood Glucose.: 128 mg/dL (22 Dec 2020 22:12)  POCT Blood Glucose.: 234 mg/dL (22 Dec 2020 21:46)  POCT Blood Glucose.: 180 mg/dL (22 Dec 2020 16:10)      Medications:  MEDICATIONS  (STANDING):  albuterol/ipratropium for Nebulization. 3 milliLiter(s) Nebulizer every 6 hours  atorvastatin 40 milliGRAM(s) Oral at bedtime  chlorhexidine 2% Cloths 1 Application(s) Topical <User Schedule>  cyanocobalamin 1000 MICROGram(s) Oral daily  dextrose 40% Gel 15 Gram(s) Oral once  dextrose 50% Injectable 25 Gram(s) IV Push once  enoxaparin Injectable 40 milliGRAM(s) SubCutaneous every 24 hours  ferrous    sulfate 325 milliGRAM(s) Oral daily  fludroCORTISONE 0.2 milliGRAM(s) Oral every 24 hours  glucagon  Injectable 1 milliGRAM(s) IntraMuscular once  insulin lispro (ADMELOG) corrective regimen sliding scale   SubCutaneous Before meals and at bedtime  lacosamide Solution 150 milliGRAM(s) Oral every 12 hours  methylphenidate 5 milliGRAM(s) Oral every 24 hours  milrinone Infusion 0.5 MICROgram(s)/kG/Min (7.49 mL/Hr) IV Continuous <Continuous>  montelukast 10 milliGRAM(s) Oral daily  norepinephrine Infusion 0.05 MICROgram(s)/kG/Min (4.68 mL/Hr) IV Continuous <Continuous>  sodium chloride 2 Gram(s) Oral every 12 hours  sodium chloride 3%. 200 milliLiter(s) (66.67 mL/Hr) IV Continuous <Continuous>  sodium chloride 3%. 500 milliLiter(s) (30 mL/Hr) IV Continuous <Continuous>    MEDICATIONS  (PRN):  acetaminophen    Suspension .. 650 milliGRAM(s) Oral every 6 hours PRN Temp greater or equal to 38C (100.4F), Mild Pain (1 - 3)  labetalol Injectable 2.5 milliGRAM(s) IV Push once PRN SBP >200      Weight:  Daily     Daily     Weight Change:     Nutrition Focused Physical Exam: Completed [   ]  Not Pertinent [   ]  Muscle Wasting- Temporal [   ]  Clavicle/Pectoral [   ]  Shoulder/Deltoid [   ]  Scapula [   ]  Interosseous [   ]  Quadriceps [   ]  Gastrocnemius [   ]  Fat Wasting- Orbital [   ]  Buccal [   ]  Triceps [   ]  Rib [   ]  Suspect [PCM] 2/2 to physical assessment, [poor intake], and [wt loss]; please see malnutrition chart note.    Estimated energy needs:     Subjective:     Previous Nutrition Diagnosis:    Active [   ]  Resolved [   ]    If resolved, new PES:     Goal:    Recommendations:    Education:     Risk Level: High [   ] Moderate [   ] Low [   ] Admitting Diagnosis:   Patient is a 75y old  Female who presents with a chief complaint of SAH (23 Dec 2020 07:28)    PAST MEDICAL & SURGICAL HISTORY:  Hyperlipidemia    Hypertension    COPD (chronic obstructive pulmonary disease)    Asthma    No significant past surgical history    Current Nutrition Order:  NPO with TF via NGT  Jevity 1.2 @50mL/hr x24h (1200mL TV, 1440 kcal, 66gm protein, 968mL free water, 120% RDI, ~29 kcal/kg ABW, ~1.3gm protein/kg ABW)     PO Intake: Good (%) [   ]  Fair (50-75%) [   ] Poor (<25%) [   ]-N/A    GI Issues: no vomiting noted, unable to assess for nausea 2/2 clinical status, +rectal tube (minimal output; no bowel regimen ordered), abdomen slightly distended but +bowel sounds    Pain: none apparent at this time    Skin Integrity: Greg score 15; no edema noted    Labs:   12-23    133<L>  |  97  |  6<L>  ----------------------------<  247<H>  3.5   |  26  |  0.41<L>    Ca    8.8      23 Dec 2020 05:26  Phos  2.4     12-23  Mg     1.8     12-23    CAPILLARY BLOOD GLUCOSE  POCT Blood Glucose.: 270 mg/dL (23 Dec 2020 06:48)  POCT Blood Glucose.: 128 mg/dL (22 Dec 2020 22:12)  POCT Blood Glucose.: 234 mg/dL (22 Dec 2020 21:46)  POCT Blood Glucose.: 180 mg/dL (22 Dec 2020 16:10)    Medications:  MEDICATIONS  (STANDING):  albuterol/ipratropium for Nebulization. 3 milliLiter(s) Nebulizer every 6 hours  atorvastatin 40 milliGRAM(s) Oral at bedtime  chlorhexidine 2% Cloths 1 Application(s) Topical <User Schedule>  cyanocobalamin 1000 MICROGram(s) Oral daily  dextrose 40% Gel 15 Gram(s) Oral once  dextrose 50% Injectable 25 Gram(s) IV Push once  enoxaparin Injectable 40 milliGRAM(s) SubCutaneous every 24 hours  ferrous    sulfate 325 milliGRAM(s) Oral daily  fludroCORTISONE 0.2 milliGRAM(s) Oral every 24 hours  glucagon  Injectable 1 milliGRAM(s) IntraMuscular once  insulin lispro (ADMELOG) corrective regimen sliding scale   SubCutaneous Before meals and at bedtime  lacosamide Solution 150 milliGRAM(s) Oral every 12 hours  methylphenidate 5 milliGRAM(s) Oral every 24 hours  milrinone Infusion 0.5 MICROgram(s)/kG/Min (7.49 mL/Hr) IV Continuous <Continuous>  montelukast 10 milliGRAM(s) Oral daily  norepinephrine Infusion 0.05 MICROgram(s)/kG/Min (4.68 mL/Hr) IV Continuous <Continuous>  sodium chloride 2 Gram(s) Oral every 12 hours  sodium chloride 3%. 200 milliLiter(s) (66.67 mL/Hr) IV Continuous <Continuous>  sodium chloride 3%. 500 milliLiter(s) (30 mL/Hr) IV Continuous <Continuous>    MEDICATIONS  (PRN):  acetaminophen    Suspension .. 650 milliGRAM(s) Oral every 6 hours PRN Temp greater or equal to 38C (100.4F), Mild Pain (1 - 3)  labetalol Injectable 2.5 milliGRAM(s) IV Push once PRN SBP >200    Weight:  49.9kg (12/9)    Weight Change: no updated weights, please obtain current weight and trend bi-weekly weights    Nutrition Focused Physical Exam: Completed [   ]  Not Pertinent [ X ]    Anthropometric Measurements:    Weight Assessment:  · Height for BMI (FEET)	4 Feet  · Height for BMI (INCHES)	11 Inch(s)  · Height for BMI (CENTIMETERS)	149.86 Centimeter(s)  · Weight for BMI (lbs)	110 lb  · Weight for BMI (kg)	49.9 kg  · Body Mass Index	22.2  · Ideal Body Weight (lbs)	98  · Ideal Body Weight (kg)	44.4    Nutrition Prescription:   Estimated Energy Needs:  ABW used to calculate energy needs due to pt's current body weight within % IBW (112%). Needs adjusted for age, post-op. Fluid needs per team.  · Weight (lbs)	110 lb  · Weight (kg)	49.8 kg  · Enter From (mulu/kg)	25  · Enter To (mulu/kg)	30  · Calculated From (mulu/kg)	1245  · Calculated To (mulu/kg)	1494     Estimated Protein Needs:  · Weight (lbs)	110 lb  · Weight (kg)	49.8 kg  · Enter From (g/kg)	1.2  · Enter To (g/kg)	1.4  · Calculated From (g/kg)	59.76  · Calculated To (g/kg)	69.72    Subjective: 74 yo Female with PMHx of COPD, asthma, HLD, HTN, [facility]IBEM[facility] from home after HHA found patient down on the floor covered in non-bloody vomitus. CTH reveals diffuse subarachnoid hemorrhage mainly at basilar cisterns with IVH and obstructive hydrocephalus, CTA shows 3mm left pcomm aneurysm, now s/p bedside right frontal EVD placement 12/10, s/p cerebral angiogram for coil embolization of left PCOMM aneurysm 12/10, now POD #5 s/p cerebral angio for verapamil c/b device malfunction of Proglide, s/p right groin cutdown for removal of proglide catheter and femoral artery repair (12/17/2020), NIHSS2 HH3 MF4).     Pt seen resting in bed, satting % on room air. Infusions running: 3% at 30mL/hr, Milrinone. Mean BP 88. Pt ordered for salt tabs, Na goal WNL, current level=13 (12/23). NGT placed for medications and TF on 12/16 2/2 failed RN dysphagia screen. SLP eval failed on 12/20, repeat SLP on 12/22 recommending continue NPO with NGT feeds and FEES when mental status improved. Please see below for full nutritional recommendations- d/w team, placed pending order for verification. RD to monitor and f/u per protocol.    Previous Nutrition Diagnosis:  Food & Nutrition Related Knowledge Deficit RT organ dysfunction AEB possibly new dx diabetes, A1c 6.7%  Active [   ]  Resolved [   ]-N/A    PES: Inadequate energy intake RT inability to safely swallow with current mental status AEB pt failed RN dysphagia screen 12/15- N/A    PES: Increased nutrient need RT increased kcal/protein demand AEB post-op    Goal: Meet >/=75%EER via most appropriate route with good tolerance    Recommendations:  1. As medically appropriate, recommend continue TF regimen to Jevity 1.2 goal of 50mL/hr x24h via NGT (1200mL TV, 1440 kcal, 66gm protein, 968mL free water, 120% RDI, ~29 kcal/kg ABW, ~1.3gm protein/kg ABW)  >>water flushes per team  >>continue order for volume based feeding protocol, monitor tolerance, maintain aspiration precautions  2. Monitor labs (BMP, lytes, [id number]; replete lytes prn  3. Obtain current weight and trend bi-weekly weights  4. BG control/insulin regimen per team  5. Repeat SLP eval as appropriate  6. Monitor need for bowel regimen     Education: N/A 2/2 clinical status    Risk Level: High [ X ] Moderate [   ] Low [   ]

## 2020-12-23 NOTE — PROGRESS NOTE ADULT - ASSESSMENT
75y/Female with:  HH3 MF4, aneursymal subarachnoid hemorrhage, L pcomm aneurysm, L parophthalmic aneurysm; brain compression, cerebral edema  osbtructive hydrocephalus  lacunar infarcts R caudate, B thalami, cerebellar hemispheres ?artery to artery vs cardioembolic?  atherosclerotic cardiovascular disease; mod to severe bilateral ICA stenosis (R>L), R VA stenosis  Hypertension dyslipidemia   COPD asthma  newly diagnosed Diabetes Mellitus?  troponin leak    BLEED DAY 15  PLAN: Day 1 = 12-09 ; Day 4 =  12/12; Day 21 = 12/29  NEURO:1) SAH   neurochecks q1h, PRN pain meds with Tylenol / Percocet  neurostimulant ritalin 5 mg daily --> change to 9AM, consider dc  nSAH:  nimodipine discontinued due to pressor requirements  Hydrocephalus:  EVD clamped --> do not pull until symptomatic spasm can be confirmed  SBP <200, liberalized, autoregulation  Labetalol 2.5mg only for sbp>200 ,v lili sensitive response  Fluctuating exam appears pressure-dependant, may be related to high grade b/l ICA stenosis plus ? microangiopathic spasm, no large vessel spasm  IV Milrinone for ? 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%  -repeat echo prior to discharge  SBP goal 100-200mm Hg (currently autoregulates to sBP 170-180's-VSP), EF 35%, repeat TTE as per Cardiology (once outside VSP window, will start MTP/ARB)    ENDO:  Blood sugar goals 140-180 mg/dL, cont insulin sliding scale, atorvastatin 40mg     GI:  PPI for GI prophylaxis (home meds); bowel regimen  DIET: no residuals  pull rectal tube  -abdomen distended  -AXR    RENAL: 3% NaCl at 30  -hold NS    HEM/ONC: Hb stable, iron profile c/w iron deficiency, iron FeSO4 325 TID  superficial intramuscular LE clot --> indication for full a/c will defer until EVD is out, d/w NSGy  VTE Prophylaxis: SCDs, SQL  -will start ASA for high grade stenosis b/l carotids once EVD out    ID: febrile, pancultured again, send CSF  s/p course zosyn end date 12.21.2020 (resolved RLL pneumonia)    Social: updated family    Access:  JOSE GUN[facility]  PIV  NGT  Christie

## 2020-12-23 NOTE — PROGRESS NOTE ADULT - SUBJECTIVE AND OBJECTIVE BOX
HPI:  76y/o F with PMHx sig for COPD, asthma, HLD, HTN, BIBEMS to [facility] from home after HHA discovered patient found down in floor covered in non-bloody vomitus. Perr HHA Pt went to the bathroom and was taking a long time, went in to check on her and found her sitting on floor, awake, however with vomitus on front of shirt. On arrival to [facility], patient was taken for stat head CT, which revealed diffuse subarachnoid hemorrhage mainly at basilar cisterns with IVH and obstructive hydrocephalus. Patient was given a bolus of 1g keppra and dilaudid pushes for generalized headache. CTA concerning for 3mm left pcomm aneurysm and a 1.6mm outpouching of distal cavernous segment of the left internal carotid artery likely aneurysm. NIHSS2 3 MF4. Patient was transferred to [facility] for further intervention. Patient currently reports headaches. Pt denies acute changes in vision, seizures, CP, SOB, weakness/paresthesias of arms or legs. (09 Dec 2020 23:37)    OVERNIGHT EVENTS: VIVIEN o/n, neuro stable    Hospital Course:   12/9: BD1 Patient admitted for SAH HH3, MF4.   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 EVD at 5, on Levo overnight, nimodipine discontinued d/t hypotension  12/12: BD4 POD#2, VIVIEN overnight, neuro stable, passed TOV  12/13: BD5 POD#3: VIVIEN x 24 hrs  12/14: BD6 POD#4. VIVIEN o/n, neuro stable. EVD at 5cm H2O  12/15: BD7 POD #5 VIVIEN overnight, neuro stable. EVD remains at 5. Plan for CTA today.   12/16: BD8 POD #6 VIVIEN overnight, neuro stable, EVD at 0, on Levo, started on 3% at 15 overnight   12/17: BD9 POD#1 s/p cerebral angio for verapamil c/b device malfunction of Proglide, s/p right groin cutdown for removal of proglide catheter and femoral artery repair.  VIVIEN overnight, neuro stable, EVD at 0. patient remained intubated overnight, on propofol for sedation, and vEEG  12/18: BD#10, POD#8 s/p coil/embo of L pcomm aneurysm, POD#2 s/p IA verapamil, POD#2 R groin cutdown for removal of proglide catheter w/ repair of femoral artery. VIVIEN overnight. EVD remains open @0cmH2O   12/19: BD#11, POD#9 s/p coil/embo of L pcomm aneurysm. POD#3 s/p IA verapamil, POD#3 s/p R femoral artery cutdown of proglide catheter w/ femoral artery repair. VIVIEN overnight. EVD remains open @0cmH2O. EEG shows b/l frontal sharp discharges, cont monitoring, vimpat was increased yesterday. Gentle hydration, total IVF 50cc/hr. Cont vanc/zosyn for empiric coverage, next vanc trough due @1pm on 12/19. F/u daily CXR.   12/20 BD#12 POD#10 VIVIEN overnight, Neuro exam stable, EVD @ 0cm H2O, vEEG, 3% @ 15 goal WNL, TF via NGT  12/21: BD13, POD11 VIVIEN overnight. EVD at 5cmH20 (raised yesterday), vEEG in place  12/22 BD#14, EVD raised to 15 yesterday, 3% @ 30cc Goal WNL, -180, Milrinone, TTE prior to DC as per Card for takotsubo cardiomyopathy, failed S&S pending reeval, TF via NGT, Neuro exam stable  12/23: BD#15. VIVIEN o/n, neuro stable. EVD clamped. 30%@30cc/hr    Vital Signs Last 24 Hrs  T(C): 37.6 (23 Dec 2020 03:00), Max: 37.6 (23 Dec 2020 03:00)  T(F): 99.7 (23 Dec 2020 03:00), Max: 99.7 (23 Dec 2020 03:00)  HR: 85 (23 Dec 2020 03:01) (73 - 97)  BP: 200/85 (23 Dec 2020 03:01) (146/66 - 229/97)  BP(mean): 122 (23 Dec 2020 03:01) (95 - 151)  RR: 25 (23 Dec 2020 03:01) (19 - 35)  SpO2: 97% (23 Dec 2020 03:01) (94% - 100%)    I&O's Summary    21 Dec 2020 07:01  -  22 Dec 2020 07:00  --------------------------------------------------------  IN: 2985 mL / OUT: 3339 mL / NET: -354 mL    22 Dec 2020 07:01  -  23 Dec 2020 03:49  --------------------------------------------------------  IN: 1752.5 mL / OUT: 2778 mL / NET: -1025.5 mL        PHYSICAL EXAM:  GEN: laying in bed, appears well, NAD  NEURO: AOx1. FC, OE spont, speech intact, face symmetric. CNII-XII intact. PERRL, EOMI. No pronator drift. MAEx4 with good strength  CV: RRR +S1/S2  PULM: CTAB  GI: Abd soft, NT/ND  EXT: ext warm, dry, nontender    TUBES/LINES:  [] Soriano  [] Lumbar Drain  [] Wound Drains  [] Others      DIET:  [] NPO  [] Mechanical  [x] Tube feeds    LABS:                        10.2   15.27 )-----------( 337      ( 22 Dec 2020 05:57 )             32.3     12-22    135  |  100  |  6<L>  ----------------------------<  207<H>  3.6   |  23  |  0.41<L>    Ca    8.8      22 Dec 2020 19:49  Phos  1.8     12-22  Mg     1.9     12-22              CAPILLARY BLOOD GLUCOSE      POCT Blood Glucose.: 128 mg/dL (22 Dec 2020 22:12)  POCT Blood Glucose.: 234 mg/dL (22 Dec 2020 21:46)  POCT Blood Glucose.: 180 mg/dL (22 Dec 2020 16:10)  POCT Blood Glucose.: 164 mg/dL (22 Dec 2020 10:24)  POCT Blood Glucose.: 210 mg/dL (22 Dec 2020 06:51)      Drug Levels: [] N/A  Vancomycin Level, Trough: <4.0 ug/mL (12-18 @ 13:37)    CSF Analysis: [] N/A      Allergies    No Known Allergies    Intolerances      MEDICATIONS:  Antibiotics:    Neuro:  acetaminophen    Suspension .. 650 milliGRAM(s) Oral every 6 hours PRN  lacosamide Solution 150 milliGRAM(s) Oral every 12 hours  methylphenidate 5 milliGRAM(s) Oral every 24 hours    Anticoagulation:  enoxaparin Injectable 40 milliGRAM(s) SubCutaneous every 24 hours    OTHER:  albuterol/ipratropium for Nebulization. 3 milliLiter(s) Nebulizer every 6 hours  atorvastatin 40 milliGRAM(s) Oral at bedtime  bisacodyl Suppository 10 milliGRAM(s) Rectal daily  chlorhexidine 2% Cloths 1 Application(s) Topical <User Schedule>  dextrose 40% Gel 15 Gram(s) Oral once  dextrose 50% Injectable 25 Gram(s) IV Push once  fludroCORTISONE 0.2 milliGRAM(s) Oral every 24 hours  glucagon  Injectable 1 milliGRAM(s) IntraMuscular once  insulin lispro (ADMELOG) corrective regimen sliding scale   SubCutaneous Before meals and at bedtime  labetalol Injectable 5 milliGRAM(s) IV Push every 4 hours PRN  milrinone Infusion 0.5 MICROgram(s)/kG/Min IV Continuous <Continuous>  montelukast 10 milliGRAM(s) Oral daily    IVF:  cyanocobalamin 1000 MICROGram(s) Oral daily  ferrous    sulfate 325 milliGRAM(s) Oral daily  sodium chloride 2 Gram(s) Oral every 12 hours  sodium chloride 3%. 500 milliLiter(s) IV Continuous <Continuous>    CULTURES:  Culture Results:   No growth to date (12-16 @ 12:10)  Culture Results:   No growth at 5 days. (12-16 @ 01:18)    RADIOLOGY & ADDITIONAL TESTS:      ASSESSMENT:  74 yo Female with PMHx of COPD, asthma, HLD, HTN, BIBEMS to [facility] from home after HHA found patient down on the floor covered in non-bloody vomitus. CTH reveals diffuse subarachnoid hemorrhage mainly at basilar cisterns with IVH and obstructive hydrocephalus, CTA shows 3mm left pcomm aneurysm, now s/p bedside right frontal EVD placement 12/10, s/p cerebral angiogram for coil embolization of left PCOMM aneurysm 12/10, now POD #6 s/p cerebral angio for verapamil c/b device malfunction of Proglide, s/p right groin cutdown for removal of proglide catheter and femoral artery repair (12/17/2020), NIHSS2 HH3 MF4).     HEADACHE    Handoff    Hyperlipidemia    Hypertension    COPD (chronic obstructive pulmonary disease)    Asthma    COPD (chronic obstructive pulmonary disease)    Asthma    Injury of right femoral artery    Cerebral artery vasospasm    SAH (subarachnoid hemorrhage)    Injury of femoral artery    Cerebral artery vasospasm    SAH (subarachnoid hemorrhage)    Subarachnoid bleed    Vascular surgery procedure    Angiogram, carotid and cerebral, bilateral    Angiogram, cerebral, with intracranial aneurysm embolization    No significant past surgical history    HEADACHE    90+    SysAdmin_VisitLink        PLAN:  NEURO:  - neuro checks q1h  - pain control   - EVD clamped, monitor ICPs  - CTH in am   - ritalin neurostim  - no nimodipine, causes labile SBP  - PT/OT    CARDIOVASCULAR:  - -200  - neurologically better with higher pressures  - cont milrinone gtt for vasospasm   - cont lipitor  - cards following, recs appreciated  - will need echo and CCTA before d/c  - HF, EF 30%, limit fluids    PULMONARY:  - satting well RA    RENAL:  - repletions prn   - 3%30cc/hr, florinef, salt tabs  - normonatremia goal     GI:    HEME:    ID:    ENDO:    DVT PROPHYLAXIS:  [] Venodynes                                [] Heparin/Lovenox    DISPOSITION:    Assessment:  Present when checked    []  GCS  E   V  M     Heart Failure: []Acute, [] acute on chronic , []chronic  Heart Failure:  [] Diastolic (HFpEF), [] Systolic (HFrEF), []Combined (HFpEF and HFrEF), [] RHF, [] Pulm HTN, [] Other    [] MICHAELLE, [] ATN, [] AIN, [] other  [] CKD1, [] CKD2, [] CKD 3, [] CKD 4, [] CKD 5, []ESRD    Encephalopathy: [] Metabolic, [] Hepatic, [] toxic, [] Neurological, [] Other    Abnormal Nurtitional Status: [] malnurtition (see nutrition note), [ ]underweight: BMI < 19, [] morbid obesity: BMI >40, [] Cachexia    [] Sepsis  [] hypovolemic shock,[] cardiogenic shock, [] hemorrhagic shock, [] neuogenic shock  [] Acute Respiratory Failure  []Cerebral edema, [] Brain compression/ herniation,   [] Functional quadriplegia  [] Acute blood loss anemia   HPI:  76y/o F with PMHx sig for COPD, asthma, HLD, HTN, BIBEMS to [facility] from home after HHA discovered patient found down in floor covered in non-bloody vomitus. Perr HHA Pt went to the bathroom and was taking a long time, went in to check on her and found her sitting on floor, awake, however with vomitus on front of shirt. On arrival to [facility], patient was taken for stat head CT, which revealed diffuse subarachnoid hemorrhage mainly at basilar cisterns with IVH and obstructive hydrocephalus. Patient was given a bolus of 1g keppra and dilaudid pushes for generalized headache. CTA concerning for 3mm left pcomm aneurysm and a 1.6mm outpouching of distal cavernous segment of the left internal carotid artery likely aneurysm. NIHSS2 3 MF4. Patient was transferred to [facility] for further intervention. Patient currently reports headaches. Pt denies acute changes in vision, seizures, CP, SOB, weakness/paresthesias of arms or legs. (09 Dec 2020 23:37)    OVERNIGHT EVENTS: VIVIEN o/n, neuro stable    Hospital Course:   12/9: BD1 Patient admitted for SAH HH3, MF4.   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 EVD at 5, on Levo overnight, nimodipine discontinued d/t hypotension  12/12: BD4 POD#2, VIVIEN overnight, neuro stable, passed TOV  12/13: BD5 POD#3: VIVIEN x 24 hrs  12/14: BD6 POD#4. VIVIEN o/n, neuro stable. EVD at 5cm H2O  12/15: BD7 POD #5 VIVIEN overnight, neuro stable. EVD remains at 5. Plan for CTA today.   12/16: BD8 POD #6 VIVIEN overnight, neuro stable, EVD at 0, on Levo, started on 3% at 15 overnight   12/17: BD9 POD#1 s/p cerebral angio for verapamil c/b device malfunction of Proglide, s/p right groin cutdown for removal of proglide catheter and femoral artery repair.  VIVIEN overnight, neuro stable, EVD at 0. patient remained intubated overnight, on propofol for sedation, and vEEG  12/18: BD#10, POD#8 s/p coil/embo of L pcomm aneurysm, POD#2 s/p IA verapamil, POD#2 R groin cutdown for removal of proglide catheter w/ repair of femoral artery. VIVIEN overnight. EVD remains open @0cmH2O   12/19: BD#11, POD#9 s/p coil/embo of L pcomm aneurysm. POD#3 s/p IA verapamil, POD#3 s/p R femoral artery cutdown of proglide catheter w/ femoral artery repair. VIVIEN overnight. EVD remains open @0cmH2O. EEG shows b/l frontal sharp discharges, cont monitoring, vimpat was increased yesterday. Gentle hydration, total IVF 50cc/hr. Cont vanc/zosyn for empiric coverage, next vanc trough due @1pm on 12/19. F/u daily CXR.   12/20 BD#12 POD#10 VIVIEN overnight, Neuro exam stable, EVD @ 0cm H2O, vEEG, 3% @ 15 goal WNL, TF via NGT  12/21: BD13, POD11 VIVIEN overnight. EVD at 5cmH20 (raised yesterday), vEEG in place  12/22 BD#14, EVD raised to 15 yesterday, 3% @ 30cc Goal WNL, -180, Milrinone, TTE prior to DC as per Card for takotsubo cardiomyopathy, failed S&S pending reeval, TF via NGT, Neuro exam stable  12/23: BD#15. VIVIEN o/n, neuro stable. EVD clamped. 30%@30cc/hr    Vital Signs Last 24 Hrs  T(C): 37.6 (23 Dec 2020 03:00), Max: 37.6 (23 Dec 2020 03:00)  T(F): 99.7 (23 Dec 2020 03:00), Max: 99.7 (23 Dec 2020 03:00)  HR: 85 (23 Dec 2020 03:01) (73 - 97)  BP: 200/85 (23 Dec 2020 03:01) (146/66 - 229/97)  BP(mean): 122 (23 Dec 2020 03:01) (95 - 151)  RR: 25 (23 Dec 2020 03:01) (19 - 35)  SpO2: 97% (23 Dec 2020 03:01) (94% - 100%)    I&O's Summary    21 Dec 2020 07:01  -  22 Dec 2020 07:00  --------------------------------------------------------  IN: 2985 mL / OUT: 3339 mL / NET: -354 mL    22 Dec 2020 07:01  -  23 Dec 2020 03:49  --------------------------------------------------------  IN: 1752.5 mL / OUT: 2778 mL / NET: -1025.5 mL        PHYSICAL EXAM:  GEN: laying in bed, appears well, NAD  NEURO: AOx1. FC, OE spont, speech intact, face symmetric. CNII-XII intact. PERRL, EOMI. No pronator drift. MAEx4 with good strength  CV: RRR +S1/S2  PULM: CTAB  GI: Abd soft, NT/ND  EXT: ext warm, dry, nontender    TUBES/LINES:  [] Soriano  [] Lumbar Drain  [] Wound Drains  [] Others      DIET:  [] NPO  [] Mechanical  [x] Tube feeds    LABS:                        10.2   15.27 )-----------( 337      ( 22 Dec 2020 05:57 )             32.3     12-22    135  |  100  |  6<L>  ----------------------------<  207<H>  3.6   |  23  |  0.41<L>    Ca    8.8      22 Dec 2020 19:49  Phos  1.8     12-22  Mg     1.9     12-22              CAPILLARY BLOOD GLUCOSE      POCT Blood Glucose.: 128 mg/dL (22 Dec 2020 22:12)  POCT Blood Glucose.: 234 mg/dL (22 Dec 2020 21:46)  POCT Blood Glucose.: 180 mg/dL (22 Dec 2020 16:10)  POCT Blood Glucose.: 164 mg/dL (22 Dec 2020 10:24)  POCT Blood Glucose.: 210 mg/dL (22 Dec 2020 06:51)      Drug Levels: [] N/A  Vancomycin Level, Trough: <4.0 ug/mL (12-18 @ 13:37)    CSF Analysis: [] N/A      Allergies    No Known Allergies    Intolerances      MEDICATIONS:  Antibiotics:    Neuro:  acetaminophen    Suspension .. 650 milliGRAM(s) Oral every 6 hours PRN  lacosamide Solution 150 milliGRAM(s) Oral every 12 hours  methylphenidate 5 milliGRAM(s) Oral every 24 hours    Anticoagulation:  enoxaparin Injectable 40 milliGRAM(s) SubCutaneous every 24 hours    OTHER:  albuterol/ipratropium for Nebulization. 3 milliLiter(s) Nebulizer every 6 hours  atorvastatin 40 milliGRAM(s) Oral at bedtime  bisacodyl Suppository 10 milliGRAM(s) Rectal daily  chlorhexidine 2% Cloths 1 Application(s) Topical <User Schedule>  dextrose 40% Gel 15 Gram(s) Oral once  dextrose 50% Injectable 25 Gram(s) IV Push once  fludroCORTISONE 0.2 milliGRAM(s) Oral every 24 hours  glucagon  Injectable 1 milliGRAM(s) IntraMuscular once  insulin lispro (ADMELOG) corrective regimen sliding scale   SubCutaneous Before meals and at bedtime  labetalol Injectable 5 milliGRAM(s) IV Push every 4 hours PRN  milrinone Infusion 0.5 MICROgram(s)/kG/Min IV Continuous <Continuous>  montelukast 10 milliGRAM(s) Oral daily    IVF:  cyanocobalamin 1000 MICROGram(s) Oral daily  ferrous    sulfate 325 milliGRAM(s) Oral daily  sodium chloride 2 Gram(s) Oral every 12 hours  sodium chloride 3%. 500 milliLiter(s) IV Continuous <Continuous>    CULTURES:  Culture Results:   No growth to date (12-16 @ 12:10)  Culture Results:   No growth at 5 days. (12-16 @ 01:18)    RADIOLOGY & ADDITIONAL TESTS:      ASSESSMENT:  74 yo Female with PMHx of COPD, asthma, HLD, HTN, BIBEMS to [facility] from home after HHA found patient down on the floor covered in non-bloody vomitus. CTH reveals diffuse subarachnoid hemorrhage mainly at basilar cisterns with IVH and obstructive hydrocephalus, CTA shows 3mm left pcomm aneurysm, now s/p bedside right frontal EVD placement 12/10, s/p cerebral angiogram for coil embolization of left PCOMM aneurysm 12/10, now POD #6 s/p cerebral angio for verapamil c/b device malfunction of Proglide, s/p right groin cutdown for removal of proglide catheter and femoral artery repair (12/17/2020), NIHSS2 HH3 MF4).     HEADACHE    Handoff    Hyperlipidemia    Hypertension    COPD (chronic obstructive pulmonary disease)    Asthma    COPD (chronic obstructive pulmonary disease)    Asthma    Injury of right femoral artery    Cerebral artery vasospasm    SAH (subarachnoid hemorrhage)    Injury of femoral artery    Cerebral artery vasospasm    SAH (subarachnoid hemorrhage)    Subarachnoid bleed    Vascular surgery procedure    Angiogram, carotid and cerebral, bilateral    Angiogram, cerebral, with intracranial aneurysm embolization    No significant past surgical history    HEADACHE    90+    SysAdmin_VisitLink        PLAN:  NEURO:  - neuro checks q1h  - pain control   - EVD clamped, monitor ICPs  - CTH in am   - ritalin neurostim  - no nimodipine, causes labile SBP  - PT/OT    CARDIOVASCULAR:  - -200  - neurologically better with higher pressures  - cont milrinone gtt for vasospasm   - cont lipitor  - cards following, recs appreciated  - will need echo and CCTA before d/c  - HF, EF 30%, limit fluids  - b/l carotid stenosis, will eventually need ASA    PULMONARY:  - satting well RA    RENAL:  - repletions prn   - 3%30cc/hr, florinef, salt tabs  - normonatremia goal     GI:  - TF via NGT  - bowel regimen     HEME:  - h/h stable  - SCDs/SQL  - cont PO iron anemia    ID:  - afebrile  - trend leukocytosis     ENDO:  - ISS    DVT PROPHYLAXIS:  [x] Venodynes                                [x] Heparin/Lovenox    DISPOSITION: ICU status, full code, dispo pending    D/w Dr. Serrano, Dr. Bang    Assessment:  Present when checked    []  GCS  E   V  M     Heart Failure: []Acute, [] acute on chronic , []chronic  Heart Failure:  [] Diastolic (HFpEF), [] Systolic (HFrEF), []Combined (HFpEF and HFrEF), [] RHF, [] Pulm HTN, [] Other    [] MICHAELLE, [] ATN, [] AIN, [] other  [] CKD1, [] CKD2, [] CKD 3, [] CKD 4, [] CKD 5, []ESRD    Encephalopathy: [] Metabolic, [] Hepatic, [] toxic, [] Neurological, [] Other    Abnormal Nurtitional Status: [] malnurtition (see nutrition note), [ ]underweight: BMI < 19, [] morbid obesity: BMI >40, [] Cachexia    [] Sepsis  [] hypovolemic shock,[] cardiogenic shock, [] hemorrhagic shock, [] neuogenic shock  [] Acute Respiratory Failure  []Cerebral edema, [] Brain compression/ herniation,   [] Functional quadriplegia  [] Acute blood loss anemia

## 2020-12-23 NOTE — PROCEDURE NOTE - GENERAL PROCEDURE DETAILS
CSF collection chamber and port cleaned with chlorhexidine. CSF diverted to sterile chamber. In sterile fashion, CSF was drawn with syringe

## 2020-12-23 NOTE — PROVIDER CONTACT NOTE (CHANGE IN STATUS NOTIFICATION) - ACTION/TREATMENT ORDERED:
Flushed with no improvement.  Currently EVD not draining.  Dr. Serrano to be called as per SHEILA Edouard.  Will continue to monitor.
SHEILA Forde at bedside to evaluate patient. Norepinephrine started as per orders for BP to maintain 180 systolic. 2L NC placed. CT head to be done this AM. EVD to remain clamped.
NS bolus administered as ordered. PA assessed pt at bedside. Will continue to monitor.
PA ordered liquid tylenol, stated would alert MD Gonzalez

## 2020-12-23 NOTE — PROVIDER CONTACT NOTE (CHANGE IN STATUS NOTIFICATION) - SITUATION
Notified PA re deviation of R-eye gaze to left, sluggishness of pupils, increased lethargy of patient; T: 101.3 oral
Pt hypotensive, c/o dizziness/lightheadedness.
Blood pressure for this hour is 103/52. Patient very lethargic, arousable by vigorous stimuli. Not answering questions at this time or following commands.

## 2020-12-23 NOTE — EEG REPORT - NS EEG TEXT BOX
[facility] Department of Neurology  Inpatient Continuous video-Electroencephalogram    Patient Name:	CARA CANCHOLA    :	1945  MRN:	[id number]    Study Start Date/Time:  2020, 9:38:09 AM  Study End Date/Time:  2020, 3:12 PM    Referred by: Bebeto Serrano     Brief Clinical History:  CARA CANCHOLA is a 75 year old Female with diffuse SAH; study performed to investigate for seizures or markers of epilepsy.    Technologist notes: FZ IS MOVED 1CM FORWARD AND CZ IS MOVED 1CM BACK DUE TO SURGICAL INCISION. PT HAS AN EVD.     Diagnosis Code:   R56.9 convulsions/seizure  CPT: 34612 EEG with video 12-26h  Technical CPT: 49064 set-up +  52318 EEG unmonitored 12-h    Pertinent Medications:  Lacosamide 100 mg BID    Acquisition Details:  Electroencephalography was acquired using a minimum of 21 channels on an [employer] system v 8.5.1 with electrode placement according to the standard International 10-20 system following ACNS (American Clinical Neurophysiology Society) guidelines for Long-Term Video EEG monitoring.  Anterior temporal T1 and T2 electrodes were utilized whenever possible.   The [employer] automated spike & seizure detections were all reviewed in detail, in addition to extensive portions of raw EEG.    Day 1: 2020 @ 9:38:09 AM to next morning @ 07:00 AM    Background:  continuous, with predominantly theta>delta frequencies.  Symmetry:  No persistent asymmetries of voltage or frequency.  Posterior Dominant Rhythm:  absent   Organization: Rudimentary   Voltage:  Normal (20+ uV)  Variability: Yes. 		Reactivity: Yes.  N2 sleep: Rudimentary but likely N2 transients present.  Spontaneous Activity:  Frequent right frontal sharp transients, maximal at Fp2/F4 which fluctuate through the recording and are present more when awake. Rare multifocal sharp transients.   Periodic/rhythmic activity:  Rare right frontal lateralized rhythmic delta activity at 1.5 Hz for very brief duration.   Events:  No electrographic seizures or significant clinical events.  Provocations:  Hyperventilation and Photic stimulation: was not performed.  Daily Summary:    Rare right frontal lateralized rhythmic delta activity (LRDA) for very brief duration, suggestive of right frontal cortical hyper-excitability.   Occasional – frequent right frontal sharp waves suggestive of cortical hyper-excitability in this region. Occasional multifocal sharp waves suggestive of multifocal epileptiform potentials.   Moderate-severe generalized slowing suggestive of a similar degree of diffuse or multifocal  dysfunction.           Lacey Rahman MD  Clinical Neurophysiology Fellow    Pamella Burton MD   Attending Neurologist, [facility] [place] [facility]       Daily Updates (from 07:00 am until 07:00 am):    Day 2  2020- 2020   Pertinent medications: lacosamide 150 mg BID  Background:  continuous, with predominantly theta>delta frequencies.  Symmetry:  Frequent (10-49%) right central and occasional left centro-temporal slowing.   Posterior Dominant Rhythm:  fragmented, poorly sustained 7 Hz  Organization: Rudimentary   Voltage:  Normal (20+ uV)  Variability: Yes. 		Reactivity: Yes.  N2 sleep: Absent.  Spontaneous Activity:    1)	Frequent right frontal sharp transients, maximal at Fp2/F4 which fluctuate through the recording and are present more when awake.   2)	Rare left frontoparietal and left temporal sharp transients.              Periodic/rhythmic activity:  Rare right frontal lateralized rhythmic delta activity at 1.5 Hz, maximal at Fp2-F4, very brief duration.        Events:  No electrographic seizures or significant clinical events.  Provocations:  Hyperventilation and Photic stimulation: was not performed.  Daily Summary:    1)	Rare right frontal lateralized rhythmic delta activity (LRDA) for very brief duration, suggestive of right frontal cortical hyper-excitability.   2)	Frequent right frontal sharp waves, as well as rare left frontoparietal and left temporal sharp waves, suggestive of focal cortical hyper-excitability in these regions.  3)	Frequent right fronto-central and occasional left centro-temporal slowing slowing, indicates focal cerebral dysfunction.  4)	Moderate generalized slowing suggestive of a similar degree of diffuse or multifocal  dysfunction.    Lacey Rahman MD  Clinical Neurophysiology Fellow    Pamella Burton MD   Attending Neurologist, [facility] [place] [facility]        Day 3  2020- 2020   Pertinent medications: lacosamide 150 mg BID  Background:  continuous, with predominantly theta >alpha frequencies.  Symmetry:  Frequent (10-49%) right central and occasional left centro-temporal slowing.   Posterior Dominant Rhythm:  fragmented, poorly sustained 7 Hz  Organization: Rudimentary   Voltage:  Normal (20+ uV)  Variability: Yes. 		Reactivity: Yes.  N2 sleep: Rudimentary but likely N2 transients present.  Spontaneous Activity:    1)	Right frontal sharp transients, maximal at Fp2/F4 which fluctuate through the recording and are present more when awake.   2)	Rare left fronto-parietal and left temporal sharp transients.    3)	Moderate generalized slowing suggestive of a similar degree of diffuse or multifocal  dysfunction. > R hemisphere      Leon Parikh MD   Attending Neurologist, [facility] [place] [facility]      Day 4  2020- 2020   Pertinent medications: lacosamide 150 mg BID  Background:  continuous, with predominantly theta >alpha frequencies.  Symmetry:  Frequent (10-49%) right central and occasional left centro-temporal slowing.   Posterior Dominant Rhythm:  fragmented, poorly sustained 7 Hz  Organization: Rudimentary   Voltage:  Normal (20+ uV)  Variability: Yes. 		Reactivity: Yes.  N2 sleep: Rudimentary but likely N2 transients present.  Spontaneous Activity:    1)	Less frequent right frontal sharp transients, maximal at Fp2/F4 which fluctuate through the recording and are present more when awake.   2)	Rare left fronto-parietal and left temporal sharp transients.    3)	Moderate generalized slowing suggestive of a similar degree of diffuse or multifocal  dysfunction. > R hemisphere      Leon Parikh MD   Attending Neurologist, [facility] [place] [facility]    Day 5 2020- 2020   Pertinent medications: lacosamide 150 mg BID  Background:  continuous, with predominantly alpha/theta > delta frequency activity.  Symmetry:  Frequent (10-49%) right frontotemporal polymorphic theta/delta frequency slowing, at times sharply contoured.  Posterior Dominant Rhythm: well-regulated 8-9 Hz.  Organization: Normal.  Voltage:  Normal (20+ uV)  Variability: Yes. 		Reactivity: Yes.  N2 sleep: Rudimentary but likely N2 transients present.  Spontaneous Activity:  Rare right frontotemporal sharp waves.     Periodic/rhythmic activity:  None  Events:  No electrographic seizures or significant clinical events.  Provocations:  Hyperventilation and Photic stimulation: was not performed.  Daily Summary:  1)	Rare right frontotemporal sharp waves, indicating increased epileptic potential in this region.  2)	Frequent right frontotemporal focal slowing, indicating focal cerebral dysfunction.  3)	Mild-to-moderate generalized background slowing suggestive of a similar degree of diffuse or multifocal  dysfunction.     Lacey Rahman MD  Clinical Neurophysiology Fellow    Simona Gan MD  Attending Neurologist, [facility] [place] [facility]      Day 6 2020 7 AM to 3:12 PM   Pertinent medications: lacosamide 150 mg BID  Background:  continuous, with predominantly alpha/theta > delta frequency activity.  Symmetry:  Frequent (10-49%) right frontotemporal polymorphic theta/delta frequency slowing.  Posterior Dominant Rhythm: well-regulated 8-9 Hz.  Organization: Normal.  Voltage:  Normal (20+ uV)  Variability: Yes. 		Reactivity: Yes.  N2 sleep: Rudimentary but likely N2 transients present.  Spontaneous Activity:  Occasional right frontotemporal sharply contoured theta frequencies, rarely with epileptiform appearance.  Periodic/rhythmic activity:  Occasional brief runs of semi-rhythmic delta activity over the right hemisphere.      Events:  No electrographic seizures or significant clinical events.  Provocations:  Hyperventilation and Photic stimulation: was not performed.  Daily Summary:  1)	Occasional right frontotemporal sharply contoured theta frequencies, rarely with epileptiform appearance, indicating increased epileptic potential in this region.  2)	Occasional brief runs of semi-rhythmic delta activity over the right hemisphere, suggestive of focal cortical hyperexcitability.  3)	Frequent right frontotemporal focal slowing, indicating focal cerebral dysfunction.  4)	Mild-to-moderate generalized background slowing suggestive of a similar degree of diffuse or multifocal  dysfunction.     Lacey Rahman MD  Clinical Neurophysiology Fellow    select  Attending Neurologist, [facility] [place] [facility]      FINAL Summary:  Abnormal continuous av-EEG study.  1)	Right frontal sharp waves, less frequent over the course of the recording.  2)	Left frontoparietal and temporal sharp waves, resolved in the later part of the recording.  3)	Right hemisphere slowing, maximal in the right frontal region.  4)	Generalized background slowing, improved over the course of the recording.    Final Clinical Correlation:  1)	Right frontal epileptic potential, improved over the course of the recording.  2)	Left frontoparietal and temporal epileptic potential, resolved over the course of the recording.  3)	Diffuse or multifocal cerebral dysfunction, worse in the right hemisphere, improved over the course of the recording.      Lacey Rahman MD  Clinical Neurophysiology Fellow      Pamella Burton MD  (Day 1-2)  Attending Neurologist, [facility] [place] [facility]    Leon Parikh MD  (Day 3-4)  Attending Neurologist, [facility] [place] [facility]    Simona Gan MD  (Day 5-6)  Attending Neurologist, [facility] [place] [facility]

## 2020-12-23 NOTE — PROVIDER CONTACT NOTE (CHANGE IN STATUS NOTIFICATION) - ASSESSMENT
Neuro status as stated above. HR 78bpm, 92% on RA. RR 30.
Patient's EVD tubing has areas of redness which was not present before.  When lowered EVD does not have an output.  SHEILA Edouard made aware.
Patient is lethargic, requires physical stimuli to arouse, provides inappropriate answers to questions
Pt alert to self and place, following all commands. Pupils equal and brisk, moves all extremities. NBP 87/37 HR 73 RR 20 O2Sat 98% on RA. BP 75/42 via L radial a-line, normal waveform noted.

## 2020-12-24 LAB
ANION GAP SERPL CALC-SCNC: 10 MMOL/L — SIGNIFICANT CHANGE UP (ref 5–17)
ANION GAP SERPL CALC-SCNC: 12 MMOL/L — SIGNIFICANT CHANGE UP (ref 5–17)
ANION GAP SERPL CALC-SCNC: 12 MMOL/L — SIGNIFICANT CHANGE UP (ref 5–17)
BUN SERPL-MCNC: 7 MG/DL — SIGNIFICANT CHANGE UP (ref 7–23)
BUN SERPL-MCNC: 8 MG/DL — SIGNIFICANT CHANGE UP (ref 7–23)
BUN SERPL-MCNC: 9 MG/DL — SIGNIFICANT CHANGE UP (ref 7–23)
CALCIUM SERPL-MCNC: 8.4 MG/DL — SIGNIFICANT CHANGE UP (ref 8.4–10.5)
CALCIUM SERPL-MCNC: 8.5 MG/DL — SIGNIFICANT CHANGE UP (ref 8.4–10.5)
CALCIUM SERPL-MCNC: 9 MG/DL — SIGNIFICANT CHANGE UP (ref 8.4–10.5)
CHLORIDE SERPL-SCNC: 101 MMOL/L — SIGNIFICANT CHANGE UP (ref 96–108)
CHLORIDE SERPL-SCNC: 105 MMOL/L — SIGNIFICANT CHANGE UP (ref 96–108)
CHLORIDE SERPL-SCNC: 99 MMOL/L — SIGNIFICANT CHANGE UP (ref 96–108)
CO2 SERPL-SCNC: 22 MMOL/L — SIGNIFICANT CHANGE UP (ref 22–31)
CO2 SERPL-SCNC: 23 MMOL/L — SIGNIFICANT CHANGE UP (ref 22–31)
CO2 SERPL-SCNC: 25 MMOL/L — SIGNIFICANT CHANGE UP (ref 22–31)
CREAT SERPL-MCNC: 0.37 MG/DL — LOW (ref 0.5–1.3)
CREAT SERPL-MCNC: 0.39 MG/DL — LOW (ref 0.5–1.3)
CREAT SERPL-MCNC: 0.46 MG/DL — LOW (ref 0.5–1.3)
GLUCOSE SERPL-MCNC: 146 MG/DL — HIGH (ref 70–99)
GLUCOSE SERPL-MCNC: 211 MG/DL — HIGH (ref 70–99)
GLUCOSE SERPL-MCNC: 239 MG/DL — HIGH (ref 70–99)
HCT VFR BLD CALC: 36.6 % — SIGNIFICANT CHANGE UP (ref 34.5–45)
HGB BLD-MCNC: 11.4 G/DL — LOW (ref 11.5–15.5)
MAGNESIUM SERPL-MCNC: 1.9 MG/DL — SIGNIFICANT CHANGE UP (ref 1.6–2.6)
MCHC RBC-ENTMCNC: 27.2 PG — SIGNIFICANT CHANGE UP (ref 27–34)
MCHC RBC-ENTMCNC: 31.1 GM/DL — LOW (ref 32–36)
MCV RBC AUTO: 87.4 FL — SIGNIFICANT CHANGE UP (ref 80–100)
NRBC # BLD: 0 /100 WBCS — SIGNIFICANT CHANGE UP (ref 0–0)
PHOSPHATE SERPL-MCNC: 2.6 MG/DL — SIGNIFICANT CHANGE UP (ref 2.5–4.5)
PLATELET # BLD AUTO: 433 K/UL — HIGH (ref 150–400)
POTASSIUM SERPL-MCNC: 3.8 MMOL/L — SIGNIFICANT CHANGE UP (ref 3.5–5.3)
POTASSIUM SERPL-MCNC: 4.2 MMOL/L — SIGNIFICANT CHANGE UP (ref 3.5–5.3)
POTASSIUM SERPL-MCNC: 4.8 MMOL/L — SIGNIFICANT CHANGE UP (ref 3.5–5.3)
POTASSIUM SERPL-SCNC: 3.8 MMOL/L — SIGNIFICANT CHANGE UP (ref 3.5–5.3)
POTASSIUM SERPL-SCNC: 4.2 MMOL/L — SIGNIFICANT CHANGE UP (ref 3.5–5.3)
POTASSIUM SERPL-SCNC: 4.8 MMOL/L — SIGNIFICANT CHANGE UP (ref 3.5–5.3)
RBC # BLD: 4.19 M/UL — SIGNIFICANT CHANGE UP (ref 3.8–5.2)
RBC # FLD: 16.7 % — HIGH (ref 10.3–14.5)
SODIUM SERPL-SCNC: 134 MMOL/L — LOW (ref 135–145)
SODIUM SERPL-SCNC: 136 MMOL/L — SIGNIFICANT CHANGE UP (ref 135–145)
SODIUM SERPL-SCNC: 139 MMOL/L — SIGNIFICANT CHANGE UP (ref 135–145)
WBC # BLD: 11.6 K/UL — HIGH (ref 3.8–10.5)
WBC # FLD AUTO: 11.6 K/UL — HIGH (ref 3.8–10.5)

## 2020-12-24 PROCEDURE — 99291 CRITICAL CARE FIRST HOUR: CPT

## 2020-12-24 RX ORDER — MAGNESIUM SULFATE 500 MG/ML
1 VIAL (ML) INJECTION ONCE
Refills: 0 | Status: COMPLETED | OUTPATIENT
Start: 2020-12-24 | End: 2020-12-24

## 2020-12-24 RX ORDER — SODIUM CHLORIDE 9 MG/ML
800 INJECTION INTRAMUSCULAR; INTRAVENOUS; SUBCUTANEOUS ONCE
Refills: 0 | Status: COMPLETED | OUTPATIENT
Start: 2020-12-24 | End: 2020-12-24

## 2020-12-24 RX ORDER — MODAFINIL 200 MG/1
100 TABLET ORAL DAILY
Refills: 0 | Status: DISCONTINUED | OUTPATIENT
Start: 2020-12-25 | End: 2020-12-31

## 2020-12-24 RX ORDER — ACETAMINOPHEN 500 MG
650 TABLET ORAL EVERY 6 HOURS
Refills: 0 | Status: COMPLETED | OUTPATIENT
Start: 2020-12-24 | End: 2020-12-25

## 2020-12-24 RX ORDER — INSULIN GLARGINE 100 [IU]/ML
8 INJECTION, SOLUTION SUBCUTANEOUS AT BEDTIME
Refills: 0 | Status: DISCONTINUED | OUTPATIENT
Start: 2020-12-24 | End: 2020-12-25

## 2020-12-24 RX ORDER — SODIUM,POTASSIUM PHOSPHATES 278-250MG
2 POWDER IN PACKET (EA) ORAL ONCE
Refills: 0 | Status: COMPLETED | OUTPATIENT
Start: 2020-12-24 | End: 2020-12-24

## 2020-12-24 RX ORDER — SODIUM CHLORIDE 9 MG/ML
3 INJECTION INTRAMUSCULAR; INTRAVENOUS; SUBCUTANEOUS EVERY 8 HOURS
Refills: 0 | Status: DISCONTINUED | OUTPATIENT
Start: 2020-12-24 | End: 2020-12-29

## 2020-12-24 RX ORDER — SODIUM CHLORIDE 9 MG/ML
2 INJECTION INTRAMUSCULAR; INTRAVENOUS; SUBCUTANEOUS EVERY 8 HOURS
Refills: 0 | Status: DISCONTINUED | OUTPATIENT
Start: 2020-12-24 | End: 2020-12-24

## 2020-12-24 RX ORDER — SODIUM CHLORIDE 5 G/100ML
500 INJECTION, SOLUTION INTRAVENOUS
Refills: 0 | Status: DISCONTINUED | OUTPATIENT
Start: 2020-12-24 | End: 2020-12-25

## 2020-12-24 RX ORDER — LABETALOL HCL 100 MG
2.5 TABLET ORAL EVERY 6 HOURS
Refills: 0 | Status: DISCONTINUED | OUTPATIENT
Start: 2020-12-24 | End: 2021-01-15

## 2020-12-24 RX ADMIN — LACOSAMIDE 150 MILLIGRAM(S): 50 TABLET ORAL at 06:20

## 2020-12-24 RX ADMIN — Medication 650 MILLIGRAM(S): at 02:20

## 2020-12-24 RX ADMIN — CHLORHEXIDINE GLUCONATE 1 APPLICATION(S): 213 SOLUTION TOPICAL at 06:20

## 2020-12-24 RX ADMIN — Medication 3 MILLILITER(S): at 22:07

## 2020-12-24 RX ADMIN — Medication 650 MILLIGRAM(S): at 17:14

## 2020-12-24 RX ADMIN — Medication 2: at 23:26

## 2020-12-24 RX ADMIN — MONTELUKAST 10 MILLIGRAM(S): 4 TABLET, CHEWABLE ORAL at 12:45

## 2020-12-24 RX ADMIN — SODIUM CHLORIDE 2 GRAM(S): 9 INJECTION INTRAMUSCULAR; INTRAVENOUS; SUBCUTANEOUS at 06:20

## 2020-12-24 RX ADMIN — Medication 4: at 07:19

## 2020-12-24 RX ADMIN — Medication 650 MILLIGRAM(S): at 01:44

## 2020-12-24 RX ADMIN — ENOXAPARIN SODIUM 40 MILLIGRAM(S): 100 INJECTION SUBCUTANEOUS at 23:08

## 2020-12-24 RX ADMIN — PREGABALIN 1000 MICROGRAM(S): 225 CAPSULE ORAL at 12:45

## 2020-12-24 RX ADMIN — Medication 2: at 18:08

## 2020-12-24 RX ADMIN — FLUDROCORTISONE ACETATE 0.2 MILLIGRAM(S): 0.1 TABLET ORAL at 12:45

## 2020-12-24 RX ADMIN — LACOSAMIDE 150 MILLIGRAM(S): 50 TABLET ORAL at 17:13

## 2020-12-24 RX ADMIN — INSULIN GLARGINE 8 UNIT(S): 100 INJECTION, SOLUTION SUBCUTANEOUS at 23:26

## 2020-12-24 RX ADMIN — Medication 3 MILLILITER(S): at 16:10

## 2020-12-24 RX ADMIN — Medication 2 PACKET(S): at 10:47

## 2020-12-24 RX ADMIN — Medication 5 MILLIGRAM(S): at 10:47

## 2020-12-24 RX ADMIN — Medication 650 MILLIGRAM(S): at 17:55

## 2020-12-24 RX ADMIN — Medication 3 MILLILITER(S): at 03:15

## 2020-12-24 RX ADMIN — Medication 2.5 MILLIGRAM(S): at 17:19

## 2020-12-24 RX ADMIN — Medication 3 MILLILITER(S): at 09:59

## 2020-12-24 RX ADMIN — Medication 650 MILLIGRAM(S): at 23:07

## 2020-12-24 RX ADMIN — Medication 100 GRAM(S): at 10:13

## 2020-12-24 RX ADMIN — SODIUM CHLORIDE 2 GRAM(S): 9 INJECTION INTRAMUSCULAR; INTRAVENOUS; SUBCUTANEOUS at 17:18

## 2020-12-24 RX ADMIN — Medication 2.5 MILLIGRAM(S): at 10:45

## 2020-12-24 RX ADMIN — Medication 4: at 12:44

## 2020-12-24 RX ADMIN — MILRINONE LACTATE 7.49 MICROGRAM(S)/KG/MIN: 1 INJECTION, SOLUTION INTRAVENOUS at 10:12

## 2020-12-24 RX ADMIN — SODIUM CHLORIDE 3 GRAM(S): 9 INJECTION INTRAMUSCULAR; INTRAVENOUS; SUBCUTANEOUS at 23:09

## 2020-12-24 RX ADMIN — Medication 650 MILLIGRAM(S): at 11:00

## 2020-12-24 RX ADMIN — SODIUM CHLORIDE 266.67 MILLILITER(S): 9 INJECTION INTRAMUSCULAR; INTRAVENOUS; SUBCUTANEOUS at 05:00

## 2020-12-24 RX ADMIN — Medication 325 MILLIGRAM(S): at 12:45

## 2020-12-24 RX ADMIN — Medication 650 MILLIGRAM(S): at 10:30

## 2020-12-24 RX ADMIN — ATORVASTATIN CALCIUM 40 MILLIGRAM(S): 80 TABLET, FILM COATED ORAL at 23:08

## 2020-12-24 NOTE — PROGRESS NOTE ADULT - SUBJECTIVE AND OBJECTIVE BOX
Pt seen and examined at bedside with chief resident, no acute event overnight, hemodynamically stable.    Vital Signs Last 24 Hrs  T(C): 37.8 (24 Dec 2020 07:00), Max: 38.9 (24 Dec 2020 02:00)  T(F): 100 (24 Dec 2020 07:00), Max: 102 (24 Dec 2020 02:00)  HR: 96 (24 Dec 2020 08:00) (78 - 105)  BP: 196/85 (24 Dec 2020 08:00) (103/52 - 217/88)  BP(mean): 122 (24 Dec 2020 08:00) (75 - 130)  RR: 16 (24 Dec 2020 08:00) (16 - 30)  SpO2: 98% (24 Dec 2020 08:00) (92% - 100%)  I&O's Summary    23 Dec 2020 07:01  -  24 Dec 2020 07:00  --------------------------------------------------------  IN: 3458.5 mL / OUT: 3130 mL / NET: 328.5 mL    24 Dec 2020 07:01  -  24 Dec 2020 08:36  --------------------------------------------------------  IN: 95 mL / OUT: 178 mL / NET: -83 mL    Physical Exam:  General: NAD, resting comfortably in bed  Pulmonary: Nonlabored breathing, no respiratory distress  Abdominal: soft, NT, ND, positive BS  Extremities: right groin incision with staples clean, dry, intact, soft. No hematoma or surrounding erythema.   Vascular: RLE: biphasic DP/PT    LABS:                        11.4   11.60 )-----------( 433      ( 24 Dec 2020 06:52 )             36.6     12-24    139  |  105  |  8   ----------------------------<  239<H>  4.2   |  22  |  0.46<L>    Ca    8.5      24 Dec 2020 06:52  Phos  2.6     12-24  Mg     1.9     12-24          Radiology and Additional Studies:

## 2020-12-24 NOTE — PROGRESS NOTE ADULT - SUBJECTIVE AND OBJECTIVE BOX
=================================================   NEUROCRITICAL CARE ATTENDING NOTE  =================================================    CARA CANCHOLA   MRN-[id number]  Summary:  75y/F with COPD, asthma, dyslipidemia Hypertension found down.  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  Given levetiracetam, hydromorphone.  NIHSS 2 HH3 MF4, transferred to [facility] for further management.      Past Medical History: Hyperlipidemia Hypertension COPD (chronic obstructive pulmonary disease) Asthma  Allergies:  No Known Allergies  Home meds:   ·	famotidine 20 mg oral tablet: 1 tab(s) orally once a day  ·	lisinopril 2.5 mg oral tablet: 1 tab(s) orally once a day  ·	montelukast 10 mg oral tablet: 1 tab(s) orally once a day  ·	predniSONE 50 mg oral tablet: 1 tab(s) orally once a day  ·	ProAir HFA CFC free 90 mcg/inh inhalation aerosol: 2 puff(s) inhaled 4 times a day PRN  ·	simvastatin 20 mg oral tablet: 1 tab(s) orally once a day (at bedtime)    COURSE IN THE HOSPITAL:   admitted to [facility], EVD inserted; given 20 lasix for pulmo edema, T inversion on CT  12/10 No significant events overnight.    No significant events overnight.    No significant events overnight.    No significant events overnight. drain stopped working at 730 a.m., off levophed    No significant events overnight.   12/15 Noted confusion, CTA mild spasm   lethargic, angio - no severe spasm, given IA verapamil, underwent femoral endarterectomy with repair with patch angioplasty   improved post verapamil, transitioned to milrinone, currently tolerating extubation  : BD#10, POD#8 s/p coil/embo of L pcomm aneurysm, POD#2 s/p IA verapamil, POD#2 R groin cutdown for removal of proglide catheter w/ repair of femoral artery.   : neurological improvement, stable hemodynamic status  : BD#12, POD#9, EVD raise to 5 cm  : no acute events, but fluctuating level of alerness this morniong.  SBP in 190's given labetalol  :     24h events: EVD clamped yesterday, febrile overnight 101.5.    this morning given 10 labetalol, BP dropped to  minimum - clearly worse mental status, somenolent and mumbling.  Started levophed to correct.      EVD at clamp  ICPs single digits    NEUROLOGIC EXAMINATION:      MSE: Very somnolent, not speaking much, says a few words, mumbling.   fluctuating exam appears worse when below 's  CN: pupils 3mmg b/l reactive, EOMI, face symmetric  motor: arms grossly antigravity, full  strength b/l, not participatipating in exam  EENT:  anicteric  CARDIOVASCULAR: (+) S1 S2, tachycardic  PULMONARY: clear to auscultation bilaterally, slight expiratory wheezes  ABDOMEN: soft, nontender but someasht distended  +BS  EXTREMITIES: no edema, pulses present by Dopplers  SKIN: no rash      Allergies: No Known Allergies      VITALS:  T(C): 36.9 (20 @ 09:08), Max: 38.6 (20 @ 06:00)  HR: 79 (20 @ 10:00) (78 - 97)  BP: 153/67 (20 @ 10:00) (103/52 - 229/97)  RR: 24 (20 @ 09:00) (19 - 35)  SpO2: 100% (20 @ 10:00) (94% - 100%)    MEDICATIONS:  acetaminophen    Suspension .. 650 milliGRAM(s) Oral every 6 hours PRN  albuterol/ipratropium for Nebulization. 3 milliLiter(s) Nebulizer every 6 hours  atorvastatin 40 milliGRAM(s) Oral at bedtime  chlorhexidine 2% Cloths 1 Application(s) Topical <User Schedule>  cyanocobalamin 1000 MICROGram(s) Oral daily  dextrose 40% Gel 15 Gram(s) Oral once  dextrose 50% Injectable 25 Gram(s) IV Push once  enoxaparin Injectable 40 milliGRAM(s) SubCutaneous every 24 hours  ferrous    sulfate 325 milliGRAM(s) Oral daily  fludroCORTISONE 0.2 milliGRAM(s) Oral every 24 hours  glucagon  Injectable 1 milliGRAM(s) IntraMuscular once  insulin lispro (ADMELOG) corrective regimen sliding scale   SubCutaneous Before meals and at bedtime  lacosamide Solution 150 milliGRAM(s) Oral every 12 hours  methylphenidate 5 milliGRAM(s) Oral every 24 hours  milrinone Infusion 0.5 MICROgram(s)/kG/Min IV Continuous <Continuous>  montelukast 10 milliGRAM(s) Oral daily  norepinephrine Infusion 0.05 MICROgram(s)/kG/Min IV Continuous <Continuous>  sodium chloride 2 Gram(s) Oral every 12 hours  sodium chloride 3%. 500 milliLiter(s) IV Continuous <Continuous>  30cc/hr  sodium chloride 3%. 200 milliLiter(s) IV Continuous <Continuous>      I/O's    20 @ 07:  -  20 @ 07:00  --------------------------------------------------------  IN: 2630.5 mL / OUT: 3503 mL / NET: -872.5 mL    20 @ 07:01  -  20 @ 10:30  --------------------------------------------------------  IN: 248.4 mL / OUT: 90 mL / NET: 158.4 mL        Ventilator data:      LABS:                          10.2   14.67 )-----------( 343      ( 23 Dec 2020 05:26 )             31.4         133<L>  |  97  |  6<L>  ----------------------------<  247<H>  3.5   |  26  |  0.41<L>    Ca    8.8      23 Dec 2020 05:26  Phos  2.4       Mg     1.8             Urinalysis Basic - ( 23 Dec 2020 06:07 )    Color: Yellow / Appearance: Clear / S.025 / pH: x  Gluc: x / Ketone: NEGATIVE  / Bili: Negative / Urobili: 0.2 E.U./dL   Blood: x / Protein: 30 mg/dL / Nitrite: NEGATIVE   Leuk Esterase: NEGATIVE / RBC: 5-10 /HPF / WBC < 5 /HPF   Sq Epi: x / Non Sq Epi: 0-5 /HPF / Bacteria: Present /HPF          CAPILLARY BLOOD GLUCOSE      POCT Blood Glucose.: 270 mg/dL (23 Dec 2020 06:48)  POCT Blood Glucose.: 128 mg/dL (22 Dec 2020 22:12)  POCT Blood Glucose.: 234 mg/dL (22 Dec 2020 21:46)  POCT Blood Glucose.: 180 mg/dL (22 Dec 2020 16:10)            CSF studies:  EE/16:   1)	Rare right frontal lateralized rhythmic delta activity (LRDA) for very brief duration, suggestive of right frontal cortical hyper-excitability.   2)	Frequent right frontal sharp waves, as well as rare left frontoparietal and left temporal sharp waves, suggestive of focal cortical hyper-excitability in these regions.  Neuroimagin/16 XA:  complete and persistent left Pcomm aneurysm occlusion with microcoils; mild vasospasm in right A1 and mild vasospasm in left MAGGIE.  Intra arterial verapamil infusion (10 mg right CCA and 15 mg left CCA)  12/15 CTA head:  Multifocal moderate stenosis the right PCA. Mild stenosis of the left PCA which were present on prior CTA head and neck 2020 and therefore may reflect intracranial atherosclerosis rather than vasospasm.  New mild stenosis of the mid right M1 segment.  Interval increase in size of the lateral and third ventricles since prior CT head 2020.  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  12/10 CT head:  EVD placement, stable   CTA:  L pcomm aneurysm, L ICA (distal cavernous) aneurysm vs infundibulum, extensive calcified plaque cavernous bilateral ICA with mild to mod stenosis; thick plaque bilateral carotid bifurcations - mod to severe R and mild to mod L stenosis, focal calcified plaque R VA off subclavian artery - high grate stenosis / partial occlusion, upper lung bilateral opacification - pulmo edema, chronic deformity R humeral neck   CT head:  SAH, basilar cisterns, IV extension, obstructive hydrocephalus SVID, lacunar infarcts R caudate, both thalami, cerebellar hemispheres, no CT evidence of territorial infarction  Other imagin/19 CXR: Discoid change right lung base. Chronic interstitial changes.   CXR: improve right basilar opacity/pleural effusion   LE Doppler: NEG   CT abd:  small paraesophageal hiatal hernia, gastritis; ?impaction, septal thickening bilateral lower lobes ?pulmo edema, hepatic steatosis    IV FLUIDS: 3% NaCl 15 mL/h  DRIPS:  DIET: CCD  Lines: R IJ  Drains:     EVD at 5cm H20   EVD at 5cm H20 ICP < 10  12/15 EVD at 5cm H20 ICP 1-5, CSF 2-8 mL/h   EVD at 0cm H20 ICP 2-7 CSF 4-20 mL/h   EVD at 0cm H20 ICP 1-6 CSF 4-20 mL/h   EVD at 0cm H20 ICP 1-10 CSF 7-18 mL/h   EVD at 0cm H20 ICP 1-10 CSF 3-14 mL/h   EVD at 0cm H20 ICP < 10, CSF 90/180 mL (will be raised to 5 cm)

## 2020-12-24 NOTE — PROGRESS NOTE ADULT - SUBJECTIVE AND OBJECTIVE BOX
HPI:  76y/o F with PMHx sig for COPD, asthma, HLD, HTN, BIBEMS to [facility] from home after HHA discovered patient found down in floor covered in non-bloody vomitus. Perr HHA Pt went to the bathroom and was taking a long time, went in to check on her and found her sitting on floor, awake, however with vomitus on front of shirt. On arrival to [facility], patient was taken for stat head CT, which revealed diffuse subarachnoid hemorrhage mainly at basilar cisterns with IVH and obstructive hydrocephalus. Patient was given a bolus of 1g keppra and dilaudid pushes for generalized headache. CTA concerning for 3mm left pcomm aneurysm and a 1.6mm outpouching of distal cavernous segment of the left internal carotid artery likely aneurysm. NIHSS2 HH3 MF4. Patient was transferred to [facility] for further intervention. Patient currently reports headaches. Pt denies acute changes in vision, seizures, CP, SOB, weakness/paresthesias of arms or legs. (09 Dec 2020 23:37)    Hospital Course:   12/9: BD1 Patient admitted for SAH HH3, MF4.   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 EVD at 5, on Levo overnight, nimodipine discontinued d/t hypotension  12/12: BD4 POD#2, VIVIEN overnight, neuro stable, passed TOV  12/13: BD5 POD#3: VIVIEN x 24 hrs  12/14: BD6 POD#4. VIVIEN o/n, neuro stable. EVD at 5cm H2O  12/15: BD7 POD #5 VIVIEN overnight, neuro stable. EVD remains at 5. Plan for CTA today.   12/16: BD8 POD #6 VIVIEN overnight, neuro stable, EVD at 0, on Levo, started on 3% at 15 overnight   12/17: BD9 POD#1 s/p cerebral angio for verapamil c/b device malfunction of Proglide, s/p right groin cutdown for removal of proglide catheter and femoral artery repair.  VIVIEN overnight, neuro stable, EVD at 0. patient remained intubated overnight, on propofol for sedation, and vEEG  12/18: BD#10, POD#8 s/p coil/embo of L pcomm aneurysm, POD#2 s/p IA verapamil, POD#2 R groin cutdown for removal of proglide catheter w/ repair of femoral artery. VIVIEN overnight. EVD remains open @0cmH2O   12/19: BD#11, POD#9 s/p coil/embo of L pcomm aneurysm. POD#3 s/p IA verapamil, POD#3 s/p R femoral artery cutdown of proglide catheter w/ femoral artery repair. VIVIEN overnight. EVD remains open @0cmH2O. EEG shows b/l frontal sharp discharges, cont monitoring, vimpat was increased yesterday. Gentle hydration, total IVF 50cc/hr. Cont vanc/zosyn for empiric coverage, next vanc trough due @1pm on 12/19. F/u daily CXR.   12/20 BD#12 POD#10 VIVIEN overnight, Neuro exam stable, EVD @ 0cm H2O, vEEG, 3% @ 15 goal WNL, TF via NGT  12/21: BD13, POD11 VIVIEN overnight. EVD at 5cmH20 (raised yesterday), vEEG in place  12/22 BD#14, EVD raised to 15 yesterday, 3% @ 30cc Goal WNL, -180, Milrinone, TTE prior to DC as per Card for takotsubo cardiomyopathy, failed S&S pending reeval, TF via NGT, Neuro exam stable  12/23: BD#15. VIVIEN o/n, neuro stable. EVD clamped. 30%@30cc/hr  12/24 BD#16 VIVIEN overnight, spiked 102, EVD @ 0cm H2O, 3% @ 30cc/hr Goal WNL, Vasc following, TF via NGT, -200,     ICU Vital Signs Last 24 Hrs  T(C): 38.9 (24 Dec 2020 02:00), Max: 38.9 (24 Dec 2020 02:00)  T(F): 102 (24 Dec 2020 02:00), Max: 102 (24 Dec 2020 02:00)  HR: 96 (24 Dec 2020 03:00) (78 - 105)  BP: 125/58 (24 Dec 2020 03:00) (103/52 - 205/90)  BP(mean): 85 (24 Dec 2020 03:00) (75 - 130)  ABP: --  ABP(mean): --  RR: 20 (24 Dec 2020 03:00) (20 - 32)  SpO2: 92% (24 Dec 2020 03:00) (92% - 100%)      PHYSICAL EXAM:  GEN: laying in bed, appears well, NAD  NEURO: AOx1. FC, OE spont, speech intact, face symmetric. CNII-XII intact. PERRL, EOMI. No pronator drift. MAEx4 with good strength  CV: RRR +S1/S2  PULM: CTAB  GI: Abd soft, NT/ND  EXT: ext warm, dry, nontender    TUBES/LINES:  [] Soriano  [] Lumbar Drain  [] Wound Drains  [] Others      DIET:  [] NPO  [] Mechanical  [x] Tube feeds    LABS:          Pending AM Collection              CAPILLARY BLOOD GLUCOSE      POCT Blood Glucose.: 128 mg/dL (22 Dec 2020 22:12)  POCT Blood Glucose.: 234 mg/dL (22 Dec 2020 21:46)  POCT Blood Glucose.: 180 mg/dL (22 Dec 2020 16:10)  POCT Blood Glucose.: 164 mg/dL (22 Dec 2020 10:24)  POCT Blood Glucose.: 210 mg/dL (22 Dec 2020 06:51)      Drug Levels: [] N/A  Vancomycin Level, Trough: <4.0 ug/mL (12-18 @ 13:37)    CSF Analysis: [] N/A      Allergies    No Known Allergies    Intolerances      MEDICATIONS:  Antibiotics:    Neuro:  acetaminophen    Suspension .. 650 milliGRAM(s) Oral every 6 hours PRN  lacosamide Solution 150 milliGRAM(s) Oral every 12 hours  methylphenidate 5 milliGRAM(s) Oral every 24 hours    Anticoagulation:  enoxaparin Injectable 40 milliGRAM(s) SubCutaneous every 24 hours    OTHER:  albuterol/ipratropium for Nebulization. 3 milliLiter(s) Nebulizer every 6 hours  atorvastatin 40 milliGRAM(s) Oral at bedtime  bisacodyl Suppository 10 milliGRAM(s) Rectal daily  chlorhexidine 2% Cloths 1 Application(s) Topical <User Schedule>  dextrose 40% Gel 15 Gram(s) Oral once  dextrose 50% Injectable 25 Gram(s) IV Push once  fludroCORTISONE 0.2 milliGRAM(s) Oral every 24 hours  glucagon  Injectable 1 milliGRAM(s) IntraMuscular once  insulin lispro (ADMELOG) corrective regimen sliding scale   SubCutaneous Before meals and at bedtime  labetalol Injectable 5 milliGRAM(s) IV Push every 4 hours PRN  milrinone Infusion 0.5 MICROgram(s)/kG/Min IV Continuous <Continuous>  montelukast 10 milliGRAM(s) Oral daily    IVF:  cyanocobalamin 1000 MICROGram(s) Oral daily  ferrous    sulfate 325 milliGRAM(s) Oral daily  sodium chloride 2 Gram(s) Oral every 12 hours  sodium chloride 3%. 500 milliLiter(s) IV Continuous <Continuous>    CULTURES:  Culture Results:   No growth to date (12-16 @ 12:10)  Culture Results:   No growth at 5 days. (12-16 @ 01:18)    RADIOLOGY & ADDITIONAL TESTS:      ASSESSMENT:  74 yo Female with PMHx of COPD, asthma, HLD, HTN, BIBEMS to [facility] from home after HHA found patient down on the floor covered in non-bloody vomitus. CTH reveals diffuse subarachnoid hemorrhage mainly at basilar cisterns with IVH and obstructive hydrocephalus, CTA shows 3mm left pcomm aneurysm, now s/p bedside right frontal EVD placement 12/10, s/p cerebral angiogram for coil embolization of left PCOMM aneurysm 12/10, now POD #8 s/p cerebral angio for verapamil c/b device malfunction of Proglide, s/p right groin cutdown for removal of proglide catheter and femoral artery repair (12/17/2020), NIHSS2 HH3 MF4).     HEADACHE    Handoff    Hyperlipidemia    Hypertension    COPD (chronic obstructive pulmonary disease)    Asthma    COPD (chronic obstructive pulmonary disease)    Asthma    Injury of right femoral artery    Cerebral artery vasospasm    SAH (subarachnoid hemorrhage)    Injury of femoral artery    Cerebral artery vasospasm    SAH (subarachnoid hemorrhage)    Subarachnoid bleed    Vascular surgery procedure    Angiogram, carotid and cerebral, bilateral    Angiogram, cerebral, with intracranial aneurysm embolization    No significant past surgical history    HEADACHE    90+    [email or web address]        PLAN:  NEURO:  - neuro checks q1h  - pain control   - EVD open @ 0cm H2O, monitor ICPs  - ritalin neurostim  - no nimodipine, causes labile SBP  - PT/OT    CARDIOVASCULAR:  - -200  - neurologically better with higher pressures  - cont milrinone gtt for vasospasm   - cont lipitor  - cards following, recs appreciated  - will need echo and CCTA before d/c  - HF, EF 30%, limit fluids  - b/l carotid stenosis, will eventually need ASA    PULMONARY:  - satting well RA    RENAL:  - repletions prn   - 3%30cc/hr, florinef, salt tabs  - normonatremia goal     GI:  - TF via NGT  - bowel regimen     HEME:  - h/h stable  - SCDs/SQL  - cont PO iron anemia    ID:  - febrile 102 (12/24), Pan Cx 12/23  - trend leukocytosis     ENDO:  - ISS    DVT PROPHYLAXIS:  [x] Venodynes                                [x] Heparin/Lovenox    DISPOSITION: ICU status, full code, dispo pending    D/w Dr. Serrano, Dr. Bang    Assessment:  Present when checked    []  GCS  E   V  M     Heart Failure: []Acute, [] acute on chronic , []chronic  Heart Failure:  [] Diastolic (HFpEF), [] Systolic (HFrEF), []Combined (HFpEF and HFrEF), [] RHF, [] Pulm HTN, [] Other    [] MICHAELLE, [] ATN, [] AIN, [] other  [] CKD1, [] CKD2, [] CKD 3, [] CKD 4, [] CKD 5, []ESRD    Encephalopathy: [x] Metabolic, [] Hepatic, [] toxic, [] Neurological, [] Other    Abnormal Nurtitional Status: [] malnurtition (see nutrition note), [ ]underweight: BMI < 19, [] morbid obesity: BMI >40, [] Cachexia    [] Sepsis  [] hypovolemic shock,[] cardiogenic shock, [] hemorrhagic shock, [] neuogenic shock  [] Acute Respiratory Failure  []Cerebral edema, [] Brain compression/ herniation,   [] Functional quadriplegia  [] Acute blood loss anemia

## 2020-12-24 NOTE — PROGRESS NOTE ADULT - ASSESSMENT
76yo F POD#2 s/p cerebral angio for verapamil c/b device malfunction of Proglide, s/p right groin cutdown for removal of proglide catheter and femoral artery repair on 12/16/20     -Right groin monitoring, look for signs of infection  -Regular Pulse checks RLE  -Vascular surgery will continue to follow

## 2020-12-25 LAB
ANION GAP SERPL CALC-SCNC: 10 MMOL/L — SIGNIFICANT CHANGE UP (ref 5–17)
ANION GAP SERPL CALC-SCNC: 11 MMOL/L — SIGNIFICANT CHANGE UP (ref 5–17)
ANION GAP SERPL CALC-SCNC: 8 MMOL/L — SIGNIFICANT CHANGE UP (ref 5–17)
BUN SERPL-MCNC: 13 MG/DL — SIGNIFICANT CHANGE UP (ref 7–23)
BUN SERPL-MCNC: 14 MG/DL — SIGNIFICANT CHANGE UP (ref 7–23)
BUN SERPL-MCNC: 15 MG/DL — SIGNIFICANT CHANGE UP (ref 7–23)
CALCIUM SERPL-MCNC: 8.4 MG/DL — SIGNIFICANT CHANGE UP (ref 8.4–10.5)
CALCIUM SERPL-MCNC: 8.5 MG/DL — SIGNIFICANT CHANGE UP (ref 8.4–10.5)
CALCIUM SERPL-MCNC: 8.8 MG/DL — SIGNIFICANT CHANGE UP (ref 8.4–10.5)
CHLORIDE SERPL-SCNC: 104 MMOL/L — SIGNIFICANT CHANGE UP (ref 96–108)
CHLORIDE SERPL-SCNC: 107 MMOL/L — SIGNIFICANT CHANGE UP (ref 96–108)
CHLORIDE SERPL-SCNC: 108 MMOL/L — SIGNIFICANT CHANGE UP (ref 96–108)
CO2 SERPL-SCNC: 23 MMOL/L — SIGNIFICANT CHANGE UP (ref 22–31)
CO2 SERPL-SCNC: 24 MMOL/L — SIGNIFICANT CHANGE UP (ref 22–31)
CO2 SERPL-SCNC: 25 MMOL/L — SIGNIFICANT CHANGE UP (ref 22–31)
CREAT SERPL-MCNC: 0.35 MG/DL — LOW (ref 0.5–1.3)
CREAT SERPL-MCNC: 0.39 MG/DL — LOW (ref 0.5–1.3)
CREAT SERPL-MCNC: 0.4 MG/DL — LOW (ref 0.5–1.3)
GLUCOSE SERPL-MCNC: 161 MG/DL — HIGH (ref 70–99)
GLUCOSE SERPL-MCNC: 172 MG/DL — HIGH (ref 70–99)
GLUCOSE SERPL-MCNC: 195 MG/DL — HIGH (ref 70–99)
HCT VFR BLD CALC: 31.3 % — LOW (ref 34.5–45)
HCT VFR BLD CALC: 31.8 % — LOW (ref 34.5–45)
HGB BLD-MCNC: 9.6 G/DL — LOW (ref 11.5–15.5)
HGB BLD-MCNC: 9.7 G/DL — LOW (ref 11.5–15.5)
MAGNESIUM SERPL-MCNC: 2 MG/DL — SIGNIFICANT CHANGE UP (ref 1.6–2.6)
MAGNESIUM SERPL-MCNC: 2 MG/DL — SIGNIFICANT CHANGE UP (ref 1.6–2.6)
MCHC RBC-ENTMCNC: 27.2 PG — SIGNIFICANT CHANGE UP (ref 27–34)
MCHC RBC-ENTMCNC: 27.2 PG — SIGNIFICANT CHANGE UP (ref 27–34)
MCHC RBC-ENTMCNC: 30.5 GM/DL — LOW (ref 32–36)
MCHC RBC-ENTMCNC: 30.7 GM/DL — LOW (ref 32–36)
MCV RBC AUTO: 88.7 FL — SIGNIFICANT CHANGE UP (ref 80–100)
MCV RBC AUTO: 89.1 FL — SIGNIFICANT CHANGE UP (ref 80–100)
NRBC # BLD: 0 /100 WBCS — SIGNIFICANT CHANGE UP (ref 0–0)
NRBC # BLD: 0 /100 WBCS — SIGNIFICANT CHANGE UP (ref 0–0)
PHOSPHATE SERPL-MCNC: 2.9 MG/DL — SIGNIFICANT CHANGE UP (ref 2.5–4.5)
PHOSPHATE SERPL-MCNC: 3 MG/DL — SIGNIFICANT CHANGE UP (ref 2.5–4.5)
PLATELET # BLD AUTO: 378 K/UL — SIGNIFICANT CHANGE UP (ref 150–400)
PLATELET # BLD AUTO: 381 K/UL — SIGNIFICANT CHANGE UP (ref 150–400)
POTASSIUM SERPL-MCNC: 2.9 MMOL/L — CRITICAL LOW (ref 3.5–5.3)
POTASSIUM SERPL-MCNC: 4.3 MMOL/L — SIGNIFICANT CHANGE UP (ref 3.5–5.3)
POTASSIUM SERPL-MCNC: 5.4 MMOL/L — HIGH (ref 3.5–5.3)
POTASSIUM SERPL-SCNC: 2.9 MMOL/L — CRITICAL LOW (ref 3.5–5.3)
POTASSIUM SERPL-SCNC: 4.3 MMOL/L — SIGNIFICANT CHANGE UP (ref 3.5–5.3)
POTASSIUM SERPL-SCNC: 5.4 MMOL/L — HIGH (ref 3.5–5.3)
RBC # BLD: 3.53 M/UL — LOW (ref 3.8–5.2)
RBC # BLD: 3.57 M/UL — LOW (ref 3.8–5.2)
RBC # FLD: 16.6 % — HIGH (ref 10.3–14.5)
RBC # FLD: 16.9 % — HIGH (ref 10.3–14.5)
SODIUM SERPL-SCNC: 138 MMOL/L — SIGNIFICANT CHANGE UP (ref 135–145)
SODIUM SERPL-SCNC: 141 MMOL/L — SIGNIFICANT CHANGE UP (ref 135–145)
SODIUM SERPL-SCNC: 141 MMOL/L — SIGNIFICANT CHANGE UP (ref 135–145)
WBC # BLD: 11.46 K/UL — HIGH (ref 3.8–10.5)
WBC # BLD: 11.75 K/UL — HIGH (ref 3.8–10.5)
WBC # FLD AUTO: 11.46 K/UL — HIGH (ref 3.8–10.5)
WBC # FLD AUTO: 11.75 K/UL — HIGH (ref 3.8–10.5)

## 2020-12-25 PROCEDURE — 99291 CRITICAL CARE FIRST HOUR: CPT

## 2020-12-25 PROCEDURE — 99024 POSTOP FOLLOW-UP VISIT: CPT

## 2020-12-25 PROCEDURE — 70450 CT HEAD/BRAIN W/O DYE: CPT | Mod: 26

## 2020-12-25 RX ORDER — POTASSIUM CHLORIDE 20 MEQ
40 PACKET (EA) ORAL
Refills: 0 | Status: COMPLETED | OUTPATIENT
Start: 2020-12-25 | End: 2020-12-25

## 2020-12-25 RX ORDER — INSULIN GLARGINE 100 [IU]/ML
10 INJECTION, SOLUTION SUBCUTANEOUS AT BEDTIME
Refills: 0 | Status: DISCONTINUED | OUTPATIENT
Start: 2020-12-25 | End: 2020-12-31

## 2020-12-25 RX ADMIN — Medication 650 MILLIGRAM(S): at 09:55

## 2020-12-25 RX ADMIN — ATORVASTATIN CALCIUM 40 MILLIGRAM(S): 80 TABLET, FILM COATED ORAL at 22:21

## 2020-12-25 RX ADMIN — SODIUM CHLORIDE 3 GRAM(S): 9 INJECTION INTRAMUSCULAR; INTRAVENOUS; SUBCUTANEOUS at 22:21

## 2020-12-25 RX ADMIN — Medication 3 MILLILITER(S): at 04:16

## 2020-12-25 RX ADMIN — Medication 40 MILLIEQUIVALENT(S): at 01:48

## 2020-12-25 RX ADMIN — CHLORHEXIDINE GLUCONATE 1 APPLICATION(S): 213 SOLUTION TOPICAL at 05:36

## 2020-12-25 RX ADMIN — ENOXAPARIN SODIUM 40 MILLIGRAM(S): 100 INJECTION SUBCUTANEOUS at 22:21

## 2020-12-25 RX ADMIN — Medication 325 MILLIGRAM(S): at 11:00

## 2020-12-25 RX ADMIN — Medication 3 MILLILITER(S): at 21:54

## 2020-12-25 RX ADMIN — Medication 650 MILLIGRAM(S): at 09:08

## 2020-12-25 RX ADMIN — Medication 3 MILLILITER(S): at 17:17

## 2020-12-25 RX ADMIN — PREGABALIN 1000 MICROGRAM(S): 225 CAPSULE ORAL at 11:00

## 2020-12-25 RX ADMIN — Medication 40 MILLIEQUIVALENT(S): at 04:00

## 2020-12-25 RX ADMIN — Medication 4: at 17:24

## 2020-12-25 RX ADMIN — FLUDROCORTISONE ACETATE 0.2 MILLIGRAM(S): 0.1 TABLET ORAL at 10:55

## 2020-12-25 RX ADMIN — Medication 3 MILLILITER(S): at 09:08

## 2020-12-25 RX ADMIN — Medication 650 MILLIGRAM(S): at 04:00

## 2020-12-25 RX ADMIN — SODIUM CHLORIDE 3 GRAM(S): 9 INJECTION INTRAMUSCULAR; INTRAVENOUS; SUBCUTANEOUS at 06:38

## 2020-12-25 RX ADMIN — MILRINONE LACTATE 7.49 MICROGRAM(S)/KG/MIN: 1 INJECTION, SOLUTION INTRAVENOUS at 09:55

## 2020-12-25 RX ADMIN — LACOSAMIDE 150 MILLIGRAM(S): 50 TABLET ORAL at 18:57

## 2020-12-25 RX ADMIN — MONTELUKAST 10 MILLIGRAM(S): 4 TABLET, CHEWABLE ORAL at 11:00

## 2020-12-25 RX ADMIN — Medication 650 MILLIGRAM(S): at 05:36

## 2020-12-25 RX ADMIN — INSULIN GLARGINE 10 UNIT(S): 100 INJECTION, SOLUTION SUBCUTANEOUS at 22:21

## 2020-12-25 RX ADMIN — Medication 650 MILLIGRAM(S): at 00:00

## 2020-12-25 RX ADMIN — Medication 2: at 11:31

## 2020-12-25 RX ADMIN — MODAFINIL 100 MILLIGRAM(S): 200 TABLET ORAL at 11:00

## 2020-12-25 RX ADMIN — SODIUM CHLORIDE 3 GRAM(S): 9 INJECTION INTRAMUSCULAR; INTRAVENOUS; SUBCUTANEOUS at 14:36

## 2020-12-25 RX ADMIN — LACOSAMIDE 150 MILLIGRAM(S): 50 TABLET ORAL at 06:44

## 2020-12-25 RX ADMIN — Medication 2: at 06:38

## 2020-12-25 NOTE — PROCEDURE NOTE - ADDITIONAL PROCEDURE DETAILS
EVD remained clamped. Site prepped with chlorhexidine. Anchoring sutures removed, EVD drain removed easily with no complication, tip of catheter visualized. Staple placed at EVD removal site. Patient tolerated procedure well.

## 2020-12-25 NOTE — PROGRESS NOTE ADULT - ASSESSMENT
75y/Female with:  HH3 MF4, aneursymal subarachnoid hemorrhage, L pcomm aneurysm, L parophthalmic aneurysm; brain compression, cerebral edema  osbtructive hydrocephalus  lacunar infarcts R caudate, B thalami, cerebellar hemispheres ?artery to artery vs cardioembolic?  atherosclerotic cardiovascular disease; mod to severe bilateral ICA stenosis (R>L), R VA stenosis  Hypertension dyslipidemia   COPD asthma  newly diagnosed Diabetes Mellitus?  troponin leak    BLEED DAY 15  PLAN: Day 1 = 12-09 ; Day 4 =  12/12; Day 21 = 12/29  NEURO:1) SAH   neurochecks q1h, PRN pain meds with Tylenol / Percocet  ritalin stopped yesterday  nSAH:  nimodipine discontinued due to pressor requirements  Hydrocephalus:  EVD clamped, ct head today, possible removal   SBP <200, liberalized, autoregulation  Fluctuating exam appears pressure-dependant, may be related to high grade b/l ICA stenosis plus ? 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%  -repeat echo prior to discharge  SBP goal 100-200mm Hg (currently autoregulates to sBP 170-180's-VSP), EF 35%, repeat TTE as per Cardiology (once outside VSP window, will start MTP/ARB)    ENDO:  Blood sugar goals 140-180 mg/dL, cont insulin sliding scale, atorvastatin 40mg     GI:  PPI for GI prophylaxis (home meds); bowel regimen  DIET: no residuals  pull rectal tube  -abdomen distended  -AXR    RENAL: 3% NaCl at 30  -hold NS    HEM/ONC: Hb stable, iron profile c/w iron deficiency, iron FeSO4 325 TID  superficial intramuscular LE clot --> indication for full a/c will defer until EVD is out, d/w NSGy  VTE Prophylaxis: SCDs, SQL  -will start ASA for high grade stenosis b/l carotids once EVD out    ID:  low grade fever, no abx for now    Social: updated family    Access:  R IJ TLC, remove soon   PIV  NGLUPIS Soriano

## 2020-12-25 NOTE — PROGRESS NOTE ADULT - SUBJECTIVE AND OBJECTIVE BOX
HPI:  76y/o F with PMHx sig for COPD, asthma, HLD, HTN, BIBEMS to [facility] from home after HHA discovered patient found down in floor covered in non-bloody vomitus. Perr HHA Pt went to the bathroom and was taking a long time, went in to check on her and found her sitting on floor, awake, however with vomitus on front of shirt. On arrival to [facility], patient was taken for stat head CT, which revealed diffuse subarachnoid hemorrhage mainly at basilar cisterns with IVH and obstructive hydrocephalus. Patient was given a bolus of 1g keppra and dilaudid pushes for generalized headache. CTA concerning for 3mm left pcomm aneurysm and a 1.6mm outpouching of distal cavernous segment of the left internal carotid artery likely aneurysm. NIHSS2 HH3 MF4. Patient was transferred to [facility] for further intervention. Patient currently reports headaches. Pt denies acute changes in vision, seizures, CP, SOB, weakness/paresthesias of arms or legs. (09 Dec 2020 23:37)    Hospital Course:   : BD1 Patient admitted for SAH HH3, MF4.   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 EVD at 5, on Levo overnight, nimodipine discontinued d/t hypotension  12: BD4 POD#2, VIVIEN overnight, neuro stable, passed TOV  12: BD5 POD#3: VIVIEN x 24 hrs  : BD6 POD#4. VIVIEN o/n, neuro stable. EVD at 5cm H2O  12/15: BD7 POD #5 VIIVEN overnight, neuro stable. EVD remains at 5. Plan for CTA today.   : BD8 POD #6 VIVIEN overnight, neuro stable, EVD at 0, on Levo, started on 3% at 15 overnight   : BD9 POD#1 s/p cerebral angio for verapamil c/b device malfunction of Proglide, s/p right groin cutdown for removal of proglide catheter and femoral artery repair.  VIVIEN overnight, neuro stable, EVD at 0. patient remained intubated overnight, on propofol for sedation, and vEEG  18: BD#10, POD#8 s/p coil/embo of L pcomm aneurysm, POD#2 s/p IA verapamil, POD#2 R groin cutdown for removal of proglide catheter w/ repair of femoral artery. VIVIEN overnight. EVD remains open @0cmH2O   : BD#11, POD#9 s/p coil/embo of L pcomm aneurysm. POD#3 s/p IA verapamil, POD#3 s/p R femoral artery cutdown of proglide catheter w/ femoral artery repair. VIVIEN overnight. EVD remains open @0cmH2O. EEG shows b/l frontal sharp discharges, cont monitoring, vimpat was increased yesterday. Gentle hydration, total IVF 50cc/hr. Cont vanc/zosyn for empiric coverage, next vanc trough due @1pm on . F/u daily CXR.    BD#12 POD#10 VIVIEN overnight, Neuro exam stable, EVD @ 0cm H2O, vEEG, 3% @ 15 goal WNL, TF via NGT  : BD13, POD11 VIVIEN overnight. EVD at 5cmH20 (raised yesterday), vEEG in place   BD#14, EVD raised to 15 yesterday, 3% @ 30cc Goal WNL, -180, Milrinone, TTE prior to DC as per Card for takotsubo cardiomyopathy, failed S&S pending reeval, TF via NGT, Neuro exam stable  : BD#15. VIVIEN o/n, neuro stable. EVD clamped. 30%@30cc/hr   BD#16 VIVIEN overnight, spiked 102, EVD @ 0cm H2O, 3% @ 30cc/hr Goal WNL, Vasc following, TF via NGT, -200,   : BD#17. VIVIEN overnight. Pan cx from , NGTD on CSF and blood. EVD clamped. 3% @15cc/hr, rate kept same overnight. NGT feeds at goal. SBP goal 160-200.     Vital Signs Last 24 Hrs  T(C): 37.5 (25 Dec 2020 01:06), Max: 39.1 (24 Dec 2020 11:00)  T(F): 99.5 (25 Dec 2020 01:06), Max: 102.4 (24 Dec 2020 11:00)  HR: 83 (25 Dec 2020 01:00) (75 - 99)  BP: 142/64 (25 Dec 2020 01:00) (112/53 - 227/93)  BP(mean): 92 (25 Dec 2020 01:) (77 - 135)  RR: 18 (25 Dec 2020 01:) (16 - 22)  SpO2: 95% (25 Dec 2020 01:) (92% - 98%)    I&O's Detail    23 Dec 2020 07:01  -  24 Dec 2020 07:00  --------------------------------------------------------  IN:    Enteral Tube Flush: 110 mL    Glucerna: 1200 mL    IV PiggyBack: 599.8 mL    Milrinone: 180 mL    Norepinephrine: 24.5 mL    Norepinephrine: 40.1 mL    Sodium Chloride 0.9% Bolus: 534 mL    sodium chloride 3%: 570 mL    sodium chloride 3%: 200.1 mL  Total IN: 3458.5 mL    OUT:    External Ventricular Device (mL): 135 mL    Indwelling Catheter - Urethral (mL): 2970 mL    Rectal Tube (mL): 25 mL  Total OUT: 3130 mL    Total NET: 328.5 mL      24 Dec 2020 07:01  -  25 Dec 2020 02:11  --------------------------------------------------------  IN:    Enteral Tube Flush: 210 mL    Glucerna: 100 mL    Glucerna: 700 mL    IV PiggyBack: 100 mL    Milrinone: 135 mL    Norepinephrine: 7.5 mL    sodium chloride 3%: 105 mL    sodium chloride 3%: 210 mL  Total IN: 1567.5 mL    OUT:    External Ventricular Device (mL): 3 mL    Indwelling Catheter - Urethral (mL): 2320 mL  Total OUT: 2323 mL    Total NET: -755.5 mL        I&O's Summary    23 Dec 2020 07:01  -  24 Dec 2020 07:00  --------------------------------------------------------  IN: 3458.5 mL / OUT: 3130 mL / NET: 328.5 mL    24 Dec 2020 07:  -  25 Dec 2020 02:11  --------------------------------------------------------  IN: 1567.5 mL / OUT: 2323 mL / NET: -755.5 mL        PHYSICAL EXAM:  General: NAD, pt is comfortably sitting up in bed, A&O x3, on RA  HEENT: PERRL 3mm, EOMI b/l, face symmetric, tongue midline   Cardiovascular: RRR, normal S1 and S2   Respiratory: lungs CTAB, no wheezing, rhonchi, or crackles   GI: normoactive BS to auscultation, abd soft, NTND   Neuro: no aphasia, speech clear, no dysmetria, no pronator drift  ZAFAR x4 spontaneously, strength 5/5 throughout x4 extremities   SILT throughout   Extremities: distal pulses 2+ x4     TUBES/LINES:  [] CVC  [] A-line  [] Lumbar Drain  [] Ventriculostomy  [] Other    DIET:  [] NPO  [] Mechanical  [] Tube feeds    LABS:                        11.4   11.60 )-----------( 433      ( 24 Dec 2020 06:52 )             36.6     12-25    138  |  104  |  13  ----------------------------<  195<H>  2.9<LL>   |  24  |  0.35<L>    Ca    8.4      25 Dec 2020 00:37  Phos  2.6     12-24  Mg     1.9     12-24        Urinalysis Basic - ( 23 Dec 2020 06:07 )    Color: Yellow / Appearance: Clear / S.025 / pH: x  Gluc: x / Ketone: NEGATIVE  / Bili: Negative / Urobili: 0.2 E.U./dL   Blood: x / Protein: 30 mg/dL / Nitrite: NEGATIVE   Leuk Esterase: NEGATIVE / RBC: 5-10 /HPF / WBC < 5 /HPF   Sq Epi: x / Non Sq Epi: 0-5 /HPF / Bacteria: Present /HPF          CAPILLARY BLOOD GLUCOSE      POCT Blood Glucose.: 169 mg/dL (24 Dec 2020 23:12)  POCT Blood Glucose.: 181 mg/dL (24 Dec 2020 21:54)  POCT Blood Glucose.: 180 mg/dL (24 Dec 2020 17:41)  POCT Blood Glucose.: 235 mg/dL (24 Dec 2020 12:04)  POCT Blood Glucose.: 240 mg/dL (24 Dec 2020 07:07)      Drug Levels: [] N/A  Vancomycin Level, Trough: <4.0 ug/mL (1218 @ 13:37)    CSF Analysis: [] N/A      Allergies    No Known Allergies    Intolerances      MEDICATIONS:  Antibiotics:    Neuro:  acetaminophen    Suspension .. 650 milliGRAM(s) Oral every 6 hours  lacosamide Solution 150 milliGRAM(s) Oral every 12 hours  modafinil 100 milliGRAM(s) Oral daily    Anticoagulation:  enoxaparin Injectable 40 milliGRAM(s) SubCutaneous every 24 hours    OTHER:  albuterol/ipratropium for Nebulization. 3 milliLiter(s) Nebulizer every 6 hours  atorvastatin 40 milliGRAM(s) Oral at bedtime  chlorhexidine 2% Cloths 1 Application(s) Topical <User Schedule>  dextrose 40% Gel 15 Gram(s) Oral once  dextrose 50% Injectable 25 Gram(s) IV Push once  fludroCORTISONE 0.2 milliGRAM(s) Oral every 24 hours  glucagon  Injectable 1 milliGRAM(s) IntraMuscular once  insulin glargine Injectable (LANTUS) 8 Unit(s) SubCutaneous at bedtime  insulin lispro (ADMELOG) corrective regimen sliding scale   SubCutaneous Before meals and at bedtime  labetalol Injectable 2.5 milliGRAM(s) IV Push every 6 hours PRN  milrinone Infusion 0.5 MICROgram(s)/kG/Min IV Continuous <Continuous>  montelukast 10 milliGRAM(s) Oral daily    IVF:  cyanocobalamin 1000 MICROGram(s) Oral daily  dextrose 5%. 1000 milliLiter(s) IV Continuous <Continuous>  dextrose 5%. 1000 milliLiter(s) IV Continuous <Continuous>  ferrous    sulfate 325 milliGRAM(s) Oral daily  potassium chloride   Solution 40 milliEquivalent(s) Oral every 2 hours  sodium chloride 3 Gram(s) Oral every 8 hours  sodium chloride 3%. 500 milliLiter(s) IV Continuous <Continuous>    CULTURES:  Culture Results:   No growth to date ( @ 15:54)  Culture Results:   No growth at 1 day. ( @ 09:53)    RADIOLOGY & ADDITIONAL TESTS:      ASSESSMENT:  75y Female s/p    HEADACHE    Handoff    Hyperlipidemia    Hypertension    COPD (chronic obstructive pulmonary disease)    Asthma    COPD (chronic obstructive pulmonary disease)    Asthma    Injury of right femoral artery    Cerebral artery vasospasm    SAH (subarachnoid hemorrhage)    Injury of femoral artery    Cerebral artery vasospasm    SAH (subarachnoid hemorrhage)    Subarachnoid bleed    Vascular surgery procedure    Angiogram, carotid and cerebral, bilateral    Angiogram, cerebral, with intracranial aneurysm embolization    No significant past surgical history    HEADACHE    90+    [email or web address]        PLAN:  NEURO:    CARDIOVASCULAR:    PULMONARY:    RENAL:    GI:    HEME:    ID:    ENDO:    DVT PROPHYLAXIS:  [] Venodynes                                [] Heparin/Lovenox    FALL RISK:  [] Low Risk                                    [] Impulsive    DISPOSITION:  HPI:  74y/o F with PMHx sig for COPD, asthma, HLD, HTN, BIBEMS to [facility] from home after HHA discovered patient found down in floor covered in non-bloody vomitus. Perr HHA Pt went to the bathroom and was taking a long time, went in to check on her and found her sitting on floor, awake, however with vomitus on front of shirt. On arrival to [facility], patient was taken for stat head CT, which revealed diffuse subarachnoid hemorrhage mainly at basilar cisterns with IVH and obstructive hydrocephalus. Patient was given a bolus of 1g keppra and dilaudid pushes for generalized headache. CTA concerning for 3mm left pcomm aneurysm and a 1.6mm outpouching of distal cavernous segment of the left internal carotid artery likely aneurysm. NIHSS2 HH3 MF4. Patient was transferred to [facility] for further intervention. Patient currently reports headaches. Pt denies acute changes in vision, seizures, CP, SOB, weakness/paresthesias of arms or legs. (09 Dec 2020 23:37)    Hospital Course:   : BD1 Patient admitted for SAH HH3, MF4.   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 EVD at 5, on Levo overnight, nimodipine discontinued d/t hypotension  12: BD4 POD#2, VIVIEN overnight, neuro stable, passed TOV  12: BD5 POD#3: VIVIEN x 24 hrs  : BD6 POD#4. VIVIEN o/n, neuro stable. EVD at 5cm H2O  12/15: BD7 POD #5 VIVIEN overnight, neuro stable. EVD remains at 5. Plan for CTA today.   : BD8 POD #6 VIVIEN overnight, neuro stable, EVD at 0, on Levo, started on 3% at 15 overnight   : BD9 POD#1 s/p cerebral angio for verapamil c/b device malfunction of Proglide, s/p right groin cutdown for removal of proglide catheter and femoral artery repair.  VIVIEN overnight, neuro stable, EVD at 0. patient remained intubated overnight, on propofol for sedation, and vEEG  18: BD#10, POD#8 s/p coil/embo of L pcomm aneurysm, POD#2 s/p IA verapamil, POD#2 R groin cutdown for removal of proglide catheter w/ repair of femoral artery. VIVIEN overnight. EVD remains open @0cmH2O   : BD#11, POD#9 s/p coil/embo of L pcomm aneurysm. POD#3 s/p IA verapamil, POD#3 s/p R femoral artery cutdown of proglide catheter w/ femoral artery repair. VIVIEN overnight. EVD remains open @0cmH2O. EEG shows b/l frontal sharp discharges, cont monitoring, vimpat was increased yesterday. Gentle hydration, total IVF 50cc/hr. Cont vanc/zosyn for empiric coverage, next vanc trough due @1pm on . F/u daily CXR.    BD#12 POD#10 VIVIEN overnight, Neuro exam stable, EVD @ 0cm H2O, vEEG, 3% @ 15 goal WNL, TF via NGT  : BD13, POD11 VIVIEN overnight. EVD at 5cmH20 (raised yesterday), vEEG in place   BD#14, EVD raised to 15 yesterday, 3% @ 30cc Goal WNL, -180, Milrinone, TTE prior to DC as per Card for takotsubo cardiomyopathy, failed S&S pending reeval, TF via NGT, Neuro exam stable  : BD#15. VIVIEN o/n, neuro stable. EVD clamped. 30%@30cc/hr   BD#16 VIVIEN overnight, spiked 102, EVD @ 0cm H2O, 3% @ 30cc/hr Goal WNL, Vasc following, TF via NGT, -200,   : BD#17. VIVIEN overnight. Pan cx from , NGTD on CSF and blood. EVD clamped. 3% @15cc/hr, rate kept same overnight. NGT feeds at goal. SBP goal 160-200.     Vital Signs Last 24 Hrs  T(C): 37.5 (25 Dec 2020 01:06), Max: 39.1 (24 Dec 2020 11:00)  T(F): 99.5 (25 Dec 2020 01:06), Max: 102.4 (24 Dec 2020 11:00)  HR: 83 (25 Dec 2020 01:00) (75 - 99)  BP: 142/64 (25 Dec 2020 01:00) (112/53 - 227/93)  BP(mean): 92 (25 Dec 2020 01:) (77 - 135)  RR: 18 (25 Dec 2020 01:) (16 - 22)  SpO2: 95% (25 Dec 2020 01:) (92% - 98%)    I&O's Detail    23 Dec 2020 07:01  -  24 Dec 2020 07:00  --------------------------------------------------------  IN:    Enteral Tube Flush: 110 mL    Glucerna: 1200 mL    IV PiggyBack: 599.8 mL    Milrinone: 180 mL    Norepinephrine: 24.5 mL    Norepinephrine: 40.1 mL    Sodium Chloride 0.9% Bolus: 534 mL    sodium chloride 3%: 570 mL    sodium chloride 3%: 200.1 mL  Total IN: 3458.5 mL    OUT:    External Ventricular Device (mL): 135 mL    Indwelling Catheter - Urethral (mL): 2970 mL    Rectal Tube (mL): 25 mL  Total OUT: 3130 mL    Total NET: 328.5 mL      24 Dec 2020 07:01  -  25 Dec 2020 02:11  --------------------------------------------------------  IN:    Enteral Tube Flush: 210 mL    Glucerna: 100 mL    Glucerna: 700 mL    IV PiggyBack: 100 mL    Milrinone: 135 mL    Norepinephrine: 7.5 mL    sodium chloride 3%: 105 mL    sodium chloride 3%: 210 mL  Total IN: 1567.5 mL    OUT:    External Ventricular Device (mL): 3 mL    Indwelling Catheter - Urethral (mL): 2320 mL  Total OUT: 2323 mL    Total NET: -755.5 mL        I&O's Summary    23 Dec 2020 07:01  -  24 Dec 2020 07:00  --------------------------------------------------------  IN: 3458.5 mL / OUT: 3130 mL / NET: 328.5 mL    24 Dec 2020 07:  -  25 Dec 2020 02:11  --------------------------------------------------------  IN: 1567.5 mL / OUT: 2323 mL / NET: -755.5 mL        PHYSICAL EXAM:  General: NAD, pt is comfortably sitting up in bed, A&O x1 (non-compliant w/ majority of exam)   HEENT: PERRL 3mm, EOMI b/l, face symmetric, tongue midline   Cardiovascular: RRR, normal S1 and S2   Respiratory: lungs CTAB, no wheezing, rhonchi, or crackles   GI: normoactive BS to auscultation, abd soft, NTND   Neuro: no aphasia, speech clear, no dysmetria, no pronator drift  ZAFAR x4 spontaneously, unable to assess strength d/t non-compliance w/ exam   Extremities: distal pulses 2+ x4   Wound/incision: R frontal EVD site C/D/I, R groin site C/D/I   Drains: EVD clamped     TUBES/LINES:  [] CVC  [] A-line  [] Lumbar Drain  [X] Ventriculostomy  [] Other    DIET:  [] NPO  [] Mechanical  [X] Tube feeds    LABS:                        11.4   11.60 )-----------( 433      ( 24 Dec 2020 06:52 )             36.6     12-25    138  |  104  |  13  ----------------------------<  195<H>  2.9<LL>   |  24  |  0.35<L>    Ca    8.4      25 Dec 2020 00:37  Phos  2.6     12-24  Mg     1.9     12-24        Urinalysis Basic - ( 23 Dec 2020 06:07 )    Color: Yellow / Appearance: Clear / S.025 / pH: x  Gluc: x / Ketone: NEGATIVE  / Bili: Negative / Urobili: 0.2 E.U./dL   Blood: x / Protein: 30 mg/dL / Nitrite: NEGATIVE   Leuk Esterase: NEGATIVE / RBC: 5-10 /HPF / WBC < 5 /HPF   Sq Epi: x / Non Sq Epi: 0-5 /HPF / Bacteria: Present /HPF          CAPILLARY BLOOD GLUCOSE      POCT Blood Glucose.: 169 mg/dL (24 Dec 2020 23:12)  POCT Blood Glucose.: 181 mg/dL (24 Dec 2020 21:54)  POCT Blood Glucose.: 180 mg/dL (24 Dec 2020 17:41)  POCT Blood Glucose.: 235 mg/dL (24 Dec 2020 12:04)  POCT Blood Glucose.: 240 mg/dL (24 Dec 2020 07:07)      Drug Levels: [] N/A  Vancomycin Level, Trough: <4.0 ug/mL (12-18 @ 13:37)    CSF Analysis: [] N/A      Allergies    No Known Allergies    Intolerances      MEDICATIONS:  Antibiotics:    Neuro:  acetaminophen    Suspension .. 650 milliGRAM(s) Oral every 6 hours  lacosamide Solution 150 milliGRAM(s) Oral every 12 hours  modafinil 100 milliGRAM(s) Oral daily    Anticoagulation:  enoxaparin Injectable 40 milliGRAM(s) SubCutaneous every 24 hours    OTHER:  albuterol/ipratropium for Nebulization. 3 milliLiter(s) Nebulizer every 6 hours  atorvastatin 40 milliGRAM(s) Oral at bedtime  chlorhexidine 2% Cloths 1 Application(s) Topical <User Schedule>  dextrose 40% Gel 15 Gram(s) Oral once  dextrose 50% Injectable 25 Gram(s) IV Push once  fludroCORTISONE 0.2 milliGRAM(s) Oral every 24 hours  glucagon  Injectable 1 milliGRAM(s) IntraMuscular once  insulin glargine Injectable (LANTUS) 8 Unit(s) SubCutaneous at bedtime  insulin lispro (ADMELOG) corrective regimen sliding scale   SubCutaneous Before meals and at bedtime  labetalol Injectable 2.5 milliGRAM(s) IV Push every 6 hours PRN  milrinone Infusion 0.5 MICROgram(s)/kG/Min IV Continuous <Continuous>  montelukast 10 milliGRAM(s) Oral daily    IVF:  cyanocobalamin 1000 MICROGram(s) Oral daily  dextrose 5%. 1000 milliLiter(s) IV Continuous <Continuous>  dextrose 5%. 1000 milliLiter(s) IV Continuous <Continuous>  ferrous    sulfate 325 milliGRAM(s) Oral daily  potassium chloride   Solution 40 milliEquivalent(s) Oral every 2 hours  sodium chloride 3 Gram(s) Oral every 8 hours  sodium chloride 3%. 500 milliLiter(s) IV Continuous <Continuous>    CULTURES:  Culture Results:   No growth to date ( @ 15:54)  Culture Results:   No growth at 1 day. ( @ 09:53)    RADIOLOGY & ADDITIONAL TESTS:      ASSESSMENT:  76 yo Female with PMHx of COPD, asthma, HLD, HTN, BIBEMS to [facility] from home after HHA found patient down on the floor covered in non-bloody vomitus. CTH reveals diffuse subarachnoid hemorrhage mainly at basilar cisterns with IVH and obstructive hydrocephalus, CTA shows 3mm left pcomm aneurysm, now s/p bedside right frontal EVD placement 12/10, s/p cerebral angiogram for coil embolization of left PCOMM aneurysm 12/10, now POD #9 s/p cerebral angio for verapamil c/b device malfunction of Proglide, s/p right groin cutdown for removal of proglide catheter and femoral artery repair (2020), NIHSS2 HH3 MF4).       HEADACHE    Handoff    Hyperlipidemia    Hypertension    COPD (chronic obstructive pulmonary disease)    Asthma    COPD (chronic obstructive pulmonary disease)    Asthma    Injury of right femoral artery    Cerebral artery vasospasm    SAH (subarachnoid hemorrhage)    Injury of femoral artery    Cerebral artery vasospasm    SAH (subarachnoid hemorrhage)    Subarachnoid bleed    Vascular surgery procedure    Angiogram, carotid and cerebral, bilateral    Angiogram, cerebral, with intracranial aneurysm embolization    No significant past surgical history    HEADACHE    90+    [email or web address]        PLAN:    NEURO:  - neuro checks q1h  - pain control   - EVD clamped, monitor ICPs   - ritalin d/c'd, changed to modafinil    - no nimodipine, causes labile SBP  - PT/OT    CARDIOVASCULAR:  - -200  - neurologically better with higher pressures  - cont milrinone gtt for vasospasm   - cont lipitor  - cards following, recs appreciated  - will need echo and CCTA before d/c  - HF, EF 30%, limit fluids  - b/l carotid stenosis, will eventually need ASA    PULMONARY:  - satting well RA    RENAL:  - repletions prn   - 3% @15cc/hr, florinef, salt tabs  - normonatremia goal     GI:  - TF via NGT  - bowel regimen     HEME:  - h/h stable  - SCDs/SQL  - cont PO iron anemia    ID:  - febrile 102 (), Pan Cx   - trend leukocytosis     ENDO:  - ISS    DVT PROPHYLAXIS:  [x] Venodynes                                [x] Heparin/Lovenox    DISPOSITION: ICU status, full code, dispo pending    Assessment and plan discussed w/ Dr. Serrano and Dr. Alfredo     Assessment:  Present when checked    []  GCS  E   V  M     Heart Failure: []Acute, [] acute on chronic , []chronic  Heart Failure:  [] Diastolic (HFpEF), [] Systolic (HFrEF), []Combined (HFpEF and HFrEF), [] RHF, [] Pulm HTN, [] Other    [] MICHAELLE, [] ATN, [] AIN, [] other  [] CKD1, [] CKD2, [] CKD 3, [] CKD 4, [] CKD 5, []ESRD    Encephalopathy: [x] Metabolic, [] Hepatic, [] toxic, [] Neurological, [] Other    Abnormal Nurtitional Status: [] malnurtition (see nutrition note), [ ]underweight: BMI < 19, [] morbid obesity: BMI >40, [] Cachexia    [] Sepsis  [] hypovolemic shock,[] cardiogenic shock, [] hemorrhagic shock, [] neuogenic shock  [] Acute Respiratory Failure  []Cerebral edema, [] Brain compression/ herniation,   [] Functional quadriplegia  [] Acute blood loss anemia

## 2020-12-25 NOTE — PROGRESS NOTE ADULT - SUBJECTIVE AND OBJECTIVE BOX
Subjective: seen and examined at bedside with chief resident    ROS:   Denies Headache, blurred vision, Chest Pain, SOB, Abdominal pain, nausea or vomiting     Social   enoxaparin Injectable 40  labetalol Injectable 2.5 PRN  milrinone Infusion 0.5      Allergies    No Known Allergies    Intolerances        Vital Signs Last 24 Hrs  T(C): 37.2 (25 Dec 2020 09:19), Max: 37.8 (24 Dec 2020 14:52)  T(F): 99 (25 Dec 2020 09:19), Max: 100 (24 Dec 2020 14:52)  HR: 92 (25 Dec 2020 10:00) (75 - 99)  BP: 165/72 (25 Dec 2020 10:00) (112/53 - 205/93)  BP(mean): 104 (25 Dec 2020 10:00) (77 - 134)  RR: 15 (25 Dec 2020 10:00) (15 - 20)  SpO2: 100% (25 Dec 2020 10:00) (93% - 100%)  I&O's Summary    24 Dec 2020 07:01  -  25 Dec 2020 07:00  --------------------------------------------------------  IN: 2252.5 mL / OUT: 2633 mL / NET: -380.5 mL    25 Dec 2020 07:01  -  25 Dec 2020 11:06  --------------------------------------------------------  IN: 217.5 mL / OUT: 140 mL / NET: 77.5 mL        Physical Exam:  General: NAD  Pulmonary: nonlabor   Cardiovascular: rrr  Abdominal: soft, ND, NT  Extremities: groin site c/d/i  Pulses:   Right:                                                                          Left:  FEM [ ]2+ [ ]1+ [ ]doppler                                             FEM [ ]2+ [ ]1+ [ ]doppler    POP [ ]2+ [ ]1+ [ ]doppler                                             POP [ ]2+ [ ]1+ [ ]doppler    DP [x ]2+ [ ]1+ [ ]doppler                                                DP [ ]2+ [ ]1+ [ ]doppler  PT[x ]2+ [ ]1+ [ ]doppler                                                  PT [ ]2+ [ ]1+ [ ]doppler      LABS:                        9.7    11.46 )-----------( 381      ( 25 Dec 2020 09:51 )             31.8     12-25    141  |  107  |  14  ----------------------------<  161<H>  4.3   |  23  |  0.39<L>    Ca    8.8      25 Dec 2020 09:51  Phos  2.9     12-25  Mg     2.0     12-25          Radiology and Additional Studies:    Assessment and Plan:   · Assessment	  76yo F POD#2 s/p cerebral angio for verapamil c/b device malfunction of Proglide, s/p right groin cutdown for removal of proglide catheter and femoral artery repair on 12/16/20     -Right groin monitoring, look for signs of infection  -Regular Pulse checks RLE  -Vascular surgery will continue to follow

## 2020-12-25 NOTE — PROGRESS NOTE ADULT - SUBJECTIVE AND OBJECTIVE BOX
=================================================   NEUROCRITICAL CARE ATTENDING NOTE  =================================================    CARA CANCHOLA   MRN-[id number]  Summary:  75y/F with COPD, asthma, dyslipidemia Hypertension found down.  Brought to [facility] where imaging showed SAH basilar cisterns with IVH and obstructive hydrocephalus L Pcomm aneurysm.  Given levetiracetam, hydromorphone.  NIHSS 2 HH3 MF4, transferred to [facility] for further management.      Past Medical History: Hyperlipidemia Hypertension COPD (chronic obstructive pulmonary disease) Asthma  Allergies:  No Known Allergies  Home meds:   ·	famotidine 20 mg oral tablet: 1 tab(s) orally once a day  ·	lisinopril 2.5 mg oral tablet: 1 tab(s) orally once a day  ·	montelukast 10 mg oral tablet: 1 tab(s) orally once a day  ·	predniSONE 50 mg oral tablet: 1 tab(s) orally once a day  ·	ProAir HFA CFC free 90 mcg/inh inhalation aerosol: 2 puff(s) inhaled 4 times a day PRN  ·	simvastatin 20 mg oral tablet: 1 tab(s) orally once a day (at bedtime)    COURSE IN THE HOSPITAL:  12/09 admitted to [facility], EVD inserted; given 20 lasix for pulmo edema, T inversion on CT  12/10 No significant events overnight.   12/11 No significant events overnight.   12/12 No significant events overnight.   12/13 No significant events overnight. drain stopped working at 730 a.m., off levophed   12/14 No significant events overnight.   12/15 Noted confusion, CTA mild spasm  12/16 lethargic, angio - no severe spasm, given IA verapamil, underwent femoral endarterectomy with repair with patch angioplasty  12/17 improved post verapamil, transitioned to milrinone, currently tolerating extubation  12/18: BD#10, POD#8 s/p coil/embo of L pcomm aneurysm, POD#2 s/p IA verapamil, POD#2 R groin cutdown for removal of proglide catheter w/ repair of femoral artery.   12/19: neurological improvement, stable hemodynamic status  12/20: BD#12, POD#9, EVD raise to 5 cm  12/21: no acute events, but fluctuating level of alerness this morniong.  SBP in 190's given labetalol  12/22: EVD clamped yesterday, febrile overnight 101.5.    this morning given 10 labetalol, BP dropped to  minimum - clearly worse mental status, somenolent and mumbling.  Started levophed to correct.      12:24  EVD at clamp    Overnight Events:  more awake, -150s, 3% 15 ml/h, milrinone 0.5    PHYSICAL EXAMINATION  T(C): 37.2 (12-25 @ 09:19), Max: 37.8 (12-24 @ 14:52)  HR: 87 (12-25 @ 12:00) (75 - 99)  BP: 143/63 (12-25 @ 12:00) (112/53 - 205/93)  RR: 15 (12-25 @ 12:00) (15 - 20)  SpO2: 96% (12-25 @ 12:00) (93% - 100%)  CVP(mm Hg): --    LABS:  CAPILLARY BLOOD GLUCOSE      POCT Blood Glucose.: 196 mg/dL (25 Dec 2020 11:04)  POCT Blood Glucose.: 169 mg/dL (25 Dec 2020 06:00)  POCT Blood Glucose.: 169 mg/dL (24 Dec 2020 23:12)  POCT Blood Glucose.: 181 mg/dL (24 Dec 2020 21:54)  POCT Blood Glucose.: 180 mg/dL (24 Dec 2020 17:41)                          9.7    11.46 )-----------( 381      ( 25 Dec 2020 09:51 )             31.8     12-25    141  |  107  |  14  ----------------------------<  161<H>  4.3   |  23  |  0.39<L>    Ca    8.8      25 Dec 2020 09:51  Phos  2.9     12-25  Mg     2.0     12-25 12-24 @ 07:01 - 12-25 @ 07:00  --------------------------------------------------------  IN: 2252.5 mL / OUT: 2633 mL / NET: -380.5 mL    12-25 @ 07:01 - 12-25 @ 14:22  --------------------------------------------------------  IN: 392.5 mL / OUT: 240 mL / NET: 152.5 mL        MEDICATIONS:  MEDICATIONS  (STANDING):  albuterol/ipratropium for Nebulization. 3 milliLiter(s) Nebulizer every 6 hours  atorvastatin 40 milliGRAM(s) Oral at bedtime  chlorhexidine 2% Cloths 1 Application(s) Topical <User Schedule>  cyanocobalamin 1000 MICROGram(s) Oral daily  dextrose 40% Gel 15 Gram(s) Oral once  dextrose 5%. 1000 milliLiter(s) (100 mL/Hr) IV Continuous <Continuous>  dextrose 5%. 1000 milliLiter(s) (50 mL/Hr) IV Continuous <Continuous>  dextrose 50% Injectable 25 Gram(s) IV Push once  enoxaparin Injectable 40 milliGRAM(s) SubCutaneous every 24 hours  ferrous    sulfate 325 milliGRAM(s) Oral daily  fludroCORTISONE 0.2 milliGRAM(s) Oral every 24 hours  glucagon  Injectable 1 milliGRAM(s) IntraMuscular once  insulin glargine Injectable (LANTUS) 10 Unit(s) SubCutaneous at bedtime  insulin lispro (ADMELOG) corrective regimen sliding scale   SubCutaneous Before meals and at bedtime  lacosamide Solution 150 milliGRAM(s) Oral every 12 hours  modafinil 100 milliGRAM(s) Oral daily  montelukast 10 milliGRAM(s) Oral daily  sodium chloride 3 Gram(s) Oral every 8 hours    MEDICATIONS  (PRN):  labetalol Injectable 2.5 milliGRAM(s) IV Push every 6 hours PRN Systolic blood pressure > 220      NEUROLOGIC EXAMINATION:      MSE: Very somnolent, not speaking much, says a few words, mumbling  CN: pupils 3mmg b/l reactive, EOMI, face symmetric  motor: arms grossly antigravity, full  strength b/l, not participating in exam    CARDIOVASCULAR: (+) S1 S2, tachycardic  PULMONARY: clear to auscultation bilaterally, slight expiratory wheezes  ABDOMEN: soft, nontender but someasht distended  +BS  EXTREMITIES: no edema, pulses present by Dopplers  SKIN: no rash

## 2020-12-26 LAB
ALBUMIN SERPL ELPH-MCNC: 2.9 G/DL — LOW (ref 3.3–5)
ALP SERPL-CCNC: 77 U/L — SIGNIFICANT CHANGE UP (ref 40–120)
ALT FLD-CCNC: 18 U/L — SIGNIFICANT CHANGE UP (ref 10–45)
ANION GAP SERPL CALC-SCNC: 13 MMOL/L — SIGNIFICANT CHANGE UP (ref 5–17)
ANION GAP SERPL CALC-SCNC: 9 MMOL/L — SIGNIFICANT CHANGE UP (ref 5–17)
AST SERPL-CCNC: 21 U/L — SIGNIFICANT CHANGE UP (ref 10–40)
BILIRUB DIRECT SERPL-MCNC: <0.2 MG/DL — SIGNIFICANT CHANGE UP (ref 0–0.2)
BILIRUB INDIRECT FLD-MCNC: SIGNIFICANT CHANGE UP (ref 0.2–1)
BILIRUB SERPL-MCNC: 0.3 MG/DL — SIGNIFICANT CHANGE UP (ref 0.2–1.2)
BUN SERPL-MCNC: 10 MG/DL — SIGNIFICANT CHANGE UP (ref 7–23)
BUN SERPL-MCNC: 13 MG/DL — SIGNIFICANT CHANGE UP (ref 7–23)
CALCIUM SERPL-MCNC: 8.7 MG/DL — SIGNIFICANT CHANGE UP (ref 8.4–10.5)
CALCIUM SERPL-MCNC: 8.9 MG/DL — SIGNIFICANT CHANGE UP (ref 8.4–10.5)
CHLORIDE SERPL-SCNC: 96 MMOL/L — SIGNIFICANT CHANGE UP (ref 96–108)
CHLORIDE SERPL-SCNC: 98 MMOL/L — SIGNIFICANT CHANGE UP (ref 96–108)
CO2 SERPL-SCNC: 27 MMOL/L — SIGNIFICANT CHANGE UP (ref 22–31)
CO2 SERPL-SCNC: 27 MMOL/L — SIGNIFICANT CHANGE UP (ref 22–31)
CREAT SERPL-MCNC: 0.39 MG/DL — LOW (ref 0.5–1.3)
CREAT SERPL-MCNC: 0.44 MG/DL — LOW (ref 0.5–1.3)
FERRITIN SERPL-MCNC: 189 NG/ML — HIGH (ref 15–150)
GLUCOSE SERPL-MCNC: 103 MG/DL — HIGH (ref 70–99)
GLUCOSE SERPL-MCNC: 140 MG/DL — HIGH (ref 70–99)
HAPTOGLOB SERPL-MCNC: 396 MG/DL — HIGH (ref 34–200)
HCT VFR BLD CALC: 32.5 % — LOW (ref 34.5–45)
HGB BLD-MCNC: 10.1 G/DL — LOW (ref 11.5–15.5)
IRON SATN MFR SERPL: 22 % — SIGNIFICANT CHANGE UP (ref 14–50)
IRON SATN MFR SERPL: 58 UG/DL — SIGNIFICANT CHANGE UP (ref 30–160)
MAGNESIUM SERPL-MCNC: 1.9 MG/DL — SIGNIFICANT CHANGE UP (ref 1.6–2.6)
MCHC RBC-ENTMCNC: 27.3 PG — SIGNIFICANT CHANGE UP (ref 27–34)
MCHC RBC-ENTMCNC: 31.1 GM/DL — LOW (ref 32–36)
MCV RBC AUTO: 87.8 FL — SIGNIFICANT CHANGE UP (ref 80–100)
NRBC # BLD: 0 /100 WBCS — SIGNIFICANT CHANGE UP (ref 0–0)
PHOSPHATE SERPL-MCNC: 3.7 MG/DL — SIGNIFICANT CHANGE UP (ref 2.5–4.5)
PLATELET # BLD AUTO: 423 K/UL — HIGH (ref 150–400)
POTASSIUM SERPL-MCNC: 3.3 MMOL/L — LOW (ref 3.5–5.3)
POTASSIUM SERPL-MCNC: 5.2 MMOL/L — SIGNIFICANT CHANGE UP (ref 3.5–5.3)
POTASSIUM SERPL-SCNC: 3.3 MMOL/L — LOW (ref 3.5–5.3)
POTASSIUM SERPL-SCNC: 5.2 MMOL/L — SIGNIFICANT CHANGE UP (ref 3.5–5.3)
PROT SERPL-MCNC: 6.5 G/DL — SIGNIFICANT CHANGE UP (ref 6–8.3)
RBC # BLD: 3.7 M/UL — LOW (ref 3.8–5.2)
RBC # BLD: 3.7 M/UL — LOW (ref 3.8–5.2)
RBC # FLD: 16.7 % — HIGH (ref 10.3–14.5)
RETICS #: 104.3 K/UL — SIGNIFICANT CHANGE UP (ref 25–125)
RETICS/RBC NFR: 2.8 % — HIGH (ref 0.5–2.5)
SODIUM SERPL-SCNC: 134 MMOL/L — LOW (ref 135–145)
SODIUM SERPL-SCNC: 136 MMOL/L — SIGNIFICANT CHANGE UP (ref 135–145)
TIBC SERPL-MCNC: 266 UG/DL — SIGNIFICANT CHANGE UP (ref 220–430)
TRANSFERRIN SERPL-MCNC: 218 MG/DL — SIGNIFICANT CHANGE UP (ref 200–360)
UIBC SERPL-MCNC: 208 UG/DL — SIGNIFICANT CHANGE UP (ref 110–370)
WBC # BLD: 10.71 K/UL — HIGH (ref 3.8–10.5)
WBC # FLD AUTO: 10.71 K/UL — HIGH (ref 3.8–10.5)

## 2020-12-26 PROCEDURE — 99232 SBSQ HOSP IP/OBS MODERATE 35: CPT

## 2020-12-26 PROCEDURE — 99233 SBSQ HOSP IP/OBS HIGH 50: CPT

## 2020-12-26 PROCEDURE — 99024 POSTOP FOLLOW-UP VISIT: CPT

## 2020-12-26 RX ORDER — SODIUM CHLORIDE 5 G/100ML
500 INJECTION, SOLUTION INTRAVENOUS
Refills: 0 | Status: DISCONTINUED | OUTPATIENT
Start: 2020-12-26 | End: 2020-12-27

## 2020-12-26 RX ORDER — ASPIRIN/CALCIUM CARB/MAGNESIUM 324 MG
81 TABLET ORAL DAILY
Refills: 0 | Status: DISCONTINUED | OUTPATIENT
Start: 2020-12-26 | End: 2020-12-28

## 2020-12-26 RX ORDER — POTASSIUM CHLORIDE 20 MEQ
40 PACKET (EA) ORAL EVERY 4 HOURS
Refills: 0 | Status: COMPLETED | OUTPATIENT
Start: 2020-12-26 | End: 2020-12-26

## 2020-12-26 RX ADMIN — MONTELUKAST 10 MILLIGRAM(S): 4 TABLET, CHEWABLE ORAL at 12:51

## 2020-12-26 RX ADMIN — Medication 40 MILLIEQUIVALENT(S): at 12:50

## 2020-12-26 RX ADMIN — SODIUM CHLORIDE 3 GRAM(S): 9 INJECTION INTRAMUSCULAR; INTRAVENOUS; SUBCUTANEOUS at 22:16

## 2020-12-26 RX ADMIN — MODAFINIL 100 MILLIGRAM(S): 200 TABLET ORAL at 13:20

## 2020-12-26 RX ADMIN — Medication 40 MILLIEQUIVALENT(S): at 15:48

## 2020-12-26 RX ADMIN — SODIUM CHLORIDE 15 MILLILITER(S): 5 INJECTION, SOLUTION INTRAVENOUS at 21:53

## 2020-12-26 RX ADMIN — Medication 3 MILLILITER(S): at 21:53

## 2020-12-26 RX ADMIN — LACOSAMIDE 150 MILLIGRAM(S): 50 TABLET ORAL at 05:56

## 2020-12-26 RX ADMIN — LACOSAMIDE 150 MILLIGRAM(S): 50 TABLET ORAL at 18:12

## 2020-12-26 RX ADMIN — Medication 3 MILLILITER(S): at 05:47

## 2020-12-26 RX ADMIN — CHLORHEXIDINE GLUCONATE 1 APPLICATION(S): 213 SOLUTION TOPICAL at 05:40

## 2020-12-26 RX ADMIN — FLUDROCORTISONE ACETATE 0.2 MILLIGRAM(S): 0.1 TABLET ORAL at 12:51

## 2020-12-26 RX ADMIN — SODIUM CHLORIDE 3 GRAM(S): 9 INJECTION INTRAMUSCULAR; INTRAVENOUS; SUBCUTANEOUS at 13:14

## 2020-12-26 RX ADMIN — Medication 81 MILLIGRAM(S): at 12:50

## 2020-12-26 RX ADMIN — Medication 2: at 11:30

## 2020-12-26 RX ADMIN — Medication 3 MILLILITER(S): at 09:29

## 2020-12-26 RX ADMIN — ENOXAPARIN SODIUM 40 MILLIGRAM(S): 100 INJECTION SUBCUTANEOUS at 21:53

## 2020-12-26 RX ADMIN — Medication 3 MILLILITER(S): at 15:30

## 2020-12-26 RX ADMIN — PREGABALIN 1000 MICROGRAM(S): 225 CAPSULE ORAL at 12:50

## 2020-12-26 RX ADMIN — ATORVASTATIN CALCIUM 40 MILLIGRAM(S): 80 TABLET, FILM COATED ORAL at 21:53

## 2020-12-26 RX ADMIN — Medication 325 MILLIGRAM(S): at 12:50

## 2020-12-26 RX ADMIN — SODIUM CHLORIDE 3 GRAM(S): 9 INJECTION INTRAMUSCULAR; INTRAVENOUS; SUBCUTANEOUS at 05:47

## 2020-12-26 RX ADMIN — INSULIN GLARGINE 10 UNIT(S): 100 INJECTION, SOLUTION SUBCUTANEOUS at 21:53

## 2020-12-26 NOTE — PROGRESS NOTE ADULT - ASSESSMENT
75y/Female with:  HH3 MF4, aneursymal subarachnoid hemorrhage, L pcomm aneurysm, L parophthalmic aneurysm; brain compression, cerebral edema  osbtructive hydrocephalus  lacunar infarcts R caudate, B thalami, cerebellar hemispheres ?artery to artery vs cardioembolic?  atherosclerotic cardiovascular disease; mod to severe bilateral ICA stenosis (R>L), R VA stenosis  Hypertension dyslipidemia   COPD asthma  newly diagnosed Diabetes Mellitus?  troponin leak    BLEED DAY 18  PLAN: Day 1 = 12-09 ; Day 4 =  12/12; Day 21 = 12/29  NEURO:1) SAH   neurochecks q1h, PRN pain meds with Tylenol / Percocet  ritalin stopped yesterday  nSAH:  nimodipine discontinued due to pressor requirements  Hydrocephalus:  EVD out  SBP <200, liberalized, autoregulation 120-200  Fluctuating exam appears pressure-dependant, may be related to high grade b/l ICA stenosis plus ? 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%  -repeat echo prior to discharge  SBP goal 120-200mm Hg (currently autoregulates to sBP 170-180's-VSP), EF 35%, repeat TTE as per Cardiology (once outside VSP window, will start MTP/ARB)  -VERY responsive to labetaolol, if  needed only give 2.5mg PRN    ENDO:  Blood sugar goals 140-180 mg/dL, cont insulin sliding scale, atorvastatin 40mg     GI:  PPI for GI prophylaxis (home meds); bowel regimen  DIET: no residuals  regular diet    RENAL:   Goal normonatremia  -salt tabs 3g q8h  -fludrocort 0.2 q24h  -check PM BMP    HEM/ONC: Hb stable, iron profile c/w iron deficiency, iron FeSO4 325 TID  superficial intramuscular LE clot --> indication for full a/c  --> repeat LE dopplers today, if still present will start full-dose AC if ok per nsgy  VTE Prophylaxis: SCDs, SQL  -will start ASA for high grade stenosis b/l carotids if not starting full AC    ID:  low grade fever, no abx for now    Access:  R IJ TLC  --> obtain good PIV access and remove today  -obtain Midline and/or PIV  NGT  pull de anda --> bladder scan and straigh

## 2020-12-26 NOTE — PROGRESS NOTE ADULT - SUBJECTIVE AND OBJECTIVE BOX
=================================================   NEUROCRITICAL CARE ATTENDING NOTE  =================================================    CARA CANCHOLA   MRN-[id number]  Summary:  75y/F with COPD, asthma, dyslipidemia Hypertension found down.  Brought to [facility] where imaging showed SAH basilar cisterns with IVH and obstructive hydrocephalus L Pcomm aneurysm.  Given levetiracetam, hydromorphone.  NIHSS 2 HH3 MF4, transferred to [facility] for further management.      Past Medical History: Hyperlipidemia Hypertension COPD (chronic obstructive pulmonary disease) Asthma  Allergies:  No Known Allergies  Home meds:   ·	famotidine 20 mg oral tablet: 1 tab(s) orally once a day  ·	lisinopril 2.5 mg oral tablet: 1 tab(s) orally once a day  ·	montelukast 10 mg oral tablet: 1 tab(s) orally once a day  ·	predniSONE 50 mg oral tablet: 1 tab(s) orally once a day  ·	ProAir HFA CFC free 90 mcg/inh inhalation aerosol: 2 puff(s) inhaled 4 times a day PRN  ·	simvastatin 20 mg oral tablet: 1 tab(s) orally once a day (at bedtime)    COURSE IN THE HOSPITAL:  12/09 admitted to [facility], EVD inserted; given 20 lasix for pulmo edema, T inversion on CT  12/10 No significant events overnight.   12/11 No significant events overnight.   12/12 No significant events overnight.   12/13 No significant events overnight. drain stopped working at 730 a.m., off levophed   12/14 No significant events overnight.   12/15 Noted confusion, CTA mild spasm  12/16 lethargic, angio - no severe spasm, given IA verapamil, underwent femoral endarterectomy with repair with patch angioplasty  12/17 improved post verapamil, transitioned to milrinone, currently tolerating extubation  12/18: BD#10, POD#8 s/p coil/embo of L pcomm aneurysm, POD#2 s/p IA verapamil, POD#2 R groin cutdown for removal of proglide catheter w/ repair of femoral artery.   12/19: neurological improvement, stable hemodynamic status  12/20: BD#12, POD#9, EVD raise to 5 cm  12/21: no acute events, but fluctuating level of alerness this morniong.  SBP in 190's given labetalol  12/22: EVD clamped yesterday, febrile overnight 101.5.    this morning given 10 labetalol, BP dropped to  minimum - clearly worse mental status, somenolent and mumbling.  Started levophed to correct.      12:24  EVD at clamp  12/25: more awake, -150s, 3% 15 ml/h, milrinone 0.5 --> off    Overnight Events:  febrile yesterday, pancultured,     NEUROLOGIC EXAMINATION:    MSE: Awake, attends intermittantly, follows central commands to smile, repeats "today is a [place] day"  not oriented A+Ox1 (says her first name only, "I dont remember" the other things)    CN: pupils 4mmg b/l reactive, EOMI, face symmetric  motor: arms grossly antigravity, (R shoulder mechanical injury is pain limiting),  full  strength b/l, legs both brisk antigravity to pinch  PULMONARY: mild coarse breath sounds,   ABDOMEN: soft, nontender  EXTREMITIES: no edema, pulses present by Dopplers  SKIN: no rash  NEUROCRITICAL CARE PROGRESS NOTE    Allergies: No Known Allergies      EXAM:  VITALS:  T(C): 36.8 (12-26-20 @ 09:15), Max: 38.1 (12-25-20 @ 17:00)  HR: 86 (12-26-20 @ 10:00) (74 - 98)  BP: 197/85 (12-26-20 @ 10:00) (122/57 - 218/91)  RR: 18 (12-26-20 @ 10:00) (15 - 18)  SpO2: 99% (12-26-20 @ 10:00) (92% - 100%)    MEDICATIONS:  albuterol/ipratropium for Nebulization. 3 milliLiter(s) Nebulizer every 6 hours  atorvastatin 40 milliGRAM(s) Oral at bedtime  chlorhexidine 2% Cloths 1 Application(s) Topical <User Schedule>  cyanocobalamin 1000 MICROGram(s) Oral daily  dextrose 40% Gel 15 Gram(s) Oral once  dextrose 5%. 1000 milliLiter(s) IV Continuous <Continuous>  dextrose 5%. 1000 milliLiter(s) IV Continuous <Continuous>  dextrose 50% Injectable 25 Gram(s) IV Push once  enoxaparin Injectable 40 milliGRAM(s) SubCutaneous every 24 hours  ferrous    sulfate 325 milliGRAM(s) Oral daily  fludroCORTISONE 0.2 milliGRAM(s) Oral every 24 hours  glucagon  Injectable 1 milliGRAM(s) IntraMuscular once  insulin glargine Injectable (LANTUS) 10 Unit(s) SubCutaneous at bedtime  insulin lispro (ADMELOG) corrective regimen sliding scale   SubCutaneous Before meals and at bedtime  labetalol Injectable 2.5 milliGRAM(s) IV Push every 6 hours PRN  lacosamide Solution 150 milliGRAM(s) Oral every 12 hours  modafinil 100 milliGRAM(s) Oral daily  montelukast 10 milliGRAM(s) Oral daily  potassium chloride   Solution 40 milliEquivalent(s) Oral every 4 hours  sodium chloride 3 Gram(s) Oral every 8 hours      I/O's    12-25-20 @ 07:01  -  12-26-20 @ 07:00  --------------------------------------------------------  IN: 832.5 mL / OUT: 1740 mL / NET: -907.5 mL    12-26-20 @ 07:01  -  12-26-20 @ 10:43  --------------------------------------------------------  IN: 30 mL / OUT: 350 mL / NET: -320 mL          LABS:                          10.1   10.71 )-----------( 423      ( 26 Dec 2020 05:48 )             32.5     12-26    136  |  96  |  10  ----------------------------<  103<H>  3.3<L>   |  27  |  0.39<L>    Ca    8.7      26 Dec 2020 05:48  Phos  3.7     12-26  Mg     1.9     12-26    TPro  6.5  /  Alb  2.9<L>  /  TBili  0.3  /  DBili  <0.2  /  AST  21  /  ALT  18  /  AlkPhos  77  12-26            CAPILLARY BLOOD GLUCOSE      POCT Blood Glucose.: 97 mg/dL (26 Dec 2020 05:30)  POCT Blood Glucose.: 129 mg/dL (25 Dec 2020 22:05)  POCT Blood Glucose.: 216 mg/dL (25 Dec 2020 17:19)  POCT Blood Glucose.: 196 mg/dL (25 Dec 2020 11:04)

## 2020-12-26 NOTE — PROGRESS NOTE ADULT - ATTENDING COMMENTS
Patient seen and examined by me 12/26/20. Awake to stime, follows some simple commands, ZAFAR. EVD removed, doing okay. Requires permissive hypertension for adequate brain perfusion, presumably due to chronic bilateral carotid stenosis. Continue supportive care. PT/OT. Dispo planning in the upcoming week.    Bebeto Serrano M.D.

## 2020-12-26 NOTE — PROGRESS NOTE ADULT - SUBJECTIVE AND OBJECTIVE BOX
HPI:  74y/o F with PMHx sig for COPD, asthma, HLD, HTN, BIBEMS to [facility] from home after HHA discovered patient found down in floor covered in non-bloody vomitus. Perr HHA Pt went to the bathroom and was taking a long time, went in to check on her and found her sitting on floor, awake, however with vomitus on front of shirt. On arrival to [facility], patient was taken for stat head CT, which revealed diffuse subarachnoid hemorrhage mainly at basilar cisterns with IVH and obstructive hydrocephalus. Patient was given a bolus of 1g keppra and dilaudid pushes for generalized headache. CTA concerning for 3mm left pcomm aneurysm and a 1.6mm outpouching of distal cavernous segment of the left internal carotid artery likely aneurysm. NIHSS2 HH3 MF4. Patient was transferred to [facility] for further intervention. Patient currently reports headaches. Pt denies acute changes in vision, seizures, CP, SOB, weakness/paresthesias of arms or legs. (09 Dec 2020 23:37)    Hospital Course:   12/9: BD1 Patient admitted for SAH HH3, MF4.   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 EVD at 5, on Levo overnight, nimodipine discontinued d/t hypotension  12/12: BD4 POD#2, VIVIEN overnight, neuro stable, passed TOV  12/13: BD5 POD#3: VIVIEN x 24 hrs  12/14: BD6 POD#4. VIVIEN o/n, neuro stable. EVD at 5cm H2O  12/15: BD7 POD #5 VIVIEN overnight, neuro stable. EVD remains at 5. Plan for CTA today.   12/16: BD8 POD #6 VIVIEN overnight, neuro stable, EVD at 0, on Levo, started on 3% at 15 overnight   12/17: BD9 POD#1 s/p cerebral angio for verapamil c/b device malfunction of Proglide, s/p right groin cutdown for removal of proglide catheter and femoral artery repair.  VIVIEN overnight, neuro stable, EVD at 0. patient remained intubated overnight, on propofol for sedation, and vEEG  12/18: BD#10, POD#8 s/p coil/embo of L pcomm aneurysm, POD#2 s/p IA verapamil, POD#2 R groin cutdown for removal of proglide catheter w/ repair of femoral artery. VIVIEN overnight. EVD remains open @0cmH2O   12/19: BD#11, POD#9 s/p coil/embo of L pcomm aneurysm. POD#3 s/p IA verapamil, POD#3 s/p R femoral artery cutdown of proglide catheter w/ femoral artery repair. VIVIEN overnight. EVD remains open @0cmH2O. EEG shows b/l frontal sharp discharges, cont monitoring, vimpat was increased yesterday. Gentle hydration, total IVF 50cc/hr. Cont vanc/zosyn for empiric coverage, next vanc trough due @1pm on 12/19. F/u daily CXR.   12/20 BD#12 POD#10 VIVIEN overnight, Neuro exam stable, EVD @ 0cm H2O, vEEG, 3% @ 15 goal WNL, TF via NGT  12/21: BD13, POD11 VIVIEN overnight. EVD at 5cmH20 (raised yesterday), vEEG in place  12/22 BD#14, EVD raised to 15 yesterday, 3% @ 30cc Goal WNL, -180, Milrinone, TTE prior to DC as per Card for takotsubo cardiomyopathy, failed S&S pending reeval, TF via NGT, Neuro exam stable  12/23: BD#15. VIVIEN o/n, neuro stable. EVD clamped. 30%@30cc/hr  12/24 BD#16 VIVIEN overnight, spiked 102, EVD @ 0cm H2O, 3% @ 30cc/hr Goal WNL, Vasc following, TF via NGT, -200,   12/25: BD#17. VIVIEN overnight. Pan cx from 12/23, NGTD on CSF and blood. EVD clamped. 3% @15cc/hr, rate kept same overnight. NGT feeds at goal. SBP goal 160-200.   12/26: BD#18. VIVIEN overnight, neuro exam stable. NGTD from pan cx on 12/23. EVD removed today. 3% discontinued 12/25. NGT feeds at goal, IVF off. SBP goal 160-200.       Vital Signs Last 24 Hrs  T(C): 37.9 (25 Dec 2020 21:00), Max: 38.1 (25 Dec 2020 17:00)  T(F): 100.2 (25 Dec 2020 21:00), Max: 100.6 (25 Dec 2020 17:00)  HR: 85 (26 Dec 2020 03:00) (78 - 98)  BP: 194/80 (26 Dec 2020 03:00) (122/57 - 218/91)  BP(mean): 115 (26 Dec 2020 03:00) (82 - 134)  RR: 17 (26 Dec 2020 03:00) (15 - 20)  SpO2: 95% (26 Dec 2020 03:00) (93% - 100%)    I&O's Detail    24 Dec 2020 07:01  -  25 Dec 2020 07:00  --------------------------------------------------------  IN:    Enteral Tube Flush: 410 mL    Glucerna: 1050 mL    Glucerna: 100 mL    IV PiggyBack: 100 mL    Milrinone: 180 mL    Norepinephrine: 7.5 mL    sodium chloride 3%: 105 mL    sodium chloride 3%: 300 mL  Total IN: 2252.5 mL    OUT:    External Ventricular Device (mL): 3 mL    Indwelling Catheter - Urethral (mL): 2630 mL  Total OUT: 2633 mL    Total NET: -380.5 mL      25 Dec 2020 07:01  -  26 Dec 2020 03:54  --------------------------------------------------------  IN:    Enteral Tube Flush: 210 mL    Glucerna: 400 mL    Milrinone: 37.5 mL    sodium chloride 3%: 75 mL  Total IN: 722.5 mL    OUT:    Indwelling Catheter - Urethral (mL): 1350 mL  Total OUT: 1350 mL    Total NET: -627.5 mL        I&O's Summary    24 Dec 2020 07:01  -  25 Dec 2020 07:00  --------------------------------------------------------  IN: 2252.5 mL / OUT: 2633 mL / NET: -380.5 mL    25 Dec 2020 07:01  -  26 Dec 2020 03:54  --------------------------------------------------------  IN: 722.5 mL / OUT: 1350 mL / NET: -627.5 mL        PHYSICAL EXAM:  General: NAD, pt is comfortably sitting up in bed, A&O x1 (non-compliant w/ majority of exam)   HEENT: PERRL 3mm, EOMI b/l, face symmetric, tongue midline   Cardiovascular: RRR, normal S1 and S2   Respiratory: lungs CTAB, no wheezing, rhonchi, or crackles   GI: normoactive BS to auscultation, abd soft, NTND   Neuro: no aphasia, speech clear, no dysmetria, no pronator drift  ZAFAR x4 spontaneously, unable to assess strength d/t non-compliance w/ exam   Extremities: distal pulses 2+ x4   Wound/incision: R frontal EVD site C/D/I, R groin site C/D/I   Drains: EVD removed     TUBES/LINES:  [] CVC  [] A-line  [] Lumbar Drain  [] Ventriculostomy  [] Other    DIET:  [] NPO  [] Mechanical  [X] Tube feeds - NGT     LABS:                        9.7    11.46 )-----------( 381      ( 25 Dec 2020 09:51 )             31.8     12-25    141  |  107  |  14  ----------------------------<  161<H>  4.3   |  23  |  0.39<L>    Ca    8.8      25 Dec 2020 09:51  Phos  2.9     12-25  Mg     2.0     12-25              CAPILLARY BLOOD GLUCOSE      POCT Blood Glucose.: 129 mg/dL (25 Dec 2020 22:05)  POCT Blood Glucose.: 216 mg/dL (25 Dec 2020 17:19)  POCT Blood Glucose.: 196 mg/dL (25 Dec 2020 11:04)  POCT Blood Glucose.: 169 mg/dL (25 Dec 2020 06:00)      Drug Levels: [] N/A    CSF Analysis: [] N/A      Allergies    No Known Allergies    Intolerances      MEDICATIONS:  Antibiotics:    Neuro:  lacosamide Solution 150 milliGRAM(s) Oral every 12 hours  modafinil 100 milliGRAM(s) Oral daily    Anticoagulation:  enoxaparin Injectable 40 milliGRAM(s) SubCutaneous every 24 hours    OTHER:  albuterol/ipratropium for Nebulization. 3 milliLiter(s) Nebulizer every 6 hours  atorvastatin 40 milliGRAM(s) Oral at bedtime  chlorhexidine 2% Cloths 1 Application(s) Topical <User Schedule>  dextrose 40% Gel 15 Gram(s) Oral once  dextrose 50% Injectable 25 Gram(s) IV Push once  fludroCORTISONE 0.2 milliGRAM(s) Oral every 24 hours  glucagon  Injectable 1 milliGRAM(s) IntraMuscular once  insulin glargine Injectable (LANTUS) 10 Unit(s) SubCutaneous at bedtime  insulin lispro (ADMELOG) corrective regimen sliding scale   SubCutaneous Before meals and at bedtime  labetalol Injectable 2.5 milliGRAM(s) IV Push every 6 hours PRN  montelukast 10 milliGRAM(s) Oral daily    IVF:  cyanocobalamin 1000 MICROGram(s) Oral daily  dextrose 5%. 1000 milliLiter(s) IV Continuous <Continuous>  dextrose 5%. 1000 milliLiter(s) IV Continuous <Continuous>  ferrous    sulfate 325 milliGRAM(s) Oral daily  sodium chloride 3 Gram(s) Oral every 8 hours    CULTURES:  Culture Results:   No growth to date (12-23 @ 15:54)  Culture Results:   No growth at 2 days. (12-23 @ 09:53)    RADIOLOGY & ADDITIONAL TESTS:      ASSESSMENT:  74 yo Female with PMHx of COPD, asthma, HLD, HTN, BIBEMS to [facility] from home after HHA found patient down on the floor covered in non-bloody vomitus. CTH reveals diffuse subarachnoid hemorrhage mainly at basilar cisterns with IVH and obstructive hydrocephalus, CTA shows 3mm left pcomm aneurysm, now s/p bedside right frontal EVD placement 12/10, s/p cerebral angiogram for coil embolization of left PCOMM aneurysm 12/10, now POD #10 s/p cerebral angio for verapamil c/b device malfunction of Proglide, s/p right groin cutdown for removal of proglide catheter and femoral artery repair (12/17/2020), NIHSS2 HH3 MF4).       HEADACHE    Handoff    Hyperlipidemia    Hypertension    COPD (chronic obstructive pulmonary disease)    Asthma    COPD (chronic obstructive pulmonary disease)    Asthma    Injury of right femoral artery    Cerebral artery vasospasm    SAH (subarachnoid hemorrhage)    Injury of femoral artery    Cerebral artery vasospasm    SAH (subarachnoid hemorrhage)    Subarachnoid bleed    Vascular surgery procedure    Angiogram, carotid and cerebral, bilateral    Angiogram, cerebral, with intracranial aneurysm embolization    No significant past surgical history    HEADACHE    90+    SysAdmin_VisitLink        PLAN:  NEURO:  - neuro checks q1h  - pain control   - EVD clamped, monitor ICPs   - ritalin d/c'd, changed to modafinil    - no nimodipine, causes labile SBP  - PT/OT    CARDIOVASCULAR:  - -200  - neurologically better with higher pressures  - cont milrinone gtt for vasospasm   - cont lipitor  - cards following, recs appreciated  - will need echo and CCTA before d/c  - HF, EF 30%, limit fluids  - b/l carotid stenosis, will eventually need ASA    PULMONARY:  - satting well RA    RENAL:  - repletions prn   - off 3%  - cont florinef, salt tabs  - normonatremia goal     GI:  - TF via NGT  - bowel regimen     HEME:  - h/h downtrend, cont to monitor   - SCDs/SQL  - cont PO iron anemia    ID:  - febrile 102 (12/24), Pan Cx 12/23  - trend leukocytosis     ENDO:  - ISS    DVT PROPHYLAXIS:  [x] Venodynes                                [x] Heparin/Lovenox    DISPOSITION: ICU status, full code, dispo pending    Assessment and plan discussed w/ Dr. Serrano and Dr. Alfredo     Assessment:  Present when checked    []  GCS  E   V  M     Heart Failure: []Acute, [] acute on chronic , []chronic  Heart Failure:  [] Diastolic (HFpEF), [] Systolic (HFrEF), []Combined (HFpEF and HFrEF), [] RHF, [] Pulm HTN, [] Other    [] MICHAELLE, [] ATN, [] AIN, [] other  [] CKD1, [] CKD2, [] CKD 3, [] CKD 4, [] CKD 5, []ESRD    Encephalopathy: [x] Metabolic, [] Hepatic, [] toxic, [] Neurological, [] Other    Abnormal Nurtitional Status: [] malnurtition (see nutrition note), [ ]underweight: BMI < 19, [] morbid obesity: BMI >40, [] Cachexia    [] Sepsis  [] hypovolemic shock,[] cardiogenic shock, [] hemorrhagic shock, [] neuogenic shock  [] Acute Respiratory Failure  []Cerebral edema, [] Brain compression/ herniation,   [] Functional quadriplegia  [] Acute blood loss anemia

## 2020-12-27 LAB
ANION GAP SERPL CALC-SCNC: 12 MMOL/L — SIGNIFICANT CHANGE UP (ref 5–17)
ANION GAP SERPL CALC-SCNC: 14 MMOL/L — SIGNIFICANT CHANGE UP (ref 5–17)
BUN SERPL-MCNC: 12 MG/DL — SIGNIFICANT CHANGE UP (ref 7–23)
BUN SERPL-MCNC: 12 MG/DL — SIGNIFICANT CHANGE UP (ref 7–23)
CALCIUM SERPL-MCNC: 8.5 MG/DL — SIGNIFICANT CHANGE UP (ref 8.4–10.5)
CALCIUM SERPL-MCNC: 8.5 MG/DL — SIGNIFICANT CHANGE UP (ref 8.4–10.5)
CHLORIDE SERPL-SCNC: 96 MMOL/L — SIGNIFICANT CHANGE UP (ref 96–108)
CHLORIDE SERPL-SCNC: 98 MMOL/L — SIGNIFICANT CHANGE UP (ref 96–108)
CO2 SERPL-SCNC: 20 MMOL/L — LOW (ref 22–31)
CO2 SERPL-SCNC: 26 MMOL/L — SIGNIFICANT CHANGE UP (ref 22–31)
CREAT SERPL-MCNC: 0.41 MG/DL — LOW (ref 0.5–1.3)
CREAT SERPL-MCNC: 0.43 MG/DL — LOW (ref 0.5–1.3)
GLUCOSE SERPL-MCNC: 115 MG/DL — HIGH (ref 70–99)
GLUCOSE SERPL-MCNC: 138 MG/DL — HIGH (ref 70–99)
HCT VFR BLD CALC: 32.1 % — LOW (ref 34.5–45)
HGB BLD-MCNC: 10 G/DL — LOW (ref 11.5–15.5)
MAGNESIUM SERPL-MCNC: 1.8 MG/DL — SIGNIFICANT CHANGE UP (ref 1.6–2.6)
MCHC RBC-ENTMCNC: 27 PG — SIGNIFICANT CHANGE UP (ref 27–34)
MCHC RBC-ENTMCNC: 31.2 GM/DL — LOW (ref 32–36)
MCV RBC AUTO: 86.8 FL — SIGNIFICANT CHANGE UP (ref 80–100)
NRBC # BLD: 0 /100 WBCS — SIGNIFICANT CHANGE UP (ref 0–0)
PHOSPHATE SERPL-MCNC: 3.3 MG/DL — SIGNIFICANT CHANGE UP (ref 2.5–4.5)
PLATELET # BLD AUTO: 434 K/UL — HIGH (ref 150–400)
POTASSIUM SERPL-MCNC: 3.5 MMOL/L — SIGNIFICANT CHANGE UP (ref 3.5–5.3)
POTASSIUM SERPL-MCNC: 4.1 MMOL/L — SIGNIFICANT CHANGE UP (ref 3.5–5.3)
POTASSIUM SERPL-SCNC: 3.5 MMOL/L — SIGNIFICANT CHANGE UP (ref 3.5–5.3)
POTASSIUM SERPL-SCNC: 4.1 MMOL/L — SIGNIFICANT CHANGE UP (ref 3.5–5.3)
RBC # BLD: 3.7 M/UL — LOW (ref 3.8–5.2)
RBC # FLD: 16.5 % — HIGH (ref 10.3–14.5)
SODIUM SERPL-SCNC: 132 MMOL/L — LOW (ref 135–145)
SODIUM SERPL-SCNC: 134 MMOL/L — LOW (ref 135–145)
WBC # BLD: 10.84 K/UL — HIGH (ref 3.8–10.5)
WBC # FLD AUTO: 10.84 K/UL — HIGH (ref 3.8–10.5)

## 2020-12-27 PROCEDURE — 99024 POSTOP FOLLOW-UP VISIT: CPT

## 2020-12-27 PROCEDURE — 99233 SBSQ HOSP IP/OBS HIGH 50: CPT

## 2020-12-27 RX ORDER — POTASSIUM CHLORIDE 20 MEQ
40 PACKET (EA) ORAL ONCE
Refills: 0 | Status: COMPLETED | OUTPATIENT
Start: 2020-12-27 | End: 2020-12-27

## 2020-12-27 RX ORDER — MAGNESIUM SULFATE 500 MG/ML
2 VIAL (ML) INJECTION ONCE
Refills: 0 | Status: COMPLETED | OUTPATIENT
Start: 2020-12-27 | End: 2020-12-27

## 2020-12-27 RX ORDER — SODIUM CHLORIDE 5 G/100ML
500 INJECTION, SOLUTION INTRAVENOUS
Refills: 0 | Status: DISCONTINUED | OUTPATIENT
Start: 2020-12-27 | End: 2020-12-28

## 2020-12-27 RX ADMIN — SODIUM CHLORIDE 3 GRAM(S): 9 INJECTION INTRAMUSCULAR; INTRAVENOUS; SUBCUTANEOUS at 14:59

## 2020-12-27 RX ADMIN — Medication 81 MILLIGRAM(S): at 11:48

## 2020-12-27 RX ADMIN — SODIUM CHLORIDE 15 MILLILITER(S): 5 INJECTION, SOLUTION INTRAVENOUS at 15:36

## 2020-12-27 RX ADMIN — ENOXAPARIN SODIUM 40 MILLIGRAM(S): 100 INJECTION SUBCUTANEOUS at 21:01

## 2020-12-27 RX ADMIN — CHLORHEXIDINE GLUCONATE 1 APPLICATION(S): 213 SOLUTION TOPICAL at 06:12

## 2020-12-27 RX ADMIN — MONTELUKAST 10 MILLIGRAM(S): 4 TABLET, CHEWABLE ORAL at 11:48

## 2020-12-27 RX ADMIN — Medication 325 MILLIGRAM(S): at 11:48

## 2020-12-27 RX ADMIN — INSULIN GLARGINE 10 UNIT(S): 100 INJECTION, SOLUTION SUBCUTANEOUS at 21:02

## 2020-12-27 RX ADMIN — Medication 3 MILLILITER(S): at 11:47

## 2020-12-27 RX ADMIN — SODIUM CHLORIDE 3 GRAM(S): 9 INJECTION INTRAMUSCULAR; INTRAVENOUS; SUBCUTANEOUS at 21:58

## 2020-12-27 RX ADMIN — LACOSAMIDE 150 MILLIGRAM(S): 50 TABLET ORAL at 19:04

## 2020-12-27 RX ADMIN — FLUDROCORTISONE ACETATE 0.2 MILLIGRAM(S): 0.1 TABLET ORAL at 11:48

## 2020-12-27 RX ADMIN — LACOSAMIDE 150 MILLIGRAM(S): 50 TABLET ORAL at 07:52

## 2020-12-27 RX ADMIN — MODAFINIL 100 MILLIGRAM(S): 200 TABLET ORAL at 11:48

## 2020-12-27 RX ADMIN — Medication 3 MILLILITER(S): at 05:30

## 2020-12-27 RX ADMIN — Medication 50 GRAM(S): at 07:52

## 2020-12-27 RX ADMIN — Medication 40 MILLIEQUIVALENT(S): at 07:41

## 2020-12-27 RX ADMIN — Medication 3 MILLILITER(S): at 16:26

## 2020-12-27 RX ADMIN — PREGABALIN 1000 MICROGRAM(S): 225 CAPSULE ORAL at 11:48

## 2020-12-27 RX ADMIN — SODIUM CHLORIDE 3 GRAM(S): 9 INJECTION INTRAMUSCULAR; INTRAVENOUS; SUBCUTANEOUS at 06:58

## 2020-12-27 RX ADMIN — Medication 2: at 21:59

## 2020-12-27 RX ADMIN — ATORVASTATIN CALCIUM 40 MILLIGRAM(S): 80 TABLET, FILM COATED ORAL at 21:01

## 2020-12-27 RX ADMIN — Medication 3 MILLILITER(S): at 21:01

## 2020-12-27 RX ADMIN — Medication 2.5 MILLIGRAM(S): at 20:54

## 2020-12-27 NOTE — PROGRESS NOTE ADULT - SUBJECTIVE AND OBJECTIVE BOX
HPI:  76y/o F with PMHx sig for COPD, asthma, HLD, HTN, BIBEMS to [facility] from home after HHA discovered patient found down in floor covered in non-bloody vomitus. Perr HHA Pt went to the bathroom and was taking a long time, went in to check on her and found her sitting on floor, awake, however with vomitus on front of shirt. On arrival to [facility], patient was taken for stat head CT, which revealed diffuse subarachnoid hemorrhage mainly at basilar cisterns with IVH and obstructive hydrocephalus. Patient was given a bolus of 1g keppra and dilaudid pushes for generalized headache. CTA concerning for 3mm left pcomm aneurysm and a 1.6mm outpouching of distal cavernous segment of the left internal carotid artery likely aneurysm. NIHSS2 HH3 MF4. Patient was transferred to [facility] for further intervention. Patient currently reports headaches. Pt denies acute changes in vision, seizures, CP, SOB, weakness/paresthesias of arms or legs. (09 Dec 2020 23:37)    Hospital Course:   12/9: BD1 Patient admitted for SAH HH3, MF4.   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 EVD at 5, on Levo overnight, nimodipine discontinued d/t hypotension  12/12: BD4 POD#2, VIVIEN overnight, neuro stable, passed TOV  12/13: BD5 POD#3: VIVIEN x 24 hrs  12/14: BD6 POD#4. VIVIEN o/n, neuro stable. EVD at 5cm H2O  12/15: BD7 POD #5 VIVIEN overnight, neuro stable. EVD remains at 5. Plan for CTA today.   12/16: BD8 POD #6 VIVIEN overnight, neuro stable, EVD at 0, on Levo, started on 3% at 15 overnight   12/17: BD9 POD#1 s/p cerebral angio for verapamil c/b device malfunction of Proglide, s/p right groin cutdown for removal of proglide catheter and femoral artery repair.  VIVIEN overnight, neuro stable, EVD at 0. patient remained intubated overnight, on propofol for sedation, and vEEG  12/18: BD#10, POD#8 s/p coil/embo of L pcomm aneurysm, POD#2 s/p IA verapamil, POD#2 R groin cutdown for removal of proglide catheter w/ repair of femoral artery. VIVIEN overnight. EVD remains open @0cmH2O   12/19: BD#11, POD#9 s/p coil/embo of L pcomm aneurysm. POD#3 s/p IA verapamil, POD#3 s/p R femoral artery cutdown of proglide catheter w/ femoral artery repair. VIVIEN overnight. EVD remains open @0cmH2O. EEG shows b/l frontal sharp discharges, cont monitoring, vimpat was increased yesterday. Gentle hydration, total IVF 50cc/hr. Cont vanc/zosyn for empiric coverage, next vanc trough due @1pm on 12/19. F/u daily CXR.   12/20 BD#12 POD#10 VIVIEN overnight, Neuro exam stable, EVD @ 0cm H2O, vEEG, 3% @ 15 goal WNL, TF via NGT  12/21: BD13, POD11 VIVIEN overnight. EVD at 5cmH20 (raised yesterday), vEEG in place  12/22 BD#14, EVD raised to 15 yesterday, 3% @ 30cc Goal WNL, -180, Milrinone, TTE prior to DC as per Card for takotsubo cardiomyopathy, failed S&S pending reeval, TF via NGT, Neuro exam stable  12/23: BD#15. VIVIEN o/n, neuro stable. EVD clamped. 30%@30cc/hr  12/24 BD#16 VIVIEN overnight, spiked 102, EVD @ 0cm H2O, 3% @ 30cc/hr Goal WNL, Vasc following, TF via NGT, -200,   12/25: BD#17. VIVIEN overnight. Pan cx from 12/23, NGTD on CSF and blood. EVD clamped. 3% @15cc/hr, rate kept same overnight. NGT feeds at goal. SBP goal 160-200.   12/26: BD#18. VIVIEN overnight, neuro exam stable. NGTD from pan cx on 12/23. EVD removed today. 3% discontinued 12/25. NGT feeds at goal, IVF off. SBP goal 160-200.   12/27: BD#19 VIVIEN overnight, neuro stable. 3% at 15, stepdown status    Vital Signs Last 24 Hrs  T(C): 37 (27 Dec 2020 01:00), Max: 37 (26 Dec 2020 07:12)  T(F): 98.6 (27 Dec 2020 01:00), Max: 98.6 (26 Dec 2020 07:12)  HR: 71 (27 Dec 2020 04:00) (71 - 93)  BP: 163/70 (27 Dec 2020 04:00) (142/65 - 207/91)  BP(mean): 101 (27 Dec 2020 04:00) (93 - 131)  RR: 16 (27 Dec 2020 04:00) (14 - 18)  SpO2: 95% (27 Dec 2020 04:00) (92% - 100%)    I&O's Detail    25 Dec 2020 07:01  -  26 Dec 2020 07:00  --------------------------------------------------------  IN:    Enteral Tube Flush: 210 mL    Glucerna: 400 mL    Milrinone: 37.5 mL    Oral Fluid: 110 mL    sodium chloride 3%: 75 mL  Total IN: 832.5 mL    OUT:    Indwelling Catheter - Urethral (mL): 1740 mL  Total OUT: 1740 mL    Total NET: -907.5 mL      26 Dec 2020 07:01  -  27 Dec 2020 04:36  --------------------------------------------------------  IN:    Enteral Tube Flush: 550 mL    Oral Fluid: 60 mL    sodium chloride 3%: 45 mL  Total IN: 655 mL    OUT:    Indwelling Catheter - Urethral (mL): 630 mL    Intermittent Catheterization - Urethral (mL): 850 mL  Total OUT: 1480 mL    Total NET: -825 mL        I&O's Summary    25 Dec 2020 07:01  -  26 Dec 2020 07:00  --------------------------------------------------------  IN: 832.5 mL / OUT: 1740 mL / NET: -907.5 mL    26 Dec 2020 07:01  -  27 Dec 2020 04:36  --------------------------------------------------------  IN: 655 mL / OUT: 1480 mL / NET: -825 mL        PHYSICAL EXAM:  General: NAD, pt is comfortably sitting up in bed  Neuro: AA&O x2, OE spont, FC  CN II-XII: PERRL, EOMI  Motor: ZAFAR x4, good strength throughout  HEENT: face symmetric, tongue midline   Cardiovascular: RRR, normal S1 and S2   Respiratory: lungs CTAB, no wheezing, rhonchi, or crackles   GI: normoactive BS to auscultation, abd soft, NTND   Extremities: distal pulses 2+ x4       TUBES/LINES:  [] CVC  [] A-line  [] Lumbar Drain  [] Ventriculostomy  [] Other    DIET:  [] NPO  [x] Mechanical  [] Tube feeds    LABS:                        10.1   10.71 )-----------( 423      ( 26 Dec 2020 05:48 )             32.5     12-26    134<L>  |  98  |  13  ----------------------------<  140<H>  5.2   |  27  |  0.44<L>    Ca    8.9      26 Dec 2020 17:03  Phos  3.7     12-26  Mg     1.9     12-26    TPro  6.5  /  Alb  2.9<L>  /  TBili  0.3  /  DBili  <0.2  /  AST  21  /  ALT  18  /  AlkPhos  77  12-26            CAPILLARY BLOOD GLUCOSE      POCT Blood Glucose.: 128 mg/dL (26 Dec 2020 21:40)  POCT Blood Glucose.: 132 mg/dL (26 Dec 2020 17:30)  POCT Blood Glucose.: 158 mg/dL (26 Dec 2020 11:18)  POCT Blood Glucose.: 97 mg/dL (26 Dec 2020 05:30)      Drug Levels: [] N/A    CSF Analysis: [] N/A      Allergies    No Known Allergies    Intolerances      MEDICATIONS:  Antibiotics:    Neuro:  lacosamide Solution 150 milliGRAM(s) Oral every 12 hours  modafinil 100 milliGRAM(s) Oral daily    Anticoagulation:  aspirin  chewable 81 milliGRAM(s) Oral daily  enoxaparin Injectable 40 milliGRAM(s) SubCutaneous every 24 hours    OTHER:  albuterol/ipratropium for Nebulization. 3 milliLiter(s) Nebulizer every 6 hours  atorvastatin 40 milliGRAM(s) Oral at bedtime  chlorhexidine 2% Cloths 1 Application(s) Topical <User Schedule>  dextrose 40% Gel 15 Gram(s) Oral once  dextrose 50% Injectable 25 Gram(s) IV Push once  fludroCORTISONE 0.2 milliGRAM(s) Oral every 24 hours  glucagon  Injectable 1 milliGRAM(s) IntraMuscular once  insulin glargine Injectable (LANTUS) 10 Unit(s) SubCutaneous at bedtime  insulin lispro (ADMELOG) corrective regimen sliding scale   SubCutaneous Before meals and at bedtime  labetalol Injectable 2.5 milliGRAM(s) IV Push every 6 hours PRN  montelukast 10 milliGRAM(s) Oral daily    IVF:  cyanocobalamin 1000 MICROGram(s) Oral daily  dextrose 5%. 1000 milliLiter(s) IV Continuous <Continuous>  dextrose 5%. 1000 milliLiter(s) IV Continuous <Continuous>  ferrous    sulfate 325 milliGRAM(s) Oral daily  sodium chloride 3 Gram(s) Oral every 8 hours  sodium chloride 3%. 500 milliLiter(s) IV Continuous <Continuous>    CULTURES:  Culture Results:   No growth to date (12-23 @ 15:54)  Culture Results:   No growth at 3 days. (12-23 @ 09:53)    RADIOLOGY & ADDITIONAL TESTS:      ASSESSMENT:  74 yo Female with PMHx of COPD, asthma, HLD, HTN, BIBEMS to [facility] from home after HHA found patient down on the floor covered in non-bloody vomitus. CTH reveals diffuse subarachnoid hemorrhage mainly at basilar cisterns with IVH and obstructive hydrocephalus, CTA shows 3mm left pcomm aneurysm, now s/p bedside right frontal EVD placement 12/10, s/p cerebral angiogram for coil embolization of left PCOMM aneurysm 12/10, now POD #10 s/p cerebral angio for verapamil c/b device malfunction of Proglide, s/p right groin cutdown for removal of proglide catheter and femoral artery repair (12/17/2020), NIHSS2 HH3 MF4), s/p EVD removal 12/25    HEADACHE    Handoff    Hyperlipidemia    Hypertension    COPD (chronic obstructive pulmonary disease)    Asthma    COPD (chronic obstructive pulmonary disease)    Asthma    Injury of right femoral artery    Cerebral artery vasospasm    SAH (subarachnoid hemorrhage)    Injury of femoral artery    Cerebral artery vasospasm    SAH (subarachnoid hemorrhage)    Subarachnoid bleed    Vascular surgery procedure    Angiogram, carotid and cerebral, bilateral    Angiogram, cerebral, with intracranial aneurysm embolization    No significant past surgical history    HEADACHE    90+    SysAdmin_VisitLink        PLAN:  NEURO:  - neuro checks  - vitals checks  - pain control  - cont Modafinil  - off Nimodipine 2/2 hypotension  - Aspirin 81 for b/l carotid stenosis    CARDIOVASCULAR:  - -200  - echo and CCTA prior to discharge  - limit fluids 2/2 takotsubo with EF 30%    PULMONARY:  - on room air     RENAL:  - 3% at 15  - cont florinef, salt tabs    GI:  - pure diet  - dc NGT?   - bowel regimen    HEME:  - monitor H/H  - PO iron for anemia    ID:  - afebrile overnight    ENDO:  - Lantus, ISS    DVT PROPHYLAXIS:  [x] Venodynes                                [x] Heparin/Lovenox    DISPOSITION: ICU status, full code, dispo pending    d/w Dr. Serrano      Assessment:  Present when checked    []  GCS  E   V  M     Heart Failure: []Acute, [] acute on chronic , []chronic  Heart Failure:  [] Diastolic (HFpEF), [] Systolic (HFrEF), []Combined (HFpEF and HFrEF), [] RHF, [] Pulm HTN, [] Other    [] MICHAELLE, [] ATN, [] AIN, [] other  [] CKD1, [] CKD2, [] CKD 3, [] CKD 4, [] CKD 5, []ESRD    Encephalopathy: [] Metabolic, [] Hepatic, [] toxic, [] Neurological, [] Other    Abnormal Nurtitional Status: [] malnurtition (see nutrition note), [ ]underweight: BMI < 19, [] morbid obesity: BMI >40, [] Cachexia    [] Sepsis  [] hypovolemic shock,[] cardiogenic shock, [] hemorrhagic shock, [] neuogenic shock  [] Acute Respiratory Failure  []Cerebral edema, [] Brain compression/ herniation,   [] Functional quadriplegia  [] Acute blood loss anemia

## 2020-12-27 NOTE — PROGRESS NOTE ADULT - ASSESSMENT
75y/Female with:  HH3 MF4, aneursymal subarachnoid hemorrhage, L pcomm aneurysm, L parophthalmic aneurysm; brain compression, cerebral edema  osbtructive hydrocephalus  lacunar infarcts R caudate, B thalami, cerebellar hemispheres ?artery to artery vs cardioembolic?  atherosclerotic cardiovascular disease; mod to severe bilateral ICA stenosis (R>L), R VA stenosis  Hypertension dyslipidemia   COPD asthma  newly diagnosed Diabetes Mellitus?  troponin leak    BLEED DAY 19  PLAN: Day 1 = 12-09 ; Day 4 =  12/12; Day 21 = 12/29  NEURO:1) SAH   neurochecks q1h, PRN pain meds with Tylenol / Percocet  ritalin stopped yesterday  nSAH:  nimodipine discontinued due to pressor requirements  Hydrocephalus:  EVD out  SBP <200, liberalized, autoregulation 140-200  Fluctuating exam appears pressure-dependant, may be related to high grade b/l ICA stenosis plus ? 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%  -repeat echo prior to discharge  SBP goal 120-200mm Hg (currently autoregulates to sBP 170-180's-VSP), EF 35%, repeat TTE as per Cardiology (once outside VSP window, will start MTP/ARB)  -VERY responsive to labetaolol, if  needed only give 2.5mg PRN    ENDO:  Blood sugar goals 140-180 mg/dL, cont insulin sliding scale, atorvastatin 40mg     GI:  PPI for GI prophylaxis (home meds); bowel regimen  DIET: pureed  -not eating much calroeis: continue TF at half goal with PO supplementation  regular diet    RENAL:   Goal normonatremia  -salt tabs 3g q8h  -fludrocort 0.2 q24h  -check PM BMP  3% at 15cc/hr --> wean slowly    HEM/ONC: Hb stable, iron profile c/w iron deficiency, iron FeSO4 325 TID  superficial intramuscular LE clot --> indication for full a/c  --> repeat LE dopplers today, if still present will start full-dose AC if ok per nsgy  VTE Prophylaxis: SCDs, SQL  -ASA for high grade stenosis b/l carotids    ID:  low grade fever, no abx for now    Access:  PIVs  NGT  de anda out --> bladder scan and straigh

## 2020-12-27 NOTE — PROGRESS NOTE ADULT - SUBJECTIVE AND OBJECTIVE BOX
=================================================   NEUROCRITICAL CARE ATTENDING NOTE  =================================================    CARA CANCHOLA   MRN-[id number]  Summary:  75y/F with COPD, asthma, dyslipidemia Hypertension found down.  Brought to [facility] where imaging showed SAH basilar cisterns with IVH and obstructive hydrocephalus L Pcomm aneurysm.  Given levetiracetam, hydromorphone.  NIHSS 2 HH3 MF4, transferred to [facility] for further management.      Past Medical History: Hyperlipidemia Hypertension COPD (chronic obstructive pulmonary disease) Asthma  Allergies:  No Known Allergies  Home meds:   ·	famotidine 20 mg oral tablet: 1 tab(s) orally once a day  ·	lisinopril 2.5 mg oral tablet: 1 tab(s) orally once a day  ·	montelukast 10 mg oral tablet: 1 tab(s) orally once a day  ·	predniSONE 50 mg oral tablet: 1 tab(s) orally once a day  ·	ProAir HFA CFC free 90 mcg/inh inhalation aerosol: 2 puff(s) inhaled 4 times a day PRN  ·	simvastatin 20 mg oral tablet: 1 tab(s) orally once a day (at bedtime)    COURSE IN THE HOSPITAL:  12/09 admitted to [facility], EVD inserted; given 20 lasix for pulmo edema, T inversion on CT  12/10 No significant events overnight.   12/11 No significant events overnight.   12/12 No significant events overnight.   12/13 No significant events overnight. drain stopped working at 730 a.m., off levophed   12/14 No significant events overnight.   12/15 Noted confusion, CTA mild spasm  12/16 lethargic, angio - no severe spasm, given IA verapamil, underwent femoral endarterectomy with repair with patch angioplasty  12/17 improved post verapamil, transitioned to milrinone, currently tolerating extubation  12/18: BD#10, POD#8 s/p coil/embo of L pcomm aneurysm, POD#2 s/p IA verapamil, POD#2 R groin cutdown for removal of proglide catheter w/ repair of femoral artery.   12/19: neurological improvement, stable hemodynamic status  12/20: BD#12, POD#9, EVD raise to 5 cm  12/21: no acute events, but fluctuating level of alerness this morniong.  SBP in 190's given labetalol  12/22: EVD clamped yesterday, febrile overnight 101.5.    this morning given 10 labetalol, BP dropped to  minimum - clearly worse mental status, somenolent and mumbling.  Started levophed to correct.      12:24  EVD at clamp  12/25: more awake, -150s, 3% 15 ml/h, milrinone 0.5 --> off  12/26: febrile yesterday, pancultured,     Overnight Events:  straight cathed, retaining.      NEUROLOGIC EXAMINATION:    MSE: Awake, inattentive, oriented to [facility] and "hospital" "[place]" but somnolent.  Follows commands to open mouth    CN: pupils 4mmg b/l reactive, EOMI, face symmetric  motor: arms grossly antigravity very frail, (R shoulder mechanical injury is pain limiting),  full  strength b/l, legs both brisk antigravity to pinch  PULMONARY: mild coarse breath sounds,   ABDOMEN: soft, nontender  EXTREMITIES: no edema, pulses present by Dopplers  SKIN: no rash      Allergies: No Known Allergies      VITALS:  T(C): 36.8 (12-27-20 @ 05:51), Max: 37 (12-27-20 @ 01:00)  HR: 81 (12-27-20 @ 09:00) (71 - 93)  BP: 137/65 (12-27-20 @ 09:00) (137/65 - 197/86)  RR: 12 (12-27-20 @ 09:00) (12 - 18)  SpO2: 94% (12-27-20 @ 09:00) (92% - 99%)    MEDICATIONS:  albuterol/ipratropium for Nebulization. 3 milliLiter(s) Nebulizer every 6 hours  aspirin  chewable 81 milliGRAM(s) Oral daily  atorvastatin 40 milliGRAM(s) Oral at bedtime  chlorhexidine 2% Cloths 1 Application(s) Topical <User Schedule>  cyanocobalamin 1000 MICROGram(s) Oral daily  dextrose 40% Gel 15 Gram(s) Oral once  dextrose 5%. 1000 milliLiter(s) IV Continuous <Continuous>  dextrose 5%. 1000 milliLiter(s) IV Continuous <Continuous>  dextrose 50% Injectable 25 Gram(s) IV Push once  enoxaparin Injectable 40 milliGRAM(s) SubCutaneous every 24 hours  ferrous    sulfate 325 milliGRAM(s) Oral daily  fludroCORTISONE 0.2 milliGRAM(s) Oral every 24 hours  glucagon  Injectable 1 milliGRAM(s) IntraMuscular once  insulin glargine Injectable (LANTUS) 10 Unit(s) SubCutaneous at bedtime  insulin lispro (ADMELOG) corrective regimen sliding scale   SubCutaneous Before meals and at bedtime  labetalol Injectable 2.5 milliGRAM(s) IV Push every 6 hours PRN  lacosamide Solution 150 milliGRAM(s) Oral every 12 hours  modafinil 100 milliGRAM(s) Oral daily  montelukast 10 milliGRAM(s) Oral daily  sodium chloride 3 Gram(s) Oral every 8 hours  sodium chloride 3%. 500 milliLiter(s) IV Continuous <Continuous> @ 15/hr      I/O's    12-26-20 @ 07:01  -  12-27-20 @ 07:00  --------------------------------------------------------  IN: 760 mL / OUT: 1480 mL / NET: -720 mL    12-27-20 @ 07:01  -  12-27-20 @ 09:47  --------------------------------------------------------  IN: 65 mL / OUT: 0 mL / NET: 65 mL        Ventilator data:      LABS:                          10.0   10.84 )-----------( 434      ( 27 Dec 2020 05:49 )             32.1     12-27    134<L>  |  96  |  12  ----------------------------<  115<H>  3.5   |  26  |  0.43<L>    Ca    8.5      27 Dec 2020 05:49  Phos  3.3     12-27  Mg     1.8     12-27    TPro  6.5  /  Alb  2.9<L>  /  TBili  0.3  /  DBili  <0.2  /  AST  21  /  ALT  18  /  AlkPhos  77  12-26            CAPILLARY BLOOD GLUCOSE      POCT Blood Glucose.: 123 mg/dL (27 Dec 2020 08:06)  POCT Blood Glucose.: 128 mg/dL (26 Dec 2020 21:40)  POCT Blood Glucose.: 132 mg/dL (26 Dec 2020 17:30)  POCT Blood Glucose.: 158 mg/dL (26 Dec 2020 11:18)

## 2020-12-28 LAB
ANION GAP SERPL CALC-SCNC: 12 MMOL/L — SIGNIFICANT CHANGE UP (ref 5–17)
ANION GAP SERPL CALC-SCNC: 12 MMOL/L — SIGNIFICANT CHANGE UP (ref 5–17)
APTT BLD: 29 SEC — SIGNIFICANT CHANGE UP (ref 27.5–35.5)
BUN SERPL-MCNC: 14 MG/DL — SIGNIFICANT CHANGE UP (ref 7–23)
BUN SERPL-MCNC: 15 MG/DL — SIGNIFICANT CHANGE UP (ref 7–23)
CALCIUM SERPL-MCNC: 9 MG/DL — SIGNIFICANT CHANGE UP (ref 8.4–10.5)
CALCIUM SERPL-MCNC: 9.2 MG/DL — SIGNIFICANT CHANGE UP (ref 8.4–10.5)
CHLORIDE SERPL-SCNC: 96 MMOL/L — SIGNIFICANT CHANGE UP (ref 96–108)
CHLORIDE SERPL-SCNC: 98 MMOL/L — SIGNIFICANT CHANGE UP (ref 96–108)
CO2 SERPL-SCNC: 28 MMOL/L — SIGNIFICANT CHANGE UP (ref 22–31)
CO2 SERPL-SCNC: 30 MMOL/L — SIGNIFICANT CHANGE UP (ref 22–31)
CREAT SERPL-MCNC: 0.47 MG/DL — LOW (ref 0.5–1.3)
CREAT SERPL-MCNC: 0.48 MG/DL — LOW (ref 0.5–1.3)
CULTURE RESULTS: SIGNIFICANT CHANGE UP
CULTURE RESULTS: SIGNIFICANT CHANGE UP
GLUCOSE SERPL-MCNC: 158 MG/DL — HIGH (ref 70–99)
GLUCOSE SERPL-MCNC: 175 MG/DL — HIGH (ref 70–99)
HCT VFR BLD CALC: 37.4 % — SIGNIFICANT CHANGE UP (ref 34.5–45)
HGB BLD-MCNC: 11.6 G/DL — SIGNIFICANT CHANGE UP (ref 11.5–15.5)
INR BLD: 1.01 — SIGNIFICANT CHANGE UP (ref 0.88–1.16)
MAGNESIUM SERPL-MCNC: 2.3 MG/DL — SIGNIFICANT CHANGE UP (ref 1.6–2.6)
MCHC RBC-ENTMCNC: 27.2 PG — SIGNIFICANT CHANGE UP (ref 27–34)
MCHC RBC-ENTMCNC: 31 GM/DL — LOW (ref 32–36)
MCV RBC AUTO: 87.6 FL — SIGNIFICANT CHANGE UP (ref 80–100)
NRBC # BLD: 0 /100 WBCS — SIGNIFICANT CHANGE UP (ref 0–0)
PHOSPHATE SERPL-MCNC: 2.8 MG/DL — SIGNIFICANT CHANGE UP (ref 2.5–4.5)
PLATELET # BLD AUTO: 532 K/UL — HIGH (ref 150–400)
POTASSIUM SERPL-MCNC: 3.4 MMOL/L — LOW (ref 3.5–5.3)
POTASSIUM SERPL-MCNC: 3.9 MMOL/L — SIGNIFICANT CHANGE UP (ref 3.5–5.3)
POTASSIUM SERPL-SCNC: 3.4 MMOL/L — LOW (ref 3.5–5.3)
POTASSIUM SERPL-SCNC: 3.9 MMOL/L — SIGNIFICANT CHANGE UP (ref 3.5–5.3)
PROTHROM AB SERPL-ACNC: 12.1 SEC — SIGNIFICANT CHANGE UP (ref 10.6–13.6)
RBC # BLD: 4.27 M/UL — SIGNIFICANT CHANGE UP (ref 3.8–5.2)
RBC # FLD: 17.2 % — HIGH (ref 10.3–14.5)
SARS-COV-2 RNA SPEC QL NAA+PROBE: SIGNIFICANT CHANGE UP
SODIUM SERPL-SCNC: 138 MMOL/L — SIGNIFICANT CHANGE UP (ref 135–145)
SODIUM SERPL-SCNC: 138 MMOL/L — SIGNIFICANT CHANGE UP (ref 135–145)
SPECIMEN SOURCE: SIGNIFICANT CHANGE UP
SPECIMEN SOURCE: SIGNIFICANT CHANGE UP
WBC # BLD: 13.83 K/UL — HIGH (ref 3.8–10.5)
WBC # FLD AUTO: 13.83 K/UL — HIGH (ref 3.8–10.5)

## 2020-12-28 PROCEDURE — 70450 CT HEAD/BRAIN W/O DYE: CPT | Mod: 26

## 2020-12-28 PROCEDURE — 93970 EXTREMITY STUDY: CPT | Mod: 26

## 2020-12-28 PROCEDURE — 99024 POSTOP FOLLOW-UP VISIT: CPT

## 2020-12-28 PROCEDURE — 62270 DX LMBR SPI PNXR: CPT

## 2020-12-28 PROCEDURE — 71045 X-RAY EXAM CHEST 1 VIEW: CPT | Mod: 26

## 2020-12-28 RX ORDER — POTASSIUM CHLORIDE 20 MEQ
40 PACKET (EA) ORAL ONCE
Refills: 0 | Status: COMPLETED | OUTPATIENT
Start: 2020-12-28 | End: 2020-12-28

## 2020-12-28 RX ORDER — LACOSAMIDE 50 MG/1
150 TABLET ORAL EVERY 12 HOURS
Refills: 0 | Status: DISCONTINUED | OUTPATIENT
Start: 2020-12-28 | End: 2021-01-26

## 2020-12-28 RX ORDER — ACETAMINOPHEN 500 MG
650 TABLET ORAL EVERY 6 HOURS
Refills: 0 | Status: DISCONTINUED | OUTPATIENT
Start: 2020-12-28 | End: 2021-01-26

## 2020-12-28 RX ORDER — CEFAZOLIN SODIUM 1 G
2000 VIAL (EA) INJECTION ONCE
Refills: 0 | Status: COMPLETED | OUTPATIENT
Start: 2020-12-28 | End: 2020-12-28

## 2020-12-28 RX ORDER — FENTANYL CITRATE 50 UG/ML
25 INJECTION INTRAVENOUS ONCE
Refills: 0 | Status: DISCONTINUED | OUTPATIENT
Start: 2020-12-28 | End: 2020-12-28

## 2020-12-28 RX ORDER — SODIUM CHLORIDE 5 G/100ML
500 INJECTION, SOLUTION INTRAVENOUS
Refills: 0 | Status: DISCONTINUED | OUTPATIENT
Start: 2020-12-28 | End: 2020-12-29

## 2020-12-28 RX ADMIN — Medication 3 MILLILITER(S): at 04:29

## 2020-12-28 RX ADMIN — INSULIN GLARGINE 10 UNIT(S): 100 INJECTION, SOLUTION SUBCUTANEOUS at 21:44

## 2020-12-28 RX ADMIN — PREGABALIN 1000 MICROGRAM(S): 225 CAPSULE ORAL at 11:41

## 2020-12-28 RX ADMIN — LACOSAMIDE 150 MILLIGRAM(S): 50 TABLET ORAL at 18:27

## 2020-12-28 RX ADMIN — Medication 3 MILLILITER(S): at 11:40

## 2020-12-28 RX ADMIN — SODIUM CHLORIDE 3 GRAM(S): 9 INJECTION INTRAMUSCULAR; INTRAVENOUS; SUBCUTANEOUS at 06:44

## 2020-12-28 RX ADMIN — Medication 2: at 06:56

## 2020-12-28 RX ADMIN — FLUDROCORTISONE ACETATE 0.2 MILLIGRAM(S): 0.1 TABLET ORAL at 11:41

## 2020-12-28 RX ADMIN — ENOXAPARIN SODIUM 40 MILLIGRAM(S): 100 INJECTION SUBCUTANEOUS at 21:45

## 2020-12-28 RX ADMIN — SODIUM CHLORIDE 3 GRAM(S): 9 INJECTION INTRAMUSCULAR; INTRAVENOUS; SUBCUTANEOUS at 18:19

## 2020-12-28 RX ADMIN — CHLORHEXIDINE GLUCONATE 1 APPLICATION(S): 213 SOLUTION TOPICAL at 06:44

## 2020-12-28 RX ADMIN — Medication 650 MILLIGRAM(S): at 23:02

## 2020-12-28 RX ADMIN — LACOSAMIDE 150 MILLIGRAM(S): 50 TABLET ORAL at 06:44

## 2020-12-28 RX ADMIN — Medication 40 MILLIEQUIVALENT(S): at 11:41

## 2020-12-28 RX ADMIN — Medication 100 MILLIGRAM(S): at 23:12

## 2020-12-28 RX ADMIN — FENTANYL CITRATE 25 MICROGRAM(S): 50 INJECTION INTRAVENOUS at 23:07

## 2020-12-28 RX ADMIN — Medication 4: at 21:44

## 2020-12-28 RX ADMIN — Medication 650 MILLIGRAM(S): at 20:30

## 2020-12-28 RX ADMIN — MODAFINIL 100 MILLIGRAM(S): 200 TABLET ORAL at 11:41

## 2020-12-28 RX ADMIN — ATORVASTATIN CALCIUM 40 MILLIGRAM(S): 80 TABLET, FILM COATED ORAL at 21:45

## 2020-12-28 RX ADMIN — Medication 3 MILLILITER(S): at 22:56

## 2020-12-28 RX ADMIN — Medication 3 MILLILITER(S): at 18:18

## 2020-12-28 RX ADMIN — Medication 325 MILLIGRAM(S): at 11:41

## 2020-12-28 RX ADMIN — MONTELUKAST 10 MILLIGRAM(S): 4 TABLET, CHEWABLE ORAL at 11:41

## 2020-12-28 NOTE — PROGRESS NOTE ADULT - SUBJECTIVE AND OBJECTIVE BOX
HPI:  76y/o F with PMHx sig for COPD, asthma, HLD, HTN, BIBEMS to [facility] from home after HHA discovered patient found down in floor covered in non-bloody vomitus. Perr HHA Pt went to the bathroom and was taking a long time, went in to check on her and found her sitting on floor, awake, however with vomitus on front of shirt. On arrival to [facility], patient was taken for stat head CT, which revealed diffuse subarachnoid hemorrhage mainly at basilar cisterns with IVH and obstructive hydrocephalus. Patient was given a bolus of 1g keppra and dilaudid pushes for generalized headache. CTA concerning for 3mm left pcomm aneurysm and a 1.6mm outpouching of distal cavernous segment of the left internal carotid artery likely aneurysm. NIHSS2 HH3 MF4. Patient was transferred to [facility] for further intervention. Patient currently reports headaches. Pt denies acute changes in vision, seizures, CP, SOB, weakness/paresthesias of arms or legs. (09 Dec 2020 23:37)    Hospital Course:   12/9: BD1 Patient admitted for SAH HH3, MF4.   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 EVD at 5, on Levo overnight, nimodipine discontinued d/t hypotension  12/12: BD4 POD#2, VIVIEN overnight, neuro stable, passed TOV  12/13: BD5 POD#3: VIVIEN x 24 hrs  12/14: BD6 POD#4. VIVIEN o/n, neuro stable. EVD at 5cm H2O  12/15: BD7 POD #5 VIVIEN overnight, neuro stable. EVD remains at 5. Plan for CTA today.   12/16: BD8 POD #6 VIVIEN overnight, neuro stable, EVD at 0, on Levo, started on 3% at 15 overnight   12/17: BD9 POD#1 s/p cerebral angio for verapamil c/b device malfunction of Proglide, s/p right groin cutdown for removal of proglide catheter and femoral artery repair.  VIVIEN overnight, neuro stable, EVD at 0. patient remained intubated overnight, on propofol for sedation, and vEEG  12/18: BD#10, POD#8 s/p coil/embo of L pcomm aneurysm, POD#2 s/p IA verapamil, POD#2 R groin cutdown for removal of proglide catheter w/ repair of femoral artery. VIVIEN overnight. EVD remains open @0cmH2O   12/19: BD#11, POD#9 s/p coil/embo of L pcomm aneurysm. POD#3 s/p IA verapamil, POD#3 s/p R femoral artery cutdown of proglide catheter w/ femoral artery repair. VIVIEN overnight. EVD remains open @0cmH2O. EEG shows b/l frontal sharp discharges, cont monitoring, vimpat was increased yesterday. Gentle hydration, total IVF 50cc/hr. Cont vanc/zosyn for empiric coverage, next vanc trough due @1pm on 12/19. F/u daily CXR.   12/20 BD#12 POD#10 VIVIEN overnight, Neuro exam stable, EVD @ 0cm H2O, vEEG, 3% @ 15 goal WNL, TF via NGT  12/21: BD13, POD11 VIVIEN overnight. EVD at 5cmH20 (raised yesterday), vEEG in place  12/22 BD#14, EVD raised to 15 yesterday, 3% @ 30cc Goal WNL, -180, Milrinone, TTE prior to DC as per Card for takotsubo cardiomyopathy, failed S&S pending reeval, TF via NGT, Neuro exam stable  12/23: BD#15. VIVIEN o/n, neuro stable. EVD clamped. 30%@30cc/hr  12/24 BD#16 VIVIEN overnight, spiked 102, EVD @ 0cm H2O, 3% @ 30cc/hr Goal WNL, Vasc following, TF via NGT, -200,   12/25: BD#17. VIVIEN overnight. Pan cx from 12/23, NGTD on CSF and blood. EVD clamped. 3% @15cc/hr, rate kept same overnight. NGT feeds at goal. SBP goal 160-200.   12/26: BD#18. VIVIEN overnight, neuro exam stable. NGTD from pan cx on 12/23. EVD removed today. 3% discontinued 12/25. NGT feeds at goal, IVF off. SBP goal 160-200.   12/27: BD#19 VIVIEN overnight, neuro stable. 3% at 15, stepdown status  12/28: BD20 VIVIEN overnight, neuro stable. 3% continues.    Vital Signs Last 24 Hrs  T(C): 36.8 (27 Dec 2020 21:30), Max: 37 (27 Dec 2020 10:04)  T(F): 98.3 (27 Dec 2020 21:30), Max: 98.6 (27 Dec 2020 10:04)  HR: 78 (27 Dec 2020 21:13) (71 - 98)  BP: 188/93 (27 Dec 2020 21:13) (137/65 - 225/94)  BP(mean): 131 (27 Dec 2020 21:13) (93 - 135)  RR: 18 (27 Dec 2020 19:12) (12 - 18)  SpO2: 96% (27 Dec 2020 21:13) (92% - 100%)    I&O's Detail    26 Dec 2020 07:01  -  27 Dec 2020 07:00  --------------------------------------------------------  IN:    Enteral Tube Flush: 550 mL    Oral Fluid: 60 mL    sodium chloride 3%: 150 mL  Total IN: 760 mL    OUT:    Indwelling Catheter - Urethral (mL): 630 mL    Intermittent Catheterization - Urethral (mL): 850 mL  Total OUT: 1480 mL    Total NET: -720 mL      27 Dec 2020 07:01  -  28 Dec 2020 01:31  --------------------------------------------------------  IN:    IV PiggyBack: 50 mL    sodium chloride 3%: 15 mL  Total IN: 65 mL    OUT:    Intermittent Catheterization - Urethral (mL): 1000 mL  Total OUT: 1000 mL    Total NET: -935 mL        I&O's Summary    26 Dec 2020 07:01  -  27 Dec 2020 07:00  --------------------------------------------------------  IN: 760 mL / OUT: 1480 mL / NET: -720 mL    27 Dec 2020 07:01  -  28 Dec 2020 01:31  --------------------------------------------------------  IN: 65 mL / OUT: 1000 mL / NET: -935 mL        PHYSICAL EXAM:    General: NAD, pt is comfortably sitting up in bed  Neuro: AA&O x2, OE spont, FC  CN II-XII: PERRL, EOMI  Motor: ZAFAR x4, good strength throughout  HEENT: face symmetric, tongue midline   Cardiovascular: RRR, normal S1 and S2   Respiratory: lungs CTAB, no wheezing, rhonchi, or crackles   GI: normoactive BS to auscultation, abd soft, NTND   Extremities: distal pulses 2+ x4       TUBES/LINES:  [] CVC  [] A-line  [] Lumbar Drain  [] Ventriculostomy  [] Other    DIET:  [] NPO  [x] Mechanical  [] Tube feeds    LABS:                        10.0   10.84 )-----------( 434      ( 27 Dec 2020 05:49 )             32.1     12-27    132<L>  |  98  |  12  ----------------------------<  138<H>  4.1   |  20<L>  |  0.41<L>    Ca    8.5      27 Dec 2020 17:19  Phos  3.3     12-27  Mg     1.8     12-27    TPro  6.5  /  Alb  2.9<L>  /  TBili  0.3  /  DBili  <0.2  /  AST  21  /  ALT  18  /  AlkPhos  77  12-26            CAPILLARY BLOOD GLUCOSE      POCT Blood Glucose.: 153 mg/dL (27 Dec 2020 21:26)  POCT Blood Glucose.: 127 mg/dL (27 Dec 2020 16:40)  POCT Blood Glucose.: 115 mg/dL (27 Dec 2020 11:07)  POCT Blood Glucose.: 123 mg/dL (27 Dec 2020 08:06)      Drug Levels: [] N/A    CSF Analysis: [] N/A      Allergies    No Known Allergies    Intolerances      MEDICATIONS:  Antibiotics:    Neuro:  lacosamide Solution 150 milliGRAM(s) Oral every 12 hours  modafinil 100 milliGRAM(s) Oral daily    Anticoagulation:  aspirin  chewable 81 milliGRAM(s) Oral daily  enoxaparin Injectable 40 milliGRAM(s) SubCutaneous every 24 hours    OTHER:  albuterol/ipratropium for Nebulization. 3 milliLiter(s) Nebulizer every 6 hours  atorvastatin 40 milliGRAM(s) Oral at bedtime  chlorhexidine 2% Cloths 1 Application(s) Topical <User Schedule>  dextrose 40% Gel 15 Gram(s) Oral once  dextrose 50% Injectable 25 Gram(s) IV Push once  fludroCORTISONE 0.2 milliGRAM(s) Oral every 24 hours  glucagon  Injectable 1 milliGRAM(s) IntraMuscular once  insulin glargine Injectable (LANTUS) 10 Unit(s) SubCutaneous at bedtime  insulin lispro (ADMELOG) corrective regimen sliding scale   SubCutaneous Before meals and at bedtime  labetalol Injectable 2.5 milliGRAM(s) IV Push every 6 hours PRN  montelukast 10 milliGRAM(s) Oral daily    IVF:  cyanocobalamin 1000 MICROGram(s) Oral daily  dextrose 5%. 1000 milliLiter(s) IV Continuous <Continuous>  dextrose 5%. 1000 milliLiter(s) IV Continuous <Continuous>  ferrous    sulfate 325 milliGRAM(s) Oral daily  sodium chloride 3 Gram(s) Oral every 8 hours  sodium chloride 3%. 500 milliLiter(s) IV Continuous <Continuous>    CULTURES:  Culture Results:   No growth to date (12-23 @ 15:54)  Culture Results:   No growth at 4 days. (12-23 @ 09:53)    RADIOLOGY & ADDITIONAL TESTS:      ASSESSMENT:    76 yo Female with PMHx of COPD, asthma, HLD, HTN, BIBEMS to [facility] from home after HHA found patient down on the floor covered in non-bloody vomitus. CTH reveals diffuse subarachnoid hemorrhage mainly at basilar cisterns with IVH and obstructive hydrocephalus, CTA shows 3mm left pcomm aneurysm, now s/p bedside right frontal EVD placement 12/10, s/p cerebral angiogram for coil embolization of left PCOMM aneurysm 12/10, now POD #11 s/p cerebral angio for verapamil c/b device malfunction of Proglide, s/p right groin cutdown for removal of proglide catheter and femoral artery repair (12/17/2020), NIHSS2 HH3 MF4), s/p EVD removal 12/25      HEADACHE    Handoff    MEWS Score    Hyperlipidemia    Hypertension    COPD (chronic obstructive pulmonary disease)    Asthma    COPD (chronic obstructive pulmonary disease)    Asthma    Injury of right femoral artery    Cerebral artery vasospasm    SAH (subarachnoid hemorrhage)    Injury of femoral artery    Cerebral artery vasospasm    SAH (subarachnoid hemorrhage)    Subarachnoid bleed    Vascular surgery procedure    Angiogram, carotid and cerebral, bilateral    Angiogram, cerebral, with intracranial aneurysm embolization    No significant past surgical history    HEADACHE    90+    [email or web address]        PLAN:    NEURO:  - neuro checks  - vitals checks  - pain control  - cont Modafinil  - off Nimodipine 2/2 hypotension  - Aspirin 81 for b/l carotid stenosis    CARDIOVASCULAR:  - -200  - echo and CCTA prior to discharge  - limit fluids 2/2 takotsubo with EF 30%    PULMONARY:  - on room air     RENAL:  - 3% at 25  - cont florinef, salt tabs  - straight cath prn retention q6h    GI:  - puree diet  - dc NGT?   - bowel regimen    HEME:  - monitor H/H  - PO iron for anemia    ID:  - afebrile overnight    ENDO:  - Lantus, ISS    DVT PROPHYLAXIS:  [x] Venodynes                                [x] Heparin/Lovenox    DISPOSITION: stepdown status, full code, dispo pending    d/w Dr. Serrano    Assessment:  Present when checked    []  GCS  E   V  M     Heart Failure: []Acute, [] acute on chronic , []chronic  Heart Failure:  [] Diastolic (HFpEF), [] Systolic (HFrEF), []Combined (HFpEF and HFrEF), [] RHF, [] Pulm HTN, [] Other    [] MICHAELLE, [] ATN, [] AIN, [] other  [] CKD1, [] CKD2, [] CKD 3, [] CKD 4, [] CKD 5, []ESRD    Encephalopathy: [] Metabolic, [] Hepatic, [] toxic, [] Neurological, [] Other    Abnormal Nurtitional Status: [] malnurtition (see nutrition note), [ ]underweight: BMI < 19, [] morbid obesity: BMI >40, [] Cachexia    [] Sepsis  [] hypovolemic shock,[] cardiogenic shock, [] hemorrhagic shock, [] neuogenic shock  [] Acute Respiratory Failure  []Cerebral edema, [] Brain compression/ herniation,   [] Functional quadriplegia  [] Acute blood loss anemia

## 2020-12-28 NOTE — CHART NOTE - NSCHARTNOTEFT_GEN_A_CORE
Admitting Diagnosis:   Patient is a 75y old  Female who presents with a chief complaint of SAH (28 Dec 2020 08:37)      PAST MEDICAL & SURGICAL HISTORY:  Hyperlipidemia    Hypertension    COPD (chronic obstructive pulmonary disease)    Asthma    No significant past surgical history        Current Nutrition Order:  PO: Regular  EN: Glucerna 1.2 Edward @ 25ml/hr x 24hrs via NGT (600ml TV, 720kcal, 36g pro, 483ml free H2O, 48% RDI)    PO Intake: Good (%) [   ]  Fair (50-75%) [   ] Poor (<25%) [ X  ]    GI Issues: Unable to assess at this time 2/2 AMS  Last BM 12/27  No episodes of vomiting or residuals overnight per RN     Pain: Unable to assess at this time 2/2 AMS  Appears comfortable     Skin Integrity: Greg 12; no edema present  R. groin surgical wound  R. head surgical wound    Labs:   12-28    138  |  98  |  14  ----------------------------<  175<H>  3.4<L>   |  28  |  0.48<L>    Ca    9.0      28 Dec 2020 07:33  Phos  2.8     12-28  Mg     2.3     12-28      CAPILLARY BLOOD GLUCOSE      POCT Blood Glucose.: 130 mg/dL (28 Dec 2020 11:25)  POCT Blood Glucose.: 161 mg/dL (28 Dec 2020 06:51)  POCT Blood Glucose.: 153 mg/dL (27 Dec 2020 21:26)  POCT Blood Glucose.: 127 mg/dL (27 Dec 2020 16:40)      Medications:  MEDICATIONS  (STANDING):  albuterol/ipratropium for Nebulization. 3 milliLiter(s) Nebulizer every 6 hours  atorvastatin 40 milliGRAM(s) Oral at bedtime  chlorhexidine 2% Cloths 1 Application(s) Topical <User Schedule>  cyanocobalamin 1000 MICROGram(s) Oral daily  dextrose 40% Gel 15 Gram(s) Oral once  dextrose 5%. 1000 milliLiter(s) (50 mL/Hr) IV Continuous <Continuous>  dextrose 5%. 1000 milliLiter(s) (100 mL/Hr) IV Continuous <Continuous>  dextrose 50% Injectable 25 Gram(s) IV Push once  enoxaparin Injectable 40 milliGRAM(s) SubCutaneous every 24 hours  ferrous    sulfate 325 milliGRAM(s) Oral daily  fludroCORTISONE 0.2 milliGRAM(s) Oral every 24 hours  glucagon  Injectable 1 milliGRAM(s) IntraMuscular once  insulin glargine Injectable (LANTUS) 10 Unit(s) SubCutaneous at bedtime  insulin lispro (ADMELOG) corrective regimen sliding scale   SubCutaneous Before meals and at bedtime  lacosamide Solution 150 milliGRAM(s) Oral every 12 hours  modafinil 100 milliGRAM(s) Oral daily  montelukast 10 milliGRAM(s) Oral daily  sodium chloride 3 Gram(s) Oral every 8 hours  sodium chloride 3%. 500 milliLiter(s) (15 mL/Hr) IV Continuous <Continuous>    MEDICATIONS  (PRN):  labetalol Injectable 2.5 milliGRAM(s) IV Push every 6 hours PRN Systolic blood pressure > 220      Weight:  49.9kg (12/9)    Weight Change: no updated weights, please obtain current weight and trend bi-weekly weights    Nutrition Focused Physical Exam: Completed [   ]  Not Pertinent [ X ]    Anthropometric Measurements:   Height 59"; ABW 49.9kg; IBW 44.4kg; 112%IBW; BMI 22.2    Estimated Energy Needs:  ABW used to calculate energy needs due to pt's current body weight within % IBW (112%). Needs adjusted for age, post-op. Fluid needs per team.  · Weight (lbs)	110 lb  · Weight (kg)	49.8 kg  · Enter From (edward/kg)	25  · Enter To (edward/kg)	30  · Calculated From (edward/kg)	1245  · Calculated To (edward/kg)	1494     Estimated Protein Needs:  · Weight (lbs)	110 lb  · Weight (kg)	49.8 kg  · Enter From (g/kg)	1.2  · Enter To (g/kg)	1.4  · Calculated From (g/kg)	59.76  · Calculated To (g/kg)	69.72    Subjective: 74 yo Female with PMHx of COPD, asthma, HLD, HTN, BIBEMS to [facility] from home after HHA found patient down on the floor covered in non-bloody vomitus. CTH reveals diffuse subarachnoid hemorrhage mainly at basilar cisterns with IVH and obstructive hydrocephalus, CTA shows 3mm left pcomm aneurysm, now s/p bedside right frontal EVD placement 12/10, s/p cerebral angiogram for coil embolization of left PCOMM aneurysm 12/10, now s/p cerebral angio for verapamil c/b device malfunction of Proglide, s/p right groin cutdown for removal of proglide catheter and femoral artery repair (12/17/2020), NIHSS2 HH3 MF4). NGT placed for medications and TF on 12/16 2/2 failed RN dysphagia screen. SLP eval failed on 12/20, repeat SLP on 12/22 recommending continue NPO with NGT feeds and FEES when mental status improved. Seen by SLP again on 12/25 and recommended to start Puree/Thin Liquid diet w/strict aspiration precautions. EVD removed 12/25.    Pt seen in room, awake, alert, but not verbally responsive today. Per RN, she has been A&Ox0-1 with staff, but more interactive when family arrives. Pt did not answer questions regarding nausea or pain today. EN running at 25cc/hr. 3%NS running at 15cc/hr. Noted that diet was changed to regular texture w/o SLP re-eval, d/w PA. RN did report that pt ate most of puree tray last evening when her family arrived. EN also noted to have been cut to half rate, though pt w/minimal PO intake during day and would benefit from full EN w/supplemental PO as tolerated. Pt would also likely benefit from calorie count. Afebrile. WBC 13.83 (H), K 3.4 (L), POC BG 161mg/dL. Will continue to follow per RD protocol.     Previous Nutrition Diagnosis:  Increased nutrient need RT increased kcal/protein demand AEB post-op  Active [ X  ]  Resolved [   ]    Goal: Meet >/=75%EER via most appropriate route with good tolerance    Recommendations:  1. Per SLP recommendations, please resume Puree/Thin Liquid diet and maintain aspiration precautions at all times. F/u with SLP recs for diet advancement.   *recommend calorie count to assess for adequacy of PO intake   2. Please resume full EN, as pt does not appear to be meeting >50% of EER via PO route. Recommend Glucerna 1.2 Edward @ 45ml/hr x 24hrs via NGT (1080ml TV, 1296kcal, 65g pro, 869ml free H2O, 86% RDI, 1.3g/kg ABW pro). Monitor for s/s intolerance; maintain aspiration precautions at all times   3. Keep nutrition aligned with GOC at all times   4. Monitor lytes and replete prn. POC BG q6hsr (goal 140-180mg/dL)  5. Pain and bowel regimens per team  *d/w PA     Education: N/A 2/2 clinical status    Risk Level: High [ X ] Moderate [   ] Low [   ].

## 2020-12-28 NOTE — PROCEDURE NOTE - ADDITIONAL PROCEDURE DETAILS
The patient was placed in the left lateral position and the lumbar region was prepped and draped in the routine manner. The region was thoroughly infiltrated with lidocaine. A Tuohy needle was inserted into the L4-5 interspace. The needle was advanced until CSF egress was noted. The catheter was inserted through the Tuohy needle without difficulty. Excellent outflow of CSF was obtained. Opening pressure was 11 and 30cc of CSF was collected. Patient tolerated procedure well with no complication.

## 2020-12-28 NOTE — PROGRESS NOTE ADULT - SUBJECTIVE AND OBJECTIVE BOX
Pt seen and examined at bedside, no acute event overnight, haemodynamically stable.     MEDICATIONS  (STANDING):  albuterol/ipratropium for Nebulization. 3 milliLiter(s) Nebulizer every 6 hours  aspirin  chewable 81 milliGRAM(s) Oral daily  atorvastatin 40 milliGRAM(s) Oral at bedtime  chlorhexidine 2% Cloths 1 Application(s) Topical <User Schedule>  cyanocobalamin 1000 MICROGram(s) Oral daily  dextrose 40% Gel 15 Gram(s) Oral once  dextrose 5%. 1000 milliLiter(s) (50 mL/Hr) IV Continuous <Continuous>  dextrose 5%. 1000 milliLiter(s) (100 mL/Hr) IV Continuous <Continuous>  dextrose 50% Injectable 25 Gram(s) IV Push once  enoxaparin Injectable 40 milliGRAM(s) SubCutaneous every 24 hours  ferrous    sulfate 325 milliGRAM(s) Oral daily  fludroCORTISONE 0.2 milliGRAM(s) Oral every 24 hours  glucagon  Injectable 1 milliGRAM(s) IntraMuscular once  insulin glargine Injectable (LANTUS) 10 Unit(s) SubCutaneous at bedtime  insulin lispro (ADMELOG) corrective regimen sliding scale   SubCutaneous Before meals and at bedtime  lacosamide Solution 150 milliGRAM(s) Oral every 12 hours  modafinil 100 milliGRAM(s) Oral daily  montelukast 10 milliGRAM(s) Oral daily  potassium chloride   Solution 40 milliEquivalent(s) Enteral Tube once  sodium chloride 3 Gram(s) Oral every 8 hours  sodium chloride 3%. 500 milliLiter(s) (15 mL/Hr) IV Continuous <Continuous>    MEDICATIONS  (PRN):  labetalol Injectable 2.5 milliGRAM(s) IV Push every 6 hours PRN Systolic blood pressure > 220        ICU Vital Signs Last 24 Hrs  T(C): 36.5 (28 Dec 2020 06:25), Max: 37 (27 Dec 2020 10:04)  T(F): 97.7 (28 Dec 2020 06:25), Max: 98.6 (27 Dec 2020 10:04)  HR: 94 (28 Dec 2020 04:40) (78 - 98)  BP: 195/85 (28 Dec 2020 04:40) (137/65 - 225/94)  BP(mean): 122 (28 Dec 2020 04:40) (93 - 135)  ABP: --  ABP(mean): --  RR: 18 (28 Dec 2020 00:40) (12 - 18)  SpO2: 96% (28 Dec 2020 04:40) (93% - 100%)    I&O's Detail    27 Dec 2020 07:01  -  28 Dec 2020 07:00  --------------------------------------------------------  IN:    IV PiggyBack: 50 mL    sodium chloride 3%: 15 mL    sodium chloride 3%: 45 mL    sodium chloride 3%: 200 mL  Total IN: 310 mL    OUT:    Intermittent Catheterization - Urethral (mL): 2000 mL  Total OUT: 2000 mL    Total NET: -1690 mL            Physical Exam:  General: NAD  Pulmonary: nonlabor   Cardiovascular: rrr  Abdominal: soft, ND, NT  Extremities: groin site c/d/i  Pulses:   Right:                                                                          Left:  FEM [ ]2+ [ ]1+ [ ]doppler                                             FEM [ ]2+ [ ]1+ [ ]doppler    POP [ ]2+ [ ]1+ [ ]doppler                                             POP [ ]2+ [ ]1+ [ ]doppler    DP [x ]2+ [ ]1+ [ ]doppler                                                DP [ ]2+ [ ]1+ [ ]doppler  PT[x ]2+ [ ]1+ [ ]doppler                                                  PT [ ]2+ [ ]1+ [ ]doppler      LABS:                                11.6   13.83 )-----------( 532      ( 28 Dec 2020 07:33 )             37.4   12-28    138  |  98  |  14  ----------------------------<  175<H>  3.4<L>   |  28  |  0.48<L>    Ca    9.0      28 Dec 2020 07:33  Phos  2.8     12-28  Mg     2.3     12-28      Radiology and Additional Studies:    Assessment and Plan:   · Assessment	  74yo F  s/p cerebral angio for verapamil c/b device malfunction of Proglide, s/p right groin cutdown for removal of proglide catheter and femoral artery repair on 12/16/20     -Right groin monitoring, look for signs of infection  -Regular Pulse checks RLE  -Vascular surgery will continue to follow

## 2020-12-29 ENCOUNTER — TRANSCRIPTION ENCOUNTER (OUTPATIENT)
Age: 75
End: 2020-12-29

## 2020-12-29 DIAGNOSIS — J45.30 MILD PERSISTENT ASTHMA, UNCOMPLICATED: ICD-10-CM

## 2020-12-29 DIAGNOSIS — Z01.818 ENCOUNTER FOR OTHER PREPROCEDURAL EXAMINATION: ICD-10-CM

## 2020-12-29 DIAGNOSIS — I60.9 NONTRAUMATIC SUBARACHNOID HEMORRHAGE, UNSPECIFIED: ICD-10-CM

## 2020-12-29 DIAGNOSIS — I10 ESSENTIAL (PRIMARY) HYPERTENSION: ICD-10-CM

## 2020-12-29 DIAGNOSIS — D62 ACUTE POSTHEMORRHAGIC ANEMIA: ICD-10-CM

## 2020-12-29 DIAGNOSIS — G91.1 OBSTRUCTIVE HYDROCEPHALUS: ICD-10-CM

## 2020-12-29 DIAGNOSIS — J43.2 CENTRILOBULAR EMPHYSEMA: ICD-10-CM

## 2020-12-29 DIAGNOSIS — E78.00 PURE HYPERCHOLESTEROLEMIA, UNSPECIFIED: ICD-10-CM

## 2020-12-29 LAB
ANION GAP SERPL CALC-SCNC: 11 MMOL/L — SIGNIFICANT CHANGE UP (ref 5–17)
APPEARANCE CSF: CLEAR — SIGNIFICANT CHANGE UP
APPEARANCE CSF: SIGNIFICANT CHANGE UP
APPEARANCE SPUN FLD: ABNORMAL
APPEARANCE SPUN FLD: ABNORMAL
APPEARANCE UR: CLEAR — SIGNIFICANT CHANGE UP
BACTERIA # UR AUTO: PRESENT /HPF
BILIRUB UR-MCNC: NEGATIVE — SIGNIFICANT CHANGE UP
BUN SERPL-MCNC: 15 MG/DL — SIGNIFICANT CHANGE UP (ref 7–23)
CALCIUM SERPL-MCNC: 9.3 MG/DL — SIGNIFICANT CHANGE UP (ref 8.4–10.5)
CHLORIDE SERPL-SCNC: 104 MMOL/L — SIGNIFICANT CHANGE UP (ref 96–108)
CO2 SERPL-SCNC: 27 MMOL/L — SIGNIFICANT CHANGE UP (ref 22–31)
COLOR CSF: ABNORMAL
COLOR CSF: YELLOW
COLOR SPEC: YELLOW — SIGNIFICANT CHANGE UP
COMMENT - URINE: SIGNIFICANT CHANGE UP
CREAT SERPL-MCNC: 0.44 MG/DL — LOW (ref 0.5–1.3)
CSF COMMENTS: SIGNIFICANT CHANGE UP
CSF COMMENTS: SIGNIFICANT CHANGE UP
DIFF PNL FLD: ABNORMAL
EPI CELLS # UR: SIGNIFICANT CHANGE UP /HPF (ref 0–5)
GLUCOSE CSF-MCNC: 70 MG/DL — SIGNIFICANT CHANGE UP (ref 40–70)
GLUCOSE CSF-MCNC: 77 MG/DL — HIGH (ref 40–70)
GLUCOSE SERPL-MCNC: 163 MG/DL — HIGH (ref 70–99)
GLUCOSE UR QL: NEGATIVE — SIGNIFICANT CHANGE UP
GRAM STN FLD: SIGNIFICANT CHANGE UP
GRAM STN FLD: SIGNIFICANT CHANGE UP
HCT VFR BLD CALC: 35.5 % — SIGNIFICANT CHANGE UP (ref 34.5–45)
HGB BLD-MCNC: 10.9 G/DL — LOW (ref 11.5–15.5)
KETONES UR-MCNC: NEGATIVE — SIGNIFICANT CHANGE UP
LEUKOCYTE ESTERASE UR-ACNC: ABNORMAL
LYMPHOCYTES # CSF: 5 % — LOW (ref 40–80)
LYMPHOCYTES # CSF: 8 % — LOW (ref 40–80)
MAGNESIUM SERPL-MCNC: 2.2 MG/DL — SIGNIFICANT CHANGE UP (ref 1.6–2.6)
MCHC RBC-ENTMCNC: 27.8 PG — SIGNIFICANT CHANGE UP (ref 27–34)
MCHC RBC-ENTMCNC: 30.7 GM/DL — LOW (ref 32–36)
MCV RBC AUTO: 90.6 FL — SIGNIFICANT CHANGE UP (ref 80–100)
MONOS+MACROS NFR CSF: 1 % — SIGNIFICANT CHANGE UP (ref 15–45)
MONOS+MACROS NFR CSF: 2 % — LOW (ref 15–45)
NEUTROPHILS # CSF: 10 % — HIGH (ref 0–6)
NEUTROPHILS # CSF: 3 % — SIGNIFICANT CHANGE UP (ref 0–6)
NITRITE UR-MCNC: POSITIVE
NRBC # BLD: 0 /100 WBCS — SIGNIFICANT CHANGE UP (ref 0–0)
NRBC NFR CSF: 20 /UL — HIGH (ref 0–5)
NRBC NFR CSF: 9 /UL — HIGH (ref 0–5)
PH UR: 6 — SIGNIFICANT CHANGE UP (ref 5–8)
PHOSPHATE SERPL-MCNC: 2.6 MG/DL — SIGNIFICANT CHANGE UP (ref 2.5–4.5)
PLATELET # BLD AUTO: 448 K/UL — HIGH (ref 150–400)
POTASSIUM SERPL-MCNC: 3.5 MMOL/L — SIGNIFICANT CHANGE UP (ref 3.5–5.3)
POTASSIUM SERPL-SCNC: 3.5 MMOL/L — SIGNIFICANT CHANGE UP (ref 3.5–5.3)
PROT CSF-MCNC: 36 MG/DL — SIGNIFICANT CHANGE UP (ref 15–45)
PROT CSF-MCNC: 37 MG/DL — SIGNIFICANT CHANGE UP (ref 15–45)
PROT UR-MCNC: 100 MG/DL
RBC # BLD: 3.92 M/UL — SIGNIFICANT CHANGE UP (ref 3.8–5.2)
RBC # CSF: 2100 /UL — HIGH (ref 0–0)
RBC # CSF: 58 /UL — HIGH (ref 0–0)
RBC # FLD: 17.3 % — HIGH (ref 10.3–14.5)
RBC CASTS # UR COMP ASSIST: < 5 /HPF — SIGNIFICANT CHANGE UP
SODIUM SERPL-SCNC: 142 MMOL/L — SIGNIFICANT CHANGE UP (ref 135–145)
SP GR SPEC: >=1.03 — SIGNIFICANT CHANGE UP (ref 1–1.03)
SPECIMEN SOURCE: SIGNIFICANT CHANGE UP
SPECIMEN SOURCE: SIGNIFICANT CHANGE UP
TUBE TYPE: SIGNIFICANT CHANGE UP
TUBE TYPE: SIGNIFICANT CHANGE UP
UROBILINOGEN FLD QL: 1 E.U./DL — SIGNIFICANT CHANGE UP
WBC # BLD: 17.34 K/UL — HIGH (ref 3.8–10.5)
WBC # FLD AUTO: 17.34 K/UL — HIGH (ref 3.8–10.5)
WBC UR QL: ABNORMAL /HPF

## 2020-12-29 PROCEDURE — 70450 CT HEAD/BRAIN W/O DYE: CPT | Mod: 26

## 2020-12-29 PROCEDURE — 99024 POSTOP FOLLOW-UP VISIT: CPT

## 2020-12-29 PROCEDURE — 99223 1ST HOSP IP/OBS HIGH 75: CPT | Mod: GC

## 2020-12-29 PROCEDURE — 62270 DX LMBR SPI PNXR: CPT

## 2020-12-29 PROCEDURE — 99291 CRITICAL CARE FIRST HOUR: CPT

## 2020-12-29 RX ORDER — POTASSIUM CHLORIDE 20 MEQ
40 PACKET (EA) ORAL ONCE
Refills: 0 | Status: DISCONTINUED | OUTPATIENT
Start: 2020-12-29 | End: 2020-12-29

## 2020-12-29 RX ORDER — PIPERACILLIN AND TAZOBACTAM 4; .5 G/20ML; G/20ML
3.38 INJECTION, POWDER, LYOPHILIZED, FOR SOLUTION INTRAVENOUS EVERY 6 HOURS
Refills: 0 | Status: DISCONTINUED | OUTPATIENT
Start: 2020-12-29 | End: 2021-01-01

## 2020-12-29 RX ORDER — SODIUM CHLORIDE 9 MG/ML
500 INJECTION INTRAMUSCULAR; INTRAVENOUS; SUBCUTANEOUS ONCE
Refills: 0 | Status: COMPLETED | OUTPATIENT
Start: 2020-12-29 | End: 2020-12-29

## 2020-12-29 RX ORDER — SODIUM CHLORIDE 9 MG/ML
1000 INJECTION INTRAMUSCULAR; INTRAVENOUS; SUBCUTANEOUS
Refills: 0 | Status: DISCONTINUED | OUTPATIENT
Start: 2020-12-30 | End: 2020-12-31

## 2020-12-29 RX ORDER — PIPERACILLIN AND TAZOBACTAM 4; .5 G/20ML; G/20ML
3.38 INJECTION, POWDER, LYOPHILIZED, FOR SOLUTION INTRAVENOUS ONCE
Refills: 0 | Status: COMPLETED | OUTPATIENT
Start: 2020-12-29 | End: 2020-12-29

## 2020-12-29 RX ORDER — SODIUM CHLORIDE 5 G/100ML
500 INJECTION, SOLUTION INTRAVENOUS
Refills: 0 | Status: DISCONTINUED | OUTPATIENT
Start: 2020-12-29 | End: 2020-12-30

## 2020-12-29 RX ORDER — PANTOPRAZOLE SODIUM 20 MG/1
40 TABLET, DELAYED RELEASE ORAL ONCE
Refills: 0 | Status: COMPLETED | OUTPATIENT
Start: 2020-12-29 | End: 2020-12-29

## 2020-12-29 RX ORDER — NOREPINEPHRINE BITARTRATE/D5W 8 MG/250ML
0.05 PLASTIC BAG, INJECTION (ML) INTRAVENOUS
Qty: 8 | Refills: 0 | Status: DISCONTINUED | OUTPATIENT
Start: 2020-12-29 | End: 2020-12-29

## 2020-12-29 RX ORDER — NOREPINEPHRINE BITARTRATE/D5W 8 MG/250ML
0.01 PLASTIC BAG, INJECTION (ML) INTRAVENOUS
Qty: 8 | Refills: 0 | Status: DISCONTINUED | OUTPATIENT
Start: 2020-12-29 | End: 2020-12-30

## 2020-12-29 RX ORDER — CEFTRIAXONE 500 MG/1
1000 INJECTION, POWDER, FOR SOLUTION INTRAMUSCULAR; INTRAVENOUS EVERY 24 HOURS
Refills: 0 | Status: DISCONTINUED | OUTPATIENT
Start: 2020-12-29 | End: 2020-12-29

## 2020-12-29 RX ORDER — SODIUM CHLORIDE 9 MG/ML
3 INJECTION INTRAMUSCULAR; INTRAVENOUS; SUBCUTANEOUS EVERY 6 HOURS
Refills: 0 | Status: DISCONTINUED | OUTPATIENT
Start: 2020-12-29 | End: 2021-01-05

## 2020-12-29 RX ORDER — POTASSIUM CHLORIDE 20 MEQ
40 PACKET (EA) ORAL ONCE
Refills: 0 | Status: COMPLETED | OUTPATIENT
Start: 2020-12-29 | End: 2020-12-29

## 2020-12-29 RX ORDER — FLUDROCORTISONE ACETATE 0.1 MG/1
0.2 TABLET ORAL AT BEDTIME
Refills: 0 | Status: DISCONTINUED | OUTPATIENT
Start: 2020-12-29 | End: 2021-01-05

## 2020-12-29 RX ADMIN — Medication 2: at 23:02

## 2020-12-29 RX ADMIN — PIPERACILLIN AND TAZOBACTAM 200 GRAM(S): 4; .5 INJECTION, POWDER, LYOPHILIZED, FOR SOLUTION INTRAVENOUS at 23:03

## 2020-12-29 RX ADMIN — Medication 650 MILLIGRAM(S): at 16:43

## 2020-12-29 RX ADMIN — Medication 2: at 06:55

## 2020-12-29 RX ADMIN — SODIUM CHLORIDE 500 MILLILITER(S): 9 INJECTION INTRAMUSCULAR; INTRAVENOUS; SUBCUTANEOUS at 18:30

## 2020-12-29 RX ADMIN — INSULIN GLARGINE 10 UNIT(S): 100 INJECTION, SOLUTION SUBCUTANEOUS at 23:04

## 2020-12-29 RX ADMIN — Medication 40 MILLIEQUIVALENT(S): at 11:00

## 2020-12-29 RX ADMIN — SODIUM CHLORIDE 1000 MILLILITER(S): 9 INJECTION INTRAMUSCULAR; INTRAVENOUS; SUBCUTANEOUS at 19:30

## 2020-12-29 RX ADMIN — SODIUM CHLORIDE 3 GRAM(S): 9 INJECTION INTRAMUSCULAR; INTRAVENOUS; SUBCUTANEOUS at 11:00

## 2020-12-29 RX ADMIN — SODIUM CHLORIDE 3 GRAM(S): 9 INJECTION INTRAMUSCULAR; INTRAVENOUS; SUBCUTANEOUS at 23:03

## 2020-12-29 RX ADMIN — CHLORHEXIDINE GLUCONATE 1 APPLICATION(S): 213 SOLUTION TOPICAL at 06:23

## 2020-12-29 RX ADMIN — SODIUM CHLORIDE 3 GRAM(S): 9 INJECTION INTRAMUSCULAR; INTRAVENOUS; SUBCUTANEOUS at 18:05

## 2020-12-29 RX ADMIN — PANTOPRAZOLE SODIUM 40 MILLIGRAM(S): 20 TABLET, DELAYED RELEASE ORAL at 11:00

## 2020-12-29 RX ADMIN — Medication 4.68 MICROGRAM(S)/KG/MIN: at 00:55

## 2020-12-29 RX ADMIN — FENTANYL CITRATE 25 MICROGRAM(S): 50 INJECTION INTRAVENOUS at 03:23

## 2020-12-29 RX ADMIN — Medication 650 MILLIGRAM(S): at 16:19

## 2020-12-29 RX ADMIN — Medication 3 MILLILITER(S): at 21:41

## 2020-12-29 RX ADMIN — LACOSAMIDE 150 MILLIGRAM(S): 50 TABLET ORAL at 18:05

## 2020-12-29 RX ADMIN — MODAFINIL 100 MILLIGRAM(S): 200 TABLET ORAL at 11:00

## 2020-12-29 RX ADMIN — MONTELUKAST 10 MILLIGRAM(S): 4 TABLET, CHEWABLE ORAL at 11:00

## 2020-12-29 RX ADMIN — Medication 3 MILLILITER(S): at 04:51

## 2020-12-29 RX ADMIN — LACOSAMIDE 150 MILLIGRAM(S): 50 TABLET ORAL at 06:43

## 2020-12-29 RX ADMIN — CEFTRIAXONE 100 MILLIGRAM(S): 500 INJECTION, POWDER, FOR SOLUTION INTRAMUSCULAR; INTRAVENOUS at 05:31

## 2020-12-29 RX ADMIN — SODIUM CHLORIDE 3 GRAM(S): 9 INJECTION INTRAMUSCULAR; INTRAVENOUS; SUBCUTANEOUS at 03:23

## 2020-12-29 RX ADMIN — PREGABALIN 1000 MICROGRAM(S): 225 CAPSULE ORAL at 11:00

## 2020-12-29 RX ADMIN — FLUDROCORTISONE ACETATE 0.2 MILLIGRAM(S): 0.1 TABLET ORAL at 22:54

## 2020-12-29 RX ADMIN — PIPERACILLIN AND TAZOBACTAM 200 GRAM(S): 4; .5 INJECTION, POWDER, LYOPHILIZED, FOR SOLUTION INTRAVENOUS at 18:06

## 2020-12-29 RX ADMIN — Medication 325 MILLIGRAM(S): at 11:00

## 2020-12-29 RX ADMIN — Medication 3 MILLILITER(S): at 10:21

## 2020-12-29 RX ADMIN — ATORVASTATIN CALCIUM 40 MILLIGRAM(S): 80 TABLET, FILM COATED ORAL at 22:54

## 2020-12-29 RX ADMIN — Medication 3 MILLILITER(S): at 16:19

## 2020-12-29 NOTE — PHYSICAL THERAPY INITIAL EVALUATION ADULT - IMPAIRMENTS FOUND, PT EVAL
aerobic capacity/endurance/arousal, attention, and cognition/cognitive impairment/gait, locomotion, and balance/gross motor/muscle strength/posture/ROM/tone

## 2020-12-29 NOTE — OCCUPATIONAL THERAPY INITIAL EVALUATION ADULT - PLANNED THERAPY INTERVENTIONS, OT EVAL
ADL retraining/balance training/bed mobility training/cognitive, visual perceptual/fine motor coordination training/motor coordination training/neuromuscular re-education/ROM/strengthening/transfer training

## 2020-12-29 NOTE — PHYSICAL THERAPY INITIAL EVALUATION ADULT - ADDITIONAL COMMENTS
Unable to obtain social history and PLOF from patient due to nonverbal and decreased attention at this time, however following information obtained from medical record. As per chart pt lives with son who is her caregiver in an elevator apt building. Pt had HHA 5 days a week for 6 hours and 4 hours on Sunday. Pt was independent PTA.

## 2020-12-29 NOTE — PHYSICAL THERAPY INITIAL EVALUATION ADULT - DID THE PATIENT HAVE SURGERY?
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

## 2020-12-29 NOTE — PHYSICAL THERAPY INITIAL EVALUATION ADULT - ORIENTATION, REHAB EVAL
Pt non-verbal and not following commands for yes/no. However, pt noted to slightly turn head to name, and responded "what" to Iris 2x during session/unable to assess

## 2020-12-29 NOTE — OCCUPATIONAL THERAPY INITIAL EVALUATION ADULT - PERTINENT HX OF CURRENT PROBLEM, REHAB EVAL
74y/o F with PMHx sig for COPD, asthma, HLD, HTN, BIBEMS to [facility] from home after HHA discovered patient found down in floor covered in non-bloody vomitus. Perr HHA Pt went to the bathroom and was taking a long time, went in to check on her and found her sitting on floor, awake, however with vomitus on front of shirt. On arrival to [facility], patient was taken for stat head CT, which revealed diffuse subarachnoid hemorrhage mainly at basilar cisterns with IVH and obstructive hydrocephalus.

## 2020-12-29 NOTE — CONSULT NOTE ADULT - PROBLEM SELECTOR RECOMMENDATION 6
The patient's medical condition is optimized for surgery.  There is no contraindication for surgery.  There is no clinical evidence neither of angina, decompensated CHF, arrhthymias, nor valvular disease.   There is no limitation of exercise capacity.  MET is  ?.  ASA class is 3.  Ramos cardiac risk factor is high .  DVT prophylaxis is indicated.  Pain control.  Early mobilization.  Avoid fluid overload.

## 2020-12-29 NOTE — OCCUPATIONAL THERAPY INITIAL EVALUATION ADULT - ORIENTATION, REHAB EVAL
Pt unable to state name, but would turn head when her name was called. When named called, pt mumbled "What?"

## 2020-12-29 NOTE — PHYSICAL THERAPY INITIAL EVALUATION ADULT - PASSIVE RANGE OF MOTION EXAMINATION, REHAB EVAL
stiffness noted in all extremities, L>R/bilateral upper extremity Passive ROM was WFL (within functional limits)/bilateral lower extremity Passive ROM was WFL (within functional limits)

## 2020-12-29 NOTE — OCCUPATIONAL THERAPY INITIAL EVALUATION ADULT - IMPAIRMENTS CONTRIBUTING IMPAIRED BED MOBILITY, REHAB EVAL
impaired balance/cognition/decreased flexibility/impaired postural control/decreased ROM/decreased strength

## 2020-12-29 NOTE — PHYSICAL THERAPY INITIAL EVALUATION ADULT - GENERAL OBSERVATIONS, REHAB EVAL
Pt received semi supine, +EVD (clamped by CASEY Mooney prior to mobility,) +danial, +NG tube (feeds held,) +bilateral wrist restraints, +telemetry, +pulse ox, +de anda, NAD, agreeable to PT.

## 2020-12-29 NOTE — PROCEDURE NOTE - ADDITIONAL PROCEDURE DETAILS
Right radial artery line attempted.  Ultrasound used to obtain needle entry into artery using sterile technique, however, unable to advance guidewire or feed longer catheter.  Suspect vasospasm, procedure aborted.  Sterile dressing applied.  Attending aware.

## 2020-12-29 NOTE — OCCUPATIONAL THERAPY INITIAL EVALUATION ADULT - ADDITIONAL COMMENTS
Pt nonverbal, unable to obtain PLOF. However, as per chart pt lives with son who is her caregiver in an elevator apt building. Pt had HHA 5 days a week for 6 hours and 4 hours on Sunday. Pt was independent PTA.

## 2020-12-29 NOTE — OCCUPATIONAL THERAPY INITIAL EVALUATION ADULT - GENERAL OBSERVATIONS, REHAB EVAL
Spoke with CASEY Mooney prior to session. Pt received semi-supine in bed in NAD, +tele, +heplock, +b/l wrist restraints, +L radial A-line, +PEG, +EVD, +de anda.

## 2020-12-29 NOTE — PHYSICAL THERAPY INITIAL EVALUATION ADULT - PERTINENT HX OF CURRENT PROBLEM, REHAB EVAL
74y/o F with PMHx sig for COPD, asthma, HLD, HTN, BIBEMS to [facility] from home after HHA discovered patient found down in floor covered in non-bloody vomitus. Perr HHA Pt went to the bathroom and was taking a long time, went in to check on her and found her sitting on floor, awake, however with vomitus on front of shirt. On arrival to [facility], patient was taken for stat head CT, which revealed diffuse subarachnoid hemorrhage mainly at basilar cisterns with IVH and obstructive hydrocephalus.

## 2020-12-29 NOTE — CONSULT NOTE ADULT - SUBJECTIVE AND OBJECTIVE BOX
Patient is a 75y old  Female who presents with a chief complaint of SAH (29 Dec 2020 02:51)      HPI:  74y/o F with PMHx sig for COPD, asthma, HLD, HTN, BIBEMS to [facility] from home after HHA discovered patient found down in floor covered in non-bloody vomitus. Perr HHA Pt went to the bathroom and was taking a long time, went in to check on her and found her sitting on floor, awake, however with vomitus on front of shirt. On arrival to [facility], patient was taken for stat head CT, which revealed diffuse subarachnoid hemorrhage mainly at basilar cisterns with IVH and obstructive hydrocephalus. Patient was given a bolus of 1g keppra and dilaudid pushes for generalized headache. CTA concerning for 3mm left pcomm aneurysm and a 1.6mm outpouching of distal cavernous segment of the left internal carotid artery likely aneurysm. NIHSS2 HH3 MF4. Patient was transferred to [facility] for further intervention. Patient currently reports headaches. Pt denies acute changes in vision, seizures, CP, SOB, weakness/paresthesias of arms or legs. (09 Dec 2020 23:37)      PAST MEDICAL & SURGICAL HISTORY:  Hyperlipidemia    Hypertension    COPD (chronic obstructive pulmonary disease)    Asthma    No significant past surgical history        FAMILY HISTORY:      SOCIAL HISTORY:  Smoking Status: [ ] Current, [ ] Former, [ ] Never  Pack Years:    MEDICATIONS:  Pulmonary:  albuterol/ipratropium for Nebulization. 3 milliLiter(s) Nebulizer every 6 hours  montelukast 10 milliGRAM(s) Oral daily    Antimicrobials:  cefTRIAXone   IVPB 1000 milliGRAM(s) IV Intermittent every 24 hours    Anticoagulants:    Onc:    GI/:    Endocrine:  atorvastatin 40 milliGRAM(s) Oral at bedtime  dextrose 40% Gel 15 Gram(s) Oral once  dextrose 50% Injectable 25 Gram(s) IV Push once  fludroCORTISONE 0.2 milliGRAM(s) Oral every 24 hours  glucagon  Injectable 1 milliGRAM(s) IntraMuscular once  insulin glargine Injectable (LANTUS) 10 Unit(s) SubCutaneous at bedtime  insulin lispro (ADMELOG) corrective regimen sliding scale   SubCutaneous Before meals and at bedtime    Cardiac:  labetalol Injectable 2.5 milliGRAM(s) IV Push every 6 hours PRN    Other Medications:  acetaminophen    Suspension .. 650 milliGRAM(s) Oral every 6 hours PRN  chlorhexidine 2% Cloths 1 Application(s) Topical <User Schedule>  cyanocobalamin 1000 MICROGram(s) Oral daily  dextrose 5%. 1000 milliLiter(s) IV Continuous <Continuous>  dextrose 5%. 1000 milliLiter(s) IV Continuous <Continuous>  ferrous    sulfate 325 milliGRAM(s) Oral daily  lacosamide Solution 150 milliGRAM(s) Oral every 12 hours  modafinil 100 milliGRAM(s) Oral daily  sodium chloride 3 Gram(s) Oral every 8 hours  sodium chloride 3%. 500 milliLiter(s) IV Continuous <Continuous>      Allergies    No Known Allergies    Intolerances        Review of Systems:   •	General: negative  •	Skin/Breast: negative  •	Ophthalmologic: negative  •	ENMT: negative  •	Respiratory and Thorax: negative  •	Cardiovascular: negative  •	Gastrointestinal: negative  •	Genitourinary: negative  •	Musculoskeletal: negative  •	Neurological: negative  •	Psychiatric: negative  •	Hematology/Lymphatics: negative  •	Endocrine: negative  •	Allergic/Immunologic: negative    Physical Exam:   • Constitutional:	Well-developed, well nourished  • Eyes:	EOMI; PERRL; no drainage or redness  • ENMT:	No oral lesions; no gross abnormalities  • Neck	No bruits; no thyromegaly or nodules  • Breasts:	not examined  • Back:	No deformity or limitation of movement  • Respiratory:	Breath Sounds equal & clear to percussion & auscultation, no accessory muscle use  • Cardiovascular:	Regular rate & rhythm, normal S1, S2; no murmurs, gallops or rubs; no S3, S4  • Gastrointestinal:	Soft, non-tender, no hepatosplenomegaly, normal bowel sounds  • Genitourinary:	not examined  • Rectal: not examined  • Extremities:	No cyanosis, clubbing or edema  • Vascular:	Equal and normal pulses (carotid, femoral, dorsalis pedis)  • Neurologica:l	not examined  • Skin:	No lesions; no rash  • Lymph Nodes:	No lymphadedenopathy  • Musculoskeletal:	No joint pain, swelling or deformity; no limitation of movement      Vital Signs Last 24 Hrs  T(C): 36.8 (29 Dec 2020 02:03), Max: 38.3 (28 Dec 2020 19:56)  T(F): 98.2 (29 Dec 2020 02:03), Max: 101 (28 Dec 2020 19:56)  HR: 95 (29 Dec 2020 06:00) (86 - 102)  BP: 228/95 (29 Dec 2020 06:00) (92/48 - 228/95)  BP(mean): 137 (29 Dec 2020 06:00) (66 - 137)  RR: 20 (29 Dec 2020 06:00) (10 - 27)  SpO2: 100% (29 Dec 2020 06:00) (88% - 100%)    12-27 @ 07:01  -  12-28 @ 07:00  --------------------------------------------------------  IN: 310 mL / OUT: 2000 mL / NET: -1690 mL    12-28 @ 07:01  -  12-29 @ 06:59  --------------------------------------------------------  IN: 550 mL / OUT: 1519 mL / NET: -969 mL          LABS:      CBC Full  -  ( 29 Dec 2020 06:12 )  WBC Count : 17.34 K/uL  RBC Count : 3.92 M/uL  Hemoglobin : 10.9 g/dL  Hematocrit : 35.5 %  Platelet Count - Automated : 448 K/uL  Mean Cell Volume : 90.6 fl  Mean Cell Hemoglobin : 27.8 pg  Mean Cell Hemoglobin Concentration : 30.7 gm/dL  Auto Neutrophil # : x  Auto Lymphocyte # : x  Auto Monocyte # : x  Auto Eosinophil # : x  Auto Basophil # : x  Auto Neutrophil % : x  Auto Lymphocyte % : x  Auto Monocyte % : x  Auto Eosinophil % : x  Auto Basophil % : x    12-28    138  |  98  |  14  ----------------------------<  175<H>  3.4<L>   |  28  |  0.48<L>    Ca    9.0      28 Dec 2020 07:33  Phos  2.8     12-28  Mg     2.3     12-28      PT/INR - ( 28 Dec 2020 11:37 )   PT: 12.1 sec;   INR: 1.01          PTT - ( 28 Dec 2020 11:37 )  PTT:29.0 sec      Urinalysis Basic - ( 28 Dec 2020 23:55 )    Color: Yellow / Appearance: Clear / SG: >=1.030 / pH: x  Gluc: x / Ketone: NEGATIVE  / Bili: Negative / Urobili: 1.0 E.U./dL   Blood: x / Protein: 100 mg/dL / Nitrite: POSITIVE   Leuk Esterase: Moderate / RBC: < 5 /HPF / WBC Many /HPF   Sq Epi: x / Non Sq Epi: 0-5 /HPF / Bacteria: Present /HPF      < from: Xray Chest 1 View- PORTABLE-Routine (Xray Chest 1 View- PORTABLE-Routine in AM.) (12.23.20 @ 04:29) >    EXAM:  XR CHEST PORTABLE ROUTINE 1V                          PROCEDURE DATE:  12/23/2020          INTERPRETATION:  Clinical history/reason for exam: Pulmonary edema.    Frontal chest.    COMPARISON: December 22, 2020.    Findings/  impression: Stable positioning of support devices. Heart size within normal limits, thoracic aortic calcification. Bilateral opacities and bony structures are stable. Scoliosis.    < end of copied text >  < from: US Duplex Venous Lower Ext Complete, Bilateral (12.28.20 @ 16:59) >  EXAM:  US DPLX LWR EXT VEINS COMPL BI                          PROCEDURE DATE:  12/28/2020          INTERPRETATION:  VENOUS DUPLEX DOPPLER OF BOTH LOWER EXTREMITIES dated 12/28/2020 4:59 PM    INDICATION: Status post embolization of brain aneurysm.Leg swelling. Evaluate for DVT.    TECHNIQUE: Duplex Doppler evaluation including gray-scale ultrasound imaging, color flow Doppler imaging, and Doppler spectral analysis of the veins of both lower extremities was performed.    COMPARISON: Lower extremity venous duplex from 12/17/2020 and 12/11/2020.    FINDINGS:    Thigh veins: The common femoral, femoral, popliteal, proximal greater saphenous, and proximal deep femoral veins are patent and free of thrombus bilaterally. The veins are normally compressible and have normal phasic flow and augmentation response.    Calf veins: The paired peroneal and posterior tibial calf veins are patent bilaterally.    Lower extremity soft tissue swelling.    IMPRESSION:  No deep vein thrombosis seen.    < end of copied text >  < from: Echocardiogram w/ Bubble and Doppler (12.10.20 @ 11:57) >  CONCLUSIONS:     1. Technically difficult study.   2. Endocardium is not well visualized. Grossly, there is moderate segmental left ventricular systolic dysfunction with akinesis of mid myocardial segments and all apical segments. Contractility is preserved in the basal segments. LVEF is    35% Above findings are suspicous for stress-induced cardiomyiopathy. Correlate clinically.   3. Grade II left ventricular diastolic dysfunction.   4. Normal right ventricular size and systolic function.   5. Pulmonary artery systolic pressure is 38 mmHg.   6. Injection of agitated saline via a peripheral vein reveals no evidence of a right-to-left shunt.   7. No prior echo is available for comparison.    < end of copied text >  < from: CT Head No Cont (12.28.20 @ 11:03) >  EXAM:  CT BRAIN                          PROCEDURE DATE:  12/28/2020          INTERPRETATION:  I, Alissa Dave MD, have reviewed the images and the report and agree with the findings, with the following modification:    Multiple remote appearing right basal ganglia areas of lacunar infarction. Remote bilateral cerebellar infarcts.    INDICATIONS: Evaluate for hydro.    TECHNIQUE: Serial axial images were obtained from the skull base to the vertex without the use of intravenous contrast. Sagittal and coronal images were reformatted.    COMPARISON EXAMINATION: CT head from 12/25/20 and prior imaging studies dating back to 12/9/20.    FINDINGS:    Patient is status post interval removal of right transfrontal EVD. The ventricular system is again dilated without significant change in size from prior exam dated 12/25/20. There is a small focus of gas in the frontal horn of the right lateral ventricle consistent with recent drain removal. Small amount of layering hemorrhage is seen in the occipital horns of the bilateral ventricles similar to prior exam.    Mild encephalomalacia seen along the tract of the prior right frontal catheter. Scattered areas of hypodensity in the periventricular white matter consistent with microangiopathic disease. No evidence of acute intraparenchymal hemorrhage, transcortical infarct, or mass effect.    Redemonstration of scattered areas of subarachnoid hemorrhage along  the sylvian fissures and scattered cerebral sulci.  Left parasellar aneurysm coil is seen with  associated streak artifact.    Status post right frontal armida hole placement. Visualized mastoids and paranasal sinuses are grossly clear. Partially visualized nasogastric tube.      IMPRESSION:  Interval removal of right transfrontal EVD without significant change in ventricular size from prior exam.    Redemonstration of intraventricular hemorrhage and scattered areas of subarachnoid hemorrhage.    < end of copied text >            RADIOLOGY & ADDITIONAL STUDIES (The following images were personally reviewed):

## 2020-12-29 NOTE — PROCEDURE NOTE - GENERAL PROCEDURE DETAILS
5mL CSF drawn from ventriculostomy, prepped with chlorhexidine, and aashish in a sterile manner with out complication

## 2020-12-29 NOTE — PHYSICAL THERAPY INITIAL EVALUATION ADULT - IMPAIRMENTS CONTRIBUTING IMPAIRED BED MOBILITY, REHAB EVAL
impaired balance/cognition/abnormal muscle tone/pain/impaired postural control/decreased ROM/decreased strength

## 2020-12-29 NOTE — CONSULT NOTE ADULT - PROBLEM SELECTOR RECOMMENDATION 3
And has subarachnoid bleed then complicated by obstructive hydrocephalus.  The patient had EVD which removed yesterday.  The patient had a lumbar puncture which is seems to be bloody.  Unlikely to patient has meningitis.  Follow-up cultures.  The patient is for  shunt today

## 2020-12-29 NOTE — CONSULT NOTE ADULT - PROBLEM SELECTOR RECOMMENDATION 2
Blood pressure is uncontrolled.  I discussed the case with neurosurgery and the target is high blood pressure to avoid vasospasm.  Follow as per neurosurgery instruction

## 2020-12-29 NOTE — PROGRESS NOTE ADULT - SUBJECTIVE AND OBJECTIVE BOX
=================================  NEUROCRITICAL CARE ATTENDING NOTE  =================================    CARA CANCHOLA   MRN-[id number]  Summary:  75y/F with COPD, asthma, dyslipidemia Hypertension found down.  Brought to [facility] where imaging showed SAH basilar cisterns with IVH and obstructive hydrocephalus L Pcomm aneurysm.  Given levetiracetam, hydromorphone.  NIHSS 2 HH3 MF4, transferred to [facility] for further management.      COURSE IN THE HOSPITAL:   admitted to [facility], EVD inserted; given 20 lasix for pulmo edema, T inversion on CT  12/10 No significant events overnight.    No significant events overnight.    No significant events overnight.    No significant events overnight. drain stopped working at 730 a.m., off levophed    No significant events overnight.   12/15 confusion    Past Medical History: Hyperlipidemia Hypertension COPD (chronic obstructive pulmonary disease) Asthma  Allergies:  No Known Allergies  Home meds:   ·	famotidine 20 mg oral tablet: 1 tab(s) orally once a day  ·	lisinopril 2.5 mg oral tablet: 1 tab(s) orally once a day  ·	montelukast 10 mg oral tablet: 1 tab(s) orally once a day  ·	predniSONE 50 mg oral tablet: 1 tab(s) orally once a day  ·	ProAir HFA CFC free 90 mcg/inh inhalation aerosol: 2 puff(s) inhaled 4 times a day PRN  ·	simvastatin 20 mg oral tablet: 1 tab(s) orally once a day (at bedtime)    PHYSICAL EXAMINATION  T(C): 36.7 (12-15 @ 17:52), Max: 37.3 (12-15 @ 10:00) HR: 110 (12-15 @ 19:00) (93 - 111) BP: 127/71 (12-15 @ 19:00) (102/70 - 151/73) RR: 16 (12-15 @ 19:00) (14 - 20) SpO2: 93% (12-15 @ 19:00) (81% - 97%)   NEUROLOGIC EXAMINATION:  Patient is  alert, oriented x2-3, CLEOPATRA, no facial droop, moving all 4s  GENERAL: not intubated, not in cardiorespiratory distress  EENT:  anicteric  CARDIOVASCULAR: (+) S1 S2, tachy rate and regular rhythm  PULMONARY: clear to auscultation bilaterally  ABDOMEN: soft, nontender with normoactive bowel sounds  EXTREMITIES: no edema  SKIN: no rash    LABS: 12-15  CAPILLARY BLOOD GLUCOSE 142 139 130 118     (12.11)  10.9 (10.9)  15.18 )-----------( 366      ( 15 Dec 2020 05:41 )             34.6     136  |  99  |  24<H>  ----------------------------<  125<H>  4.2   |  21<L>  |  0.68    Ca    9.4      15 Dec 2020 05:41  Phos  3.4     12-15  Mg     2.1     12-15    12-14 @ 07:01  -  12-15 @ 07:00  IN: 0 mL / OUT: 630 mL / NET: -630 mL    HbA1C = 6.7 (12-10) 6.4 ()  LDL = 112 ()   HDL = 66 ()  TG = 114 ()   TSH = 0.990 ()    Bacteriology:  CSF studies:  EEG:  Neuroimagin/10 CT head:  EVD placement, stable   CTA:  L pcomm aneurysm, L ICA (distal cavernous) aneurysm vs infundibulum, extensive calcified plaque cavernous bilateral ICA with mild to mod stenosis; thick plaque bilateral carotid bifurcations - mod to severe R and mild to mod L stenosis, focal calcified plaque R VA off subclavian artery - high grate stenosis / partial occlusion, upper lung bilateral opacification - pulmo edema, chronic deformity R humeral neck   CT head:  SAH, basilar cisterns, IV extension, obstructive hydrocephalus SVID, lacunar infarcts R caudate, both thalami, cerebellar hemispheres, no CT evidence of territorial infarction  Other imagin/11 LE Doppler: NEG   CT abd:  small paraesophageal hiatal hernia, gastritis; ?impaction, septal thickening bilateral lower lobes ?pulmo edema, hepatic steatosis    MEDICATIONS: 12-15  SQL 40 daily lacosamide 50mg PO BID ipratropium / albuterol q6h montelukast 10mg PO daily bisacodyl 5mg PO HS pantoprazole 40 daily polyethylene glycol 17G daily senna HS atorvastatin 40mgPO HS mod ISS Peridex cyanocobalamin 1000 PO daily ferrous sulfate 325mg PO daily     IV FLUIDS: IVL  DRIPS:  ·	norepinephrine Infusion 0.05 MICROgram(s)/kG/Min (4.68 mL/Hr) IV Continuous <Continuous>  DIET: CCD  Lines: R IJ  Drains:     EVD at 5cm H20   EVD at 5cm H20 ICP < 10  Wounds:    CODE STATUS:  Full Code                       GOALS OF CARE:  aggressive                      DISPOSITION:  ICU  NIHSS 2 HH3 MF4 =================================  NEUROCRITICAL CARE ATTENDING NOTE  =================================    CARA CANCHOLA   MRN-[id number]  Summary:  75y/F with COPD, asthma, dyslipidemia Hypertension found down.  Brought to [facility] where imaging showed SAH basilar cisterns with IVH and obstructive hydrocephalus L Pcomm aneurysm.  Given levetiracetam, hydromorphone.  NIHSS 2 HH3 MF4, transferred to [facility] for further management.      COURSE IN THE HOSPITAL:   admitted to [facility], EVD inserted; given 20 lasix for pulmo edema, T inversion on CT  12/10 No significant events overnight.    No significant events overnight.    No significant events overnight.    No significant events overnight. drain stopped working at 730 a.m., off levophed    No significant events overnight.   12/15 confusion   stepped down   readmitted to ICU    more confused somnolent overnight, high volume LP - 30cc removed, CT scan, EVD inserted, febrile pancultured, UA (+) rocephin started    Past Medical History: Hyperlipidemia Hypertension COPD (chronic obstructive pulmonary disease) Asthma  Allergies:  No Known Allergies  Home meds:   ·	famotidine 20 mg oral tablet: 1 tab(s) orally once a day  ·	lisinopril 2.5 mg oral tablet: 1 tab(s) orally once a day  ·	montelukast 10 mg oral tablet: 1 tab(s) orally once a day  ·	predniSONE 50 mg oral tablet: 1 tab(s) orally once a day  ·	ProAir HFA CFC free 90 mcg/inh inhalation aerosol: 2 puff(s) inhaled 4 times a day PRN  ·	simvastatin 20 mg oral tablet: 1 tab(s) orally once a day (at bedtime)    PHYSICAL EXAMINATION  T(C): 36.9 ( @ 06:33), Max: 38.3 ( @ 19:56) HR: 84 ( @ 09:00) (84 - 102) BP: 206/95 ( @ 07:00) (92/48 - 228/95) RR: 23 ( @ 09:00) (10 - ) SpO2: 100% ( @ 09:00) (92% - 100%)  NEUROLOGIC EXAMINATION:  Patient is  lethargic, oriented x 1 , moves B UE/LE L>R, CLEOPATRA  GENERAL: not intubated, not in cardiorespiratory distress  EENT:  anicteric  CARDIOVASCULAR: (+) S1 S2, normal rate and regular rhythm  PULMONARY: clear to auscultation bilaterally  ABDOMEN: soft, nontender with normoactive bowel sounds  EXTREMITIES: no edema  SKIN: no rash    LABS:   CAPILLARY BLOOD GLUCOSE 152 214 130 130  - given insulin glargine and 16 units              (13)    10.9   17.34 )-----------( 448      ( 29 Dec 2020 06:12 )             35.5     142  |  104  |  15  ----------------------------<  163<H>  3.5   |  27  |  0.44<L>    Ca    9.3      29 Dec 2020 06:12  Phos  2.6       Mg     2.2      @ 07:01  -   @ 07:00  IN: 775 mL / OUT: 1526 mL / NET: -751 mL    HbA1C = 6.7 (12-10) 6.4 ()  LDL = 112 ()   HDL = 66 ()  TG = 114 ()   TSH = 0.990 ()    Bacteriology:  UA (+) LE (+) N   CSF no organisms   CSF NGTD    Blood NG5D x2    CSF studies:  EEG:  Neuroimagin/10 CT head:  EVD placement, stable   CTA:  L pcomm aneurysm, L ICA (distal cavernous) aneurysm vs infundibulum, extensive calcified plaque cavernous bilateral ICA with mild to mod stenosis; thick plaque bilateral carotid bifurcations - mod to severe R and mild to mod L stenosis, focal calcified plaque R VA off subclavian artery - high grate stenosis / partial occlusion, upper lung bilateral opacification - pulmo edema, chronic deformity R humeral neck   CT head:  SAH, basilar cisterns, IV extension, obstructive hydrocephalus SVID, lacunar infarcts R caudate, both thalami, cerebellar hemispheres, no CT evidence of territorial infarction  Other imagin/28 LE Doppler: NEG   LE Doppler: NEG   CT abd:  small paraesophageal hiatal hernia, gastritis; ?impaction, septal thickening bilateral lower lobes ?pulmo edema, hepatic steatosis    MEDICATIONS:   ceftriaxone 1G IV q24h lacosamide 150mg PO q12h modafinil 100mg PO daily ipratropium / albuterol q6h montelukast 10mg PO daily atorvastatin 40mg PO HS fludrocortisone 0.2mg PO daily insulin glargine 10 HS mod ISS Peridex cyanocobalamin 1000 PO daily ferrous sulfate 325mg PO daily salt 3G PO q8h     IV FLUIDS: 3%@15  DRIPS:  DIET: Glucerna   Lines: Norris   Drains:  Soriano   EVD at 5cm H20   EVD at 5cm H20 ICP < 10   EVD at 5cm H20 ICP <10  Wounds:    CODE STATUS:  Full Code                       GOALS OF CARE:  aggressive                      DISPOSITION:  ICU  NIHSS 2 HH3 MF4

## 2020-12-29 NOTE — PHYSICAL THERAPY INITIAL EVALUATION ADULT - PLANNED THERAPY INTERVENTIONS, PT EVAL
balance training/bed mobility training/gait training/postural re-education/ROM/strengthening/stretching/transfer training

## 2020-12-29 NOTE — PROGRESS NOTE ADULT - SUBJECTIVE AND OBJECTIVE BOX
Event overnight noted, more confused requiring placement of EVD.   MEDICATIONS  (STANDING):  albuterol/ipratropium for Nebulization. 3 milliLiter(s) Nebulizer every 6 hours  atorvastatin 40 milliGRAM(s) Oral at bedtime  cefTRIAXone   IVPB 1000 milliGRAM(s) IV Intermittent every 24 hours  chlorhexidine 2% Cloths 1 Application(s) Topical <User Schedule>  cyanocobalamin 1000 MICROGram(s) Oral daily  dextrose 40% Gel 15 Gram(s) Oral once  dextrose 5%. 1000 milliLiter(s) (50 mL/Hr) IV Continuous <Continuous>  dextrose 5%. 1000 milliLiter(s) (100 mL/Hr) IV Continuous <Continuous>  dextrose 50% Injectable 25 Gram(s) IV Push once  ferrous    sulfate 325 milliGRAM(s) Oral daily  fludroCORTISONE 0.2 milliGRAM(s) Oral at bedtime  glucagon  Injectable 1 milliGRAM(s) IntraMuscular once  insulin glargine Injectable (LANTUS) 10 Unit(s) SubCutaneous at bedtime  insulin lispro (ADMELOG) corrective regimen sliding scale   SubCutaneous Before meals and at bedtime  lacosamide Solution 150 milliGRAM(s) Oral every 12 hours  modafinil 100 milliGRAM(s) Oral daily  montelukast 10 milliGRAM(s) Oral daily  sodium chloride 3 Gram(s) Oral every 6 hours  sodium chloride 3%. 500 milliLiter(s) (15 mL/Hr) IV Continuous <Continuous>    MEDICATIONS  (PRN):  acetaminophen    Suspension .. 650 milliGRAM(s) Oral every 6 hours PRN Temp greater or equal to 38C (100.4F), Mild Pain (1 - 3)  labetalol Injectable 2.5 milliGRAM(s) IV Push every 6 hours PRN Systolic blood pressure > 220      ICU Vital Signs Last 24 Hrs  T(C): 37.1 (29 Dec 2020 10:45), Max: 38.3 (28 Dec 2020 19:56)  T(F): 98.7 (29 Dec 2020 10:45), Max: 101 (28 Dec 2020 19:56)  HR: 107 (29 Dec 2020 12:00) (84 - 109)  BP: 180/79 (29 Dec 2020 12:00) (92/48 - 228/95)  BP(mean): 113 (29 Dec 2020 12:00) (66 - 137)  ABP: 110/100 (29 Dec 2020 12:00) (96/88 - 213/82)  ABP(mean): 105 (29 Dec 2020 12:00) (87 - 132)  RR: 28 (29 Dec 2020 12:00) (10 - 28)  SpO2: 97% (29 Dec 2020 12:00) (88% - 100%)  I&O's Summary    28 Dec 2020 07:01  -  29 Dec 2020 07:00  --------------------------------------------------------  IN: 775 mL / OUT: 1526 mL / NET: -751 mL    29 Dec 2020 07:01  -  29 Dec 2020 12:54  --------------------------------------------------------  IN: 390 mL / OUT: 401 mL / NET: -11 mL    Physical Exam:  General: Somnolent  Pulmonary: nonlabor   Cardiovascular: rrr  Abdominal: soft, ND, NT  Extremities: groin site c/d/i  Pulses:   Right:                                                                          Left:  FEM [ ]2+ [ ]1+ [ ]doppler                                             FEM [ ]2+ [ ]1+ [ ]doppler    POP [ ]2+ [ ]1+ [ ]doppler                                             POP [ ]2+ [ ]1+ [ ]doppler    DP [ ]2+ [ ]1+ [x ]doppler                                                DP [ ]2+ [ ]1+ [ ]doppler  PT[ ]2+ [x ]1+ [ ]doppler                                                  PT [ ]2+ [ ]1+ [ ]doppler  LABS:                                           10.9   17.34 )-----------( 448      ( 29 Dec 2020 06:12 )             35.5   12-29    142  |  104  |  15  ----------------------------<  163<H>  3.5   |  27  |  0.44<L>    Ca    9.3      29 Dec 2020 06:12  Phos  2.6     12-29  Mg     2.2     12-29        Radiology and Additional Studies:    Assessment and Plan:   · Assessment	  74yo F  s/p cerebral angio for verapamil c/b device malfunction of Proglide, s/p right groin cutdown for removal of proglide catheter and femoral artery repair on 12/16/20     -Right groin monitoring, look for signs of infection  -Regular Pulse checks RLE  -Vascular surgery will continue to follow

## 2020-12-29 NOTE — PROGRESS NOTE ADULT - ASSESSMENT
75y/Female with:  aneursymal subarachnoid hemorrhage, L pcomm aneurysm, L parophthalmic aneurysm; brain compression, cerebral edema  osbtructive hydrocephalus  lacunar infarcts R caudate, B thalami, cerebellar hemispheres ?artery to artery vs cardioembolic?  atherosclerotic cardiovascular disease; mod to severe bilateral ICA stenosis (R>L), R VA stenosis  Hypertension dyslipidemia   COPD asthma  newly diagnosed Diabetes Mellitus?  troponin leak    PLAN: Day 1 = 12-09 ; Day 4 =  12/12; Day 21 = 12/29  NEURO: neurochecks q1h, PRN pain meds with Tylenol / Percocet  s/p angio; HDA with levophed  SAH:  nimodipine discontinued due to pressor requirements  Hydrocephalus:  EVD 5 cm H20 open to drain, monitor ICPs  Seizure prophylaxis (cortical ICH, associated SDH, unclear etiology):  lacosamide 50mg PO BID   REHAB:  physical therapy evaluation and management    EARLY MOB:  HOB elevated    PULM:  Room air, incentive spirometry, continue montelukast, ipratropium / albuterol PRN   CARDIO:  SBP goal 90-180mm Hg, EF 35%, repeat TTE; levophed  ENDO:  Blood sugar goals 140-180 mg/dL, cont insulin sliding scale, atorvastatin 40mg   GI:  PPI for GI prophylaxis (home meds); bowel regimen  DIET: CCD  RENAL: maintain euvolemia, accurate Is and Os  HEM/ONC: Hb stable, iron profile c/w iron deficiency, iron FeSO4 325 TID; FOBT  VTE Prophylaxis: SCDs, SQL  ID: afebrile, leukocytosis  Social: will update family    CORE MEASURES  1.  Nath and Jacobo Score = 3  2.  VTE prophylaxis:  [ ] administered within 24 hours of admission OR [X] reason not done: fresh bleed, unsecured aneurysm.  3.  Dysphagia screening:   [X] performed before any oral meds / liquids / food  4.  Nimodipine treatment:  [X] administered within 24 hours of admission OR [ ] reason not done:    ATTENDING ATTESTATION:  I was physically present for the key portions of the evaluation and management (E/M) service provided.  I agree with the above history, physical and plan, which I have reviewed and edited where appropriate.    Patient at high risk for neurological deterioration or death due to:  ICU delirium, aspiration PNA, DVT / PE.  Critical care time:  I have personally provided 30 minutes of critical care time, excluding time spent on separate procedures.      Plan discussed with RN, house staff.   75y/Female with:  aneursymal subarachnoid hemorrhage, L pcomm aneurysm, L parophthalmic aneurysm; brain compression, cerebral edema  osbtructive hydrocephalus  lacunar infarcts R caudate, B thalami, cerebellar hemispheres ?artery to artery vs cardioembolic?  atherosclerotic cardiovascular disease; mod to severe bilateral ICA stenosis (R>L), R VA stenosis  Hypertension dyslipidemia   COPD asthma  newly diagnosed Diabetes Mellitus?  troponin leak    PLAN: Day 1 = 12-09 ; Day 4 =  12/12; Day 21 = 12/29  NEURO: neurochecks q1h, PRN pain meds with Tylenol / Percocet  SAH:  off nimodipine   Hydrocephalus:  EVD 5 cm H20 open to drain, monitor ICPs; VPS tomorrow  Seizure prophylaxis (cortical ICH, associated SDH, unclear etiology):  lacosamide 150mg PO BID ?   REHAB:  physical therapy evaluation and management    EARLY MOB:  HOB elevated    PULM:  Room air, incentive spirometry, continue montelukast, ipratropium / albuterol PRN   CARDIO:  SBP goal 100-200mm Hg, EF 35%  ENDO:  Blood sugar goals 140-180 mg/dL, cont insulin sliding scale, atorvastatin 40mg   GI:  bowel regimen  DIET: tube feeds, NPO after midnight  RENAL: maintain euvolemia, accurate Is and Os; switch 3% to NS once NPO  HEM/ONC: Hb stable, FOBT  VTE Prophylaxis: SCDs, no DVT chemoprophylaxis for now as patient is high risk for bleed (OR tomorrow)  ID: febrile, worsening leukocytosis; ceftriaxone (last day 01/01/2021), f/u culture sensitivity  Social: will update family    CORE MEASURES  1.  Nath and Jacobo Score = 3  2.  VTE prophylaxis:  [ ] administered within 24 hours of admission OR [X] reason not done: fresh bleed, unsecured aneurysm.  3.  Dysphagia screening:   [X] performed before any oral meds / liquids / food  4.  Nimodipine treatment:  [X] administered within 24 hours of admission OR [ ] reason not done:    ATTENDING ATTESTATION:  I was physically present for the key portions of the evaluation and management (E/M) service provided.  I agree with the above history, physical and plan, which I have reviewed and edited where appropriate.    Patient at high risk for neurological deterioration or death due to:  ICU delirium, aspiration PNA, DVT / PE.  Critical care time:  I have personally provided 30 minutes of critical care time, excluding time spent on separate procedures.      Plan discussed with RN, house staff.   75y/Female with:  aneursymal subarachnoid hemorrhage, L pcomm aneurysm, L parophthalmic aneurysm; brain compression, cerebral edema  osbtructive hydrocephalus  lacunar infarcts R caudate, B thalami, cerebellar hemispheres ?artery to artery vs cardioembolic?  atherosclerotic cardiovascular disease; mod to severe bilateral ICA stenosis (R>L), R VA stenosis  Hypertension dyslipidemia   COPD asthma  newly diagnosed Diabetes Mellitus?  troponin leak    PLAN: Day 1 = 12-09 ; Day 4 =  12/12; Day 21 = 12/29  NEURO: neurochecks q1h, PRN pain meds with Tylenol / Percocet  SAH:  off nimodipine   Hydrocephalus:  EVD 5 cm H20 open to drain, monitor ICPs; VPS tomorrow  Seizure prophylaxis (cortical ICH, associated SDH, unclear etiology):  lacosamide 150mg PO BID ?   REHAB:  physical therapy evaluation and management    EARLY MOB:  HOB elevated    PULM:  Room air, incentive spirometry, continue montelukast, ipratropium / albuterol PRN   CARDIO:  SBP goal 150-200mm Hg, EF 35%  ENDO:  Blood sugar goals 140-180 mg/dL, cont insulin sliding scale, atorvastatin 40mg   GI:  bowel regimen  DIET: tube feeds, NPO after midnight  RENAL: maintain euvolemia, accurate Is and Os; switch 3% to NS once NPO  HEM/ONC: Hb stable, FOBT  VTE Prophylaxis: SCDs, no DVT chemoprophylaxis for now as patient is high risk for bleed (OR tomorrow)  ID: febrile, worsening leukocytosis; ceftriaxone (last day 01/01/2021), f/u culture sensitivity  Social: will update family    CORE MEASURES  1.  Nath and Jacobo Score = 3  2.  VTE prophylaxis:  [ ] administered within 24 hours of admission OR [X] reason not done: fresh bleed, unsecured aneurysm.  3.  Dysphagia screening:   [X] performed before any oral meds / liquids / food  4.  Nimodipine treatment:  [X] administered within 24 hours of admission OR [ ] reason not done:    ATTENDING ATTESTATION:  I was physically present for the key portions of the evaluation and management (E/M) service provided.  I agree with the above history, physical and plan, which I have reviewed and edited where appropriate.    Patient at high risk for neurological deterioration or death due to:  ICU delirium, aspiration PNA, DVT / PE.  Critical care time:  I have personally provided 30 minutes of critical care time, excluding time spent on separate procedures.      Plan discussed with RN, house staff.

## 2020-12-29 NOTE — PROCEDURE NOTE - ADDITIONAL PROCEDURE DETAILS
Re-insertion of the EVD    Parts, prepped and draped in sterile fashion. Previous staples were removed. Previous incision opened. EVD catheter was inserted from the one (anterior) of the two previously placed twist drill holes. ventricle hit at 5 cm depth. flow of CSF at low pressure was confirmed. EVD cathter was tunnelized infero-medially and secured with silk 2-0. EVD was connected to the drainage system and kept at 5 cm height. two samples of CSF were taken for examination.

## 2020-12-29 NOTE — PROGRESS NOTE ADULT - SUBJECTIVE AND OBJECTIVE BOX
S/Overnight events:      HPI:  74y/o F with PMHx sig for COPD, asthma, HLD, HTN, BIBEMS to [facility] from home after HHA discovered patient found down in floor covered in non-bloody vomitus. Perr HHA Pt went to the bathroom and was taking a long time, went in to check on her and found her sitting on floor, awake, however with vomitus on front of shirt. On arrival to [facility], patient was taken for stat head CT, which revealed diffuse subarachnoid hemorrhage mainly at basilar cisterns with IVH and obstructive hydrocephalus. Patient was given a bolus of 1g keppra and dilaudid pushes for generalized headache. CTA concerning for 3mm left pcomm aneurysm and a 1.6mm outpouching of distal cavernous segment of the left internal carotid artery likely aneurysm. NIHSS2 3 MF4. Patient was transferred to [facility] for further intervention. Patient currently reports headaches. Pt denies acute changes in vision, seizures, CP, SOB, weakness/paresthesias of arms or legs. (09 Dec 2020 23:37)    Hospital Course:   12/9: BD1 Patient admitted for SAH HH3, MF4.   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 EVD at 5, on Levo overnight, nimodipine discontinued d/t hypotension  12/12: BD4 POD#2, VIVIEN overnight, neuro stable, passed TOV  12/13: BD5 POD#3: VIVIEN x 24 hrs  12/14: BD6 POD#4. VIVIEN o/n, neuro stable. EVD at 5cm H2O  12/15: BD7 POD #5 VIVIEN overnight, neuro stable. EVD remains at 5. Plan for CTA today.   12/16: BD8 POD #6 VIVIEN overnight, neuro stable, EVD at 0, on Levo, started on 3% at 15 overnight   12/17: BD9 POD#1 s/p cerebral angio for verapamil c/b device malfunction of Proglide, s/p right groin cutdown for removal of proglide catheter and femoral artery repair.  VIVIEN overnight, neuro stable, EVD at 0. patient remained intubated overnight, on propofol for sedation, and vEEG  12/18: BD#10, POD#8 s/p coil/embo of L pcomm aneurysm, POD#2 s/p IA verapamil, POD#2 R groin cutdown for removal of proglide catheter w/ repair of femoral artery. VIVIEN overnight. EVD remains open @0cmH2O   12/19: BD#11, POD#9 s/p coil/embo of L pcomm aneurysm. POD#3 s/p IA verapamil, POD#3 s/p R femoral artery cutdown of proglide catheter w/ femoral artery repair. VIVIEN overnight. EVD remains open @0cmH2O. EEG shows b/l frontal sharp discharges, cont monitoring, vimpat was increased yesterday. Gentle hydration, total IVF 50cc/hr. Cont vanc/zosyn for empiric coverage, next vanc trough due @1pm on 12/19. F/u daily CXR.   12/20 BD#12 POD#10 VIVIEN overnight, Neuro exam stable, EVD @ 0cm H2O, vEEG, 3% @ 15 goal WNL, TF via NGT  12/21: BD13, POD11 VIVIEN overnight. EVD at 5cmH20 (raised yesterday), vEEG in place  12/22 BD#14, EVD raised to 15 yesterday, 3% @ 30cc Goal WNL, -180, Milrinone, TTE prior to DC as per Card for takotsubo cardiomyopathy, failed S&S pending reeval, TF via NGT, Neuro exam stable  12/23: BD#15. VIVIEN o/n, neuro stable. EVD clamped. 30%@30cc/hr  12/24 BD#16 VIVIEN overnight, spiked 102, EVD @ 0cm H2O, 3% @ 30cc/hr Goal WNL, Vasc following, TF via NGT, -200,   12/25: BD#17. VIVIEN overnight. Pan cx from 12/23, NGTD on CSF and blood. EVD clamped. 3% @15cc/hr, rate kept same overnight. NGT feeds at goal. SBP goal 160-200.   12/26: BD#18. VIVIEN overnight, neuro exam stable. NGTD from pan cx on 12/23. EVD removed today. 3% discontinued 12/25. NGT feeds at goal, IVF off. SBP goal 160-200.   12/27: BD#19 VIVIEN overnight, neuro stable. 3% at 15, stepdown status  12/28: BD20 VIVIEN overnight, neuro stable. 3% continues.  Patient became increasingly more somnolent and underwent LP for CSF high volume tap.  Temporary improvement.  Spiked fever, pancultured.  12/29: BD21 Patient increasingly more somnolent, EVD placed at bedside.        Vital Signs Last 24 Hrs  T(C): 38.3 (28 Dec 2020 19:56), Max: 38.3 (28 Dec 2020 19:56)  T(F): 101 (28 Dec 2020 19:56), Max: 101 (28 Dec 2020 19:56)  HR: 99 (29 Dec 2020 01:00) (86 - 102)  BP: 191/87 (29 Dec 2020 01:00) (92/48 - 195/85)  BP(mean): 122 (29 Dec 2020 01:00) (66 - 122)  RR: 24 (29 Dec 2020 01:00) (10 - 26)  SpO2: 98% (29 Dec 2020 01:00) (88% - 100%)    I&O's Detail    27 Dec 2020 07:01  -  28 Dec 2020 07:00  --------------------------------------------------------  IN:    IV PiggyBack: 50 mL    sodium chloride 3%: 15 mL    sodium chloride 3%: 45 mL    sodium chloride 3%: 200 mL  Total IN: 310 mL    OUT:    Intermittent Catheterization - Urethral (mL): 2000 mL  Total OUT: 2000 mL    Total NET: -1690 mL      28 Dec 2020 07:01  -  29 Dec 2020 02:52  --------------------------------------------------------  IN:    Enteral Tube Flush: 30 mL    IV PiggyBack: 50 mL    sodium chloride 3%: 285 mL  Total IN: 365 mL    OUT:    Indwelling Catheter - Urethral (mL): 370 mL    Intermittent Catheterization - Urethral (mL): 850 mL  Total OUT: 1220 mL    Total NET: -855 mL        I&O's Summary    27 Dec 2020 07:01  -  28 Dec 2020 07:00  --------------------------------------------------------  IN: 310 mL / OUT: 2000 mL / NET: -1690 mL    28 Dec 2020 07:01  -  29 Dec 2020 02:52  --------------------------------------------------------  IN: 365 mL / OUT: 1220 mL / NET: -855 mL        PHYSICAL EXAM:    General: Patient is sleepy, arouses to deep sternal rub and maintains wakefulness for a few seconds and goes back to sleep.  Neuro: AA&O xself. OE to pain.  Not FC  CN II-XII: PERRL, EOMI  Motor: ZAFAR x4, good strength throughout  HEENT: face symmetric, tongue midline   Cardiovascular: RRR, normal S1 and S2   Respiratory: lungs CTAB, no wheezing, rhonchi, or crackles   GI: normoactive BS to auscultation, abd soft, NTND   Extremities: distal pulses 2+ x4       TUBES/LINES:  [] CVC  [] A-line  [] Lumbar Drain  [x] Ventriculostomy  [] Other    DIET:  [x] NPO  [] Mechanical  [] Tube feeds    LABS:                        11.6   13.83 )-----------( 532      ( 28 Dec 2020 07:33 )             37.4     12-28    138  |  98  |  14  ----------------------------<  175<H>  3.4<L>   |  28  |  0.48<L>    Ca    9.0      28 Dec 2020 07:33  Phos  2.8     12-28  Mg     2.3     12-28      PT/INR - ( 28 Dec 2020 11:37 )   PT: 12.1 sec;   INR: 1.01          PTT - ( 28 Dec 2020 11:37 )  PTT:29.0 sec  Urinalysis Basic - ( 28 Dec 2020 23:55 )    Color: Yellow / Appearance: Clear / SG: >=1.030 / pH: x  Gluc: x / Ketone: NEGATIVE  / Bili: Negative / Urobili: 1.0 E.U./dL   Blood: x / Protein: 100 mg/dL / Nitrite: POSITIVE   Leuk Esterase: Moderate / RBC: < 5 /HPF / WBC Many /HPF   Sq Epi: x / Non Sq Epi: 0-5 /HPF / Bacteria: Present /HPF          CAPILLARY BLOOD GLUCOSE      POCT Blood Glucose.: 214 mg/dL (28 Dec 2020 21:32)  POCT Blood Glucose.: 130 mg/dL (28 Dec 2020 17:01)  POCT Blood Glucose.: 130 mg/dL (28 Dec 2020 11:25)  POCT Blood Glucose.: 161 mg/dL (28 Dec 2020 06:51)      Drug Levels: [] N/A    CSF Analysis: [] N/A      Allergies    No Known Allergies    Intolerances      MEDICATIONS:  Antibiotics:    Neuro:  acetaminophen    Suspension .. 650 milliGRAM(s) Oral every 6 hours PRN  lacosamide Solution 150 milliGRAM(s) Oral every 12 hours  modafinil 100 milliGRAM(s) Oral daily    Anticoagulation:  enoxaparin Injectable 40 milliGRAM(s) SubCutaneous every 24 hours    OTHER:  albuterol/ipratropium for Nebulization. 3 milliLiter(s) Nebulizer every 6 hours  atorvastatin 40 milliGRAM(s) Oral at bedtime  chlorhexidine 2% Cloths 1 Application(s) Topical <User Schedule>  dextrose 40% Gel 15 Gram(s) Oral once  dextrose 50% Injectable 25 Gram(s) IV Push once  fludroCORTISONE 0.2 milliGRAM(s) Oral every 24 hours  glucagon  Injectable 1 milliGRAM(s) IntraMuscular once  insulin glargine Injectable (LANTUS) 10 Unit(s) SubCutaneous at bedtime  insulin lispro (ADMELOG) corrective regimen sliding scale   SubCutaneous Before meals and at bedtime  labetalol Injectable 2.5 milliGRAM(s) IV Push every 6 hours PRN  montelukast 10 milliGRAM(s) Oral daily  norepinephrine Infusion 0.05 MICROgram(s)/kG/Min IV Continuous <Continuous>    IVF:  cyanocobalamin 1000 MICROGram(s) Oral daily  dextrose 5%. 1000 milliLiter(s) IV Continuous <Continuous>  dextrose 5%. 1000 milliLiter(s) IV Continuous <Continuous>  ferrous    sulfate 325 milliGRAM(s) Oral daily  sodium chloride 3 Gram(s) Oral every 8 hours  sodium chloride 3%. 500 milliLiter(s) IV Continuous <Continuous>    CULTURES:  Culture Results:   No growth to date (12-23 @ 15:54)  Culture Results:   No growth at 5 days. (12-23 @ 09:53)    RADIOLOGY & ADDITIONAL TESTS:      ASSESSMENT:  75y Female  with PMHx of COPD, asthma, HLD, HTN, BIBEMS to [facility] from home after HHA found patient down on the floor covered in non-bloody vomitus. CTH reveals diffuse subarachnoid hemorrhage mainly at basilar cisterns with IVH and obstructive hydrocephalus, CTA shows 3mm left pcomm aneurysm, now s/p bedside right frontal EVD placement 12/10, s/p cerebral angiogram for coil embolization of left PCOMM aneurysm 12/10, now POD #12 s/p cerebral angio for verapamil c/b device malfunction of Proglide, s/p right groin cutdown for removal of proglide catheter and femoral artery repair (12/17/2020), NIHSS2 HH3 MF4), s/p EVD removal 12/25, POD#0 s/p bedside EVD placement      HEADACHE    Handoff    MEWS Score    Hyperlipidemia    Hypertension    COPD (chronic obstructive pulmonary disease)    Asthma    COPD (chronic obstructive pulmonary disease)    Asthma    Injury of right femoral artery    Cerebral artery vasospasm    SAH (subarachnoid hemorrhage)    Injury of femoral artery    Cerebral artery vasospasm    SAH (subarachnoid hemorrhage)    Subarachnoid bleed    Vascular surgery procedure    Angiogram, carotid and cerebral, bilateral    Angiogram, cerebral, with intracranial aneurysm embolization    No significant past surgical history    HEADACHE    90+    [email or web address]        PLAN:    NEURO:  - neuro checks  - vitals checks  - pain control  - cont Modafinil  - off Nimodipine 2/2 hypotension  - Aspirin 81 for b/l carotid stenosis is stopped, preop for poss OR this week    CARDIOVASCULAR:  - SBP 1-200  - echo and CCTA prior to discharge  - limit fluids 2/2 takotsubo with EF 30%    PULMONARY:  - on room air     RENAL:  - 3% at 15  - cont florinef, salt tabs  - de anda placed 12/28    GI:  - NPO, TF?  - bowel regimen    HEME:  - monitor H/H  - PO iron for anemia    ID:  - Tm 101 12/28, pancultures sent -> U/A concerning for UTI, Urine culture sent, to d/w attending for antibiotics    ENDO:  - Lantus, ISS    DVT PROPHYLAXIS:  [x] Venodynes                                [] Heparin/Lovenox- on hold for EVD    DISPOSITION: ICU status, full code, dispo pending    d/w Dr. Serrano and Dr. Nino      Assessment:  Present when checked    []  GCS  E   V  M     Heart Failure: []Acute, [] acute on chronic , []chronic  Heart Failure:  [] Diastolic (HFpEF), [] Systolic (HFrEF), []Combined (HFpEF and HFrEF), [] RHF, [] Pulm HTN, [] Other    [] MICHAELLE, [] ATN, [] AIN, [] other  [] CKD1, [] CKD2, [] CKD 3, [] CKD 4, [] CKD 5, []ESRD    Encephalopathy: [] Metabolic, [] Hepatic, [] toxic, [] Neurological, [] Other    Abnormal Nurtitional Status: [] malnurtition (see nutrition note), [ ]underweight: BMI < 19, [] morbid obesity: BMI >40, [] Cachexia    [] Sepsis  [] hypovolemic shock,[] cardiogenic shock, [] hemorrhagic shock, [] neuogenic shock  [] Acute Respiratory Failure  []Cerebral edema, [] Brain compression/ herniation,   [] Functional quadriplegia  [] Acute blood loss anemia

## 2020-12-30 LAB
ANION GAP SERPL CALC-SCNC: 10 MMOL/L — SIGNIFICANT CHANGE UP (ref 5–17)
APTT BLD: 23.2 SEC — LOW (ref 27.5–35.5)
BLD GP AB SCN SERPL QL: NEGATIVE — SIGNIFICANT CHANGE UP
BUN SERPL-MCNC: 12 MG/DL — SIGNIFICANT CHANGE UP (ref 7–23)
CALCIUM SERPL-MCNC: 8.3 MG/DL — LOW (ref 8.4–10.5)
CHLORIDE SERPL-SCNC: 109 MMOL/L — HIGH (ref 96–108)
CO2 SERPL-SCNC: 26 MMOL/L — SIGNIFICANT CHANGE UP (ref 22–31)
CREAT SERPL-MCNC: 0.41 MG/DL — LOW (ref 0.5–1.3)
CULTURE RESULTS: NO GROWTH — SIGNIFICANT CHANGE UP
CULTURE RESULTS: NO GROWTH — SIGNIFICANT CHANGE UP
GLUCOSE SERPL-MCNC: 106 MG/DL — HIGH (ref 70–99)
HCT VFR BLD CALC: 28.7 % — LOW (ref 34.5–45)
HGB BLD-MCNC: 8.6 G/DL — LOW (ref 11.5–15.5)
INR BLD: 1.04 — SIGNIFICANT CHANGE UP (ref 0.88–1.16)
LDH CSF L TO P-CCNC: 126 U/L — SIGNIFICANT CHANGE UP
LDH FLD-CCNC: 126 U/L — SIGNIFICANT CHANGE UP
MAGNESIUM SERPL-MCNC: 2.1 MG/DL — SIGNIFICANT CHANGE UP (ref 1.6–2.6)
MCHC RBC-ENTMCNC: 27.4 PG — SIGNIFICANT CHANGE UP (ref 27–34)
MCHC RBC-ENTMCNC: 30 GM/DL — LOW (ref 32–36)
MCV RBC AUTO: 91.4 FL — SIGNIFICANT CHANGE UP (ref 80–100)
NRBC # BLD: 0 /100 WBCS — SIGNIFICANT CHANGE UP (ref 0–0)
PHOSPHATE SERPL-MCNC: 2.6 MG/DL — SIGNIFICANT CHANGE UP (ref 2.5–4.5)
PLATELET # BLD AUTO: 444 K/UL — HIGH (ref 150–400)
POTASSIUM SERPL-MCNC: 3.1 MMOL/L — LOW (ref 3.5–5.3)
POTASSIUM SERPL-SCNC: 3.1 MMOL/L — LOW (ref 3.5–5.3)
PROTHROM AB SERPL-ACNC: 12.5 SEC — SIGNIFICANT CHANGE UP (ref 10.6–13.6)
RBC # BLD: 3.14 M/UL — LOW (ref 3.8–5.2)
RBC # FLD: 18 % — HIGH (ref 10.3–14.5)
RH IG SCN BLD-IMP: POSITIVE — SIGNIFICANT CHANGE UP
SODIUM SERPL-SCNC: 145 MMOL/L — SIGNIFICANT CHANGE UP (ref 135–145)
SPECIMEN SOURCE: SIGNIFICANT CHANGE UP
SPECIMEN SOURCE: SIGNIFICANT CHANGE UP
WBC # BLD: 12.56 K/UL — HIGH (ref 3.8–10.5)
WBC # FLD AUTO: 12.56 K/UL — HIGH (ref 3.8–10.5)

## 2020-12-30 PROCEDURE — 99024 POSTOP FOLLOW-UP VISIT: CPT

## 2020-12-30 PROCEDURE — 61781 SCAN PROC CRANIAL INTRA: CPT

## 2020-12-30 PROCEDURE — 99291 CRITICAL CARE FIRST HOUR: CPT

## 2020-12-30 PROCEDURE — 62223 ESTABLISH BRAIN CAVITY SHUNT: CPT

## 2020-12-30 PROCEDURE — 99232 SBSQ HOSP IP/OBS MODERATE 35: CPT | Mod: GC

## 2020-12-30 RX ORDER — POLYETHYLENE GLYCOL 3350 17 G/17G
17 POWDER, FOR SOLUTION ORAL DAILY
Refills: 0 | Status: DISCONTINUED | OUTPATIENT
Start: 2020-12-30 | End: 2021-01-02

## 2020-12-30 RX ORDER — POTASSIUM CHLORIDE 20 MEQ
40 PACKET (EA) ORAL EVERY 4 HOURS
Refills: 0 | Status: COMPLETED | OUTPATIENT
Start: 2020-12-30 | End: 2020-12-30

## 2020-12-30 RX ORDER — SODIUM,POTASSIUM PHOSPHATES 278-250MG
1 POWDER IN PACKET (EA) ORAL ONCE
Refills: 0 | Status: COMPLETED | OUTPATIENT
Start: 2020-12-30 | End: 2020-12-30

## 2020-12-30 RX ORDER — IPRATROPIUM/ALBUTEROL SULFATE 18-103MCG
3 AEROSOL WITH ADAPTER (GRAM) INHALATION EVERY 6 HOURS
Refills: 0 | Status: DISCONTINUED | OUTPATIENT
Start: 2020-12-30 | End: 2021-01-09

## 2020-12-30 RX ORDER — SENNA PLUS 8.6 MG/1
2 TABLET ORAL AT BEDTIME
Refills: 0 | Status: DISCONTINUED | OUTPATIENT
Start: 2021-01-23 | End: 2021-01-26

## 2020-12-30 RX ORDER — SODIUM,POTASSIUM PHOSPHATES 278-250MG
1 POWDER IN PACKET (EA) ORAL ONCE
Refills: 0 | Status: DISCONTINUED | OUTPATIENT
Start: 2020-12-30 | End: 2020-12-30

## 2020-12-30 RX ORDER — POVIDONE-IODINE 5 %
1 AEROSOL (ML) TOPICAL ONCE
Refills: 0 | Status: COMPLETED | OUTPATIENT
Start: 2020-12-30 | End: 2020-12-30

## 2020-12-30 RX ORDER — SENNA PLUS 8.6 MG/1
2 TABLET ORAL AT BEDTIME
Refills: 0 | Status: DISCONTINUED | OUTPATIENT
Start: 2020-12-30 | End: 2021-01-02

## 2020-12-30 RX ORDER — POTASSIUM CHLORIDE 20 MEQ
40 PACKET (EA) ORAL EVERY 4 HOURS
Refills: 0 | Status: DISCONTINUED | OUTPATIENT
Start: 2020-12-30 | End: 2020-12-30

## 2020-12-30 RX ADMIN — Medication 1 APPLICATION(S): at 13:34

## 2020-12-30 RX ADMIN — CHLORHEXIDINE GLUCONATE 1 APPLICATION(S): 213 SOLUTION TOPICAL at 05:20

## 2020-12-30 RX ADMIN — MONTELUKAST 10 MILLIGRAM(S): 4 TABLET, CHEWABLE ORAL at 13:07

## 2020-12-30 RX ADMIN — SODIUM CHLORIDE 75 MILLILITER(S): 9 INJECTION INTRAMUSCULAR; INTRAVENOUS; SUBCUTANEOUS at 00:05

## 2020-12-30 RX ADMIN — SODIUM CHLORIDE 3 GRAM(S): 9 INJECTION INTRAMUSCULAR; INTRAVENOUS; SUBCUTANEOUS at 12:38

## 2020-12-30 RX ADMIN — LACOSAMIDE 150 MILLIGRAM(S): 50 TABLET ORAL at 05:28

## 2020-12-30 RX ADMIN — Medication 650 MILLIGRAM(S): at 00:04

## 2020-12-30 RX ADMIN — Medication 1 PACKET(S): at 10:00

## 2020-12-30 RX ADMIN — SODIUM CHLORIDE 3 GRAM(S): 9 INJECTION INTRAMUSCULAR; INTRAVENOUS; SUBCUTANEOUS at 20:38

## 2020-12-30 RX ADMIN — PIPERACILLIN AND TAZOBACTAM 200 GRAM(S): 4; .5 INJECTION, POWDER, LYOPHILIZED, FOR SOLUTION INTRAVENOUS at 13:07

## 2020-12-30 RX ADMIN — Medication 650 MILLIGRAM(S): at 01:00

## 2020-12-30 RX ADMIN — PIPERACILLIN AND TAZOBACTAM 200 GRAM(S): 4; .5 INJECTION, POWDER, LYOPHILIZED, FOR SOLUTION INTRAVENOUS at 05:20

## 2020-12-30 RX ADMIN — Medication 2.5 MILLIGRAM(S): at 14:00

## 2020-12-30 RX ADMIN — Medication 650 MILLIGRAM(S): at 13:34

## 2020-12-30 RX ADMIN — SODIUM CHLORIDE 3 GRAM(S): 9 INJECTION INTRAMUSCULAR; INTRAVENOUS; SUBCUTANEOUS at 05:18

## 2020-12-30 RX ADMIN — SENNA PLUS 2 TABLET(S): 8.6 TABLET ORAL at 22:16

## 2020-12-30 RX ADMIN — PREGABALIN 1000 MICROGRAM(S): 225 CAPSULE ORAL at 13:07

## 2020-12-30 RX ADMIN — LACOSAMIDE 150 MILLIGRAM(S): 50 TABLET ORAL at 18:51

## 2020-12-30 RX ADMIN — Medication 325 MILLIGRAM(S): at 13:10

## 2020-12-30 RX ADMIN — PIPERACILLIN AND TAZOBACTAM 200 GRAM(S): 4; .5 INJECTION, POWDER, LYOPHILIZED, FOR SOLUTION INTRAVENOUS at 18:52

## 2020-12-30 RX ADMIN — ATORVASTATIN CALCIUM 40 MILLIGRAM(S): 80 TABLET, FILM COATED ORAL at 22:17

## 2020-12-30 RX ADMIN — Medication 40 MILLIEQUIVALENT(S): at 07:39

## 2020-12-30 RX ADMIN — Medication 3 MILLILITER(S): at 04:00

## 2020-12-30 RX ADMIN — FLUDROCORTISONE ACETATE 0.2 MILLIGRAM(S): 0.1 TABLET ORAL at 22:15

## 2020-12-30 RX ADMIN — PIPERACILLIN AND TAZOBACTAM 200 GRAM(S): 4; .5 INJECTION, POWDER, LYOPHILIZED, FOR SOLUTION INTRAVENOUS at 23:48

## 2020-12-30 RX ADMIN — MODAFINIL 100 MILLIGRAM(S): 200 TABLET ORAL at 18:58

## 2020-12-30 RX ADMIN — Medication 40 MILLIEQUIVALENT(S): at 12:54

## 2020-12-30 RX ADMIN — INSULIN GLARGINE 10 UNIT(S): 100 INJECTION, SOLUTION SUBCUTANEOUS at 22:21

## 2020-12-30 NOTE — BRIEF OPERATIVE NOTE - NSICDXBRIEFPROCEDURE_GEN_ALL_CORE_FT
PROCEDURES:  Angiogram, carotid and cerebral, bilateral 16-Dec-2020 17:40:51 Intra arterial verapamil Nitin Lim  Angiogram, cerebral, with intracranial aneurysm embolization 10-Dec-2020 10:25:22  Nitin Lim  
PROCEDURES:  Angiogram, cerebral, with intracranial aneurysm embolization 10-Dec-2020 10:25:22  Nitin Lim  
PROCEDURES:  Ventriculoperitoneal shunt 30-Dec-2020 17:30:55  Trenton Alonzo  
PROCEDURES:  Vascular surgery procedure 16-Dec-2020 23:12:50 right groin cutdown with removal of foreign body from R CFA and repair with patch angioplasty and completion angiogram Henry Pulido

## 2020-12-30 NOTE — PROGRESS NOTE ADULT - SUBJECTIVE AND OBJECTIVE BOX
=================================  NEUROCRITICAL CARE ATTENDING NOTE  =================================    CARA CANCHOLA   MRN-[id number]  Summary:  75y/F with COPD, asthma, dyslipidemia Hypertension found down.  Brought to [facility] where imaging showed SAH basilar cisterns with IVH and obstructive hydrocephalus L Pcomm aneurysm.  Given levetiracetam, hydromorphone.  NIHSS 2 HH3 MF4, transferred to [facility] for further management.      COURSE IN THE HOSPITAL:   admitted to [facility], EVD inserted; given 20 lasix for pulmo edema, T inversion on CT  12/10 No significant events overnight.    No significant events overnight.    No significant events overnight.    No significant events overnight. drain stopped working at 730 a.m., off levophed    No significant events overnight.   12/15 confusion   stepped down   readmitted to ICU    more confused somnolent overnight, high volume LP - 30cc removed, CT scan, EVD inserted, febrile pancultured, UA (+) rocephin started    Past Medical History: Hyperlipidemia Hypertension COPD (chronic obstructive pulmonary disease) Asthma  Allergies:  No Known Allergies  Home meds:   ·	famotidine 20 mg oral tablet: 1 tab(s) orally once a day  ·	lisinopril 2.5 mg oral tablet: 1 tab(s) orally once a day  ·	montelukast 10 mg oral tablet: 1 tab(s) orally once a day  ·	predniSONE 50 mg oral tablet: 1 tab(s) orally once a day  ·	ProAir HFA CFC free 90 mcg/inh inhalation aerosol: 2 puff(s) inhaled 4 times a day PRN  ·	simvastatin 20 mg oral tablet: 1 tab(s) orally once a day (at bedtime)    PHYSICAL EXAMINATION  T(C): 36.9 ( @ 06:33), Max: 38.3 ( @ 19:56) HR: 84 ( @ 09:00) (84 - 102) BP: 206/95 ( @ 07:00) (92/48 - 228/95) RR: 23 ( @ 09:00) (10 - ) SpO2: 100% ( @ 09:00) (92% - 100%)  NEUROLOGIC EXAMINATION:  Patient is  lethargic, oriented x 1 , moves B UE/LE L>R, CLEOPATRA  GENERAL: not intubated, not in cardiorespiratory distress  EENT:  anicteric  CARDIOVASCULAR: (+) S1 S2, normal rate and regular rhythm  PULMONARY: clear to auscultation bilaterally  ABDOMEN: soft, nontender with normoactive bowel sounds  EXTREMITIES: no edema  SKIN: no rash    LABS:   CAPILLARY BLOOD GLUCOSE 152 214 130 130  - given insulin glargine and 16 units              (13)    10.9   17.34 )-----------( 448      ( 29 Dec 2020 06:12 )             35.5     142  |  104  |  15  ----------------------------<  163<H>  3.5   |  27  |  0.44<L>    Ca    9.3      29 Dec 2020 06:12  Phos  2.6       Mg     2.2      @ 07:01  -   @ 07:00  IN: 775 mL / OUT: 1526 mL / NET: -751 mL    HbA1C = 6.7 (12-10) 6.4 ()  LDL = 112 ()   HDL = 66 ()  TG = 114 ()   TSH = 0.990 ()    Bacteriology:  UA (+) LE (+) N   CSF no organisms   CSF NGTD    Blood NG5D x2    CSF studies:  EEG:  Neuroimagin/10 CT head:  EVD placement, stable   CTA:  L pcomm aneurysm, L ICA (distal cavernous) aneurysm vs infundibulum, extensive calcified plaque cavernous bilateral ICA with mild to mod stenosis; thick plaque bilateral carotid bifurcations - mod to severe R and mild to mod L stenosis, focal calcified plaque R VA off subclavian artery - high grate stenosis / partial occlusion, upper lung bilateral opacification - pulmo edema, chronic deformity R humeral neck   CT head:  SAH, basilar cisterns, IV extension, obstructive hydrocephalus SVID, lacunar infarcts R caudate, both thalami, cerebellar hemispheres, no CT evidence of territorial infarction  Other imagin/28 LE Doppler: NEG   LE Doppler: NEG   CT abd:  small paraesophageal hiatal hernia, gastritis; ?impaction, septal thickening bilateral lower lobes ?pulmo edema, hepatic steatosis    MEDICATIONS:   ceftriaxone 1G IV q24h lacosamide 150mg PO q12h modafinil 100mg PO daily ipratropium / albuterol q6h montelukast 10mg PO daily atorvastatin 40mg PO HS fludrocortisone 0.2mg PO daily insulin glargine 10 HS mod ISS Peridex cyanocobalamin 1000 PO daily ferrous sulfate 325mg PO daily salt 3G PO q8h     IV FLUIDS: 3%@15  DRIPS:  DIET: Glucerna   Lines: [place]   Drains:  Soriano   EVD at 5cm H20   EVD at 5cm H20 ICP < 10   EVD at 5cm H20 ICP <10  Wounds:    CODE STATUS:  Full Code                       GOALS OF CARE:  aggressive                      DISPOSITION:  ICU  NIHSS 2 HH3 MF4 =================================  NEUROCRITICAL CARE ATTENDING NOTE  =================================    CARA CANCHOLA   MRN-[id number]  Summary:  75y/F with COPD, asthma, dyslipidemia Hypertension found down.  Brought to [facility] where imaging showed SAH basilar cisterns with IVH and obstructive hydrocephalus L Pcomm aneurysm.  Given levetiracetam, hydromorphone.  NIHSS 2 HH3 MF4, transferred to [facility] for further management.      COURSE IN THE HOSPITAL:   admitted to [facility], EVD inserted; given 20 lasix for pulmo edema, T inversion on CT  12/10 No significant events overnight.    No significant events overnight.    No significant events overnight.    No significant events overnight. drain stopped working at 730 a.m., off levophed    No significant events overnight.   12/15 confusion   stepped down   readmitted to ICU    more confused somnolent overnight, high volume LP - 30cc removed, CT scan, EVD inserted, febrile pancultured, UA (+) rocephin started    Past Medical History: Hyperlipidemia Hypertension COPD (chronic obstructive pulmonary disease) Asthma  Allergies:  No Known Allergies  Home meds:   ·	famotidine 20 mg oral tablet: 1 tab(s) orally once a day  ·	lisinopril 2.5 mg oral tablet: 1 tab(s) orally once a day  ·	montelukast 10 mg oral tablet: 1 tab(s) orally once a day  ·	predniSONE 50 mg oral tablet: 1 tab(s) orally once a day  ·	ProAir HFA CFC free 90 mcg/inh inhalation aerosol: 2 puff(s) inhaled 4 times a day PRN  ·	simvastatin 20 mg oral tablet: 1 tab(s) orally once a day (at bedtime)    PHYSICAL EXAMINATION  T(C): 36.9 ( @ 06:33), Max: 38.3 ( @ 19:56) HR: 84 ( @ 09:00) (84 - 102) BP: 206/95 ( @ 07:00) (92/48 - 228/95) RR: 23 ( @ 09:00) (10 - ) SpO2: 100% ( @ 09:00) (92% - 100%)  NEUROLOGIC EXAMINATION:  Patient is  lethargic but rousable, oriented x 1 , moves B UE/LE L>R, CLEOPATRA  GENERAL: not intubated, not in cardiorespiratory distress  EENT:  anicteric  CARDIOVASCULAR: (+) S1 S2, normal rate and regular rhythm  PULMONARY: clear to auscultation bilaterally  ABDOMEN: soft, nontender with normoactive bowel sounds  EXTREMITIES: no edema  SKIN: no rash    LABS:   CAPILLARY BLOOD GLUCOSE 152 214 130 130  - given insulin glargine and 16 units              (13)    10.9   17.34 )-----------( 448      ( 29 Dec 2020 06:12 )             35.5     142  |  104  |  15  ----------------------------<  163<H>  3.5   |  27  |  0.44<L>    Ca    9.3      29 Dec 2020 06:12  Phos  2.6       Mg     2.2      @ 07:01  -   @ 07:00  IN: 775 mL / OUT: 1526 mL / NET: -751 mL    HbA1C = 6.7 (12-10) 6.4 ()  LDL = 112 ()   HDL = 66 ()  TG = 114 ()   TSH = 0.990 ()    Bacteriology:  UA (+) LE (+) N   CSF no organisms   CSF NGTD    Blood NG5D x2    CSF studies:  EEG:  Neuroimagin/10 CT head:  EVD placement, stable   CTA:  L pcomm aneurysm, L ICA (distal cavernous) aneurysm vs infundibulum, extensive calcified plaque cavernous bilateral ICA with mild to mod stenosis; thick plaque bilateral carotid bifurcations - mod to severe R and mild to mod L stenosis, focal calcified plaque R VA off subclavian artery - high grate stenosis / partial occlusion, upper lung bilateral opacification - pulmo edema, chronic deformity R humeral neck   CT head:  SAH, basilar cisterns, IV extension, obstructive hydrocephalus SVID, lacunar infarcts R caudate, both thalami, cerebellar hemispheres, no CT evidence of territorial infarction  Other imagin/28 LE Doppler: NEG   LE Doppler: NEG   CT abd:  small paraesophageal hiatal hernia, gastritis; ?impaction, septal thickening bilateral lower lobes ?pulmo edema, hepatic steatosis    MEDICATIONS:   ceftriaxone 1G IV q24h lacosamide 150mg PO q12h modafinil 100mg PO daily ipratropium / albuterol q6h montelukast 10mg PO daily atorvastatin 40mg PO HS fludrocortisone 0.2mg PO daily insulin glargine 10 HS mod ISS Peridex cyanocobalamin 1000 PO daily ferrous sulfate 325mg PO daily salt 3G PO q8h     IV FLUIDS: 3%@15  DRIPS:  DIET: Glucerna   Lines: [place]   Drains:  Soriano   EVD at 5cm H20   EVD at 5cm H20 ICP < 10   EVD at 5cm H20 ICP <10  Wounds:    CODE STATUS:  Full Code                       GOALS OF CARE:  aggressive                      DISPOSITION:  ICU  NIHSS 2 HH3 MF4 =================================  NEUROCRITICAL CARE ATTENDING NOTE  =================================    CARA CANCHOLA   MRN-[id number]  Summary:  75y/F with COPD, asthma, dyslipidemia Hypertension found down.  Brought to [facility] where imaging showed SAH basilar cisterns with IVH and obstructive hydrocephalus L Pcomm aneurysm.  Given levetiracetam, hydromorphone.  NIHSS 2 HH3 MF4, transferred to [facility] for further management.      COURSE IN THE HOSPITAL:   admitted to [facility], EVD inserted; given 20 lasix for pulmo edema, T inversion on CT  12/10 No significant events overnight.    No significant events overnight.    No significant events overnight.    No significant events overnight. drain stopped working at 730 a.m., off levophed    No significant events overnight.   12/15 confusion   stepped down   readmitted to ICU    more confused somnolent overnight, high volume LP - 30cc removed, CT scan, EVD inserted, febrile pancultured, UA (+) rocephin started   Tmax 38.6  No significant events overnight. EVD clamped this morning, given 1L fluid bolus overnight    Past Medical History: Hyperlipidemia Hypertension COPD (chronic obstructive pulmonary disease) Asthma  Allergies:  No Known Allergies  Home meds:   ·	famotidine 20 mg oral tablet: 1 tab(s) orally once a day  ·	lisinopril 2.5 mg oral tablet: 1 tab(s) orally once a day  ·	montelukast 10 mg oral tablet: 1 tab(s) orally once a day  ·	predniSONE 50 mg oral tablet: 1 tab(s) orally once a day  ·	ProAir HFA CFC free 90 mcg/inh inhalation aerosol: 2 puff(s) inhaled 4 times a day PRN  ·	simvastatin 20 mg oral tablet: 1 tab(s) orally once a day (at bedtime)    PHYSICAL EXAMINATION  T(C): 37.4 ( @ 05:59), Max: 38.6 ( @ 16:00) HR: 93 ( @ 09:00) (77 - 109) BP: 195/81 ( @ 09:00) (110/54 - 205/92) RR: 31 ( @ 09:00) (11 - 34) SpO2: 100% ( @ 09:00) (93% - 100%)  NEUROLOGIC EXAMINATION:  Patient is awake, oriented x 1, CLEOPATRA, not following comands, moving all 4s   GENERAL: not intubated, not in cardiorespiratory distress  EENT:  anicteric  CARDIOVASCULAR: (+) S1 S2, normal rate and regular rhythm  PULMONARY: clear to auscultation bilaterally, but coughing  ABDOMEN: soft, nontender with normoactive bowel sounds  EXTREMITIES: no edema  SKIN: no rash    LABS:   CAPILLARY BLOOD GLUCOSE 112 156 149 131              (17.34)   8.6   (10.9)  12.56 )-----------( 444      ( 30 Dec 2020 05:37 )             28.7   (142)  145  |  109<H>  |  12  ----------------------------<  106<H>  3.1<L>   |  26  |  0.41<L>    Ca    8.3<L>      30 Dec 2020 05:37  Phos  2.6       Mg     2.1      @ 07:01  -   @ 07:00  IN: 2776.6 mL / OUT: 2163 mL / NET: 613.6 mL    HbA1C = 6.7 (12-10) 6.4 ()  LDL = 112 ()   HDL = 66 ()  TG = 114 ()   TSH = 0.990 ()    Bacteriology:  UA (+) LE (+) N   CSF no growth   Urine >100K GNR   Blood culture NG1D   CSF NGTD    Blood NG5D x2    CSF studies:    L   *** RBC58 WBC9 *** %N3 %L5     L   *** [id number] WBC20 *** %N10 %L8     L   *** [id number] WBC24 *** %N7 %L13     EEG:  Neuroimagin/10 CT head:  EVD placement, stable   CTA:  L pcomm aneurysm, L ICA (distal cavernous) aneurysm vs infundibulum, extensive calcified plaque cavernous bilateral ICA with mild to mod stenosis; thick plaque bilateral carotid bifurcations - mod to severe R and mild to mod L stenosis, focal calcified plaque R VA off subclavian artery - high grate stenosis / partial occlusion, upper lung bilateral opacification - pulmo edema, chronic deformity R humeral neck   CT head:  SAH, basilar cisterns, IV extension, obstructive hydrocephalus SVID, lacunar infarcts R caudate, both thalami, cerebellar hemispheres, no CT evidence of territorial infarction  Other imagin/28 LE Doppler: NEG   LE Doppler: NEG   CT abd:  small paraesophageal hiatal hernia, gastritis; ?impaction, septal thickening bilateral lower lobes ?pulmo edema, hepatic steatosis    MEDICATIONS:   piperacillin/tazobactam 3.375 IV q6h lacosamide 150 q12h modafinil1 00mg PO daily ipratropium / albuterol q6h montelukast 10mg PO daily atorvastatin 40mg PO HS fludrocortisone0.2mg PO HS insulin glargine 10 HS mod ISS Peridex cyanocobalamin 1000 PO daily ferrous sulfate 325mg PO daily salt 3G PO q6h     IV FLUIDS: NS@75  DRIPS:  DIET: NPO  Lines: [place]   Drains:  Soriano   EVD at 5cm H20   EVD at 5cm H20 ICP < 10   EVD at 5cm H20 ICP <10   EVD clamped  Wounds:    CODE STATUS:  Full Code                       GOALS OF CARE:  aggressive                      DISPOSITION:  ICU  NIHSS 2 HH3 MF4

## 2020-12-30 NOTE — BRIEF OPERATIVE NOTE - NSICDXBRIEFPOSTOP_GEN_ALL_CORE_FT
POST-OP DIAGNOSIS:  Cerebral artery vasospasm 16-Dec-2020 17:41:31  Nitin Lim  SAH (subarachnoid hemorrhage) 10-Dec-2020 10:25:41  Nitin Lim  
POST-OP DIAGNOSIS:  Hydrocephalus, adult 30-Dec-2020 17:31:49  Trenton Alonzo  
POST-OP DIAGNOSIS:  SAH (subarachnoid hemorrhage) 10-Dec-2020 10:25:41  Nitin Lim  
POST-OP DIAGNOSIS:  Injury of right femoral artery 16-Dec-2020 23:12:42  Henry Pulido

## 2020-12-30 NOTE — BRIEF OPERATIVE NOTE - OPERATION/FINDINGS
Right  shunt    Certes valve at 5
Under general anesthesia, EVD open to drainage, using a 6 fr long sheath right CFA, cerebral angiogram was performed. Multiple left ICA DSA runs including 3D, demonstrate a 3.4 x 3mm left Pcomm aneurysm arising from PComm origin, there is a approximately 2mm left parophthalmic aneurysm.   Full report to follow.  Patient tolerated procedure well, no new neurological deficit, hemodynamically stable.  Right groin/vasc access site: exoseal and manual compression applied, hemostasis achieved, no hematoma.  Patient was transferred back to NSICU  Above d/w Dr. Patel
Neurosurgery performed cerebral angiogram with intra-cerebral administration of verapamil. See their note for full details.    At end of case, neurosurg attempted to deploy Proglide 6F system to R CFA, but device became stuck in R CFA. Vascular surgery consulted intra-op. Performed right groin cutdown with removal of fractured Proglide catheter in its entirety. R deep femoral artery was injured. R CFA/PFA was repaired with patch angioplasty with Photofix patch after endarterectomy. Completion angio showed occlusion of PFA distal to patch, so this area was endarterectomized again with new photofix patch angioplasty. Palpable pulse confirmed in R SFA and PFA. Pt had PT signal in R foot at end. Hemostasis achieved, groin closed in layers.
Under MAC, using a 5 fr long sheath right CFA, cerebral angiogram was performed.  Findings complete and persistent left Pcomm aneurysm occlusion with microcoils.  There is mild vasospasm in right A1 and mild vasospasm in left MAGGIE.  Intra arterial verapamil infusion (10 mg right CCA and 15 mg left CCA)  Full report to follow.  Patient tolerated procedure well, no new neurological deficit, hemodynamically stable.  Right groin/vasc access site: Attempted perclose closure complicated by retention of the device requiring vascular surgical intervention, (Please see separate report)  Patient was transferred back to NSICU.  Above d/w Dr. Alvarenga

## 2020-12-30 NOTE — BRIEF OPERATIVE NOTE - NSICDXBRIEFPREOP_GEN_ALL_CORE_FT
PRE-OP DIAGNOSIS:  Cerebral artery vasospasm 16-Dec-2020 17:41:17  Nitin Lim  SAH (subarachnoid hemorrhage) 10-Dec-2020 10:25:33  Nitin Lim  
PRE-OP DIAGNOSIS:  SAH (subarachnoid hemorrhage) 10-Dec-2020 10:25:33  Nitin Lim  
PRE-OP DIAGNOSIS:  Hydrocephalus, adult 30-Dec-2020 17:31:36  Trenton Alonzo  
PRE-OP DIAGNOSIS:  Injury of femoral artery 16-Dec-2020 23:12:28  Henry Pulido

## 2020-12-30 NOTE — PROGRESS NOTE ADULT - SUBJECTIVE AND OBJECTIVE BOX
Interval Events: Reviewed  Patient seen and examined at bedside.    Patient is a 75y old  Female who presents with a chief complaint of SAH (30 Dec 2020 00:38)    she is awake and not in distress  PAST MEDICAL & SURGICAL HISTORY:  Hyperlipidemia    Hypertension    COPD (chronic obstructive pulmonary disease)    Asthma    No significant past surgical history        MEDICATIONS:  Pulmonary:  albuterol/ipratropium for Nebulization. 3 milliLiter(s) Nebulizer every 6 hours PRN  montelukast 10 milliGRAM(s) Oral daily    Antimicrobials:  piperacillin/tazobactam IVPB.. 3.375 Gram(s) IV Intermittent every 6 hours    Anticoagulants:    Cardiac:  labetalol Injectable 2.5 milliGRAM(s) IV Push every 6 hours PRN      Allergies    No Known Allergies    Intolerances        Vital Signs Last 24 Hrs  T(C): 37.8 (30 Dec 2020 12:49), Max: 38 (30 Dec 2020 00:00)  T(F): 100 (30 Dec 2020 12:49), Max: 100.4 (30 Dec 2020 00:00)  HR: 65 (30 Dec 2020 14:00) (65 - 103)  BP: 131/63 (30 Dec 2020 14:00) (110/54 - 226/95)  BP(mean): 90 (30 Dec 2020 14:00) (78 - 151)  RR: 30 (30 Dec 2020 14:00) (11 - 34)  SpO2: 96% (30 Dec 2020 14:00) (93% - 100%)    12-29 @ 07:01  -  12-30 @ 07:00  --------------------------------------------------------  IN: 2776.6 mL / OUT: 2163 mL / NET: 613.6 mL    12-30 @ 07:01  -  12-30 @ 16:33  --------------------------------------------------------  IN: 850 mL / OUT: 927 mL / NET: -77 mL          Review of Systems:   •	General: negative  •	Skin/Breast: negative  •	Ophthalmologic: negative  •	ENMT: negative  •	Respiratory and Thorax: negative  •	Cardiovascular: negative  •	Gastrointestinal: negative  •	Genitourinary: negative  •	Musculoskeletal: negative  •	Neurological: negative  •	Psychiatric: negative  •	Hematology/Lymphatics: negative  •	Endocrine: negative  •	Allergic/Immunologic: negative    Physical Exam:   • Constitutional:	Well-developed, well nourished  • Eyes:	EOMI; PERRL; no drainage or redness  • ENMT:	No oral lesions; no gross abnormalities  • Neck	No bruits; no thyromegaly or nodules  • Breasts:	not examined  • Back:	No deformity or limitation of movement  • Respiratory:	Breath Sounds equal & clear to percussion & auscultation, no accessory muscle use  • Cardiovascular:	Regular rate & rhythm, normal S1, S2; no murmurs, gallops or rubs; no S3, S4  • Gastrointestinal:	Soft, non-tender, no hepatosplenomegaly, normal bowel sounds  • Genitourinary:	not examined  • Rectal: not examined  • Extremities:	No cyanosis, clubbing or edema  • Vascular:	Equal and normal pulses (carotid, femoral, dorsalis pedis)  • Neurologica:l	not examined  • Skin:	No lesions; no rash  • Lymph Nodes:	No lymphadedenopathy  • Musculoskeletal:	No joint pain, swelling or deformity; no limitation of movement        LABS:  ABG - ( 30 Dec 2020 16:01 )  pH, Arterial: 7.42  pH, Blood: x     /  pCO2: 38    /  pO2: 330   / HCO3: 24    / Base Excess: x     /  SaO2: x                   CBC Full  -  ( 30 Dec 2020 05:37 )  WBC Count : 12.56 K/uL  RBC Count : 3.14 M/uL  Hemoglobin : 8.6 g/dL  Hematocrit : 28.7 %  Platelet Count - Automated : 444 K/uL  Mean Cell Volume : 91.4 fl  Mean Cell Hemoglobin : 27.4 pg  Mean Cell Hemoglobin Concentration : 30.0 gm/dL  Auto Neutrophil # : x  Auto Lymphocyte # : x  Auto Monocyte # : x  Auto Eosinophil # : x  Auto Basophil # : x  Auto Neutrophil % : x  Auto Lymphocyte % : x  Auto Monocyte % : x  Auto Eosinophil % : x  Auto Basophil % : x    12-30    145  |  109<H>  |  12  ----------------------------<  106<H>  3.1<L>   |  26  |  0.41<L>    Ca    8.3<L>      30 Dec 2020 05:37  Phos  2.6     12-30  Mg     2.1     12-30      PT/INR - ( 30 Dec 2020 05:37 )   PT: 12.5 sec;   INR: 1.04          PTT - ( 30 Dec 2020 05:37 )  PTT:23.2 sec      Urinalysis Basic - ( 28 Dec 2020 23:55 )    Color: Yellow / Appearance: Clear / SG: >=1.030 / pH: x  Gluc: x / Ketone: NEGATIVE  / Bili: Negative / Urobili: 1.0 E.U./dL   Blood: x / Protein: 100 mg/dL / Nitrite: POSITIVE   Leuk Esterase: Moderate / RBC: < 5 /HPF / WBC Many /HPF   Sq Epi: x / Non Sq Epi: 0-5 /HPF / Bacteria: Present /HPF              Culture Results:   No growth to date (12-29 @ 18:34)  Culture Results:   >100,000 CFU/ml Enterobacter cloacae complex  Susceptibility to follow. (12-29 @ 07:31)  Culture Results:   No growth to date (12-29 @ 05:07)  Culture Results:   No growth at 1 day. (12-28 @ 22:11)  Culture Results:   No growth at 1 day. (12-28 @ 22:11)      RADIOLOGY & ADDITIONAL STUDIES (The following images were personally reviewed):  Soriano:                                     No  Urine output:                       adequate  DVT prophylaxis:                 Yes  Flattus:                                  Yes  Bowel movement:              No

## 2020-12-30 NOTE — PROGRESS NOTE ADULT - ASSESSMENT
75y/Female with:  aneursymal subarachnoid hemorrhage, L pcomm aneurysm, L parophthalmic aneurysm; brain compression, cerebral edema  osbtructive hydrocephalus  lacunar infarcts R caudate, B thalami, cerebellar hemispheres ?artery to artery vs cardioembolic?  atherosclerotic cardiovascular disease; mod to severe bilateral ICA stenosis (R>L), R VA stenosis  Hypertension dyslipidemia   COPD asthma  newly diagnosed Diabetes Mellitus?  troponin leak    PLAN: Day 1 = 12-09 ; Day 4 =  12/12; Day 21 = 12/29  NEURO: neurochecks q1h, PRN pain meds with Tylenol / Percocet  SAH:  off nimodipine   Hydrocephalus:  EVD 5 cm H20 open to drain, monitor ICPs; VPS tomorrow  Seizure prophylaxis (cortical ICH, associated SDH, unclear etiology):  lacosamide 150mg PO BID ?   REHAB:  physical therapy evaluation and management    EARLY MOB:  HOB elevated    PULM:  Room air, incentive spirometry, continue montelukast, ipratropium / albuterol PRN   CARDIO:  SBP goal 150-200mm Hg, EF 35%  ENDO:  Blood sugar goals 140-180 mg/dL, cont insulin sliding scale, atorvastatin 40mg   GI:  bowel regimen  DIET: tube feeds, NPO after midnight  RENAL: maintain euvolemia, accurate Is and Os; switch 3% to NS once NPO  HEM/ONC: Hb stable, FOBT  VTE Prophylaxis: SCDs, no DVT chemoprophylaxis for now as patient is high risk for bleed (OR tomorrow)  ID: febrile, worsening leukocytosis; ceftriaxone (last day 01/01/2021), f/u culture sensitivity  Social: will update family    CORE MEASURES  1.  Nath and Jacobo Score = 3  2.  VTE prophylaxis:  [ ] administered within 24 hours of admission OR [X] reason not done: fresh bleed, unsecured aneurysm.  3.  Dysphagia screening:   [X] performed before any oral meds / liquids / food  4.  Nimodipine treatment:  [X] administered within 24 hours of admission OR [ ] reason not done:    ATTENDING ATTESTATION:  I was physically present for the key portions of the evaluation and management (E/M) service provided.  I agree with the above history, physical and plan, which I have reviewed and edited where appropriate.    Patient at high risk for neurological deterioration or death due to:  ICU delirium, aspiration PNA, DVT / PE.  Critical care time:  I have personally provided 30 minutes of critical care time, excluding time spent on separate procedures.      Plan discussed with RN, house staff.   75y/Female with:  aneursymal subarachnoid hemorrhage, L pcomm aneurysm, L parophthalmic aneurysm; brain compression, cerebral edema  osbtructive hydrocephalus  lacunar infarcts R caudate, B thalami, cerebellar hemispheres ?artery to artery vs cardioembolic?  atherosclerotic cardiovascular disease; mod to severe bilateral ICA stenosis (R>L), R VA stenosis  Hypertension dyslipidemia   COPD asthma  newly diagnosed Diabetes Mellitus?  troponin leak    PLAN: Day 1 = 12-09 ; Day 4 =  12/12; Day 21 = 12/29  NEURO: neurochecks q1h, PRN pain meds with Tylenol / Percocet  SAH:  off nimodipine   Hydrocephalus:  VPS today   Seizure treatment (cortical ICH, associated SDH, unclear etiology):  lacosamide 150mg PO BID   REHAB:  physical therapy evaluation and management    EARLY MOB:  HOB elevated    PULM:  Room air, incentive spirometry, continue montelukast, ipratropium / albuterol PRN   CARDIO:  SBP goal 150-200mm Hg, EF 35% - repeat TTE  ENDO:  Blood sugar goals 140-180 mg/dL, cont insulin sliding scale, atorvastatin 40mg, insulin glargine 10 given overnight  GI:  bowel regimen  DIET: NPO   RENAL: maintain euvolemia, accurate Is and Os; NS@75  HEM/ONC: Hb stable, FOBT, 1 unit pRBC per Dr. Serrano; repeat CBC  VTE Prophylaxis: SCDs, no DVT chemoprophylaxis for now as patient is high risk for bleed (OR today)  ID: febrile, leukocytosis improving; piperacillin/tazobactam (last day 01/02/2021), f/u culture sensitivity  Social: will update family    CORE MEASURES  1.  Nath and Jacobo Score = 3  2.  VTE prophylaxis:  [ ] administered within 24 hours of admission OR [X] reason not done: fresh bleed, unsecured aneurysm.  3.  Dysphagia screening:   [X] performed before any oral meds / liquids / food  4.  Nimodipine treatment:  [X] administered within 24 hours of admission OR [ ] reason not done:    ATTENDING ATTESTATION:  I was physically present for the key portions of the evaluation and management (E/M) service provided.  I agree with the above history, physical and plan, which I have reviewed and edited where appropriate.    Patient at high risk for neurological deterioration or death due to:  ICU delirium, aspiration PNA, DVT / PE.  Critical care time:  I have personally provided 30 minutes of critical care time, excluding time spent on separate procedures.      Plan discussed with RN, house staff.

## 2020-12-30 NOTE — PROGRESS NOTE ADULT - SUBJECTIVE AND OBJECTIVE BOX
Surgery: Rt VPS  Consent: Signed by HCP                        Representative Consent: [ x ] Signed by  HCP                                                 [  ] N/A -> only for cerebral angiogram    No Known Allergies      OVERNIGHT EVENTS: VIVIEN overnight. Neuro exam stable.    T(C): 38 (12-30-20 @ 00:00), Max: 38.6 (12-29-20 @ 16:00)  HR: 88 (12-30-20 @ 00:30) (84 - 109)  BP: 168/71 (12-30-20 @ 00:30) (110/54 - 228/95)  RR: 24 (12-30-20 @ 00:30) (16 - 34)  SpO2: 100% (12-30-20 @ 00:30) (93% - 100%)  Wt(kg): --    EXAM: General: Patient is sleepy, arouses to deep sternal rub and maintains wakefulness for a few seconds and goes back to sleep.  Neuro: AA&O xself. OE to pain.  Not FC  CN II-XII: PERRL, EOMI  Motor: ZAFAR x4, good strength throughout  HEENT: face symmetric, tongue midline   Cardiovascular: RRR, normal S1 and S2   Respiratory: lungs CTAB, no wheezing, rhonchi, or crackles   GI: normoactive BS to auscultation, abd soft, NTND   Extremities: distal pulses 2+ x4       TUBES/LINES:  [] CVC  [] A-line  [] Lumbar Drain  [x] Ventriculostomy @ 5 cm H20  [] Other    DIET:  [x] NPO  [] Mechanical  [] Tube feeds      12-29    142  |  104  |  15  ----------------------------<  163<H>  3.5   |  27  |  0.44<L>    Ca    9.3      29 Dec 2020 06:12  Phos  2.6     12-29  Mg     2.2     12-29      CBC Full  -  ( 29 Dec 2020 06:12 )  WBC Count : 17.34 K/uL  RBC Count : 3.92 M/uL  Hemoglobin : 10.9 g/dL  Hematocrit : 35.5 %  Platelet Count - Automated : 448 K/uL  Mean Cell Volume : 90.6 fl  Mean Cell Hemoglobin : 27.8 pg  Mean Cell Hemoglobin Concentration : 30.7 gm/dL  Auto Neutrophil # : x  Auto Lymphocyte # : x  Auto Monocyte # : x  Auto Eosinophil # : x  Auto Basophil # : x  Auto Neutrophil % : x  Auto Lymphocyte % : x  Auto Monocyte % : x  Auto Eosinophil % : x  Auto Basophil % : x    PT/INR - ( 28 Dec 2020 11:37 )   PT: 12.1 sec;   INR: 1.01          PTT - ( 28 Dec 2020 11:37 )  PTT:29.0 sec    Pregnancy test: N/A  Type & Screen (in past 72hrs): Done     2 Type & Screen within 72 hours if anticipate blood need in OR:  _ Y x N     Blood ordered and on hold for OR:   [ x] No need     [ ] 1u pRBC on hold      [ ] 2u pRBC on hold    CXR:  EKG: NSR  ECHO:  Medical Clearances: Dr. Whitlock  Other Clearances:     Last dose of antiplatelet/anticoagulation drug:    Implanted Devices (pacemaker, drug pump...etc):  []YES   [x] NO                  If yes --> EPS consulted to interrogate/adjust device:    3M nasal swab ordered? x Y  _N  Cranial surgery: Order written for hair to be shampooed night before surgery and morning before surgery  [x] yes   []no  Chlorhexidine Wipes ordered for Neck Down?  x Y  _ N  (twice a day if 1 day before surgery, daily for 3 days if 3 days prior, daily if in ICU)                 Assessment:    75y Female  with PMHx of COPD, asthma, HLD, HTN, BIBEMS to [facility] from home after HHA found patient down on the floor covered in non-bloody vomitus. CTH reveals diffuse subarachnoid hemorrhage mainly at basilar cisterns with IVH and obstructive hydrocephalus, CTA shows 3mm left pcomm aneurysm, now s/p bedside right frontal EVD placement 12/10, s/p cerebral angiogram for coil embolization of left PCOMM aneurysm 12/10, now POD #13 s/p cerebral angio for verapamil c/b device malfunction of Proglide, s/p right groin cutdown for removal of proglide catheter and femoral artery repair (12/17/2020), NIHSS2 HH3 MF4), s/p EVD removal 12/25, POD#1 s/p bedside EVD placement, preop for VPS      Plan: - NPO after midnight  - -200 as per Dr. Serrano, levo prn  - IVF  - zosyn for UTI  - B/L SCD dvt prop    All above d/w Dr. Serrano    Assessment:  Present when checked    []  GCS  E   V  M     Heart Failure: []Acute, [] acute on chronic , []chronic  Heart Failure:  [] Diastolic (HFpEF), [] Systolic (HFrEF), []Combined (HFpEF and HFrEF), [] RHF, [] Pulm HTN, [] Other    [] MICHAELLE, [] ATN, [] AIN, [] other  [] CKD1, [] CKD2, [] CKD 3, [] CKD 4, [] CKD 5, []ESRD    Encephalopathy: [] Metabolic, [] Hepatic, [] toxic, [] Neurological, [] Other    Abnormal Nurtitional Status: [] malnurtition (see nutrition note), [ ]underweight: BMI < 19, [] morbid obesity: BMI >40, [] Cachexia    [] Sepsis  [] hypovolemic shock,[] cardiogenic shock, [] hemorrhagic shock, [] neuogenic shock  [] Acute Respiratory Failure  []Cerebral edema, [] Brain compression/ herniation,   [] Functional quadriplegia  [] Acute blood loss anemia

## 2020-12-31 LAB
-  AMPICILLIN/SULBACTAM: SIGNIFICANT CHANGE UP
-  AMPICILLIN: SIGNIFICANT CHANGE UP
-  CEFAZOLIN: SIGNIFICANT CHANGE UP
-  CEFEPIME: SIGNIFICANT CHANGE UP
-  CEFTRIAXONE: SIGNIFICANT CHANGE UP
-  CIPROFLOXACIN: SIGNIFICANT CHANGE UP
-  GENTAMICIN: SIGNIFICANT CHANGE UP
-  NITROFURANTOIN: SIGNIFICANT CHANGE UP
-  PIPERACILLIN/TAZOBACTAM: SIGNIFICANT CHANGE UP
-  TOBRAMYCIN: SIGNIFICANT CHANGE UP
-  TRIMETHOPRIM/SULFAMETHOXAZOLE: SIGNIFICANT CHANGE UP
ANION GAP SERPL CALC-SCNC: 12 MMOL/L — SIGNIFICANT CHANGE UP (ref 5–17)
ANION GAP SERPL CALC-SCNC: 13 MMOL/L — SIGNIFICANT CHANGE UP (ref 5–17)
BUN SERPL-MCNC: 11 MG/DL — SIGNIFICANT CHANGE UP (ref 7–23)
BUN SERPL-MCNC: 9 MG/DL — SIGNIFICANT CHANGE UP (ref 7–23)
CALCIUM SERPL-MCNC: 8.3 MG/DL — LOW (ref 8.4–10.5)
CALCIUM SERPL-MCNC: 9 MG/DL — SIGNIFICANT CHANGE UP (ref 8.4–10.5)
CHLORIDE SERPL-SCNC: 103 MMOL/L — SIGNIFICANT CHANGE UP (ref 96–108)
CHLORIDE SERPL-SCNC: 95 MMOL/L — LOW (ref 96–108)
CO2 SERPL-SCNC: 23 MMOL/L — SIGNIFICANT CHANGE UP (ref 22–31)
CO2 SERPL-SCNC: 25 MMOL/L — SIGNIFICANT CHANGE UP (ref 22–31)
CREAT SERPL-MCNC: 0.43 MG/DL — LOW (ref 0.5–1.3)
CREAT SERPL-MCNC: 0.47 MG/DL — LOW (ref 0.5–1.3)
GLUCOSE SERPL-MCNC: 147 MG/DL — HIGH (ref 70–99)
GLUCOSE SERPL-MCNC: 74 MG/DL — SIGNIFICANT CHANGE UP (ref 70–99)
HCT VFR BLD CALC: 37.3 % — SIGNIFICANT CHANGE UP (ref 34.5–45)
HGB BLD-MCNC: 11.9 G/DL — SIGNIFICANT CHANGE UP (ref 11.5–15.5)
MAGNESIUM SERPL-MCNC: 1.6 MG/DL — SIGNIFICANT CHANGE UP (ref 1.6–2.6)
MCHC RBC-ENTMCNC: 28 PG — SIGNIFICANT CHANGE UP (ref 27–34)
MCHC RBC-ENTMCNC: 31.9 GM/DL — LOW (ref 32–36)
MCV RBC AUTO: 87.8 FL — SIGNIFICANT CHANGE UP (ref 80–100)
METHOD TYPE: SIGNIFICANT CHANGE UP
NRBC # BLD: 0 /100 WBCS — SIGNIFICANT CHANGE UP (ref 0–0)
PHOSPHATE SERPL-MCNC: 3.3 MG/DL — SIGNIFICANT CHANGE UP (ref 2.5–4.5)
PLATELET # BLD AUTO: 446 K/UL — HIGH (ref 150–400)
POTASSIUM SERPL-MCNC: 3.5 MMOL/L — SIGNIFICANT CHANGE UP (ref 3.5–5.3)
POTASSIUM SERPL-MCNC: 4.4 MMOL/L — SIGNIFICANT CHANGE UP (ref 3.5–5.3)
POTASSIUM SERPL-SCNC: 3.5 MMOL/L — SIGNIFICANT CHANGE UP (ref 3.5–5.3)
POTASSIUM SERPL-SCNC: 4.4 MMOL/L — SIGNIFICANT CHANGE UP (ref 3.5–5.3)
RBC # BLD: 4.25 M/UL — SIGNIFICANT CHANGE UP (ref 3.8–5.2)
RBC # FLD: 16.4 % — HIGH (ref 10.3–14.5)
SODIUM SERPL-SCNC: 133 MMOL/L — LOW (ref 135–145)
SODIUM SERPL-SCNC: 138 MMOL/L — SIGNIFICANT CHANGE UP (ref 135–145)
WBC # BLD: 13.82 K/UL — HIGH (ref 3.8–10.5)
WBC # FLD AUTO: 13.82 K/UL — HIGH (ref 3.8–10.5)

## 2020-12-31 PROCEDURE — 74176 CT ABD & PELVIS W/O CONTRAST: CPT | Mod: 26

## 2020-12-31 PROCEDURE — 93308 TTE F-UP OR LMTD: CPT | Mod: 26

## 2020-12-31 PROCEDURE — 99232 SBSQ HOSP IP/OBS MODERATE 35: CPT | Mod: GC

## 2020-12-31 PROCEDURE — 36415 COLL VENOUS BLD VENIPUNCTURE: CPT

## 2020-12-31 PROCEDURE — 93970 EXTREMITY STUDY: CPT | Mod: 26

## 2020-12-31 PROCEDURE — 70450 CT HEAD/BRAIN W/O DYE: CPT | Mod: 26

## 2020-12-31 PROCEDURE — 73030 X-RAY EXAM OF SHOULDER: CPT | Mod: 26,RT

## 2020-12-31 PROCEDURE — 99291 CRITICAL CARE FIRST HOUR: CPT

## 2020-12-31 PROCEDURE — 93321 DOPPLER ECHO F-UP/LMTD STD: CPT | Mod: 26

## 2020-12-31 PROCEDURE — 73060 X-RAY EXAM OF HUMERUS: CPT | Mod: 26,RT

## 2020-12-31 PROCEDURE — 71045 X-RAY EXAM CHEST 1 VIEW: CPT | Mod: 26

## 2020-12-31 RX ORDER — IPRATROPIUM/ALBUTEROL SULFATE 18-103MCG
3 AEROSOL WITH ADAPTER (GRAM) INHALATION EVERY 6 HOURS
Refills: 0 | Status: DISCONTINUED | OUTPATIENT
Start: 2020-12-31 | End: 2020-12-31

## 2020-12-31 RX ORDER — NYSTATIN 500MM UNIT
500000 POWDER (EA) MISCELLANEOUS
Refills: 0 | Status: DISCONTINUED | OUTPATIENT
Start: 2020-12-31 | End: 2021-01-16

## 2020-12-31 RX ORDER — DEXTROSE 50 % IN WATER 50 %
15 SYRINGE (ML) INTRAVENOUS ONCE
Refills: 0 | Status: COMPLETED | OUTPATIENT
Start: 2020-12-31 | End: 2020-12-31

## 2020-12-31 RX ORDER — PANTOPRAZOLE SODIUM 20 MG/1
40 TABLET, DELAYED RELEASE ORAL
Refills: 0 | Status: DISCONTINUED | OUTPATIENT
Start: 2020-12-31 | End: 2021-01-01

## 2020-12-31 RX ORDER — MAGNESIUM SULFATE 500 MG/ML
2 VIAL (ML) INJECTION ONCE
Refills: 0 | Status: COMPLETED | OUTPATIENT
Start: 2020-12-31 | End: 2020-12-31

## 2020-12-31 RX ORDER — MODAFINIL 200 MG/1
100 TABLET ORAL DAILY
Refills: 0 | Status: DISCONTINUED | OUTPATIENT
Start: 2021-01-01 | End: 2021-01-08

## 2020-12-31 RX ORDER — CHLORHEXIDINE GLUCONATE 213 G/1000ML
15 SOLUTION TOPICAL
Refills: 0 | Status: DISCONTINUED | OUTPATIENT
Start: 2020-12-31 | End: 2021-01-26

## 2020-12-31 RX ORDER — SODIUM CHLORIDE 5 G/100ML
500 INJECTION, SOLUTION INTRAVENOUS
Refills: 0 | Status: COMPLETED | OUTPATIENT
Start: 2020-12-31 | End: 2020-12-31

## 2020-12-31 RX ORDER — POTASSIUM CHLORIDE 20 MEQ
20 PACKET (EA) ORAL
Refills: 0 | Status: COMPLETED | OUTPATIENT
Start: 2020-12-31 | End: 2020-12-31

## 2020-12-31 RX ADMIN — Medication 500000 UNIT(S): at 18:53

## 2020-12-31 RX ADMIN — PIPERACILLIN AND TAZOBACTAM 200 GRAM(S): 4; .5 INJECTION, POWDER, LYOPHILIZED, FOR SOLUTION INTRAVENOUS at 05:54

## 2020-12-31 RX ADMIN — Medication 20 MILLIEQUIVALENT(S): at 09:08

## 2020-12-31 RX ADMIN — SODIUM CHLORIDE 250 MILLILITER(S): 5 INJECTION, SOLUTION INTRAVENOUS at 07:58

## 2020-12-31 RX ADMIN — MODAFINIL 100 MILLIGRAM(S): 200 TABLET ORAL at 11:15

## 2020-12-31 RX ADMIN — Medication 3 MILLILITER(S): at 22:29

## 2020-12-31 RX ADMIN — SODIUM CHLORIDE 3 GRAM(S): 9 INJECTION INTRAMUSCULAR; INTRAVENOUS; SUBCUTANEOUS at 09:08

## 2020-12-31 RX ADMIN — PIPERACILLIN AND TAZOBACTAM 200 GRAM(S): 4; .5 INJECTION, POWDER, LYOPHILIZED, FOR SOLUTION INTRAVENOUS at 11:15

## 2020-12-31 RX ADMIN — Medication 650 MILLIGRAM(S): at 18:15

## 2020-12-31 RX ADMIN — Medication 2: at 19:07

## 2020-12-31 RX ADMIN — PANTOPRAZOLE SODIUM 40 MILLIGRAM(S): 20 TABLET, DELAYED RELEASE ORAL at 18:53

## 2020-12-31 RX ADMIN — LACOSAMIDE 150 MILLIGRAM(S): 50 TABLET ORAL at 05:58

## 2020-12-31 RX ADMIN — Medication 500000 UNIT(S): at 11:15

## 2020-12-31 RX ADMIN — Medication 50 GRAM(S): at 06:30

## 2020-12-31 RX ADMIN — Medication 325 MILLIGRAM(S): at 11:15

## 2020-12-31 RX ADMIN — Medication 10 MILLIGRAM(S): at 10:28

## 2020-12-31 RX ADMIN — LACOSAMIDE 150 MILLIGRAM(S): 50 TABLET ORAL at 18:53

## 2020-12-31 RX ADMIN — POLYETHYLENE GLYCOL 3350 17 GRAM(S): 17 POWDER, FOR SOLUTION ORAL at 11:15

## 2020-12-31 RX ADMIN — Medication 650 MILLIGRAM(S): at 19:21

## 2020-12-31 RX ADMIN — Medication 20 MILLIEQUIVALENT(S): at 06:38

## 2020-12-31 RX ADMIN — Medication 15 MILLILITER(S): at 06:26

## 2020-12-31 RX ADMIN — CHLORHEXIDINE GLUCONATE 15 MILLILITER(S): 213 SOLUTION TOPICAL at 18:53

## 2020-12-31 RX ADMIN — ATORVASTATIN CALCIUM 40 MILLIGRAM(S): 80 TABLET, FILM COATED ORAL at 22:29

## 2020-12-31 RX ADMIN — CHLORHEXIDINE GLUCONATE 1 APPLICATION(S): 213 SOLUTION TOPICAL at 05:56

## 2020-12-31 RX ADMIN — SODIUM CHLORIDE 3 GRAM(S): 9 INJECTION INTRAMUSCULAR; INTRAVENOUS; SUBCUTANEOUS at 02:00

## 2020-12-31 RX ADMIN — Medication 650 MILLIGRAM(S): at 10:29

## 2020-12-31 RX ADMIN — MONTELUKAST 10 MILLIGRAM(S): 4 TABLET, CHEWABLE ORAL at 11:15

## 2020-12-31 RX ADMIN — SODIUM CHLORIDE 3 GRAM(S): 9 INJECTION INTRAMUSCULAR; INTRAVENOUS; SUBCUTANEOUS at 18:53

## 2020-12-31 RX ADMIN — Medication 2: at 22:21

## 2020-12-31 RX ADMIN — PREGABALIN 1000 MICROGRAM(S): 225 CAPSULE ORAL at 11:15

## 2020-12-31 RX ADMIN — FLUDROCORTISONE ACETATE 0.2 MILLIGRAM(S): 0.1 TABLET ORAL at 22:29

## 2020-12-31 RX ADMIN — Medication 650 MILLIGRAM(S): at 09:08

## 2020-12-31 RX ADMIN — Medication 20 MILLIEQUIVALENT(S): at 11:15

## 2020-12-31 RX ADMIN — PIPERACILLIN AND TAZOBACTAM 200 GRAM(S): 4; .5 INJECTION, POWDER, LYOPHILIZED, FOR SOLUTION INTRAVENOUS at 18:53

## 2020-12-31 NOTE — CHART NOTE - NSCHARTNOTEFT_GEN_A_CORE
Admitting Diagnosis:   Patient is a 75y old  Female who presents with a chief complaint of SAH (31 Dec 2020 07:38)      PAST MEDICAL & SURGICAL HISTORY:  Hyperlipidemia    Hypertension    COPD (chronic obstructive pulmonary disease)    Asthma    No significant past surgical history        Current Nutrition Order:  Glucerna 1.2 45ml/hr cont x24hrs 12/29   >> 1080ml, 1296kcal, 65gm prot, 969ml water     PO Intake: Good (%) [   ]  Fair (50-75%) [   ] Poor (<25%) [   ]  NA NPO TF     GI Issues:   RN reports lack of BM and provided lactulose   Ordered for Senna, Miralax   Per flow sheets: BM 12/31 x1, 12/27 x1, 12/25 x2     Pain:  none per flow sheets     Skin Integrity:  Greg 12   1+ Gen edema  No pressure ulcers- Sx site noted     Labs:   12-31    138  |  103  |  11  ----------------------------<  147<H>  4.4   |  23  |  0.47<L>    Ca    8.3<L>      31 Dec 2020 12:33  Phos  3.3     12-31  Mg     1.6     12-31    TPro  5.9<L>  /  Alb  3.0<L>  /  TBili  0.4  /  DBili  x   /  AST  30  /  ALT  26  /  AlkPhos  83  12-30    CAPILLARY BLOOD GLUCOSE      POCT Blood Glucose.: 123 mg/dL (31 Dec 2020 11:37)  POCT Blood Glucose.: 148 mg/dL (31 Dec 2020 06:56)  POCT Blood Glucose.: 66 mg/dL (31 Dec 2020 06:04)  POCT Blood Glucose.: 66 mg/dL (31 Dec 2020 06:02)  POCT Blood Glucose.: 107 mg/dL (30 Dec 2020 22:18)  POCT Blood Glucose.: 99 mg/dL (30 Dec 2020 16:07)      Medications:  MEDICATIONS  (STANDING):  atorvastatin 40 milliGRAM(s) Oral at bedtime  chlorhexidine 0.12% Liquid 15 milliLiter(s) Oral Mucosa two times a day  chlorhexidine 2% Cloths 1 Application(s) Topical <User Schedule>  cyanocobalamin 1000 MICROGram(s) Oral daily  dextrose 40% Gel 15 Gram(s) Oral once  dextrose 5%. 1000 milliLiter(s) (50 mL/Hr) IV Continuous <Continuous>  dextrose 5%. 1000 milliLiter(s) (100 mL/Hr) IV Continuous <Continuous>  dextrose 50% Injectable 25 Gram(s) IV Push once  ferrous    sulfate 325 milliGRAM(s) Oral daily  fludroCORTISONE 0.2 milliGRAM(s) Oral at bedtime  glucagon  Injectable 1 milliGRAM(s) IntraMuscular once  insulin lispro (ADMELOG) corrective regimen sliding scale   SubCutaneous Before meals and at bedtime  lacosamide Solution 150 milliGRAM(s) Oral every 12 hours  montelukast 10 milliGRAM(s) Oral daily  nystatin    Suspension 242674 Unit(s) Oral four times a day  piperacillin/tazobactam IVPB.. 3.375 Gram(s) IV Intermittent every 6 hours  polyethylene glycol 3350 17 Gram(s) Oral daily  senna 2 Tablet(s) Oral at bedtime  sodium chloride 3 Gram(s) Oral every 6 hours    MEDICATIONS  (PRN):  acetaminophen    Suspension .. 650 milliGRAM(s) Oral every 6 hours PRN Temp greater or equal to 38C (100.4F), Mild Pain (1 - 3)  albuterol/ipratropium for Nebulization. 3 milliLiter(s) Nebulizer every 6 hours PRN Shortness of Breath  labetalol Injectable 2.5 milliGRAM(s) IV Push every 6 hours PRN Systolic blood pressure > 220    Anthropometric Measurements:   Height 4'11'' IBW 98 pounds +-10%  Weight 110 pounds  BMI 22.2, %EZL=840    Weight:  108.78 pounds (12/9)    Weight Change: no updated weights, please obtain current weight and trend bi-weekly weights    Nutrition Focused Physical Exam: Completed [   ]  Not Pertinent [ X ]    Estimated Energy Needs:  ABW used to calculate energy needs due to pt's current body weight within % IBW (112%)  Needs adjusted for age, post-op. Fluid needs per team.    · Weight (lbs)	110 lb  · Weight (kg)	49.8 kg  · Enter From (mulu/kg)	25  · Enter To (mulu/kg)	30  · Calculated From (mulu/kg)	1245  · Calculated To (mulu/kg)	1494     Estimated Protein Needs:  · Weight (lbs)	110 lb  · Weight (kg)	49.8 kg  · Enter From (g/kg)	1.2  · Enter To (g/kg)	1.4  · Calculated From (g/kg)	59.76  · Calculated To (g/kg)	69.72    Subjective: 76 yo Female with PMHx of COPD, asthma, HLD, HTN, BIBEMS to [facility] from home after HHA found pt down on the floor covered in non-bloody vomitus. CTH reveals diffuse subarachnoid hemorrhage mainly at basilar cisterns with IVH and obstructive hydrocephalus, CTA shows 3mm left pcomm aneurysm, now s/p bedside right frontal EVD placement 12/10, s/p cerebral angiogram for coil embolization of left PCOMM aneurysm 12/10, now s/p cerebral angio for verapamil c/b device malfunction of Proglide, s/p right groin cutdown for removal of proglide catheter and femoral artery repair 12/17, NIHSS2 HH3 MF4. PT underwent LP for CSF high volume tap 12/28. EVD Re-insertion 12/29 (had been removed 12/25). CSF NGTD 12/29. EVD clamped 12/30. Ventriculoperitoneal shunt placed 12/30.     NGT placed for medications and TF 12/16 2/2 failed RN dysphagia screen. SLP has been following during admission, most recent SLP visit 12/26 fors for Puree/Thin Liquid. Pt however with orders only for TF at this time; glucerna 1.2 via NGT x24hrs rate 45ml/hr 12/29 - noted pt with increased confusion overnight 12/29 - assume confusion reason for d/c of PO diet; RN confrims decreased neuro status, does not feel PO feasible. RN reports tolerating feeds well at goal, did have to give lactulose for lack of BM.  Labs of Note: Last 3 POCT 123 148 66, A1c 6.7%, Na WDL.   Please see below for nutritions recommendations.       Previous Nutrition Diagnosis:  Increased nutrient need RT increased kcal/protein demand AEB post-op  Active [ X  ]  Resolved [   ]  Goal: Meet >/=75%EER via most appropriate route with good tolerance    Recommendations:  1. While TF indicated as main source of nutrition, cont with Glucerna 1.2 45ml/hr cont x24hrs to provide ~1080ml, 1296kcal, 65gm prot, 969ml water.  >> Monitor for s/s intolerance; maintain aspiration precautions at all times.  >> Will adjust formula Pending BG/POCT, +DM A1c however Low/WDL BG/POCT noted.   2. Should mental status improve and PO wished for, recommend NPO+SLP f/u.  3. Keep nutrition aligned with GOC at all times   4. Monitor Labs: monitor BMP, CBC, glucose, lytes, trend renal indices, LFTs, POCT.  5. Pain and bowel regimens per team.  6. Monitor Skin, Wts.  7. RD to remain available for additional nutrition interventions as needed.     Education: N/A 2/2 clinical status  Risk Level: High [ X ] Moderate [   ] Low [   ].

## 2020-12-31 NOTE — PROGRESS NOTE ADULT - SUBJECTIVE AND OBJECTIVE BOX
=================================  NEUROCRITICAL CARE ATTENDING NOTE  =================================    CARA CANCHOLA   MRN-[id number]  Summary:  75y/F with COPD, asthma, dyslipidemia Hypertension found down.  Brought to [facility] where imaging showed SAH basilar cisterns with IVH and obstructive hydrocephalus L Pcomm aneurysm.  Given levetiracetam, hydromorphone.  NIHSS 2 HH3 MF4, transferred to [facility] for further management.      COURSE IN THE HOSPITAL:   admitted to [facility], EVD inserted; given 20 lasix for pulmo edema, T inversion on CT  12/10 No significant events overnight.    No significant events overnight.    No significant events overnight.    No significant events overnight. drain stopped working at 730 a.m., off levophed    No significant events overnight.   12/15 confusion   stepped down   readmitted to ICU    more confused somnolent overnight, high volume LP - 30cc removed, CT scan, EVD inserted, febrile pancultured, UA (+) rocephin started   Tmax 38.6  No significant events overnight. EVD clamped this morning, given 1L fluid bolus overnight    Past Medical History: Hyperlipidemia Hypertension COPD (chronic obstructive pulmonary disease) Asthma  Allergies:  No Known Allergies  Home meds:   ·	famotidine 20 mg oral tablet: 1 tab(s) orally once a day  ·	lisinopril 2.5 mg oral tablet: 1 tab(s) orally once a day  ·	montelukast 10 mg oral tablet: 1 tab(s) orally once a day  ·	predniSONE 50 mg oral tablet: 1 tab(s) orally once a day  ·	ProAir HFA CFC free 90 mcg/inh inhalation aerosol: 2 puff(s) inhaled 4 times a day PRN  ·	simvastatin 20 mg oral tablet: 1 tab(s) orally once a day (at bedtime)    PHYSICAL EXAMINATION  T(C): 37.4 ( @ 05:59), Max: 38.6 ( @ 16:00) HR: 93 ( @ 09:00) (77 - 109) BP: 195/81 ( @ 09:00) (110/54 - 205/92) RR: 31 ( @ 09:00) (11 - 34) SpO2: 100% ( @ 09:00) (93% - 100%)  NEUROLOGIC EXAMINATION:  Patient is awake, oriented x 1, CLEOPATRA, not following comands, moving all 4s   GENERAL: not intubated, not in cardiorespiratory distress  EENT:  anicteric  CARDIOVASCULAR: (+) S1 S2, normal rate and regular rhythm  PULMONARY: clear to auscultation bilaterally, but coughing  ABDOMEN: soft, nontender with normoactive bowel sounds  EXTREMITIES: no edema  SKIN: no rash    LABS:   CAPILLARY BLOOD GLUCOSE 112 156 149 131              (17.34)   8.6   (10.9)  12.56 )-----------( 444      ( 30 Dec 2020 05:37 )             28.7   (142)  145  |  109<H>  |  12  ----------------------------<  106<H>  3.1<L>   |  26  |  0.41<L>    Ca    8.3<L>      30 Dec 2020 05:37  Phos  2.6       Mg     2.1      @ 07:01  -   @ 07:00  IN: 2776.6 mL / OUT: 2163 mL / NET: 613.6 mL    HbA1C = 6.7 (12-10) 6.4 ()  LDL = 112 ()   HDL = 66 ()  TG = 114 ()   TSH = 0.990 ()    Bacteriology:  UA (+) LE (+) N   CSF no growth   Urine >100K GNR   Blood culture NG1D   CSF NGTD    Blood NG5D x2    CSF studies:    L   *** RBC58 WBC9 *** %N3 %L5     L   *** [id number] WBC20 *** %N10 %L8     L   *** [id number] WBC24 *** %N7 %L13     EEG:  Neuroimagin/10 CT head:  EVD placement, stable   CTA:  L pcomm aneurysm, L ICA (distal cavernous) aneurysm vs infundibulum, extensive calcified plaque cavernous bilateral ICA with mild to mod stenosis; thick plaque bilateral carotid bifurcations - mod to severe R and mild to mod L stenosis, focal calcified plaque R VA off subclavian artery - high grate stenosis / partial occlusion, upper lung bilateral opacification - pulmo edema, chronic deformity R humeral neck   CT head:  SAH, basilar cisterns, IV extension, obstructive hydrocephalus SVID, lacunar infarcts R caudate, both thalami, cerebellar hemispheres, no CT evidence of territorial infarction  Other imagin/28 LE Doppler: NEG   LE Doppler: NEG   CT abd:  small paraesophageal hiatal hernia, gastritis; ?impaction, septal thickening bilateral lower lobes ?pulmo edema, hepatic steatosis    MEDICATIONS:   piperacillin/tazobactam 3.375 IV q6h lacosamide 150 q12h modafinil1 00mg PO daily ipratropium / albuterol q6h montelukast 10mg PO daily atorvastatin 40mg PO HS fludrocortisone0.2mg PO HS insulin glargine 10 HS mod ISS Peridex cyanocobalamin 1000 PO daily ferrous sulfate 325mg PO daily salt 3G PO q6h     IV FLUIDS: NS@75  DRIPS:  DIET: NPO  Lines: Spray   Drains:  Soriano   EVD at 5cm H20   EVD at 5cm H20 ICP < 10   EVD at 5cm H20 ICP <10   EVD clamped  Wounds:    CODE STATUS:  Full Code                       GOALS OF CARE:  aggressive                      DISPOSITION:  ICU  NIHSS 2 HH3 MF4 =================================  NEUROCRITICAL CARE ATTENDING NOTE  =================================    CARA CANCHOLA   MRN-[id number]  Summary:  75y/F with COPD, asthma, dyslipidemia Hypertension found down.  Brought to [facility] where imaging showed SAH basilar cisterns with IVH and obstructive hydrocephalus L Pcomm aneurysm.  Given levetiracetam, hydromorphone.  NIHSS 2 HH3 MF4, transferred to [facility] for further management.      COURSE IN THE HOSPITAL:   admitted to [facility], EVD inserted; given 20 lasix for pulmo edema, T inversion on CT  12/10 No significant events overnight.    No significant events overnight.    No significant events overnight.    No significant events overnight. drain stopped working at 730 a.m., off levophed    No significant events overnight.   12/15 confusion   stepped down   readmitted to ICU    more confused somnolent overnight, high volume LP - 30cc removed, CT scan, EVD inserted, febrile pancultured, UA (+) rocephin started   Tmax 38.6  EVD clamped this morning, given 1L fluid bolus overnight   Tmax 38.2     Past Medical History: Hyperlipidemia Hypertension COPD (chronic obstructive pulmonary disease) Asthma  Allergies:  No Known Allergies  Home meds:   ·	famotidine 20 mg oral tablet: 1 tab(s) orally once a day  ·	lisinopril 2.5 mg oral tablet: 1 tab(s) orally once a day  ·	montelukast 10 mg oral tablet: 1 tab(s) orally once a day  ·	predniSONE 50 mg oral tablet: 1 tab(s) orally once a day  ·	ProAir HFA CFC free 90 mcg/inh inhalation aerosol: 2 puff(s) inhaled 4 times a day PRN  ·	simvastatin 20 mg oral tablet: 1 tab(s) orally once a day (at bedtime)    PHYSICAL EXAMINATION  T(C): 38.2 ( @ 09:00), Max: 38.2 ( @ 09:00) HR: 88 ( @ 09:00) (65 - 100) BP: 159/71 ( @ 09:00) (131/63 - 226/95) RR: 25 ( @ 09:00) (19 - 30) SpO2: 95% ( @ 09:00) (91% - 100%)  NEUROLOGIC EXAMINATION:  Patient is awake, oriented x 1, CLEOPATRA, intermittently following commands, moving all 4s, R shoulder movement pain limited  GENERAL: not intubated, not in cardiorespiratory distress  EENT:  anicteric  CARDIOVASCULAR: (+) S1 S2, normal rate and regular rhythm  PULMONARY: clear to auscultation bilaterally, but coughing  ABDOMEN: soft, nontender with normoactive bowel sounds  EXTREMITIES: no edema  SKIN: no rash    LABS:   CAPILLARY BLOOD GLUCOSE  148 66 66 107 99 90     (14.5)    11.9   13.82 )-----------( 446      ( 31 Dec 2020 05:24 )             37.3     133<L>  |  95<L>  |  9   ----------------------------<  74  3.5   |  25  |  0.43<L>    Ca    9.0      31 Dec 2020 05:24  Phos  3.3       Mg     1.6         TPro  5.9<L>  /  Alb  3.0<L>  /  TBili  0.4  /  DBili  x   /  AST  30  /  ALT  26  /  AlkPhos  83   @ 07:01  -   @ 07:00  IN: 2285 mL / OUT: 1927 mL / NET: 358 mL    HbA1C = 6.7 (12-10) 6.4 ()  LDL = 112 ()   HDL = 66 ()  TG = 114 ()   TSH = 0.990 ()    Bacteriology:  UA (+) LE (+) N   CSF NGTD   Urine Enterobacter    Blood culture NG2D   CSF NGTD    Blood NG5D x2    CSF studies:    L   *** RBC58 WBC9 *** %N3 %L5     L   *** [id number] WBC20 *** %N10 %L8     L   *** [id number] WBC24 *** %N7 %L13     EEG:  Neuroimagin/10 CT head:  EVD placement, stable   CTA:  L pcomm aneurysm, L ICA (distal cavernous) aneurysm vs infundibulum, extensive calcified plaque cavernous bilateral ICA with mild to mod stenosis; thick plaque bilateral carotid bifurcations - mod to severe R and mild to mod L stenosis, focal calcified plaque R VA off subclavian artery - high grate stenosis / partial occlusion, upper lung bilateral opacification - pulmo edema, chronic deformity R humeral neck   CT head:  SAH, basilar cisterns, IV extension, obstructive hydrocephalus SVID, lacunar infarcts R caudate, both thalami, cerebellar hemispheres, no CT evidence of territorial infarction  Other imagin/28 LE Doppler: NEG   LE Doppler: NEG   CT abd:  small paraesophageal hiatal hernia, gastritis; ?impaction, septal thickening bilateral lower lobes ?pulmo edema, hepatic steatosis    MEDICATIONS:   piperacillin/tazobactam 3.375 IV q6h lacosamide 150mg PO q12h modafinil 100mg PO daily montelukast 10mg PO daily polyethylene glycol 17G daily daily senna HS atorvastatin 40mg PO HS fludrocortisone 0.2mg PO HS mod ISS Peridex cyanocobalamin 1000 PO daily ferrous sulfate 325mg PO daily salt 3G PO q6h ipratropium / albuterol PRN oxycodone PRN     IV FLUIDS: IVL  DRIPS:  DIET: tube feeds  Lines:   Drains:     EVD at 5cm H20   EVD at 5cm H20 ICP < 10   EVD at 5cm H20 ICP <10   EVD clamped  Wounds:    CODE STATUS:  Full Code                       GOALS OF CARE:  aggressive                      DISPOSITION:  ICU  NIHSS 2 HH3 MF4

## 2020-12-31 NOTE — PROGRESS NOTE ADULT - SUBJECTIVE AND OBJECTIVE BOX
Pt seen and examined at bedside with chief resident, s/p  shunt yesterday.       MEDICATIONS  (STANDING):  atorvastatin 40 milliGRAM(s) Oral at bedtime  chlorhexidine 2% Cloths 1 Application(s) Topical <User Schedule>  cyanocobalamin 1000 MICROGram(s) Oral daily  dextrose 40% Gel 15 Gram(s) Oral once  dextrose 5%. 1000 milliLiter(s) (50 mL/Hr) IV Continuous <Continuous>  dextrose 5%. 1000 milliLiter(s) (100 mL/Hr) IV Continuous <Continuous>  dextrose 50% Injectable 25 Gram(s) IV Push once  ferrous    sulfate 325 milliGRAM(s) Oral daily  fludroCORTISONE 0.2 milliGRAM(s) Oral at bedtime  glucagon  Injectable 1 milliGRAM(s) IntraMuscular once  insulin glargine Injectable (LANTUS) 10 Unit(s) SubCutaneous at bedtime  insulin lispro (ADMELOG) corrective regimen sliding scale   SubCutaneous Before meals and at bedtime  lacosamide Solution 150 milliGRAM(s) Oral every 12 hours  modafinil 100 milliGRAM(s) Oral daily  montelukast 10 milliGRAM(s) Oral daily  piperacillin/tazobactam IVPB.. 3.375 Gram(s) IV Intermittent every 6 hours  polyethylene glycol 3350 17 Gram(s) Oral daily  potassium chloride   Solution 20 milliEquivalent(s) Enteral Tube every 2 hours  senna 2 Tablet(s) Oral at bedtime  sodium chloride 3 Gram(s) Oral every 6 hours  sodium chloride 3%. 500 milliLiter(s) (250 mL/Hr) IV Continuous <Continuous>    MEDICATIONS  (PRN):  acetaminophen    Suspension .. 650 milliGRAM(s) Oral every 6 hours PRN Temp greater or equal to 38C (100.4F), Mild Pain (1 - 3)  albuterol/ipratropium for Nebulization. 3 milliLiter(s) Nebulizer every 6 hours PRN Shortness of Breath  labetalol Injectable 2.5 milliGRAM(s) IV Push every 6 hours PRN Systolic blood pressure > 220    ICU Vital Signs Last 24 Hrs  T(C): 37.2 (31 Dec 2020 07:17), Max: 38 (30 Dec 2020 10:35)  T(F): 99 (31 Dec 2020 07:17), Max: 100.4 (30 Dec 2020 10:35)  HR: 86 (31 Dec 2020 07:00) (65 - 100)  BP: 157/70 (31 Dec 2020 07:00) (131/63 - 226/95)  BP(mean): 101 (31 Dec 2020 07:00) (90 - 151)  ABP: 184/165 (31 Dec 2020 03:00) (66/58 - 199/82)  ABP(mean): 172 (31 Dec 2020 03:00) (61 - 172)  RR: 30 (31 Dec 2020 07:00) (19 - 31)  SpO2: 93% (31 Dec 2020 07:00) (93% - 100%)    I&O's Detail    30 Dec 2020 07:01  -  31 Dec 2020 07:00  --------------------------------------------------------  IN:    Enteral Tube Flush: 220 mL    Glucerna: 40 mL    IV PiggyBack: 450 mL    PRBCs (Packed Red Blood Cells): 300 mL    sodium chloride 0.9%: 1275 mL  Total IN: 2285 mL    OUT:    External Ventricular Device (mL): 2 mL    Indwelling Catheter - Urethral (mL): 1925 mL  Total OUT: 1927 mL    Total NET: 358 mL          Physical Exam:  General: Somnolent  Pulmonary: nonlabor   Cardiovascular: rrr  Abdominal: soft, ND, NT  Extremities: groin site c/d/i  Pulses:   Right:                                                                          Left:  FEM [ ]2+ [ ]1+ [ ]doppler                                             FEM [ ]2+ [ ]1+ [ ]doppler    POP [ ]2+ [ ]1+ [ ]doppler                                             POP [ ]2+ [ ]1+ [ ]doppler    DP [ ]2+ [x]1+ [ ]doppler                                                DP [ ]2+ [ ]1+ [ ]doppler  PT[ ]2+ [x ]1+ [ ]doppler                                                  PT [ ]2+ [ ]1+ [ ]doppler  LABS:                                                                11.9   13.82 )-----------( 446      ( 31 Dec 2020 05:24 )             37.3   12-31    133<L>  |  95<L>  |  9   ----------------------------<  74  3.5   |  25  |  0.43<L>    Ca    9.0      31 Dec 2020 05:24  Phos  3.3     12-31  Mg     1.6     12-31    TPro  5.9<L>  /  Alb  3.0<L>  /  TBili  0.4  /  DBili  x   /  AST  30  /  ALT  26  /  AlkPhos  83  12-30  Radiology and Additional Studies:    Assessment and Plan:   · Assessment	  76yo F  s/p cerebral angio for verapamil c/b device malfunction of Proglide, s/p right groin cutdown for removal of proglide catheter and femoral artery repair on 12/16/20     -Right groin monitoring, look for signs of infection  -Regular Pulse checks RLE  -Vascular surgery will continue to follow

## 2020-12-31 NOTE — PROGRESS NOTE ADULT - SUBJECTIVE AND OBJECTIVE BOX
Interval Events: Reviewed  Patient seen and examined at bedside.    Patient is a 75y old  Female who presents with a chief complaint of SAH (31 Dec 2020 07:38)    she is awake not in distress  PAST MEDICAL & SURGICAL HISTORY:  Hyperlipidemia    Hypertension    COPD (chronic obstructive pulmonary disease)    Asthma    No significant past surgical history        MEDICATIONS:  Pulmonary:  albuterol/ipratropium for Nebulization. 3 milliLiter(s) Nebulizer every 6 hours PRN  montelukast 10 milliGRAM(s) Oral daily    Antimicrobials:  nystatin    Suspension 282100 Unit(s) Oral four times a day  piperacillin/tazobactam IVPB.. 3.375 Gram(s) IV Intermittent every 6 hours    Anticoagulants:    Cardiac:  labetalol Injectable 2.5 milliGRAM(s) IV Push every 6 hours PRN      Allergies    No Known Allergies    Intolerances        Vital Signs Last 24 Hrs  T(C): 35.9 (31 Dec 2020 18:17), Max: 38.2 (31 Dec 2020 09:00)  T(F): 96.6 (31 Dec 2020 18:17), Max: 100.8 (31 Dec 2020 09:00)  HR: 92 (31 Dec 2020 16:00) (70 - 92)  BP: 153/69 (31 Dec 2020 16:00) (119/56 - 185/80)  BP(mean): 99 (31 Dec 2020 16:00) (80 - 115)  RR: 26 (31 Dec 2020 16:00) (19 - 30)  SpO2: 98% (31 Dec 2020 16:00) (91% - 100%)    12-30 @ 07:01 - 12-31 @ 07:00  --------------------------------------------------------  IN: 2285 mL / OUT: 1927 mL / NET: 358 mL    12-31 @ 07:01 - 12-31 @ 18:26  --------------------------------------------------------  IN: 735 mL / OUT: 135 mL / NET: 600 mL          Review of Systems:   •	General: negative  •	Skin/Breast: negative  •	Ophthalmologic: negative  •	ENMT: negative  •	Respiratory and Thorax: negative  •	Cardiovascular: negative  •	Gastrointestinal: negative  •	Genitourinary: negative  •	Musculoskeletal: negative  •	Neurological: negative  •	Psychiatric: negative  •	Hematology/Lymphatics: negative  •	Endocrine: negative  •	Allergic/Immunologic: negative    Physical Exam:   • Constitutional:	Well-developed, well nourished  • Eyes:	EOMI; PERRL; no drainage or redness  • ENMT:	No oral lesions; no gross abnormalities  • Neck	No bruits; no thyromegaly or nodules  • Breasts:	not examined  • Back:	No deformity or limitation of movement  • Respiratory:	Breath Sounds equal & clear to percussion & auscultation, no accessory muscle use  • Cardiovascular:	Regular rate & rhythm, normal S1, S2; no murmurs, gallops or rubs; no S3, S4  • Gastrointestinal:	Soft, non-tender, no hepatosplenomegaly, normal bowel sounds  • Genitourinary:	not examined  • Rectal: not examined  • Extremities:	No cyanosis, clubbing or edema  • Vascular:	Equal and normal pulses (carotid, femoral, dorsalis pedis)  • Neurologica:l	not examined  • Skin:	No lesions; no rash  • Lymph Nodes:	No lymphadedenopathy  • Musculoskeletal:	No joint pain, swelling or deformity; no limitation of movement        LABS:  ABG - ( 30 Dec 2020 16:01 )  pH, Arterial: 7.42  pH, Blood: x     /  pCO2: 38    /  pO2: 330   / HCO3: 24    / Base Excess: x     /  SaO2: x                   CBC Full  -  ( 31 Dec 2020 05:24 )  WBC Count : 13.82 K/uL  RBC Count : 4.25 M/uL  Hemoglobin : 11.9 g/dL  Hematocrit : 37.3 %  Platelet Count - Automated : 446 K/uL  Mean Cell Volume : 87.8 fl  Mean Cell Hemoglobin : 28.0 pg  Mean Cell Hemoglobin Concentration : 31.9 gm/dL  Auto Neutrophil # : x  Auto Lymphocyte # : x  Auto Monocyte # : x  Auto Eosinophil # : x  Auto Basophil # : x  Auto Neutrophil % : x  Auto Lymphocyte % : x  Auto Monocyte % : x  Auto Eosinophil % : x  Auto Basophil % : x    12-31    138  |  103  |  11  ----------------------------<  147<H>  4.4   |  23  |  0.47<L>    Ca    8.3<L>      31 Dec 2020 12:33  Phos  3.3     12-31  Mg     1.6     12-31    TPro  5.9<L>  /  Alb  3.0<L>  /  TBili  0.4  /  DBili  x   /  AST  30  /  ALT  26  /  AlkPhos  83  12-30    PT/INR - ( 30 Dec 2020 05:37 )   PT: 12.5 sec;   INR: 1.04          PTT - ( 30 Dec 2020 05:37 )  PTT:23.2 sec                Culture Results:   No growth to date (12-29 @ 18:34)      RADIOLOGY & ADDITIONAL STUDIES (The following images were personally reviewed):  Soriano:                                     No  Urine output:                       adequate  DVT prophylaxis:                 Yes  Flattus:                                  Yes  Bowel movement:              No

## 2020-12-31 NOTE — PROGRESS NOTE ADULT - ASSESSMENT
75y/Female with:  aneursymal subarachnoid hemorrhage, L pcomm aneurysm, L parophthalmic aneurysm; brain compression, cerebral edema  osbtructive hydrocephalus  lacunar infarcts R caudate, B thalami, cerebellar hemispheres ?artery to artery vs cardioembolic?  atherosclerotic cardiovascular disease; mod to severe bilateral ICA stenosis (R>L), R VA stenosis  Hypertension dyslipidemia   COPD asthma  newly diagnosed Diabetes Mellitus?  troponin leak    PLAN: Day 1 = 12-09 ; Day 4 =  12/12; Day 21 = 12/29  NEURO: neurochecks q1h, PRN pain meds with Tylenol / Percocet  SAH:  off nimodipine   Hydrocephalus:  VPS today   Seizure treatment (cortical ICH, associated SDH, unclear etiology):  lacosamide 150mg PO BID   REHAB:  physical therapy evaluation and management    EARLY MOB:  HOB elevated    PULM:  Room air, incentive spirometry, continue montelukast, ipratropium / albuterol PRN   CARDIO:  SBP goal 150-200mm Hg, EF 35% - repeat TTE  ENDO:  Blood sugar goals 140-180 mg/dL, cont insulin sliding scale, atorvastatin 40mg, insulin glargine 10 given overnight  GI:  bowel regimen  DIET: NPO   RENAL: maintain euvolemia, accurate Is and Os; NS@75  HEM/ONC: Hb stable, FOBT, 1 unit pRBC per Dr. Serrano; repeat CBC  VTE Prophylaxis: SCDs, no DVT chemoprophylaxis for now as patient is high risk for bleed (OR today)  ID: febrile, leukocytosis improving; piperacillin/tazobactam (last day 01/02/2021), f/u culture sensitivity  Social: will update family    CORE MEASURES  1.  Nath and Jacobo Score = 3  2.  VTE prophylaxis:  [ ] administered within 24 hours of admission OR [X] reason not done: fresh bleed, unsecured aneurysm.  3.  Dysphagia screening:   [X] performed before any oral meds / liquids / food  4.  Nimodipine treatment:  [X] administered within 24 hours of admission OR [ ] reason not done:    ATTENDING ATTESTATION:  I was physically present for the key portions of the evaluation and management (E/M) service provided.  I agree with the above history, physical and plan, which I have reviewed and edited where appropriate.    Patient at high risk for neurological deterioration or death due to:  ICU delirium, aspiration PNA, DVT / PE.  Critical care time:  I have personally provided 30 minutes of critical care time, excluding time spent on separate procedures.      Plan discussed with RN, house staff.   75y/Female with:  aneursymal subarachnoid hemorrhage, L pcomm aneurysm, L parophthalmic aneurysm; brain compression, cerebral edema  osbtructive hydrocephalus  lacunar infarcts R caudate, B thalami, cerebellar hemispheres ?artery to artery vs cardioembolic?  atherosclerotic cardiovascular disease; mod to severe bilateral ICA stenosis (R>L), R VA stenosis  Hypertension dyslipidemia   COPD asthma  newly diagnosed Diabetes Mellitus?  troponin leak    PLAN: Day 1 = 12-09 ; Day 4 =  12/12; Day 21 = 12/29  NEURO: neurochecks q2h, VS q1, PRN pain meds with Tylenol   SAH:  off nimodipine   Hydrocephalus:  VPS today   Seizure treatment (cortical ICH, associated SDH, unclear etiology):  lacosamide 150mg PO BID   REHAB:  physical therapy evaluation and management    EARLY MOB:  HOB elevated    PULM:  Room air, incentive spirometry, continue montelukast, ipratropium / albuterol PRN   CARDIO:  SBP goal 150-200mm Hg, EF 35% - repeat TTE  ENDO:  Blood sugar goals 140-180 mg/dL, cont insulin sliding scale, atorvastatin 40mg  GI:  bowel regimen  DIET: start tube feeds  RENAL: maintain euvolemia, accurate Is and Os; 3% 250 cc bolus given, repeat BMP  HEM/ONC: Hb stable, FOBT  VTE Prophylaxis: SCDs, SQL  ID: febrile, leukocytosis improving; piperacillin/tazobactam (last day 01/02/2021)  Social: will update family    CORE MEASURES  1.  Nath and Jacobo Score = 3  2.  VTE prophylaxis:  [ ] administered within 24 hours of admission OR [X] reason not done: fresh bleed, unsecured aneurysm.  3.  Dysphagia screening:   [X] performed before any oral meds / liquids / food  4.  Nimodipine treatment:  [X] administered within 24 hours of admission OR [ ] reason not done:    ATTENDING ATTESTATION:  I was physically present for the key portions of the evaluation and management (E/M) service provided.  I agree with the above history, physical and plan, which I have reviewed and edited where appropriate.    Patient at high risk for neurological deterioration or death due to:  ICU delirium, aspiration PNA, DVT / PE.  Critical care time:  I have personally provided 30 minutes of critical care time, excluding time spent on separate procedures.      Plan discussed with RN, house staff.

## 2020-12-31 NOTE — PROGRESS NOTE ADULT - SUBJECTIVE AND OBJECTIVE BOX
HPI:  76y/o F with PMHx sig for COPD, asthma, HLD, HTN, BIBEMS to [facility] from home after HHA discovered patient found down in floor covered in non-bloody vomitus. Perr HHA Pt went to the bathroom and was taking a long time, went in to check on her and found her sitting on floor, awake, however with vomitus on front of shirt. On arrival to [facility], patient was taken for stat head CT, which revealed diffuse subarachnoid hemorrhage mainly at basilar cisterns with IVH and obstructive hydrocephalus. Patient was given a bolus of 1g keppra and dilaudid pushes for generalized headache. CTA concerning for 3mm left pcomm aneurysm and a 1.6mm outpouching of distal cavernous segment of the left internal carotid artery likely aneurysm. NIHSS2 HH3 MF4. Patient was transferred to [facility] for further intervention. Patient currently reports headaches. Pt denies acute changes in vision, seizures, CP, SOB, weakness/paresthesias of arms or legs. (09 Dec 2020 23:37)    Hospital Course:   12/9: BD1 Patient admitted for SAH HH3, MF4.   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 EVD at 5, on Levo overnight, nimodipine discontinued d/t hypotension  12/12: BD4 POD#2, VIVIEN overnight, neuro stable, passed TOV  12/13: BD5 POD#3: VIVIEN x 24 hrs  12/14: BD6 POD#4. VIVIEN o/n, neuro stable. EVD at 5cm H2O  12/15: BD7 POD #5 VIVIEN overnight, neuro stable. EVD remains at 5. Plan for CTA today.   12/16: BD8 POD #6 VIVIEN overnight, neuro stable, EVD at 0, on Levo, started on 3% at 15 overnight   12/17: BD9 POD#1 s/p cerebral angio for verapamil c/b device malfunction of Proglide, s/p right groin cutdown for removal of proglide catheter and femoral artery repair.  VIVIEN overnight, neuro stable, EVD at 0. patient remained intubated overnight, on propofol for sedation, and vEEG  12/18: BD#10, POD#8 s/p coil/embo of L pcomm aneurysm, POD#2 s/p IA verapamil, POD#2 R groin cutdown for removal of proglide catheter w/ repair of femoral artery. VIVIEN overnight. EVD remains open @0cmH2O   12/19: BD#11, POD#9 s/p coil/embo of L pcomm aneurysm. POD#3 s/p IA verapamil, POD#3 s/p R femoral artery cutdown of proglide catheter w/ femoral artery repair. VIVIEN overnight. EVD remains open @0cmH2O. EEG shows b/l frontal sharp discharges, cont monitoring, vimpat was increased yesterday. Gentle hydration, total IVF 50cc/hr. Cont vanc/zosyn for empiric coverage, next vanc trough due @1pm on 12/19. F/u daily CXR.   12/20 BD#12 POD#10 VIVIEN overnight, Neuro exam stable, EVD @ 0cm H2O, vEEG, 3% @ 15 goal WNL, TF via NGT  12/21: BD13, POD11 VIVIEN overnight. EVD at 5cmH20 (raised yesterday), vEEG in place  12/22 BD#14, EVD raised to 15 yesterday, 3% @ 30cc Goal WNL, -180, Milrinone, TTE prior to DC as per Card for takotsubo cardiomyopathy, failed S&S pending reeval, TF via NGT, Neuro exam stable  12/23: BD#15. VIVIEN o/n, neuro stable. EVD clamped. 30%@30cc/hr  12/24 BD#16 VIVIEN overnight, spiked 102, EVD @ 0cm H2O, 3% @ 30cc/hr Goal WNL, Vasc following, TF via NGT, -200,   12/25: BD#17. VIVIEN overnight. Pan cx from 12/23, NGTD on CSF and blood. EVD clamped. 3% @15cc/hr, rate kept same overnight. NGT feeds at goal. SBP goal 160-200.   12/26: BD#18. VIVIEN overnight, neuro exam stable. NGTD from pan cx on 12/23. EVD removed today. 3% discontinued 12/25. NGT feeds at goal, IVF off. SBP goal 160-200.   12/27: BD#19 VIVIEN overnight, neuro stable. 3% at 15, stepdown status  12/28: BD20 VIVIEN overnight, neuro stable. 3% continues.  Patient became increasingly more somnolent and underwent LP for CSF high volume tap.  Temporary improvement.  Spiked fever, pancultured.  12/29: BD21 Patient increasingly more somnolent, EVD placed at bedside.        Vital Signs Last 24 Hrs  T(C): 36.6 (30 Dec 2020 22:00), Max: 38 (30 Dec 2020 10:35)  T(F): 97.8 (30 Dec 2020 22:00), Max: 100.4 (30 Dec 2020 10:35)  HR: 88 (31 Dec 2020 01:30) (65 - 100)  BP: 131/63 (30 Dec 2020 14:00) (131/63 - 226/95)  BP(mean): 90 (30 Dec 2020 14:00) (90 - 151)  RR: 27 (31 Dec 2020 01:30) (11 - 31)  SpO2: 97% (31 Dec 2020 01:30) (96% - 100%)    I&O's Detail    29 Dec 2020 07:01  -  30 Dec 2020 07:00  --------------------------------------------------------  IN:    Enteral Tube Flush: 270 mL    Glucerna: 660 mL    Norepinephrine: 6.6 mL    PRBCs (Packed Red Blood Cells): 500 mL    sodium chloride 0.9%: 600 mL    Sodium Chloride 0.9% Bolus: 500 mL    sodium chloride 3%: 45 mL    sodium chloride 3%: 195 mL  Total IN: 2776.6 mL    OUT:    External Ventricular Device (mL): 93 mL    Indwelling Catheter - Urethral (mL): 2070 mL  Total OUT: 2163 mL    Total NET: 613.6 mL      30 Dec 2020 07:01  -  31 Dec 2020 02:01  --------------------------------------------------------  IN:    Enteral Tube Flush: 80 mL    IV PiggyBack: 300 mL    PRBCs (Packed Red Blood Cells): 300 mL    sodium chloride 0.9%: 900 mL  Total IN: 1580 mL    OUT:    External Ventricular Device (mL): 2 mL    Indwelling Catheter - Urethral (mL): 1450 mL  Total OUT: 1452 mL    Total NET: 128 mL        I&O's Summary    29 Dec 2020 07:01  -  30 Dec 2020 07:00  --------------------------------------------------------  IN: 2776.6 mL / OUT: 2163 mL / NET: 613.6 mL    30 Dec 2020 07:01  -  31 Dec 2020 02:01  --------------------------------------------------------  IN: 1580 mL / OUT: 1452 mL / NET: 128 mL      **************    PHYSICAL EXAM:    General: Patient is sleepy, arouses to deep sternal rub and maintains wakefulness for a few seconds and goes back to sleep.  Neuro: AA&O xself. OE to pain.  Not FC  CN II-XII: PERRL, EOMI  Motor: ZAFAR x4, good strength throughout  HEENT: face symmetric, tongue midline   Cardiovascular: RRR, normal S1 and S2   Respiratory: lungs CTAB, no wheezing, rhonchi, or crackles   GI: normoactive BS to auscultation, abd soft, NTND   Extremities: distal pulses 2+ x4       TUBES/LINES:  [] CVC  [] A-line  [] Lumbar Drain  [] Ventriculostomy  [] Other    DIET:  [] NPO  [] Mechanical  [] Tube feeds    LABS:                        10.9   14.55 )-----------( 414      ( 30 Dec 2020 18:29 )             35.0     12-30    139  |  105  |  10  ----------------------------<  124<H>  4.0   |  24  |  0.49<L>    Ca    8.7      30 Dec 2020 18:29  Phos  2.6     12-30  Mg     2.1     12-30    TPro  5.9<L>  /  Alb  3.0<L>  /  TBili  0.4  /  DBili  x   /  AST  30  /  ALT  26  /  AlkPhos  83  12-30    PT/INR - ( 30 Dec 2020 05:37 )   PT: 12.5 sec;   INR: 1.04          PTT - ( 30 Dec 2020 05:37 )  PTT:23.2 sec        CAPILLARY BLOOD GLUCOSE      POCT Blood Glucose.: 107 mg/dL (30 Dec 2020 22:18)  POCT Blood Glucose.: 99 mg/dL (30 Dec 2020 16:07)  POCT Blood Glucose.: 90 mg/dL (30 Dec 2020 13:14)  POCT Blood Glucose.: 112 mg/dL (30 Dec 2020 06:53)      Drug Levels: [] N/A    CSF Analysis: [] N/A      Allergies    No Known Allergies    Intolerances      MEDICATIONS:  Antibiotics:  piperacillin/tazobactam IVPB.. 3.375 Gram(s) IV Intermittent every 6 hours    Neuro:  acetaminophen    Suspension .. 650 milliGRAM(s) Oral every 6 hours PRN  lacosamide Solution 150 milliGRAM(s) Oral every 12 hours  modafinil 100 milliGRAM(s) Oral daily    Anticoagulation:    OTHER:  albuterol/ipratropium for Nebulization. 3 milliLiter(s) Nebulizer every 6 hours PRN  atorvastatin 40 milliGRAM(s) Oral at bedtime  chlorhexidine 2% Cloths 1 Application(s) Topical <User Schedule>  dextrose 40% Gel 15 Gram(s) Oral once  dextrose 50% Injectable 25 Gram(s) IV Push once  fludroCORTISONE 0.2 milliGRAM(s) Oral at bedtime  glucagon  Injectable 1 milliGRAM(s) IntraMuscular once  insulin glargine Injectable (LANTUS) 10 Unit(s) SubCutaneous at bedtime  insulin lispro (ADMELOG) corrective regimen sliding scale   SubCutaneous Before meals and at bedtime  labetalol Injectable 2.5 milliGRAM(s) IV Push every 6 hours PRN  montelukast 10 milliGRAM(s) Oral daily  polyethylene glycol 3350 17 Gram(s) Oral daily  senna 2 Tablet(s) Oral at bedtime    IVF:  cyanocobalamin 1000 MICROGram(s) Oral daily  dextrose 5%. 1000 milliLiter(s) IV Continuous <Continuous>  dextrose 5%. 1000 milliLiter(s) IV Continuous <Continuous>  ferrous    sulfate 325 milliGRAM(s) Oral daily  sodium chloride 3 Gram(s) Oral every 6 hours  sodium chloride 0.9%. 1000 milliLiter(s) IV Continuous <Continuous>    CULTURES:  Culture Results:   No growth to date (12-29 @ 18:34)  Culture Results:   >100,000 CFU/ml Enterobacter cloacae complex  Susceptibility to follow. (12-29 @ 07:31)    RADIOLOGY & ADDITIONAL TESTS:      ASSESSMENT:  75y Female  with PMHx of COPD, asthma, HLD, HTN, BIBEMS to [facility] from home after HHA found patient down on the floor covered in non-bloody vomitus. CTH reveals diffuse subarachnoid hemorrhage mainly at basilar cisterns with IVH and obstructive hydrocephalus, CTA shows 3mm left pcomm aneurysm, now s/p bedside right frontal EVD placement 12/10, s/p cerebral angiogram for coil embolization of left PCOMM aneurysm 12/10, now POD #12 s/p cerebral angio for verapamil c/b device malfunction of Proglide, s/p right groin cutdown for removal of proglide catheter and femoral artery repair (12/17/2020), NIHSS2 HH3 MF4), s/p EVD removal 12/25, POD#0 s/p bedside EVD placement        HEADACHE    Handoff    MEWS Score    Hyperlipidemia    Hypertension    COPD (chronic obstructive pulmonary disease)    Asthma    COPD (chronic obstructive pulmonary disease)    Asthma    Hydrocephalus, adult    Injury of right femoral artery    Cerebral artery vasospasm    SAH (subarachnoid hemorrhage)    Hydrocephalus, adult    Injury of femoral artery    Cerebral artery vasospasm    SAH (subarachnoid hemorrhage)    Subarachnoid bleed    Obstructive hydrocephalus    Anemia due to acute blood loss    Preoperative clearance    Centrilobular emphysema    Mild persistent asthma without complication    Subarachnoid bleed    Essential hypertension    Pure hypercholesterolemia    Ventriculoperitoneal shunt    Insertion of external ventricular drain    Vascular surgery procedure    Angiogram, carotid and cerebral, bilateral    Angiogram, cerebral, with intracranial aneurysm embolization    No significant past surgical history    HEADACHE    90+    [email or web address]      ********************  PLAN:    NEURO:  - neuro checks  - vitals checks  - pain control  - cont Modafinil  - off Nimodipine 2/2 hypotension  - Aspirin 81 for b/l carotid stenosis is stopped, preop for poss OR this week    CARDIOVASCULAR:  - SBP 1-200  - echo and CCTA prior to discharge  - limit fluids 2/2 takotsubo with EF 30%    PULMONARY:  - on room air     RENAL:  - 3% at 15  - cont florinef, salt tabs  - de anda placed 12/28    GI:  - NPO, TF?  - bowel regimen    HEME:  - monitor H/H  - PO iron for anemia    ID:  - Tm 101 12/28, pancultures sent -> U/A concerning for UTI, Urine culture sent, to d/w attending for antibiotics    ENDO:  - Lantus, ISS    DVT PROPHYLAXIS:  [x] Venodynes                                [] Heparin/Lovenox- on hold for EVD    DISPOSITION: ICU status, full code, dispo pending    d/w Dr. Serrano and Dr. Nino      Assessment:  Present when checked    []  GCS  E   V  M     Heart Failure: []Acute, [] acute on chronic , []chronic  Heart Failure:  [] Diastolic (HFpEF), [] Systolic (HFrEF), []Combined (HFpEF and HFrEF), [] RHF, [] Pulm HTN, [] Other    [] MICHAELLE, [] ATN, [] AIN, [] other  [] CKD1, [] CKD2, [] CKD 3, [] CKD 4, [] CKD 5, []ESRD    Encephalopathy: [] Metabolic, [] Hepatic, [] toxic, [] Neurological, [] Other    Abnormal Nurtitional Status: [] malnurtition (see nutrition note), [ ]underweight: BMI < 19, [] morbid obesity: BMI >40, [] Cachexia    [] Sepsis  [] hypovolemic shock,[] cardiogenic shock, [] hemorrhagic shock, [] neuogenic shock  [] Acute Respiratory Failure  []Cerebral edema, [] Brain compression/ herniation,   [] Functional quadriplegia  [] Acute blood loss anemia   HPI:  74y/o F with PMHx sig for COPD, asthma, HLD, HTN, BIBEMS to [facility] from home after HHA discovered patient found down in floor covered in non-bloody vomitus. Perr HHA Pt went to the bathroom and was taking a long time, went in to check on her and found her sitting on floor, awake, however with vomitus on front of shirt. On arrival to [facility], patient was taken for stat head CT, which revealed diffuse subarachnoid hemorrhage mainly at basilar cisterns with IVH and obstructive hydrocephalus. Patient was given a bolus of 1g keppra and dilaudid pushes for generalized headache. CTA concerning for 3mm left pcomm aneurysm and a 1.6mm outpouching of distal cavernous segment of the left internal carotid artery likely aneurysm. NIHSS2 HH3 MF4. Patient was transferred to [facility] for further intervention. Patient currently reports headaches. Pt denies acute changes in vision, seizures, CP, SOB, weakness/paresthesias of arms or legs. (09 Dec 2020 23:37)    Hospital Course:   12/9: BD1 Patient admitted for SAH HH3, MF4.   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 EVD at 5, on Levo overnight, nimodipine discontinued d/t hypotension  12/12: BD4 POD#2, VIVIEN overnight, neuro stable, passed TOV  12/13: BD5 POD#3: VIVIEN x 24 hrs  12/14: BD6 POD#4. VIVIEN o/n, neuro stable. EVD at 5cm H2O  12/15: BD7 POD #5 VIVIEN overnight, neuro stable. EVD remains at 5. Plan for CTA today.   12/16: BD8 POD #6 VIVIEN overnight, neuro stable, EVD at 0, on Levo, started on 3% at 15 overnight   12/17: BD9 POD#1 s/p cerebral angio for verapamil c/b device malfunction of Proglide, s/p right groin cutdown for removal of proglide catheter and femoral artery repair.  VIVIEN overnight, neuro stable, EVD at 0. patient remained intubated overnight, on propofol for sedation, and vEEG  12/18: BD#10, POD#8 s/p coil/embo of L pcomm aneurysm, POD#2 s/p IA verapamil, POD#2 R groin cutdown for removal of proglide catheter w/ repair of femoral artery. VIVIEN overnight. EVD remains open @0cmH2O   12/19: BD#11, POD#9 s/p coil/embo of L pcomm aneurysm. POD#3 s/p IA verapamil, POD#3 s/p R femoral artery cutdown of proglide catheter w/ femoral artery repair. VIVIEN overnight. EVD remains open @0cmH2O. EEG shows b/l frontal sharp discharges, cont monitoring, vimpat was increased yesterday. Gentle hydration, total IVF 50cc/hr. Cont vanc/zosyn for empiric coverage, next vanc trough due @1pm on 12/19. F/u daily CXR.   12/20 BD#12 POD#10 VIVIEN overnight, Neuro exam stable, EVD @ 0cm H2O, vEEG, 3% @ 15 goal WNL, TF via NGT  12/21: BD13, POD11 VIVIEN overnight. EVD at 5cmH20 (raised yesterday), vEEG in place  12/22 BD#14, EVD raised to 15 yesterday, 3% @ 30cc Goal WNL, -180, Milrinone, TTE prior to DC as per Card for takotsubo cardiomyopathy, failed S&S pending reeval, TF via NGT, Neuro exam stable  12/23: BD#15. VIVIEN o/n, neuro stable. EVD clamped. 30%@30cc/hr  12/24 BD#16 VIVIEN overnight, spiked 102, EVD @ 0cm H2O, 3% @ 30cc/hr Goal WNL, Vasc following, TF via NGT, -200,   12/25: BD#17. VIVIEN overnight. Pan cx from 12/23, NGTD on CSF and blood. EVD clamped. 3% @15cc/hr, rate kept same overnight. NGT feeds at goal. SBP goal 160-200.   12/26: BD#18. VIVIEN overnight, neuro exam stable. NGTD from pan cx on 12/23. EVD removed today. 3% discontinued 12/25. NGT feeds at goal, IVF off. SBP goal 160-200.   12/27: BD#19 VIVIEN overnight, neuro stable. 3% at 15, stepdown status  12/28: BD20 VIVIEN overnight, neuro stable. 3% continues.  Patient became increasingly more somnolent and underwent LP for CSF high volume tap.  Temporary improvement.  Spiked fever, pancultured.  12/29: BD21 Patient increasingly more somnolent, EVD placed at bedside.    12/30: BD22 pt. is s/p R VPS placement, certas @5 POD#1, post op ct head c/a/p done. afebrile o/n.       Vital Signs Last 24 Hrs  T(C): 36.6 (30 Dec 2020 22:00), Max: 38 (30 Dec 2020 10:35)  T(F): 97.8 (30 Dec 2020 22:00), Max: 100.4 (30 Dec 2020 10:35)  HR: 88 (31 Dec 2020 01:30) (65 - 100)  BP: 131/63 (30 Dec 2020 14:00) (131/63 - 226/95)  BP(mean): 90 (30 Dec 2020 14:00) (90 - 151)  RR: 27 (31 Dec 2020 01:30) (11 - 31)  SpO2: 97% (31 Dec 2020 01:30) (96% - 100%)    I&O's Detail    29 Dec 2020 07:01  -  30 Dec 2020 07:00  --------------------------------------------------------  IN:    Enteral Tube Flush: 270 mL    Glucerna: 660 mL    Norepinephrine: 6.6 mL    PRBCs (Packed Red Blood Cells): 500 mL    sodium chloride 0.9%: 600 mL    Sodium Chloride 0.9% Bolus: 500 mL    sodium chloride 3%: 45 mL    sodium chloride 3%: 195 mL  Total IN: 2776.6 mL    OUT:    External Ventricular Device (mL): 93 mL    Indwelling Catheter - Urethral (mL): 2070 mL  Total OUT: 2163 mL    Total NET: 613.6 mL      30 Dec 2020 07:01  -  31 Dec 2020 02:01  --------------------------------------------------------  IN:    Enteral Tube Flush: 80 mL    IV PiggyBack: 300 mL    PRBCs (Packed Red Blood Cells): 300 mL    sodium chloride 0.9%: 900 mL  Total IN: 1580 mL    OUT:    External Ventricular Device (mL): 2 mL    Indwelling Catheter - Urethral (mL): 1450 mL  Total OUT: 1452 mL    Total NET: 128 mL        I&O's Summary    29 Dec 2020 07:01  -  30 Dec 2020 07:00  --------------------------------------------------------  IN: 2776.6 mL / OUT: 2163 mL / NET: 613.6 mL    30 Dec 2020 07:01  -  31 Dec 2020 02:01  --------------------------------------------------------  IN: 1580 mL / OUT: 1452 mL / NET: 128 mL        PHYSICAL EXAM:    General: somnolent, OE to noxious, not following commands   grunts  Neuro: AA&O x 0  CN II-XII: PERRL, EOMI  Motor: ZAFAR x4, good strength throughout  HEENT: face symmetric, tongue midline   Cardiovascular: RRR, normal S1 and S2   Respiratory: lungs CTAB, no wheezing, rhonchi, or crackles   GI: normoactive BS to auscultation, abd soft, NTND   Extremities: distal pulses 2+ x4       TUBES/LINES:  [] CVC  [] A-line  [] Lumbar Drain  [] Ventriculostomy  [] Other    DIET:  [x] NPO  [] Mechanical  [] Tube feeds    LABS:                        10.9   14.55 )-----------( 414      ( 30 Dec 2020 18:29 )             35.0     12-30    139  |  105  |  10  ----------------------------<  124<H>  4.0   |  24  |  0.49<L>    Ca    8.7      30 Dec 2020 18:29  Phos  2.6     12-30  Mg     2.1     12-30    TPro  5.9<L>  /  Alb  3.0<L>  /  TBili  0.4  /  DBili  x   /  AST  30  /  ALT  26  /  AlkPhos  83  12-30    PT/INR - ( 30 Dec 2020 05:37 )   PT: 12.5 sec;   INR: 1.04          PTT - ( 30 Dec 2020 05:37 )  PTT:23.2 sec        CAPILLARY BLOOD GLUCOSE      POCT Blood Glucose.: 107 mg/dL (30 Dec 2020 22:18)  POCT Blood Glucose.: 99 mg/dL (30 Dec 2020 16:07)  POCT Blood Glucose.: 90 mg/dL (30 Dec 2020 13:14)  POCT Blood Glucose.: 112 mg/dL (30 Dec 2020 06:53)      Drug Levels: [] N/A    CSF Analysis: [] N/A      Allergies    No Known Allergies    Intolerances      MEDICATIONS:  Antibiotics:  piperacillin/tazobactam IVPB.. 3.375 Gram(s) IV Intermittent every 6 hours    Neuro:  acetaminophen    Suspension .. 650 milliGRAM(s) Oral every 6 hours PRN  lacosamide Solution 150 milliGRAM(s) Oral every 12 hours  modafinil 100 milliGRAM(s) Oral daily    Anticoagulation:    OTHER:  albuterol/ipratropium for Nebulization. 3 milliLiter(s) Nebulizer every 6 hours PRN  atorvastatin 40 milliGRAM(s) Oral at bedtime  chlorhexidine 2% Cloths 1 Application(s) Topical <User Schedule>  dextrose 40% Gel 15 Gram(s) Oral once  dextrose 50% Injectable 25 Gram(s) IV Push once  fludroCORTISONE 0.2 milliGRAM(s) Oral at bedtime  glucagon  Injectable 1 milliGRAM(s) IntraMuscular once  insulin glargine Injectable (LANTUS) 10 Unit(s) SubCutaneous at bedtime  insulin lispro (ADMELOG) corrective regimen sliding scale   SubCutaneous Before meals and at bedtime  labetalol Injectable 2.5 milliGRAM(s) IV Push every 6 hours PRN  montelukast 10 milliGRAM(s) Oral daily  polyethylene glycol 3350 17 Gram(s) Oral daily  senna 2 Tablet(s) Oral at bedtime    IVF:  cyanocobalamin 1000 MICROGram(s) Oral daily  dextrose 5%. 1000 milliLiter(s) IV Continuous <Continuous>  dextrose 5%. 1000 milliLiter(s) IV Continuous <Continuous>  ferrous    sulfate 325 milliGRAM(s) Oral daily  sodium chloride 3 Gram(s) Oral every 6 hours  sodium chloride 0.9%. 1000 milliLiter(s) IV Continuous <Continuous>    CULTURES:  Culture Results:   No growth to date (12-29 @ 18:34)  Culture Results:   >100,000 CFU/ml Enterobacter cloacae complex  Susceptibility to follow. (12-29 @ 07:31)    RADIOLOGY & ADDITIONAL TESTS:    < from: CT Abdomen and Pelvis No Cont (12.31.20 @ 01:04) >  IMPRESSION:  1.  shunt catheter coiled in the upper abdomen with tip anterior to the left lobe of the liver. No  associated fluid collection or inflammation.  2.Trace right pleural effusion.  3. Subjacent to right lower quadrant/groin skin staples, there is a small amount of non capsulated lowattenuation fluid which is presumably seroma or resolving hematoma. Abscess not excluded but less  likely.    Thank you for allowing us to participate in the care of your patient.    < end of copied text >  < from: CT Head No Cont (12.31.20 @ 00:59) >  IMPRESSION:  No significant change in bilateral sub arachnoid hemorrhage, intraventricular hemorrhage, and  ventricular size.    < end of copied text >    ASSESSMENT:  75y Female  with PMHx of COPD, asthma, HLD, HTN, BIBEMS to [facility] from home after HHA found patient down on the floor covered in non-bloody vomitus. CTH reveals diffuse subarachnoid hemorrhage mainly at basilar cisterns with IVH and obstructive hydrocephalus, CTA shows 3mm left pcomm aneurysm, now s/p bedside right frontal EVD placement 12/10, s/p cerebral angiogram for coil embolization of left PCOMM aneurysm 12/10, now   s/p cerebral angio for verapamil c/b device malfunction of Proglide, s/p right groin cutdown for removal of proglide catheter and femoral artery repair (12/17/2020), NIHSS2 HH3 MF4), s/p EVD removal 12/25,   s/p bedside EVD placement  12/29, POD #1 from R VPS certas at 5  BD#22       HEADACHE    Handoff    MEWS Score    Hyperlipidemia    Hypertension    COPD (chronic obstructive pulmonary disease)    Asthma    COPD (chronic obstructive pulmonary disease)    Asthma    Hydrocephalus, adult    Injury of right femoral artery    Cerebral artery vasospasm    SAH (subarachnoid hemorrhage)    Hydrocephalus, adult    Injury of femoral artery    Cerebral artery vasospasm    SAH (subarachnoid hemorrhage)    Subarachnoid bleed    Obstructive hydrocephalus    Anemia due to acute blood loss    Preoperative clearance    Centrilobular emphysema    Mild persistent asthma without complication    Subarachnoid bleed    Essential hypertension    Pure hypercholesterolemia    Ventriculoperitoneal shunt    Insertion of external ventricular drain    Vascular surgery procedure    Angiogram, carotid and cerebral, bilateral    Angiogram, cerebral, with intracranial aneurysm embolization    No significant past surgical history    HEADACHE    90+    [email or web address]      PLAN:    NEURO:  - neuro checks  - vitals checks  - pain control  - cont Modafinil  - off Nimodipine 2/2 hypotension  - Aspirin 81 for b/l carotid stenosis is stopped for OR   - resume when ?   - CTH /CT abd post shunt perfomed   - cont Vimpat from seizure ppx     CARDIOVASCULAR:  - -200  - echo and CCTA prior to discharge  - limit fluids 2/2 takotsubo with EF 30%  - dc'd IVF     PULMONARY:  - on room air     RENAL:  - IVL Na+ 139, normonatremia goal   - cont florinef, salt tabs  - de anda placed 12/28    GI:  - NPO, TF started as per Dr. Alfredo since CT abd with no significant findings.   - bowel regimen    HEME:  - monitor H/H  - PO iron for anemia  - s/p 1 U prbc yesterday     ID:  - Tm 101 12/28, pancultures sent   - urine growing enterobacter, on Zosyn   - abd CT also c/w RLL pna/vs consolidation, monitor clinically/ repeat xray     ENDO:  - Lantus, ISS    DVT PROPHYLAXIS:  [x] Venodynes                                [] Heparin/Lovenox- on hold for EVD    DISPOSITION: ICU status, full code, dispo pending    d/w Dr. Serrano and Dr. Nino      Assessment:  Present when checked    []  GCS  E   V  M     Heart Failure: []Acute, [] acute on chronic , []chronic  Heart Failure:  [] Diastolic (HFpEF), [] Systolic (HFrEF), []Combined (HFpEF and HFrEF), [] RHF, [] Pulm HTN, [] Other    [] MICHAELLE, [] ATN, [] AIN, [] other  [] CKD1, [] CKD2, [] CKD 3, [] CKD 4, [] CKD 5, []ESRD    Encephalopathy: [] Metabolic, [] Hepatic, [] toxic, [] Neurological, [] Other    Abnormal Nurtitional Status: [] malnurtition (see nutrition note), [ ]underweight: BMI < 19, [] morbid obesity: BMI >40, [] Cachexia    [] Sepsis  [] hypovolemic shock,[] cardiogenic shock, [] hemorrhagic shock, [] neuogenic shock  [] Acute Respiratory Failure  []Cerebral edema, [] Brain compression/ herniation,   [] Functional quadriplegia  [] Acute blood loss anemia

## 2021-01-01 LAB
-  AMPICILLIN/SULBACTAM: SIGNIFICANT CHANGE UP
-  AMPICILLIN: SIGNIFICANT CHANGE UP
-  CEFAZOLIN: SIGNIFICANT CHANGE UP
-  CEFTRIAXONE: SIGNIFICANT CHANGE UP
-  CIPROFLOXACIN: SIGNIFICANT CHANGE UP
-  GENTAMICIN: SIGNIFICANT CHANGE UP
-  NITROFURANTOIN: SIGNIFICANT CHANGE UP
-  PIPERACILLIN/TAZOBACTAM: SIGNIFICANT CHANGE UP
-  TOBRAMYCIN: SIGNIFICANT CHANGE UP
-  TRIMETHOPRIM/SULFAMETHOXAZOLE: SIGNIFICANT CHANGE UP
ANION GAP SERPL CALC-SCNC: 11 MMOL/L — SIGNIFICANT CHANGE UP (ref 5–17)
ANION GAP SERPL CALC-SCNC: 9 MMOL/L — SIGNIFICANT CHANGE UP (ref 5–17)
BUN SERPL-MCNC: 12 MG/DL — SIGNIFICANT CHANGE UP (ref 7–23)
BUN SERPL-MCNC: 9 MG/DL — SIGNIFICANT CHANGE UP (ref 7–23)
CALCIUM SERPL-MCNC: 8.1 MG/DL — LOW (ref 8.4–10.5)
CALCIUM SERPL-MCNC: 8.6 MG/DL — SIGNIFICANT CHANGE UP (ref 8.4–10.5)
CHLORIDE SERPL-SCNC: 101 MMOL/L — SIGNIFICANT CHANGE UP (ref 96–108)
CHLORIDE SERPL-SCNC: 102 MMOL/L — SIGNIFICANT CHANGE UP (ref 96–108)
CO2 SERPL-SCNC: 23 MMOL/L — SIGNIFICANT CHANGE UP (ref 22–31)
CO2 SERPL-SCNC: 28 MMOL/L — SIGNIFICANT CHANGE UP (ref 22–31)
CREAT SERPL-MCNC: 0.33 MG/DL — LOW (ref 0.5–1.3)
CREAT SERPL-MCNC: 0.37 MG/DL — LOW (ref 0.5–1.3)
CULTURE RESULTS: SIGNIFICANT CHANGE UP
GLUCOSE SERPL-MCNC: 142 MG/DL — HIGH (ref 70–99)
GLUCOSE SERPL-MCNC: 144 MG/DL — HIGH (ref 70–99)
HCT VFR BLD CALC: 31.5 % — LOW (ref 34.5–45)
HGB BLD-MCNC: 10 G/DL — LOW (ref 11.5–15.5)
MAGNESIUM SERPL-MCNC: 2.1 MG/DL — SIGNIFICANT CHANGE UP (ref 1.6–2.6)
MCHC RBC-ENTMCNC: 28.5 PG — SIGNIFICANT CHANGE UP (ref 27–34)
MCHC RBC-ENTMCNC: 31.7 GM/DL — LOW (ref 32–36)
MCV RBC AUTO: 89.7 FL — SIGNIFICANT CHANGE UP (ref 80–100)
METHOD TYPE: SIGNIFICANT CHANGE UP
NRBC # BLD: 0 /100 WBCS — SIGNIFICANT CHANGE UP (ref 0–0)
ORGANISM # SPEC MICROSCOPIC CNT: SIGNIFICANT CHANGE UP
PHOSPHATE SERPL-MCNC: 2.6 MG/DL — SIGNIFICANT CHANGE UP (ref 2.5–4.5)
PLATELET # BLD AUTO: 353 K/UL — SIGNIFICANT CHANGE UP (ref 150–400)
POTASSIUM SERPL-MCNC: 2.6 MMOL/L — CRITICAL LOW (ref 3.5–5.3)
POTASSIUM SERPL-MCNC: 4.2 MMOL/L — SIGNIFICANT CHANGE UP (ref 3.5–5.3)
POTASSIUM SERPL-SCNC: 2.6 MMOL/L — CRITICAL LOW (ref 3.5–5.3)
POTASSIUM SERPL-SCNC: 4.2 MMOL/L — SIGNIFICANT CHANGE UP (ref 3.5–5.3)
RBC # BLD: 3.51 M/UL — LOW (ref 3.8–5.2)
RBC # FLD: 16.5 % — HIGH (ref 10.3–14.5)
SODIUM SERPL-SCNC: 135 MMOL/L — SIGNIFICANT CHANGE UP (ref 135–145)
SODIUM SERPL-SCNC: 139 MMOL/L — SIGNIFICANT CHANGE UP (ref 135–145)
SPECIMEN SOURCE: SIGNIFICANT CHANGE UP
WBC # BLD: 10.17 K/UL — SIGNIFICANT CHANGE UP (ref 3.8–10.5)
WBC # FLD AUTO: 10.17 K/UL — SIGNIFICANT CHANGE UP (ref 3.8–10.5)

## 2021-01-01 PROCEDURE — 99291 CRITICAL CARE FIRST HOUR: CPT

## 2021-01-01 PROCEDURE — 71045 X-RAY EXAM CHEST 1 VIEW: CPT | Mod: 26

## 2021-01-01 PROCEDURE — 70450 CT HEAD/BRAIN W/O DYE: CPT | Mod: 26

## 2021-01-01 PROCEDURE — 43752 NASAL/OROGASTRIC W/TUBE PLMT: CPT

## 2021-01-01 RX ORDER — SODIUM CHLORIDE 9 MG/ML
500 INJECTION INTRAMUSCULAR; INTRAVENOUS; SUBCUTANEOUS ONCE
Refills: 0 | Status: COMPLETED | OUTPATIENT
Start: 2021-01-01 | End: 2021-01-01

## 2021-01-01 RX ORDER — ENOXAPARIN SODIUM 100 MG/ML
40 INJECTION SUBCUTANEOUS AT BEDTIME
Refills: 0 | Status: DISCONTINUED | OUTPATIENT
Start: 2021-01-01 | End: 2021-01-06

## 2021-01-01 RX ORDER — POTASSIUM CHLORIDE 20 MEQ
40 PACKET (EA) ORAL
Refills: 0 | Status: DISCONTINUED | OUTPATIENT
Start: 2021-01-01 | End: 2021-01-01

## 2021-01-01 RX ORDER — POTASSIUM CHLORIDE 20 MEQ
10 PACKET (EA) ORAL ONCE
Refills: 0 | Status: DISCONTINUED | OUTPATIENT
Start: 2021-01-01 | End: 2021-01-01

## 2021-01-01 RX ORDER — MIDODRINE HYDROCHLORIDE 2.5 MG/1
5 TABLET ORAL EVERY 8 HOURS
Refills: 0 | Status: DISCONTINUED | OUTPATIENT
Start: 2021-01-01 | End: 2021-01-03

## 2021-01-01 RX ORDER — POTASSIUM PHOSPHATE, MONOBASIC POTASSIUM PHOSPHATE, DIBASIC 236; 224 MG/ML; MG/ML
15 INJECTION, SOLUTION INTRAVENOUS ONCE
Refills: 0 | Status: COMPLETED | OUTPATIENT
Start: 2021-01-01 | End: 2021-01-01

## 2021-01-01 RX ORDER — PANTOPRAZOLE SODIUM 20 MG/1
40 TABLET, DELAYED RELEASE ORAL EVERY 12 HOURS
Refills: 0 | Status: DISCONTINUED | OUTPATIENT
Start: 2021-01-01 | End: 2021-01-02

## 2021-01-01 RX ORDER — PIPERACILLIN AND TAZOBACTAM 4; .5 G/20ML; G/20ML
3.38 INJECTION, POWDER, LYOPHILIZED, FOR SOLUTION INTRAVENOUS EVERY 6 HOURS
Refills: 0 | Status: DISCONTINUED | OUTPATIENT
Start: 2021-01-01 | End: 2021-01-03

## 2021-01-01 RX ORDER — POTASSIUM CHLORIDE 20 MEQ
10 PACKET (EA) ORAL
Refills: 0 | Status: COMPLETED | OUTPATIENT
Start: 2021-01-01 | End: 2021-01-01

## 2021-01-01 RX ORDER — FENTANYL CITRATE 50 UG/ML
25 INJECTION INTRAVENOUS ONCE
Refills: 0 | Status: DISCONTINUED | OUTPATIENT
Start: 2021-01-01 | End: 2021-01-01

## 2021-01-01 RX ADMIN — PIPERACILLIN AND TAZOBACTAM 200 GRAM(S): 4; .5 INJECTION, POWDER, LYOPHILIZED, FOR SOLUTION INTRAVENOUS at 05:44

## 2021-01-01 RX ADMIN — MONTELUKAST 10 MILLIGRAM(S): 4 TABLET, CHEWABLE ORAL at 13:22

## 2021-01-01 RX ADMIN — PIPERACILLIN AND TAZOBACTAM 25 GRAM(S): 4; .5 INJECTION, POWDER, LYOPHILIZED, FOR SOLUTION INTRAVENOUS at 17:26

## 2021-01-01 RX ADMIN — CHLORHEXIDINE GLUCONATE 15 MILLILITER(S): 213 SOLUTION TOPICAL at 05:44

## 2021-01-01 RX ADMIN — Medication 500000 UNIT(S): at 13:22

## 2021-01-01 RX ADMIN — PANTOPRAZOLE SODIUM 40 MILLIGRAM(S): 20 TABLET, DELAYED RELEASE ORAL at 06:10

## 2021-01-01 RX ADMIN — SODIUM CHLORIDE 3 GRAM(S): 9 INJECTION INTRAMUSCULAR; INTRAVENOUS; SUBCUTANEOUS at 13:22

## 2021-01-01 RX ADMIN — CHLORHEXIDINE GLUCONATE 1 APPLICATION(S): 213 SOLUTION TOPICAL at 05:45

## 2021-01-01 RX ADMIN — Medication 500000 UNIT(S): at 23:41

## 2021-01-01 RX ADMIN — SODIUM CHLORIDE 3 GRAM(S): 9 INJECTION INTRAMUSCULAR; INTRAVENOUS; SUBCUTANEOUS at 17:26

## 2021-01-01 RX ADMIN — PREGABALIN 1000 MICROGRAM(S): 225 CAPSULE ORAL at 13:22

## 2021-01-01 RX ADMIN — Medication 500000 UNIT(S): at 17:26

## 2021-01-01 RX ADMIN — FENTANYL CITRATE 25 MICROGRAM(S): 50 INJECTION INTRAVENOUS at 13:11

## 2021-01-01 RX ADMIN — LACOSAMIDE 150 MILLIGRAM(S): 50 TABLET ORAL at 05:45

## 2021-01-01 RX ADMIN — FLUDROCORTISONE ACETATE 0.2 MILLIGRAM(S): 0.1 TABLET ORAL at 22:12

## 2021-01-01 RX ADMIN — Medication 325 MILLIGRAM(S): at 13:22

## 2021-01-01 RX ADMIN — SODIUM CHLORIDE 3 GRAM(S): 9 INJECTION INTRAMUSCULAR; INTRAVENOUS; SUBCUTANEOUS at 06:11

## 2021-01-01 RX ADMIN — SODIUM CHLORIDE 1000 MILLILITER(S): 9 INJECTION INTRAMUSCULAR; INTRAVENOUS; SUBCUTANEOUS at 23:44

## 2021-01-01 RX ADMIN — ENOXAPARIN SODIUM 40 MILLIGRAM(S): 100 INJECTION SUBCUTANEOUS at 22:12

## 2021-01-01 RX ADMIN — Medication 50 MILLIEQUIVALENT(S): at 12:48

## 2021-01-01 RX ADMIN — POTASSIUM PHOSPHATE, MONOBASIC POTASSIUM PHOSPHATE, DIBASIC 62.5 MILLIMOLE(S): 236; 224 INJECTION, SOLUTION INTRAVENOUS at 08:59

## 2021-01-01 RX ADMIN — Medication 650 MILLIGRAM(S): at 22:12

## 2021-01-01 RX ADMIN — PIPERACILLIN AND TAZOBACTAM 25 GRAM(S): 4; .5 INJECTION, POWDER, LYOPHILIZED, FOR SOLUTION INTRAVENOUS at 11:39

## 2021-01-01 RX ADMIN — Medication 500000 UNIT(S): at 05:44

## 2021-01-01 RX ADMIN — Medication 500000 UNIT(S): at 01:08

## 2021-01-01 RX ADMIN — Medication 50 MILLIEQUIVALENT(S): at 16:08

## 2021-01-01 RX ADMIN — LACOSAMIDE 150 MILLIGRAM(S): 50 TABLET ORAL at 18:26

## 2021-01-01 RX ADMIN — Medication 50 MILLIEQUIVALENT(S): at 13:51

## 2021-01-01 RX ADMIN — SODIUM CHLORIDE 3 GRAM(S): 9 INJECTION INTRAMUSCULAR; INTRAVENOUS; SUBCUTANEOUS at 23:41

## 2021-01-01 RX ADMIN — SODIUM CHLORIDE 1000 MILLILITER(S): 9 INJECTION INTRAMUSCULAR; INTRAVENOUS; SUBCUTANEOUS at 01:20

## 2021-01-01 RX ADMIN — FENTANYL CITRATE 25 MICROGRAM(S): 50 INJECTION INTRAVENOUS at 12:16

## 2021-01-01 RX ADMIN — MIDODRINE HYDROCHLORIDE 5 MILLIGRAM(S): 2.5 TABLET ORAL at 13:22

## 2021-01-01 RX ADMIN — MIDODRINE HYDROCHLORIDE 5 MILLIGRAM(S): 2.5 TABLET ORAL at 22:12

## 2021-01-01 RX ADMIN — MODAFINIL 100 MILLIGRAM(S): 200 TABLET ORAL at 13:21

## 2021-01-01 RX ADMIN — PANTOPRAZOLE SODIUM 40 MILLIGRAM(S): 20 TABLET, DELAYED RELEASE ORAL at 13:11

## 2021-01-01 RX ADMIN — PIPERACILLIN AND TAZOBACTAM 25 GRAM(S): 4; .5 INJECTION, POWDER, LYOPHILIZED, FOR SOLUTION INTRAVENOUS at 23:41

## 2021-01-01 RX ADMIN — PIPERACILLIN AND TAZOBACTAM 200 GRAM(S): 4; .5 INJECTION, POWDER, LYOPHILIZED, FOR SOLUTION INTRAVENOUS at 00:45

## 2021-01-01 RX ADMIN — Medication 650 MILLIGRAM(S): at 22:42

## 2021-01-01 RX ADMIN — SODIUM CHLORIDE 500 MILLILITER(S): 9 INJECTION INTRAMUSCULAR; INTRAVENOUS; SUBCUTANEOUS at 06:30

## 2021-01-01 RX ADMIN — Medication 650 MILLIGRAM(S): at 02:45

## 2021-01-01 RX ADMIN — Medication 650 MILLIGRAM(S): at 02:00

## 2021-01-01 RX ADMIN — SODIUM CHLORIDE 3 GRAM(S): 9 INJECTION INTRAMUSCULAR; INTRAVENOUS; SUBCUTANEOUS at 01:10

## 2021-01-01 RX ADMIN — CHLORHEXIDINE GLUCONATE 15 MILLILITER(S): 213 SOLUTION TOPICAL at 17:26

## 2021-01-01 RX ADMIN — ATORVASTATIN CALCIUM 40 MILLIGRAM(S): 80 TABLET, FILM COATED ORAL at 22:11

## 2021-01-01 RX ADMIN — PANTOPRAZOLE SODIUM 40 MILLIGRAM(S): 20 TABLET, DELAYED RELEASE ORAL at 23:42

## 2021-01-01 NOTE — PROGRESS NOTE ADULT - SUBJECTIVE AND OBJECTIVE BOX
HPI:  74y/o F with PMHx sig for COPD, asthma, HLD, HTN, BIBEMS to [facility] from home after HHA discovered patient found down in floor covered in non-bloody vomitus. Perr HHA Pt went to the bathroom and was taking a long time, went in to check on her and found her sitting on floor, awake, however with vomitus on front of shirt. On arrival to [facility], patient was taken for stat head CT, which revealed diffuse subarachnoid hemorrhage mainly at basilar cisterns with IVH and obstructive hydrocephalus. Patient was given a bolus of 1g keppra and dilaudid pushes for generalized headache. CTA concerning for 3mm left pcomm aneurysm and a 1.6mm outpouching of distal cavernous segment of the left internal carotid artery likely aneurysm. NIHSS2 HH3 MF4. Patient was transferred to [facility] for further intervention. Patient currently reports headaches. Pt denies acute changes in vision, seizures, CP, SOB, weakness/paresthesias of arms or legs. (09 Dec 2020 23:37)    Hospital Course:   12/9: BD1 Patient admitted for SAH HH3, MF4.   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 EVD at 5, on Levo overnight, nimodipine discontinued d/t hypotension  12/12: BD4 POD#2, VIVIEN overnight, neuro stable, passed TOV  12/13: BD5 POD#3: VIVIEN x 24 hrs  12/14: BD6 POD#4. VIVIEN o/n, neuro stable. EVD at 5cm H2O  12/15: BD7 POD #5 VIVIEN overnight, neuro stable. EVD remains at 5. Plan for CTA today.   12/16: BD8 POD #6 VIVIEN overnight, neuro stable, EVD at 0, on Levo, started on 3% at 15 overnight   12/17: BD9 POD#1 s/p cerebral angio for verapamil c/b device malfunction of Proglide, s/p right groin cutdown for removal of proglide catheter and femoral artery repair.  VIVIEN overnight, neuro stable, EVD at 0. patient remained intubated overnight, on propofol for sedation, and vEEG  12/18: BD#10, POD#8 s/p coil/embo of L pcomm aneurysm, POD#2 s/p IA verapamil, POD#2 R groin cutdown for removal of proglide catheter w/ repair of femoral artery. VIVIEN overnight. EVD remains open @0cmH2O   12/19: BD#11, POD#9 s/p coil/embo of L pcomm aneurysm. POD#3 s/p IA verapamil, POD#3 s/p R femoral artery cutdown of proglide catheter w/ femoral artery repair. VIVIEN overnight. EVD remains open @0cmH2O. EEG shows b/l frontal sharp discharges, cont monitoring, vimpat was increased yesterday. Gentle hydration, total IVF 50cc/hr. Cont vanc/zosyn for empiric coverage, next vanc trough due @1pm on 12/19. F/u daily CXR.   12/20 BD#12 POD#10 VIVIEN overnight, Neuro exam stable, EVD @ 0cm H2O, vEEG, 3% @ 15 goal WNL, TF via NGT  12/21: BD13, POD11 VIVIEN overnight. EVD at 5cmH20 (raised yesterday), vEEG in place  12/22 BD#14, EVD raised to 15 yesterday, 3% @ 30cc Goal WNL, -180, Milrinone, TTE prior to DC as per Card for takotsubo cardiomyopathy, failed S&S pending reeval, TF via NGT, Neuro exam stable  12/23: BD#15. VIVIEN o/n, neuro stable. EVD clamped. 30%@30cc/hr  12/24 BD#16 VIVIEN overnight, spiked 102, EVD @ 0cm H2O, 3% @ 30cc/hr Goal WNL, Vasc following, TF via NGT, -200,   12/25: BD#17. VIVIEN overnight. Pan cx from 12/23, NGTD on CSF and blood. EVD clamped. 3% @15cc/hr, rate kept same overnight. NGT feeds at goal. SBP goal 160-200.   12/26: BD#18. VIVIEN overnight, neuro exam stable. NGTD from pan cx on 12/23. EVD removed today. 3% discontinued 12/25. NGT feeds at goal, IVF off. SBP goal 160-200.   12/27: BD#19 VIVIEN overnight, neuro stable. 3% at 15, stepdown status  12/28: BD20 VIVIEN overnight, neuro stable. 3% continues.  Patient became increasingly more somnolent and underwent LP for CSF high volume tap.  Temporary improvement.  Spiked fever, pancultured.  12/29: BD21 Patient increasingly more somnolent, EVD placed at bedside.    12/30: BD22 pt. is s/p R VPS placement, certas @5 POD#1, post op ct head c/a/p done. afebrile o/n.   1/1: BD#23: pt. is s/p R VPS placement, certas @5 POD#2, post op ct head c/a/p done. zosyn for enterobacter       Vital Signs Last 24 Hrs  T(C): 38.6 (01 Jan 2021 02:45), Max: 39 (31 Dec 2020 18:00)  T(F): 101.4 (01 Jan 2021 02:45), Max: 102.2 (31 Dec 2020 18:00)  HR: 88 (01 Jan 2021 02:00) (78 - 95)  BP: 174/81 (01 Jan 2021 02:00) (119/56 - 187/76)  BP(mean): 113 (01 Jan 2021 02:00) (80 - 119)  RR: 22 (01 Jan 2021 02:00) (20 - 30)  SpO2: 98% (01 Jan 2021 02:00) (91% - 100%)    I&O's Detail    30 Dec 2020 07:01  -  31 Dec 2020 07:00  --------------------------------------------------------  IN:    Enteral Tube Flush: 220 mL    Glucerna: 40 mL    IV PiggyBack: 450 mL    PRBCs (Packed Red Blood Cells): 300 mL    sodium chloride 0.9%: 1275 mL  Total IN: 2285 mL    OUT:    External Ventricular Device (mL): 2 mL    Indwelling Catheter - Urethral (mL): 1925 mL  Total OUT: 1927 mL    Total NET: 358 mL      31 Dec 2020 07:01  -  01 Jan 2021 03:00  --------------------------------------------------------  IN:    Enteral Tube Flush: 240 mL    Glucerna: 780 mL    IV PiggyBack: 300 mL    Sodium Chloride 0.9% Bolus: 500 mL    sodium chloride 3%: 250 mL  Total IN: 2070 mL    OUT:    Incontinent per Collection Bag (mL): 0 mL    Indwelling Catheter - Urethral (mL): 135 mL    Intermittent Catheterization - Urethral (mL): 250 mL  Total OUT: 385 mL    Total NET: 1685 mL          PHYSICAL EXAM:    General: somnolent, OE to noxious, not following commands   grunts  Neuro: AA&O x 0-1  CN II-XII: PERRL, EOMI  Motor: ZAFAR x4, good strength throughout  HEENT: face symmetric, tongue midline   Cardiovascular: RRR, normal S1 and S2   Respiratory: lungs CTAB, no wheezing, rhonchi, or crackles   GI: normoactive BS to auscultation, abd soft, NTND   Extremities: distal pulses 2+ x4       TUBES/LINES:  [] CVC  [] A-line  [] Lumbar Drain  [] Ventriculostomy  [] Other    DIET:  [x] NPO  [] Mechanical  [] Tube feeds    LABS:                           11.9   13.82 )-----------( 446      ( 31 Dec 2020 05:24 )             37.3     12-31    138  |  103  |  11  ----------------------------<  147<H>  4.4   |  23  |  0.47<L>    Ca    8.3<L>      31 Dec 2020 12:33  Phos  3.3     12-31  Mg     1.6     12-31    TPro  5.9<L>  /  Alb  3.0<L>  /  TBili  0.4  /  DBili  x   /  AST  30  /  ALT  26  /  AlkPhos  83  12-30        CAPILLARY BLOOD GLUCOSE      POCT Blood Glucose.: 107 mg/dL (30 Dec 2020 22:18)  POCT Blood Glucose.: 99 mg/dL (30 Dec 2020 16:07)  POCT Blood Glucose.: 90 mg/dL (30 Dec 2020 13:14)  POCT Blood Glucose.: 112 mg/dL (30 Dec 2020 06:53)      Drug Levels: [] N/A    CSF Analysis: [] N/A      Allergies    No Known Allergies    Intolerances      MEDICATIONS:  MEDICATIONS  (STANDING):  atorvastatin 40 milliGRAM(s) Oral at bedtime  chlorhexidine 0.12% Liquid 15 milliLiter(s) Oral Mucosa two times a day  chlorhexidine 2% Cloths 1 Application(s) Topical <User Schedule>  cyanocobalamin 1000 MICROGram(s) Oral daily  dextrose 40% Gel 15 Gram(s) Oral once  dextrose 5%. 1000 milliLiter(s) (50 mL/Hr) IV Continuous <Continuous>  dextrose 5%. 1000 milliLiter(s) (100 mL/Hr) IV Continuous <Continuous>  dextrose 50% Injectable 25 Gram(s) IV Push once  ferrous    sulfate 325 milliGRAM(s) Oral daily  fludroCORTISONE 0.2 milliGRAM(s) Oral at bedtime  glucagon  Injectable 1 milliGRAM(s) IntraMuscular once  insulin lispro (ADMELOG) corrective regimen sliding scale   SubCutaneous Before meals and at bedtime  lacosamide Solution 150 milliGRAM(s) Oral every 12 hours  modafinil 100 milliGRAM(s) Oral daily  montelukast 10 milliGRAM(s) Oral daily  nystatin    Suspension 401545 Unit(s) Oral four times a day  pantoprazole   Suspension 40 milliGRAM(s) Oral two times a day  piperacillin/tazobactam IVPB.. 3.375 Gram(s) IV Intermittent every 6 hours  polyethylene glycol 3350 17 Gram(s) Oral daily  senna 2 Tablet(s) Oral at bedtime  sodium chloride 3 Gram(s) Oral every 6 hours    MEDICATIONS  (PRN):  acetaminophen    Suspension .. 650 milliGRAM(s) Oral every 6 hours PRN Temp greater or equal to 38C (100.4F), Mild Pain (1 - 3)  albuterol/ipratropium for Nebulization. 3 milliLiter(s) Nebulizer every 6 hours PRN Shortness of Breath  labetalol Injectable 2.5 milliGRAM(s) IV Push every 6 hours PRN Systolic blood pressure > 220    CULTURES:  Culture Results:   No growth to date (12-29 @ 18:34)  Culture Results:   >100,000 CFU/ml Enterobacter cloacae complex  Susceptibility to follow. (12-29 @ 07:31)    RADIOLOGY & ADDITIONAL TESTS:    < from: CT Abdomen and Pelvis No Cont (12.31.20 @ 01:04) >  IMPRESSION:  1.  shunt catheter coiled in the upper abdomen with tip anterior to the left lobe of the liver. No  associated fluid collection or inflammation.  2.Trace right pleural effusion.  3. Subjacent to right lower quadrant/groin skin staples, there is a small amount of non capsulated lowattenuation fluid which is presumably seroma or resolving hematoma. Abscess not excluded but less  likely.    Thank you for allowing us to participate in the care of your patient.    < end of copied text >  < from: CT Head No Cont (12.31.20 @ 00:59) >  IMPRESSION:  No significant change in bilateral sub arachnoid hemorrhage, intraventricular hemorrhage, and  ventricular size.    < end of copied text >    ASSESSMENT:  75y Female  with PMHx of COPD, asthma, HLD, HTN, BIBEMS to [facility] from home after HHA found patient down on the floor covered in non-bloody vomitus. CTH reveals diffuse subarachnoid hemorrhage mainly at basilar cisterns with IVH and obstructive hydrocephalus, CTA shows 3mm left pcomm aneurysm, now s/p bedside right frontal EVD placement 12/10, s/p cerebral angiogram for coil embolization of left PCOMM aneurysm 12/10, now   s/p cerebral angio for verapamil c/b device malfunction of Proglide, s/p right groin cutdown for removal of proglide catheter and femoral artery repair (12/17/2020), NIHSS2 HH3 MF4), s/p EVD removal 12/25,   s/p bedside EVD placement  12/29, POD 2 from R VPS certas at 5  BD#23      HEADACHE    Handoff    MEWS Score    Hyperlipidemia    Hypertension    COPD (chronic obstructive pulmonary disease)    Asthma    COPD (chronic obstructive pulmonary disease)    Asthma    Hydrocephalus, adult    Injury of right femoral artery    Cerebral artery vasospasm    SAH (subarachnoid hemorrhage)    Hydrocephalus, adult    Injury of femoral artery    Cerebral artery vasospasm    SAH (subarachnoid hemorrhage)    Subarachnoid bleed    Obstructive hydrocephalus    Anemia due to acute blood loss    Preoperative clearance    Centrilobular emphysema    Mild persistent asthma without complication    Subarachnoid bleed    Essential hypertension    Pure hypercholesterolemia    Ventriculoperitoneal shunt    Insertion of external ventricular drain    Vascular surgery procedure    Angiogram, carotid and cerebral, bilateral    Angiogram, cerebral, with intracranial aneurysm embolization    No significant past surgical history    HEADACHE    90+    [email or web address]      PLAN:    NEURO:  - neuro checks  - vitals checks  - pain control  - cont Modafinil  - off Nimodipine 2/2 hypotension  - Aspirin 81 for b/l carotid stenosis is stopped for OR , resume when ?   - CTH /CT abd post shunt perfomed   - CTH in am   - cont Vimpat from seizure ppx     CARDIOVASCULAR:  - -200  - echo and CCTA prior to discharge  - limit fluids 2/2 takotsubo with EF 30%  - IVL    PULMONARY:  - on room air   duoneb prm     RENAL:  - IVL Na+ 138, normonatremia goal   - s/p bolus yesterday   - cont florinef, salt tabs  - straight cath prn     GI:  - NPO, TF    - bowel regimen    HEME:  - monitor H/H  - PO iron for anemia  - s/p 1 U prbc yesterday   - heme occult + protonix BID started  - doppler + for R arm cephalic thrombus, ? restart SQL     ID:  - fever spike, repeat cx sent 12/31 night   - urine growing enterobacter, on Zosyn   - abd CT also c/w RLL pna/vs consolidation, monitor clinically/ repeat xray     ENDO:  - Lantus, ISS    DVT PROPHYLAXIS:  [x] Venodynes                                [] Heparin/Lovenox- on hold for EVD    DISPOSITION: ICU status, full code, dispo pending    d/w Dr. Serrano and Dr. Nino      Assessment:  Present when checked    []  GCS  E   V  M     Heart Failure: []Acute, [] acute on chronic , []chronic  Heart Failure:  [] Diastolic (HFpEF), [] Systolic (HFrEF), []Combined (HFpEF and HFrEF), [] RHF, [] Pulm HTN, [] Other    [] MICHAELLE, [] ATN, [] AIN, [] other  [] CKD1, [] CKD2, [] CKD 3, [] CKD 4, [] CKD 5, []ESRD    Encephalopathy: [] Metabolic, [] Hepatic, [] toxic, [] Neurological, [] Other    Abnormal Nurtitional Status: [] malnurtition (see nutrition note), [ ]underweight: BMI < 19, [] morbid obesity: BMI >40, [] Cachexia    [] Sepsis  [] hypovolemic shock,[] cardiogenic shock, [] hemorrhagic shock, [] neuogenic shock  [] Acute Respiratory Failure  []Cerebral edema, [] Brain compression/ herniation,   [] Functional quadriplegia  [] Acute blood loss anemia

## 2021-01-01 NOTE — PROGRESS NOTE ADULT - SUBJECTIVE AND OBJECTIVE BOX
=================================  NEUROCRITICAL CARE ATTENDING NOTE  =================================    CARA CANCHOLA   MRN-[id number]  Summary:  75y/F with COPD, asthma, dyslipidemia Hypertension found down.  Brought to [facility] where imaging showed SAH basilar cisterns with IVH and obstructive hydrocephalus L Pcomm aneurysm.  Given levetiracetam, hydromorphone.  NIHSS 2 HH3 MF4, transferred to [facility] for further management.      COURSE IN THE HOSPITAL:   admitted to [facility], EVD inserted; given 20 lasix for pulmo edema, T inversion on CT  12/10 No significant events overnight.    No significant events overnight.    No significant events overnight.    No significant events overnight. drain stopped working at 730 a.m., off levophed    No significant events overnight.   12/15 confusion   stepped down   readmitted to ICU    more confused somnolent overnight, high volume LP - 30cc removed, CT scan, EVD inserted, febrile pancultured, UA (+) rocephin started   Tmax 38.6  EVD clamped this morning, given 1L fluid bolus overnight   Tmax 38.2     Past Medical History: Hyperlipidemia Hypertension COPD (chronic obstructive pulmonary disease) Asthma  Allergies:  No Known Allergies  Home meds:   ·	famotidine 20 mg oral tablet: 1 tab(s) orally once a day  ·	lisinopril 2.5 mg oral tablet: 1 tab(s) orally once a day  ·	montelukast 10 mg oral tablet: 1 tab(s) orally once a day  ·	predniSONE 50 mg oral tablet: 1 tab(s) orally once a day  ·	ProAir HFA CFC free 90 mcg/inh inhalation aerosol: 2 puff(s) inhaled 4 times a day PRN  ·	simvastatin 20 mg oral tablet: 1 tab(s) orally once a day (at bedtime)    PHYSICAL EXAMINATION  T(C): 38.2 ( @ 09:00), Max: 38.2 ( @ 09:00) HR: 88 ( @ 09:00) (65 - 100) BP: 159/71 ( @ 09:00) (131/63 - 226/95) RR: 25 ( @ 09:00) (19 - 30) SpO2: 95% ( @ 09:00) (91% - 100%)  NEUROLOGIC EXAMINATION:  Patient is awake, oriented x 1, CLEOPATRA, intermittently following commands, moving all 4s, R shoulder movement pain limited  GENERAL: not intubated, not in cardiorespiratory distress  EENT:  anicteric  CARDIOVASCULAR: (+) S1 S2, normal rate and regular rhythm  PULMONARY: clear to auscultation bilaterally, but coughing  ABDOMEN: soft, nontender with normoactive bowel sounds  EXTREMITIES: no edema  SKIN: no rash    LABS:   CAPILLARY BLOOD GLUCOSE  148 66 66 107 99 90     (14.5)    11.9   13.82 )-----------( 446      ( 31 Dec 2020 05:24 )             37.3     133<L>  |  95<L>  |  9   ----------------------------<  74  3.5   |  25  |  0.43<L>    Ca    9.0      31 Dec 2020 05:24  Phos  3.3       Mg     1.6         TPro  5.9<L>  /  Alb  3.0<L>  /  TBili  0.4  /  DBili  x   /  AST  30  /  ALT  26  /  AlkPhos  83   @ 07:01  -   @ 07:00  IN: 2285 mL / OUT: 1927 mL / NET: 358 mL    HbA1C = 6.7 (12-10) 6.4 ()  LDL = 112 ()   HDL = 66 ()  TG = 114 ()   TSH = 0.990 ()    Bacteriology:  UA (+) LE (+) N   CSF NGTD   Urine Enterobacter    Blood culture NG2D   CSF NGTD    Blood NG5D x2    CSF studies:    L   *** RBC58 WBC9 *** %N3 %L5     L   *** [id number] WBC20 *** %N10 %L8     L   *** [id number] WBC24 *** %N7 %L13     EEG:  Neuroimagin/10 CT head:  EVD placement, stable   CTA:  L pcomm aneurysm, L ICA (distal cavernous) aneurysm vs infundibulum, extensive calcified plaque cavernous bilateral ICA with mild to mod stenosis; thick plaque bilateral carotid bifurcations - mod to severe R and mild to mod L stenosis, focal calcified plaque R VA off subclavian artery - high grate stenosis / partial occlusion, upper lung bilateral opacification - pulmo edema, chronic deformity R humeral neck   CT head:  SAH, basilar cisterns, IV extension, obstructive hydrocephalus SVID, lacunar infarcts R caudate, both thalami, cerebellar hemispheres, no CT evidence of territorial infarction  Other imagin/28 LE Doppler: NEG   LE Doppler: NEG   CT abd:  small paraesophageal hiatal hernia, gastritis; ?impaction, septal thickening bilateral lower lobes ?pulmo edema, hepatic steatosis    MEDICATIONS:   piperacillin/tazobactam 3.375 IV q6h lacosamide 150mg PO q12h modafinil 100mg PO daily montelukast 10mg PO daily polyethylene glycol 17G daily daily senna HS atorvastatin 40mg PO HS fludrocortisone 0.2mg PO HS mod ISS Peridex cyanocobalamin 1000 PO daily ferrous sulfate 325mg PO daily salt 3G PO q6h ipratropium / albuterol PRN oxycodone PRN     IV FLUIDS: IVL  DRIPS:  DIET: tube feeds  Lines:   Drains:     EVD at 5cm H20   EVD at 5cm H20 ICP < 10   EVD at 5cm H20 ICP <10   EVD clamped  Wounds:    CODE STATUS:  Full Code                       GOALS OF CARE:  aggressive                      DISPOSITION:  ICU  NIHSS 2 HH3 MF4 =================================  NEUROCRITICAL CARE ATTENDING NOTE  =================================    CARA CANCHOLA   MRN-[id number]  Summary:  75y/F with COPD, asthma, dyslipidemia Hypertension found down.  Brought to [facility] where imaging showed SAH basilar cisterns with IVH and obstructive hydrocephalus L Pcomm aneurysm.  Given levetiracetam, hydromorphone.  NIHSS 2 HH3 MF4, transferred to [facility] for further management.      COURSE IN THE HOSPITAL:   admitted to [facility], EVD inserted; given 20 lasix for pulmo edema, T inversion on CT  12/10 No significant events overnight.    No significant events overnight.    No significant events overnight.    No significant events overnight. drain stopped working at 730 a.m., off levophed    No significant events overnight.   12/15 confusion   stepped down   readmitted to ICU    more confused somnolent overnight, high volume LP - 30cc removed, CT scan, EVD inserted, febrile pancultured, UA (+) rocephin started   Tmax 38.6  EVD clamped this morning, given 1L fluid bolus overnight, s/p VPS   Tmax 38.2    Tmax 39 500cc bolus overnight x2, pancultured for fever    Past Medical History: Hyperlipidemia Hypertension COPD (chronic obstructive pulmonary disease) Asthma  Allergies:  No Known Allergies  Home meds:   ·	famotidine 20 mg oral tablet: 1 tab(s) orally once a day  ·	lisinopril 2.5 mg oral tablet: 1 tab(s) orally once a day  ·	montelukast 10 mg oral tablet: 1 tab(s) orally once a day  ·	predniSONE 50 mg oral tablet: 1 tab(s) orally once a day  ·	ProAir HFA CFC free 90 mcg/inh inhalation aerosol: 2 puff(s) inhaled 4 times a day PRN  ·	simvastatin 20 mg oral tablet: 1 tab(s) orally once a day (at bedtime)    PHYSICAL EXAMINATION  T(C): 37.5 ( @ 09:00), Max: 39 ( @ 18:00) HR: 76 ( @ 09:00) (72 - 95) BP: 137/61 ( @ 09:00) (102/54 - 187/76) RR: 22 ( @ 09:00) (14 - 30) SpO2: 97% ( @ 09:00) (95% - 100%)  NEUROLOGIC EXAMINATION:  Patient is awake, oriented x 2, CLEOPATRA, intermittently following commands, moving all 4s, R shoulder movement pain limited  GENERAL: not intubated, not in cardiorespiratory distress  EENT:  anicteric  CARDIOVASCULAR: (+) S1 S2, normal rate and regular rhythm  PULMONARY: clear to auscultation bilaterally, but coughing  ABDOMEN: soft, nontender with normoactive bowel sounds  EXTREMITIES: no edema  SKIN: no rash    LABS:   CAPILLARY BLOOD GLUCOSE 134 173 165 174 123 - 4 units insulin lispro given       (13.8)  10.0   10.17 )-----------( 353      ( 2021 05:48 )             31.5     139  |  102  |  12  ----------------------------<  142<H>  2.6<LL>   |  28  |  0.37<L>    Ca    8.1<L>      2021 05:48  Phos  2.6       Mg     2.1         TPro  5.9<L>  /  Alb  3.0<L>  /  TBili  0.4  /  DBili  x   /  AST  30  /  ALT  26  /  AlkPhos  83   @ 07:01  -   @ 07:00  IN: 2975 mL / OUT: 710 mL / NET: 2265 mL    HbA1C = 6.7 (12-10) 6.4 ()  LDL = 112 ()   HDL = 66 ()  TG = 114 ()   TSH = 0.990 ()    Bacteriology:  UA (+) LE (+) N   CSF NGTD   Urine Enterobacter E coli   Blood culture NG2D   CSF NGTD    Blood NG5D x2    CSF studies:    L   *** RBC58 WBC9 *** %N3 %L5     L   *** [id number] WBC20 *** %N10 %L8     L   *** [id number] WBC24 *** %N7 %L13     EEG:  Neuroimagin/10 CT head:  EVD placement, stable   CTA:  L pcomm aneurysm, L ICA (distal cavernous) aneurysm vs infundibulum, extensive calcified plaque cavernous bilateral ICA with mild to mod stenosis; thick plaque bilateral carotid bifurcations - mod to severe R and mild to mod L stenosis, focal calcified plaque R VA off subclavian artery - high grate stenosis / partial occlusion, upper lung bilateral opacification - pulmo edema, chronic deformity R humeral neck   CT head:  SAH, basilar cisterns, IV extension, obstructive hydrocephalus SVID, lacunar infarcts R caudate, both thalami, cerebellar hemispheres, no CT evidence of territorial infarction  Other imagin/28 LE Doppler: NEG   LE Doppler: NEG   CT abd:  small paraesophageal hiatal hernia, gastritis; ?impaction, septal thickening bilateral lower lobes ?pulmo edema, hepatic steatosis    MEDICATIONS:   ·	nystatin    Suspension [id number] Oral four times a day  ·	piperacillin/tazobactam IVPB.. 3.375 IV Intermittent every 6 hours  ·	lacosamide Solution 150 Oral every 12 hours  ·	modafinil 100 Oral daily  ·	labetalol Injectable 2.5 IV Push every 6 hours PRN  ·	midodrine 5 Oral every 8 hours  ·	albuterol/ipratropium for Nebulization. 3 Nebulizer every 6 hours PRN  ·	montelukast 10 Oral daily  ·	atorvastatin 40 Oral at bedtime  ·	cyanocobalamin 1000 Oral daily  ·	ferrous    sulfate 325 Oral daily  ·	fludroCORTISONE 0.2 Oral at bedtime  ·	mod ISS  ·	sodium chloride 3 Oral every 6 hours  ·	pantoprazole   Suspension 40 Oral two times a day  ·	polyethylene glycol 3350 17 Oral daily  ·	senna 2 Oral at bedtime    IV FLUIDS: IVL  DRIPS:  DIET: tube feeds  Lines:   Drains:     EVD at 5cm H20   EVD at 5cm H20 ICP < 10   EVD at 5cm H20 ICP <10   EVD clamped  Wounds:    CODE STATUS:  Full Code                       GOALS OF CARE:  aggressive                      DISPOSITION:  ICU  NIHSS 2 HH3 MF4

## 2021-01-01 NOTE — PROGRESS NOTE ADULT - SUBJECTIVE AND OBJECTIVE BOX
Subjective: seen and examined at bedside  somnolent       ROS:   Denies Headache, blurred vision, Chest Pain, SOB, Abdominal pain, nausea or vomiting     Social   nystatin    Suspension [id number]  piperacillin/tazobactam IVPB.. 3.375  labetalol Injectable 2.5 PRN  midodrine 5  nystatin    Suspension [id number]  piperacillin/tazobactam IVPB.. 3.375      Allergies    No Known Allergies    Intolerances        Vital Signs Last 24 Hrs  T(C): 37.8 (01 Jan 2021 14:50), Max: 39 (31 Dec 2020 18:00)  T(F): 100.1 (01 Jan 2021 14:50), Max: 102.2 (31 Dec 2020 18:00)  HR: 81 (01 Jan 2021 16:00) (72 - 95)  BP: 140/64 (01 Jan 2021 16:00) (102/54 - 187/76)  BP(mean): 92 (01 Jan 2021 16:00) (71 - 126)  RR: 26 (01 Jan 2021 16:00) (14 - 26)  SpO2: 97% (01 Jan 2021 16:00) (96% - 100%)  I&O's Summary    31 Dec 2020 07:01  -  01 Jan 2021 07:00  --------------------------------------------------------  IN: 2975 mL / OUT: 710 mL / NET: 2265 mL    01 Jan 2021 07:01  -  01 Jan 2021 16:53  --------------------------------------------------------  IN: 750 mL / OUT: 600 mL / NET: 150 mL        Physical Exam:  General: nonresponsive, NAD  Pulmonary: non labor  Cardiovascular: rrr  Abdominal: soft, NT, ND  Extremities: right groin soft   Pulses:   Right:                                                                          Left:  FEM [ ]2+ [ ]1+ [ ]doppler                                             FEM [ ]2+ [ ]1+ [ ]doppler    POP [ ]2+ [ ]1+ [ ]doppler                                             POP [ ]2+ [ ]1+ [ ]doppler    DP [ ]2+ [x ]1+ [x ]doppler                                                DP [ ]2+ [ ]1+ [ ]doppler  PT[ ]2+ [x ]1+ [x ]doppler                                                  PT [ ]2+ [ ]1+ [ ]doppler      LABS:                        10.0   10.17 )-----------( 353      ( 01 Jan 2021 05:48 )             31.5     01-01    139  |  102  |  12  ----------------------------<  142<H>  2.6<LL>   |  28  |  0.37<L>    Ca    8.1<L>      01 Jan 2021 05:48  Phos  2.6     01-01  Mg     2.1     01-01    TPro  5.9<L>  /  Alb  3.0<L>  /  TBili  0.4  /  DBili  x   /  AST  30  /  ALT  26  /  AlkPhos  83  12-30        Radiology and Additional Studies:

## 2021-01-01 NOTE — PROGRESS NOTE ADULT - ASSESSMENT
75y/Female with:  aneursymal subarachnoid hemorrhage, L pcomm aneurysm, L parophthalmic aneurysm; brain compression, cerebral edema  osbtructive hydrocephalus  lacunar infarcts R caudate, B thalami, cerebellar hemispheres ?artery to artery vs cardioembolic?  atherosclerotic cardiovascular disease; mod to severe bilateral ICA stenosis (R>L), R VA stenosis  Hypertension dyslipidemia   COPD asthma  newly diagnosed Diabetes Mellitus?  troponin leak    PLAN: Day 1 = 12-09 ; Day 4 =  12/12; Day 21 = 12/29  NEURO: neurochecks q2h, VS q1, PRN pain meds with Tylenol   SAH:  off nimodipine   Hydrocephalus:  VPS today   Seizure treatment (cortical ICH, associated SDH, unclear etiology):  lacosamide 150mg PO BID   REHAB:  physical therapy evaluation and management    EARLY MOB:  HOB elevated    PULM:  Room air, incentive spirometry, continue montelukast, ipratropium / albuterol PRN   CARDIO:  SBP goal 150-200mm Hg, EF 35% - repeat TTE  ENDO:  Blood sugar goals 140-180 mg/dL, cont insulin sliding scale, atorvastatin 40mg  GI:  bowel regimen  DIET: start tube feeds  RENAL: maintain euvolemia, accurate Is and Os; 3% 250 cc bolus given, repeat BMP  HEM/ONC: Hb stable, FOBT  VTE Prophylaxis: SCDs, SQL  ID: febrile, leukocytosis improving; piperacillin/tazobactam (last day 01/02/2021)  Social: will update family    CORE MEASURES  1.  Nath and Jacobo Score = 3  2.  VTE prophylaxis:  [ ] administered within 24 hours of admission OR [X] reason not done: fresh bleed, unsecured aneurysm.  3.  Dysphagia screening:   [X] performed before any oral meds / liquids / food  4.  Nimodipine treatment:  [X] administered within 24 hours of admission OR [ ] reason not done:    ATTENDING ATTESTATION:  I was physically present for the key portions of the evaluation and management (E/M) service provided.  I agree with the above history, physical and plan, which I have reviewed and edited where appropriate.    Patient at high risk for neurological deterioration or death due to:  ICU delirium, aspiration PNA, DVT / PE.  Critical care time:  I have personally provided 30 minutes of critical care time, excluding time spent on separate procedures.      Plan discussed with RN, house staff.   75y/Female with:  aneursymal subarachnoid hemorrhage, L pcomm aneurysm, L parophthalmic aneurysm; brain compression, cerebral edema  osbtructive hydrocephalus  lacunar infarcts R caudate, B thalami, cerebellar hemispheres ?artery to artery vs cardioembolic?  atherosclerotic cardiovascular disease; mod to severe bilateral ICA stenosis (R>L), R VA stenosis  Hypertension dyslipidemia   COPD asthma  newly diagnosed Diabetes Mellitus?  troponin leak    PLAN: Day 1 = 12-09 ; Day 4 =  12/12; Day 21 = 12/29  NEURO: neurochecks q2h, VS q1, PRN pain meds with Tylenol   SAH:  off nimodipine   Hydrocephalus:  s/p VPS   Seizure treatment (cortical ICH, associated SDH, unclear etiology):  lacosamide 150mg PO BID   REHAB:  physical therapy evaluation and management    EARLY MOB:  OOB to chair    PULM:  Room air, incentive spirometry, continue montelukast, ipratropium / albuterol PRN   CARDIO:  SBP goal 150-200mm Hg, TTE EF 70%; midodrine 5mg PO q8h   ENDO:  Blood sugar goals 140-180 mg/dL, cont insulin sliding scale, atorvastatin 40mg  GI:  bowel regimen  DIET: insert NGT, start tube feeds, ?PEG next week  RENAL: maintain euvolemia, accurate Is and Os  HEM/ONC: Hb stable, FOBT  VTE Prophylaxis: SCDs, SQL  ID: febrile, leukocytosis improving; piperacillin/tazobactam (last day 01/04/2021)  Social: will update family    CORE MEASURES  1.  Nath and Jacobo Score = 3  2.  VTE prophylaxis:  [ ] administered within 24 hours of admission OR [X] reason not done: fresh bleed, unsecured aneurysm.  3.  Dysphagia screening:   [X] performed before any oral meds / liquids / food  4.  Nimodipine treatment:  [X] administered within 24 hours of admission OR [ ] reason not done:    ATTENDING ATTESTATION:  I was physically present for the key portions of the evaluation and management (E/M) service provided.  I agree with the above history, physical and plan, which I have reviewed and edited where appropriate.    Patient at high risk for neurological deterioration or death due to:  ICU delirium, aspiration PNA, DVT / PE.  Critical care time:  I have personally provided 30 minutes of critical care time, excluding time spent on separate procedures.      Plan discussed with RN, house staff.

## 2021-01-01 NOTE — PROGRESS NOTE ADULT - ASSESSMENT
74yo F  s/p cerebral angio for verapamil c/b device malfunction of Proglide, s/p right groin cutdown for removal of proglide catheter and femoral artery repair on 12/16/20     -Right groin monitoring, look for signs of infection  -Regular Pulse checks RLE  -Vascular surgery will continue to follow

## 2021-01-01 NOTE — PROCEDURE NOTE - NSPOSTPRCRAD_GEN_A_CORE
chest radiograph/feeding tube in correct gastric position
central line located in the superior vena cava/post-procedure radiography performed/no pneumothorax

## 2021-01-01 NOTE — PROCEDURE NOTE - NSPOSTCAREGUIDE_GEN_A_CORE
Verbal/written post procedure instructions were given to patient/caregiver

## 2021-01-01 NOTE — PROCEDURE NOTE - NSINFORMCONSENT_GEN_A_CORE
Benefits, risks, and possible complications of procedure explained to patient/caregiver who verbalized understanding and gave written consent.
Benefits, risks, and possible complications of procedure explained to patient/caregiver who verbalized understanding and gave verbal consent.
Benefits, risks, and possible complications of procedure explained to patient/caregiver who verbalized understanding and gave written consent.

## 2021-01-02 LAB
ANION GAP SERPL CALC-SCNC: 9 MMOL/L — SIGNIFICANT CHANGE UP (ref 5–17)
BUN SERPL-MCNC: 8 MG/DL — SIGNIFICANT CHANGE UP (ref 7–23)
CALCIUM SERPL-MCNC: 8.3 MG/DL — LOW (ref 8.4–10.5)
CHLORIDE SERPL-SCNC: 99 MMOL/L — SIGNIFICANT CHANGE UP (ref 96–108)
CO2 SERPL-SCNC: 28 MMOL/L — SIGNIFICANT CHANGE UP (ref 22–31)
CREAT SERPL-MCNC: 0.36 MG/DL — LOW (ref 0.5–1.3)
CULTURE RESULTS: SIGNIFICANT CHANGE UP
CULTURE RESULTS: SIGNIFICANT CHANGE UP
GLUCOSE SERPL-MCNC: 134 MG/DL — HIGH (ref 70–99)
HCT VFR BLD CALC: 30.7 % — LOW (ref 34.5–45)
HGB BLD-MCNC: 9.6 G/DL — LOW (ref 11.5–15.5)
MAGNESIUM SERPL-MCNC: 1.7 MG/DL — SIGNIFICANT CHANGE UP (ref 1.6–2.6)
MCHC RBC-ENTMCNC: 28.2 PG — SIGNIFICANT CHANGE UP (ref 27–34)
MCHC RBC-ENTMCNC: 31.3 GM/DL — LOW (ref 32–36)
MCV RBC AUTO: 90.3 FL — SIGNIFICANT CHANGE UP (ref 80–100)
NRBC # BLD: 0 /100 WBCS — SIGNIFICANT CHANGE UP (ref 0–0)
PHOSPHATE SERPL-MCNC: 2.2 MG/DL — LOW (ref 2.5–4.5)
PLATELET # BLD AUTO: 330 K/UL — SIGNIFICANT CHANGE UP (ref 150–400)
POTASSIUM SERPL-MCNC: 3 MMOL/L — LOW (ref 3.5–5.3)
POTASSIUM SERPL-SCNC: 3 MMOL/L — LOW (ref 3.5–5.3)
RBC # BLD: 3.4 M/UL — LOW (ref 3.8–5.2)
RBC # FLD: 16.7 % — HIGH (ref 10.3–14.5)
SODIUM SERPL-SCNC: 136 MMOL/L — SIGNIFICANT CHANGE UP (ref 135–145)
SPECIMEN SOURCE: SIGNIFICANT CHANGE UP
SPECIMEN SOURCE: SIGNIFICANT CHANGE UP
WBC # BLD: 7.42 K/UL — SIGNIFICANT CHANGE UP (ref 3.8–10.5)
WBC # FLD AUTO: 7.42 K/UL — SIGNIFICANT CHANGE UP (ref 3.8–10.5)

## 2021-01-02 PROCEDURE — 99024 POSTOP FOLLOW-UP VISIT: CPT

## 2021-01-02 PROCEDURE — 99291 CRITICAL CARE FIRST HOUR: CPT

## 2021-01-02 RX ORDER — MAGNESIUM SULFATE 500 MG/ML
2 VIAL (ML) INJECTION ONCE
Refills: 0 | Status: COMPLETED | OUTPATIENT
Start: 2021-01-02 | End: 2021-01-02

## 2021-01-02 RX ORDER — POTASSIUM PHOSPHATE, MONOBASIC POTASSIUM PHOSPHATE, DIBASIC 236; 224 MG/ML; MG/ML
30 INJECTION, SOLUTION INTRAVENOUS ONCE
Refills: 0 | Status: COMPLETED | OUTPATIENT
Start: 2021-01-02 | End: 2021-01-02

## 2021-01-02 RX ORDER — POTASSIUM CHLORIDE 20 MEQ
40 PACKET (EA) ORAL ONCE
Refills: 0 | Status: COMPLETED | OUTPATIENT
Start: 2021-01-02 | End: 2021-01-02

## 2021-01-02 RX ORDER — PANTOPRAZOLE SODIUM 20 MG/1
40 TABLET, DELAYED RELEASE ORAL
Refills: 0 | Status: DISCONTINUED | OUTPATIENT
Start: 2021-01-02 | End: 2021-01-26

## 2021-01-02 RX ORDER — SODIUM CHLORIDE 9 MG/ML
500 INJECTION INTRAMUSCULAR; INTRAVENOUS; SUBCUTANEOUS ONCE
Refills: 0 | Status: COMPLETED | OUTPATIENT
Start: 2021-01-02 | End: 2021-01-02

## 2021-01-02 RX ADMIN — SODIUM CHLORIDE 1000 MILLILITER(S): 9 INJECTION INTRAMUSCULAR; INTRAVENOUS; SUBCUTANEOUS at 02:24

## 2021-01-02 RX ADMIN — Medication 650 MILLIGRAM(S): at 23:42

## 2021-01-02 RX ADMIN — PANTOPRAZOLE SODIUM 40 MILLIGRAM(S): 20 TABLET, DELAYED RELEASE ORAL at 17:01

## 2021-01-02 RX ADMIN — Medication 500000 UNIT(S): at 17:01

## 2021-01-02 RX ADMIN — Medication 500000 UNIT(S): at 11:49

## 2021-01-02 RX ADMIN — PREGABALIN 1000 MICROGRAM(S): 225 CAPSULE ORAL at 11:49

## 2021-01-02 RX ADMIN — CHLORHEXIDINE GLUCONATE 15 MILLILITER(S): 213 SOLUTION TOPICAL at 17:01

## 2021-01-02 RX ADMIN — CHLORHEXIDINE GLUCONATE 1 APPLICATION(S): 213 SOLUTION TOPICAL at 07:00

## 2021-01-02 RX ADMIN — FLUDROCORTISONE ACETATE 0.2 MILLIGRAM(S): 0.1 TABLET ORAL at 21:37

## 2021-01-02 RX ADMIN — PIPERACILLIN AND TAZOBACTAM 25 GRAM(S): 4; .5 INJECTION, POWDER, LYOPHILIZED, FOR SOLUTION INTRAVENOUS at 17:01

## 2021-01-02 RX ADMIN — PIPERACILLIN AND TAZOBACTAM 25 GRAM(S): 4; .5 INJECTION, POWDER, LYOPHILIZED, FOR SOLUTION INTRAVENOUS at 23:42

## 2021-01-02 RX ADMIN — Medication 500000 UNIT(S): at 06:57

## 2021-01-02 RX ADMIN — Medication 325 MILLIGRAM(S): at 11:49

## 2021-01-02 RX ADMIN — Medication 40 MILLIEQUIVALENT(S): at 07:32

## 2021-01-02 RX ADMIN — MODAFINIL 100 MILLIGRAM(S): 200 TABLET ORAL at 11:49

## 2021-01-02 RX ADMIN — SODIUM CHLORIDE 3 GRAM(S): 9 INJECTION INTRAMUSCULAR; INTRAVENOUS; SUBCUTANEOUS at 06:57

## 2021-01-02 RX ADMIN — PIPERACILLIN AND TAZOBACTAM 25 GRAM(S): 4; .5 INJECTION, POWDER, LYOPHILIZED, FOR SOLUTION INTRAVENOUS at 06:56

## 2021-01-02 RX ADMIN — MIDODRINE HYDROCHLORIDE 5 MILLIGRAM(S): 2.5 TABLET ORAL at 06:57

## 2021-01-02 RX ADMIN — SODIUM CHLORIDE 3 GRAM(S): 9 INJECTION INTRAMUSCULAR; INTRAVENOUS; SUBCUTANEOUS at 23:42

## 2021-01-02 RX ADMIN — MONTELUKAST 10 MILLIGRAM(S): 4 TABLET, CHEWABLE ORAL at 11:49

## 2021-01-02 RX ADMIN — POTASSIUM PHOSPHATE, MONOBASIC POTASSIUM PHOSPHATE, DIBASIC 83.33 MILLIMOLE(S): 236; 224 INJECTION, SOLUTION INTRAVENOUS at 11:49

## 2021-01-02 RX ADMIN — CHLORHEXIDINE GLUCONATE 15 MILLILITER(S): 213 SOLUTION TOPICAL at 06:57

## 2021-01-02 RX ADMIN — ATORVASTATIN CALCIUM 40 MILLIGRAM(S): 80 TABLET, FILM COATED ORAL at 21:37

## 2021-01-02 RX ADMIN — ENOXAPARIN SODIUM 40 MILLIGRAM(S): 100 INJECTION SUBCUTANEOUS at 21:37

## 2021-01-02 RX ADMIN — Medication 500000 UNIT(S): at 23:44

## 2021-01-02 RX ADMIN — MIDODRINE HYDROCHLORIDE 5 MILLIGRAM(S): 2.5 TABLET ORAL at 21:37

## 2021-01-02 RX ADMIN — LACOSAMIDE 150 MILLIGRAM(S): 50 TABLET ORAL at 18:08

## 2021-01-02 RX ADMIN — SODIUM CHLORIDE 3 GRAM(S): 9 INJECTION INTRAMUSCULAR; INTRAVENOUS; SUBCUTANEOUS at 11:49

## 2021-01-02 RX ADMIN — PIPERACILLIN AND TAZOBACTAM 25 GRAM(S): 4; .5 INJECTION, POWDER, LYOPHILIZED, FOR SOLUTION INTRAVENOUS at 11:49

## 2021-01-02 RX ADMIN — LACOSAMIDE 150 MILLIGRAM(S): 50 TABLET ORAL at 07:29

## 2021-01-02 RX ADMIN — Medication 50 GRAM(S): at 07:32

## 2021-01-02 RX ADMIN — SODIUM CHLORIDE 3 GRAM(S): 9 INJECTION INTRAMUSCULAR; INTRAVENOUS; SUBCUTANEOUS at 17:02

## 2021-01-02 RX ADMIN — Medication 3 MILLILITER(S): at 21:37

## 2021-01-02 RX ADMIN — MIDODRINE HYDROCHLORIDE 5 MILLIGRAM(S): 2.5 TABLET ORAL at 15:51

## 2021-01-02 NOTE — SWALLOW BEDSIDE ASSESSMENT ADULT - COMMENTS
Pt known to our services with multiple swallow evaluations. Last encounter on 12/26 with recommendations to continue a pureed diet with thin liquids. However, per EMR, pt currently NPO with NGT.
Known to our service during current hospitalization (12/20-12/22). On 12/22, pt with poor secretions management, overt s/s of aspiration, and increased WOB. NPO with NGT was recommended pending FEES. MD requesting to hold FEES d/t fluctuating arousal. Now reconsulted.

## 2021-01-02 NOTE — SWALLOW BEDSIDE ASSESSMENT ADULT - SWALLOW EVAL: RECOMMENDED FEEDING/EATING TECHNIQUES
maintain upright posture during/after eating for 30 mins/oral hygiene/position upright (90 degrees)/small sips/bites
oral hygiene/position upright (90 degrees)/small sips/bites
Small ecchymosis over R anterior tib/fib.

## 2021-01-02 NOTE — PROGRESS NOTE ADULT - SUBJECTIVE AND OBJECTIVE BOX
HPI:  74y/o F with PMHx sig for COPD, asthma, HLD, HTN, BIBEMS to [facility] from home after HHA discovered patient found down in floor covered in non-bloody vomitus. Perr HHA Pt went to the bathroom and was taking a long time, went in to check on her and found her sitting on floor, awake, however with vomitus on front of shirt. On arrival to [facility], patient was taken for stat head CT, which revealed diffuse subarachnoid hemorrhage mainly at basilar cisterns with IVH and obstructive hydrocephalus. Patient was given a bolus of 1g keppra and dilaudid pushes for generalized headache. CTA concerning for 3mm left pcomm aneurysm and a 1.6mm outpouching of distal cavernous segment of the left internal carotid artery likely aneurysm. NIHSS2 HH3 MF4. Patient was transferred to [facility] for further intervention. Patient currently reports headaches. Pt denies acute changes in vision, seizures, CP, SOB, weakness/paresthesias of arms or legs. (09 Dec 2020 23:37)    Hospital Course:   12/9: BD1 Patient admitted for SAH HH3, MF4.   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 EVD at 5, on Levo overnight, nimodipine discontinued d/t hypotension  12/12: BD4 POD#2, VIVIEN overnight, neuro stable, passed TOV  12/13: BD5 POD#3: VIVIEN x 24 hrs  12/14: BD6 POD#4. VIVIEN o/n, neuro stable. EVD at 5cm H2O  12/15: BD7 POD #5 VIVIEN overnight, neuro stable. EVD remains at 5. Plan for CTA today.   12/16: BD8 POD #6 VIVIEN overnight, neuro stable, EVD at 0, on Levo, started on 3% at 15 overnight   12/17: BD9 POD#1 s/p cerebral angio for verapamil c/b device malfunction of Proglide, s/p right groin cutdown for removal of proglide catheter and femoral artery repair.  VIVIEN overnight, neuro stable, EVD at 0. patient remained intubated overnight, on propofol for sedation, and vEEG  12/18: BD#10, POD#8 s/p coil/embo of L pcomm aneurysm, POD#2 s/p IA verapamil, POD#2 R groin cutdown for removal of proglide catheter w/ repair of femoral artery. VIVIEN overnight. EVD remains open @0cmH2O   12/19: BD#11, POD#9 s/p coil/embo of L pcomm aneurysm. POD#3 s/p IA verapamil, POD#3 s/p R femoral artery cutdown of proglide catheter w/ femoral artery repair. VIVIEN overnight. EVD remains open @0cmH2O. EEG shows b/l frontal sharp discharges, cont monitoring, vimpat was increased yesterday. Gentle hydration, total IVF 50cc/hr. Cont vanc/zosyn for empiric coverage, next vanc trough due @1pm on 12/19. F/u daily CXR.   12/20 BD#12 POD#10 VIVIEN overnight, Neuro exam stable, EVD @ 0cm H2O, vEEG, 3% @ 15 goal WNL, TF via NGT  12/21: BD13, POD11 VIVIEN overnight. EVD at 5cmH20 (raised yesterday), vEEG in place  12/22 BD#14, EVD raised to 15 yesterday, 3% @ 30cc Goal WNL, -180, Milrinone, TTE prior to DC as per Card for takotsubo cardiomyopathy, failed S&S pending reeval, TF via NGT, Neuro exam stable  12/23: BD#15. VIVIEN o/n, neuro stable. EVD clamped. 30%@30cc/hr  12/24 BD#16 VIVIEN overnight, spiked 102, EVD @ 0cm H2O, 3% @ 30cc/hr Goal WNL, Vasc following, TF via NGT, -200,   12/25: BD#17. VIVIEN overnight. Pan cx from 12/23, NGTD on CSF and blood. EVD clamped. 3% @15cc/hr, rate kept same overnight. NGT feeds at goal. SBP goal 160-200.   12/26: BD#18. VIVIEN overnight, neuro exam stable. NGTD from pan cx on 12/23. EVD removed today. 3% discontinued 12/25. NGT feeds at goal, IVF off. SBP goal 160-200.   12/27: BD#19 VIVIEN overnight, neuro stable. 3% at 15, stepdown status  12/28: BD20 VIVIEN overnight, neuro stable. 3% continues.  Patient became increasingly more somnolent and underwent LP for CSF high volume tap.  Temporary improvement.  Spiked fever, pancultured.  12/29: BD21 Patient increasingly more somnolent, EVD placed at bedside.    12/30: BD22 pt. is s/p R VPS placement, certas @5 POD#1, post op ct head c/a/p done. afebrile o/n.   1/1: BD#23: pt. is s/p R VPS placement, certas @5 POD#2, post op ct head c/a/p done. zosyn for enterobacter   1/2: BD #24.     Vital Signs Last 24 Hrs  T(C): 37.6 (02 Jan 2021 14:26), Max: 38.1 (01 Jan 2021 21:45)  T(F): 99.7 (02 Jan 2021 14:26), Max: 100.5 (01 Jan 2021 21:45)  HR: 87 (02 Jan 2021 14:02) (69 - 90)  BP: 178/104 (02 Jan 2021 14:02) (101/49 - 195/80)  BP(mean): 129 (02 Jan 2021 14:02) (71 - 129)  RR: 28 (02 Jan 2021 14:02) (19 - 28)  SpO2: 100% (02 Jan 2021 14:02) (95% - 100%)    I&O's Detail    01 Jan 2021 07:01  -  02 Jan 2021 07:00  --------------------------------------------------------  IN:    Enteral Tube Flush: 140 mL    Glucerna: 765 mL    IV PiggyBack: 815 mL    PRBCs (Packed Red Blood Cells): 100 mL    Sodium Chloride 0.9% Bolus: 1000 mL  Total IN: 2820 mL    OUT:    Intermittent Catheterization - Urethral (mL): 2350 mL  Total OUT: 2350 mL    Total NET: 470 mL      02 Jan 2021 07:01  -  02 Jan 2021 14:38  --------------------------------------------------------  IN:    Enteral Tube Flush: 120 mL    Glucerna: 225 mL    IV PiggyBack: 100 mL  Total IN: 445 mL    OUT:    Intermittent Catheterization - Urethral (mL): 550 mL  Total OUT: 550 mL    Total NET: -105 mL        I&O's Summary    01 Jan 2021 07:01  -  02 Jan 2021 07:00  --------------------------------------------------------  IN: 2820 mL / OUT: 2350 mL / NET: 470 mL    02 Jan 2021 07:01  -  02 Jan 2021 14:38  --------------------------------------------------------  IN: 445 mL / OUT: 550 mL / NET: -105 mL        PHYSICAL EXAM:  Neurological:    Motor exam:         [] Upper extremity              Bi(c5)  WE(c6)  EE(c7)   FF(c8)                                                R         5/5        5/5        5/5       5/5                                               L          5/5        5/5        5/5       5/5         [] Lower extremeity          HF(l2)   KE(l3)    TA(l4)   EHL(l5)  GS(s1)                                                 R        5/5        5/5        5/5       5/5         5/5                                               L         5/5        5/5       5/5       5/5          5/5                                                        [] warm well perfused; capillary refill <3 seconds     Sensation: [] intact to light touch  [] decreased:       Cardiovascular:  Respiratory:  Gastrointestinal:  Genitourinary:  Extremities:    Incision/Wound:    DEVICE/DRAIN DRESSING:    TUBES/LINES:  [] CVC  [] A-line  [] Lumbar Drain  [] Ventriculostomy  [] Other    DIET:  [] NPO  [] Mechanical  [] Tube feeds    LABS:                        9.6    7.42  )-----------( 330      ( 02 Jan 2021 05:17 )             30.7     01-02    136  |  99  |  8   ----------------------------<  134<H>  3.0<L>   |  28  |  0.36<L>    Ca    8.3<L>      02 Jan 2021 05:17  Phos  2.2     01-02  Mg     1.7     01-02              CAPILLARY BLOOD GLUCOSE      POCT Blood Glucose.: 145 mg/dL (02 Jan 2021 11:05)  POCT Blood Glucose.: 119 mg/dL (02 Jan 2021 06:18)  POCT Blood Glucose.: 138 mg/dL (01 Jan 2021 22:01)  POCT Blood Glucose.: 133 mg/dL (01 Jan 2021 16:51)      Drug Levels: [] N/A    CSF Analysis: [] N/A      Allergies    No Known Allergies    Intolerances      MEDICATIONS:  Antibiotics:  nystatin    Suspension 910560 Unit(s) Oral four times a day  piperacillin/tazobactam IVPB.. 3.375 Gram(s) IV Intermittent every 6 hours    Neuro:  acetaminophen    Suspension .. 650 milliGRAM(s) Oral every 6 hours PRN  lacosamide Solution 150 milliGRAM(s) Oral every 12 hours  modafinil 100 milliGRAM(s) Oral daily    Anticoagulation:  enoxaparin Injectable 40 milliGRAM(s) SubCutaneous at bedtime    OTHER:  albuterol/ipratropium for Nebulization. 3 milliLiter(s) Nebulizer every 6 hours PRN  atorvastatin 40 milliGRAM(s) Oral at bedtime  chlorhexidine 0.12% Liquid 15 milliLiter(s) Oral Mucosa two times a day  chlorhexidine 2% Cloths 1 Application(s) Topical <User Schedule>  dextrose 40% Gel 15 Gram(s) Oral once  dextrose 50% Injectable 25 Gram(s) IV Push once  fludroCORTISONE 0.2 milliGRAM(s) Oral at bedtime  glucagon  Injectable 1 milliGRAM(s) IntraMuscular once  insulin lispro (ADMELOG) corrective regimen sliding scale   SubCutaneous Before meals and at bedtime  labetalol Injectable 2.5 milliGRAM(s) IV Push every 6 hours PRN  midodrine 5 milliGRAM(s) Oral every 8 hours  montelukast 10 milliGRAM(s) Oral daily  pantoprazole   Suspension 40 milliGRAM(s) Oral two times a day    IVF:  cyanocobalamin 1000 MICROGram(s) Oral daily  dextrose 5%. 1000 milliLiter(s) IV Continuous <Continuous>  dextrose 5%. 1000 milliLiter(s) IV Continuous <Continuous>  ferrous    sulfate 325 milliGRAM(s) Oral daily  sodium chloride 3 Gram(s) Oral every 6 hours    CULTURES:  Culture Results:   No growth at 1 day. (12-31 @ 22:37)  Culture Results:   No growth at 1 day. (12-31 @ 22:37)    RADIOLOGY & ADDITIONAL TESTS:      ASSESSMENT:  75y Female s/p    HEADACHE    Handoff    MEWS Score    Hyperlipidemia    Hypertension    COPD (chronic obstructive pulmonary disease)    Asthma    COPD (chronic obstructive pulmonary disease)    Asthma    Hydrocephalus, adult    Injury of right femoral artery    Cerebral artery vasospasm    SAH (subarachnoid hemorrhage)    Hydrocephalus, adult    Injury of femoral artery    Cerebral artery vasospasm    SAH (subarachnoid hemorrhage)    Subarachnoid bleed    Obstructive hydrocephalus    Anemia due to acute blood loss    Preoperative clearance    Centrilobular emphysema    Mild persistent asthma without complication    Subarachnoid bleed    Essential hypertension    Pure hypercholesterolemia    Ventriculoperitoneal shunt    Insertion of external ventricular drain    Vascular surgery procedure    Angiogram, carotid and cerebral, bilateral    Angiogram, cerebral, with intracranial aneurysm embolization    No significant past surgical history    HEADACHE    90+    [email or web address]        PLAN:  NEURO:    CARDIOVASCULAR:    PULMONARY:    RENAL:    GI:    HEME:    ID:    ENDO:    DVT PROPHYLAXIS:  [] Venodynes                                [] Heparin/Lovenox    FALL RISK:  [] Low Risk                                    [] Impulsive    DISPOSITION:  HPI:  76y/o F with PMHx sig for COPD, asthma, HLD, HTN, BIBEMS to [facility] from home after HHA discovered patient found down in floor covered in non-bloody vomitus. Perr HHA Pt went to the bathroom and was taking a long time, went in to check on her and found her sitting on floor, awake, however with vomitus on front of shirt. On arrival to [facility], patient was taken for stat head CT, which revealed diffuse subarachnoid hemorrhage mainly at basilar cisterns with IVH and obstructive hydrocephalus. Patient was given a bolus of 1g keppra and dilaudid pushes for generalized headache. CTA concerning for 3mm left pcomm aneurysm and a 1.6mm outpouching of distal cavernous segment of the left internal carotid artery likely aneurysm. NIHSS2 HH3 MF4. Patient was transferred to [facility] for further intervention. Patient currently reports headaches. Pt denies acute changes in vision, seizures, CP, SOB, weakness/paresthesias of arms or legs. (09 Dec 2020 23:37)    Hospital Course:   12/9: BD1 Patient admitted for SAH HH3, MF4.   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 EVD at 5, on Levo overnight, nimodipine discontinued d/t hypotension  12/12: BD4 POD#2, VIVIEN overnight, neuro stable, passed TOV  12/13: BD5 POD#3: VIVIEN x 24 hrs  12/14: BD6 POD#4. VIVIEN o/n, neuro stable. EVD at 5cm H2O  12/15: BD7 POD #5 VIVIEN overnight, neuro stable. EVD remains at 5. Plan for CTA today.   12/16: BD8 POD #6 VIVIEN overnight, neuro stable, EVD at 0, on Levo, started on 3% at 15 overnight   12/17: BD9 POD#1 s/p cerebral angio for verapamil c/b device malfunction of Proglide, s/p right groin cutdown for removal of proglide catheter and femoral artery repair.  VIVIEN overnight, neuro stable, EVD at 0. patient remained intubated overnight, on propofol for sedation, and vEEG  12/18: BD#10, POD#8 s/p coil/embo of L pcomm aneurysm, POD#2 s/p IA verapamil, POD#2 R groin cutdown for removal of proglide catheter w/ repair of femoral artery. VIVIEN overnight. EVD remains open @0cmH2O   12/19: BD#11, POD#9 s/p coil/embo of L pcomm aneurysm. POD#3 s/p IA verapamil, POD#3 s/p R femoral artery cutdown of proglide catheter w/ femoral artery repair. VIVIEN overnight. EVD remains open @0cmH2O. EEG shows b/l frontal sharp discharges, cont monitoring, vimpat was increased yesterday. Gentle hydration, total IVF 50cc/hr. Cont vanc/zosyn for empiric coverage, next vanc trough due @1pm on 12/19. F/u daily CXR.   12/20 BD#12 POD#10 VIVIEN overnight, Neuro exam stable, EVD @ 0cm H2O, vEEG, 3% @ 15 goal WNL, TF via NGT  12/21: BD13, POD11 VIVIEN overnight. EVD at 5cmH20 (raised yesterday), vEEG in place  12/22 BD#14, EVD raised to 15 yesterday, 3% @ 30cc Goal WNL, -180, Milrinone, TTE prior to DC as per Card for takotsubo cardiomyopathy, failed S&S pending reeval, TF via NGT, Neuro exam stable  12/23: BD#15. VIVIEN o/n, neuro stable. EVD clamped. 30%@30cc/hr  12/24 BD#16 VIVIEN overnight, spiked 102, EVD @ 0cm H2O, 3% @ 30cc/hr Goal WNL, Vasc following, TF via NGT, -200,   12/25: BD#17. VIVIEN overnight. Pan cx from 12/23, NGTD on CSF and blood. EVD clamped. 3% @15cc/hr, rate kept same overnight. NGT feeds at goal. SBP goal 160-200.   12/26: BD#18. VIVIEN overnight, neuro exam stable. NGTD from pan cx on 12/23. EVD removed today. 3% discontinued 12/25. NGT feeds at goal, IVF off. SBP goal 160-200.   12/27: BD#19 VIVIEN overnight, neuro stable. 3% at 15, stepdown status  12/28: BD20 VIVIEN overnight, neuro stable. 3% continues.  Patient became increasingly more somnolent and underwent LP for CSF high volume tap.  Temporary improvement.  Spiked fever, pancultured.  12/29: BD21 Patient increasingly more somnolent, EVD placed at bedside.    12/30: BD22 pt. is s/p R VPS placement, certas @5 POD#1, post op ct head c/a/p done. afebrile o/n.   1/1: BD#23: pt. is s/p R VPS placement, certas @5 POD#2, post op ct head c/a/p done. zosyn for enterobacter   1/2: BD #24. POD#3 s/p R VPS placement, CErtas @5. Dressings removed from head and abdomen. Cont zosyn for enterobacter and e. coli in urine, end date 1/4/21. Post-op imaging completed. Pend S&S re-eval, improvement in mental status/neuro exam.     Vital Signs Last 24 Hrs  T(C): 37.6 (02 Jan 2021 14:26), Max: 38.1 (01 Jan 2021 21:45)  T(F): 99.7 (02 Jan 2021 14:26), Max: 100.5 (01 Jan 2021 21:45)  HR: 87 (02 Jan 2021 14:02) (69 - 90)  BP: 178/104 (02 Jan 2021 14:02) (101/49 - 195/80)  BP(mean): 129 (02 Jan 2021 14:02) (71 - 129)  RR: 28 (02 Jan 2021 14:02) (19 - 28)  SpO2: 100% (02 Jan 2021 14:02) (95% - 100%)    I&O's Detail    01 Jan 2021 07:01  -  02 Jan 2021 07:00  --------------------------------------------------------  IN:    Enteral Tube Flush: 140 mL    Glucerna: 765 mL    IV PiggyBack: 815 mL    PRBCs (Packed Red Blood Cells): 100 mL    Sodium Chloride 0.9% Bolus: 1000 mL  Total IN: 2820 mL    OUT:    Intermittent Catheterization - Urethral (mL): 2350 mL  Total OUT: 2350 mL    Total NET: 470 mL      02 Jan 2021 07:01  -  02 Jan 2021 14:38  --------------------------------------------------------  IN:    Enteral Tube Flush: 120 mL    Glucerna: 225 mL    IV PiggyBack: 100 mL  Total IN: 445 mL    OUT:    Intermittent Catheterization - Urethral (mL): 550 mL  Total OUT: 550 mL    Total NET: -105 mL        I&O's Summary    01 Jan 2021 07:01  -  02 Jan 2021 07:00  --------------------------------------------------------  IN: 2820 mL / OUT: 2350 mL / NET: 470 mL    02 Jan 2021 07:01  -  02 Jan 2021 14:38  --------------------------------------------------------  IN: 445 mL / OUT: 550 mL / NET: -105 mL        PHYSICAL EXAM:  General: NAD, pt is comfortably sitting up in bed, A&O x1 (non-compliant w/ majority of exam)   HEENT: PERRL 3mm, EOMI b/l, face symmetric, tongue midline   Cardiovascular: RRR, normal S1 and S2   Respiratory: lungs CTAB, no wheezing, rhonchi, or crackles   GI: normoactive BS to auscultation, abd soft, NTND   Neuro: no aphasia, speech clear, no dysmetria, no pronator drift  ZAFAR x4 spontaneously, unable to assess strength d/t non-compliance w/ exam   Extremities: distal pulses 2+ x4   Wound/incision: R frontal EVD site C/D/I, R groin site C/D/I   Drains: EVD removed     TUBES/LINES:  [] CVC  [] A-line  [] Lumbar Drain  [] Ventriculostomy  [] Other    DIET:  [] NPO  [] Mechanical  [] Tube feeds    LABS:                        9.6    7.42  )-----------( 330      ( 02 Jan 2021 05:17 )             30.7     01-02    136  |  99  |  8   ----------------------------<  134<H>  3.0<L>   |  28  |  0.36<L>    Ca    8.3<L>      02 Jan 2021 05:17  Phos  2.2     01-02  Mg     1.7     01-02              CAPILLARY BLOOD GLUCOSE      POCT Blood Glucose.: 145 mg/dL (02 Jan 2021 11:05)  POCT Blood Glucose.: 119 mg/dL (02 Jan 2021 06:18)  POCT Blood Glucose.: 138 mg/dL (01 Jan 2021 22:01)  POCT Blood Glucose.: 133 mg/dL (01 Jan 2021 16:51)      Drug Levels: [] N/A    CSF Analysis: [] N/A      Allergies    No Known Allergies    Intolerances      MEDICATIONS:  Antibiotics:  nystatin    Suspension [id number] Unit(s) Oral four times a day  piperacillin/tazobactam IVPB.. 3.375 Gram(s) IV Intermittent every 6 hours    Neuro:  acetaminophen    Suspension .. 650 milliGRAM(s) Oral every 6 hours PRN  lacosamide Solution 150 milliGRAM(s) Oral every 12 hours  modafinil 100 milliGRAM(s) Oral daily    Anticoagulation:  enoxaparin Injectable 40 milliGRAM(s) SubCutaneous at bedtime    OTHER:  albuterol/ipratropium for Nebulization. 3 milliLiter(s) Nebulizer every 6 hours PRN  atorvastatin 40 milliGRAM(s) Oral at bedtime  chlorhexidine 0.12% Liquid 15 milliLiter(s) Oral Mucosa two times a day  chlorhexidine 2% Cloths 1 Application(s) Topical <User Schedule>  dextrose 40% Gel 15 Gram(s) Oral once  dextrose 50% Injectable 25 Gram(s) IV Push once  fludroCORTISONE 0.2 milliGRAM(s) Oral at bedtime  glucagon  Injectable 1 milliGRAM(s) IntraMuscular once  insulin lispro (ADMELOG) corrective regimen sliding scale   SubCutaneous Before meals and at bedtime  labetalol Injectable 2.5 milliGRAM(s) IV Push every 6 hours PRN  midodrine 5 milliGRAM(s) Oral every 8 hours  montelukast 10 milliGRAM(s) Oral daily  pantoprazole   Suspension 40 milliGRAM(s) Oral two times a day    IVF:  cyanocobalamin 1000 MICROGram(s) Oral daily  dextrose 5%. 1000 milliLiter(s) IV Continuous <Continuous>  dextrose 5%. 1000 milliLiter(s) IV Continuous <Continuous>  ferrous    sulfate 325 milliGRAM(s) Oral daily  sodium chloride 3 Gram(s) Oral every 6 hours    CULTURES:  Culture Results:   No growth at 1 day. (12-31 @ 22:37)  Culture Results:   No growth at 1 day. (12-31 @ 22:37)    RADIOLOGY & ADDITIONAL TESTS:      ASSESSMENT:  75y Female s/p    HEADACHE    Handoff    MEWS Score    Hyperlipidemia    Hypertension    COPD (chronic obstructive pulmonary disease)    Asthma    COPD (chronic obstructive pulmonary disease)    Asthma    Hydrocephalus, adult    Injury of right femoral artery    Cerebral artery vasospasm    SAH (subarachnoid hemorrhage)    Hydrocephalus, adult    Injury of femoral artery    Cerebral artery vasospasm    SAH (subarachnoid hemorrhage)    Subarachnoid bleed    Obstructive hydrocephalus    Anemia due to acute blood loss    Preoperative clearance    Centrilobular emphysema    Mild persistent asthma without complication    Subarachnoid bleed    Essential hypertension    Pure hypercholesterolemia    Ventriculoperitoneal shunt    Insertion of external ventricular drain    Vascular surgery procedure    Angiogram, carotid and cerebral, bilateral    Angiogram, cerebral, with intracranial aneurysm embolization    No significant past surgical history    HEADACHE    90+    [email or web address]        PLAN:  NEURO:    CARDIOVASCULAR:    PULMONARY:    RENAL:    GI:    HEME:    ID:    ENDO:    DVT PROPHYLAXIS:  [] Venodynes                                [] Heparin/Lovenox    FALL RISK:  [] Low Risk                                    [] Impulsive    DISPOSITION:  HPI:  74y/o F with PMHx sig for COPD, asthma, HLD, HTN, BIBEMS to [facility] from home after HHA discovered patient found down in floor covered in non-bloody vomitus. Perr HHA Pt went to the bathroom and was taking a long time, went in to check on her and found her sitting on floor, awake, however with vomitus on front of shirt. On arrival to [facility], patient was taken for stat head CT, which revealed diffuse subarachnoid hemorrhage mainly at basilar cisterns with IVH and obstructive hydrocephalus. Patient was given a bolus of 1g keppra and dilaudid pushes for generalized headache. CTA concerning for 3mm left pcomm aneurysm and a 1.6mm outpouching of distal cavernous segment of the left internal carotid artery likely aneurysm. NIHSS2 HH3 MF4. Patient was transferred to [facility] for further intervention. Patient currently reports headaches. Pt denies acute changes in vision, seizures, CP, SOB, weakness/paresthesias of arms or legs. (09 Dec 2020 23:37)    Hospital Course:   12/9: BD1 Patient admitted for SAH HH3, MF4.   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 EVD at 5, on Levo overnight, nimodipine discontinued d/t hypotension  12/12: BD4 POD#2, VIVIEN overnight, neuro stable, passed TOV  12/13: BD5 POD#3: VIVIEN x 24 hrs  12/14: BD6 POD#4. VIVIEN o/n, neuro stable. EVD at 5cm H2O  12/15: BD7 POD #5 VIVIEN overnight, neuro stable. EVD remains at 5. Plan for CTA today.   12/16: BD8 POD #6 VIVIEN overnight, neuro stable, EVD at 0, on Levo, started on 3% at 15 overnight   12/17: BD9 POD#1 s/p cerebral angio for verapamil c/b device malfunction of Proglide, s/p right groin cutdown for removal of proglide catheter and femoral artery repair.  VIVIEN overnight, neuro stable, EVD at 0. patient remained intubated overnight, on propofol for sedation, and vEEG  12/18: BD#10, POD#8 s/p coil/embo of L pcomm aneurysm, POD#2 s/p IA verapamil, POD#2 R groin cutdown for removal of proglide catheter w/ repair of femoral artery. VIVIEN overnight. EVD remains open @0cmH2O   12/19: BD#11, POD#9 s/p coil/embo of L pcomm aneurysm. POD#3 s/p IA verapamil, POD#3 s/p R femoral artery cutdown of proglide catheter w/ femoral artery repair. VIVIEN overnight. EVD remains open @0cmH2O. EEG shows b/l frontal sharp discharges, cont monitoring, vimpat was increased yesterday. Gentle hydration, total IVF 50cc/hr. Cont vanc/zosyn for empiric coverage, next vanc trough due @1pm on 12/19. F/u daily CXR.   12/20 BD#12 POD#10 VIVIEN overnight, Neuro exam stable, EVD @ 0cm H2O, vEEG, 3% @ 15 goal WNL, TF via NGT  12/21: BD13, POD11 VIVIEN overnight. EVD at 5cmH20 (raised yesterday), vEEG in place  12/22 BD#14, EVD raised to 15 yesterday, 3% @ 30cc Goal WNL, -180, Milrinone, TTE prior to DC as per Card for takotsubo cardiomyopathy, failed S&S pending reeval, TF via NGT, Neuro exam stable  12/23: BD#15. VIVIEN o/n, neuro stable. EVD clamped. 30%@30cc/hr  12/24 BD#16 VIVIEN overnight, spiked 102, EVD @ 0cm H2O, 3% @ 30cc/hr Goal WNL, Vasc following, TF via NGT, -200,   12/25: BD#17. VIVIEN overnight. Pan cx from 12/23, NGTD on CSF and blood. EVD clamped. 3% @15cc/hr, rate kept same overnight. NGT feeds at goal. SBP goal 160-200.   12/26: BD#18. VIVIEN overnight, neuro exam stable. NGTD from pan cx on 12/23. EVD removed today. 3% discontinued 12/25. NGT feeds at goal, IVF off. SBP goal 160-200.   12/27: BD#19 VIVIEN overnight, neuro stable. 3% at 15, stepdown status  12/28: BD20 VIVIEN overnight, neuro stable. 3% continues.  Patient became increasingly more somnolent and underwent LP for CSF high volume tap.  Temporary improvement.  Spiked fever, pancultured.  12/29: BD21 Patient increasingly more somnolent, EVD placed at bedside.    12/30: BD22 pt. is s/p R VPS placement, certas @5 POD#1, post op ct head c/a/p done. afebrile o/n.   1/1: BD#23: pt. is s/p R VPS placement, certas @5 POD#2, post op ct head c/a/p done. zosyn for enterobacter   1/2: BD #24. POD#3 s/p R VPS placement, CErtas @5. Dressings removed from head and abdomen. Cont zosyn for enterobacter and e. coli in urine, end date 1/4/21. Post-op imaging completed. Pend S&S re-eval, improvement in mental status/neuro exam.     Vital Signs Last 24 Hrs  T(C): 37.6 (02 Jan 2021 14:26), Max: 38.1 (01 Jan 2021 21:45)  T(F): 99.7 (02 Jan 2021 14:26), Max: 100.5 (01 Jan 2021 21:45)  HR: 87 (02 Jan 2021 14:02) (69 - 90)  BP: 178/104 (02 Jan 2021 14:02) (101/49 - 195/80)  BP(mean): 129 (02 Jan 2021 14:02) (71 - 129)  RR: 28 (02 Jan 2021 14:02) (19 - 28)  SpO2: 100% (02 Jan 2021 14:02) (95% - 100%)    I&O's Detail    01 Jan 2021 07:01  -  02 Jan 2021 07:00  --------------------------------------------------------  IN:    Enteral Tube Flush: 140 mL    Glucerna: 765 mL    IV PiggyBack: 815 mL    PRBCs (Packed Red Blood Cells): 100 mL    Sodium Chloride 0.9% Bolus: 1000 mL  Total IN: 2820 mL    OUT:    Intermittent Catheterization - Urethral (mL): 2350 mL  Total OUT: 2350 mL    Total NET: 470 mL      02 Jan 2021 07:01  -  02 Jan 2021 14:38  --------------------------------------------------------  IN:    Enteral Tube Flush: 120 mL    Glucerna: 225 mL    IV PiggyBack: 100 mL  Total IN: 445 mL    OUT:    Intermittent Catheterization - Urethral (mL): 550 mL  Total OUT: 550 mL    Total NET: -105 mL        I&O's Summary    01 Jan 2021 07:01  -  02 Jan 2021 07:00  --------------------------------------------------------  IN: 2820 mL / OUT: 2350 mL / NET: 470 mL    02 Jan 2021 07:01  -  02 Jan 2021 14:38  --------------------------------------------------------  IN: 445 mL / OUT: 550 mL / NET: -105 mL        PHYSICAL EXAM:  General: NAD, pt is comfortably sitting up in bed, A&O x2    HEENT: PERRL 3mm, EOMI b/l, face symmetric, tongue midline, +hard of hearing  Cardiovascular: RRR, normal S1 and S2   Respiratory: lungs CTAB, no wheezing, rhonchi, or crackles   GI: normoactive BS to auscultation, abd soft, NTND   Neuro: no aphasia, speech clear, no dysmetria, no pronator drift, following commands   ZAFAR x4 spontaneously and with good strength  Extremities: distal pulses 2+ x4   Wound/incision: R frontal EVD site C/D/I, R groin site C/D/I   Drains: EVD d/c'd     TUBES/LINES:  [] CVC  [] A-line  [] Lumbar Drain  [] Ventriculostomy  [] Other    DIET:  [] NPO  [] Mechanical  [X] Tube feeds    LABS:                        9.6    7.42  )-----------( 330      ( 02 Jan 2021 05:17 )             30.7     01-02    136  |  99  |  8   ----------------------------<  134<H>  3.0<L>   |  28  |  0.36<L>    Ca    8.3<L>      02 Jan 2021 05:17  Phos  2.2     01-02  Mg     1.7     01-02              CAPILLARY BLOOD GLUCOSE      POCT Blood Glucose.: 145 mg/dL (02 Jan 2021 11:05)  POCT Blood Glucose.: 119 mg/dL (02 Jan 2021 06:18)  POCT Blood Glucose.: 138 mg/dL (01 Jan 2021 22:01)  POCT Blood Glucose.: 133 mg/dL (01 Jan 2021 16:51)      Drug Levels: [] N/A    CSF Analysis: [] N/A      Allergies    No Known Allergies    Intolerances      MEDICATIONS:  Antibiotics:  nystatin    Suspension 272536 Unit(s) Oral four times a day  piperacillin/tazobactam IVPB.. 3.375 Gram(s) IV Intermittent every 6 hours    Neuro:  acetaminophen    Suspension .. 650 milliGRAM(s) Oral every 6 hours PRN  lacosamide Solution 150 milliGRAM(s) Oral every 12 hours  modafinil 100 milliGRAM(s) Oral daily    Anticoagulation:  enoxaparin Injectable 40 milliGRAM(s) SubCutaneous at bedtime    OTHER:  albuterol/ipratropium for Nebulization. 3 milliLiter(s) Nebulizer every 6 hours PRN  atorvastatin 40 milliGRAM(s) Oral at bedtime  chlorhexidine 0.12% Liquid 15 milliLiter(s) Oral Mucosa two times a day  chlorhexidine 2% Cloths 1 Application(s) Topical <User Schedule>  dextrose 40% Gel 15 Gram(s) Oral once  dextrose 50% Injectable 25 Gram(s) IV Push once  fludroCORTISONE 0.2 milliGRAM(s) Oral at bedtime  glucagon  Injectable 1 milliGRAM(s) IntraMuscular once  insulin lispro (ADMELOG) corrective regimen sliding scale   SubCutaneous Before meals and at bedtime  labetalol Injectable 2.5 milliGRAM(s) IV Push every 6 hours PRN  midodrine 5 milliGRAM(s) Oral every 8 hours  montelukast 10 milliGRAM(s) Oral daily  pantoprazole   Suspension 40 milliGRAM(s) Oral two times a day    IVF:  cyanocobalamin 1000 MICROGram(s) Oral daily  dextrose 5%. 1000 milliLiter(s) IV Continuous <Continuous>  dextrose 5%. 1000 milliLiter(s) IV Continuous <Continuous>  ferrous    sulfate 325 milliGRAM(s) Oral daily  sodium chloride 3 Gram(s) Oral every 6 hours    CULTURES:  Culture Results:   No growth at 1 day. (12-31 @ 22:37)  Culture Results:   No growth at 1 day. (12-31 @ 22:37)    RADIOLOGY & ADDITIONAL TESTS:      ASSESSMENT:  75y Female  with PMHx of COPD, asthma, HLD, HTN, BIBEMS to [facility] from home after HHA found patient down on the floor covered in non-bloody vomitus. CTH reveals diffuse subarachnoid hemorrhage mainly at basilar cisterns with IVH and obstructive hydrocephalus, CTA shows 3mm left pcomm aneurysm, now s/p bedside right frontal EVD placement 12/10, s/p cerebral angiogram for coil embolization of left PCOMM aneurysm 12/10, now   s/p cerebral angio for verapamil c/b device malfunction of Proglide, s/p right groin cutdown for removal of proglide catheter and femoral artery repair (12/17/2020), NIHSS2 HH3 MF4), s/p EVD removal 12/25,   s/p bedside EVD placement  12/29, POD#3 from  VPS certas at 5  BD#24.     HEADACHE    Handoff    MEWS Score    Hyperlipidemia    Hypertension    COPD (chronic obstructive pulmonary disease)    Asthma    COPD (chronic obstructive pulmonary disease)    Asthma    Hydrocephalus, adult    Injury of right femoral artery    Cerebral artery vasospasm    SAH (subarachnoid hemorrhage)    Hydrocephalus, adult    Injury of femoral artery    Cerebral artery vasospasm    SAH (subarachnoid hemorrhage)    Subarachnoid bleed    Obstructive hydrocephalus    Anemia due to acute blood loss    Preoperative clearance    Centrilobular emphysema    Mild persistent asthma without complication    Subarachnoid bleed    Essential hypertension    Pure hypercholesterolemia    Ventriculoperitoneal shunt    Insertion of external ventricular drain    Vascular surgery procedure    Angiogram, carotid and cerebral, bilateral    Angiogram, cerebral, with intracranial aneurysm embolization    No significant past surgical history    HEADACHE    90+    [email or web address]        PLAN:  NEURO:  - neuro checks  - vitals checks  - pain control  - cont Modafinil  - off Nimodipine 2/2 hypotension  - Aspirin 81 for b/l carotid stenosis is stopped for OR , resume when?   - CTH /CT abd post shunt performed, no active issues   - cont Vimpat from seizure ppx   - pend possible SDU     CARDIOVASCULAR:  - -200  - echo and CCTA prior to discharge  - Taketsubo resolved, EF improved from 30% to 70%   - IVL    PULMONARY:  - on room air   - duoneb prm     RENAL:  - normonatremia goal   - s/p bolus yesterday   - cont florinef, salt tabs  - straight cath prn     GI:  - NPO, TF    - bowel regimen  - pend S&S re-eval to advance diet     HEME:  - monitor H/H  - PO iron for anemia  - heme occult + protonix BID started   - doppler + for R arm cephalic thrombus, ? restart SQL     ID:  - fever spike, repeat cx sent 12/31 night   - urine growing enterobacter, on Zosyn until 1/4   - abd CT also c/w RLL pna/vs consolidation, monitor clinically/ repeat xray     ENDO:  - Lantus, ISS    DVT PROPHYLAXIS:  [x] Venodynes                                [X] Heparin/Lovenox     DISPOSITION: ICU status, full code, dispo pending  Assessment and plan discussed w/ Dr. Serrano and Dr. Nino      Assessment:  Present when checked    []  GCS  E   V  M     Heart Failure: []Acute, [] acute on chronic , []chronic  Heart Failure:  [] Diastolic (HFpEF), [] Systolic (HFrEF), []Combined (HFpEF and HFrEF), [] RHF, [] Pulm HTN, [] Other    [] MICHAELLE, [] ATN, [] AIN, [] other  [] CKD1, [] CKD2, [] CKD 3, [] CKD 4, [] CKD 5, []ESRD    Encephalopathy: [] Metabolic, [] Hepatic, [] toxic, [] Neurological, [] Other    Abnormal Nurtitional Status: [] malnurtition (see nutrition note), [ ]underweight: BMI < 19, [] morbid obesity: BMI >40, [] Cachexia    [] Sepsis  [] hypovolemic shock,[] cardiogenic shock, [] hemorrhagic shock, [] neuogenic shock  [] Acute Respiratory Failure  []Cerebral edema, [] Brain compression/ herniation,   [] Functional quadriplegia  [] Acute blood loss anemia

## 2021-01-02 NOTE — PROGRESS NOTE ADULT - SUBJECTIVE AND OBJECTIVE BOX
=================================  NEUROCRITICAL CARE ATTENDING NOTE  =================================    CARA CANCHOLA   MRN-[id number]  Summary:  75y/F with COPD, asthma, dyslipidemia Hypertension found down.  Brought to [facility] where imaging showed SAH basilar cisterns with IVH and obstructive hydrocephalus L Pcomm aneurysm.  Given levetiracetam, hydromorphone.  NIHSS 2 HH3 MF4, transferred to [facility] for further management.      COURSE IN THE HOSPITAL:   admitted to [facility], EVD inserted; given 20 lasix for pulmo edema, T inversion on CT  12/10 No significant events overnight.    No significant events overnight.    No significant events overnight.    No significant events overnight. drain stopped working at 730 a.m., off levophed    No significant events overnight.   12/15 confusion   stepped down   readmitted to ICU    more confused somnolent overnight, high volume LP - 30cc removed, CT scan, EVD inserted, febrile pancultured, UA (+) rocephin started   Tmax 38.6  EVD clamped this morning, given 1L fluid bolus overnight, s/p VPS   Tmax 38.2    Tmax 39 500cc bolus overnight x2, pancultured for fever   Tmax 38.1    Past Medical History: Hyperlipidemia Hypertension COPD (chronic obstructive pulmonary disease) Asthma  Allergies:  No Known Allergies  Home meds:   ·	famotidine 20 mg oral tablet: 1 tab(s) orally once a day  ·	lisinopril 2.5 mg oral tablet: 1 tab(s) orally once a day  ·	montelukast 10 mg oral tablet: 1 tab(s) orally once a day  ·	predniSONE 50 mg oral tablet: 1 tab(s) orally once a day  ·	ProAir HFA CFC free 90 mcg/inh inhalation aerosol: 2 puff(s) inhaled 4 times a day PRN  ·	simvastatin 20 mg oral tablet: 1 tab(s) orally once a day (at bedtime)    PHYSICAL EXAMINATION  T(C): 36.8 ( @ 00:35), Max: 38.1 ( @ 21:45) HR: 76 ( @ 04:00) (69 - 90) BP: 147/105 ( @ 04:00) (101/49 - 186/78) RR: 20 ( @ 04:00) (18 - 26) SpO2: 99% (@ 04:00) (95% - 99%)   NEUROLOGIC EXAMINATION:  Patient is awake, oriented x 2, CLEOPATRA, intermittently following commands, moving all 4s, R shoulder movement pain limited  GENERAL: not intubated, not in cardiorespiratory distress  EENT:  anicteric  CARDIOVASCULAR: (+) S1 S2, normal rate and regular rhythm  PULMONARY: clear to auscultation bilaterally, but coughing  ABDOMEN: soft, nontender with normoactive bowel sounds  EXTREMITIES: no edema  SKIN: no rash    LABS:   CAPILLARY BLOOD GLUCOSE 119 138 133 114               9.6    7.42  )-----------( 330      ( 2021 05:17 )             30.7     136  |  99  |  8   ----------------------------<  134<H>  3.0<L>   |  28  |  0.36<L>    Ca    8.3<L>      2021 05:17  Phos  2.2       Mg     1.7      @ 07:01  -  - @ 07:00  IN: 2975 mL / OUT: 710 mL / NET: 2265 mL    HbA1C = 6.7 (12-10) 6.4 ()  LDL = 112 ()   HDL = 66 ()  TG = 114 ()   TSH = 0.990 ()    Bacteriology:  UA (+) LE (+) N   CSF NGTD   Urine Enterobacter E coli   Blood culture NG2D   CSF NGTD    Blood NG5D x2    CSF studies:    L   *** RBC58 WBC9 *** %N3 %L5     L   *** [id number] WBC20 *** %N10 %L8   12-23  L   *** [id number] WBC24 *** %N7 %L13     EEG:  Neuroimagin/10 CT head:  EVD placement, stable   CTA:  L pcomm aneurysm, L ICA (distal cavernous) aneurysm vs infundibulum, extensive calcified plaque cavernous bilateral ICA with mild to mod stenosis; thick plaque bilateral carotid bifurcations - mod to severe R and mild to mod L stenosis, focal calcified plaque R VA off subclavian artery - high grate stenosis / partial occlusion, upper lung bilateral opacification - pulmo edema, chronic deformity R humeral neck   CT head:  SAH, basilar cisterns, IV extension, obstructive hydrocephalus SVID, lacunar infarcts R caudate, both thalami, cerebellar hemispheres, no CT evidence of territorial infarction  Other imagin/28 LE Doppler: NEG   LE Doppler: NEG   CT abd:  small paraesophageal hiatal hernia, gastritis; ?impaction, septal thickening bilateral lower lobes ?pulmo edema, hepatic steatosis    MEDICATIONS:   ·	nystatin    Suspension [id number] Oral four times a day  ·	piperacillin/tazobactam IVPB.. 3.375 IV Intermittent every 6 hours  ·	lacosamide Solution 150 Oral every 12 hours  ·	modafinil 100 Oral daily  ·	labetalol Injectable 2.5 IV Push every 6 hours PRN  ·	midodrine 5 Oral every 8 hours  ·	albuterol/ipratropium for Nebulization. 3 Nebulizer every 6 hours PRN  ·	montelukast 10 Oral daily  ·	atorvastatin 40 Oral at bedtime  ·	cyanocobalamin 1000 Oral daily  ·	ferrous    sulfate 325 Oral daily  ·	fludroCORTISONE 0.2 Oral at bedtime  ·	mod ISS  ·	sodium chloride 3 Oral every 6 hours  ·	pantoprazole   Suspension 40 Oral two times a day  ·	polyethylene glycol 3350 17 Oral daily  ·	senna 2 Oral at bedtime    MEDICATIONS:   ·	nystatin    Suspension 828112 Oral four times a day  ·	piperacillin/tazobactam IVPB.. 3.375 IV Intermittent every 6 hours  ·	lacosamide Solution 150 Oral every 12 hours  ·	modafinil 100 Oral daily  ·	labetalol Injectable 2.5 IV Push every 6 hours PRN  ·	midodrine 5 Oral every 8 hours  ·	albuterol/ipratropium for Nebulization. 3 Nebulizer every 6 hours PRN  ·	montelukast 10 Oral daily  ·	atorvastatin 40 Oral at bedtime  ·	cyanocobalamin 1000 Oral daily  ·	ferrous    sulfate 325 Oral daily  ·	fludroCORTISONE 0.2 Oral at bedtime  ·	insulin lispro (ADMELOG) corrective regimen sliding scale  SubCutaneous Before meals and at bedtime  ·	sodium chloride 3 Oral every 6 hours  ·	enoxaparin Injectable 40 SubCutaneous at bedtime  ·	pantoprazole  Injectable 40 IV Push every 12 hours  ·	polyethylene glycol 3350 17 Oral daily  ·	senna 2 Oral at bedtime    IV FLUIDS: IVL  DRIPS:  DIET: tube feeds  Lines:   Drains:     EVD at 5cm H20   EVD at 5cm H20 ICP < 10   EVD at 5cm H20 ICP <10   EVD clamped  Wounds:    CODE STATUS:  Full Code                       GOALS OF CARE:  aggressive                      DISPOSITION:  ICU  NIHSS 2 HH3 MF4 =================================  NEUROCRITICAL CARE ATTENDING NOTE  =================================    CARA CANCHOLA   MRN-[id number]  Summary:  75y/F with COPD, asthma, dyslipidemia Hypertension found down.  Brought to [facility] where imaging showed SAH basilar cisterns with IVH and obstructive hydrocephalus L Pcomm aneurysm.  Given levetiracetam, hydromorphone.  NIHSS 2 HH3 MF4, transferred to [facility] for further management.      COURSE IN THE HOSPITAL:   admitted to [facility], EVD inserted; given 20 lasix for pulmo edema, T inversion on CT  12/10 No significant events overnight.    No significant events overnight.    No significant events overnight.    No significant events overnight. drain stopped working at 730 a.m., off levophed    No significant events overnight.   12/15 confusion   stepped down   readmitted to ICU    more confused somnolent overnight, high volume LP - 30cc removed, CT scan, EVD inserted, febrile pancultured, UA (+) rocephin started   Tmax 38.6  EVD clamped this morning, given 1L fluid bolus overnight, s/p VPS   Tmax 38.2    Tmax 39 500cc bolus overnight x2, pancultured for fever   Tmax 38.1, multiple bowel movements overnight    Past Medical History: Hyperlipidemia Hypertension COPD (chronic obstructive pulmonary disease) Asthma  Allergies:  No Known Allergies  Home meds:   ·	famotidine 20 mg oral tablet: 1 tab(s) orally once a day  ·	lisinopril 2.5 mg oral tablet: 1 tab(s) orally once a day  ·	montelukast 10 mg oral tablet: 1 tab(s) orally once a day  ·	predniSONE 50 mg oral tablet: 1 tab(s) orally once a day  ·	ProAir HFA CFC free 90 mcg/inh inhalation aerosol: 2 puff(s) inhaled 4 times a day PRN  ·	simvastatin 20 mg oral tablet: 1 tab(s) orally once a day (at bedtime)    PHYSICAL EXAMINATION  T(C): 36.8 ( @ 00:35), Max: 38.1 ( @ 21:45) HR: 76 ( @ 04:00) (69 - 90) BP: 147/105 ( @ 04:00) (101/49 - 186/78) RR: 20 ( @ 04:00) (18 - 26) SpO2: 99% (@ 04:00) (95% - 99%)   NEUROLOGIC EXAMINATION:  Patient is awake, oriented x 2, CLEOPATRA, intermittently following commands, moving all 4s, R shoulder movement pain limited  GENERAL: not intubated, not in cardiorespiratory distress  EENT:  anicteric  CARDIOVASCULAR: (+) S1 S2, normal rate and regular rhythm  PULMONARY: clear to auscultation bilaterally  ABDOMEN: soft, nontender with normoactive bowel sounds  EXTREMITIES: no edema  SKIN: no rash    LABS:   CAPILLARY BLOOD GLUCOSE 119 138 133 114               9.6    7.42  )-----------( 330      ( 2021 05:17 )             30.7     136  |  99  |  8   ----------------------------<  134<H>  3.0<L>   |  28  |  0.36<L>    Ca    8.3<L>      2021 05:17  Phos  2.2       Mg     1.7      @ 07:01  -  - @ 07:00  IN: 2975 mL / OUT: 710 mL / NET: 2265 mL    HbA1C = 6.7 (12-10) 6.4 ()  LDL = 112 ()   HDL = 66 ()  TG = 114 ()   TSH = 0.990 ()    Bacteriology:  UA (+) LE (+) N   Blood CS NGTD   CSF NGTD   Urine Enterobacter E coli   Blood culture NG2D   CSF NGTD    Blood NG5D x2    CSF studies:    L   *** RBC58 WBC9 *** %N3 %L5     L   *** [id number] WBC20 *** %N10 %L8   12-  L   *** [id number] WBC24 *** %N7 %L13     EEG:  Neuroimagin/10 CT head:  EVD placement, stable   CTA:  L pcomm aneurysm, L ICA (distal cavernous) aneurysm vs infundibulum, extensive calcified plaque cavernous bilateral ICA with mild to mod stenosis; thick plaque bilateral carotid bifurcations - mod to severe R and mild to mod L stenosis, focal calcified plaque R VA off subclavian artery - high grate stenosis / partial occlusion, upper lung bilateral opacification - pulmo edema, chronic deformity R humeral neck   CT head:  SAH, basilar cisterns, IV extension, obstructive hydrocephalus SVID, lacunar infarcts R caudate, both thalami, cerebellar hemispheres, no CT evidence of territorial infarction  Other imagin/28 LE Doppler: NEG   LE Doppler: NEG   CT abd:  small paraesophageal hiatal hernia, gastritis; ?impaction, septal thickening bilateral lower lobes ?pulmo edema, hepatic steatosis    MEDICATIONS:   ·	nystatin    Suspension 102544 Oral four times a day  ·	piperacillin/tazobactam IVPB.. 3.375 IV Intermittent every 6 hours  ·	lacosamide Solution 150 Oral every 12 hours  ·	modafinil 100 Oral daily  ·	midodrine 5 Oral every 8 hours  ·	albuterol/ipratropium for Nebulization. 3 Nebulizer every 6 hours PRN  ·	montelukast 10 Oral daily  ·	atorvastatin 40 Oral at bedtime  ·	cyanocobalamin 1000 Oral daily  ·	ferrous    sulfate 325 Oral daily  ·	fludroCORTISONE 0.2 Oral at bedtime  ·	insulin lispro (ADMELOG) corrective regimen sliding scale  SubCutaneous Before meals and at bedtime  ·	sodium chloride 3 Oral every 6 hours  ·	enoxaparin Injectable 40 SubCutaneous at bedtime  ·	pantoprazole  Injectable 40 IV Push every 12 hours  ·	polyethylene glycol 3350 17 Oral daily  ·	senna 2 Oral at bedtime    IV FLUIDS: IVL  DRIPS:  DIET: Glucerna  Lines:   Drains:     EVD at 5cm H20   EVD at 5cm H20 ICP < 10   EVD at 5cm H20 ICP <10   EVD clamped  Wounds:    CODE STATUS:  Full Code                       GOALS OF CARE:  aggressive                      DISPOSITION:  ICU  NIHSS 2 HH3 MF4

## 2021-01-02 NOTE — SWALLOW BEDSIDE ASSESSMENT ADULT - NS SPL SWALLOW CLINIC TRIAL FT
Unable to break off piece of banana? Significantly prolonged bolus formation with solids with notable increased WOB while chewing/mashing solids. Laryngeal movement appreciated upon palpation. No clinical overt s/s of aspiration appreciated.
PO trials limited d/t pt refusal 2/2 agitation. Adequate bolus containment and manipulation with trials of puree and thin liquids. Laryngeal movement appreciated upon palpation. Vocal quality remained dry. No clinical overt s/s of aspiration appreciated across trials.
Reduced oral grading of spoon and formation of labial seal. Poor bolus containment w/ open mouth posture and anterior spillage. Reduced and prolonged bolus manipulation and likely A-P transport. Pt w/ 2-3 swallows per small volume bolus and visible effort w/ swallow, suggestive of inefficient pharyngeal swallow likely w/ stasis. Slight wet upper respiratory sounds following trials, indicative of aspiration. Poor endurance following PO trials, w/ notably increased WOB; resp rate fluctuated 19-38 BPM, O2 sats 92-95%.

## 2021-01-02 NOTE — SWALLOW BEDSIDE ASSESSMENT ADULT - SLP PERTINENT HISTORY OF CURRENT PROBLEM
PMHx of COPD, asthma, HLD, HTN, BIBEMS to [facility] from home after HHA found patient down; found with diffuse subarachnoid hemorrhage mainly at basilar cisterns with IVH and obstructive hydrocephalus and left pcomm aneurysm now s/p right frontal EVD placement, cerebral angiogram for coil embolization of left PCOMM aneurysm 12/10, and cerebral angio for verapamil c/b device malfunction of Proglide,
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
PMHx of COPD, asthma, HLD, HTN, BIBEMS to [facility] from home after HHA found patient down on the floor covered in non-bloody vomitus. CTH reveals diffuse subarachnoid hemorrhage mainly at basilar cisterns with IVH and obstructive hydrocephalus, CTA shows 3mm left pcomm aneurysm, now s/p bedside right frontal EVD placement 12/10, s/p cerebral angiogram for coil embolization of left PCOMM aneurysm 12/10, now POD #3 s/p cerebral angio for verapamil c/b device malfunction of Proglide, s/p right groin cutdown for removal of proglide catheter and femoral artery repair (12/17/2020), NIHSS2 HH3 MF4).

## 2021-01-02 NOTE — SWALLOW BEDSIDE ASSESSMENT ADULT - SLP GENERAL OBSERVATIONS
Pt received awake in bed, A&Ox2, ?[place], became increasingly agitated throughout evaluation.
Pt received awake, lethargic, very limited verbalizations or response to basic yes/no questions. Pt did not follow 1-step directives.
Increased arousal and overall MS compared to prior assessment. A&Ox2 with periodic confusion. Vocal quality is dry. 5L supplemental O2 via NCL

## 2021-01-02 NOTE — SWALLOW BEDSIDE ASSESSMENT ADULT - SWALLOW EVAL: RECOMMENDED DIET
Group Therapy Note    Date: August 30    Group Start Time: 11:15 AM  Group End Time: 12:10 PM  Group Topic: Cognitive Skills    SUNG 7S OP BH    Reean Medrano, CTRS        Group Therapy Note    Number of participants: 12  Type of group: Cognitive Skills  Mode of intervention: Education, Support, Socialization, Exploration, Clarifying, Problem-solving and Activity  Topic: Music   Objective: Patient will identify benefits of music and share favorite song. Notes:   Patient was interactive sharing benefits of music. Shared favorite song and enjoyed listening to peers share their favorite song. Status After Intervention:  Improved    Participation Level:  Active Listener and Interactive    Participation Quality: Appropriate, Attentive, Sharing and Supportive      Speech:  normal      Thought Process/Content: Logical      Affective Functioning: Congruent      Mood: euthymic      Level of consciousness:  Alert, Oriented x4 and Attentive      Response to Learning: Able to verbalize current knowledge/experience, Able to verbalize/acknowledge new learning, Able to retain information, Capable of insight, Able to change behavior and Progressing to goal      Endings: None Reported    Modes of Intervention: Education, Support, Socialization, Exploration, Clarifying, Problem-solving and Activity
puree + thin
Continue NPO w/ NGT
puree/thin

## 2021-01-02 NOTE — SWALLOW BEDSIDE ASSESSMENT ADULT - SWALLOW EVAL: DIAGNOSIS
Oropharyngeal swallow was clinically judged to be WFL for puree and thin liquids. Solids deferred d/t pt refusal only.
Pt presented w/ oropharyngeal dysphagia in setting of neuro insult, AMS, and respiratory insufficiency. Pt is at risk for aspiration/aspiration-related sequelae. Recs to continue NPO w/ NGT. Will f/u to re-assess as pt becomes more medically optimized.
Overall improving clinical presentation compared to 12/22. Mild oral phase deficits noted with prolonged and effortful bolus formation with solids, likely d/t respiratory insufficiency.

## 2021-01-02 NOTE — SWALLOW BEDSIDE ASSESSMENT ADULT - SPECIFY REASON(S)
reconsulted to assess swallowing to determine safest, least restrictive diet
reconsulted to assess swallowing to determine readiness for a PO diet
To assess swallow function and safety for oral diet

## 2021-01-03 LAB
ANION GAP SERPL CALC-SCNC: 12 MMOL/L — SIGNIFICANT CHANGE UP (ref 5–17)
BUN SERPL-MCNC: 6 MG/DL — LOW (ref 7–23)
CALCIUM SERPL-MCNC: 8.6 MG/DL — SIGNIFICANT CHANGE UP (ref 8.4–10.5)
CHLORIDE SERPL-SCNC: 97 MMOL/L — SIGNIFICANT CHANGE UP (ref 96–108)
CO2 SERPL-SCNC: 27 MMOL/L — SIGNIFICANT CHANGE UP (ref 22–31)
CREAT SERPL-MCNC: 0.38 MG/DL — LOW (ref 0.5–1.3)
GLUCOSE SERPL-MCNC: 117 MG/DL — HIGH (ref 70–99)
HCT VFR BLD CALC: 31.3 % — LOW (ref 34.5–45)
HGB BLD-MCNC: 10.1 G/DL — LOW (ref 11.5–15.5)
MAGNESIUM SERPL-MCNC: 2 MG/DL — SIGNIFICANT CHANGE UP (ref 1.6–2.6)
MCHC RBC-ENTMCNC: 29.1 PG — SIGNIFICANT CHANGE UP (ref 27–34)
MCHC RBC-ENTMCNC: 32.3 GM/DL — SIGNIFICANT CHANGE UP (ref 32–36)
MCV RBC AUTO: 90.2 FL — SIGNIFICANT CHANGE UP (ref 80–100)
NRBC # BLD: 0 /100 WBCS — SIGNIFICANT CHANGE UP (ref 0–0)
PHOSPHATE SERPL-MCNC: 2.9 MG/DL — SIGNIFICANT CHANGE UP (ref 2.5–4.5)
PLATELET # BLD AUTO: 315 K/UL — SIGNIFICANT CHANGE UP (ref 150–400)
POTASSIUM SERPL-MCNC: 3.1 MMOL/L — LOW (ref 3.5–5.3)
POTASSIUM SERPL-SCNC: 3.1 MMOL/L — LOW (ref 3.5–5.3)
RBC # BLD: 3.47 M/UL — LOW (ref 3.8–5.2)
RBC # FLD: 16.6 % — HIGH (ref 10.3–14.5)
SODIUM SERPL-SCNC: 136 MMOL/L — SIGNIFICANT CHANGE UP (ref 135–145)
WBC # BLD: 7.83 K/UL — SIGNIFICANT CHANGE UP (ref 3.8–10.5)
WBC # FLD AUTO: 7.83 K/UL — SIGNIFICANT CHANGE UP (ref 3.8–10.5)

## 2021-01-03 PROCEDURE — 99291 CRITICAL CARE FIRST HOUR: CPT

## 2021-01-03 RX ORDER — MEGESTROL ACETATE 40 MG/ML
400 SUSPENSION ORAL DAILY
Refills: 0 | Status: DISCONTINUED | OUTPATIENT
Start: 2021-01-03 | End: 2021-01-04

## 2021-01-03 RX ORDER — ASPIRIN/CALCIUM CARB/MAGNESIUM 324 MG
81 TABLET ORAL DAILY
Refills: 0 | Status: DISCONTINUED | OUTPATIENT
Start: 2021-01-03 | End: 2021-01-04

## 2021-01-03 RX ORDER — SODIUM,POTASSIUM PHOSPHATES 278-250MG
1 POWDER IN PACKET (EA) ORAL
Refills: 0 | Status: COMPLETED | OUTPATIENT
Start: 2021-01-03 | End: 2021-01-03

## 2021-01-03 RX ORDER — MEGESTROL ACETATE 40 MG/ML
20 SUSPENSION ORAL DAILY
Refills: 0 | Status: DISCONTINUED | OUTPATIENT
Start: 2021-01-03 | End: 2021-01-03

## 2021-01-03 RX ORDER — SODIUM CHLORIDE 9 MG/ML
1000 INJECTION INTRAMUSCULAR; INTRAVENOUS; SUBCUTANEOUS
Refills: 0 | Status: DISCONTINUED | OUTPATIENT
Start: 2021-01-03 | End: 2021-01-03

## 2021-01-03 RX ORDER — POTASSIUM CHLORIDE 20 MEQ
40 PACKET (EA) ORAL EVERY 4 HOURS
Refills: 0 | Status: DISCONTINUED | OUTPATIENT
Start: 2021-01-03 | End: 2021-01-03

## 2021-01-03 RX ORDER — POTASSIUM CHLORIDE 20 MEQ
40 PACKET (EA) ORAL THREE TIMES A DAY
Refills: 0 | Status: COMPLETED | OUTPATIENT
Start: 2021-01-03 | End: 2021-01-03

## 2021-01-03 RX ORDER — PIPERACILLIN AND TAZOBACTAM 4; .5 G/20ML; G/20ML
3.38 INJECTION, POWDER, LYOPHILIZED, FOR SOLUTION INTRAVENOUS EVERY 6 HOURS
Refills: 0 | Status: COMPLETED | OUTPATIENT
Start: 2021-01-03 | End: 2021-01-05

## 2021-01-03 RX ORDER — HYDROMORPHONE HYDROCHLORIDE 2 MG/ML
0.5 INJECTION INTRAMUSCULAR; INTRAVENOUS; SUBCUTANEOUS EVERY 6 HOURS
Refills: 0 | Status: DISCONTINUED | OUTPATIENT
Start: 2021-01-03 | End: 2021-01-03

## 2021-01-03 RX ORDER — TIOTROPIUM BROMIDE 18 UG/1
1 CAPSULE ORAL; RESPIRATORY (INHALATION) DAILY
Refills: 0 | Status: DISCONTINUED | OUTPATIENT
Start: 2021-01-03 | End: 2021-01-09

## 2021-01-03 RX ORDER — ACETAMINOPHEN 500 MG
650 TABLET ORAL EVERY 6 HOURS
Refills: 0 | Status: DISCONTINUED | OUTPATIENT
Start: 2021-01-03 | End: 2021-01-03

## 2021-01-03 RX ADMIN — Medication 500000 UNIT(S): at 05:20

## 2021-01-03 RX ADMIN — Medication 1 PACKET(S): at 23:36

## 2021-01-03 RX ADMIN — MODAFINIL 100 MILLIGRAM(S): 200 TABLET ORAL at 08:58

## 2021-01-03 RX ADMIN — PANTOPRAZOLE SODIUM 40 MILLIGRAM(S): 20 TABLET, DELAYED RELEASE ORAL at 05:33

## 2021-01-03 RX ADMIN — Medication 500000 UNIT(S): at 11:10

## 2021-01-03 RX ADMIN — PIPERACILLIN AND TAZOBACTAM 200 GRAM(S): 4; .5 INJECTION, POWDER, LYOPHILIZED, FOR SOLUTION INTRAVENOUS at 23:36

## 2021-01-03 RX ADMIN — PREGABALIN 1000 MICROGRAM(S): 225 CAPSULE ORAL at 08:58

## 2021-01-03 RX ADMIN — SODIUM CHLORIDE 3 GRAM(S): 9 INJECTION INTRAMUSCULAR; INTRAVENOUS; SUBCUTANEOUS at 05:20

## 2021-01-03 RX ADMIN — Medication 650 MILLIGRAM(S): at 18:29

## 2021-01-03 RX ADMIN — MIDODRINE HYDROCHLORIDE 5 MILLIGRAM(S): 2.5 TABLET ORAL at 05:20

## 2021-01-03 RX ADMIN — Medication 650 MILLIGRAM(S): at 19:15

## 2021-01-03 RX ADMIN — Medication 40 MILLIEQUIVALENT(S): at 13:46

## 2021-01-03 RX ADMIN — LACOSAMIDE 150 MILLIGRAM(S): 50 TABLET ORAL at 05:20

## 2021-01-03 RX ADMIN — Medication 81 MILLIGRAM(S): at 13:46

## 2021-01-03 RX ADMIN — ENOXAPARIN SODIUM 40 MILLIGRAM(S): 100 INJECTION SUBCUTANEOUS at 21:15

## 2021-01-03 RX ADMIN — PIPERACILLIN AND TAZOBACTAM 200 GRAM(S): 4; .5 INJECTION, POWDER, LYOPHILIZED, FOR SOLUTION INTRAVENOUS at 11:09

## 2021-01-03 RX ADMIN — Medication 1 PACKET(S): at 11:10

## 2021-01-03 RX ADMIN — Medication 40 MILLIEQUIVALENT(S): at 07:51

## 2021-01-03 RX ADMIN — PANTOPRAZOLE SODIUM 40 MILLIGRAM(S): 20 TABLET, DELAYED RELEASE ORAL at 18:29

## 2021-01-03 RX ADMIN — SODIUM CHLORIDE 3 GRAM(S): 9 INJECTION INTRAMUSCULAR; INTRAVENOUS; SUBCUTANEOUS at 23:36

## 2021-01-03 RX ADMIN — ATORVASTATIN CALCIUM 40 MILLIGRAM(S): 80 TABLET, FILM COATED ORAL at 21:16

## 2021-01-03 RX ADMIN — Medication 500000 UNIT(S): at 23:35

## 2021-01-03 RX ADMIN — Medication 1 PACKET(S): at 18:21

## 2021-01-03 RX ADMIN — Medication 500000 UNIT(S): at 18:20

## 2021-01-03 RX ADMIN — PIPERACILLIN AND TAZOBACTAM 200 GRAM(S): 4; .5 INJECTION, POWDER, LYOPHILIZED, FOR SOLUTION INTRAVENOUS at 16:25

## 2021-01-03 RX ADMIN — Medication 650 MILLIGRAM(S): at 00:43

## 2021-01-03 RX ADMIN — FLUDROCORTISONE ACETATE 0.2 MILLIGRAM(S): 0.1 TABLET ORAL at 21:15

## 2021-01-03 RX ADMIN — PIPERACILLIN AND TAZOBACTAM 25 GRAM(S): 4; .5 INJECTION, POWDER, LYOPHILIZED, FOR SOLUTION INTRAVENOUS at 05:21

## 2021-01-03 RX ADMIN — CHLORHEXIDINE GLUCONATE 1 APPLICATION(S): 213 SOLUTION TOPICAL at 05:32

## 2021-01-03 RX ADMIN — SODIUM CHLORIDE 3 GRAM(S): 9 INJECTION INTRAMUSCULAR; INTRAVENOUS; SUBCUTANEOUS at 11:10

## 2021-01-03 RX ADMIN — TIOTROPIUM BROMIDE 1 CAPSULE(S): 18 CAPSULE ORAL; RESPIRATORY (INHALATION) at 11:09

## 2021-01-03 RX ADMIN — CHLORHEXIDINE GLUCONATE 15 MILLILITER(S): 213 SOLUTION TOPICAL at 05:20

## 2021-01-03 RX ADMIN — SODIUM CHLORIDE 3 GRAM(S): 9 INJECTION INTRAMUSCULAR; INTRAVENOUS; SUBCUTANEOUS at 18:21

## 2021-01-03 RX ADMIN — MONTELUKAST 10 MILLIGRAM(S): 4 TABLET, CHEWABLE ORAL at 08:59

## 2021-01-03 RX ADMIN — LACOSAMIDE 150 MILLIGRAM(S): 50 TABLET ORAL at 18:21

## 2021-01-03 RX ADMIN — Medication 325 MILLIGRAM(S): at 08:58

## 2021-01-03 RX ADMIN — CHLORHEXIDINE GLUCONATE 15 MILLILITER(S): 213 SOLUTION TOPICAL at 18:20

## 2021-01-03 NOTE — PROGRESS NOTE ADULT - SUBJECTIVE AND OBJECTIVE BOX
O: UTI, GI bleed  T(C): 37.2 (01-03-21 @ 17:01), Max: 37.7 (01-02-21 @ 21:54)  HR: 78 (01-03-21 @ 20:00) (65 - 88)  BP: 162/74 (01-03-21 @ 20:00) (120/57 - 202/85)  RR: 25 (01-03-21 @ 20:00) (18 - 28)  SpO2: 95% (01-03-21 @ 20:00) (94% - 100%)  01-02-21 @ 07:01  -  01-03-21 @ 07:00  --------------------------------------------------------  IN: 1945 mL / OUT: 3010 mL / NET: -1065 mL    01-03-21 @ 07:01  -  01-03-21 @ 20:45  --------------------------------------------------------  IN: 635 mL / OUT: 940 mL / NET: -305 mL    acetaminophen    Suspension .. 650 milliGRAM(s) Oral every 6 hours PRN  albuterol/ipratropium for Nebulization. 3 milliLiter(s) Nebulizer every 6 hours PRN  aspirin enteric coated 81 milliGRAM(s) Oral daily  atorvastatin 40 milliGRAM(s) Oral at bedtime  chlorhexidine 0.12% Liquid 15 milliLiter(s) Oral Mucosa two times a day  chlorhexidine 2% Cloths 1 Application(s) Topical <User Schedule>  cyanocobalamin 1000 MICROGram(s) Oral daily  dextrose 40% Gel 15 Gram(s) Oral once  dextrose 5%. 1000 milliLiter(s) IV Continuous <Continuous>  dextrose 5%. 1000 milliLiter(s) IV Continuous <Continuous>  dextrose 50% Injectable 25 Gram(s) IV Push once  enoxaparin Injectable 40 milliGRAM(s) SubCutaneous at bedtime  ferrous    sulfate 325 milliGRAM(s) Oral daily  fludroCORTISONE 0.2 milliGRAM(s) Oral at bedtime  glucagon  Injectable 1 milliGRAM(s) IntraMuscular once  labetalol Injectable 2.5 milliGRAM(s) IV Push every 6 hours PRN  lacosamide Solution 150 milliGRAM(s) Oral every 12 hours  megestrol Suspension 400 milliGRAM(s) Oral daily  modafinil 100 milliGRAM(s) Oral daily  montelukast 10 milliGRAM(s) Oral daily  nystatin    Suspension 207288 Unit(s) Oral four times a day  pantoprazole   Suspension 40 milliGRAM(s) Oral two times a day  piperacillin/tazobactam IVPB.. 3.375 Gram(s) IV Intermittent every 6 hours  potassium phosphate / sodium phosphate Powder (PHOS-NaK) 1 Packet(s) Oral four times a day  sodium chloride 3 Gram(s) Oral every 6 hours  tiotropium 18 MICROgram(s) Capsule 1 Capsule(s) Inhalation daily          PHYSICAL EXAMINATION  NEUROLOGIC EXAMINATION:  Patient is awake, oriented x 2, CLEOPATRA, following commands, moving all 4s, R shoulder movement pain limited  GENERAL: not intubated, not in cardiorespiratory distress  EENT:  anicteric  CARDIOVASCULAR: (+) S1 S2, normal rate and regular rhythm  PULMONARY: clear to auscultation bilaterally  ABDOMEN: soft, nontender with normoactive bowel sounds  EXTREMITIES: no edema  SKIN: no rash      LABS:  Na: 136 (01-03 @ 05:01), 136 (01-02 @ 05:17), 135 (01-01 @ 17:52), 139 (01-01 @ 05:48)  K: 3.1 (01-03 @ 05:01), 3.0 (01-02 @ 05:17), 4.2 (01-01 @ 17:52), 2.6 (01-01 @ 05:48)  Cl: 97 (01-03 @ 05:01), 99 (01-02 @ 05:17), 101 (01-01 @ 17:52), 102 (01-01 @ 05:48)  CO2: 27 (01-03 @ 05:01), 28 (01-02 @ 05:17), 23 (01-01 @ 17:52), 28 (01-01 @ 05:48)  BUN: 6 (01-03 @ 05:01), 8 (01-02 @ 05:17), 9 (01-01 @ 17:52), 12 (01-01 @ 05:48)  Cr: 0.38 (01-03 @ 05:01), 0.36 (01-02 @ 05:17), 0.33 (01-01 @ 17:52), 0.37 (01-01 @ 05:48)  Glu: 117(01-03 @ 05:01), 134(01-02 @ 05:17), 144(01-01 @ 17:52), 142(01-01 @ 05:48)    Hgb: 10.1 (01-03 @ 05:01), 9.6 (01-02 @ 05:17), 10.0 (01-01 @ 05:48)  Hct: 31.3 (01-03 @ 05:01), 30.7 (01-02 @ 05:17), 31.5 (01-01 @ 05:48)  WBC: 7.83 (01-03 @ 05:01), 7.42 (01-02 @ 05:17), 10.17 (01-01 @ 05:48)  Plt: 315 (01-03 @ 05:01), 330 (01-02 @ 05:17), 353 (01-01 @ 05:48)    INR:   PTT:               aSAH NIHSS 2 HH3 MF4 ICTUS ON 12/9/20   L pcomm aneurysm, L parophthalmic aneurysm; brain compression, cerebral edema  osbtructive hydrocephalus  lacunar infarcts R caudate, B thalami, cerebellar hemispheres ?artery to artery vs cardioembolic?  atherosclerotic cardiovascular disease; mod to severe bilateral ICA stenosis (R>L), R VA stenosis  PLAN:   NEURO: neurochecks q2h, PRN pain meds with Tylenol   SAH:  off nimodipine   bilateral ICA stenosis - ASA per NS  Hydrocephalus:  s/p VPS   Seizure treatment (cortical ICH, associated SDH, unclear etiology):  lacosamide 150mg PO BID   PT/OT/OBC  PULM:  Room air, incentive spirometry, continue montelukast, ipratropium / albuterol PRN, tiotropium daily   CARDIO:  SBP goal 120-200mm Hg, TTE EF 70%  ENDO:  Blood sugar goals 140-180 mg/dL, atorvastatin 40mg  GI:  GIB, FOB+, continue BID protonix   DIET: add ensure  RENAL: maintain euvolemia, accurate Is and Os; cont salt tabs and fludrocortisone; straight q6h, Na goal 135-145  HEM/ONC: Hb stable  VTE Prophylaxis: SCDs, SQL; R UE cephalic vein thrombosis - repeat UE doppler 01/06  ID: UTI enterobacter and e-coli sensitive to zosyn.  piperacillin/tazobactam (last day 01/05/2021)  Social: will update family    not critical      O: UTI, GI bleed    T(C): 37.2 (01-03-21 @ 17:01), Max: 37.7 (01-02-21 @ 21:54)  HR: 78 (01-03-21 @ 20:00) (65 - 88)  BP: 162/74 (01-03-21 @ 20:00) (120/57 - 202/85)  RR: 25 (01-03-21 @ 20:00) (18 - 28)  SpO2: 95% (01-03-21 @ 20:00) (94% - 100%)  01-02-21 @ 07:01  -  01-03-21 @ 07:00  --------------------------------------------------------  IN: 1945 mL / OUT: 3010 mL / NET: -1065 mL    01-03-21 @ 07:01  -  01-03-21 @ 20:45  --------------------------------------------------------  IN: 635 mL / OUT: 940 mL / NET: -305 mL    acetaminophen    Suspension .. 650 milliGRAM(s) Oral every 6 hours PRN  albuterol/ipratropium for Nebulization. 3 milliLiter(s) Nebulizer every 6 hours PRN  aspirin enteric coated 81 milliGRAM(s) Oral daily  atorvastatin 40 milliGRAM(s) Oral at bedtime  chlorhexidine 0.12% Liquid 15 milliLiter(s) Oral Mucosa two times a day  chlorhexidine 2% Cloths 1 Application(s) Topical <User Schedule>  cyanocobalamin 1000 MICROGram(s) Oral daily  dextrose 40% Gel 15 Gram(s) Oral once  dextrose 5%. 1000 milliLiter(s) IV Continuous <Continuous>  dextrose 5%. 1000 milliLiter(s) IV Continuous <Continuous>  dextrose 50% Injectable 25 Gram(s) IV Push once  enoxaparin Injectable 40 milliGRAM(s) SubCutaneous at bedtime  ferrous    sulfate 325 milliGRAM(s) Oral daily  fludroCORTISONE 0.2 milliGRAM(s) Oral at bedtime  glucagon  Injectable 1 milliGRAM(s) IntraMuscular once  labetalol Injectable 2.5 milliGRAM(s) IV Push every 6 hours PRN  lacosamide Solution 150 milliGRAM(s) Oral every 12 hours  megestrol Suspension 400 milliGRAM(s) Oral daily  modafinil 100 milliGRAM(s) Oral daily  montelukast 10 milliGRAM(s) Oral daily  nystatin    Suspension 277395 Unit(s) Oral four times a day  pantoprazole   Suspension 40 milliGRAM(s) Oral two times a day  piperacillin/tazobactam IVPB.. 3.375 Gram(s) IV Intermittent every 6 hours  potassium phosphate / sodium phosphate Powder (PHOS-NaK) 1 Packet(s) Oral four times a day  sodium chloride 3 Gram(s) Oral every 6 hours  tiotropium 18 MICROgram(s) Capsule 1 Capsule(s) Inhalation daily          PHYSICAL EXAMINATION  NEUROLOGIC EXAMINATION:  Patient is awake, oriented x 2, CLEOPATRA, following commands, moving all 4s  GENERAL: not intubated, not in cardiorespiratory distress  EENT:  anicteric  CARDIOVASCULAR: (+) S1 S2, normal rate and regular rhythm  PULMONARY: clear to auscultation bilaterally  ABDOMEN: soft, nontender with normoactive bowel sounds  EXTREMITIES: no edema  SKIN: no rash      LABS:  Na: 136 (01-03 @ 05:01), 136 (01-02 @ 05:17), 135 (01-01 @ 17:52), 139 (01-01 @ 05:48)  K: 3.1 (01-03 @ 05:01), 3.0 (01-02 @ 05:17), 4.2 (01-01 @ 17:52), 2.6 (01-01 @ 05:48)  Cl: 97 (01-03 @ 05:01), 99 (01-02 @ 05:17), 101 (01-01 @ 17:52), 102 (01-01 @ 05:48)  CO2: 27 (01-03 @ 05:01), 28 (01-02 @ 05:17), 23 (01-01 @ 17:52), 28 (01-01 @ 05:48)  BUN: 6 (01-03 @ 05:01), 8 (01-02 @ 05:17), 9 (01-01 @ 17:52), 12 (01-01 @ 05:48)  Cr: 0.38 (01-03 @ 05:01), 0.36 (01-02 @ 05:17), 0.33 (01-01 @ 17:52), 0.37 (01-01 @ 05:48)  Glu: 117(01-03 @ 05:01), 134(01-02 @ 05:17), 144(01-01 @ 17:52), 142(01-01 @ 05:48)    Hgb: 10.1 (01-03 @ 05:01), 9.6 (01-02 @ 05:17), 10.0 (01-01 @ 05:48)  Hct: 31.3 (01-03 @ 05:01), 30.7 (01-02 @ 05:17), 31.5 (01-01 @ 05:48)  WBC: 7.83 (01-03 @ 05:01), 7.42 (01-02 @ 05:17), 10.17 (01-01 @ 05:48)  Plt: 315 (01-03 @ 05:01), 330 (01-02 @ 05:17), 353 (01-01 @ 05:48)    INR:   PTT:               aSAH NIHSS 2 HH3 MF4 ICTUS ON 12/9/20   L pcomm aneurysm, L parophthalmic aneurysm; brain compression, cerebral edema  obstructive hydrocephalus  lacunar infarcts R caudate, B thalami, cerebellar hemispheres ?artery to artery vs cardioembolic?  atherosclerotic cardiovascular disease; mod to severe bilateral ICA stenosis (R>L), R VA stenosis  PLAN:   NEURO: neurochecks q2h, PRN pain meds with Tylenol   bilateral ICA stenosis - ASA per NS  Hydrocephalus:  s/p VPS   Seizure treatment (cortical ICH, associated SDH, unclear etiology):  lacosamide 150mg PO BID   PT/OT/OBC  PULM:  Room air, incentive spirometry, continue montelukast, ipratropium / albuterol PRN, tiotropium daily   CARDIO:  SBP goal 120-200mm Hg, TTE EF 70%  ENDO:  Blood sugar goals 140-180 mg/dL, atorvastatin 40mg  GI:  GIB, FOB+, continue BID protonix , monitor hgb   DIET: add ensure  RENAL: maintain euvolemia, accurate Is and Os; cont salt tabs   d/c fludrocortisone given hypokalemia   ; straight q6h, Na goal 135-145  HEM/ONC: Hb stable  VTE Prophylaxis: SCDs, SQL; R UE cephalic vein thrombosis - repeat UE doppler 01/06  ID: UTI enterobacter and e-coli sensitive to zosyn.  piperacillin/tazobactam (last day 01/05/2021)  Social: will update family    not critical

## 2021-01-03 NOTE — PROGRESS NOTE ADULT - ASSESSMENT
75y/Female with:  aneursymal subarachnoid hemorrhage, L pcomm aneurysm, L parophthalmic aneurysm; brain compression, cerebral edema  osbtructive hydrocephalus  lacunar infarcts R caudate, B thalami, cerebellar hemispheres ?artery to artery vs cardioembolic?  atherosclerotic cardiovascular disease; mod to severe bilateral ICA stenosis (R>L), R VA stenosis  Hypertension dyslipidemia   COPD asthma  newly diagnosed Diabetes Mellitus?  troponin leak    PLAN: Day 1 = 12-09 ; Day 4 =  12/12; Day 21 = 12/29  NEURO: neurochecks q2h, VS q1, PRN pain meds with Tylenol   SAH:  off nimodipine   Hydrocephalus:  s/p VPS   Seizure treatment (cortical ICH, associated SDH, unclear etiology):  lacosamide 150mg PO BID   REHAB:  physical therapy evaluation and management    EARLY MOB:  OOB to chair    PULM:  Room air, incentive spirometry, continue montelukast, ipratropium / albuterol PRN   CARDIO:  SBP goal 120-200mm Hg, TTE EF 70%; midodrine 5mg PO q8h   ENDO:  Blood sugar goals 140-180 mg/dL, cont insulin sliding scale, atorvastatin 40mg  GI:  d/c bowel regimen; started on BID protonix for GIB  DIET: s/s, insert NGT, cont tube feeds, ?PEG next week  RENAL: maintain euvolemia, accurate Is and Os  HEM/ONC: Hb stable  VTE Prophylaxis: SCDs, SQL  ID: febrile, leukocytosis improving; piperacillin/tazobactam (last day 01/04/2021)  Social: will update family    CORE MEASURES  1.  Nath and Jacobo Score = 3  2.  VTE prophylaxis:  [ ] administered within 24 hours of admission OR [X] reason not done: fresh bleed, unsecured aneurysm.  3.  Dysphagia screening:   [X] performed before any oral meds / liquids / food  4.  Nimodipine treatment:  [X] administered within 24 hours of admission OR [ ] reason not done:    ATTENDING ATTESTATION:  I was physically present for the key portions of the evaluation and management (E/M) service provided.  I agree with the above history, physical and plan, which I have reviewed and edited where appropriate.    Patient at high risk for neurological deterioration or death due to:  ICU delirium, aspiration PNA, DVT / PE.  Critical care time:  I have personally provided 30 minutes of critical care time, excluding time spent on separate procedures.      Plan discussed with RN, house staff.   75y/Female with:  aneursymal subarachnoid hemorrhage, L pcomm aneurysm, L parophthalmic aneurysm; brain compression, cerebral edema  osbtructive hydrocephalus  lacunar infarcts R caudate, B thalami, cerebellar hemispheres ?artery to artery vs cardioembolic?  atherosclerotic cardiovascular disease; mod to severe bilateral ICA stenosis (R>L), R VA stenosis  Hypertension dyslipidemia   COPD asthma  newly diagnosed Diabetes Mellitus?  troponin leak    PLAN: Day 1 = 12-09 ; Day 4 =  12/12; Day 21 = 12/29  NEURO: neurochecks q2h, VS q1, PRN pain meds with Tylenol   SAH:  off nimodipine   bilateral ICA stenosis - start ASA if ok with NS  Hydrocephalus:  s/p VPS   Seizure treatment (cortical ICH, associated SDH, unclear etiology):  lacosamide 150mg PO BID   REHAB:  physical therapy evaluation and management    EARLY MOB:  OOB to chair    PULM:  Room air, incentive spirometry, continue montelukast, ipratropium / albuterol PRN, tiotropium daily   CARDIO:  SBP goal 120-200mm Hg, TTE EF 70%; d/c midodrine  ENDO:  Blood sugar goals 140-180 mg/dL, d/c insulin sliding scale, atorvastatin 40mg  GI:  BID protonix for GIB  DIET: add ensure  RENAL: maintain euvolemia, accurate Is and Os; cont salt tabs and fludrocortisone; straight q6h, Na goal 135-145  HEM/ONC: Hb stable  VTE Prophylaxis: SCDs, SQL; R UE cephalic vein thrombosis - repeat UE doppler 01/06  ID: afebrile, no leukocytosis,  piperacillin/tazobactam (last day 01/05/2021)  Social: will update family    CORE MEASURES  1.  Nath and Jacobo Score = 3  2.  VTE prophylaxis:  [ ] administered within 24 hours of admission OR [X] reason not done: fresh bleed, unsecured aneurysm.  3.  Dysphagia screening:   [X] performed before any oral meds / liquids / food  4.  Nimodipine treatment:  [X] administered within 24 hours of admission OR [ ] reason not done:    ATTENDING ATTESTATION:  I was physically present for the key portions of the evaluation and management (E/M) service provided.  I agree with the above history, physical and plan, which I have reviewed and edited where appropriate.    Patient at high risk for neurological deterioration or death due to:  ICU delirium, aspiration PNA, DVT / PE.  Critical care time:  I have personally provided 30 minutes of critical care time, excluding time spent on separate procedures.      Plan discussed with RN, house staff.    Discharge Checklist:    Completed: 01-03 @ 09:27    [X] hemodynamically stable – VS WNL and stable x 24hours, UO adequate  [n/a ] if  previously on HDA - off pressors x 24h with stable neuro exam  --> bilateral ICA stenosis, starting ASA today; will keep in ICU x 24h  [X] no new symptoms x 24h (i.e. new fever, new-onset nausea/vomiting)  [X] stable labs: (i.e. WBC not rising, sodium not dropping)  [n/a] if high aspiration risk (modified MASA score):                  [ ] should have definitive plans for trach/PEG (alternative option is to discharge from ICU to facilty)                  [ ] stepdown to bed close to nurse’s station  [n/a] low suctioning requirements (i.e. q4h or less)  [X] sign-off from primary RN*  [X] drains do not require ICU level of care  [X] if patient previously agitated or with behavioral issues – controlled   [X] pain controlled

## 2021-01-03 NOTE — PROVIDER CONTACT NOTE (OTHER) - REASON
Patient febrile 102.1 rectal
Patient with increased confusion at this time.
sbp <150
no output from EVD  x 2 hours, waveform WDL, ICP reading negative -2 to 0
Pt LEs neuro exam
Right groin edematous and reddened

## 2021-01-03 NOTE — PROVIDER CONTACT NOTE (OTHER) - ACTION/TREATMENT ORDERED:
no intervention at this time
500 ml NS bolus given
SHEILA Carrillo notified and aware. Will continue to mnoitor
SHEILA Crowley made aware. Vascular team called and will come evaluate patient.
8 units of insulin administered as ordered. No other interventions at present. Will continue to monitor.
SHEILA Smith aware. No further orders at this time.
PA stated would order tylenol and consider placement of NG tube.

## 2021-01-03 NOTE — PROVIDER CONTACT NOTE (OTHER) - ASSESSMENT
Palpable pedal pulses noted & strong signal when using doppler. Extremity warm. Patient denies numbness/tingling.
drain patent, drips when lowered
VSS. Pupil 2mm brisk bilateral and UEs some against gravity
Pt very confused and lethargic, unable to swallow pills

## 2021-01-03 NOTE — PROVIDER CONTACT NOTE (OTHER) - SITUATION
Notified PA re fever, notified re NPO status and held PO meds d/t failed dysphagia screen
Pt LEs unable to lift up and per pt stated she was trying but able to move some against gravity
Patient awake, answering all questions appropriately, moving all extremities, but is now disorientated to situation. Reoriented to situation PRN.
Right groin site noted to be edematous and reddened upon change of shift.
patient more alert than this morning. has sustained awakening. does not track or follow commands but moves x4 extremities spontaneously and to pain. left side more than right. perrla 3mm

## 2021-01-03 NOTE — PROGRESS NOTE ADULT - SUBJECTIVE AND OBJECTIVE BOX
=================================  NEUROCRITICAL CARE ATTENDING NOTE  =================================    CARA CANCHOLA   MRN-[id number]  Summary:  75y/F with COPD, asthma, dyslipidemia Hypertension found down.  Brought to [facility] where imaging showed SAH basilar cisterns with IVH and obstructive hydrocephalus L Pcomm aneurysm.  Given levetiracetam, hydromorphone.  NIHSS 2 HH3 MF4, transferred to [facility] for further management.      COURSE IN THE HOSPITAL:   admitted to [facility], EVD inserted; given 20 lasix for pulmo edema, T inversion on CT  12/10 No significant events overnight.    No significant events overnight.    No significant events overnight.    No significant events overnight. drain stopped working at 730 a.m., off levophed    No significant events overnight.   12/15 confusion   stepped down   readmitted to ICU    more confused somnolent overnight, high volume LP - 30cc removed, CT scan, EVD inserted, febrile pancultured, UA (+) rocephin started   Tmax 38.6  EVD clamped this morning, given 1L fluid bolus overnight, s/p VPS   Tmax 38.2    Tmax 39 500cc bolus overnight x2, pancultured for fever   Tmax 38.1, multiple bowel movements overnight   Tmax 37.8    Past Medical History: Hyperlipidemia Hypertension COPD (chronic obstructive pulmonary disease) Asthma  Allergies:  No Known Allergies  Home meds:   ·	famotidine 20 mg oral tablet: 1 tab(s) orally once a day  ·	lisinopril 2.5 mg oral tablet: 1 tab(s) orally once a day  ·	montelukast 10 mg oral tablet: 1 tab(s) orally once a day  ·	predniSONE 50 mg oral tablet: 1 tab(s) orally once a day  ·	ProAir HFA CFC free 90 mcg/inh inhalation aerosol: 2 puff(s) inhaled 4 times a day PRN  ·	simvastatin 20 mg oral tablet: 1 tab(s) orally once a day (at bedtime)    PHYSICAL EXAMINATION  T(C): 36.4 ( @ 06:36), Max: 37.8 ( @ 17:55) HR: 68 ( @ 07:00) (65 - 90) BP: 167/71 ( @ 07:00) (120/57 - 195/80) RR: 20 ( @ 07:00) (18 - 29) SpO2: 100% ( @ 07:00) (97% - 100%)  NEUROLOGIC EXAMINATION:  Patient is awake, oriented x 2, CLEOPATRA, intermittently following commands, moving all 4s, R shoulder movement pain limited  GENERAL: not intubated, not in cardiorespiratory distress  EENT:  anicteric  CARDIOVASCULAR: (+) S1 S2, normal rate and regular rhythm  PULMONARY: clear to auscultation bilaterally  ABDOMEN: soft, nontender with normoactive bowel sounds  EXTREMITIES: no edema  SKIN: no rash    LABS:   CAPILLARY BLOOD GLUCOSE 108 137 132 145               10.1   7.83  )-----------( 315      ( 2021 05:01 )             31.3     136  |  97  |  6<L>  ----------------------------<  117<H>  3.1<L>   |  27  |  0.38<L>    Ca    8.6      2021 05:01  Phos  2.9       Mg     2.0      @ 07:01  -   @ 07:00  IN: 1945 mL / OUT: 3010 mL / NET: -1065 mL    HbA1C = 6.7 (12-10) 6.4 ()  LDL = 112 ()   HDL = 66 ()  TG = 114 ()   TSH = 0.990 ()    Bacteriology:  UA (+) LE (+) N   Blood CS NG2D x2    CSF NGTD   Urine Enterobacter E coli   Blood culture NG5D (final)   CSF NGTD    Blood NG5D x2    CSF studies:    L   *** RBC58 WBC9 *** %N3 %L5     L   *** [id number] WBC20 *** %N10 %L8   -  L   *** [id number] WBC24 *** %N7 %L13     EEG:  Neuroimagin/10 CT head:  EVD placement, stable   CTA:  L pcomm aneurysm, L ICA (distal cavernous) aneurysm vs infundibulum, extensive calcified plaque cavernous bilateral ICA with mild to mod stenosis; thick plaque bilateral carotid bifurcations - mod to severe R and mild to mod L stenosis, focal calcified plaque R VA off subclavian artery - high grate stenosis / partial occlusion, upper lung bilateral opacification - pulmo edema, chronic deformity R humeral neck   CT head:  SAH, basilar cisterns, IV extension, obstructive hydrocephalus SVID, lacunar infarcts R caudate, both thalami, cerebellar hemispheres, no CT evidence of territorial infarction  Other imagin/28 LE Doppler: NEG   LE Doppler: NEG   CT abd:  small paraesophageal hiatal hernia, gastritis; ?impaction, septal thickening bilateral lower lobes ?pulmo edema, hepatic steatosis    MEDICATIONS:   ·	nystatin    Suspension [id number] Oral four times a day  ·	piperacillin/tazobactam IVPB.. 3.375 IV Intermittent every 6 hours  ·	lacosamide Solution 150 Oral every 12 hours  ·	modafinil 100 Oral daily  ·	midodrine 5 Oral every 8 hours  ·	albuterol/ipratropium for Nebulization. 3 Nebulizer every 6 hours PRN  ·	montelukast 10 Oral daily  ·	atorvastatin 40 Oral at bedtime  ·	cyanocobalamin 1000 Oral daily  ·	ferrous    sulfate 325 Oral daily  ·	fludroCORTISONE 0.2 Oral at bedtime  ·	insulin lispro (ADMELOG) corrective regimen sliding scale  SubCutaneous Before meals and at bedtime  ·	sodium chloride 3 Oral every 6 hours  ·	enoxaparin Injectable 40 SubCutaneous at bedtime  ·	pantoprazole   Suspension 40 Oral two times a day    IV FLUIDS: IVL  DRIPS:  DIET: Glucerna  Lines:   Drains:     EVD at 5cm H20   EVD at 5cm H20 ICP < 10   EVD at 5cm H20 ICP <10   EVD clamped  Wounds:    CODE STATUS:  Full Code                       GOALS OF CARE:  aggressive                      DISPOSITION:  ICU  NIHSS 2 HH3 MF4 =================================  NEUROCRITICAL CARE ATTENDING NOTE  =================================    CARA CANCHOLA   [id number]  Summary:  75y/F with COPD, asthma, dyslipidemia Hypertension found down.  Brought to [facility] where imaging showed SAH basilar cisterns with IVH and obstructive hydrocephalus L Pcomm aneurysm.  Given levetiracetam, hydromorphone.  NIHSS 2 HH3 MF4, transferred to [facility] for further management.      COURSE IN THE HOSPITAL:   admitted to [facility], EVD inserted; given 20 lasix for pulmo edema, T inversion on CT  12/10 No significant events overnight.    No significant events overnight.    No significant events overnight.    No significant events overnight. drain stopped working at 730 a.m., off levophed    No significant events overnight.   12/15 confusion   stepped down   readmitted to ICU    more confused somnolent overnight, high volume LP - 30cc removed, CT scan, EVD inserted, febrile pancultured, UA (+) rocephin started   Tmax 38.6  EVD clamped this morning, given 1L fluid bolus overnight, s/p VPS   Tmax 38.2    Tmax 39 500cc bolus overnight x2, pancultured for fever   Tmax 38.1 multiple bowel movements overnight   Tmax 37.8 wheezing overnight, given nebs    Past Medical History: Hyperlipidemia Hypertension COPD (chronic obstructive pulmonary disease) Asthma  Allergies:  No Known Allergies  Home meds:   ·	famotidine 20 mg oral tablet: 1 tab(s) orally once a day  ·	lisinopril 2.5 mg oral tablet: 1 tab(s) orally once a day  ·	montelukast 10 mg oral tablet: 1 tab(s) orally once a day  ·	predniSONE 50 mg oral tablet: 1 tab(s) orally once a day  ·	ProAir HFA CFC free 90 mcg/inh inhalation aerosol: 2 puff(s) inhaled 4 times a day PRN  ·	simvastatin 20 mg oral tablet: 1 tab(s) orally once a day (at bedtime)    PHYSICAL EXAMINATION  T(C): 36.4 ( @ 06:36), Max: 37.8 ( @ 17:55) HR: 68 ( @ 07:00) (65 - 90) BP: 167/71 ( @ 07:00) (120/57 - 195/80) RR: 20 ( @ 07:00) (18 - 29) SpO2: 100% ( @ 07:00) (97% - 100%)  NEUROLOGIC EXAMINATION:  Patient is awake, oriented x 2, CLEOPATRA, following commands, moving all 4s, R shoulder movement pain limited  GENERAL: not intubated, not in cardiorespiratory distress  EENT:  anicteric  CARDIOVASCULAR: (+) S1 S2, normal rate and regular rhythm  PULMONARY: clear to auscultation bilaterally  ABDOMEN: soft, nontender with normoactive bowel sounds  EXTREMITIES: no edema  SKIN: no rash    LABS:   CAPILLARY BLOOD GLUCOSE 108 137 132 145               10.1   7.83  )-----------( 315      ( 2021 05:01 )             31.3     136  |  97  |  6<L>  ----------------------------<  117<H>  3.1<L>   |  27  |  0.38<L>    Ca    8.6      2021 05:01  Phos  2.9       Mg     2.0      @ 07:01  -   @ 07:00  IN: 1945 mL / OUT: 3010 mL / NET: -1065 mL    HbA1C = 6.7 (12-10) 6.4 ()  LDL = 112 ()   HDL = 66 ()  TG = 114 ()   TSH = 0.990 ()    Bacteriology:  UA (+) LE (+) N   Blood CS NG2D x2    CSF NGTD   Urine Enterobacter E coli   Blood culture NG5D (final)   CSF NGTD    Blood NG5D x2    CSF studies:    L   *** RBC58 WBC9 *** %N3 %L5   12-29  L   *** [id number] WBC20 *** %N10 %L8   12-23  L   *** [id number] WBC24 *** %N7 %L13     EEG:  Neuroimagin/10 CT head:  EVD placement, stable   CTA:  L pcomm aneurysm, L ICA (distal cavernous) aneurysm vs infundibulum, extensive calcified plaque cavernous bilateral ICA with mild to mod stenosis; thick plaque bilateral carotid bifurcations - mod to severe R and mild to mod L stenosis, focal calcified plaque R VA off subclavian artery - high grate stenosis / partial occlusion, upper lung bilateral opacification - pulmo edema, chronic deformity R humeral neck   CT head:  SAH, basilar cisterns, IV extension, obstructive hydrocephalus SVID, lacunar infarcts R caudate, both thalami, cerebellar hemispheres, no CT evidence of territorial infarction  Other imagin/28 LE Doppler: NEG   LE Doppler: NEG   CT abd:  small paraesophageal hiatal hernia, gastritis; ?impaction, septal thickening bilateral lower lobes ?pulmo edema, hepatic steatosis    MEDICATIONS:   ·	nystatin    Suspension [id number] Oral four times a day  ·	piperacillin/tazobactam IVPB.. 3.375 IV Intermittent every 6 hours  ·	lacosamide Solution 150 Oral every 12 hours  ·	modafinil 100 Oral daily  ·	midodrine 5 Oral every 8 hours  ·	albuterol/ipratropium for Nebulization. 3 Nebulizer every 6 hours PRN  ·	montelukast 10 Oral daily  ·	atorvastatin 40 Oral at bedtime  ·	cyanocobalamin 1000 Oral daily  ·	ferrous    sulfate 325 Oral daily  ·	fludroCORTISONE 0.2 Oral at bedtime  ·	insulin lispro (ADMELOG) corrective regimen sliding scale  SubCutaneous Before meals and at bedtime  ·	sodium chloride 3 Oral every 6 hours  ·	enoxaparin Injectable 40 SubCutaneous at bedtime  ·	pantoprazole   Suspension 40 Oral two times a day    IV FLUIDS: IVL  DRIPS:  DIET: Glucerna  Lines:   Drains: rectal tube     EVD at 5cm H20   EVD at 5cm H20 ICP < 10   EVD at 5cm H20 ICP <10   EVD clamped  Wounds:    CODE STATUS:  Full Code                       GOALS OF CARE:  aggressive                      DISPOSITION:  ICU  NIHSS 2 HH3 MF4

## 2021-01-03 NOTE — PROVIDER CONTACT NOTE (OTHER) - DATE AND TIME:
19-Dec-2020 19:00
29-Dec-2020 19:00
03-Jan-2021 00:15
19-Dec-2020 08:30
11-Dec-2020 10:55
15-Dec-2020 19:40
29-Dec-2020 13:00

## 2021-01-03 NOTE — PROVIDER CONTACT NOTE (OTHER) - NAME OF MD/NP/PA/DO NOTIFIED:
Neuro SHEILA Carrillo
PA Shlifer
PA Yuval
SHEILA Stahl [facility]
SHEILA Smith
PA Yuval
SHEILA Crowley

## 2021-01-03 NOTE — PROGRESS NOTE ADULT - SUBJECTIVE AND OBJECTIVE BOX
HPI:  76y/o F with PMHx sig for COPD, asthma, HLD, HTN, BIBEMS to [facility] from home after HHA discovered patient found down in floor covered in non-bloody vomitus. Perr HHA Pt went to the bathroom and was taking a long time, went in to check on her and found her sitting on floor, awake, however with vomitus on front of shirt. On arrival to [facility], patient was taken for stat head CT, which revealed diffuse subarachnoid hemorrhage mainly at basilar cisterns with IVH and obstructive hydrocephalus. Patient was given a bolus of 1g keppra and dilaudid pushes for generalized headache. CTA concerning for 3mm left pcomm aneurysm and a 1.6mm outpouching of distal cavernous segment of the left internal carotid artery likely aneurysm. NIHSS2 3 MF4. Patient was transferred to [facility] for further intervention. Patient currently reports headaches. Pt denies acute changes in vision, seizures, CP, SOB, weakness/paresthesias of arms or legs. (09 Dec 2020 23:37)    OVERNIGHT EVENTS: VIVIEN o/n, neuro stable    Hospital Course:   12/9: BD1 Patient admitted for SAH HH3, MF4.   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 EVD at 5, on Levo overnight, nimodipine discontinued d/t hypotension  12/12: BD4 POD#2, VIVIEN overnight, neuro stable, passed TOV  12/13: BD5 POD#3: VIVIEN x 24 hrs  12/14: BD6 POD#4. VIVIEN o/n, neuro stable. EVD at 5cm H2O  12/15: BD7 POD #5 VIVIEN overnight, neuro stable. EVD remains at 5. Plan for CTA today.   12/16: BD8 POD #6 VIVIEN overnight, neuro stable, EVD at 0, on Levo, started on 3% at 15 overnight   12/17: BD9 POD#1 s/p cerebral angio for verapamil c/b device malfunction of Proglide, s/p right groin cutdown for removal of proglide catheter and femoral artery repair.  VIVIEN overnight, neuro stable, EVD at 0. patient remained intubated overnight, on propofol for sedation, and vEEG  12/18: BD#10, POD#8 s/p coil/embo of L pcomm aneurysm, POD#2 s/p IA verapamil, POD#2 R groin cutdown for removal of proglide catheter w/ repair of femoral artery. VIVIEN overnight. EVD remains open @0cmH2O   12/19: BD#11, POD#9 s/p coil/embo of L pcomm aneurysm. POD#3 s/p IA verapamil, POD#3 s/p R femoral artery cutdown of proglide catheter w/ femoral artery repair. VIVIEN overnight. EVD remains open @0cmH2O. EEG shows b/l frontal sharp discharges, cont monitoring, vimpat was increased yesterday. Gentle hydration, total IVF 50cc/hr. Cont vanc/zosyn for empiric coverage, next vanc trough due @1pm on 12/19. F/u daily CXR.   12/20 BD#12 POD#10 VIVIEN overnight, Neuro exam stable, EVD @ 0cm H2O, vEEG, 3% @ 15 goal WNL, TF via NGT  12/21: BD13, POD11 VIVIEN overnight. EVD at 5cmH20 (raised yesterday), vEEG in place  12/22 BD#14, EVD raised to 15 yesterday, 3% @ 30cc Goal WNL, -180, Milrinone, TTE prior to DC as per Card for takotsubo cardiomyopathy, failed S&S pending reeval, TF via NGT, Neuro exam stable  12/23: BD#15. VIVIEN o/n, neuro stable. EVD clamped. 30%@30cc/hr  12/24 BD#16 VIVIEN overnight, spiked 102, EVD @ 0cm H2O, 3% @ 30cc/hr Goal WNL, Vasc following, TF via NGT, -200,   12/25: BD#17. VIVIEN overnight. Pan cx from 12/23, NGTD on CSF and blood. EVD clamped. 3% @15cc/hr, rate kept same overnight. NGT feeds at goal. SBP goal 160-200.   12/26: BD#18. VIVIEN overnight, neuro exam stable. NGTD from pan cx on 12/23. EVD removed today. 3% discontinued 12/25. NGT feeds at goal, IVF off. SBP goal 160-200.   12/27: BD#19 VIVIEN overnight, neuro stable. 3% at 15, stepdown status  12/28: BD20 VIVIEN overnight, neuro stable. 3% continues.  Patient became increasingly more somnolent and underwent LP for CSF high volume tap.  Temporary improvement.  Spiked fever, pancultured.  12/29: BD21 Patient increasingly more somnolent, EVD placed at bedside.    12/30: BD22 pt. is s/p R VPS placement, certas @5 POD#1, post op ct head c/a/p done. afebrile o/n.   1/1: BD#23: pt. is s/p R VPS placement, certas @5 POD#2, post op ct head c/a/p done. zosyn for enterobacter   1/2: BD #24. POD#3 s/p R VPS placement, CErtas @5. Dressings removed from head and abdomen. Cont zosyn for enterobacter and e. coli in urine, end date 1/4/21. Post-op imaging completed. Pend S&S re-eval, improvement in mental status/neuro exam.   1/3: BD25, POD4. SBP goals relaxed 120-200. zosyn for enterobacter UTI until 1/4. FOB+, protonix bid started    Vital Signs Last 24 Hrs  T(C): 37.3 (03 Jan 2021 01:30), Max: 37.8 (02 Jan 2021 17:55)  T(F): 99.2 (03 Jan 2021 01:30), Max: 100 (02 Jan 2021 17:55)  HR: 68 (03 Jan 2021 01:00) (68 - 90)  BP: 153/67 (03 Jan 2021 01:00) (134/107 - 195/80)  BP(mean): 96 (03 Jan 2021 01:00) (89 - 129)  RR: 22 (03 Jan 2021 01:00) (19 - 29)  SpO2: 100% (03 Jan 2021 01:00) (97% - 100%)    I&O's Summary    01 Jan 2021 07:01  -  02 Jan 2021 07:00  --------------------------------------------------------  IN: 2820 mL / OUT: 2350 mL / NET: 470 mL    02 Jan 2021 07:01  -  03 Jan 2021 01:33  --------------------------------------------------------  IN: 1685 mL / OUT: 2300 mL / NET: -615 mL        PHYSICAL EXAM:  GEN: laying in bed, appears well, NAD  NEURO: AOx1-2. FC, OE spont, speech intact, face symmetric. CNII-XII intact. PERRL, EOMI. No pronator drift. MAEx4. 5/5 strength throughout. SILT  CV: RRR +S1/S2  PULM: CTAB  GI: Abd soft, NT/ND  EXT: ext warm, dry, nontender  WOUND: evd site c/d/i    TUBES/LINES:  [] Soriano  [] Lumbar Drain  [] Wound Drains  [] Others      DIET:  [] NPO  [x] Mechanical  [] Tube feeds    LABS:                        9.6    7.42  )-----------( 330      ( 02 Jan 2021 05:17 )             30.7     01-02    136  |  99  |  8   ----------------------------<  134<H>  3.0<L>   |  28  |  0.36<L>    Ca    8.3<L>      02 Jan 2021 05:17  Phos  2.2     01-02  Mg     1.7     01-02              CAPILLARY BLOOD GLUCOSE      POCT Blood Glucose.: 137 mg/dL (02 Jan 2021 21:48)  POCT Blood Glucose.: 132 mg/dL (02 Jan 2021 16:43)  POCT Blood Glucose.: 145 mg/dL (02 Jan 2021 11:05)  POCT Blood Glucose.: 119 mg/dL (02 Jan 2021 06:18)      Drug Levels: [] N/A    CSF Analysis: [] N/A      Allergies    No Known Allergies    Intolerances      MEDICATIONS:  Antibiotics:  nystatin    Suspension [id number] Unit(s) Oral four times a day  piperacillin/tazobactam IVPB.. 3.375 Gram(s) IV Intermittent every 6 hours    Neuro:  acetaminophen    Suspension .. 650 milliGRAM(s) Oral every 6 hours PRN  lacosamide Solution 150 milliGRAM(s) Oral every 12 hours  modafinil 100 milliGRAM(s) Oral daily    Anticoagulation:  enoxaparin Injectable 40 milliGRAM(s) SubCutaneous at bedtime    OTHER:  albuterol/ipratropium for Nebulization. 3 milliLiter(s) Nebulizer every 6 hours PRN  atorvastatin 40 milliGRAM(s) Oral at bedtime  chlorhexidine 0.12% Liquid 15 milliLiter(s) Oral Mucosa two times a day  chlorhexidine 2% Cloths 1 Application(s) Topical <User Schedule>  dextrose 40% Gel 15 Gram(s) Oral once  dextrose 50% Injectable 25 Gram(s) IV Push once  fludroCORTISONE 0.2 milliGRAM(s) Oral at bedtime  glucagon  Injectable 1 milliGRAM(s) IntraMuscular once  insulin lispro (ADMELOG) corrective regimen sliding scale   SubCutaneous Before meals and at bedtime  labetalol Injectable 2.5 milliGRAM(s) IV Push every 6 hours PRN  midodrine 5 milliGRAM(s) Oral every 8 hours  montelukast 10 milliGRAM(s) Oral daily  pantoprazole   Suspension 40 milliGRAM(s) Oral two times a day    IVF:  cyanocobalamin 1000 MICROGram(s) Oral daily  dextrose 5%. 1000 milliLiter(s) IV Continuous <Continuous>  dextrose 5%. 1000 milliLiter(s) IV Continuous <Continuous>  ferrous    sulfate 325 milliGRAM(s) Oral daily  sodium chloride 3 Gram(s) Oral every 6 hours    CULTURES:  Culture Results:   No growth at 2 days. (12-31 @ 22:37)  Culture Results:   No growth at 2 days. (12-31 @ 22:37)    RADIOLOGY & ADDITIONAL TESTS:      ASSESSMENT:  75y Female  with PMHx of COPD, asthma, HLD, HTN, BIBEMS to [facility] from home after HHA found patient down on the floor covered in non-bloody vomitus. CTH reveals diffuse subarachnoid hemorrhage mainly at basilar cisterns with IVH and obstructive hydrocephalus, CTA shows 3mm left pcomm aneurysm, now s/p bedside right frontal EVD placement 12/10, s/p cerebral angiogram for coil embolization of left PCOMM aneurysm 12/10, now   s/p cerebral angio for verapamil c/b device malfunction of Proglide, s/p right groin cutdown for removal of proglide catheter and femoral artery repair (12/17/2020), NIHSS2 HH3 MF4), s/p EVD removal 12/25,   s/p bedside EVD placement  12/29, POD#4 from R VPS certas at 5  BD#25.     HEADACHE    Handoff    MEWS Score    Hyperlipidemia    Hypertension    COPD (chronic obstructive pulmonary disease)    Asthma    COPD (chronic obstructive pulmonary disease)    Asthma    Hydrocephalus, adult    Injury of right femoral artery    Cerebral artery vasospasm    SAH (subarachnoid hemorrhage)    Hydrocephalus, adult    Injury of femoral artery    Cerebral artery vasospasm    SAH (subarachnoid hemorrhage)    Subarachnoid bleed    Obstructive hydrocephalus    Anemia due to acute blood loss    Preoperative clearance    Centrilobular emphysema    Mild persistent asthma without complication    Subarachnoid bleed    Essential hypertension    Pure hypercholesterolemia    Ventriculoperitoneal shunt    Insertion of external ventricular drain    Vascular surgery procedure    Angiogram, carotid and cerebral, bilateral    Angiogram, cerebral, with intracranial aneurysm embolization    No significant past surgical history    HEADACHE    90+    [email or web address]        PLAN:  NEURO:  - neuro/vitals checks  - pain control   - cont modafinil for neurostim  - cont vimpat seizure ppx    CARDIOVASCULAR:  - -200  - echo and CCTA prior to discharge  - EF now 70%    PULMONARY:  - satting well RA    RENAL:  - repletions prn  - sc prn     GI:  - puree/thin liquid diet  - RT  - FOB+, protonix bid     HEME:  - h/h stable  - SCDs/SQL    ID:  - afebrile  - zosyn enterobacter UTI until 1/4    ENDO:  - glucose goal 140-180    DVT PROPHYLAXIS:  [x] Venodynes                                [x] Heparin/Lovenox    DISPOSITION: ICU status, full code, dispo pending    D/w Dr. Serrano, Dr. Nino    Assessment:  Present when checked    []  GCS  E   V  M     Heart Failure: []Acute, [] acute on chronic , []chronic  Heart Failure:  [] Diastolic (HFpEF), [] Systolic (HFrEF), []Combined (HFpEF and HFrEF), [] RHF, [] Pulm HTN, [] Other    [] MICHAELLE, [] ATN, [] AIN, [] other  [] CKD1, [] CKD2, [] CKD 3, [] CKD 4, [] CKD 5, []ESRD    Encephalopathy: [] Metabolic, [] Hepatic, [] toxic, [] Neurological, [] Other    Abnormal Nurtitional Status: [] malnurtition (see nutrition note), [ ]underweight: BMI < 19, [] morbid obesity: BMI >40, [] Cachexia    [] Sepsis  [] hypovolemic shock,[] cardiogenic shock, [] hemorrhagic shock, [] neuogenic shock  [] Acute Respiratory Failure  []Cerebral edema, [] Brain compression/ herniation,   [] Functional quadriplegia  [] Acute blood loss anemia

## 2021-01-04 LAB
ANION GAP SERPL CALC-SCNC: 12 MMOL/L — SIGNIFICANT CHANGE UP (ref 5–17)
BUN SERPL-MCNC: 5 MG/DL — LOW (ref 7–23)
CALCIUM SERPL-MCNC: 9 MG/DL — SIGNIFICANT CHANGE UP (ref 8.4–10.5)
CHLORIDE SERPL-SCNC: 98 MMOL/L — SIGNIFICANT CHANGE UP (ref 96–108)
CO2 SERPL-SCNC: 28 MMOL/L — SIGNIFICANT CHANGE UP (ref 22–31)
CREAT SERPL-MCNC: 0.39 MG/DL — LOW (ref 0.5–1.3)
GLUCOSE SERPL-MCNC: 106 MG/DL — HIGH (ref 70–99)
HCT VFR BLD CALC: 36.5 % — SIGNIFICANT CHANGE UP (ref 34.5–45)
HGB BLD-MCNC: 11.4 G/DL — LOW (ref 11.5–15.5)
MAGNESIUM SERPL-MCNC: 1.8 MG/DL — SIGNIFICANT CHANGE UP (ref 1.6–2.6)
MCHC RBC-ENTMCNC: 28.2 PG — SIGNIFICANT CHANGE UP (ref 27–34)
MCHC RBC-ENTMCNC: 31.2 GM/DL — LOW (ref 32–36)
MCV RBC AUTO: 90.3 FL — SIGNIFICANT CHANGE UP (ref 80–100)
NRBC # BLD: 0 /100 WBCS — SIGNIFICANT CHANGE UP (ref 0–0)
PHOSPHATE SERPL-MCNC: 3.4 MG/DL — SIGNIFICANT CHANGE UP (ref 2.5–4.5)
PLATELET # BLD AUTO: 338 K/UL — SIGNIFICANT CHANGE UP (ref 150–400)
POTASSIUM SERPL-MCNC: 3.3 MMOL/L — LOW (ref 3.5–5.3)
POTASSIUM SERPL-SCNC: 3.3 MMOL/L — LOW (ref 3.5–5.3)
RBC # BLD: 4.04 M/UL — SIGNIFICANT CHANGE UP (ref 3.8–5.2)
RBC # FLD: 16.7 % — HIGH (ref 10.3–14.5)
SODIUM SERPL-SCNC: 138 MMOL/L — SIGNIFICANT CHANGE UP (ref 135–145)
WBC # BLD: 7.73 K/UL — SIGNIFICANT CHANGE UP (ref 3.8–10.5)
WBC # FLD AUTO: 7.73 K/UL — SIGNIFICANT CHANGE UP (ref 3.8–10.5)

## 2021-01-04 PROCEDURE — 99291 CRITICAL CARE FIRST HOUR: CPT

## 2021-01-04 PROCEDURE — 99292 CRITICAL CARE ADDL 30 MIN: CPT | Mod: 24

## 2021-01-04 RX ORDER — AMLODIPINE BESYLATE 2.5 MG/1
2.5 TABLET ORAL EVERY 24 HOURS
Refills: 0 | Status: DISCONTINUED | OUTPATIENT
Start: 2021-01-04 | End: 2021-01-05

## 2021-01-04 RX ORDER — SODIUM CHLORIDE 9 MG/ML
1000 INJECTION INTRAMUSCULAR; INTRAVENOUS; SUBCUTANEOUS
Refills: 0 | Status: DISCONTINUED | OUTPATIENT
Start: 2021-01-04 | End: 2021-01-05

## 2021-01-04 RX ORDER — MAGNESIUM SULFATE 500 MG/ML
2 VIAL (ML) INJECTION ONCE
Refills: 0 | Status: COMPLETED | OUTPATIENT
Start: 2021-01-04 | End: 2021-01-04

## 2021-01-04 RX ORDER — POTASSIUM CHLORIDE 20 MEQ
40 PACKET (EA) ORAL EVERY 4 HOURS
Refills: 0 | Status: COMPLETED | OUTPATIENT
Start: 2021-01-04 | End: 2021-01-04

## 2021-01-04 RX ADMIN — Medication 500000 UNIT(S): at 17:34

## 2021-01-04 RX ADMIN — SODIUM CHLORIDE 3 GRAM(S): 9 INJECTION INTRAMUSCULAR; INTRAVENOUS; SUBCUTANEOUS at 17:34

## 2021-01-04 RX ADMIN — Medication 325 MILLIGRAM(S): at 11:41

## 2021-01-04 RX ADMIN — PANTOPRAZOLE SODIUM 40 MILLIGRAM(S): 20 TABLET, DELAYED RELEASE ORAL at 17:34

## 2021-01-04 RX ADMIN — CHLORHEXIDINE GLUCONATE 15 MILLILITER(S): 213 SOLUTION TOPICAL at 17:35

## 2021-01-04 RX ADMIN — CHLORHEXIDINE GLUCONATE 15 MILLILITER(S): 213 SOLUTION TOPICAL at 05:34

## 2021-01-04 RX ADMIN — SODIUM CHLORIDE 3 GRAM(S): 9 INJECTION INTRAMUSCULAR; INTRAVENOUS; SUBCUTANEOUS at 11:41

## 2021-01-04 RX ADMIN — LACOSAMIDE 150 MILLIGRAM(S): 50 TABLET ORAL at 05:33

## 2021-01-04 RX ADMIN — Medication 500000 UNIT(S): at 05:34

## 2021-01-04 RX ADMIN — FLUDROCORTISONE ACETATE 0.2 MILLIGRAM(S): 0.1 TABLET ORAL at 21:56

## 2021-01-04 RX ADMIN — CHLORHEXIDINE GLUCONATE 1 APPLICATION(S): 213 SOLUTION TOPICAL at 05:34

## 2021-01-04 RX ADMIN — Medication 500000 UNIT(S): at 23:38

## 2021-01-04 RX ADMIN — MODAFINIL 100 MILLIGRAM(S): 200 TABLET ORAL at 11:41

## 2021-01-04 RX ADMIN — MONTELUKAST 10 MILLIGRAM(S): 4 TABLET, CHEWABLE ORAL at 11:42

## 2021-01-04 RX ADMIN — Medication 40 MILLIEQUIVALENT(S): at 17:36

## 2021-01-04 RX ADMIN — AMLODIPINE BESYLATE 2.5 MILLIGRAM(S): 2.5 TABLET ORAL at 12:07

## 2021-01-04 RX ADMIN — ENOXAPARIN SODIUM 40 MILLIGRAM(S): 100 INJECTION SUBCUTANEOUS at 21:58

## 2021-01-04 RX ADMIN — Medication 40 MILLIEQUIVALENT(S): at 12:17

## 2021-01-04 RX ADMIN — LACOSAMIDE 150 MILLIGRAM(S): 50 TABLET ORAL at 17:34

## 2021-01-04 RX ADMIN — Medication 50 GRAM(S): at 07:36

## 2021-01-04 RX ADMIN — PIPERACILLIN AND TAZOBACTAM 200 GRAM(S): 4; .5 INJECTION, POWDER, LYOPHILIZED, FOR SOLUTION INTRAVENOUS at 11:21

## 2021-01-04 RX ADMIN — PANTOPRAZOLE SODIUM 40 MILLIGRAM(S): 20 TABLET, DELAYED RELEASE ORAL at 05:35

## 2021-01-04 RX ADMIN — SODIUM CHLORIDE 3 GRAM(S): 9 INJECTION INTRAMUSCULAR; INTRAVENOUS; SUBCUTANEOUS at 23:37

## 2021-01-04 RX ADMIN — PREGABALIN 1000 MICROGRAM(S): 225 CAPSULE ORAL at 11:41

## 2021-01-04 RX ADMIN — PIPERACILLIN AND TAZOBACTAM 200 GRAM(S): 4; .5 INJECTION, POWDER, LYOPHILIZED, FOR SOLUTION INTRAVENOUS at 23:38

## 2021-01-04 RX ADMIN — Medication 81 MILLIGRAM(S): at 11:41

## 2021-01-04 RX ADMIN — ATORVASTATIN CALCIUM 40 MILLIGRAM(S): 80 TABLET, FILM COATED ORAL at 21:57

## 2021-01-04 RX ADMIN — PIPERACILLIN AND TAZOBACTAM 200 GRAM(S): 4; .5 INJECTION, POWDER, LYOPHILIZED, FOR SOLUTION INTRAVENOUS at 05:33

## 2021-01-04 RX ADMIN — SODIUM CHLORIDE 3 GRAM(S): 9 INJECTION INTRAMUSCULAR; INTRAVENOUS; SUBCUTANEOUS at 05:33

## 2021-01-04 RX ADMIN — PIPERACILLIN AND TAZOBACTAM 200 GRAM(S): 4; .5 INJECTION, POWDER, LYOPHILIZED, FOR SOLUTION INTRAVENOUS at 17:34

## 2021-01-04 NOTE — PROGRESS NOTE ADULT - ASSESSMENT
75y/Female with:  aneursymal subarachnoid hemorrhage, L pcomm aneurysm, L parophthalmic aneurysm; brain compression, cerebral edema  osbtructive hydrocephalus  lacunar infarcts R caudate, B thalami, cerebellar hemispheres ?artery to artery vs cardioembolic?  atherosclerotic cardiovascular disease; mod to severe bilateral ICA stenosis (R>L), R VA stenosis  Hypertension dyslipidemia   COPD asthma  newly diagnosed Diabetes Mellitus?  troponin leak    PLAN: Day 1 = 12-09 ; Day 4 =  12/12; Day 21 = 12/29  NEURO: neurochecks q 4 h, VS q 4, PRN pain meds with Tylenol   SAH:  off nimodipine   bilateral ICA stenosis - start ASA if ok with NS  Hydrocephalus:  s/p VPS   Seizure treatment (cortical ICH, associated SDH, unclear etiology):  lacosamide 150mg PO BID   REHAB:  physical therapy evaluation and management    EARLY MOB:  OOB to chair    PULM:  Room air, incentive spirometry, continue montelukast, ipratropium / albuterol PRN, tiotropium daily     CARDIO:  SBP goal 120-200mm Hg, TTE EF 70%; d/c midodrine    ENDO:  Blood sugar goals 140-180 mg/dL, d/c insulin sliding scale, atorvastatin 40mg    GI:  BID protonix for GIB; PEG tube     DIET: add ensure    RENAL: maintain euvolemia, accurate Is and Os; cont salt tabs and fludrocortisone; straight q6h, Na goal 135-145    HEM/ONC: Hb stable    VTE Prophylaxis: SCDs, SQL; R UE cephalic vein thrombosis - repeat UE doppler 01/06    ID: afebrile, no leukocytosis,  piperacillin/tazobactam (last day 01/05/2021)    Social: will update family    CORE MEASURES  1.  Nath and Jacobo Score = 3  2.  VTE prophylaxis:  [ ] administered within 24 hours of admission OR [X] reason not done: fresh bleed, unsecured aneurysm.  3.  Dysphagia screening:   [X] performed before any oral meds / liquids / food  4.  Nimodipine treatment:  [X] administered within 24 hours of admission OR [ ] reason not done:    ATTENDING ATTESTATION:  I was physically present for the key portions of the evaluation and management (E/M) service provided.  I agree with the above history, physical and plan, which I have reviewed and edited where appropriate.    Patient at high risk for neurological deterioration or death due to:  ICU delirium, aspiration PNA, DVT / PE.  Critical care time:  I have personally provided 30 minutes of critical care time, excluding time spent on separate procedures.      Plan discussed with RN, house staff.    Discharge Checklist:    Completed: 01-03 @ 09:27    [X] hemodynamically stable – VS WNL and stable x 24hours, UO adequate  [n/a ] if  previously on HDA - off pressors x 24h with stable neuro exam  --> bilateral ICA stenosis, starting ASA today; will keep in ICU x 24h  [X] no new symptoms x 24h (i.e. new fever, new-onset nausea/vomiting)  [X] stable labs: (i.e. WBC not rising, sodium not dropping)  [n/a] if high aspiration risk (modified MASA score):                  [ ] should have definitive plans for trach/PEG (alternative option is to discharge from ICU to facilty)                  [ ] stepdown to bed close to nurse’s station  [n/a] low suctioning requirements (i.e. q4h or less)  [X] sign-off from primary RN*  [X] drains do not require ICU level of care  [X] if patient previously agitated or with behavioral issues – controlled   [X] pain controlled

## 2021-01-04 NOTE — PROGRESS NOTE ADULT - SUBJECTIVE AND OBJECTIVE BOX
=================================  NEUROCRITICAL CARE ATTENDING NOTE  =================================    CARA CANCHOLA   MRN-[id number]  Summary:  75y/F with COPD, asthma, dyslipidemia Hypertension found down.  Brought to [facility] where imaging showed SAH basilar cisterns with IVH and obstructive hydrocephalus L Pcomm aneurysm.  Given levetiracetam, hydromorphone.  NIHSS 2 HH3 MF4, transferred to [facility] for further management.      COURSE IN THE HOSPITAL:   admitted to [facility], EVD inserted; given 20 lasix for pulmo edema, T inversion on CT  12/10 No significant events overnight.    No significant events overnight.    No significant events overnight.    No significant events overnight. drain stopped working at 730 a.m., off levophed    No significant events overnight.   12/15 confusion   stepped down   readmitted to ICU    more confused somnolent overnight, high volume LP - 30cc removed, CT scan, EVD inserted, febrile pancultured, UA (+) rocephin started   Tmax 38.6  EVD clamped this morning, given 1L fluid bolus overnight, s/p VPS   Tmax 38.2    Tmax 39 500cc bolus overnight x2, pancultured for fever   Tmax 38.1 multiple bowel movements overnight   Tmax 37.8 wheezing overnight, given nebs    Overnight Events:   VIVIEN     PHYSICAL EXAMINATION  T(C): 36.5 ( @ 09:26), Max: 37.2 ( @ 17:01)  HR: 85 ( @ 14:00) (69 - 85)  BP: 158/86 ( @ 14:00) (132/54 - 202/85)  RR: 19 ( @ 14:00) (17 - 30)  SpO2: 95% ( @ 14:00) (91% - 98%)  CVP(mm Hg): --    LABS:  CAPILLARY BLOOD GLUCOSE      POCT Blood Glucose.: 116 mg/dL (2021 11:25)                          11.4   7.73  )-----------( 338      ( 2021 06:29 )             36.5         138  |  98  |  5<L>  ----------------------------<  106<H>  3.3<L>   |  28  |  0.39<L>    Ca    9.0      2021 05:50  Phos  3.4     -  Mg     1.8      @ 07:01  -   @ 07:00  --------------------------------------------------------  IN: 925 mL / OUT: 2790 mL / NET: -1865 mL        MEDICATIONS:  MEDICATIONS  (STANDING):  amLODIPine   Tablet 2.5 milliGRAM(s) Oral every 24 hours  aspirin enteric coated 81 milliGRAM(s) Oral daily  atorvastatin 40 milliGRAM(s) Oral at bedtime  chlorhexidine 0.12% Liquid 15 milliLiter(s) Oral Mucosa two times a day  chlorhexidine 2% Cloths 1 Application(s) Topical <User Schedule>  cyanocobalamin 1000 MICROGram(s) Oral daily  dextrose 40% Gel 15 Gram(s) Oral once  dextrose 5%. 1000 milliLiter(s) (50 mL/Hr) IV Continuous <Continuous>  dextrose 5%. 1000 milliLiter(s) (100 mL/Hr) IV Continuous <Continuous>  dextrose 50% Injectable 25 Gram(s) IV Push once  enoxaparin Injectable 40 milliGRAM(s) SubCutaneous at bedtime  ferrous    sulfate 325 milliGRAM(s) Oral daily  fludroCORTISONE 0.2 milliGRAM(s) Oral at bedtime  glucagon  Injectable 1 milliGRAM(s) IntraMuscular once  lacosamide Solution 150 milliGRAM(s) Oral every 12 hours  modafinil 100 milliGRAM(s) Oral daily  montelukast 10 milliGRAM(s) Oral daily  nystatin    Suspension 964247 Unit(s) Oral four times a day  pantoprazole   Suspension 40 milliGRAM(s) Oral two times a day  piperacillin/tazobactam IVPB.. 3.375 Gram(s) IV Intermittent every 6 hours  potassium chloride   Solution 40 milliEquivalent(s) Oral every 4 hours  sodium chloride 3 Gram(s) Oral every 6 hours  tiotropium 18 MICROgram(s) Capsule 1 Capsule(s) Inhalation daily    MEDICATIONS  (PRN):  acetaminophen    Suspension .. 650 milliGRAM(s) Oral every 6 hours PRN Temp greater or equal to 38C (100.4F), Mild Pain (1 - 3)  albuterol/ipratropium for Nebulization. 3 milliLiter(s) Nebulizer every 6 hours PRN Shortness of Breath  labetalol Injectable 2.5 milliGRAM(s) IV Push every 6 hours PRN Systolic blood pressure > 220      Past Medical History: Hyperlipidemia Hypertension COPD (chronic obstructive pulmonary disease) Asthma  Allergies:  No Known Allergies  Home meds:   ·	famotidine 20 mg oral tablet: 1 tab(s) orally once a day  ·	lisinopril 2.5 mg oral tablet: 1 tab(s) orally once a day  ·	montelukast 10 mg oral tablet: 1 tab(s) orally once a day  ·	predniSONE 50 mg oral tablet: 1 tab(s) orally once a day  ·	ProAir HFA CFC free 90 mcg/inh inhalation aerosol: 2 puff(s) inhaled 4 times a day PRN  ·	simvastatin 20 mg oral tablet: 1 tab(s) orally once a day (at bedtime)    PHYSICAL EXAMINATION    NEUROLOGIC EXAMINATION:  Patient is awake, oriented x 2, CLEOPATRA, following commands, moving all 4s, R shoulder movement pain limited    CARDIOVASCULAR: (+) S1 S2, normal rate and regular rhythm  PULMONARY: clear to auscultation bilaterally  ABDOMEN: soft, nontender with normoactive bowel sounds  EXTREMITIES: no edema  SKIN: no rash      HbA1C = 6.7 (12-10) 6.4 ()  LDL = 112 ()   HDL = 66 ()  TG = 114 ()   TSH = 0.990 ()    Bacteriology:  UA (+) LE (+) N   Blood CS NG2D x2    CSF NGTD   Urine Enterobacter E coli   Blood culture NG5D (final)   CSF NGTD    Blood NG5D x2    CSF studies:    L   *** RBC58 WBC9 *** %N3 %L5     L   *** [id number] WBC20 *** %N10 %L8     L   *** [id number] WBC24 *** %N7 %L13     EEG:  Neuroimagin/10 CT head:  EVD placement, stable   CTA:  L pcomm aneurysm, L ICA (distal cavernous) aneurysm vs infundibulum, extensive calcified plaque cavernous bilateral ICA with mild to mod stenosis; thick plaque bilateral carotid bifurcations - mod to severe R and mild to mod L stenosis, focal calcified plaque R VA off subclavian artery - high grate stenosis / partial occlusion, upper lung bilateral opacification - pulmo edema, chronic deformity R humeral neck   CT head:  SAH, basilar cisterns, IV extension, obstructive hydrocephalus SVID, lacunar infarcts R caudate, both thalami, cerebellar hemispheres, no CT evidence of territorial infarction  Other imagin/28 LE Doppler: NEG   LE Doppler: NEG   CT abd:  small paraesophageal hiatal hernia, gastritis; ?impaction, septal thickening bilateral lower lobes ?pulmo edema, hepatic steatosis      IV FLUIDS: IVL  DRIPS:  DIET: Glucerna  Lines:   Drains: rectal tube     EVD at 5cm H20   EVD at 5cm H20 ICP < 10   EVD at 5cm H20 ICP <10   EVD clamped

## 2021-01-04 NOTE — CONSULT NOTE ADULT - SUBJECTIVE AND OBJECTIVE BOX
SURGERY CONSULT  ==============================================================================================================  HPI: 75y Female  HPI:  76y/o F with PMHx sig for COPD, asthma, HLD, HTN, BIBEMS to [facility] from home after HHA discovered patient found down in floor covered in non-bloody vomitus. Perr HHA Pt went to the bathroom and was taking a long time, went in to check on her and found her sitting on floor, awake, however with vomitus on front of shirt. On arrival to [facility], patient was taken for stat head CT, which revealed diffuse subarachnoid hemorrhage mainly at basilar cisterns with IVH and obstructive hydrocephalus. Patient was given a bolus of 1g keppra and dilaudid pushes for generalized headache. CTA concerning for 3mm left pcomm aneurysm and a 1.6mm outpouching of distal cavernous segment of the left internal carotid artery likely aneurysm. NIHSS2 HH3 MF4. Patient was transferred to [facility] for further intervention. Patient currently reports headaches. Pt denies acute changes in vision, seizures, CP, SOB, weakness/paresthesias of arms or legs. (09 Dec 2020 23:37)    SURGERY ADDENDUM  Patient is now s/p angiogram with aneurysm embolization and  shunt. She has a recent swallow evaluation that clears patient for thick liquids/puree, but per NSG team, her exam fluctuates a lot, and doesn't always tolerate food intake.      PAST MEDICAL & SURGICAL HISTORY:  Hyperlipidemia    Hypertension    COPD (chronic obstructive pulmonary disease)    Asthma    No significant past surgical history      Home Meds: Home Medications:  famotidine 20 mg oral tablet: 1 tab(s) orally once a day (10 Dec 2020 00:38)  lisinopril 2.5 mg oral tablet: 1 tab(s) orally once a day (10 Dec 2020 00:38)  montelukast 10 mg oral tablet: 1 tab(s) orally once a day (10 Dec 2020 00:38)  omeprazole 20 mg oral delayed release capsule: 1 cap(s) orally once a day (12 Dec 2020 16:14)  simvastatin 20 mg oral tablet: 1 tab(s) orally once a day (at bedtime) (10 Dec 2020 00:38)    Allergies: Allergies    No Known Allergies    Intolerances      Soc:   Advanced Directives: Presumed Full Code     CURRENT MEDICATIONS:   --------------------------------------------------------------------------------------  Neurologic Medications  acetaminophen    Suspension .. 650 milliGRAM(s) Oral every 6 hours PRN Temp greater or equal to 38C (100.4F), Mild Pain (1 - 3)  lacosamide Solution 150 milliGRAM(s) Oral every 12 hours  modafinil 100 milliGRAM(s) Oral daily    Respiratory Medications  albuterol/ipratropium for Nebulization. 3 milliLiter(s) Nebulizer every 6 hours PRN Shortness of Breath  montelukast 10 milliGRAM(s) Oral daily  tiotropium 18 MICROgram(s) Capsule 1 Capsule(s) Inhalation daily    Cardiovascular Medications  amLODIPine   Tablet 2.5 milliGRAM(s) Oral every 24 hours  labetalol Injectable 2.5 milliGRAM(s) IV Push every 6 hours PRN Systolic blood pressure > 220    Gastrointestinal Medications  cyanocobalamin 1000 MICROGram(s) Oral daily  dextrose 5%. 1000 milliLiter(s) IV Continuous <Continuous>  dextrose 5%. 1000 milliLiter(s) IV Continuous <Continuous>  ferrous    sulfate 325 milliGRAM(s) Oral daily  pantoprazole   Suspension 40 milliGRAM(s) Oral two times a day  sodium chloride 3 Gram(s) Oral every 6 hours    Genitourinary Medications    Hematologic/Oncologic Medications  aspirin enteric coated 81 milliGRAM(s) Oral daily  enoxaparin Injectable 40 milliGRAM(s) SubCutaneous at bedtime    Antimicrobial/Immunologic Medications  nystatin    Suspension [id number] Unit(s) Oral four times a day  piperacillin/tazobactam IVPB.. 3.375 Gram(s) IV Intermittent every 6 hours    Endocrine/Metabolic Medications  atorvastatin 40 milliGRAM(s) Oral at bedtime  dextrose 40% Gel 15 Gram(s) Oral once  dextrose 50% Injectable 25 Gram(s) IV Push once  fludroCORTISONE 0.2 milliGRAM(s) Oral at bedtime  glucagon  Injectable 1 milliGRAM(s) IntraMuscular once    Topical/Other Medications  chlorhexidine 0.12% Liquid 15 milliLiter(s) Oral Mucosa two times a day  chlorhexidine 2% Cloths 1 Application(s) Topical <User Schedule>    --------------------------------------------------------------------------------------    VITAL SIGNS, INS/OUTS (last 24 hours):  --------------------------------------------------------------------------------------  ICU Vital Signs Last 24 Hrs  T(C): 36.6 (04 Jan 2021 17:30), Max: 36.6 (03 Jan 2021 21:36)  T(F): 97.9 (04 Jan 2021 17:30), Max: 97.9 (03 Jan 2021 21:36)  HR: 81 (04 Jan 2021 16:00) (69 - 85)  BP: 157/71 (04 Jan 2021 16:00) (132/54 - 202/85)  BP(mean): 102 (04 Jan 2021 16:00) (78 - 122)  ABP: --  ABP(mean): --  RR: 31 (04 Jan 2021 16:00) (17 - 31)  SpO2: 94% (04 Jan 2021 16:00) (91% - 97%)    I&O's Summary    03 Jan 2021 07:01  -  04 Jan 2021 07:00  --------------------------------------------------------  IN: 925 mL / OUT: 2790 mL / NET: -1865 mL    04 Jan 2021 07:01  -  04 Jan 2021 17:54  --------------------------------------------------------  IN: 0 mL / OUT: 700 mL / NET: -700 mL      --------------------------------------------------------------------------------------    EXAM:  General: NAD, lying in bed  Neuro: A&Ox2, answers some questions, EOMI  HEENT: NC, staples on head from  shunt, no dentures  Resp: Non-labored breathing on RA  CV: sinus rhythm  Abdomen: soft, non-distended, non-tender, staples on RUQ from  shunt, c/d/i  Extremities: warm, no edema, SCDs in place, R groin staples in place, incision c/d/i    LABS  --------------------------------------------------------------------------------------  Labs:  CAPILLARY BLOOD GLUCOSE      POCT Blood Glucose.: 116 mg/dL (04 Jan 2021 11:25)                          11.4   7.73  )-----------( 338      ( 04 Jan 2021 06:29 )             36.5         01-04    138  |  98  |  5<L>  ----------------------------<  106<H>  3.3<L>   |  28  |  0.39<L>      Calcium, Total Serum: 9.0 mg/dL (01-04-21 @ 05:50)      LFTs:       ABG - ( 30 Dec 2020 16:01 )  pH: 7.42  /  pCO2: 38    /  pO2: 330   / HCO3: 24    / Base Excess: x     /  SaO2: x                 Coags:                  --------------------------------------------------------------------------------------    OTHER LABS          ASSESSMENT/ PLAN:  75y Female  PMHx sig for COPD, asthma, HLD, HTN, with SAH, IVH, obstructive hydrocephalus, s/p angiogram, aneurysm embolization,  shunt placement, on TF for 3 weeks with fluctuating exam. general surgery consulted for PEG tube placement    Recs:      Attending aware and agrees with plan

## 2021-01-04 NOTE — PROGRESS NOTE ADULT - SUBJECTIVE AND OBJECTIVE BOX
======================================================   NEUROCRITICAL CARE ATTENDING NOTE (, 2021 EVENING)  ======================================================    CARA CANCHOLA   MRN-[id number]  Summary:  75y/F with COPD, asthma, dyslipidemia Hypertension found down.  Brought to [facility] where imaging showed SAH basilar cisterns with IVH and obstructive hydrocephalus L Pcomm aneurysm.  Given levetiracetam, hydromorphone.  NIHSS 2 HH3 MF4, transferred to [facility] for further management.      COURSE IN THE HOSPITAL:   admitted to [facility], EVD inserted; given 20 lasix for pulmo edema, T inversion on CT  12/10 No significant events overnight.    No significant events overnight.    No significant events overnight.    No significant events overnight. drain stopped working at 730 a.m., off levophed    No significant events overnight.   12/15 confusion   stepped down   readmitted to ICU    more confused somnolent overnight, high volume LP - 30cc removed, CT scan, EVD inserted, febrile pancultured, UA (+) rocephin started   Tmax 38.6  EVD clamped this morning, given 1L fluid bolus overnight, s/p VPS   Tmax 38.2    Tmax 39 500cc bolus overnight x2, pancultured for fever   Tmax 38.1 multiple bowel movements overnight   Tmax 37.8 wheezing overnight, given nebs   VIVIEN    Past Medical History: Hyperlipidemia Hypertension COPD (chronic obstructive pulmonary disease) Asthma  Allergies:  No Known Allergies  Home meds:   ·	famotidine 20 mg oral tablet: 1 tab(s) orally once a day  ·	lisinopril 2.5 mg oral tablet: 1 tab(s) orally once a day  ·	montelukast 10 mg oral tablet: 1 tab(s) orally once a day  ·	predniSONE 50 mg oral tablet: 1 tab(s) orally once a day  ·	ProAir HFA CFC free 90 mcg/inh inhalation aerosol: 2 puff(s) inhaled 4 times a day PRN  ·	simvastatin 20 mg oral tablet: 1 tab(s) orally once a day (at bedtime)    Current Meds:  MEDICATIONS  (STANDING):  amLODIPine   Tablet 2.5 milliGRAM(s) Oral every 24 hours  aspirin enteric coated 81 milliGRAM(s) Oral daily  atorvastatin 40 milliGRAM(s) Oral at bedtime  cyanocobalamin 1000 MICROGram(s) Oral daily  enoxaparin Injectable 40 milliGRAM(s) SubCutaneous at bedtime  ferrous    sulfate 325 milliGRAM(s) Oral daily  fludroCORTISONE 0.2 milliGRAM(s) Oral at bedtime  lacosamide Solution 150 milliGRAM(s) Oral every 12 hours  modafinil 100 milliGRAM(s) Oral daily  montelukast 10 milliGRAM(s) Oral daily  nystatin    Suspension 997176 Unit(s) Oral four times a day  pantoprazole   Suspension 40 milliGRAM(s) Oral two times a day  piperacillin/tazobactam IVPB.. 3.375 Gram(s) IV Intermittent every 6 hours  potassium chloride   Solution 40 milliEquivalent(s) Oral every 4 hours  sodium chloride 3 Gram(s) Oral every 6 hours  tiotropium 18 MICROgram(s) Capsule 1 Capsule(s) Inhalation daily    MEDICATIONS  (PRN):  acetaminophen    Suspension .. 650 milliGRAM(s) Oral every 6 hours PRN Temp greater or equal to 38C (100.4F), Mild Pain (1 - 3)  albuterol/ipratropium for Nebulization. 3 milliLiter(s) Nebulizer every 6 hours PRN Shortness of Breath  labetalol Injectable 2.5 milliGRAM(s) IV Push every 6 hours PRN Systolic blood pressure > 220    PHYSICAL EXAMINATION    ICU Vital Signs Last 24 Hrs  T(C): 36.6 (2021 14:34), Max: 37.2 (2021 17:01)  T(F): 97.9 (2021 14:34), Max: 98.9 (2021 17:01)  HR: 81 (2021 16:00) (69 - 85)  BP: 157/71 (2021 16:00) (132/54 - 202/85)  BP(mean): 102 (2021 16:00) (78 - 122)  RR: 31 (2021 16:00) (17 - 31)  SpO2: 94% (2021 16:00) (91% - 97%)    NEUROLOGIC EXAMINATION:  Patient is awake, oriented x 2, CLEOPATRA, following commands, moving all 4s, R shoulder movement pain limited  CARDIOVASCULAR: (+) S1 S2, normal rate and regular rhythm  PULMONARY: clear to auscultation bilaterally  ABDOMEN: soft, nontender with normoactive bowel sounds  EXTREMITIES: no edema  SKIN: no rash    I/O's    21 @ 07:01  -  21 @ 07:00  --------------------------------------------------------  IN: 925 mL / OUT: 2790 mL / NET: -1865 mL    21 @ 07:01  -  21 @ 16:57  --------------------------------------------------------  IN: 0 mL / OUT: 700 mL / NET: -700 mL    LABS:                        11.4   7.73  )-----------( 338      ( 2021 06:29 )             36.5     04    138  |  98  |  5<L>  ----------------------------<  106<H>  3.3<L>   |  28  |  0.39<L>    Ca    9.0      2021 05:50  Phos  3.4     -  Mg     1.8         CAPILLARY BLOOD GLUCOSE    POCT Blood Glucose.: 116 mg/dL (2021 11:25)    HbA1C = 6.7 (12-10) 6.4 (12-09)  LDL = 112 (12-09)   HDL = 66 (12-09)  TG = 114 ()   TSH = 0.990 ()    Bacteriology:  UA (+) LE (+) N   Blood CS NG2D x2    CSF NGTD   Urine Enterobacter E coli   Blood culture NG5D (final)   CSF NGTD    Blood NG5D x2    CSF studies:    L   *** RBC58 WBC9 *** %N3 %L5     L   *** [id number] WBC20 *** %N10 %L8     L   *** [id number] WBC24 *** %N7 %L13     EEG:  Neuroimagin/01 CT head:  stable  12/10 CT head:  EVD placement, stable   CTA:  L pcomm aneurysm, L ICA (distal cavernous) aneurysm vs infundibulum, extensive calcified plaque cavernous bilateral ICA with mild to mod stenosis; thick plaque bilateral carotid bifurcations - mod to severe R and mild to mod L stenosis, focal calcified plaque R VA off subclavian artery - high grate stenosis / partial occlusion, upper lung bilateral opacification - pulmo edema, chronic deformity R humeral neck   CT head:  SAH, basilar cisterns, IV extension, obstructive hydrocephalus SVID, lacunar infarcts R caudate, both thalami, cerebellar hemispheres, no CT evidence of territorial infarction  Other imagin/01 CXR:  clear   LE Doppler: NEG   LE Doppler: NEG   CT abd:  small paraesophageal hiatal hernia, gastritis; ?impaction, septal thickening bilateral lower lobes ?pulmo edema, hepatic steatosis    IV FLUIDS: IVL  DRIPS:  DIET: Glucerna  Lines:   Drains: rectal tube     EVD at 5cm H20   EVD at 5cm H20 ICP < 10   EVD at 5cm H20 ICP <10   EVD clamped ======================================================   NEUROCRITICAL CARE ATTENDING NOTE (, 2021 EVENING)  ======================================================    CARA CANCHOLA   MRN-[id number]  Summary:  75y/F with COPD, asthma, dyslipidemia Hypertension found down.  Brought to [facility] where imaging showed SAH basilar cisterns with IVH and obstructive hydrocephalus L Pcomm aneurysm.  Given levetiracetam, hydromorphone.  NIHSS 2 HH3 MF4, transferred to [facility] for further management.      COURSE IN THE HOSPITAL:   admitted to [facility], EVD inserted; given 20 lasix for pulmo edema, T inversion on CT  12/10 No significant events overnight.    No significant events overnight.    No significant events overnight.    No significant events overnight. drain stopped working at 730 a.m., off levophed    No significant events overnight.   12/15 confusion   stepped down   readmitted to ICU    more confused somnolent overnight, high volume LP - 30cc removed, CT scan, EVD inserted, febrile pancultured, UA (+) rocephin started   Tmax 38.6  EVD clamped this morning, given 1L fluid bolus overnight, s/p VPS   Tmax 38.2    Tmax 39 500cc bolus overnight x2, pancultured for fever   Tmax 38.1 multiple bowel movements overnight   Tmax 37.8 wheezing overnight, given nebs   VIVIEN, LOC fluctuations    Past Medical History: Hyperlipidemia Hypertension COPD (chronic obstructive pulmonary disease) Asthma  Allergies:  No Known Allergies  Home meds:   ·	famotidine 20 mg oral tablet: 1 tab(s) orally once a day  ·	lisinopril 2.5 mg oral tablet: 1 tab(s) orally once a day  ·	montelukast 10 mg oral tablet: 1 tab(s) orally once a day  ·	predniSONE 50 mg oral tablet: 1 tab(s) orally once a day  ·	ProAir HFA CFC free 90 mcg/inh inhalation aerosol: 2 puff(s) inhaled 4 times a day PRN  ·	simvastatin 20 mg oral tablet: 1 tab(s) orally once a day (at bedtime)    Current Meds:  MEDICATIONS  (STANDING):  amLODIPine   Tablet 2.5 milliGRAM(s) Oral every 24 hours  aspirin enteric coated 81 milliGRAM(s) Oral daily  atorvastatin 40 milliGRAM(s) Oral at bedtime  cyanocobalamin 1000 MICROGram(s) Oral daily  enoxaparin Injectable 40 milliGRAM(s) SubCutaneous at bedtime  ferrous    sulfate 325 milliGRAM(s) Oral daily  fludroCORTISONE 0.2 milliGRAM(s) Oral at bedtime  lacosamide Solution 150 milliGRAM(s) Oral every 12 hours  modafinil 100 milliGRAM(s) Oral daily  montelukast 10 milliGRAM(s) Oral daily  nystatin    Suspension [id number] Unit(s) Oral four times a day  pantoprazole   Suspension 40 milliGRAM(s) Oral two times a day  piperacillin/tazobactam IVPB.. 3.375 Gram(s) IV Intermittent every 6 hours  potassium chloride   Solution 40 milliEquivalent(s) Oral every 4 hours  sodium chloride 3 Gram(s) Oral every 6 hours  tiotropium 18 MICROgram(s) Capsule 1 Capsule(s) Inhalation daily    MEDICATIONS  (PRN):  acetaminophen    Suspension .. 650 milliGRAM(s) Oral every 6 hours PRN Temp greater or equal to 38C (100.4F), Mild Pain (1 - 3)  albuterol/ipratropium for Nebulization. 3 milliLiter(s) Nebulizer every 6 hours PRN Shortness of Breath  labetalol Injectable 2.5 milliGRAM(s) IV Push every 6 hours PRN Systolic blood pressure > 220    PHYSICAL EXAMINATION    ICU Vital Signs Last 24 Hrs  T(C): 36.6 (2021 14:34), Max: 37.2 (2021 17:01)  T(F): 97.9 (2021 14:34), Max: 98.9 (2021 17:01)  HR: 81 (2021 16:00) (69 - 85)  BP: 157/71 (2021 16:00) (132/54 - 202/85)  BP(mean): 102 (2021 16:00) (78 - 122)  RR: 31 (2021 16:00) (17 - 31)  SpO2: 94% (2021 16:00) (91% - 97%)    NEUROLOGIC EXAMINATION:  Patient is awake, oriented x 1, CLEOPATRA, following commands (inconsistent) X 4, R shoulder movement pain limited  CARDIOVASCULAR: (+) S1 S2, normal rate and regular rhythm  PULMONARY: clear to auscultation bilaterally  ABDOMEN: soft, nontender with normoactive bowel sounds  EXTREMITIES: no edema  SKIN: no rash    I/O's    21 @ 07:01  -  21 @ 07:00  --------------------------------------------------------  IN: 925 mL / OUT: 2790 mL / NET: -1865 mL    21 @ 07:01  -  21 @ 16:57  --------------------------------------------------------  IN: 0 mL / OUT: 700 mL / NET: -700 mL    LABS:                        11.4   7.73  )-----------( 338      ( 2021 06:29 )             36.5         138  |  98  |  5<L>  ----------------------------<  106<H>  3.3<L>   |  28  |  0.39<L>    Ca    9.0      2021 05:50  Phos  3.4     -  Mg     1.8         CAPILLARY BLOOD GLUCOSE    POCT Blood Glucose.: 116 mg/dL (2021 11:25)    HbA1C = 6.7 (12-10) 6.4 (12-09)  LDL = 112 (12-09)   HDL = 66 (12-)  TG = 114 ()   TSH = 0.990 ()    Bacteriology:  UA (+) LE (+) N   Blood CS NG2D x2    CSF NGTD   Urine Enterobacter E coli   Blood culture NG5D (final)   CSF NGTD    Blood NG5D x2    CSF studies:    L   *** RBC58 WBC9 *** %N3 %L5     L   *** [id number] WBC20 *** %N10 %L8     L   *** [id number] WBC24 *** %N7 %L13     EEG:  Neuroimagin/01 CT head:  stable  12/10 CT head:  EVD placement, stable   CTA:  L pcomm aneurysm, L ICA (distal cavernous) aneurysm vs infundibulum, extensive calcified plaque cavernous bilateral ICA with mild to mod stenosis; thick plaque bilateral carotid bifurcations - mod to severe R and mild to mod L stenosis, focal calcified plaque R VA off subclavian artery - high grate stenosis / partial occlusion, upper lung bilateral opacification - pulmo edema, chronic deformity R humeral neck   CT head:  SAH, basilar cisterns, IV extension, obstructive hydrocephalus SVID, lacunar infarcts R caudate, both thalami, cerebellar hemispheres, no CT evidence of territorial infarction  Other imagin/01 CXR:  clear   LE Doppler: NEG   LE Doppler: NEG   CT abd:  small paraesophageal hiatal hernia, gastritis; ?impaction, septal thickening bilateral lower lobes ?pulmo edema, hepatic steatosis    IV FLUIDS: IVL  DRIPS:  DIET: Glucerna  Lines:   Drains: rectal tube     EVD at 5cm H20   EVD at 5cm H20 ICP < 10   EVD at 5cm H20 ICP <10   EVD clamped

## 2021-01-04 NOTE — PROGRESS NOTE ADULT - SUBJECTIVE AND OBJECTIVE BOX
SUBJECTIVE: Patient seen and examined bedside. No complaints, AOx1 (Self), somnolent but arousable.     aspirin enteric coated 81 milliGRAM(s) Oral daily  enoxaparin Injectable 40 milliGRAM(s) SubCutaneous at bedtime  labetalol Injectable 2.5 milliGRAM(s) IV Push every 6 hours PRN  nystatin    Suspension [id number] Unit(s) Oral four times a day  piperacillin/tazobactam IVPB.. 3.375 Gram(s) IV Intermittent every 6 hours    MEDICATIONS  (PRN):  acetaminophen    Suspension .. 650 milliGRAM(s) Oral every 6 hours PRN Temp greater or equal to 38C (100.4F), Mild Pain (1 - 3)  albuterol/ipratropium for Nebulization. 3 milliLiter(s) Nebulizer every 6 hours PRN Shortness of Breath  labetalol Injectable 2.5 milliGRAM(s) IV Push every 6 hours PRN Systolic blood pressure > 220      I&O's Detail    03 Jan 2021 07:01  -  04 Jan 2021 07:00  --------------------------------------------------------  IN:    Enteral Tube Flush: 600 mL    IV PiggyBack: 50 mL    Oral Fluid: 275 mL  Total IN: 925 mL    OUT:    Intermittent Catheterization - Urethral (mL): 2650 mL    Rectal Tube (mL): 140 mL  Total OUT: 2790 mL    Total NET: -1865 mL          Vital Signs Last 24 Hrs  T(C): 36.4 (04 Jan 2021 05:04), Max: 37.2 (03 Jan 2021 17:01)  T(F): 97.5 (04 Jan 2021 05:04), Max: 98.9 (03 Jan 2021 17:01)  HR: 71 (04 Jan 2021 08:01) (68 - 88)  BP: 148/68 (04 Jan 2021 08:01) (137/63 - 202/85)  BP(mean): 98 (04 Jan 2021 08:01) (87 - 122)  RR: 29 (04 Jan 2021 08:01) (19 - 30)  SpO2: 94% (04 Jan 2021 08:01) (94% - 98%)        LABS:                        11.4   7.73  )-----------( 338      ( 04 Jan 2021 06:29 )             36.5     01-04    138  |  98  |  5<L>  ----------------------------<  106<H>  3.3<L>   |  28  |  0.39<L>    Ca    9.0      04 Jan 2021 05:50  Phos  3.4     01-04  Mg     1.8     01-04            RADIOLOGY & ADDITIONAL STUDIES:      Culture - Blood (collected 12-31-20 @ 22:37)  Source: .Blood Blood  Preliminary Report (01-03-21 @ 23:01):    No growth at 3 days.    Culture - Blood (collected 12-31-20 @ 22:37)  Source: .Blood Blood  Preliminary Report (01-03-21 @ 23:01):    No growth at 3 days.     SUBJECTIVE: Patient seen and examined bedside. No complaints, AOx1 (Self), somnolent but arousable.     aspirin enteric coated 81 milliGRAM(s) Oral daily  enoxaparin Injectable 40 milliGRAM(s) SubCutaneous at bedtime  labetalol Injectable 2.5 milliGRAM(s) IV Push every 6 hours PRN  nystatin    Suspension [id number] Unit(s) Oral four times a day  piperacillin/tazobactam IVPB.. 3.375 Gram(s) IV Intermittent every 6 hours    MEDICATIONS  (PRN):  acetaminophen    Suspension .. 650 milliGRAM(s) Oral every 6 hours PRN Temp greater or equal to 38C (100.4F), Mild Pain (1 - 3)  albuterol/ipratropium for Nebulization. 3 milliLiter(s) Nebulizer every 6 hours PRN Shortness of Breath  labetalol Injectable 2.5 milliGRAM(s) IV Push every 6 hours PRN Systolic blood pressure > 220      I&O's Detail    03 Jan 2021 07:01  -  04 Jan 2021 07:00  --------------------------------------------------------  IN:    Enteral Tube Flush: 600 mL    IV PiggyBack: 50 mL    Oral Fluid: 275 mL  Total IN: 925 mL    OUT:    Intermittent Catheterization - Urethral (mL): 2650 mL    Rectal Tube (mL): 140 mL  Total OUT: 2790 mL    Total NET: -1865 mL          Vital Signs Last 24 Hrs  T(C): 36.4 (04 Jan 2021 05:04), Max: 37.2 (03 Jan 2021 17:01)  T(F): 97.5 (04 Jan 2021 05:04), Max: 98.9 (03 Jan 2021 17:01)  HR: 71 (04 Jan 2021 08:01) (68 - 88)  BP: 148/68 (04 Jan 2021 08:01) (137/63 - 202/85)  BP(mean): 98 (04 Jan 2021 08:01) (87 - 122)  RR: 29 (04 Jan 2021 08:01) (19 - 30)  SpO2: 94% (04 Jan 2021 08:01) (94% - 98%)    GENERAL: NAD, Resting comfortably in bed, awake, opens eyes spontaneously  HEENT: NCAT, MMM  RESP: Nonlabored breathing, No respiratory distress  CARD: Normal rate, Normal peripheral perfusion  GI: Soft, ND, NT, No guarding, No rebound tenderness  Extremities: right groin soft   Pulses:   Right:                                                                          Left:  FEM [ ]2+ [ ]1+ [ ]doppler                                             FEM [ ]2+ [ ]1+ [ ]doppler    POP [ ]2+ [ ]1+ [ ]doppler                                             POP [ ]2+ [ ]1+ [ ]doppler    DP [ ]2+ [  ]1+ [x ]doppler                                                DP [ ]2+ [ ]1+ [ ]doppler  PT[ ]2+ [x ]1+ [ X ]doppler                                                PT [ ]2+ [ ]1+ [ ]doppler      LABS:                        11.4   7.73  )-----------( 338      ( 04 Jan 2021 06:29 )             36.5     01-04    138  |  98  |  5<L>  ----------------------------<  106<H>  3.3<L>   |  28  |  0.39<L>    Ca    9.0      04 Jan 2021 05:50  Phos  3.4     01-04  Mg     1.8     01-04            RADIOLOGY & ADDITIONAL STUDIES:      Culture - Blood (collected 12-31-20 @ 22:37)  Source: .Blood Blood  Preliminary Report (01-03-21 @ 23:01):    No growth at 3 days.    Culture - Blood (collected 12-31-20 @ 22:37)  Source: .Blood Blood  Preliminary Report (01-03-21 @ 23:01):    No growth at 3 days.     SUBJECTIVE: Patient seen and examined bedside. No complaints, AOx1 (Self), somnolent but arousable.     aspirin enteric coated 81 milliGRAM(s) Oral daily  enoxaparin Injectable 40 milliGRAM(s) SubCutaneous at bedtime  labetalol Injectable 2.5 milliGRAM(s) IV Push every 6 hours PRN  nystatin    Suspension [id number] Unit(s) Oral four times a day  piperacillin/tazobactam IVPB.. 3.375 Gram(s) IV Intermittent every 6 hours    MEDICATIONS  (PRN):  acetaminophen    Suspension .. 650 milliGRAM(s) Oral every 6 hours PRN Temp greater or equal to 38C (100.4F), Mild Pain (1 - 3)  albuterol/ipratropium for Nebulization. 3 milliLiter(s) Nebulizer every 6 hours PRN Shortness of Breath  labetalol Injectable 2.5 milliGRAM(s) IV Push every 6 hours PRN Systolic blood pressure > 220      I&O's Detail    03 Jan 2021 07:01  -  04 Jan 2021 07:00  --------------------------------------------------------  IN:    Enteral Tube Flush: 600 mL    IV PiggyBack: 50 mL    Oral Fluid: 275 mL  Total IN: 925 mL    OUT:    Intermittent Catheterization - Urethral (mL): 2650 mL    Rectal Tube (mL): 140 mL  Total OUT: 2790 mL    Total NET: -1865 mL          Vital Signs Last 24 Hrs  T(C): 36.4 (04 Jan 2021 05:04), Max: 37.2 (03 Jan 2021 17:01)  T(F): 97.5 (04 Jan 2021 05:04), Max: 98.9 (03 Jan 2021 17:01)  HR: 71 (04 Jan 2021 08:01) (68 - 88)  BP: 148/68 (04 Jan 2021 08:01) (137/63 - 202/85)  BP(mean): 98 (04 Jan 2021 08:01) (87 - 122)  RR: 29 (04 Jan 2021 08:01) (19 - 30)  SpO2: 94% (04 Jan 2021 08:01) (94% - 98%)    GENERAL: NAD, Resting comfortably in bed, awake, opens eyes spontaneously  HEENT: NCAT, MMM  RESP: Nonlabored breathing, No respiratory distress  CARD: Normal rate, Normal peripheral perfusion  GI: Soft, ND, NT, No guarding, No rebound tenderness  Extremities: right groin soft   Pulses:   Right:                                                                          Left:  FEM [ ]2+ [ ]1+ [ ]doppler                                             FEM [ ]2+ [ ]1+ [ ]doppler    POP [ ]2+ [ ]1+ [ ]doppler                                             POP [ ]2+ [ ]1+ [ ]doppler    DP [ ]2+ [x  ]1+ [x ]doppler                                                DP [ ]2+ [ ]1+ [ ]doppler  PT[ ]2+ [x ]1+ [ X ]doppler                                                PT [ ]2+ [ ]1+ [ ]doppler      LABS:                        11.4   7.73  )-----------( 338      ( 04 Jan 2021 06:29 )             36.5     01-04    138  |  98  |  5<L>  ----------------------------<  106<H>  3.3<L>   |  28  |  0.39<L>    Ca    9.0      04 Jan 2021 05:50  Phos  3.4     01-04  Mg     1.8     01-04            RADIOLOGY & ADDITIONAL STUDIES:      Culture - Blood (collected 12-31-20 @ 22:37)  Source: .Blood Blood  Preliminary Report (01-03-21 @ 23:01):    No growth at 3 days.    Culture - Blood (collected 12-31-20 @ 22:37)  Source: .Blood Blood  Preliminary Report (01-03-21 @ 23:01):    No growth at 3 days.

## 2021-01-04 NOTE — PROGRESS NOTE ADULT - ASSESSMENT
75y/Female with:  aneursymal subarachnoid hemorrhage, L pcomm aneurysm, L parophthalmic aneurysm; brain compression, cerebral edema  osbtructive hydrocephalus  lacunar infarcts R caudate, B thalami, cerebellar hemispheres ?artery to artery vs cardioembolic?  atherosclerotic cardiovascular disease; mod to severe bilateral ICA stenosis (R>L), R VA stenosis  Hypertension dyslipidemia   COPD asthma  newly diagnosed Diabetes Mellitus?  troponin leak    PLAN: Day 1 = 12-09 ; Day 4 =  12/12; Day 21 = 12/29  NEURO: neurochecks q 4 h, VS q 4, PRN pain meds with Tylenol   SAH:  off nimodipine   bilateral ICA stenosis - started ASA  Hydrocephalus:  s/p VPS   Seizure treatment (cortical ICH, associated SDH, unclear etiology):  lacosamide 150mg PO BID   REHAB:  physical therapy evaluation and management    EARLY MOB:  OOB to chair    PULM:  Room air, incentive spirometry, continue montelukast, ipratropium / albuterol PRN, tiotropium daily     CARDIO:  noted -180mm Hg, TTE EF 70%    ENDO:  Blood sugar goals 140-180 mg/dL, d/c insulin sliding scale, atorvastatin 40mg    GI:  BID protonix for GIB; PEG tube     DIET: add ensure    RENAL: maintain euvolemia, accurate Is and Os; cont salt tabs and fludrocortisone; straight q6h, Na goal 135-145    HEM/ONC: Hb stable    VTE Prophylaxis: SCDs, SQL; R UE cephalic vein thrombosis - repeat UE doppler 01/06    ID: afebrile, no leukocytosis,  piperacillin/tazobactam (last day 01/05/2021)    Social: will update family    CORE MEASURES  1.  Nath and Jacobo Score = 3  2.  VTE prophylaxis:  [ ] administered within 24 hours of admission OR [X] reason not done: fresh bleed, unsecured aneurysm.  3.  Dysphagia screening:   [X] performed before any oral meds / liquids / food  4.  Nimodipine treatment:  [X] administered within 24 hours of admission OR [ ] reason not done:    ATTENDING ATTESTATION:  I was physically present for the key portions of the evaluation and management (E/M) service provided.  I agree with the above history, physical and plan, which I have reviewed and edited where appropriate.    Patient at high risk for neurological deterioration or death due to:  ICU delirium, aspiration PNA, DVT / PE.  Critical care time:  I have personally provided 30 minutes of critical care time, excluding time spent on separate procedures.      Plan discussed with RN, house staff.    Discharge Checklist:    Completed: 01-03 @ 09:27    [X] hemodynamically stable – VS WNL and stable x 24hours, UO adequate  [n/a ] if  previously on HDA - off pressors x 24h with stable neuro exam  --> bilateral ICA stenosis, starting ASA today; will keep in ICU x 24h  [X] no new symptoms x 24h (i.e. new fever, new-onset nausea/vomiting)  [X] stable labs: (i.e. WBC not rising, sodium not dropping)  [n/a] if high aspiration risk (modified MASA score):                  [ ] should have definitive plans for trach/PEG (alternative option is to discharge from ICU to facilty)                  [ ] stepdown to bed close to nurse’s station  [n/a] low suctioning requirements (i.e. q4h or less)  [X] sign-off from primary RN*  [X] drains do not require ICU level of care  [X] if patient previously agitated or with behavioral issues – controlled   [X] pain controlled     75y/Female with:  aneursymal subarachnoid hemorrhage, L pcomm aneurysm, L parophthalmic aneurysm; brain compression, cerebral edema  osbtructive hydrocephalus  lacunar infarcts R caudate, B thalami, cerebellar hemispheres ?artery to artery vs cardioembolic?  atherosclerotic cardiovascular disease; mod to severe bilateral ICA stenosis (R>L), R VA stenosis  Hypertension dyslipidemia   COPD asthma  newly diagnosed Diabetes Mellitus?  troponin leak    PLAN: Day 1 = 12-09 ; Day 4 =  12/12; Day 21 = 12/29  NEURO: neurochecks q 4 h, VS q 4, PRN pain meds with Tylenol   SAH:  off nimodipine   bilateral ICA stenosis - ASA (held for possible PEG)  Hydrocephalus:  s/p VPS   Seizure treatment (cortical ICH, associated SDH, unclear etiology):  lacosamide 150mg PO BID   F/U CT head, EEG if persistent LOC fluctuations  REHAB:  physical therapy evaluation and management    EARLY MOB:  OOB to chair    PULM:  Room air, incentive spirometry, continue montelukast, ipratropium / albuterol PRN, tiotropium daily     CARDIO:  noted -180mm Hg, TTE EF 70%    ENDO:  Blood sugar goals 140-180 mg/dL, d/c insulin sliding scale, atorvastatin 40mg    GI:  BID protonix for GIB; PEG tube pending     DIET: add ensure -> NPO after MN    RENAL: maintain euvolemia, accurate Is and Os; cont salt tabs and fludrocortisone; straight q6h, Na goal 135-145    HEM/ONC: Hb stable    VTE Prophylaxis: SCDs, SQL; R UE cephalic vein thrombosis - repeat UE doppler 01/06    ID: afebrile, no leukocytosis,  piperacillin/tazobactam (last day 01/05/2021)    Social: will update family    *****    CORE MEASURES  1.  Nath and Jacobo Score = 3  2.  VTE prophylaxis:  [ ] administered within 24 hours of admission OR [X] reason not done: fresh bleed, unsecured aneurysm.  3.  Dysphagia screening:   [X] performed before any oral meds / liquids / food  4.  Nimodipine treatment:  [X] administered within 24 hours of admission OR [ ] reason not done:    *****    ATTENDING ATTESTATION:  I was physically present for the key portions of the evaluation and management (E/M) service provided.  I agree with the above history, physical and plan, which I have reviewed and edited where appropriate.    Patient at high risk for neurological deterioration or death due to:  ICU delirium, aspiration PNA, DVT / PE.  Critical care time:  I have personally provided 30 minutes of critical care time, excluding time spent on separate procedures.      Plan discussed with RN, house staff.

## 2021-01-04 NOTE — PROGRESS NOTE ADULT - ASSESSMENT
74yo F  s/p cerebral angio for verapamil c/b device malfunction of Proglide, s/p right groin cutdown for removal of proglide catheter and femoral artery repair on 12/16/20     -Right groin monitoring, look for signs of infection  -Regular Pulse checks RLE  -Vascular surgery will continue to follow 74yo F  s/p cerebral angio for verapamil c/b device malfunction of Proglide, s/p right groin cutdown for removal of proglide catheter and femoral artery repair on 12/16/20     -Right groin monitoring, look for signs of infection  -Regular Pulse checks RLE  -Cont lovenox for DVT ppx, no SCDs  -Vascular surgery will continue to follow

## 2021-01-04 NOTE — PROGRESS NOTE ADULT - SUBJECTIVE AND OBJECTIVE BOX
Hospital Course:   12/9: BD1 Patient admitted for SAH HH3, MF4.   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 EVD at 5, on Levo overnight, nimodipine discontinued d/t hypotension  12/12: BD4 POD#2, YUMIKO overnight, neuro stable, passed TOV  12/13: BD5 POD#3: YUMIKO x 24 hrs  12/14: BD6 POD#4. YUMIKO o/n, neuro stable. EVD at 5cm H2O  12/15: BD7 POD #5 YUMIKO overnight, neuro stable. EVD remains at 5. Plan for CTA today.   12/16: BD8 POD #6 YUMIKO overnight, neuro stable, EVD at 0, on Levo, started on 3% at 15 overnight   12/17: BD9 POD#1 s/p cerebral angio for verapamil c/b device malfunction of Proglide, s/p right groin cutdown for removal of proglide catheter and femoral artery repair.  YUMIKO overnight, neuro stable, EVD at 0. patient remained intubated overnight, on propofol for sedation, and vEEG  12/18: BD#10, POD#8 s/p coil/embo of L pcomm aneurysm, POD#2 s/p IA verapamil, POD#2 R groin cutdown for removal of proglide catheter w/ repair of femoral artery. YUMIKO overnight. EVD remains open @0cmH2O   12/19: BD#11, POD#9 s/p coil/embo of L pcomm aneurysm. POD#3 s/p IA verapamil, POD#3 s/p R femoral artery cutdown of proglide catheter w/ femoral artery repair. YUMIKO overnight. EVD remains open @0cmH2O. EEG shows b/l frontal sharp discharges, cont monitoring, vimpat was increased yesterday. Gentle hydration, total IVF 50cc/hr. Cont vanc/zosyn for empiric coverage, next vanc trough due @1pm on 12/19. F/u daily CXR.   12/20 BD#12 POD#10 YUMIKO overnight, Neuro exam stable, EVD @ 0cm H2O, vEEG, 3% @ 15 goal WNL, TF via NGT  12/21: BD13, POD11 YUMIKO overnight. EVD at 5cmH20 (raised yesterday), vEEG in place  12/22 BD#14, EVD raised to 15 yesterday, 3% @ 30cc Goal WNL, -180, Milrinone, TTE prior to DC as per Card for takotsubo cardiomyopathy, failed S&S pending reeval, TF via NGT, Neuro exam stable  12/23: BD#15. YUMIKO o/n, neuro stable. EVD clamped. 30%@30cc/hr  12/24 BD#16 YUMIKO overnight, spiked 102, EVD @ 0cm H2O, 3% @ 30cc/hr Goal WNL, Vasc following, TF via NGT, -200,   12/25: BD#17. YUMIKO overnight. Pan cx from 12/23, NGTD on CSF and blood. EVD clamped. 3% @15cc/hr, rate kept same overnight. NGT feeds at goal. SBP goal 160-200.   12/26: BD#18. YUMIKO overnight, neuro exam stable. NGTD from pan cx on 12/23. EVD removed today. 3% discontinued 12/25. NGT feeds at goal, IVF off. SBP goal 160-200.   12/27: BD#19 YUMIKO overnight, neuro stable. 3% at 15, stepdown status  12/28: BD20 YUMIKO overnight, neuro stable. 3% continues.  Patient became increasingly more somnolent and underwent LP for CSF high volume tap.  Temporary improvement.  Spiked fever, pancultured.  12/29: BD21 Patient increasingly more somnolent, EVD placed at bedside.    12/30: BD22 pt. is s/p R VPS placement, certas @5 POD#1, post op ct head c/a/p done. afebrile o/n.   1/1: BD#23: pt. is s/p R VPS placement, certas @5 POD#2, post op ct head c/a/p done. zosyn for enterobacter   1/2: BD #24. POD#3 s/p R VPS placement, CErtas @5. Dressings removed from head and abdomen. Cont zosyn for enterobacter and e. coli in urine, end date 1/4/21. Post-op imaging completed. Pend S&S re-eval, improvement in mental status/neuro exam.   1/3: BD25, POD4. SBP goals relaxed 120-200. zosyn for enterobacter UTI until 1/4. FOB+, protonix bid started, yumiko o/n      Vital Signs Last 24 Hrs  T(C): 36.4 (04 Jan 2021 05:04), Max: 37.2 (03 Jan 2021 17:01)  T(F): 97.5 (04 Jan 2021 05:04), Max: 98.9 (03 Jan 2021 17:01)  HR: 74 (04 Jan 2021 05:00) (68 - 88)  BP: 144/70 (04 Jan 2021 05:00) (137/63 - 202/85)  BP(mean): 100 (04 Jan 2021 05:00) (87 - 122)  RR: 22 (04 Jan 2021 05:00) (19 - 28)  SpO2: 94% (04 Jan 2021 05:00) (94% - 100%)    I&O's Detail    02 Jan 2021 07:01  -  03 Jan 2021 07:00  --------------------------------------------------------  IN:    Enteral Tube Flush: 450 mL    Glucerna: 495 mL    IV PiggyBack: 800 mL    Oral Fluid: 200 mL  Total IN: 1945 mL    OUT:    Intermittent Catheterization - Urethral (mL): 2950 mL    Rectal Tube (mL): 60 mL  Total OUT: 3010 mL    Total NET: -1065 mL      03 Jan 2021 07:01  -  04 Jan 2021 06:34  --------------------------------------------------------  IN:    Enteral Tube Flush: 480 mL    Oral Fluid: 275 mL  Total IN: 755 mL    OUT:    Intermittent Catheterization - Urethral (mL): 1650 mL    Rectal Tube (mL): 140 mL  Total OUT: 1790 mL    Total NET: -1035 mL        I&O's Summary    02 Jan 2021 07:01  -  03 Jan 2021 07:00  --------------------------------------------------------  IN: 1945 mL / OUT: 3010 mL / NET: -1065 mL    03 Jan 2021 07:01  -  04 Jan 2021 06:34  --------------------------------------------------------  IN: 755 mL / OUT: 1790 mL / NET: -1035 mL        PHYSICAL EXAM:  GEN: laying in bed, appears well, NAD  NEURO: AOx1-2. FC, OE spont, speech intact, face symmetric. CNII-XII intact. PERRL, EOMI. No pronator drift. MAEx4. 5/5 strength throughout. SILT  CV: RRR +S1/S2  PULM: CTAB  GI: Abd soft, NT/ND  EXT: ext warm, dry, nontender  WOUND: evd site c/d/i    TUBES/LINES:  [] CVC  [x] A-line  [] Lumbar Drain  [x] Ventriculostomy  [] Other    DIET:  [] NPO  [x] Mechanical  [] Tube feeds    LABS:                        11.4   7.73  )-----------( 338      ( 04 Jan 2021 06:29 )             36.5     01-04    138  |  98  |  5<L>  ----------------------------<  106<H>  3.3<L>   |  28  |  0.39<L>    Ca    9.0      04 Jan 2021 05:50  Phos  3.4     01-04  Mg     1.8     01-04              CAPILLARY BLOOD GLUCOSE          Drug Levels: [] N/A    CSF Analysis: [] N/A      Allergies    No Known Allergies    Intolerances      MEDICATIONS:  Antibiotics:  nystatin    Suspension 916140 Unit(s) Oral four times a day  piperacillin/tazobactam IVPB.. 3.375 Gram(s) IV Intermittent every 6 hours    Neuro:  acetaminophen    Suspension .. 650 milliGRAM(s) Oral every 6 hours PRN  lacosamide Solution 150 milliGRAM(s) Oral every 12 hours  modafinil 100 milliGRAM(s) Oral daily    Anticoagulation:  aspirin enteric coated 81 milliGRAM(s) Oral daily  enoxaparin Injectable 40 milliGRAM(s) SubCutaneous at bedtime    OTHER:  albuterol/ipratropium for Nebulization. 3 milliLiter(s) Nebulizer every 6 hours PRN  atorvastatin 40 milliGRAM(s) Oral at bedtime  chlorhexidine 0.12% Liquid 15 milliLiter(s) Oral Mucosa two times a day  chlorhexidine 2% Cloths 1 Application(s) Topical <User Schedule>  dextrose 40% Gel 15 Gram(s) Oral once  dextrose 50% Injectable 25 Gram(s) IV Push once  fludroCORTISONE 0.2 milliGRAM(s) Oral at bedtime  glucagon  Injectable 1 milliGRAM(s) IntraMuscular once  labetalol Injectable 2.5 milliGRAM(s) IV Push every 6 hours PRN  megestrol Suspension 400 milliGRAM(s) Oral daily  montelukast 10 milliGRAM(s) Oral daily  pantoprazole   Suspension 40 milliGRAM(s) Oral two times a day  tiotropium 18 MICROgram(s) Capsule 1 Capsule(s) Inhalation daily    IVF:  cyanocobalamin 1000 MICROGram(s) Oral daily  dextrose 5%. 1000 milliLiter(s) IV Continuous <Continuous>  dextrose 5%. 1000 milliLiter(s) IV Continuous <Continuous>  ferrous    sulfate 325 milliGRAM(s) Oral daily  sodium chloride 3 Gram(s) Oral every 6 hours    CULTURES:  Culture Results:   No growth at 3 days. (12-31 @ 22:37)  Culture Results:   No growth at 3 days. (12-31 @ 22:37)    RADIOLOGY & ADDITIONAL TESTS:      ASSESSMENT:  75y Female  with PMHx of COPD, asthma, HLD, HTN, BIBEMS to [facility] from home after HHA found patient down on the floor covered in non-bloody vomitus. CTH reveals diffuse subarachnoid hemorrhage mainly at basilar cisterns with IVH and obstructive hydrocephalus, CTA shows 3mm left pcomm aneurysm, now s/p bedside right frontal EVD placement 12/10, s/p cerebral angiogram for coil embolization of left PCOMM aneurysm 12/10, now   s/p cerebral angio for verapamil c/b device malfunction of Proglide, s/p right groin cutdown for removal of proglide catheter and femoral artery repair (12/17/2020), NIHSS2 HH3 MF4), s/p EVD removal 12/25,   s/p bedside EVD placement  12/29, POD#5 from R VPS certas at 5  BD#26.     HEADACHE    Handoff    MEWS Score    Hyperlipidemia    Hypertension    COPD (chronic obstructive pulmonary disease)    Asthma    COPD (chronic obstructive pulmonary disease)    Asthma    Hydrocephalus, adult    Injury of right femoral artery    Cerebral artery vasospasm    SAH (subarachnoid hemorrhage)    Hydrocephalus, adult    Injury of femoral artery    Cerebral artery vasospasm    SAH (subarachnoid hemorrhage)    Subarachnoid bleed    Obstructive hydrocephalus    Anemia due to acute blood loss    Preoperative clearance    Centrilobular emphysema    Mild persistent asthma without complication    Subarachnoid bleed    Essential hypertension    Pure hypercholesterolemia    Ventriculoperitoneal shunt    Insertion of external ventricular drain    Vascular surgery procedure    Angiogram, carotid and cerebral, bilateral    Angiogram, cerebral, with intracranial aneurysm embolization    No significant past surgical history    HEADACHE    90+    [email or web address]        PLAN:  NEURO:  - neuro/vitals checks  - pain control   - cont modafinil for neurostim  - cont vimpat seizure ppx    CARDIOVASCULAR:  - -200  - echo and CCTA prior to discharge  - EF now 70%    PULMONARY:  - satting well RA    RENAL:  - repletions prn  - sc prn     GI:  - puree/thin liquid diet  - RT  - FOB+, protonix bid     HEME:  - h/h stable  - SCDs/SQL    ID:  - afebrile  - zosyn enterobacter UTI until 1/4    ENDO:  - glucose goal 140-180    DVT PROPHYLAXIS:  [x] Venodynes                                [x] Heparin/Lovenox    DISPOSITION: ICU status, full code, dispo pending    D/w Dr. Serrano, Dr. Alfredo

## 2021-01-05 LAB
CULTURE RESULTS: SIGNIFICANT CHANGE UP
CULTURE RESULTS: SIGNIFICANT CHANGE UP
SARS-COV-2 RNA SPEC QL NAA+PROBE: NEGATIVE — SIGNIFICANT CHANGE UP
SPECIMEN SOURCE: SIGNIFICANT CHANGE UP
SPECIMEN SOURCE: SIGNIFICANT CHANGE UP

## 2021-01-05 PROCEDURE — 99233 SBSQ HOSP IP/OBS HIGH 50: CPT

## 2021-01-05 PROCEDURE — 99223 1ST HOSP IP/OBS HIGH 75: CPT

## 2021-01-05 RX ORDER — POTASSIUM CHLORIDE 20 MEQ
40 PACKET (EA) ORAL
Refills: 0 | Status: COMPLETED | OUTPATIENT
Start: 2021-01-05 | End: 2021-01-05

## 2021-01-05 RX ORDER — FLUDROCORTISONE ACETATE 0.1 MG/1
0.1 TABLET ORAL AT BEDTIME
Refills: 0 | Status: DISCONTINUED | OUTPATIENT
Start: 2021-01-05 | End: 2021-01-07

## 2021-01-05 RX ORDER — AMLODIPINE BESYLATE 2.5 MG/1
5 TABLET ORAL ONCE
Refills: 0 | Status: COMPLETED | OUTPATIENT
Start: 2021-01-05 | End: 2021-01-05

## 2021-01-05 RX ORDER — MAGNESIUM SULFATE 500 MG/ML
2 VIAL (ML) INJECTION ONCE
Refills: 0 | Status: COMPLETED | OUTPATIENT
Start: 2021-01-05 | End: 2021-01-05

## 2021-01-05 RX ORDER — AMLODIPINE BESYLATE 2.5 MG/1
5 TABLET ORAL EVERY 24 HOURS
Refills: 0 | Status: DISCONTINUED | OUTPATIENT
Start: 2021-01-05 | End: 2021-01-05

## 2021-01-05 RX ORDER — AMLODIPINE BESYLATE 2.5 MG/1
10 TABLET ORAL EVERY 24 HOURS
Refills: 0 | Status: DISCONTINUED | OUTPATIENT
Start: 2021-01-05 | End: 2021-01-26

## 2021-01-05 RX ADMIN — Medication 500000 UNIT(S): at 06:16

## 2021-01-05 RX ADMIN — CHLORHEXIDINE GLUCONATE 15 MILLILITER(S): 213 SOLUTION TOPICAL at 17:09

## 2021-01-05 RX ADMIN — PIPERACILLIN AND TAZOBACTAM 200 GRAM(S): 4; .5 INJECTION, POWDER, LYOPHILIZED, FOR SOLUTION INTRAVENOUS at 06:17

## 2021-01-05 RX ADMIN — Medication 325 MILLIGRAM(S): at 11:08

## 2021-01-05 RX ADMIN — Medication 500000 UNIT(S): at 11:07

## 2021-01-05 RX ADMIN — PIPERACILLIN AND TAZOBACTAM 200 GRAM(S): 4; .5 INJECTION, POWDER, LYOPHILIZED, FOR SOLUTION INTRAVENOUS at 10:27

## 2021-01-05 RX ADMIN — FLUDROCORTISONE ACETATE 0.1 MILLIGRAM(S): 0.1 TABLET ORAL at 21:13

## 2021-01-05 RX ADMIN — ATORVASTATIN CALCIUM 40 MILLIGRAM(S): 80 TABLET, FILM COATED ORAL at 21:13

## 2021-01-05 RX ADMIN — TIOTROPIUM BROMIDE 1 CAPSULE(S): 18 CAPSULE ORAL; RESPIRATORY (INHALATION) at 11:08

## 2021-01-05 RX ADMIN — PANTOPRAZOLE SODIUM 40 MILLIGRAM(S): 20 TABLET, DELAYED RELEASE ORAL at 06:17

## 2021-01-05 RX ADMIN — LACOSAMIDE 150 MILLIGRAM(S): 50 TABLET ORAL at 06:16

## 2021-01-05 RX ADMIN — Medication 40 MILLIEQUIVALENT(S): at 13:16

## 2021-01-05 RX ADMIN — CHLORHEXIDINE GLUCONATE 1 APPLICATION(S): 213 SOLUTION TOPICAL at 06:18

## 2021-01-05 RX ADMIN — PREGABALIN 1000 MICROGRAM(S): 225 CAPSULE ORAL at 11:07

## 2021-01-05 RX ADMIN — MODAFINIL 100 MILLIGRAM(S): 200 TABLET ORAL at 11:09

## 2021-01-05 RX ADMIN — ENOXAPARIN SODIUM 40 MILLIGRAM(S): 100 INJECTION SUBCUTANEOUS at 21:13

## 2021-01-05 RX ADMIN — SODIUM CHLORIDE 3 GRAM(S): 9 INJECTION INTRAMUSCULAR; INTRAVENOUS; SUBCUTANEOUS at 06:18

## 2021-01-05 RX ADMIN — PANTOPRAZOLE SODIUM 40 MILLIGRAM(S): 20 TABLET, DELAYED RELEASE ORAL at 17:09

## 2021-01-05 RX ADMIN — LACOSAMIDE 150 MILLIGRAM(S): 50 TABLET ORAL at 17:21

## 2021-01-05 RX ADMIN — AMLODIPINE BESYLATE 5 MILLIGRAM(S): 2.5 TABLET ORAL at 21:13

## 2021-01-05 RX ADMIN — Medication 50 GRAM(S): at 10:27

## 2021-01-05 RX ADMIN — AMLODIPINE BESYLATE 5 MILLIGRAM(S): 2.5 TABLET ORAL at 10:27

## 2021-01-05 RX ADMIN — MONTELUKAST 10 MILLIGRAM(S): 4 TABLET, CHEWABLE ORAL at 11:08

## 2021-01-05 RX ADMIN — Medication 40 MILLIEQUIVALENT(S): at 10:28

## 2021-01-05 RX ADMIN — Medication 500000 UNIT(S): at 17:09

## 2021-01-05 RX ADMIN — CHLORHEXIDINE GLUCONATE 15 MILLILITER(S): 213 SOLUTION TOPICAL at 06:16

## 2021-01-05 NOTE — CONSULT NOTE ADULT - ASSESSMENT
76y/o F with PMHx sig for COPD, asthma, HLD, HTN, BIBEMS to [facility] with SAH s/p VPS    Dysphagia- 2/2 SAH   76y/o F with PMHx sig for COPD, asthma, HLD, HTN, BIBEMS to [facility] with SAH s/p VPS    Dysphagia- 2/2 SAH  Patient was evaluated for PEG. Given abdominal surgical incision with staples, there is a very limited window for PEG placement and procedure may be technically difficult  Recommend evaluation for g tube by IR     Please call back if any questions or concerns  Thank you!

## 2021-01-05 NOTE — PROGRESS NOTE ADULT - SUBJECTIVE AND OBJECTIVE BOX
S/Overnight events:        Hospital Course:   POD#       Vital Signs Last 24 Hrs  T(C): 36.6 (05 Jan 2021 06:11), Max: 36.6 (04 Jan 2021 14:34)  T(F): 97.9 (05 Jan 2021 06:11), Max: 97.9 (04 Jan 2021 14:34)  HR: 80 (05 Jan 2021 05:00) (69 - 98)  BP: 200/80 (05 Jan 2021 05:00) (95/58 - 200/80)  BP(mean): 115 (05 Jan 2021 05:00) (71 - 121)  RR: 15 (05 Jan 2021 05:00) (13 - 31)  SpO2: 95% (05 Jan 2021 05:00) (91% - 97%)    I&O's Detail    03 Jan 2021 07:01  -  04 Jan 2021 07:00  --------------------------------------------------------  IN:    Enteral Tube Flush: 600 mL    IV PiggyBack: 50 mL    Oral Fluid: 275 mL  Total IN: 925 mL    OUT:    Intermittent Catheterization - Urethral (mL): 2650 mL    Rectal Tube (mL): 140 mL  Total OUT: 2790 mL    Total NET: -1865 mL      04 Jan 2021 07:01  -  05 Jan 2021 06:43  --------------------------------------------------------  IN:    sodium chloride 0.9%: 550 mL  Total IN: 550 mL    OUT:    Intermittent Catheterization - Urethral (mL): 1500 mL  Total OUT: 1500 mL    Total NET: -950 mL        I&O's Summary    03 Jan 2021 07:01  -  04 Jan 2021 07:00  --------------------------------------------------------  IN: 925 mL / OUT: 2790 mL / NET: -1865 mL    04 Jan 2021 07:01  -  05 Jan 2021 06:43  --------------------------------------------------------  IN: 550 mL / OUT: 1500 mL / NET: -950 mL        PHYSICAL EXAM:  Neurological:    Motor exam:         [] Upper extremity              Bi(c5)  WE(c6)  EE(c7)   FF(c8)                                                R         5/5        5/5        5/5       5/5                                               L          5/5        5/5        5/5       5/5         [] Lower extremeity          HF(l2)   KE(l3)    TA(l4)   EHL(l5)  GS(s1)                                                 R        5/5        5/5        5/5       5/5         5/5                                               L         5/5        5/5       5/5       5/5          5/5                                                        [] warm well perfused; capillary refill <3 seconds     Sensation: [] intact to light touch  [] decreased:       Cardiovascular:  Respiratory:  Gastrointestinal:  Genitourinary:  Extremities:    Incision/Wound:    DEVICE/DRAIN DRESSING:    TUBES/LINES:  [] CVC  [] A-line  [] Lumbar Drain  [] Ventriculostomy  [] Other    DIET:  [] NPO  [] Mechanical  [] Tube feeds    LABS:                        11.4   7.73  )-----------( 338      ( 04 Jan 2021 06:29 )             36.5     01-04    138  |  98  |  5<L>  ----------------------------<  106<H>  3.3<L>   |  28  |  0.39<L>    Ca    9.0      04 Jan 2021 05:50  Phos  3.4     01-04  Mg     1.8     01-04              CAPILLARY BLOOD GLUCOSE      POCT Blood Glucose.: 116 mg/dL (04 Jan 2021 11:25)      Drug Levels: [] N/A    CSF Analysis: [] N/A      Allergies    No Known Allergies    Intolerances      MEDICATIONS:  Antibiotics:  nystatin    Suspension 804083 Unit(s) Oral four times a day  piperacillin/tazobactam IVPB.. 3.375 Gram(s) IV Intermittent every 6 hours    Neuro:  acetaminophen    Suspension .. 650 milliGRAM(s) Oral every 6 hours PRN  lacosamide Solution 150 milliGRAM(s) Oral every 12 hours  modafinil 100 milliGRAM(s) Oral daily    Anticoagulation:  enoxaparin Injectable 40 milliGRAM(s) SubCutaneous at bedtime    OTHER:  albuterol/ipratropium for Nebulization. 3 milliLiter(s) Nebulizer every 6 hours PRN  amLODIPine   Tablet 2.5 milliGRAM(s) Oral every 24 hours  atorvastatin 40 milliGRAM(s) Oral at bedtime  chlorhexidine 0.12% Liquid 15 milliLiter(s) Oral Mucosa two times a day  chlorhexidine 2% Cloths 1 Application(s) Topical <User Schedule>  dextrose 40% Gel 15 Gram(s) Oral once  dextrose 50% Injectable 25 Gram(s) IV Push once  fludroCORTISONE 0.2 milliGRAM(s) Oral at bedtime  glucagon  Injectable 1 milliGRAM(s) IntraMuscular once  labetalol Injectable 2.5 milliGRAM(s) IV Push every 6 hours PRN  montelukast 10 milliGRAM(s) Oral daily  pantoprazole   Suspension 40 milliGRAM(s) Oral two times a day  tiotropium 18 MICROgram(s) Capsule 1 Capsule(s) Inhalation daily    IVF:  cyanocobalamin 1000 MICROGram(s) Oral daily  dextrose 5%. 1000 milliLiter(s) IV Continuous <Continuous>  dextrose 5%. 1000 milliLiter(s) IV Continuous <Continuous>  ferrous    sulfate 325 milliGRAM(s) Oral daily  sodium chloride 3 Gram(s) Oral every 6 hours  sodium chloride 0.9%. 1000 milliLiter(s) IV Continuous <Continuous>    CULTURES:  Culture Results:   No growth at 4 days. (12-31 @ 22:37)  Culture Results:   No growth at 4 days. (12-31 @ 22:37)    RADIOLOGY & ADDITIONAL TESTS:      ASSESSMENT:  75y Female s/p    HEADACHE    Handoff    MEWS Score    Hyperlipidemia    Hypertension    COPD (chronic obstructive pulmonary disease)    Asthma    COPD (chronic obstructive pulmonary disease)    Asthma    Hydrocephalus, adult    Injury of right femoral artery    Cerebral artery vasospasm    SAH (subarachnoid hemorrhage)    Hydrocephalus, adult    Injury of femoral artery    Cerebral artery vasospasm    SAH (subarachnoid hemorrhage)    Subarachnoid bleed    Obstructive hydrocephalus    Anemia due to acute blood loss    Preoperative clearance    Centrilobular emphysema    Mild persistent asthma without complication    Subarachnoid bleed    Essential hypertension    Pure hypercholesterolemia    Ventriculoperitoneal shunt    Insertion of external ventricular drain    Vascular surgery procedure    Angiogram, carotid and cerebral, bilateral    Angiogram, cerebral, with intracranial aneurysm embolization    No significant past surgical history    HEADACHE    90+    SysAdmin_VisitLink        PLAN:  NEURO:    CARDIOVASCULAR:    PULMONARY:    RENAL:    GI:    HEME:    ID:    ENDO:    DVT PROPHYLAXIS:  [] Venodynes                                [] Heparin/Lovenox    FALL RISK:  [] Low Risk                                    [] Impulsive    DISPOSITION:  Hospital Course:   12/9: BD1 Patient admitted for SAH HH3, MF4.   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 EVD at 5, on Levo overnight, nimodipine discontinued d/t hypotension  12/12: BD4 POD#2, YUMIKO overnight, neuro stable, passed TOV  12/13: BD5 POD#3: YUMIKO x 24 hrs  12/14: BD6 POD#4. YUMIKO o/n, neuro stable. EVD at 5cm H2O  12/15: BD7 POD #5 YUMIKO overnight, neuro stable. EVD remains at 5. Plan for CTA today.   12/16: BD8 POD #6 YUMIKO overnight, neuro stable, EVD at 0, on Levo, started on 3% at 15 overnight   12/17: BD9 POD#1 s/p cerebral angio for verapamil c/b device malfunction of Proglide, s/p right groin cutdown for removal of proglide catheter and femoral artery repair.  YUMIKO overnight, neuro stable, EVD at 0. patient remained intubated overnight, on propofol for sedation, and vEEG  12/18: BD#10, POD#8 s/p coil/embo of L pcomm aneurysm, POD#2 s/p IA verapamil, POD#2 R groin cutdown for removal of proglide catheter w/ repair of femoral artery. YUMIKO overnight. EVD remains open @0cmH2O   12/19: BD#11, POD#9 s/p coil/embo of L pcomm aneurysm. POD#3 s/p IA verapamil, POD#3 s/p R femoral artery cutdown of proglide catheter w/ femoral artery repair. YUMIKO overnight. EVD remains open @0cmH2O. EEG shows b/l frontal sharp discharges, cont monitoring, vimpat was increased yesterday. Gentle hydration, total IVF 50cc/hr. Cont vanc/zosyn for empiric coverage, next vanc trough due @1pm on 12/19. F/u daily CXR.   12/20 BD#12 POD#10 YUMIKO overnight, Neuro exam stable, EVD @ 0cm H2O, vEEG, 3% @ 15 goal WNL, TF via NGT  12/21: BD13, POD11 YUMIKO overnight. EVD at 5cmH20 (raised yesterday), vEEG in place  12/22 BD#14, EVD raised to 15 yesterday, 3% @ 30cc Goal WNL, -180, Milrinone, TTE prior to DC as per Card for takotsubo cardiomyopathy, failed S&S pending reeval, TF via NGT, Neuro exam stable  12/23: BD#15. YUMIKO o/n, neuro stable. EVD clamped. 30%@30cc/hr  12/24 BD#16 YUMIKO overnight, spiked 102, EVD @ 0cm H2O, 3% @ 30cc/hr Goal WNL, Vasc following, TF via NGT, -200,   12/25: BD#17. YUMIKO overnight. Pan cx from 12/23, NGTD on CSF and blood. EVD clamped. 3% @15cc/hr, rate kept same overnight. NGT feeds at goal. SBP goal 160-200.   12/26: BD#18. YUMIKO overnight, neuro exam stable. NGTD from pan cx on 12/23. EVD removed today. 3% discontinued 12/25. NGT feeds at goal, IVF off. SBP goal 160-200.   12/27: BD#19 YUMIKO overnight, neuro stable. 3% at 15, stepdown status  12/28: BD20 YUMIKO overnight, neuro stable. 3% continues.  Patient became increasingly more somnolent and underwent LP for CSF high volume tap.  Temporary improvement.  Spiked fever, pancultured.  12/29: BD21 Patient increasingly more somnolent, EVD placed at bedside.    12/30: BD22 pt. is s/p R VPS placement, certas @5 POD#1, post op ct head c/a/p done. afebrile o/n.   1/1: BD#23: pt. is s/p R VPS placement, certas @5 POD#2, post op ct head c/a/p done. zosyn for enterobacter   1/2: BD #24. POD#3 s/p R VPS placement, CErtas @5. Dressings removed from head and abdomen. Cont zosyn for enterobacter and e. coli in urine, end date 1/4/21. Post-op imaging completed. Pend S&S re-eval, improvement in mental status/neuro exam.   1/3: BD25, POD4. SBP goals relaxed 120-200. zosyn for enterobacter UTI until 1/4. FOB+, protonix bid started, yumiko o/n    Vital Signs Last 24 Hrs  T(C): 36.6 (05 Jan 2021 06:11), Max: 36.6 (04 Jan 2021 14:34)  T(F): 97.9 (05 Jan 2021 06:11), Max: 97.9 (04 Jan 2021 14:34)  HR: 80 (05 Jan 2021 05:00) (69 - 98)  BP: 200/80 (05 Jan 2021 05:00) (95/58 - 200/80)  BP(mean): 115 (05 Jan 2021 05:00) (71 - 121)  RR: 15 (05 Jan 2021 05:00) (13 - 31)  SpO2: 95% (05 Jan 2021 05:00) (91% - 97%)    I&O's Detail    03 Jan 2021 07:01  -  04 Jan 2021 07:00  --------------------------------------------------------  IN:    Enteral Tube Flush: 600 mL    IV PiggyBack: 50 mL    Oral Fluid: 275 mL  Total IN: 925 mL    OUT:    Intermittent Catheterization - Urethral (mL): 2650 mL    Rectal Tube (mL): 140 mL  Total OUT: 2790 mL    Total NET: -1865 mL      04 Jan 2021 07:01  -  05 Jan 2021 06:43  --------------------------------------------------------  IN:    sodium chloride 0.9%: 550 mL  Total IN: 550 mL    OUT:    Intermittent Catheterization - Urethral (mL): 1500 mL  Total OUT: 1500 mL    Total NET: -950 mL        I&O's Summary    03 Jan 2021 07:01  -  04 Jan 2021 07:00  --------------------------------------------------------  IN: 925 mL / OUT: 2790 mL / NET: -1865 mL    04 Jan 2021 07:01  -  05 Jan 2021 06:43  --------------------------------------------------------  IN: 550 mL / OUT: 1500 mL / NET: -950 mL        PHYSICAL EXAM:  GEN: laying in bed, appears well, NAD  NEURO: AOx1-2. FC, OE spont, speech intact, face symmetric. CNII-XII intact. PERRL, EOMI. No pronator drift. MAEx4. 5/5 strength throughout. SILT  CV: RRR +S1/S2  PULM: CTAB  GI: Abd soft, NT/ND  EXT: ext warm, dry, nontender  WOUND: evd site c/d/i    TUBES/LINES:  [] CVC  [x] A-line  [] Lumbar Drain  [x] Ventriculostomy  [] Other    DIET:  [] NPO  [x] Mechanical  [] Tube feeds      LABS:                        11.4   7.73  )-----------( 338      ( 04 Jan 2021 06:29 )             36.5     01-04    138  |  98  |  5<L>  ----------------------------<  106<H>  3.3<L>   |  28  |  0.39<L>    Ca    9.0      04 Jan 2021 05:50  Phos  3.4     01-04  Mg     1.8     01-04              CAPILLARY BLOOD GLUCOSE      POCT Blood Glucose.: 116 mg/dL (04 Jan 2021 11:25)      Drug Levels: [] N/A    CSF Analysis: [] N/A      Allergies    No Known Allergies    Intolerances      MEDICATIONS:  Antibiotics:  nystatin    Suspension 122352 Unit(s) Oral four times a day  piperacillin/tazobactam IVPB.. 3.375 Gram(s) IV Intermittent every 6 hours    Neuro:  acetaminophen    Suspension .. 650 milliGRAM(s) Oral every 6 hours PRN  lacosamide Solution 150 milliGRAM(s) Oral every 12 hours  modafinil 100 milliGRAM(s) Oral daily    Anticoagulation:  enoxaparin Injectable 40 milliGRAM(s) SubCutaneous at bedtime    OTHER:  albuterol/ipratropium for Nebulization. 3 milliLiter(s) Nebulizer every 6 hours PRN  amLODIPine   Tablet 2.5 milliGRAM(s) Oral every 24 hours  atorvastatin 40 milliGRAM(s) Oral at bedtime  chlorhexidine 0.12% Liquid 15 milliLiter(s) Oral Mucosa two times a day  chlorhexidine 2% Cloths 1 Application(s) Topical <User Schedule>  dextrose 40% Gel 15 Gram(s) Oral once  dextrose 50% Injectable 25 Gram(s) IV Push once  fludroCORTISONE 0.2 milliGRAM(s) Oral at bedtime  glucagon  Injectable 1 milliGRAM(s) IntraMuscular once  labetalol Injectable 2.5 milliGRAM(s) IV Push every 6 hours PRN  montelukast 10 milliGRAM(s) Oral daily  pantoprazole   Suspension 40 milliGRAM(s) Oral two times a day  tiotropium 18 MICROgram(s) Capsule 1 Capsule(s) Inhalation daily    IVF:  cyanocobalamin 1000 MICROGram(s) Oral daily  dextrose 5%. 1000 milliLiter(s) IV Continuous <Continuous>  dextrose 5%. 1000 milliLiter(s) IV Continuous <Continuous>  ferrous    sulfate 325 milliGRAM(s) Oral daily  sodium chloride 3 Gram(s) Oral every 6 hours  sodium chloride 0.9%. 1000 milliLiter(s) IV Continuous <Continuous>    CULTURES:  Culture Results:   No growth at 4 days. (12-31 @ 22:37)  Culture Results:   No growth at 4 days. (12-31 @ 22:37)    RADIOLOGY & ADDITIONAL TESTS:      ASSESSMENT:  75y Female  with PMHx of COPD, asthma, HLD, HTN, BIBEMS to [facility] from home after HHA found patient down on the floor covered in non-bloody vomitus. CTH reveals diffuse subarachnoid hemorrhage mainly at basilar cisterns with IVH and obstructive hydrocephalus, CTA shows 3mm left pcomm aneurysm, now s/p bedside right frontal EVD placement 12/10, s/p cerebral angiogram for coil embolization of left PCOMM aneurysm 12/10, now   s/p cerebral angio for verapamil c/b device malfunction of Proglide, s/p right groin cutdown for removal of proglide catheter and femoral artery repair (12/17/2020), NIHSS2 HH3 MF4), s/p EVD removal 12/25,   s/p bedside EVD placement  12/29, POD#6 from R VPS certas at 5  BD#27.     HEADACHE    Handoff    MEWS Score    Hyperlipidemia    Hypertension    COPD (chronic obstructive pulmonary disease)    Asthma    COPD (chronic obstructive pulmonary disease)    Asthma    Hydrocephalus, adult    Injury of right femoral artery    Cerebral artery vasospasm    SAH (subarachnoid hemorrhage)    Hydrocephalus, adult    Injury of femoral artery    Cerebral artery vasospasm    SAH (subarachnoid hemorrhage)    Subarachnoid bleed    Obstructive hydrocephalus    Anemia due to acute blood loss    Preoperative clearance    Centrilobular emphysema    Mild persistent asthma without complication    Subarachnoid bleed    Essential hypertension    Pure hypercholesterolemia    Ventriculoperitoneal shunt    Insertion of external ventricular drain    Vascular surgery procedure    Angiogram, carotid and cerebral, bilateral    Angiogram, cerebral, with intracranial aneurysm embolization    No significant past surgical history    HEADACHE    90+    SysAdmin_VisitLink        PLAN:  NEURO:  - neuro/vitals checks  - pain control   - cont modafinil for neurostim  - cont vimpat seizure ppx    CARDIOVASCULAR:  - -200  - echo and CCTA prior to discharge  - EF now 70%    PULMONARY:  - satting well RA    RENAL:  - repletions prn  - sc prn     GI:  - puree/thin liquid diet  - RT  - FOB+, protonix bid     HEME:  - h/h stable  - SCDs/SQL    ID:  - afebrile  - zosyn enterobacter UTI until 1/4    ENDO:  - glucose goal 140-180    DVT PROPHYLAXIS:  [x] Venodynes                                [x] Heparin/Lovenox    DISPOSITION: ICU status, full code, dispo pending    D/w Dr. Serrano, Dr. Alfredo

## 2021-01-05 NOTE — CONSULT NOTE ADULT - ASSESSMENT
Assessment: case reviewed with Dr. Gandhi, can plan for PEG tube placement once off aspirin for 5 days.         Recommendations: NPO at midnight prior to procedure with anesthesia. Aspirin to be held 5 days prior to procedure, lovenox to be held 12 hours prior to procedure.         Communicated with: SHEILA Barakat

## 2021-01-05 NOTE — CONSULT NOTE ADULT - SUBJECTIVE AND OBJECTIVE BOX
HPI:  76y/o F with PMHx sig for COPD, asthma, HLD, HTN, BIBEMS to [facility] from home after HHA discovered patient found down in floor. She was diagnosed with SAH, underwent cerebral angiogram for coil embo left pcomm aneurysm on Dec 10. On Dec 17 underwent cerebral angio for verapamil c/b device malfunction of Proglide, s/p right groin cutdown for removal of proglide catheter and femoral artery repair.  Underwent VPS on Dec 30    GI consulted for dysphagia and consideration of PEG placement     Allergies    No Known Allergies    Intolerances      Home Medications:  famotidine 20 mg oral tablet: 1 tab(s) orally once a day (10 Dec 2020 00:38)  lisinopril 2.5 mg oral tablet: 1 tab(s) orally once a day (10 Dec 2020 00:38)  montelukast 10 mg oral tablet: 1 tab(s) orally once a day (10 Dec 2020 00:38)  omeprazole 20 mg oral delayed release capsule: 1 cap(s) orally once a day (12 Dec 2020 16:14)  simvastatin 20 mg oral tablet: 1 tab(s) orally once a day (at bedtime) (10 Dec 2020 00:38)    MEDICATIONS:  MEDICATIONS  (STANDING):  amLODIPine   Tablet 5 milliGRAM(s) Oral every 24 hours  atorvastatin 40 milliGRAM(s) Oral at bedtime  chlorhexidine 0.12% Liquid 15 milliLiter(s) Oral Mucosa two times a day  chlorhexidine 2% Cloths 1 Application(s) Topical <User Schedule>  cyanocobalamin 1000 MICROGram(s) Oral daily  dextrose 40% Gel 15 Gram(s) Oral once  dextrose 5%. 1000 milliLiter(s) (50 mL/Hr) IV Continuous <Continuous>  dextrose 5%. 1000 milliLiter(s) (100 mL/Hr) IV Continuous <Continuous>  dextrose 50% Injectable 25 Gram(s) IV Push once  enoxaparin Injectable 40 milliGRAM(s) SubCutaneous at bedtime  ferrous    sulfate 325 milliGRAM(s) Oral daily  fludroCORTISONE 0.1 milliGRAM(s) Oral at bedtime  glucagon  Injectable 1 milliGRAM(s) IntraMuscular once  lacosamide Solution 150 milliGRAM(s) Oral every 12 hours  modafinil 100 milliGRAM(s) Oral daily  montelukast 10 milliGRAM(s) Oral daily  nystatin    Suspension 111651 Unit(s) Oral four times a day  pantoprazole   Suspension 40 milliGRAM(s) Oral two times a day  sodium chloride 0.9%. 1000 milliLiter(s) (50 mL/Hr) IV Continuous <Continuous>  tiotropium 18 MICROgram(s) Capsule 1 Capsule(s) Inhalation daily    MEDICATIONS  (PRN):  acetaminophen    Suspension .. 650 milliGRAM(s) Oral every 6 hours PRN Temp greater or equal to 38C (100.4F), Mild Pain (1 - 3)  albuterol/ipratropium for Nebulization. 3 milliLiter(s) Nebulizer every 6 hours PRN Shortness of Breath  labetalol Injectable 2.5 milliGRAM(s) IV Push every 6 hours PRN Systolic blood pressure > 220    PAST MEDICAL & SURGICAL HISTORY:  Hyperlipidemia    Hypertension    COPD (chronic obstructive pulmonary disease)    Asthma    No significant past surgical history      FAMILY HISTORY:    SOCIAL HISTORY:  [place]: [ ] Current, [ ] Former, [ ] Never; Pack Years:  Alcohol:  Illicit Drugs:    REVIEW OF SYSTEMS:  CONSTITUTIONAL: No weakness, fevers or chills  HEENT: No visual changes; No vertigo or throat pain   NECK: No pain or stiffness  RESPIRATORY: No cough, wheezing, hemoptysis; No shortness of breath  CARDIOVASCULAR: No chest pain or palpitations  GASTROINTESTINAL: No abdominal or epigastric pain. No nausea, vomiting, or hematemesis; No diarrhea or constipation. No melena or hematochezia.  GENITOURINARY: No dysuria, frequency or hematuria  NEUROLOGICAL: No numbness or weakness  SKIN: No itching, burning, rashes, or lesions   All other 10 review of systems is negative unless indicated above.    Vital Signs Last 24 Hrs  T(C): 36.7 (05 Jan 2021 09:10), Max: 36.7 (05 Jan 2021 09:10)  T(F): 98 (05 Jan 2021 09:10), Max: 98 (05 Jan 2021 09:10)  HR: 74 (05 Jan 2021 13:00) (74 - 98)  BP: 150/67 (05 Jan 2021 13:00) (95/58 - 211/79)  BP(mean): 96 (05 Jan 2021 13:00) (71 - 121)  RR: 22 (05 Jan 2021 13:00) (13 - 31)  SpO2: 95% (05 Jan 2021 13:00) (91% - 98%)    01-04 @ 07:01  -  01-05 @ 07:00  --------------------------------------------------------  IN: 1040 mL / OUT: 2200 mL / NET: -1160 mL    01-05 @ 07:01 - 01-05 @ 13:49  --------------------------------------------------------  IN: 555 mL / OUT: 450 mL / NET: 105 mL        PHYSICAL EXAM:    General: awake, but not answering questions  Eyes: Anicteric sclerae, moist conjunctivae  HENT: Moist mucous membranes  Neck: Trachea midline, supple  Lungs: Normal respiratory effors and no intercostal retractions  Cardiovascular: RRR  Abdomen: Soft, mildly distended, tender to palpation diffusely. abdominal incision with staples  Extremities: Normal range of motion, No clubbing, cyanosis or edema  Neurological: Alert and oriented x3  Skin: Warm and dry. No obvious rash    LABS:                        11.4   7.73  )-----------( 338      ( 04 Jan 2021 06:29 )             36.5     01-04    138  |  98  |  5<L>  ----------------------------<  106<H>  3.3<L>   |  28  |  0.39<L>    Ca    9.0      04 Jan 2021 05:50  Phos  3.4     01-04  Mg     1.8     01-04              RADIOLOGY & ADDITIONAL STUDIES:

## 2021-01-05 NOTE — PROGRESS NOTE ADULT - SUBJECTIVE AND OBJECTIVE BOX
======================================================   NEUROCRITICAL CARE ATTENDING NOTE (2021 EVENING)  ======================================================    CARA CANCHOLA   MRN-[id number]    Summary:  75y/F with COPD, asthma, dyslipidemia Hypertension found down.  Brought to [facility] where imaging showed SAH basilar cisterns with IVH and obstructive hydrocephalus L Pcomm aneurysm.  Given levetiracetam, hydromorphone.  NIHSS 2 HH3 MF4, transferred to [facility] for further management.      COURSE IN THE HOSPITAL:   admitted to [facility], EVD inserted; given 20 lasix for pulmo edema, T inversion on CT  12/10 No significant events overnight.    No significant events overnight.    No significant events overnight.    No significant events overnight. drain stopped working at 730 a.m., off levophed    No significant events overnight.   12/15 confusion   stepped down   readmitted to ICU    more confused somnolent overnight, high volume LP - 30cc removed, CT scan, EVD inserted, febrile pancultured, UA (+) rocephin started   Tmax 38.6  EVD clamped this morning, given 1L fluid bolus overnight, s/p VPS   Tmax 38.2    Tmax 39 500cc bolus overnight x2, pancultured for fever   Tmax 38.1 multiple bowel movements overnight   Tmax 37.8 wheezing overnight, given nebs   VIVIEN    ___    Past Medical History: Hyperlipidemia Hypertension COPD (chronic obstructive pulmonary disease) Asthma  Allergies:  No Known Allergies  Home meds:   ·	famotidine 20 mg oral tablet: 1 tab(s) orally once a day  ·	lisinopril 2.5 mg oral tablet: 1 tab(s) orally once a day  ·	montelukast 10 mg oral tablet: 1 tab(s) orally once a day  ·	predniSONE 50 mg oral tablet: 1 tab(s) orally once a day  ·	ProAir HFA CFC free 90 mcg/inh inhalation aerosol: 2 puff(s) inhaled 4 times a day PRN  ·	simvastatin 20 mg oral tablet: 1 tab(s) orally once a day (at bedtime)    Current Meds:  MEDICATIONS  (STANDING):  amLODIPine   Tablet 5 milliGRAM(s) Oral every 24 hours  atorvastatin 40 milliGRAM(s) Oral at bedtime  cyanocobalamin 1000 MICROGram(s) Oral daily  enoxaparin Injectable 40 milliGRAM(s) SubCutaneous at bedtime  ferrous    sulfate 325 milliGRAM(s) Oral daily  fludroCORTISONE 0.1 milliGRAM(s) Oral at bedtime  lacosamide Solution 150 milliGRAM(s) Oral every 12 hours  modafinil 100 milliGRAM(s) Oral daily  montelukast 10 milliGRAM(s) Oral daily  nystatin    Suspension [id number] Unit(s) Oral four times a day  pantoprazole   Suspension 40 milliGRAM(s) Oral two times a day  sodium chloride 0.9%. 1000 milliLiter(s) (50 mL/Hr) IV Continuous <Continuous>  tiotropium 18 MICROgram(s) Capsule 1 Capsule(s) Inhalation daily    MEDICATIONS  (PRN):  acetaminophen    Suspension .. 650 milliGRAM(s) Oral every 6 hours PRN Temp greater or equal to 38C (100.4F), Mild Pain (1 - 3)  albuterol/ipratropium for Nebulization. 3 milliLiter(s) Nebulizer every 6 hours PRN Shortness of Breath  labetalol Injectable 2.5 milliGRAM(s) IV Push every 6 hours PRN Systolic blood pressure > 220    PHYSICAL EXAMINATION    ICU Vital Signs Last 24 Hrs  T(C): 36.3 (2021 14:12), Max: 36.7 (2021 09:10)  T(F): 97.4 (2021 14:12), Max: 98 (2021 09:10)  HR: 83 (2021 15:05) (74 - 98)  BP: 176/78 (2021 15:05) (95/58 - 211/79)  BP(mean): 112 (2021 15:05) (71 - 121)  RR: 26 (2021 15:05) (13 - 27)  SpO2: 95% (2021 15:05) (90% - 98%)    NEUROLOGIC EXAMINATION:  Patient is awake, oriented x 2, CLEOPATRA, following commands, moving all 4s, R shoulder movement pain limited  CARDIOVASCULAR: (+) S1 S2, normal rate and regular rhythm  PULMONARY: clear to auscultation bilaterally  ABDOMEN: soft, nontender with normoactive bowel sounds  EXTREMITIES: no edema  SKIN: no rash    I/O's    21 @ 07:01  -  21 @ 07:00  --------------------------------------------------------  IN: 1040 mL / OUT: 2200 mL / NET: -1160 mL    21 @ 07:21 @ 16:11  --------------------------------------------------------  IN: 655 mL / OUT: 550 mL / NET: 105 mL    LABS:                        11.4   7.73  )-----------( 338      ( 2021 06:29 )             36.5     01-04    138  |  98  |  5<L>  ----------------------------<  106<H>  3.3<L>   |  28  |  0.39<L>    Ca    9.0      2021 05:50  Phos  3.4     -  Mg     1.8         HbA1C = 6.7 (12-10) 6.4 ()  LDL = 112 ()   HDL = 66 ()  TG = 114 ()   TSH = 0.990 ()    Bacteriology:  UA (+) LE (+) N   Blood CS NG2D x2    CSF NGTD   Urine Enterobacter E coli   Blood culture NG5D (final)   CSF NGTD    Blood NG5D x2    CSF studies:    L   *** RBC58 WBC9 *** %N3 %L5     L   *** [id number] WBC20 *** %N10 %L8     L   *** [id number] WBC24 *** %N7 %L13     EEG:  Neuroimagin/10 CT head:  EVD placement, stable   CTA:  L pcomm aneurysm, L ICA (distal cavernous) aneurysm vs infundibulum, extensive calcified plaque cavernous bilateral ICA with mild to mod stenosis; thick plaque bilateral carotid bifurcations - mod to severe R and mild to mod L stenosis, focal calcified plaque R VA off subclavian artery - high grate stenosis / partial occlusion, upper lung bilateral opacification - pulmo edema, chronic deformity R humeral neck   CT head:  SAH, basilar cisterns, IV extension, obstructive hydrocephalus SVID, lacunar infarcts R caudate, both thalami, cerebellar hemispheres, no CT evidence of territorial infarction  Other imagin/28 LE Doppler: NEG   LE Doppler: NEG   CT abd:  small paraesophageal hiatal hernia, gastritis; ?impaction, septal thickening bilateral lower lobes ?pulmo edema, hepatic steatosis      IV FLUIDS: IVL  DRIPS:  DIET: Glucerna  Lines:   Drains: rectal tube     EVD at 5cm H20   EVD at 5cm H20 ICP < 10   EVD at 5cm H20 ICP <10   EVD clamped ======================================================   NEUROCRITICAL CARE ATTENDING NOTE (2021 EVENING)  ======================================================    CARA CANCHOLA   MRN-[id number]    Summary:  75y/F with COPD, asthma, dyslipidemia Hypertension found down.  Brought to [facility] where imaging showed SAH basilar cisterns with IVH and obstructive hydrocephalus L Pcomm aneurysm.  Given levetiracetam, hydromorphone.  NIHSS 2 HH3 MF4, transferred to [facility] for further management.      COURSE IN THE HOSPITAL:   admitted to [facility], EVD inserted; given 20 lasix for pulmo edema, T inversion on CT  12/10 No significant events overnight.    No significant events overnight.    No significant events overnight.    No significant events overnight. drain stopped working at 730 a.m., off levophed    No significant events overnight.   12/15 confusion   stepped down   readmitted to ICU    more confused somnolent overnight, high volume LP - 30cc removed, CT scan, EVD inserted, febrile pancultured, UA (+) rocephin started   Tmax 38.6  EVD clamped this morning, given 1L fluid bolus overnight, s/p VPS   Tmax 38.2    Tmax 39 500cc bolus overnight x2, pancultured for fever   Tmax 38.1 multiple bowel movements overnight   Tmax 37.8 wheezing overnight, given nebs   VIVIEN    VIVIEN    Past Medical History: Hyperlipidemia Hypertension COPD (chronic obstructive pulmonary disease) Asthma  Allergies:  No Known Allergies  Home meds:   ·	famotidine 20 mg oral tablet: 1 tab(s) orally once a day  ·	lisinopril 2.5 mg oral tablet: 1 tab(s) orally once a day  ·	montelukast 10 mg oral tablet: 1 tab(s) orally once a day  ·	predniSONE 50 mg oral tablet: 1 tab(s) orally once a day  ·	ProAir HFA CFC free 90 mcg/inh inhalation aerosol: 2 puff(s) inhaled 4 times a day PRN  ·	simvastatin 20 mg oral tablet: 1 tab(s) orally once a day (at bedtime)    Current Meds:  MEDICATIONS  (STANDING):  amLODIPine   Tablet 5 milliGRAM(s) Oral every 24 hours  atorvastatin 40 milliGRAM(s) Oral at bedtime  cyanocobalamin 1000 MICROGram(s) Oral daily  enoxaparin Injectable 40 milliGRAM(s) SubCutaneous at bedtime  ferrous    sulfate 325 milliGRAM(s) Oral daily  fludroCORTISONE 0.1 milliGRAM(s) Oral at bedtime  lacosamide Solution 150 milliGRAM(s) Oral every 12 hours  modafinil 100 milliGRAM(s) Oral daily  montelukast 10 milliGRAM(s) Oral daily  nystatin    Suspension [id number] Unit(s) Oral four times a day  pantoprazole   Suspension 40 milliGRAM(s) Oral two times a day  sodium chloride 0.9%. 1000 milliLiter(s) (50 mL/Hr) IV Continuous <Continuous>  tiotropium 18 MICROgram(s) Capsule 1 Capsule(s) Inhalation daily    MEDICATIONS  (PRN):  acetaminophen    Suspension .. 650 milliGRAM(s) Oral every 6 hours PRN Temp greater or equal to 38C (100.4F), Mild Pain (1 - 3)  albuterol/ipratropium for Nebulization. 3 milliLiter(s) Nebulizer every 6 hours PRN Shortness of Breath  labetalol Injectable 2.5 milliGRAM(s) IV Push every 6 hours PRN Systolic blood pressure > 220    PHYSICAL EXAMINATION    ICU Vital Signs Last 24 Hrs  T(C): 36.3 (2021 14:12), Max: 36.7 (2021 09:10)  T(F): 97.4 (2021 14:12), Max: 98 (2021 09:10)  HR: 83 (2021 15:05) (74 - 98)  BP: 176/78 (2021 15:05) (95/58 - 211/79)  BP(mean): 112 (2021 15:05) (71 - 121)  RR: 26 (2021 15:05) (13 - 27)  SpO2: 95% (2021 15:05) (90% - 98%)    NEUROLOGIC EXAMINATION:  Patient is awake, oriented x 2, CLEOPATRA, following commands, moving all 4s, R shoulder movement pain limited  CARDIOVASCULAR: (+) S1 S2, normal rate and regular rhythm  PULMONARY: clear to auscultation bilaterally  ABDOMEN: soft, nontender with normoactive bowel sounds  EXTREMITIES: no edema  SKIN: no rash    I/O's    21 @ 07:01  -  21 @ 07:00  --------------------------------------------------------  IN: 1040 mL / OUT: 2200 mL / NET: -1160 mL    21 @ 07:01  21 @ 16:11  --------------------------------------------------------  IN: 655 mL / OUT: 550 mL / NET: 105 mL    LABS:                        11.4   7.73  )-----------( 338      ( 2021 06:29 )             36.5     01-04    138  |  98  |  5<L>  ----------------------------<  106<H>  3.3<L>   |  28  |  0.39<L>    Ca    9.0      2021 05:50  Phos  3.4     -  Mg     1.8     -    HbA1C = 6.7 (12-10) 6.4 ()  LDL = 112 ()   HDL = 66 ()  TG = 114 ()   TSH = 0.990 ()    Bacteriology:  UA (+) LE (+) N   Blood CS NG2D x2    CSF NGTD   Urine Enterobacter E coli   Blood culture NG5D (final)   CSF NGTD    Blood NG5D x2    CSF studies:    L   *** RBC58 WBC9 *** %N3 %L5     L   *** [id number] WBC20 *** %N10 %L8     L   *** [id number] WBC24 *** %N7 %L13     EEG:  Neuroimagin/10 CT head:  EVD placement, stable   CTA:  L pcomm aneurysm, L ICA (distal cavernous) aneurysm vs infundibulum, extensive calcified plaque cavernous bilateral ICA with mild to mod stenosis; thick plaque bilateral carotid bifurcations - mod to severe R and mild to mod L stenosis, focal calcified plaque R VA off subclavian artery - high grate stenosis / partial occlusion, upper lung bilateral opacification - pulmo edema, chronic deformity R humeral neck   CT head:  SAH, basilar cisterns, IV extension, obstructive hydrocephalus SVID, lacunar infarcts R caudate, both thalami, cerebellar hemispheres, no CT evidence of territorial infarction  Other imagin/28 LE Doppler: NEG   LE Doppler: NEG   CT abd:  small paraesophageal hiatal hernia, gastritis; ?impaction, septal thickening bilateral lower lobes ?pulmo edema, hepatic steatosis      IV FLUIDS: IVL  DRIPS:  DIET: Glucerna  Lines:   Drains: rectal tube     EVD at 5cm H20   EVD at 5cm H20 ICP < 10   EVD at 5cm H20 ICP <10   EVD clamped

## 2021-01-05 NOTE — CONSULT NOTE ADULT - SUBJECTIVE AND OBJECTIVE BOX
76y/o F with PMHx sig for COPD, asthma, HLD, HTN, BIBEMS to [facility] with SAH s/p VPS      Clinical History: HEADACHE    Handoff    MEWS Score    Hyperlipidemia    Hypertension    COPD (chronic obstructive pulmonary disease)    Asthma    COPD (chronic obstructive pulmonary disease)    Asthma    Hydrocephalus, adult    Injury of right femoral artery    Cerebral artery vasospasm    SAH (subarachnoid hemorrhage)    Hydrocephalus, adult    Injury of femoral artery    Cerebral artery vasospasm    SAH (subarachnoid hemorrhage)    Subarachnoid bleed    Obstructive hydrocephalus    Anemia due to acute blood loss    Preoperative clearance    Centrilobular emphysema    Mild persistent asthma without complication    Subarachnoid bleed    Essential hypertension    Pure hypercholesterolemia    Ventriculoperitoneal shunt    Insertion of external ventricular drain    Vascular surgery procedure    Angiogram, carotid and cerebral, bilateral    Angiogram, cerebral, with intracranial aneurysm embolization    No significant past surgical history    HEADACHE    90+    [email or web address]        Meds:acetaminophen    Suspension .. 650 milliGRAM(s) Oral every 6 hours PRN  albuterol/ipratropium for Nebulization. 3 milliLiter(s) Nebulizer every 6 hours PRN  amLODIPine   Tablet 5 milliGRAM(s) Oral every 24 hours  atorvastatin 40 milliGRAM(s) Oral at bedtime  chlorhexidine 0.12% Liquid 15 milliLiter(s) Oral Mucosa two times a day  chlorhexidine 2% Cloths 1 Application(s) Topical <User Schedule>  cyanocobalamin 1000 MICROGram(s) Oral daily  dextrose 40% Gel 15 Gram(s) Oral once  dextrose 5%. 1000 milliLiter(s) IV Continuous <Continuous>  dextrose 5%. 1000 milliLiter(s) IV Continuous <Continuous>  dextrose 50% Injectable 25 Gram(s) IV Push once  enoxaparin Injectable 40 milliGRAM(s) SubCutaneous at bedtime  ferrous    sulfate 325 milliGRAM(s) Oral daily  fludroCORTISONE 0.1 milliGRAM(s) Oral at bedtime  glucagon  Injectable 1 milliGRAM(s) IntraMuscular once  labetalol Injectable 2.5 milliGRAM(s) IV Push every 6 hours PRN  lacosamide Solution 150 milliGRAM(s) Oral every 12 hours  modafinil 100 milliGRAM(s) Oral daily  montelukast 10 milliGRAM(s) Oral daily  nystatin    Suspension 329497 Unit(s) Oral four times a day  pantoprazole   Suspension 40 milliGRAM(s) Oral two times a day  sodium chloride 0.9%. 1000 milliLiter(s) IV Continuous <Continuous>  tiotropium 18 MICROgram(s) Capsule 1 Capsule(s) Inhalation daily      Allergies:No Known Allergies        Labs:                           11.4   7.73  )-----------( 338      ( 04 Jan 2021 06:29 )             36.5       01-04    138  |  98  |  5<L>  ----------------------------<  106<H>  3.3<L>   |  28  |  0.39<L>    Ca    9.0      04 Jan 2021 05:50  Phos  3.4     01-04  Mg     1.8     01-04            Imaging Findings: reviewed

## 2021-01-05 NOTE — CONSULT NOTE ADULT - SUBJECTIVE AND OBJECTIVE BOX
SURGERY CONSULT  ==============================================================================================================  HPI: 75y Female  HPI:  76y/o F with PMHx sig for COPD, asthma, HLD, HTN, BIBEMS to [facility] from home after HHA discovered patient found down in floor covered in non-bloody vomitus. Perr HHA Pt went to the bathroom and was taking a long time, went in to check on her and found her sitting on floor, awake, however with vomitus on front of shirt. On arrival to [facility], patient was taken for stat head CT, which revealed diffuse subarachnoid hemorrhage mainly at basilar cisterns with IVH and obstructive hydrocephalus. Patient was given a bolus of 1g keppra and dilaudid pushes for generalized headache. CTA concerning for 3mm left pcomm aneurysm and a 1.6mm outpouching of distal cavernous segment of the left internal carotid artery likely aneurysm. NIHSS2 HH3 MF4. Patient was transferred to [facility] for further intervention. Patient currently reports headaches. Pt denies acute changes in vision, seizures, CP, SOB, weakness/paresthesias of arms or legs. (09 Dec 2020 23:37)    Patient is now s/p angiogram with aneurysm embolization and  shunt. She has a recent swallow evaluation that clears patient for thick liquids/puree, but per NSG team, her exam fluctuates a lot, and doesn't always tolerate food intake.       PAST MEDICAL & SURGICAL HISTORY:  Hyperlipidemia    Hypertension    COPD (chronic obstructive pulmonary disease)    Asthma    No significant past surgical history      Home Meds: Home Medications:  famotidine 20 mg oral tablet: 1 tab(s) orally once a day (10 Dec 2020 00:38)  lisinopril 2.5 mg oral tablet: 1 tab(s) orally once a day (10 Dec 2020 00:38)  montelukast 10 mg oral tablet: 1 tab(s) orally once a day (10 Dec 2020 00:38)  omeprazole 20 mg oral delayed release capsule: 1 cap(s) orally once a day (12 Dec 2020 16:14)  simvastatin 20 mg oral tablet: 1 tab(s) orally once a day (at bedtime) (10 Dec 2020 00:38)    Allergies: Allergies    No Known Allergies    Intolerances      Soc:   Advanced Directives: Presumed Full Code     CURRENT MEDICATIONS:   --------------------------------------------------------------------------------------  Neurologic Medications  acetaminophen    Suspension .. 650 milliGRAM(s) Oral every 6 hours PRN Temp greater or equal to 38C (100.4F), Mild Pain (1 - 3)  lacosamide Solution 150 milliGRAM(s) Oral every 12 hours  modafinil 100 milliGRAM(s) Oral daily    Respiratory Medications  albuterol/ipratropium for Nebulization. 3 milliLiter(s) Nebulizer every 6 hours PRN Shortness of Breath  montelukast 10 milliGRAM(s) Oral daily  tiotropium 18 MICROgram(s) Capsule 1 Capsule(s) Inhalation daily    Cardiovascular Medications  amLODIPine   Tablet 5 milliGRAM(s) Oral once  amLODIPine   Tablet 5 milliGRAM(s) Oral every 24 hours  labetalol Injectable 2.5 milliGRAM(s) IV Push every 6 hours PRN Systolic blood pressure > 220    Gastrointestinal Medications  cyanocobalamin 1000 MICROGram(s) Oral daily  dextrose 5%. 1000 milliLiter(s) IV Continuous <Continuous>  dextrose 5%. 1000 milliLiter(s) IV Continuous <Continuous>  ferrous    sulfate 325 milliGRAM(s) Oral daily  pantoprazole   Suspension 40 milliGRAM(s) Oral two times a day  sodium chloride 0.9%. 1000 milliLiter(s) IV Continuous <Continuous>    Genitourinary Medications    Hematologic/Oncologic Medications  enoxaparin Injectable 40 milliGRAM(s) SubCutaneous at bedtime    Antimicrobial/Immunologic Medications  nystatin    Suspension [id number] Unit(s) Oral four times a day    Endocrine/Metabolic Medications  atorvastatin 40 milliGRAM(s) Oral at bedtime  dextrose 40% Gel 15 Gram(s) Oral once  dextrose 50% Injectable 25 Gram(s) IV Push once  fludroCORTISONE 0.1 milliGRAM(s) Oral at bedtime  glucagon  Injectable 1 milliGRAM(s) IntraMuscular once    Topical/Other Medications  chlorhexidine 0.12% Liquid 15 milliLiter(s) Oral Mucosa two times a day  chlorhexidine 2% Cloths 1 Application(s) Topical <User Schedule>    --------------------------------------------------------------------------------------    VITAL SIGNS, INS/OUTS (last 24 hours):  --------------------------------------------------------------------------------------  ICU Vital Signs Last 24 Hrs  T(C): 36.3 (05 Jan 2021 14:12), Max: 36.7 (05 Jan 2021 09:10)  T(F): 97.4 (05 Jan 2021 14:12), Max: 98 (05 Jan 2021 09:10)  HR: 82 (05 Jan 2021 17:00) (74 - 98)  BP: 193/71 (05 Jan 2021 17:00) (148/67 - 211/79)  BP(mean): 102 (05 Jan 2021 17:00) (96 - 123)  ABP: --  ABP(mean): --  RR: 25 (05 Jan 2021 17:00) (13 - 28)  SpO2: 95% (05 Jan 2021 17:00) (90% - 98%)    I&O's Summary    04 Jan 2021 07:01  -  05 Jan 2021 07:00  --------------------------------------------------------  IN: 1040 mL / OUT: 2200 mL / NET: -1160 mL    05 Jan 2021 07:01  -  05 Jan 2021 18:09  --------------------------------------------------------  IN: 805 mL / OUT: 550 mL / NET: 255 mL      --------------------------------------------------------------------------------------    EXAM:  General: NAD, lying in bed     Neuro: A&Ox2, answers some questions, EOMI     HEENT: NC, staples on head from  shunt, no dentures     Resp: Non-labored breathing on RA     CV: sinus rhythm     Abdomen: soft, non-distended, non-tender, staples on RUQ from  shunt, c/d/i     Extremities: warm, no edema, SCDs in place, R groin staples in place, incision c/d/i       LABS  --------------------------------------------------------------------------------------  Labs:  CAPILLARY BLOOD GLUCOSE                              11.4   7.73  )-----------( 338      ( 04 Jan 2021 06:29 )             36.5         01-04    138  |  98  |  5<L>  ----------------------------<  106<H>  3.3<L>   |  28  |  0.39<L>          LFTs:       ABG - ( 30 Dec 2020 16:01 )  pH: 7.42  /  pCO2: 38    /  pO2: 330   / HCO3: 24    / Base Excess: x     /  SaO2: x                 Coags:                  --------------------------------------------------------------------------------------            ASSESSMENT/ PLAN:  75y Female  PMHx sig for COPD, asthma, HLD, HTN, with SAH, IVH, obstructive hydrocephalus, s/p angiogram, aneurysm embolization,  shunt placement, on TF for 3 weeks with fluctuating exam. general surgery consulted for PEG tube placement. Dr. Frye had direct communication with GI who will re-assess the patient for PEG tube, in an attempt to avoid a laparoscopic or open procedure at the time.    Please reconsult General Surgery as needed  Attending aware and agrees with plan

## 2021-01-05 NOTE — CHART NOTE - NSCHARTNOTEFT_GEN_A_CORE
Admitting Diagnosis:   Patient is a 75y old  Female who presents with a chief complaint of SAH (05 Jan 2021 07:28)    PAST MEDICAL & SURGICAL HISTORY:  Hyperlipidemia    Hypertension    COPD (chronic obstructive pulmonary disease)    Asthma    No significant past surgical history    Current Nutrition Order:   Diet, NPO after Midnight:      NPO Start Date: 04-Jan-2021,   NPO Start Time: 23:59  Except Medications (01-04-21 @ 22:10)    PO Intake: Good (%) [   ]  Fair (50-75%) [   ] Poor (<25%) [   ]- N/A    GI Issues: no N/V noted, +rectal tube (+liquid output per RN, amount in the last 24h unclear)    Pain: none apparent at this time    Skin Integrity: Greg score 13; 1+ generalized edema documented on 1/3    Labs:   01-04    138  |  98  |  5<L>  ----------------------------<  106<H>  3.3<L>   |  28  |  0.39<L>    Ca    9.0      04 Jan 2021 05:50  Phos  3.4     01-04  Mg     1.8     01-04    CAPILLARY BLOOD GLUCOSE  POCT Blood Glucose.: 116 mg/dL (04 Jan 2021 11:25)    Medications:  MEDICATIONS  (STANDING):  amLODIPine   Tablet 5 milliGRAM(s) Oral every 24 hours  atorvastatin 40 milliGRAM(s) Oral at bedtime  chlorhexidine 0.12% Liquid 15 milliLiter(s) Oral Mucosa two times a day  chlorhexidine 2% Cloths 1 Application(s) Topical <User Schedule>  cyanocobalamin 1000 MICROGram(s) Oral daily  dextrose 40% Gel 15 Gram(s) Oral once  dextrose 5%. 1000 milliLiter(s) (50 mL/Hr) IV Continuous <Continuous>  dextrose 5%. 1000 milliLiter(s) (100 mL/Hr) IV Continuous <Continuous>  dextrose 50% Injectable 25 Gram(s) IV Push once  enoxaparin Injectable 40 milliGRAM(s) SubCutaneous at bedtime  ferrous    sulfate 325 milliGRAM(s) Oral daily  fludroCORTISONE 0.1 milliGRAM(s) Oral at bedtime  glucagon  Injectable 1 milliGRAM(s) IntraMuscular once  lacosamide Solution 150 milliGRAM(s) Oral every 12 hours  modafinil 100 milliGRAM(s) Oral daily  montelukast 10 milliGRAM(s) Oral daily  nystatin    Suspension [id number] Unit(s) Oral four times a day  pantoprazole   Suspension 40 milliGRAM(s) Oral two times a day  potassium chloride   Solution 40 milliEquivalent(s) Oral every 2 hours  sodium chloride 0.9%. 1000 milliLiter(s) (50 mL/Hr) IV Continuous <Continuous>  tiotropium 18 MICROgram(s) Capsule 1 Capsule(s) Inhalation daily    MEDICATIONS  (PRN):  acetaminophen    Suspension .. 650 milliGRAM(s) Oral every 6 hours PRN Temp greater or equal to 38C (100.4F), Mild Pain (1 - 3)  albuterol/ipratropium for Nebulization. 3 milliLiter(s) Nebulizer every 6 hours PRN Shortness of Breath  labetalol Injectable 2.5 milliGRAM(s) IV Push every 6 hours PRN Systolic blood pressure > 220    Anthropometric Measurements:   Height 4'11'' IBW 98 pounds +-10%  Weight 110 pounds  BMI 22.2, %AUW=276    Weight:  49.4kg (12/9)  49.9kg (12/30)    Weight Change: no updated weights, please obtain current weight and trend bi-weekly weights    Nutrition Focused Physical Exam: Completed [   ]  Not Pertinent [ X ]    Estimated Energy Needs:  ABW used to calculate energy needs due to pt's current body weight within % IBW (112%)  Needs adjusted for age, post-op. Fluid needs per team.    · Weight (lbs)	110 lb  · Weight (kg)	49.8 kg  · Enter From (mulu/kg)	25  · Enter To (mulu/kg)	30  · Calculated From (mulu/kg)	1245  · Calculated To (mulu/kg)	1494     Estimated Protein Needs:  · Weight (lbs)	110 lb  · Weight (kg)	49.8 kg  · Enter From (g/kg)	1.2  · Enter To (g/kg)	1.4  · Calculated From (g/kg)	59.76  · Calculated To (g/kg)	69.72    Subjective: 76 yo Female with PMHx of COPD, asthma, HLD, HTN, BIBEMS to [facility] from home after HHA found pt down on the floor covered in non-bloody vomitus. CTH reveals diffuse subarachnoid hemorrhage mainly at basilar cisterns with IVH and obstructive hydrocephalus, CTA shows 3mm left pcomm aneurysm, now s/p bedside right frontal EVD placement 12/10, s/p cerebral angiogram for coil embolization of left PCOMM aneurysm 12/10, now s/p cerebral angio for verapamil c/b device malfunction of Proglide, s/p right groin cutdown for removal of proglide catheter and femoral artery repair 12/17, NIHSS2 HH3 MF4. PT underwent LP for CSF high volume tap 12/28. EVD Re-insertion 12/29 (had been removed 12/25). CSF NGTD 12/29. EVD clamped 12/30. Ventriculoperitoneal shunt placed 12/30. Pt placed on droplet precautions for exposure on 12/24.    NGT placed for medications and TF 12/16 2/2 failed RN dysphagia screen. SLP has been following during admission, most recent SLP visit 1/2 for Puree/Thin Liquid. Pt NPO at this time for PEG placement today. Post PEG placement, recommend change TF regimen to Jevity 1.2 as pt having loose output. Please see below for full nutritional recommendations- d/w team. RD to monitor and f/u per protocol.    Previous Nutrition Diagnosis:  Increased nutrient need RT increased kcal/protein demand AEB post-op  Active [ X  ]  Resolved [   ]    Goal: Meet >/=75%EER via most appropriate route with good tolerance    Recommendations:  1. As medically appropriate, recommend Jevity 1.2 start at 20mL/hr increase to goal of 50mL/hr x24h via NGT (1200mL TV, 1440 kcal, 66gm protein, 968mL free water, 120% RDI, ~29 kcal/kg ABW, ~1.3gm protein/kg ABW)  >>order for volume based feeding protocol, monitor for s/s intolerance, maintain aspiration precautions at all times.  2. As medically appropriate/mental status, resume diet recommended per SLP  >>if intake increases, assess need to change TF regimen (please consult nutrition as needed)  3. Monitor labs (BMP, lytes, [id number]); replete lytes prn  4. Trend bi-weekly weights    Education: N/A 2/2 clinical status    Risk Level: High [ X ] Moderate [   ] Low [   ]

## 2021-01-05 NOTE — PROGRESS NOTE ADULT - SUBJECTIVE AND OBJECTIVE BOX
=================================  NEUROCRITICAL CARE ATTENDING NOTE  =================================    CARA CANCHOLA   MRN-[id number]  Summary:  75y/F with COPD, asthma, dyslipidemia Hypertension found down.  Brought to [facility] where imaging showed SAH basilar cisterns with IVH and obstructive hydrocephalus L Pcomm aneurysm.  Given levetiracetam, hydromorphone.  NIHSS 2 HH3 MF4, transferred to [facility] for further management.      COURSE IN THE HOSPITAL:   admitted to [facility], EVD inserted; given 20 lasix for pulmo edema, T inversion on CT  12/10 No significant events overnight.    No significant events overnight.    No significant events overnight.    No significant events overnight. drain stopped working at 730 a.m., off levophed    No significant events overnight.   12/15 confusion   stepped down   readmitted to ICU    more confused somnolent overnight, high volume LP - 30cc removed, CT scan, EVD inserted, febrile pancultured, UA (+) rocephin started   Tmax 38.6  EVD clamped this morning, given 1L fluid bolus overnight, s/p VPS   Tmax 38.2    Tmax 39 500cc bolus overnight x2, pancultured for fever   Tmax 38.1 multiple bowel movements overnight   Tmax 37.8 wheezing overnight, given nebs  /   VIVIEN     Overnight Events:       PHYSICAL EXAMINATION  T(C): 36.6 ( @ 06:11), Max: 36.6 ( @ 14:34)  HR: 80 ( @ 05:00) (69 - 98)  BP: 200/80 ( @ 05:00) (95/58 - 200/80)  RR: 15 ( @ 05:00) (13 - 31)  SpO2: 95% ( @ 05:00) (91% - 96%)  CVP(mm Hg): --    LABS:  CAPILLARY BLOOD GLUCOSE      POCT Blood Glucose.: 116 mg/dL (2021 11:25)                          11.4   7.73  )-----------( 338      ( 2021 06:29 )             36.5         138  |  98  |  5<L>  ----------------------------<  106<H>  3.3<L>   |  28  |  0.39<L>    Ca    9.0      2021 05:50  Phos  3.4       Mg     1.8      @ 07:01  -   @ 07:00  --------------------------------------------------------  IN: 550 mL / OUT: 1500 mL / NET: -950 mL        MEDICATIONS:  MEDICATIONS  (STANDING):  amLODIPine   Tablet 2.5 milliGRAM(s) Oral every 24 hours  atorvastatin 40 milliGRAM(s) Oral at bedtime  chlorhexidine 0.12% Liquid 15 milliLiter(s) Oral Mucosa two times a day  chlorhexidine 2% Cloths 1 Application(s) Topical <User Schedule>  cyanocobalamin 1000 MICROGram(s) Oral daily  dextrose 40% Gel 15 Gram(s) Oral once  dextrose 5%. 1000 milliLiter(s) (50 mL/Hr) IV Continuous <Continuous>  dextrose 5%. 1000 milliLiter(s) (100 mL/Hr) IV Continuous <Continuous>  dextrose 50% Injectable 25 Gram(s) IV Push once  enoxaparin Injectable 40 milliGRAM(s) SubCutaneous at bedtime  ferrous    sulfate 325 milliGRAM(s) Oral daily  fludroCORTISONE 0.2 milliGRAM(s) Oral at bedtime  glucagon  Injectable 1 milliGRAM(s) IntraMuscular once  lacosamide Solution 150 milliGRAM(s) Oral every 12 hours  modafinil 100 milliGRAM(s) Oral daily  montelukast 10 milliGRAM(s) Oral daily  nystatin    Suspension [id number] Unit(s) Oral four times a day  pantoprazole   Suspension 40 milliGRAM(s) Oral two times a day  piperacillin/tazobactam IVPB.. 3.375 Gram(s) IV Intermittent every 6 hours  sodium chloride 3 Gram(s) Oral every 6 hours  sodium chloride 0.9%. 1000 milliLiter(s) (50 mL/Hr) IV Continuous <Continuous>  tiotropium 18 MICROgram(s) Capsule 1 Capsule(s) Inhalation daily    MEDICATIONS  (PRN):  acetaminophen    Suspension .. 650 milliGRAM(s) Oral every 6 hours PRN Temp greater or equal to 38C (100.4F), Mild Pain (1 - 3)  albuterol/ipratropium for Nebulization. 3 milliLiter(s) Nebulizer every 6 hours PRN Shortness of Breath  labetalol Injectable 2.5 milliGRAM(s) IV Push every 6 hours PRN Systolic blood pressure > 220        Past Medical History: Hyperlipidemia Hypertension COPD (chronic obstructive pulmonary disease) Asthma  Allergies:  No Known Allergies  Home meds:   ·	famotidine 20 mg oral tablet: 1 tab(s) orally once a day  ·	lisinopril 2.5 mg oral tablet: 1 tab(s) orally once a day  ·	montelukast 10 mg oral tablet: 1 tab(s) orally once a day  ·	predniSONE 50 mg oral tablet: 1 tab(s) orally once a day  ·	ProAir HFA CFC free 90 mcg/inh inhalation aerosol: 2 puff(s) inhaled 4 times a day PRN  ·	simvastatin 20 mg oral tablet: 1 tab(s) orally once a day (at bedtime)    PHYSICAL EXAMINATION    NEUROLOGIC EXAMINATION:  Patient is awake, oriented x 2, CLEOPATRA, following commands, moving all 4s, R shoulder movement pain limited    CARDIOVASCULAR: (+) S1 S2, normal rate and regular rhythm  PULMONARY: clear to auscultation bilaterally  ABDOMEN: soft, nontender with normoactive bowel sounds  EXTREMITIES: no edema  SKIN: no rash      HbA1C = 6.7 (12-10) 6.4 ()  LDL = 112 ()   HDL = 66 ()  TG = 114 ()   TSH = 0.990 ()    Bacteriology:  UA (+) LE (+) N   Blood CS NG2D x2    CSF NGTD   Urine Enterobacter E coli   Blood culture NG5D (final)   CSF NGTD    Blood NG5D x2    CSF studies:    L   *** RBC58 WBC9 *** %N3 %L5     L   *** [id number] WBC20 *** %N10 %L8     L   *** [id number] WBC24 *** %N7 %L13     EEG:  Neuroimagin/10 CT head:  EVD placement, stable   CTA:  L pcomm aneurysm, L ICA (distal cavernous) aneurysm vs infundibulum, extensive calcified plaque cavernous bilateral ICA with mild to mod stenosis; thick plaque bilateral carotid bifurcations - mod to severe R and mild to mod L stenosis, focal calcified plaque R VA off subclavian artery - high grate stenosis / partial occlusion, upper lung bilateral opacification - pulmo edema, chronic deformity R humeral neck   CT head:  SAH, basilar cisterns, IV extension, obstructive hydrocephalus SVID, lacunar infarcts R caudate, both thalami, cerebellar hemispheres, no CT evidence of territorial infarction  Other imagin/28 LE Doppler: NEG   LE Doppler: NEG   CT abd:  small paraesophageal hiatal hernia, gastritis; ?impaction, septal thickening bilateral lower lobes ?pulmo edema, hepatic steatosis      IV FLUIDS: IVL  DRIPS:  DIET: Glucerna  Lines:   Drains: rectal tube     EVD at 5cm H20   EVD at 5cm H20 ICP < 10   EVD at 5cm H20 ICP <10   EVD clamped

## 2021-01-05 NOTE — PROGRESS NOTE ADULT - ASSESSMENT
75y/Female with:  aneursymal subarachnoid hemorrhage, L pcomm aneurysm, L parophthalmic aneurysm; brain compression, cerebral edema  osbtructive hydrocephalus  lacunar infarcts R caudate, B thalami, cerebellar hemispheres ?artery to artery vs cardioembolic?  atherosclerotic cardiovascular disease; mod to severe bilateral ICA stenosis (R>L), R VA stenosis  Hypertension dyslipidemia   COPD asthma  newly diagnosed Diabetes Mellitus?  troponin leak    PLAN: Day 1 = 12-09 ; Day 4 =  12/12; Day 21 = 12/29  NEURO: neurochecks q 4 h, VS q 4, PRN pain meds with Tylenol   SAH:  off nimodipine   bilateral ICA stenosis - start ASA if ok with NS  Hydrocephalus:  s/p VPS   Seizure treatment (cortical ICH, associated SDH, unclear etiology):  lacosamide 150mg PO BID   REHAB:  physical therapy evaluation and management    EARLY MOB:  OOB to chair    PULM:  Room air, incentive spirometry, continue montelukast, ipratropium / albuterol PRN, tiotropium daily     CARDIO:  SBP goal 120-200mm Hg, TTE EF 70%; d/c midodrine    ENDO:  Blood sugar goals 140-180 mg/dL, d/c insulin sliding scale, atorvastatin 40mg    GI:  BID protonix for GIB; PEG tube (pending IR)    DIET: add ensure    RENAL: maintain euvolemia, accurate Is and Os; cont salt tabs and fludrocortisone; straight q6h, Na goal 135-145    HEM/ONC: Hb stable    VTE Prophylaxis: SCDs, SQL; R UE cephalic vein thrombosis - repeat UE doppler 01/06    ID: afebrile, no leukocytosis,  piperacillin/tazobactam (last day 01/05/2021)    Social: will update family    CORE MEASURES  1.  Nath and Jacobo Score = 3  2.  VTE prophylaxis:  [ ] administered within 24 hours of admission OR [X] reason not done: fresh bleed, unsecured aneurysm.  3.  Dysphagia screening:   [X] performed before any oral meds / liquids / food  4.  Nimodipine treatment:  [X] administered within 24 hours of admission OR [ ] reason not done:    ATTENDING ATTESTATION:  I was physically present for the key portions of the evaluation and management (E/M) service provided.  I agree with the above history, physical and plan, which I have reviewed and edited where appropriate.    Patient at high risk for neurological deterioration or death due to:  ICU delirium, aspiration PNA, DVT / PE.  Critical care time:  I have personally provided 30 minutes of critical care time, excluding time spent on separate procedures.      Plan discussed with RN, house staff.    Discharge Checklist:    Completed: 01-03 @ 09:27    [X] hemodynamically stable – VS WNL and stable x 24hours, UO adequate  [n/a ] if  previously on HDA - off pressors x 24h with stable neuro exam  --> bilateral ICA stenosis, starting ASA today; will keep in ICU x 24h  [X] no new symptoms x 24h (i.e. new fever, new-onset nausea/vomiting)  [X] stable labs: (i.e. WBC not rising, sodium not dropping)  [n/a] if high aspiration risk (modified MASA score):                  [ ] should have definitive plans for trach/PEG (alternative option is to discharge from ICU to facilty)                  [ ] stepdown to bed close to nurse’s station  [n/a] low suctioning requirements (i.e. q4h or less)  [X] sign-off from primary RN*  [X] drains do not require ICU level of care  [X] if patient previously agitated or with behavioral issues – controlled   [X] pain controlled     75y/Female with:  aneursymal subarachnoid hemorrhage, L pcomm aneurysm, L parophthalmic aneurysm; brain compression, cerebral edema  osbtructive hydrocephalus  lacunar infarcts R caudate, B thalami, cerebellar hemispheres ?artery to artery vs cardioembolic?  atherosclerotic cardiovascular disease; mod to severe bilateral ICA stenosis (R>L), R VA stenosis  Hypertension dyslipidemia   COPD asthma  newly diagnosed Diabetes Mellitus?  troponin leak    PLAN: Day 1 = 12-09 ; Day 4 =  12/12; Day 21 = 12/29  NEURO: neurochecks q 4 h, VS q 4, PRN pain meds with Tylenol   SAH:  off nimodipine   bilateral ICA stenosis - start ASA if ok with NS  Hydrocephalus:  s/p VPS   Seizure treatment (cortical ICH, associated SDH, unclear etiology):  lacosamide 150mg PO BID   REHAB:  physical therapy evaluation and management    EARLY MOB:  OOB to chair    PULM:  Room air, incentive spirometry, continue montelukast, ipratropium / albuterol PRN, tiotropium daily     CARDIO:  SBP goal 120-200mm Hg, TTE EF 70%; d/c midodrine    ENDO:  Blood sugar goals 140-180 mg/dL, d/c insulin sliding scale, atorvastatin 40mg    GI:  BID protonix for GIB; PEG tube (pending IR)    DIET: add ensure    RENAL: maintain euvolemia, accurate Is and Os; cont salt tabs and fludrocortisone; straight q6h, Na goal 135-145    HEM/ONC: Hb stable    VTE Prophylaxis: SCDs, SQL; R UE cephalic vein thrombosis - repeat UE doppler 01/06    ID: afebrile, no leukocytosis,  piperacillin/tazobactam (last day 01/05/2021)    Social: will update family    *****    CORE MEASURES  1.  Nath and Jacobo Score = 3  2.  VTE prophylaxis:  [ ] administered within 24 hours of admission OR [X] reason not done: fresh bleed, unsecured aneurysm.  3.  Dysphagia screening:   [X] performed before any oral meds / liquids / food  4.  Nimodipine treatment:  [X] administered within 24 hours of admission OR [ ] reason not done:    *****    ATTENDING ATTESTATION:  I was physically present for the key portions of the evaluation and management (E/M) service provided.  I agree with the above history, physical and plan, which I have reviewed and edited where appropriate.    Patient at high risk for neurological deterioration or death due to:  ICU delirium, aspiration PNA, DVT / PE.  Critical care time:  I have personally provided 30 minutes of critical care time, excluding time spent on separate procedures.      Plan discussed with RN, house staff.    *****    Discharge Checklist:    Completed: 01-03 @ 09:27    [X] hemodynamically stable – VS WNL and stable x 24hours, UO adequate  [n/a ] if  previously on HDA - off pressors x 24h with stable neuro exam  --> bilateral ICA stenosis, starting ASA today; will keep in ICU x 24h  [X] no new symptoms x 24h (i.e. new fever, new-onset nausea/vomiting)  [X] stable labs: (i.e. WBC not rising, sodium not dropping)  [n/a] if high aspiration risk (modified MASA score):                  [ ] should have definitive plans for trach/PEG (alternative option is to discharge from ICU to facilty)                  [ ] stepdown to bed close to nurse’s station  [n/a] low suctioning requirements (i.e. q4h or less)  [X] sign-off from primary RN*  [X] drains do not require ICU level of care  [X] if patient previously agitated or with behavioral issues – controlled   [X] pain controlled

## 2021-01-06 LAB
ANION GAP SERPL CALC-SCNC: 11 MMOL/L — SIGNIFICANT CHANGE UP (ref 5–17)
ANION GAP SERPL CALC-SCNC: 12 MMOL/L — SIGNIFICANT CHANGE UP (ref 5–17)
BUN SERPL-MCNC: 14 MG/DL — SIGNIFICANT CHANGE UP (ref 7–23)
BUN SERPL-MCNC: 18 MG/DL — SIGNIFICANT CHANGE UP (ref 7–23)
CALCIUM SERPL-MCNC: 8.8 MG/DL — SIGNIFICANT CHANGE UP (ref 8.4–10.5)
CALCIUM SERPL-MCNC: 8.9 MG/DL — SIGNIFICANT CHANGE UP (ref 8.4–10.5)
CHLORIDE SERPL-SCNC: 97 MMOL/L — SIGNIFICANT CHANGE UP (ref 96–108)
CHLORIDE SERPL-SCNC: 98 MMOL/L — SIGNIFICANT CHANGE UP (ref 96–108)
CO2 SERPL-SCNC: 22 MMOL/L — SIGNIFICANT CHANGE UP (ref 22–31)
CO2 SERPL-SCNC: 25 MMOL/L — SIGNIFICANT CHANGE UP (ref 22–31)
CREAT SERPL-MCNC: 0.4 MG/DL — LOW (ref 0.5–1.3)
CREAT SERPL-MCNC: 0.41 MG/DL — LOW (ref 0.5–1.3)
CULTURE RESULTS: NO GROWTH — SIGNIFICANT CHANGE UP
GLUCOSE SERPL-MCNC: 141 MG/DL — HIGH (ref 70–99)
GLUCOSE SERPL-MCNC: 157 MG/DL — HIGH (ref 70–99)
HCT VFR BLD CALC: 35.7 % — SIGNIFICANT CHANGE UP (ref 34.5–45)
HGB BLD-MCNC: 11.3 G/DL — LOW (ref 11.5–15.5)
MAGNESIUM SERPL-MCNC: 2 MG/DL — SIGNIFICANT CHANGE UP (ref 1.6–2.6)
MCHC RBC-ENTMCNC: 28.9 PG — SIGNIFICANT CHANGE UP (ref 27–34)
MCHC RBC-ENTMCNC: 31.7 GM/DL — LOW (ref 32–36)
MCV RBC AUTO: 91.3 FL — SIGNIFICANT CHANGE UP (ref 80–100)
NRBC # BLD: 0 /100 WBCS — SIGNIFICANT CHANGE UP (ref 0–0)
PHOSPHATE SERPL-MCNC: 2.7 MG/DL — SIGNIFICANT CHANGE UP (ref 2.5–4.5)
PLATELET # BLD AUTO: 318 K/UL — SIGNIFICANT CHANGE UP (ref 150–400)
POTASSIUM SERPL-MCNC: 3.9 MMOL/L — SIGNIFICANT CHANGE UP (ref 3.5–5.3)
POTASSIUM SERPL-MCNC: 4.7 MMOL/L — SIGNIFICANT CHANGE UP (ref 3.5–5.3)
POTASSIUM SERPL-SCNC: 3.9 MMOL/L — SIGNIFICANT CHANGE UP (ref 3.5–5.3)
POTASSIUM SERPL-SCNC: 4.7 MMOL/L — SIGNIFICANT CHANGE UP (ref 3.5–5.3)
RBC # BLD: 3.91 M/UL — SIGNIFICANT CHANGE UP (ref 3.8–5.2)
RBC # FLD: 17.2 % — HIGH (ref 10.3–14.5)
SARS-COV-2 RNA SPEC QL NAA+PROBE: SIGNIFICANT CHANGE UP
SODIUM SERPL-SCNC: 132 MMOL/L — LOW (ref 135–145)
SODIUM SERPL-SCNC: 133 MMOL/L — LOW (ref 135–145)
SPECIMEN SOURCE: SIGNIFICANT CHANGE UP
WBC # BLD: 8.97 K/UL — SIGNIFICANT CHANGE UP (ref 3.8–10.5)
WBC # FLD AUTO: 8.97 K/UL — SIGNIFICANT CHANGE UP (ref 3.8–10.5)

## 2021-01-06 PROCEDURE — 99024 POSTOP FOLLOW-UP VISIT: CPT

## 2021-01-06 PROCEDURE — 99233 SBSQ HOSP IP/OBS HIGH 50: CPT

## 2021-01-06 PROCEDURE — ZZZZZ: CPT

## 2021-01-06 RX ORDER — SODIUM CHLORIDE 9 MG/ML
1000 INJECTION INTRAMUSCULAR; INTRAVENOUS; SUBCUTANEOUS
Refills: 0 | Status: DISCONTINUED | OUTPATIENT
Start: 2021-01-06 | End: 2021-01-08

## 2021-01-06 RX ORDER — POTASSIUM CHLORIDE 20 MEQ
10 PACKET (EA) ORAL ONCE
Refills: 0 | Status: COMPLETED | OUTPATIENT
Start: 2021-01-06 | End: 2021-01-06

## 2021-01-06 RX ADMIN — CHLORHEXIDINE GLUCONATE 1 APPLICATION(S): 213 SOLUTION TOPICAL at 06:16

## 2021-01-06 RX ADMIN — LACOSAMIDE 150 MILLIGRAM(S): 50 TABLET ORAL at 18:43

## 2021-01-06 RX ADMIN — Medication 10 MILLIEQUIVALENT(S): at 07:01

## 2021-01-06 RX ADMIN — TIOTROPIUM BROMIDE 1 CAPSULE(S): 18 CAPSULE ORAL; RESPIRATORY (INHALATION) at 15:06

## 2021-01-06 RX ADMIN — ATORVASTATIN CALCIUM 40 MILLIGRAM(S): 80 TABLET, FILM COATED ORAL at 22:58

## 2021-01-06 RX ADMIN — LACOSAMIDE 150 MILLIGRAM(S): 50 TABLET ORAL at 06:15

## 2021-01-06 RX ADMIN — CHLORHEXIDINE GLUCONATE 15 MILLILITER(S): 213 SOLUTION TOPICAL at 06:15

## 2021-01-06 RX ADMIN — Medication 500000 UNIT(S): at 12:27

## 2021-01-06 RX ADMIN — CHLORHEXIDINE GLUCONATE 15 MILLILITER(S): 213 SOLUTION TOPICAL at 18:35

## 2021-01-06 RX ADMIN — Medication 500000 UNIT(S): at 02:04

## 2021-01-06 RX ADMIN — AMLODIPINE BESYLATE 10 MILLIGRAM(S): 2.5 TABLET ORAL at 12:27

## 2021-01-06 RX ADMIN — FLUDROCORTISONE ACETATE 0.1 MILLIGRAM(S): 0.1 TABLET ORAL at 22:58

## 2021-01-06 RX ADMIN — PANTOPRAZOLE SODIUM 40 MILLIGRAM(S): 20 TABLET, DELAYED RELEASE ORAL at 06:16

## 2021-01-06 RX ADMIN — PANTOPRAZOLE SODIUM 40 MILLIGRAM(S): 20 TABLET, DELAYED RELEASE ORAL at 18:35

## 2021-01-06 RX ADMIN — Medication 500000 UNIT(S): at 18:35

## 2021-01-06 RX ADMIN — Medication 325 MILLIGRAM(S): at 12:27

## 2021-01-06 RX ADMIN — PREGABALIN 1000 MICROGRAM(S): 225 CAPSULE ORAL at 12:27

## 2021-01-06 RX ADMIN — Medication 500000 UNIT(S): at 23:01

## 2021-01-06 RX ADMIN — MONTELUKAST 10 MILLIGRAM(S): 4 TABLET, CHEWABLE ORAL at 12:28

## 2021-01-06 RX ADMIN — MODAFINIL 100 MILLIGRAM(S): 200 TABLET ORAL at 12:27

## 2021-01-06 RX ADMIN — Medication 500000 UNIT(S): at 06:16

## 2021-01-06 NOTE — PROGRESS NOTE ADULT - SUBJECTIVE AND OBJECTIVE BOX
=================================  NEUROCRITICAL CARE ATTENDING NOTE  =================================    CARA CANCHOLA   MRN-[id number]  Summary:  75y/F with COPD, asthma, dyslipidemia Hypertension found down.  Brought to [facility] [place] where imaging showed SAH basilar cisterns with IVH and obstructive hydrocephalus L Pcomm aneurysm.  Given levetiracetam, hydromorphone.  NIHSS 2 HH3 MF4, transferred to [facility] for further management.      COURSE IN THE HOSPITAL:   admitted to [facility], EVD inserted; given 20 lasix for pulmo edema, T inversion on CT  12/10 No significant events overnight.    No significant events overnight.    No significant events overnight.    No significant events overnight. drain stopped working at 730 a.m., off levophed    No significant events overnight.   12/15 confusion   stepped down   readmitted to ICU    more confused somnolent overnight, high volume LP - 30cc removed, CT scan, EVD inserted, febrile pancultured, UA (+) rocephin started   Tmax 38.6  EVD clamped this morning, given 1L fluid bolus overnight, s/p VPS   Tmax 38.2    Tmax 39 500cc bolus overnight x2, pancultured for fever   Tmax 38.1 multiple bowel movements overnight   Tmax 37.8 wheezing overnight, given nebs     VIVIEN     Overnight Events:       PHYSICAL EXAMINATION  T(C): 36.9 ( @ 06:26), Max: 36.9 ( @ 06:26)  HR: 72 ( @ 07:00) (72 - 97)  BP: 153/66 ( @ 07:00) (113/57 - 211/79)  RR: 21 ( @ 07:00) (19 - 28)  SpO2: 100% ( @ 07:00) (90% - 100%)  CVP(mm Hg): --    LABS:  CAPILLARY BLOOD GLUCOSE                              11.3   8.97  )-----------( 318      ( 2021 06:01 )             35.7     01-06    133<L>  |  97  |  14  ----------------------------<  141<H>  3.9   |  25  |  0.40<L>    Ca    8.9      2021 06:01  Phos  2.7     -  Mg     2.0     -05 @ 07:01  -  01-06 @ 07:00  --------------------------------------------------------  IN: 1585 mL / OUT: 1750 mL / NET: -165 mL        MEDICATIONS:  MEDICATIONS  (STANDING):  amLODIPine   Tablet 10 milliGRAM(s) Oral every 24 hours  atorvastatin 40 milliGRAM(s) Oral at bedtime  chlorhexidine 0.12% Liquid 15 milliLiter(s) Oral Mucosa two times a day  chlorhexidine 2% Cloths 1 Application(s) Topical <User Schedule>  cyanocobalamin 1000 MICROGram(s) Oral daily  dextrose 40% Gel 15 Gram(s) Oral once  dextrose 5%. 1000 milliLiter(s) (50 mL/Hr) IV Continuous <Continuous>  dextrose 5%. 1000 milliLiter(s) (100 mL/Hr) IV Continuous <Continuous>  dextrose 50% Injectable 25 Gram(s) IV Push once  enoxaparin Injectable 40 milliGRAM(s) SubCutaneous at bedtime  ferrous    sulfate 325 milliGRAM(s) Oral daily  fludroCORTISONE 0.1 milliGRAM(s) Oral at bedtime  glucagon  Injectable 1 milliGRAM(s) IntraMuscular once  lacosamide Solution 150 milliGRAM(s) Oral every 12 hours  modafinil 100 milliGRAM(s) Oral daily  montelukast 10 milliGRAM(s) Oral daily  nystatin    Suspension 559412 Unit(s) Oral four times a day  pantoprazole   Suspension 40 milliGRAM(s) Oral two times a day  tiotropium 18 MICROgram(s) Capsule 1 Capsule(s) Inhalation daily    MEDICATIONS  (PRN):  acetaminophen    Suspension .. 650 milliGRAM(s) Oral every 6 hours PRN Temp greater or equal to 38C (100.4F), Mild Pain (1 - 3)  albuterol/ipratropium for Nebulization. 3 milliLiter(s) Nebulizer every 6 hours PRN Shortness of Breath  labetalol Injectable 2.5 milliGRAM(s) IV Push every 6 hours PRN Systolic blood pressure > 220          Past Medical History: Hyperlipidemia Hypertension COPD (chronic obstructive pulmonary disease) Asthma  Allergies:  No Known Allergies  Home meds:   ·	famotidine 20 mg oral tablet: 1 tab(s) orally once a day  ·	lisinopril 2.5 mg oral tablet: 1 tab(s) orally once a day  ·	montelukast 10 mg oral tablet: 1 tab(s) orally once a day  ·	predniSONE 50 mg oral tablet: 1 tab(s) orally once a day  ·	ProAir HFA CFC free 90 mcg/inh inhalation aerosol: 2 puff(s) inhaled 4 times a day PRN  ·	simvastatin 20 mg oral tablet: 1 tab(s) orally once a day (at bedtime)    PHYSICAL EXAMINATION    NEUROLOGIC EXAMINATION:  Patient is awake, oriented x 2, CLEOPATRA, following commands, moving all 4s, R shoulder movement pain limited    CARDIOVASCULAR: (+) S1 S2, normal rate and regular rhythm  PULMONARY: clear to auscultation bilaterally  ABDOMEN: soft, nontender with normoactive bowel sounds  EXTREMITIES: no edema  SKIN: no rash      HbA1C = 6.7 (12-10) 6.4 ()  LDL = 112 ()   HDL = 66 ()  TG = 114 ()   TSH = 0.990 ()    Bacteriology:  UA (+) LE (+) N   Blood CS NG2D x2    CSF NGTD   Urine Enterobacter E coli   Blood culture NG5D (final)   CSF NGTD    Blood NG5D x2    CSF studies:    L   *** RBC58 WBC9 *** %N3 %L5     L   *** [id number] WBC20 *** %N10 %L8     L   *** [id number] WBC24 *** %N7 %L13     EEG:  Neuroimagin/10 CT head:  EVD placement, stable   CTA:  L pcomm aneurysm, L ICA (distal cavernous) aneurysm vs infundibulum, extensive calcified plaque cavernous bilateral ICA with mild to mod stenosis; thick plaque bilateral carotid bifurcations - mod to severe R and mild to mod L stenosis, focal calcified plaque R VA off subclavian artery - high grate stenosis / partial occlusion, upper lung bilateral opacification - pulmo edema, chronic deformity R humeral neck   CT head:  SAH, basilar cisterns, IV extension, obstructive hydrocephalus SVID, lacunar infarcts R caudate, both thalami, cerebellar hemispheres, no CT evidence of territorial infarction  Other imagin/28 LE Doppler: NEG   LE Doppler: NEG   CT abd:  small paraesophageal hiatal hernia, gastritis; ?impaction, septal thickening bilateral lower lobes ?pulmo edema, hepatic steatosis      IV FLUIDS: IVL  DRIPS:  DIET: Glucerna  Lines:   Drains: rectal tube     EVD at 5cm H20   EVD at 5cm H20 ICP < 10   EVD at 5cm H20 ICP <10   EVD clamped

## 2021-01-06 NOTE — PROGRESS NOTE ADULT - ASSESSMENT
76y/o F with PMHx sig for COPD, asthma, HLD, HTN, BIBEMS to [facility] with SAH s/p VPS    Dysphagia- 2/2 SAH  Will plan for PEG placement tomorrow at bedside  Please keep NPO at midnight, hold pm lovenox today

## 2021-01-06 NOTE — PROGRESS NOTE ADULT - SUBJECTIVE AND OBJECTIVE BOX
HPI:  74y/o F with PMHx sig for COPD, asthma, HLD, HTN, BIBEMS to [facility] from home after HHA discovered patient found down in floor covered in non-bloody vomitus. Perr HHA Pt went to the bathroom and was taking a long time, went in to check on her and found her sitting on floor, awake, however with vomitus on front of shirt. On arrival to [facility], patient was taken for stat head CT, which revealed diffuse subarachnoid hemorrhage mainly at basilar cisterns with IVH and obstructive hydrocephalus. Patient was given a bolus of 1g keppra and dilaudid pushes for generalized headache. CTA concerning for 3mm left pcomm aneurysm and a 1.6mm outpouching of distal cavernous segment of the left internal carotid artery likely aneurysm. NIHSS2 HH3 MF4. Patient was transferred to [facility] for further intervention. Patient currently reports headaches. Pt denies acute changes in vision, seizures, CP, SOB, weakness/paresthesias of arms or legs. (09 Dec 2020 23:37)      Hospital Course:   12/9: BD1 Patient admitted for SAH HH3, MF4.   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 EVD at 5, on Levo overnight, nimodipine discontinued d/t hypotension  12/12: BD4 POD#2, YUMIKO overnight, neuro stable, passed TOV  12/13: BD5 POD#3: YUMIKO x 24 hrs  12/14: BD6 POD#4. YUMIKO o/n, neuro stable. EVD at 5cm H2O  12/15: BD7 POD #5 YUMIKO overnight, neuro stable. EVD remains at 5. Plan for CTA today.   12/16: BD8 POD #6 YUMIKO overnight, neuro stable, EVD at 0, on Levo, started on 3% at 15 overnight   12/17: BD9 POD#1 s/p cerebral angio for verapamil c/b device malfunction of Proglide, s/p right groin cutdown for removal of proglide catheter and femoral artery repair.  YUMIKO overnight, neuro stable, EVD at 0. patient remained intubated overnight, on propofol for sedation, and vEEG  12/18: BD#10, POD#8 s/p coil/embo of L pcomm aneurysm, POD#2 s/p IA verapamil, POD#2 R groin cutdown for removal of proglide catheter w/ repair of femoral artery. YUMIKO overnight. EVD remains open @0cmH2O   12/19: BD#11, POD#9 s/p coil/embo of L pcomm aneurysm. POD#3 s/p IA verapamil, POD#3 s/p R femoral artery cutdown of proglide catheter w/ femoral artery repair. YUMIKO overnight. EVD remains open @0cmH2O. EEG shows b/l frontal sharp discharges, cont monitoring, vimpat was increased yesterday. Gentle hydration, total IVF 50cc/hr. Cont vanc/zosyn for empiric coverage, next vanc trough due @1pm on 12/19. F/u daily CXR.   12/20 BD#12 POD#10 YUMIKO overnight, Neuro exam stable, EVD @ 0cm H2O, vEEG, 3% @ 15 goal WNL, TF via NGT  12/21: BD13, POD11 YUMIKO overnight. EVD at 5cmH20 (raised yesterday), vEEG in place  12/22 BD#14, EVD raised to 15 yesterday, 3% @ 30cc Goal WNL, -180, Milrinone, TTE prior to DC as per Card for takotsubo cardiomyopathy, failed S&S pending reeval, TF via NGT, Neuro exam stable  12/23: BD#15. YUMIKO o/n, neuro stable. EVD clamped. 30%@30cc/hr  12/24 BD#16 YUMIKO overnight, spiked 102, EVD @ 0cm H2O, 3% @ 30cc/hr Goal WNL, Vasc following, TF via NGT, -200,   12/25: BD#17. YUMIKO overnight. Pan cx from 12/23, NGTD on CSF and blood. EVD clamped. 3% @15cc/hr, rate kept same overnight. NGT feeds at goal. SBP goal 160-200.   12/26: BD#18. YUMIKO overnight, neuro exam stable. NGTD from pan cx on 12/23. EVD removed today. 3% discontinued 12/25. NGT feeds at goal, IVF off. SBP goal 160-200.   12/27: BD#19 YUMIKO overnight, neuro stable. 3% at 15, stepdown status  12/28: BD20 YUMIKO overnight, neuro stable. 3% continues.  Patient became increasingly more somnolent and underwent LP for CSF high volume tap.  Temporary improvement.  Spiked fever, pancultured.  12/29: BD21 Patient increasingly more somnolent, EVD placed at bedside.    12/30: BD22 pt. is s/p R VPS placement, certas @5 POD#1, post op ct head c/a/p done. afebrile o/n.   1/1: BD#23: pt. is s/p R VPS placement, certas @5 POD#2, post op ct head c/a/p done. zosyn for enterobacter   1/2: BD #24. POD#3 s/p R VPS placement, CErtas @5. Dressings removed from head and abdomen. Cont zosyn for enterobacter and e. coli in urine, end date 1/4/21. Post-op imaging completed. Pend S&S re-eval, improvement in mental status/neuro exam.   1/3: BD25, POD4. SBP goals relaxed 120-200. zosyn for enterobacter UTI until 1/4. FOB+, protonix bid started, yumiko o/n  1/4:  1/5:  1/6: BD 28, POD 7 VPS (Certas @ 5). YUMIKO overnight. Plan for PEG with GI Thursday. Repeat Covid swab negative. Stepdown status.      Vital Signs Last 24 Hrs  T(C): 36.8 (06 Jan 2021 02:02), Max: 36.8 (05 Jan 2021 18:23)  T(F): 98.2 (06 Jan 2021 02:02), Max: 98.3 (05 Jan 2021 18:23)  HR: 75 (06 Jan 2021 02:00) (74 - 97)  BP: 122/56 (06 Jan 2021 02:00) (122/56 - 211/79)  BP(mean): 81 (06 Jan 2021 02:00) (81 - 125)  RR: 20 (06 Jan 2021 02:00) (15 - 28)  SpO2: 93% (06 Jan 2021 02:00) (90% - 98%)    I&O's Detail    04 Jan 2021 07:01  -  05 Jan 2021 07:00  --------------------------------------------------------  IN:    Enteral Tube Flush: 240 mL    IV PiggyBack: 200 mL    sodium chloride 0.9%: 600 mL  Total IN: 1040 mL    OUT:    Intermittent Catheterization - Urethral (mL): 2200 mL  Total OUT: 2200 mL    Total NET: -1160 mL      05 Jan 2021 07:01  -  06 Jan 2021 02:56  --------------------------------------------------------  IN:    Enteral Tube Flush: 300 mL    Glucerna: 270 mL    IV PiggyBack: 150 mL    sodium chloride 0.9%: 775 mL  Total IN: 1495 mL    OUT:    Intermittent Catheterization - Urethral (mL): 1650 mL    Rectal Tube (mL): 100 mL  Total OUT: 1750 mL    Total NET: -255 mL        I&O's Summary    04 Jan 2021 07:01  -  05 Jan 2021 07:00  --------------------------------------------------------  IN: 1040 mL / OUT: 2200 mL / NET: -1160 mL    05 Jan 2021 07:01  -  06 Jan 2021 02:56  --------------------------------------------------------  IN: 1495 mL / OUT: 1750 mL / NET: -255 mL        PHYSICAL EXAM:  GEN: laying in bed, appears well, NAD  NEURO: AOx2. FC, OE spont, speech intact, face symmetric. CNII-XII intact. PERRL, EOMI. No pronator drift. MAEx4. 5/5 strength throughout. SILT  CV: RRR +S1/S2  PULM: CTAB  GI: Abd soft, NT/ND  EXT: ext warm, dry, nontender  WOUND: evd site c/d/i      TUBES/LINES:  [] CVC  [] A-line  [] Lumbar Drain  [] Ventriculostomy  [] ADIN  [] Soriano  [x] NGT   [] Other    DIET:  [] NPO  [] Mechanical  [x] Tube feeds    LABS:                        11.4   7.73  )-----------( 338      ( 04 Jan 2021 06:29 )             36.5     01-04    138  |  98  |  5<L>  ----------------------------<  106<H>  3.3<L>   |  28  |  0.39<L>    Ca    9.0      04 Jan 2021 05:50  Phos  3.4     01-04  Mg     1.8     01-04              CAPILLARY BLOOD GLUCOSE          Drug Levels: [] N/A    CSF Analysis: [] N/A      Allergies    No Known Allergies    Intolerances        Home Medications:  famotidine 20 mg oral tablet: 1 tab(s) orally once a day (10 Dec 2020 00:38)  lisinopril 2.5 mg oral tablet: 1 tab(s) orally once a day (10 Dec 2020 00:38)  montelukast 10 mg oral tablet: 1 tab(s) orally once a day (10 Dec 2020 00:38)  omeprazole 20 mg oral delayed release capsule: 1 cap(s) orally once a day (12 Dec 2020 16:14)  simvastatin 20 mg oral tablet: 1 tab(s) orally once a day (at bedtime) (10 Dec 2020 00:38)      MEDICATIONS:  Antibiotics:  nystatin    Suspension 438214 Unit(s) Oral four times a day    Neuro:  acetaminophen    Suspension .. 650 milliGRAM(s) Oral every 6 hours PRN  lacosamide Solution 150 milliGRAM(s) Oral every 12 hours  modafinil 100 milliGRAM(s) Oral daily    Anticoagulation:  enoxaparin Injectable 40 milliGRAM(s) SubCutaneous at bedtime    OTHER:  albuterol/ipratropium for Nebulization. 3 milliLiter(s) Nebulizer every 6 hours PRN  amLODIPine   Tablet 10 milliGRAM(s) Oral every 24 hours  atorvastatin 40 milliGRAM(s) Oral at bedtime  chlorhexidine 0.12% Liquid 15 milliLiter(s) Oral Mucosa two times a day  chlorhexidine 2% Cloths 1 Application(s) Topical <User Schedule>  dextrose 40% Gel 15 Gram(s) Oral once  dextrose 50% Injectable 25 Gram(s) IV Push once  fludroCORTISONE 0.1 milliGRAM(s) Oral at bedtime  glucagon  Injectable 1 milliGRAM(s) IntraMuscular once  labetalol Injectable 2.5 milliGRAM(s) IV Push every 6 hours PRN  montelukast 10 milliGRAM(s) Oral daily  pantoprazole   Suspension 40 milliGRAM(s) Oral two times a day  tiotropium 18 MICROgram(s) Capsule 1 Capsule(s) Inhalation daily    IVF:  cyanocobalamin 1000 MICROGram(s) Oral daily  dextrose 5%. 1000 milliLiter(s) IV Continuous <Continuous>  dextrose 5%. 1000 milliLiter(s) IV Continuous <Continuous>  ferrous    sulfate 325 milliGRAM(s) Oral daily    CULTURES:  Culture Results:   No growth at 5 days. (12-31 @ 22:37)  Culture Results:   No growth at 5 days. (12-31 @ 22:37)    RADIOLOGY & ADDITIONAL TESTS:      ASSESSMENT:  75y Female  with PMHx of COPD, asthma, HLD, HTN, BIBEMS to [facility] from home after HHA found patient down on the floor covered in non-bloody vomitus. CTH reveals diffuse subarachnoid hemorrhage mainly at basilar cisterns with IVH and obstructive hydrocephalus, CTA shows 3mm left pcomm aneurysm, now s/p bedside right frontal EVD placement 12/10, s/p cerebral angiogram for coil embolization of left PCOMM aneurysm 12/10, now   s/p cerebral angio for verapamil c/b device malfunction of Proglide, s/p right groin cutdown for removal of proglide catheter and femoral artery repair (12/17/2020), NIHSS2 HH3 MF4), s/p EVD removal 12/25,   s/p bedside EVD placement  12/29, POD#7 from R VPS certas at 5  BD#28      HEADACHE    Handoff    MEWS Score    Hyperlipidemia    Hypertension    COPD (chronic obstructive pulmonary disease)    Asthma    COPD (chronic obstructive pulmonary disease)    Asthma    Hydrocephalus, adult    Injury of right femoral artery    Cerebral artery vasospasm    SAH (subarachnoid hemorrhage)    Hydrocephalus, adult    Injury of femoral artery    Cerebral artery vasospasm    SAH (subarachnoid hemorrhage)    Subarachnoid bleed    Obstructive hydrocephalus    Anemia due to acute blood loss    Preoperative clearance    Centrilobular emphysema    Mild persistent asthma without complication    Subarachnoid bleed    Essential hypertension    Pure hypercholesterolemia    Ventriculoperitoneal shunt    Insertion of external ventricular drain    Vascular surgery procedure    Angiogram, carotid and cerebral, bilateral    Angiogram, cerebral, with intracranial aneurysm embolization    No significant past surgical history    HEADACHE    90+    [email or web address]        PLAN:  NEURO:  - neuro/vitals checks  - pain control   - cont modafinil for neurostim  - cont vimpat seizure ppx  - stepdown status     CARDIOVASCULAR:  - -200  - echo and CCTA prior to discharge  - EF now 70%    PULMONARY:  - satting well RA    RENAL:  - repletions prn  - sc prn     GI:  - puree/thin liquid diet  - FOB+, protonix bid   - plan for PEG with GI on Thursday     HEME:  - h/h stable  - SCDs/SQL    ID:  - afebrile  - off abx  - repeat covid swab 1/5 negative    ENDO:  - glucose goal 140-180    DVT PROPHYLAXIS:  [x] Venodynes                                [x] Heparin/Lovenox    DISPOSITION: SDU status, full code, dispo pending    D/w Dr. Serrano, Dr. Alfredo      Assessment:  Present when checked    []  GCS  E   V  M     Heart Failure: []Acute, [] acute on chronic , []chronic  Heart Failure:  [] Diastolic (HFpEF), [] Systolic (HFrEF), []Combined (HFpEF and HFrEF), [] RHF, [] Pulm HTN, [] Other    [] MICHAELLE, [] ATN, [] AIN, [] other  [] CKD1, [] CKD2, [] CKD 3, [] CKD 4, [] CKD 5, []ESRD    Encephalopathy: [] Metabolic, [] Hepatic, [] toxic, [] Neurological, [] Other    Abnormal Nurtitional Status: [] malnurtition (see nutrition note), [ ]underweight: BMI < 19, [] morbid obesity: BMI >40, [] Cachexia    [] Sepsis  [] hypovolemic shock,[] cardiogenic shock, [] hemorrhagic shock, [] neuogenic shock  [] Acute Respiratory Failure  []Cerebral edema, [] Brain compression/ herniation,   [] Functional quadriplegia  [] Acute blood loss anemia

## 2021-01-06 NOTE — PROGRESS NOTE ADULT - SUBJECTIVE AND OBJECTIVE BOX
No acute events    PERTINENT REVIEW OF SYSTEMS:  CONSTITUTIONAL: No weakness, fevers or chills  HEENT: No visual changes; No vertigo or throat pain   GASTROINTESTINAL: No abdominal or epigastric pain. No nausea, vomiting, or hematemesis; No diarrhea or constipation. No melena or hematochezia.  NEUROLOGICAL: No numbness or weakness  SKIN: No itching, burning, rashes, or lesions     Allergies    No Known Allergies    Intolerances      MEDICATIONS:  MEDICATIONS  (STANDING):  amLODIPine   Tablet 10 milliGRAM(s) Oral every 24 hours  atorvastatin 40 milliGRAM(s) Oral at bedtime  chlorhexidine 0.12% Liquid 15 milliLiter(s) Oral Mucosa two times a day  chlorhexidine 2% Cloths 1 Application(s) Topical <User Schedule>  cyanocobalamin 1000 MICROGram(s) Oral daily  dextrose 40% Gel 15 Gram(s) Oral once  dextrose 5%. 1000 milliLiter(s) (50 mL/Hr) IV Continuous <Continuous>  dextrose 5%. 1000 milliLiter(s) (100 mL/Hr) IV Continuous <Continuous>  dextrose 50% Injectable 25 Gram(s) IV Push once  enoxaparin Injectable 40 milliGRAM(s) SubCutaneous at bedtime  ferrous    sulfate 325 milliGRAM(s) Oral daily  fludroCORTISONE 0.1 milliGRAM(s) Oral at bedtime  glucagon  Injectable 1 milliGRAM(s) IntraMuscular once  lacosamide Solution 150 milliGRAM(s) Oral every 12 hours  modafinil 100 milliGRAM(s) Oral daily  montelukast 10 milliGRAM(s) Oral daily  nystatin    Suspension [id number] Unit(s) Oral four times a day  pantoprazole   Suspension 40 milliGRAM(s) Oral two times a day  tiotropium 18 MICROgram(s) Capsule 1 Capsule(s) Inhalation daily    MEDICATIONS  (PRN):  acetaminophen    Suspension .. 650 milliGRAM(s) Oral every 6 hours PRN Temp greater or equal to 38C (100.4F), Mild Pain (1 - 3)  albuterol/ipratropium for Nebulization. 3 milliLiter(s) Nebulizer every 6 hours PRN Shortness of Breath  labetalol Injectable 2.5 milliGRAM(s) IV Push every 6 hours PRN Systolic blood pressure > 220    Vital Signs Last 24 Hrs  T(C): 36.6 (06 Jan 2021 09:03), Max: 36.9 (06 Jan 2021 06:26)  T(F): 97.9 (06 Jan 2021 09:03), Max: 98.5 (06 Jan 2021 06:26)  HR: 83 (06 Jan 2021 09:00) (72 - 97)  BP: 124/58 (06 Jan 2021 09:00) (113/57 - 200/84)  BP(mean): 84 (06 Jan 2021 09:00) (76 - 125)  RR: 24 (06 Jan 2021 09:00) (19 - 28)  SpO2: 96% (06 Jan 2021 09:00) (90% - 100%)    01-05 @ 07:01  -  01-06 @ 07:00  --------------------------------------------------------  IN: 1795 mL / OUT: 2050 mL / NET: -255 mL    01-06 @ 07:01 - 01-06 @ 10:33  --------------------------------------------------------  IN: 90 mL / OUT: 0 mL / NET: 90 mL      PHYSICAL EXAM:    General: awake, in no acute distress  HEENT: MMM, conjunctiva and sclera clear  Gastrointestinal: Soft non-tender non-distended; Normal bowel sounds; No hepatosplenomegaly. No rebound or guarding, surgical scar with staples  Skin: Warm and dry. No obvious rash    LABS:                        11.3   8.97  )-----------( 318      ( 06 Jan 2021 06:01 )             35.7     01-06    133<L>  |  97  |  14  ----------------------------<  141<H>  3.9   |  25  |  0.40<L>    Ca    8.9      06 Jan 2021 06:01  Phos  2.7     01-06  Mg     2.0     01-06                        RADIOLOGY & ADDITIONAL STUDIES:

## 2021-01-07 LAB
ANION GAP SERPL CALC-SCNC: 11 MMOL/L — SIGNIFICANT CHANGE UP (ref 5–17)
BUN SERPL-MCNC: 8 MG/DL — SIGNIFICANT CHANGE UP (ref 7–23)
CALCIUM SERPL-MCNC: 8.8 MG/DL — SIGNIFICANT CHANGE UP (ref 8.4–10.5)
CHLORIDE SERPL-SCNC: 106 MMOL/L — SIGNIFICANT CHANGE UP (ref 96–108)
CO2 SERPL-SCNC: 22 MMOL/L — SIGNIFICANT CHANGE UP (ref 22–31)
CREAT SERPL-MCNC: 0.34 MG/DL — LOW (ref 0.5–1.3)
GLUCOSE SERPL-MCNC: 119 MG/DL — HIGH (ref 70–99)
HCT VFR BLD CALC: 37.6 % — SIGNIFICANT CHANGE UP (ref 34.5–45)
HGB BLD-MCNC: 11.8 G/DL — SIGNIFICANT CHANGE UP (ref 11.5–15.5)
MAGNESIUM SERPL-MCNC: 1.8 MG/DL — SIGNIFICANT CHANGE UP (ref 1.6–2.6)
MCHC RBC-ENTMCNC: 28.9 PG — SIGNIFICANT CHANGE UP (ref 27–34)
MCHC RBC-ENTMCNC: 31.4 GM/DL — LOW (ref 32–36)
MCV RBC AUTO: 91.9 FL — SIGNIFICANT CHANGE UP (ref 80–100)
NRBC # BLD: 0 /100 WBCS — SIGNIFICANT CHANGE UP (ref 0–0)
PHOSPHATE SERPL-MCNC: 3.4 MG/DL — SIGNIFICANT CHANGE UP (ref 2.5–4.5)
PLATELET # BLD AUTO: 170 K/UL — SIGNIFICANT CHANGE UP (ref 150–400)
POTASSIUM SERPL-MCNC: 3.6 MMOL/L — SIGNIFICANT CHANGE UP (ref 3.5–5.3)
POTASSIUM SERPL-SCNC: 3.6 MMOL/L — SIGNIFICANT CHANGE UP (ref 3.5–5.3)
RBC # BLD: 4.09 M/UL — SIGNIFICANT CHANGE UP (ref 3.8–5.2)
RBC # FLD: 17.5 % — HIGH (ref 10.3–14.5)
SODIUM SERPL-SCNC: 139 MMOL/L — SIGNIFICANT CHANGE UP (ref 135–145)
WBC # BLD: 6.45 K/UL — SIGNIFICANT CHANGE UP (ref 3.8–10.5)
WBC # FLD AUTO: 6.45 K/UL — SIGNIFICANT CHANGE UP (ref 3.8–10.5)

## 2021-01-07 PROCEDURE — 93970 EXTREMITY STUDY: CPT | Mod: 26

## 2021-01-07 PROCEDURE — 99291 CRITICAL CARE FIRST HOUR: CPT

## 2021-01-07 PROCEDURE — 43246 EGD PLACE GASTROSTOMY TUBE: CPT

## 2021-01-07 PROCEDURE — 99024 POSTOP FOLLOW-UP VISIT: CPT

## 2021-01-07 RX ORDER — MAGNESIUM SULFATE 500 MG/ML
2 VIAL (ML) INJECTION ONCE
Refills: 0 | Status: COMPLETED | OUTPATIENT
Start: 2021-01-07 | End: 2021-01-07

## 2021-01-07 RX ORDER — MIDAZOLAM HYDROCHLORIDE 1 MG/ML
2 INJECTION, SOLUTION INTRAMUSCULAR; INTRAVENOUS ONCE
Refills: 0 | Status: DISCONTINUED | OUTPATIENT
Start: 2021-01-07 | End: 2021-01-07

## 2021-01-07 RX ORDER — FENTANYL CITRATE 50 UG/ML
50 INJECTION INTRAVENOUS ONCE
Refills: 0 | Status: DISCONTINUED | OUTPATIENT
Start: 2021-01-07 | End: 2021-01-07

## 2021-01-07 RX ORDER — SODIUM CHLORIDE 5 G/100ML
250 INJECTION, SOLUTION INTRAVENOUS
Refills: 0 | Status: DISCONTINUED | OUTPATIENT
Start: 2021-01-07 | End: 2021-01-08

## 2021-01-07 RX ORDER — POTASSIUM CHLORIDE 20 MEQ
40 PACKET (EA) ORAL ONCE
Refills: 0 | Status: COMPLETED | OUTPATIENT
Start: 2021-01-07 | End: 2021-01-07

## 2021-01-07 RX ORDER — CEFAZOLIN SODIUM 1 G
2000 VIAL (EA) INJECTION ONCE
Refills: 0 | Status: COMPLETED | OUTPATIENT
Start: 2021-01-07 | End: 2021-01-07

## 2021-01-07 RX ORDER — LISINOPRIL 2.5 MG/1
5 TABLET ORAL ONCE
Refills: 0 | Status: COMPLETED | OUTPATIENT
Start: 2021-01-07 | End: 2021-01-07

## 2021-01-07 RX ORDER — LISINOPRIL 2.5 MG/1
5 TABLET ORAL DAILY
Refills: 0 | Status: DISCONTINUED | OUTPATIENT
Start: 2021-01-07 | End: 2021-01-14

## 2021-01-07 RX ORDER — FENTANYL CITRATE 50 UG/ML
25 INJECTION INTRAVENOUS ONCE
Refills: 0 | Status: DISCONTINUED | OUTPATIENT
Start: 2021-01-07 | End: 2021-01-07

## 2021-01-07 RX ORDER — FLUDROCORTISONE ACETATE 0.1 MG/1
0.1 TABLET ORAL EVERY 12 HOURS
Refills: 0 | Status: DISCONTINUED | OUTPATIENT
Start: 2021-01-07 | End: 2021-01-09

## 2021-01-07 RX ADMIN — Medication 500000 UNIT(S): at 12:03

## 2021-01-07 RX ADMIN — ATORVASTATIN CALCIUM 40 MILLIGRAM(S): 80 TABLET, FILM COATED ORAL at 22:10

## 2021-01-07 RX ADMIN — PANTOPRAZOLE SODIUM 40 MILLIGRAM(S): 20 TABLET, DELAYED RELEASE ORAL at 18:53

## 2021-01-07 RX ADMIN — LISINOPRIL 5 MILLIGRAM(S): 2.5 TABLET ORAL at 17:09

## 2021-01-07 RX ADMIN — FENTANYL CITRATE 50 MICROGRAM(S): 50 INJECTION INTRAVENOUS at 14:41

## 2021-01-07 RX ADMIN — FLUDROCORTISONE ACETATE 0.1 MILLIGRAM(S): 0.1 TABLET ORAL at 12:03

## 2021-01-07 RX ADMIN — MIDAZOLAM HYDROCHLORIDE 2 MILLIGRAM(S): 1 INJECTION, SOLUTION INTRAMUSCULAR; INTRAVENOUS at 14:54

## 2021-01-07 RX ADMIN — TIOTROPIUM BROMIDE 1 CAPSULE(S): 18 CAPSULE ORAL; RESPIRATORY (INHALATION) at 15:08

## 2021-01-07 RX ADMIN — PREGABALIN 1000 MICROGRAM(S): 225 CAPSULE ORAL at 12:04

## 2021-01-07 RX ADMIN — LACOSAMIDE 150 MILLIGRAM(S): 50 TABLET ORAL at 05:55

## 2021-01-07 RX ADMIN — Medication 100 MILLIGRAM(S): at 14:43

## 2021-01-07 RX ADMIN — SODIUM CHLORIDE 50 MILLILITER(S): 9 INJECTION INTRAMUSCULAR; INTRAVENOUS; SUBCUTANEOUS at 00:00

## 2021-01-07 RX ADMIN — MODAFINIL 100 MILLIGRAM(S): 200 TABLET ORAL at 12:05

## 2021-01-07 RX ADMIN — Medication 50 GRAM(S): at 23:30

## 2021-01-07 RX ADMIN — PANTOPRAZOLE SODIUM 40 MILLIGRAM(S): 20 TABLET, DELAYED RELEASE ORAL at 05:56

## 2021-01-07 RX ADMIN — Medication 40 MILLIEQUIVALENT(S): at 23:29

## 2021-01-07 RX ADMIN — CHLORHEXIDINE GLUCONATE 1 APPLICATION(S): 213 SOLUTION TOPICAL at 05:56

## 2021-01-07 RX ADMIN — MIDAZOLAM HYDROCHLORIDE 2 MILLIGRAM(S): 1 INJECTION, SOLUTION INTRAMUSCULAR; INTRAVENOUS at 14:41

## 2021-01-07 RX ADMIN — Medication 500000 UNIT(S): at 23:11

## 2021-01-07 RX ADMIN — MONTELUKAST 10 MILLIGRAM(S): 4 TABLET, CHEWABLE ORAL at 12:03

## 2021-01-07 RX ADMIN — Medication 325 MILLIGRAM(S): at 12:03

## 2021-01-07 RX ADMIN — LACOSAMIDE 150 MILLIGRAM(S): 50 TABLET ORAL at 18:53

## 2021-01-07 RX ADMIN — SODIUM CHLORIDE 250 MILLILITER(S): 5 INJECTION, SOLUTION INTRAVENOUS at 12:04

## 2021-01-07 RX ADMIN — CHLORHEXIDINE GLUCONATE 15 MILLILITER(S): 213 SOLUTION TOPICAL at 05:56

## 2021-01-07 RX ADMIN — Medication 500000 UNIT(S): at 18:53

## 2021-01-07 RX ADMIN — FLUDROCORTISONE ACETATE 0.1 MILLIGRAM(S): 0.1 TABLET ORAL at 18:53

## 2021-01-07 RX ADMIN — FENTANYL CITRATE 25 MICROGRAM(S): 50 INJECTION INTRAVENOUS at 14:54

## 2021-01-07 RX ADMIN — SODIUM CHLORIDE 50 MILLILITER(S): 9 INJECTION INTRAMUSCULAR; INTRAVENOUS; SUBCUTANEOUS at 23:12

## 2021-01-07 RX ADMIN — Medication 500000 UNIT(S): at 05:56

## 2021-01-07 RX ADMIN — AMLODIPINE BESYLATE 10 MILLIGRAM(S): 2.5 TABLET ORAL at 12:03

## 2021-01-07 NOTE — PROGRESS NOTE ADULT - SUBJECTIVE AND OBJECTIVE BOX
=================================  NEUROCRITICAL CARE ATTENDING NOTE  =================================    CARA CANCHOLA   MRN-[id number]  Summary:  75y/F with COPD, asthma, dyslipidemia Hypertension found down.  Brought to [facility] where imaging showed SAH basilar cisterns with IVH and obstructive hydrocephalus L Pcomm aneurysm.  Given levetiracetam, hydromorphone.  NIHSS 2 HH3 MF4, transferred to [facility] for further management.      COURSE IN THE HOSPITAL:   admitted to [facility], EVD inserted; given 20 lasix for pulmo edema, T inversion on CT  12/10 No significant events overnight.    No significant events overnight.    No significant events overnight.    No significant events overnight. drain stopped working at 730 a.m., off levophed    No significant events overnight.   12/15 confusion   stepped down   readmitted to ICU    more confused somnolent overnight, high volume LP - 30cc removed, CT scan, EVD inserted, febrile pancultured, UA (+) rocephin started   Tmax 38.6  EVD clamped this morning, given 1L fluid bolus overnight, s/p VPS   Tmax 38.2    Tmax 39 500cc bolus overnight x2, pancultured for fever   Tmax 38.1 multiple bowel movements overnight   Tmax 37.8 wheezing overnight, given nebs   No significant events overnight.     Past Medical History: Hyperlipidemia Hypertension COPD (chronic obstructive pulmonary disease) Asthma  Allergies:  No Known Allergies  Home meds:   ·	famotidine 20 mg oral tablet: 1 tab(s) orally once a day  ·	lisinopril 2.5 mg oral tablet: 1 tab(s) orally once a day  ·	montelukast 10 mg oral tablet: 1 tab(s) orally once a day  ·	predniSONE 50 mg oral tablet: 1 tab(s) orally once a day  ·	ProAir HFA CFC free 90 mcg/inh inhalation aerosol: 2 puff(s) inhaled 4 times a day PRN  ·	simvastatin 20 mg oral tablet: 1 tab(s) orally once a day (at bedtime)    PHYSICAL EXAMINATION  T(C): 36.6 ( @ 17:55), Max: 37.1 ( @ 22:31) HR: 94 ( @ 20:00) (70 - 94) BP: 185/76 ( @ 20:00) (121/60 - 185/76) RR: 25 ( @ 20:00) (17 - 36) SpO2: 100% ( @ 20:00) (93% - 100%)  NEUROLOGIC EXAMINATION:  Patient is awake, oriented x 2, CLEOPATRA, following commands, moving all 4s, R shoulder movement pain limited  GENERAL: not intubated, not in cardiorespiratory distress  EENT:  anicteric  CARDIOVASCULAR: (+) S1 S2, normal rate and regular rhythm  PULMONARY: clear to auscultation bilaterally  ABDOMEN: soft, nontender with normoactive bowel sounds  EXTREMITIES: no edema  SKIN: no rash    LABS:                11.8   6.45  )-----------( 170      ( 2021 06:29 )             37.6     139  |  106  |  8   ----------------------------<  119<H>  3.6   |  22  |  0.34<L>    Ca    8.8      2021 18:20  Phos  3.4       Mg     1.8      @ 07:01  -   @ 07:00  IN: 1580 mL / OUT: 1900 mL / NET: -320 mL    HbA1C = 6.7 (12-10) 6.4 ()  LDL = 112 ()   HDL = 66 ()  TG = 114 ()   TSH = 0.990 ()    Bacteriology:  UA (+) LE (+) N   Blood CS NG5D x2 (final)   CSF NGTD   Urine Enterobacter E coli   Blood culture NG5D (final)   CSF NGTD    Blood NG5D x2    CSF studies:    L   *** RBC58 WBC9 *** %N3 %L5   12-29  L   *** [id number] WBC20 *** %N10 %L8   12-23  L   *** [id number] WBC24 *** %N7 %L13     EEG:  Neuroimagin/10 CT head:  EVD placement, stable   CTA:  L pcomm aneurysm, L ICA (distal cavernous) aneurysm vs infundibulum, extensive calcified plaque cavernous bilateral ICA with mild to mod stenosis; thick plaque bilateral carotid bifurcations - mod to severe R and mild to mod L stenosis, focal calcified plaque R VA off subclavian artery - high grate stenosis / partial occlusion, upper lung bilateral opacification - pulmo edema, chronic deformity R humeral neck   CT head:  SAH, basilar cisterns, IV extension, obstructive hydrocephalus SVID, lacunar infarcts R caudate, both thalami, cerebellar hemispheres, no CT evidence of territorial infarction  Other imagin/28 LE Doppler: NEG   LE Doppler: NEG   CT abd:  small paraesophageal hiatal hernia, gastritis; ?impaction, septal thickening bilateral lower lobes ?pulmo edema, hepatic steatosis    MEDICATIONS:   ·	nystatin    Suspension [id number] Oral four times a day  ·	lacosamide Solution 150 Oral every 12 hours  ·	modafinil 100 Oral daily  ·	amLODIPine   Tablet 10 Oral every 24 hours  ·	lisinopril 5 Oral daily  ·	albuterol/ipratropium for Nebulization. 3 Nebulizer every 6 hours PRN  ·	montelukast 10 Oral daily  ·	tiotropium 18 MICROgram(s) Capsule 1 Inhalation daily  ·	atorvastatin 40 Oral at bedtime  ·	cyanocobalamin 1000 Oral daily  ·	ferrous    sulfate 325 Oral daily  ·	fludroCORTISONE 0.1 Oral every 12 hours  ·	pantoprazole   Suspension 40 Oral two times a day    IV FLUIDS: IVL  DRIPS:  DIET: Glucerna  Lines:   Drains: rectal tube     EVD at 5cm H20   EVD at 5cm H20 ICP < 10   EVD at 5cm H20 ICP <10   EVD clamped  Wounds:    CODE STATUS:  Full Code                       GOALS OF CARE:  aggressive                      DISPOSITION:  ICU  NIHSS 2 HH3 MF4

## 2021-01-07 NOTE — PROGRESS NOTE ADULT - SUBJECTIVE AND OBJECTIVE BOX
=================================  NEUROCRITICAL CARE ATTENDING NOTE  =================================    CARA CANCHOLA   MRN-[id number]  Summary:  75y/F with COPD, asthma, dyslipidemia Hypertension found down.  Brought to [facility] where imaging showed SAH basilar cisterns with IVH and obstructive hydrocephalus L Pcomm aneurysm.  Given levetiracetam, hydromorphone.  NIHSS 2 HH3 MF4, transferred to [facility] for further management.      COURSE IN THE HOSPITAL:   admitted to [facility], EVD inserted; given 20 lasix for pulmo edema, T inversion on CT  12/10 No significant events overnight.    No significant events overnight.    No significant events overnight.    No significant events overnight. drain stopped working at 730 a.m., off levophed    No significant events overnight.   12/15 confusion   stepped down   readmitted to ICU    more confused somnolent overnight, high volume LP - 30cc removed, CT scan, EVD inserted, febrile pancultured, UA (+) rocephin started   Tmax 38.6  EVD clamped this morning, given 1L fluid bolus overnight, s/p VPS   Tmax 38.2    Tmax 39 500cc bolus overnight x2, pancultured for fever   Tmax 38.1 multiple bowel movements overnight   Tmax 37.8 wheezing overnight, given nebs     VIVIEN     Overnight Events:       PHYSICAL EXAMINATION  T(C): 37.1 ( @ 07:05), Max: 37.1 ( @ 22:31)  HR: 73 ( @ 04:00) (73 - 90)  BP: 157/74 ( @ 04:00) (106/58 - 161/72)  RR: 19 ( @ 04:00) (19 - 26)  SpO2: 93% ( @ 04:00) (93% - 100%)  CVP(mm Hg): --    LABS:  CAPILLARY BLOOD GLUCOSE                              11.8   6.45  )-----------( 170      ( 2021 06:29 )             37.6     -    132<L>  |  98  |  18  ----------------------------<  157<H>  4.7   |  22  |  0.41<L>    Ca    8.8      2021 14:09  Phos  2.7       Mg     2.0      @ 07:01  -  - @ 07:00  --------------------------------------------------------  IN: 1580 mL / OUT: 1200 mL / NET: 380 mL        MEDICATIONS:  MEDICATIONS  (STANDING):  amLODIPine   Tablet 10 milliGRAM(s) Oral every 24 hours  atorvastatin 40 milliGRAM(s) Oral at bedtime  chlorhexidine 0.12% Liquid 15 milliLiter(s) Oral Mucosa two times a day  chlorhexidine 2% Cloths 1 Application(s) Topical <User Schedule>  cyanocobalamin 1000 MICROGram(s) Oral daily  dextrose 40% Gel 15 Gram(s) Oral once  dextrose 5%. 1000 milliLiter(s) (50 mL/Hr) IV Continuous <Continuous>  dextrose 5%. 1000 milliLiter(s) (100 mL/Hr) IV Continuous <Continuous>  dextrose 50% Injectable 25 Gram(s) IV Push once  ferrous    sulfate 325 milliGRAM(s) Oral daily  fludroCORTISONE 0.1 milliGRAM(s) Oral at bedtime  glucagon  Injectable 1 milliGRAM(s) IntraMuscular once  lacosamide Solution 150 milliGRAM(s) Oral every 12 hours  modafinil 100 milliGRAM(s) Oral daily  montelukast 10 milliGRAM(s) Oral daily  nystatin    Suspension 482460 Unit(s) Oral four times a day  pantoprazole   Suspension 40 milliGRAM(s) Oral two times a day  sodium chloride 0.9%. 1000 milliLiter(s) (50 mL/Hr) IV Continuous <Continuous>  tiotropium 18 MICROgram(s) Capsule 1 Capsule(s) Inhalation daily    MEDICATIONS  (PRN):  acetaminophen    Suspension .. 650 milliGRAM(s) Oral every 6 hours PRN Temp greater or equal to 38C (100.4F), Mild Pain (1 - 3)  albuterol/ipratropium for Nebulization. 3 milliLiter(s) Nebulizer every 6 hours PRN Shortness of Breath  labetalol Injectable 2.5 milliGRAM(s) IV Push every 6 hours PRN Systolic blood pressure > 220          Past Medical History: Hyperlipidemia Hypertension COPD (chronic obstructive pulmonary disease) Asthma  Allergies:  No Known Allergies  Home meds:   ·	famotidine 20 mg oral tablet: 1 tab(s) orally once a day  ·	lisinopril 2.5 mg oral tablet: 1 tab(s) orally once a day  ·	montelukast 10 mg oral tablet: 1 tab(s) orally once a day  ·	predniSONE 50 mg oral tablet: 1 tab(s) orally once a day  ·	ProAir HFA CFC free 90 mcg/inh inhalation aerosol: 2 puff(s) inhaled 4 times a day PRN  ·	simvastatin 20 mg oral tablet: 1 tab(s) orally once a day (at bedtime)    PHYSICAL EXAMINATION    NEUROLOGIC EXAMINATION:  Patient is awake, oriented x 2, CLEOPATRA, following commands, moving all 4s, R shoulder movement pain limited    CARDIOVASCULAR: (+) S1 S2, normal rate and regular rhythm  PULMONARY: clear to auscultation bilaterally  ABDOMEN: soft, nontender with normoactive bowel sounds  EXTREMITIES: no edema  SKIN: no rash      HbA1C = 6.7 (12-10) 6.4 ()  LDL = 112 ()   HDL = 66 ()  TG = 114 ()   TSH = 0.990 ()    Bacteriology:  UA (+) LE (+) N   Blood CS NG2D x2    CSF NGTD   Urine Enterobacter E coli   Blood culture NG5D (final)   CSF NGTD    Blood NG5D x2    CSF studies:    L   *** RBC58 WBC9 *** %N3 %L5     L   *** [id number] WBC20 *** %N10 %L8     L   *** [id number] WBC24 *** %N7 %L13     EEG:  Neuroimagin/10 CT head:  EVD placement, stable   CTA:  L pcomm aneurysm, L ICA (distal cavernous) aneurysm vs infundibulum, extensive calcified plaque cavernous bilateral ICA with mild to mod stenosis; thick plaque bilateral carotid bifurcations - mod to severe R and mild to mod L stenosis, focal calcified plaque R VA off subclavian artery - high grate stenosis / partial occlusion, upper lung bilateral opacification - pulmo edema, chronic deformity R humeral neck   CT head:  SAH, basilar cisterns, IV extension, obstructive hydrocephalus SVID, lacunar infarcts R caudate, both thalami, cerebellar hemispheres, no CT evidence of territorial infarction  Other imagin/28 LE Doppler: NEG   LE Doppler: NEG   CT abd:  small paraesophageal hiatal hernia, gastritis; ?impaction, septal thickening bilateral lower lobes ?pulmo edema, hepatic steatosis      IV FLUIDS: IVL  DRIPS:  DIET: Glucerna  Lines:   Drains: rectal tube     EVD at 5cm H20   EVD at 5cm H20 ICP < 10   EVD at 5cm H20 ICP <10   EVD clamped

## 2021-01-07 NOTE — PROCEDURAL SAFETY CHECKLIST WITH OR WITHOUT SEDATION - NSPREPROCDATE6_GEN_ALL_CORE
07-Jan-2021 14:30
15-Dec-2020 17:35
10-Dec-2020 18:21
13-Dec-2020 12:55
10-Dec-2020 01:30
28-Dec-2020 13:57
29-Dec-2020 02:07
well-groomed/obese/well-developed/no distress/well-nourished

## 2021-01-07 NOTE — PROCEDURAL SAFETY CHECKLIST WITH OR WITHOUT SEDATION - NSPRESEDATION2FT_GEN_ALL_CORE
Anesthesia confirms case reviewed for anesthesia risk alert.

## 2021-01-07 NOTE — CHART NOTE - NSCHARTNOTEFT_GEN_A_CORE
Admitting Diagnosis:   Patient is a 76y old  Female who presents with a chief complaint of SAH (07 Jan 2021 08:38)    PAST MEDICAL & SURGICAL HISTORY:  Hyperlipidemia    Hypertension    COPD (chronic obstructive pulmonary disease)    Asthma    No significant past surgical history    Current Nutrition Order:   Diet, NPO after Midnight:      NPO Start Date: 06-Jan-2021,   NPO Start Time: 23:59  Except Medications (01-06-21 @ 15:57)    PO Intake: Good (%) [   ]  Fair (50-75%) [   ] Poor (<25%) [   ]-N/A    GI Issues: no N/V noted,     Pain:    Skin Integrity:    Labs:   01-06    132<L>  |  98  |  18  ----------------------------<  157<H>  4.7   |  22  |  0.41<L>    Ca    8.8      06 Jan 2021 14:09  Phos  2.7     01-06  Mg     2.0     01-06      CAPILLARY BLOOD GLUCOSE          Medications:  MEDICATIONS  (STANDING):  amLODIPine   Tablet 10 milliGRAM(s) Oral every 24 hours  atorvastatin 40 milliGRAM(s) Oral at bedtime  ceFAZolin   IVPB 2000 milliGRAM(s) IV Intermittent once  chlorhexidine 0.12% Liquid 15 milliLiter(s) Oral Mucosa two times a day  chlorhexidine 2% Cloths 1 Application(s) Topical <User Schedule>  cyanocobalamin 1000 MICROGram(s) Oral daily  dextrose 40% Gel 15 Gram(s) Oral once  dextrose 5%. 1000 milliLiter(s) (50 mL/Hr) IV Continuous <Continuous>  dextrose 5%. 1000 milliLiter(s) (100 mL/Hr) IV Continuous <Continuous>  dextrose 50% Injectable 25 Gram(s) IV Push once  fentaNYL    Injectable 50 MICROGram(s) IV Push once  ferrous    sulfate 325 milliGRAM(s) Oral daily  fludroCORTISONE 0.1 milliGRAM(s) Oral every 12 hours  glucagon  Injectable 1 milliGRAM(s) IntraMuscular once  lacosamide Solution 150 milliGRAM(s) Oral every 12 hours  midazolam Injectable 2 milliGRAM(s) IV Push once  modafinil 100 milliGRAM(s) Oral daily  montelukast 10 milliGRAM(s) Oral daily  nystatin    Suspension [id number] Unit(s) Oral four times a day  pantoprazole   Suspension 40 milliGRAM(s) Oral two times a day  sodium chloride 0.9%. 1000 milliLiter(s) (50 mL/Hr) IV Continuous <Continuous>  sodium chloride 3%. 250 milliLiter(s) (250 mL/Hr) IV Continuous <Continuous>  tiotropium 18 MICROgram(s) Capsule 1 Capsule(s) Inhalation daily    MEDICATIONS  (PRN):  acetaminophen    Suspension .. 650 milliGRAM(s) Oral every 6 hours PRN Temp greater or equal to 38C (100.4F), Mild Pain (1 - 3)  albuterol/ipratropium for Nebulization. 3 milliLiter(s) Nebulizer every 6 hours PRN Shortness of Breath  labetalol Injectable 2.5 milliGRAM(s) IV Push every 6 hours PRN Systolic blood pressure > 220      Weight:  Daily     Daily     Weight Change:     Nutrition Focused Physical Exam: Completed [   ]  Not Pertinent [   ]  Muscle Wasting- Temporal [   ]  Clavicle/Pectoral [   ]  Shoulder/Deltoid [   ]  Scapula [   ]  Interosseous [   ]  Quadriceps [   ]  Gastrocnemius [   ]  Fat Wasting- Orbital [   ]  Buccal [   ]  Triceps [   ]  Rib [   ]  Suspect [PCM] 2/2 to physical assessment, [poor intake], and [wt loss]; please see malnutrition chart note.    Estimated energy needs:     Subjective:     Previous Nutrition Diagnosis:    Active [   ]  Resolved [   ]    If resolved, new PES:     Goal:    Recommendations:    Education:     Risk Level: High [   ] Moderate [   ] Low [   ] Admitting Diagnosis:   Patient is a 76y old  Female who presents with a chief complaint of SAH (07 Jan 2021 08:38)    PAST MEDICAL & SURGICAL HISTORY:  Hyperlipidemia    Hypertension    COPD (chronic obstructive pulmonary disease)    Asthma    No significant past surgical history    Current Nutrition Order:   Diet, NPO after Midnight:      NPO Start Date: 06-Jan-2021,   NPO Start Time: 23:59  Except Medications (01-06-21 @ 15:57)    PO Intake: Good (%) [   ]  Fair (50-75%) [   ] Poor (<25%) [   ]-N/A    GI Issues: no N/V noted, last BM 1/5    Pain: none noted at this time    Skin Integrity: Greg score 16    Labs:   01-06    132<L>  |  98  |  18  ----------------------------<  157<H>  4.7   |  22  |  0.41<L>    Ca    8.8      06 Jan 2021 14:09  Phos  2.7     01-06  Mg     2.0     01-06    Medications:  MEDICATIONS  (STANDING):  amLODIPine   Tablet 10 milliGRAM(s) Oral every 24 hours  atorvastatin 40 milliGRAM(s) Oral at bedtime  ceFAZolin   IVPB 2000 milliGRAM(s) IV Intermittent once  chlorhexidine 0.12% Liquid 15 milliLiter(s) Oral Mucosa two times a day  chlorhexidine 2% Cloths 1 Application(s) Topical <User Schedule>  cyanocobalamin 1000 MICROGram(s) Oral daily  dextrose 40% Gel 15 Gram(s) Oral once  dextrose 5%. 1000 milliLiter(s) (50 mL/Hr) IV Continuous <Continuous>  dextrose 5%. 1000 milliLiter(s) (100 mL/Hr) IV Continuous <Continuous>  dextrose 50% Injectable 25 Gram(s) IV Push once  fentaNYL    Injectable 50 MICROGram(s) IV Push once  ferrous    sulfate 325 milliGRAM(s) Oral daily  fludroCORTISONE 0.1 milliGRAM(s) Oral every 12 hours  glucagon  Injectable 1 milliGRAM(s) IntraMuscular once  lacosamide Solution 150 milliGRAM(s) Oral every 12 hours  midazolam Injectable 2 milliGRAM(s) IV Push once  modafinil 100 milliGRAM(s) Oral daily  montelukast 10 milliGRAM(s) Oral daily  nystatin    Suspension [id number] Unit(s) Oral four times a day  pantoprazole   Suspension 40 milliGRAM(s) Oral two times a day  sodium chloride 0.9%. 1000 milliLiter(s) (50 mL/Hr) IV Continuous <Continuous>  sodium chloride 3%. 250 milliLiter(s) (250 mL/Hr) IV Continuous <Continuous>  tiotropium 18 MICROgram(s) Capsule 1 Capsule(s) Inhalation daily    MEDICATIONS  (PRN):  acetaminophen    Suspension .. 650 milliGRAM(s) Oral every 6 hours PRN Temp greater or equal to 38C (100.4F), Mild Pain (1 - 3)  albuterol/ipratropium for Nebulization. 3 milliLiter(s) Nebulizer every 6 hours PRN Shortness of Breath  labetalol Injectable 2.5 milliGRAM(s) IV Push every 6 hours PRN Systolic blood pressure > 220    Anthropometric Measurements:   Height 4'11'' IBW 98 pounds +-10%  Weight 110 pounds  BMI 22.2, %EZG=986    Weight:  49.4kg (12/9)  49.9kg (12/30)    Weight Change: no updated weights since 12/30, please obtain current weight and trend bi-weekly weights for further assessment    Nutrition Focused Physical Exam: Completed [   ]  Not Pertinent [ X ]    Estimated Energy Needs:  ABW used to calculate energy needs due to pt's current body weight within % IBW (112%)  Needs adjusted for age, post-op. Fluid needs per team.    · Weight (lbs)	110 lb  · Weight (kg)	49.8 kg  · Enter From (mulu/kg)	25  · Enter To (mulu/kg)	30  · Calculated From (mulu/kg)	1245  · Calculated To (mulu/kg)	1494     Estimated Protein Needs:  · Weight (lbs)	110 lb  · Weight (kg)	49.8 kg  · Enter From (g/kg)	1.2  · Enter To (g/kg)	1.4  · Calculated From (g/kg)	59.76  · Calculated To (g/kg)	69.72    Subjective: 74 yo Female with PMHx of COPD, asthma, HLD, HTN, BIBEMS to [facility] from home after HHA found pt down on the floor covered in non-bloody vomitus. CTH reveals diffuse subarachnoid hemorrhage mainly at basilar cisterns with IVH and obstructive hydrocephalus, CTA shows 3mm left pcomm aneurysm, now s/p bedside right frontal EVD placement 12/10, s/p cerebral angiogram for coil embolization of left PCOMM aneurysm 12/10, now s/p cerebral angio for verapamil c/b device malfunction of Proglide, s/p right groin cutdown for removal of proglide catheter and femoral artery repair 12/17, NIHSS2 HH3 MF4. PT underwent LP for CSF high volume tap 12/28. EVD Re-insertion 12/29 (had been removed 12/25). Ventriculoperitoneal shunt placed 12/30.    Pt seen sleeping in bed this AM, mean , pt satting 93-99% on room air. 3% bolus ordered this AM for low Na (132 on 1/6 lab), Na goal= 135-145. NGT placed for medications and TF 12/16 2/2 failed RN dysphagia screen. SLP has been following during admission, most recent SLP visit 1/2 for Puree/Thin Liquid. PO intake poor even when pt wasn't receiving TF so plan for PEG placment today, pt NPO. Please see below for full nutritional recommendations- d/w team. RD to monitor and f/u per protocol.    Previous Nutrition Diagnosis:  Increased nutrient need RT increased kcal/protein demand AEB post-op  Active [ X  ]  Resolved [   ]    Goal: Meet >/=75%EER via most appropriate route with good tolerance    Recommendations:  1. As medically appropriate post-PEG placement, recommend Jevity 1.2 start at 20mL/hr increase to goal of 50mL/hr x24h via PEG (1200mL TV, 1440 kcal, 66gm protein, 968mL free water, 120% RDI, ~29 kcal/kg ABW, ~1.3gm protein/kg ABW)  >>order for volume based feeding protocol, monitor for s/s intolerance, maintain aspiration precautions at all times.  2. As medically appropriate/mental status, resume diet recommended per SLP  >>if intake increases, assess need to change TF regimen (please consult nutrition as needed)  3. Monitor labs (BMP, lytes, [id number]); replete lytes prn  4. Obtain current weight and trend bi-weekly weights    Education: N/A 2/2 clinical status    Risk Level: High [ X ]  Moderate [   ] Low [   ]

## 2021-01-07 NOTE — PROCEDURAL SAFETY CHECKLIST WITH OR WITHOUT SEDATION - NSPROCEDPERFORMDFREE_GEN_ALL_CORE
Placement of Arterial Line
EVD placement
Central line placement
Right Frontal Ventriculostomy replacement
EVD insertion (soft)
lumbar puncture
PEG

## 2021-01-07 NOTE — PROCEDURAL SAFETY CHECKLIST WITH OR WITHOUT SEDATION - NSTIMEOUTDATE_GEN_ALL_CORE
15-Dec-2020 17:41
10-Christian-2020 01:30
10-Dec-2020 18:25
13-Dec-2020 13:00
07-Jan-2021 14:35
28-Dec-2020 14:15
29-Dec-2020 02:00

## 2021-01-07 NOTE — PROGRESS NOTE ADULT - ASSESSMENT
75y/Female with:  aneursymal subarachnoid hemorrhage, L pcomm aneurysm, L parophthalmic aneurysm; brain compression, cerebral edema  osbtructive hydrocephalus  lacunar infarcts R caudate, B thalami, cerebellar hemispheres ?artery to artery vs cardioembolic?  atherosclerotic cardiovascular disease; mod to severe bilateral ICA stenosis (R>L), R VA stenosis  Hypertension dyslipidemia   COPD asthma  newly diagnosed Diabetes Mellitus?  troponin leak    PLAN: Day 1 = 12-09 ; Day 4 =  12/12; Day 21 = 12/29  NEURO: neurochecks q2h, VS q1, PRN pain meds with Tylenol   SAH:  off nimodipine   bilateral ICA stenosis - start ASA once cleared by NS  Hydrocephalus:  s/p VPS   Seizure treatment:  lacosamide 150mg PO BID   REHAB:  physical therapy evaluation and management    EARLY MOB:  OOB to chair    PULM:  Room air, incentive spirometry, continue montelukast, ipratropium / albuterol PRN, tiotropium daily   CARDIO:  SBP goal 100-200mm Hg, TTE EF 70%; continue amlodipine, lisinopril  ENDO:  Blood sugar goals 140-180 mg/dL, atorvastatin 40mg  GI:  BID protonix for GIB  DIET: NPO  RENAL: IVF while NPO, cont salt tabs and fludrocortisone; straight q6h, Na goal 135-145  HEM/ONC: Hb stable  VTE Prophylaxis: SCDs, off SQL (PEG) - R UE cephalic vein thrombosis - f/u repeat UE doppler   ID: afebrile, no leukocytosis   Social: will update family    CORE MEASURES  1.  Nath and Jacobo Score = 3  2.  VTE prophylaxis:  [ ] administered within 24 hours of admission OR [X] reason not done: fresh bleed, unsecured aneurysm.  3.  Dysphagia screening:   [X] performed before any oral meds / liquids / food  4.  Nimodipine treatment:  [X] administered within 24 hours of admission OR [ ] reason not done:    ATTENDING ATTESTATION:  I was physically present for the key portions of the evaluation and management (E/M) service provided.  I agree with the above history, physical and plan, which I have reviewed and edited where appropriate.    Patient at high risk for neurological deterioration or death due to:  ICU delirium, aspiration PNA, DVT / PE.  Critical care time:  I have personally provided 30 minutes of critical care time, excluding time spent on separate procedures.      Plan discussed with RN, house staff.    Discharge Checklist:    Completed: 01-07 @ 21:42    [X] hemodynamically stable – VS WNL and stable x 24hours, UO adequate  [n/a ] if  previously on HDA - off pressors x 24h with stable neuro exam    [X] no new symptoms x 24h (i.e. new fever, new-onset nausea/vomiting)  [X] stable labs: (i.e. WBC not rising, sodium not dropping)  [n/a] if high aspiration risk (modified MASA score):                  [ ] should have definitive plans for trach/PEG (alternative option is to discharge from ICU to facilty)                  [ ] stepdown to bed close to nurse’s station  [n/a] low suctioning requirements (i.e. q4h or less)  [X] sign-off from primary RN*  [X] drains do not require ICU level of care  [X] if patient previously agitated or with behavioral issues – controlled   [X] pain controlled

## 2021-01-07 NOTE — PROCEDURAL SAFETY CHECKLIST WITH OR WITHOUT SEDATION - NSPREPROCFT_GEN_ALL_CORE
Central line placement
EVD placement
Right Frontal Ventriculostomy replacement
EVD insertion (soft)
Placement of Arterial Line
lumbar puncture
PEG placement

## 2021-01-07 NOTE — PROCEDURAL SAFETY CHECKLIST WITH OR WITHOUT SEDATION - NSPRESEDATIONFT_GEN_ALL_CORE
Physician confirms case reviewed for anesthesia consultation requirements.

## 2021-01-07 NOTE — PROCEDURAL SAFETY CHECKLIST WITH OR WITHOUT SEDATION - NSPRESURGSED_GEN_ALL_CORE
Present, accurate, and signed
n/a
Present, accurate, and signed

## 2021-01-07 NOTE — PROGRESS NOTE ADULT - SUBJECTIVE AND OBJECTIVE BOX
HPI:  76y/o F with PMHx sig for COPD, asthma, HLD, HTN, BIBEMS to [facility] from home after HHA discovered patient found down in floor covered in non-bloody vomitus. Perr HHA Pt went to the bathroom and was taking a long time, went in to check on her and found her sitting on floor, awake, however with vomitus on front of shirt. On arrival to [facility], patient was taken for stat head CT, which revealed diffuse subarachnoid hemorrhage mainly at basilar cisterns with IVH and obstructive hydrocephalus. Patient was given a bolus of 1g keppra and dilaudid pushes for generalized headache. CTA concerning for 3mm left pcomm aneurysm and a 1.6mm outpouching of distal cavernous segment of the left internal carotid artery likely aneurysm. NIHSS2 HH3 MF4. Patient was transferred to [facility] for further intervention. Patient currently reports headaches. Pt denies acute changes in vision, seizures, CP, SOB, weakness/paresthesias of arms or legs. (09 Dec 2020 23:37)        Hospital Course:   12/9: BD1 Patient admitted for SAH HH3, MF4.   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 EVD at 5, on Levo overnight, nimodipine discontinued d/t hypotension  12/12: BD4 POD#2, YUMIKO overnight, neuro stable, passed TOV  12/13: BD5 POD#3: YUMIKO x 24 hrs  12/14: BD6 POD#4. YUMIKO o/n, neuro stable. EVD at 5cm H2O  12/15: BD7 POD #5 YUMIKO overnight, neuro stable. EVD remains at 5. Plan for CTA today.   12/16: BD8 POD #6 YUMIKO overnight, neuro stable, EVD at 0, on Levo, started on 3% at 15 overnight   12/17: BD9 POD#1 s/p cerebral angio for verapamil c/b device malfunction of Proglide, s/p right groin cutdown for removal of proglide catheter and femoral artery repair.  YUMIKO overnight, neuro stable, EVD at 0. patient remained intubated overnight, on propofol for sedation, and vEEG  12/18: BD#10, POD#8 s/p coil/embo of L pcomm aneurysm, POD#2 s/p IA verapamil, POD#2 R groin cutdown for removal of proglide catheter w/ repair of femoral artery. YUMIKO overnight. EVD remains open @0cmH2O   12/19: BD#11, POD#9 s/p coil/embo of L pcomm aneurysm. POD#3 s/p IA verapamil, POD#3 s/p R femoral artery cutdown of proglide catheter w/ femoral artery repair. YUMIKO overnight. EVD remains open @0cmH2O. EEG shows b/l frontal sharp discharges, cont monitoring, vimpat was increased yesterday. Gentle hydration, total IVF 50cc/hr. Cont vanc/zosyn for empiric coverage, next vanc trough due @1pm on 12/19. F/u daily CXR.   12/20 BD#12 POD#10 YUMIKO overnight, Neuro exam stable, EVD @ 0cm H2O, vEEG, 3% @ 15 goal WNL, TF via NGT  12/21: BD13, POD11 YUMIKO overnight. EVD at 5cmH20 (raised yesterday), vEEG in place  12/22 BD#14, EVD raised to 15 yesterday, 3% @ 30cc Goal WNL, -180, Milrinone, TTE prior to DC as per Card for takotsubo cardiomyopathy, failed S&S pending reeval, TF via NGT, Neuro exam stable  12/23: BD#15. YUMIKO o/n, neuro stable. EVD clamped. 30%@30cc/hr  12/24 BD#16 YMUIKO overnight, spiked 102, EVD @ 0cm H2O, 3% @ 30cc/hr Goal WNL, Vasc following, TF via NGT, -200,   12/25: BD#17. YUMIKO overnight. Pan cx from 12/23, NGTD on CSF and blood. EVD clamped. 3% @15cc/hr, rate kept same overnight. NGT feeds at goal. SBP goal 160-200.   12/26: BD#18. YUMIKO overnight, neuro exam stable. NGTD from pan cx on 12/23. EVD removed today. 3% discontinued 12/25. NGT feeds at goal, IVF off. SBP goal 160-200.   12/27: BD#19 YUMIKO overnight, neuro stable. 3% at 15, stepdown status  12/28: BD20 YUMIKO overnight, neuro stable. 3% continues.  Patient became increasingly more somnolent and underwent LP for CSF high volume tap.  Temporary improvement.  Spiked fever, pancultured.  12/29: BD21 Patient increasingly more somnolent, EVD placed at bedside.    12/30: BD22 pt. is s/p R VPS placement, certas @5 POD#1, post op ct head c/a/p done. afebrile o/n.   1/1: BD#23: pt. is s/p R VPS placement, certas @5 POD#2, post op ct head c/a/p done. zosyn for enterobacter   1/2: BD #24. POD#3 s/p R VPS placement, CErtas @5. Dressings removed from head and abdomen. Cont zosyn for enterobacter and e. coli in urine, end date 1/4/21. Post-op imaging completed. Pend S&S re-eval, improvement in mental status/neuro exam.   1/3: BD25, POD4. SBP goals relaxed 120-200. zosyn for enterobacter UTI until 1/4. FOB+, protonix bid started, yumiko o/n  1/4:  1/5:  1/6: BD 28, POD 7 VPS (Certas @ 5). YUMIKO overnight. Plan for PEG with GI Thursday. Repeat Covid swab negative. Stepdown status.  1/7: BD 29, POD 8. YUMIKO overnight. PEG placed at bedside by GI today. Stepdown status         Vital Signs Last 24 Hrs  T(C): 36.6 (07 Jan 2021 17:55), Max: 37.1 (06 Jan 2021 22:31)  T(F): 97.9 (07 Jan 2021 17:55), Max: 98.8 (06 Jan 2021 22:31)  HR: 75 (07 Jan 2021 17:00) (70 - 90)  BP: 174/75 (07 Jan 2021 17:00) (121/60 - 178/75)  BP(mean): 108 (07 Jan 2021 17:00) (84 - 108)  RR: 21 (07 Jan 2021 17:00) (17 - 36)  SpO2: 100% (07 Jan 2021 17:00) (93% - 100%)    I&O's Detail    06 Jan 2021 07:01  -  07 Jan 2021 07:00  --------------------------------------------------------  IN:    Enteral Tube Flush: 220 mL    Glucerna: 720 mL    Oral Fluid: 240 mL    sodium chloride 0.9%: 400 mL  Total IN: 1580 mL    OUT:    Intermittent Catheterization - Urethral (mL): 1900 mL  Total OUT: 1900 mL    Total NET: -320 mL      07 Jan 2021 07:01  -  07 Jan 2021 18:55  --------------------------------------------------------  IN:    sodium chloride 0.9%: 400 mL    sodium chloride 3%: 250 mL  Total IN: 650 mL    OUT:    Intermittent Catheterization - Urethral (mL): 600 mL  Total OUT: 600 mL    Total NET: 50 mL        I&O's Summary    06 Jan 2021 07:01  -  07 Jan 2021 07:00  --------------------------------------------------------  IN: 1580 mL / OUT: 1900 mL / NET: -320 mL    07 Jan 2021 07:01  -  07 Jan 2021 18:55  --------------------------------------------------------  IN: 650 mL / OUT: 600 mL / NET: 50 mL        PHYSICAL EXAM:  GEN: laying in bed, appears well, NAD  NEURO: AOx2. FC, OE spont, speech intact, face symmetric. CNII-XII intact. PERRL, EOMI. No pronator drift. MAEx4. 5/5 strength throughout. SILT  CV: RRR +S1/S2  PULM: CTAB  GI: Abd soft, NT/ND  EXT: ext warm, dry, nontender  WOUND: evd site c/d/i      TUBES/LINES:  [] CVC  [] A-line  [] Lumbar Drain  [] Ventriculostomy  [] ADIN  [] Soriano  [] NGT   [] Other    DIET:  [] NPO  [] Mechanical  [] Tube feeds    LABS:                        11.8   6.45  )-----------( 170      ( 07 Jan 2021 06:29 )             37.6     01-06    132<L>  |  98  |  18  ----------------------------<  157<H>  4.7   |  22  |  0.41<L>    Ca    8.8      06 Jan 2021 14:09  Phos  2.7     01-06  Mg     2.0     01-06              CAPILLARY BLOOD GLUCOSE          Drug Levels: [] N/A    CSF Analysis: [] N/A      Allergies    No Known Allergies    Intolerances        Home Medications:  famotidine 20 mg oral tablet: 1 tab(s) orally once a day (10 Dec 2020 00:38)  lisinopril 2.5 mg oral tablet: 1 tab(s) orally once a day (10 Dec 2020 00:38)  montelukast 10 mg oral tablet: 1 tab(s) orally once a day (10 Dec 2020 00:38)  omeprazole 20 mg oral delayed release capsule: 1 cap(s) orally once a day (12 Dec 2020 16:14)  simvastatin 20 mg oral tablet: 1 tab(s) orally once a day (at bedtime) (10 Dec 2020 00:38)      MEDICATIONS:  Antibiotics:  nystatin    Suspension 306754 Unit(s) Oral four times a day    Neuro:  acetaminophen    Suspension .. 650 milliGRAM(s) Oral every 6 hours PRN  lacosamide Solution 150 milliGRAM(s) Oral every 12 hours  modafinil 100 milliGRAM(s) Oral daily    Anticoagulation:    OTHER:  albuterol/ipratropium for Nebulization. 3 milliLiter(s) Nebulizer every 6 hours PRN  amLODIPine   Tablet 10 milliGRAM(s) Oral every 24 hours  atorvastatin 40 milliGRAM(s) Oral at bedtime  chlorhexidine 0.12% Liquid 15 milliLiter(s) Oral Mucosa two times a day  chlorhexidine 2% Cloths 1 Application(s) Topical <User Schedule>  dextrose 40% Gel 15 Gram(s) Oral once  dextrose 50% Injectable 25 Gram(s) IV Push once  fludroCORTISONE 0.1 milliGRAM(s) Oral every 12 hours  glucagon  Injectable 1 milliGRAM(s) IntraMuscular once  labetalol Injectable 2.5 milliGRAM(s) IV Push every 6 hours PRN  lisinopril 5 milliGRAM(s) Oral daily  montelukast 10 milliGRAM(s) Oral daily  pantoprazole   Suspension 40 milliGRAM(s) Oral two times a day  tiotropium 18 MICROgram(s) Capsule 1 Capsule(s) Inhalation daily    IVF:  cyanocobalamin 1000 MICROGram(s) Oral daily  dextrose 5%. 1000 milliLiter(s) IV Continuous <Continuous>  dextrose 5%. 1000 milliLiter(s) IV Continuous <Continuous>  ferrous    sulfate 325 milliGRAM(s) Oral daily  sodium chloride 0.9%. 1000 milliLiter(s) IV Continuous <Continuous>  sodium chloride 3%. 250 milliLiter(s) IV Continuous <Continuous>    CULTURES:  Culture Results:   No growth at 5 days. (12-31 @ 22:37)  Culture Results:   No growth at 5 days. (12-31 @ 22:37)    RADIOLOGY & ADDITIONAL TESTS:      ASSESSMENT:  75y Female  with PMHx of COPD, asthma, HLD, HTN, BIBEMS to [facility] from home after HHA found patient down on the floor covered in non-bloody vomitus. CTH reveals diffuse subarachnoid hemorrhage mainly at basilar cisterns with IVH and obstructive hydrocephalus, CTA shows 3mm left pcomm aneurysm, now s/p bedside right frontal EVD placement 12/10, s/p cerebral angiogram for coil embolization of left PCOMM aneurysm 12/10, now   s/p cerebral angio for verapamil c/b device malfunction of Proglide, s/p right groin cutdown for removal of proglide catheter and femoral artery repair (12/17/2020), NIHSS2 HH3 MF4), s/p EVD removal 12/25,  s/p bedside EVD placement  12/29, POD#8 from R VPS certas at 5, BD#9    HEADACHE    Handoff    MEWS Score    Hyperlipidemia    Hypertension    COPD (chronic obstructive pulmonary disease)    Asthma    COPD (chronic obstructive pulmonary disease)    Asthma    Hydrocephalus, adult    Injury of right femoral artery    Cerebral artery vasospasm    SAH (subarachnoid hemorrhage)    Hydrocephalus, adult    Injury of femoral artery    Cerebral artery vasospasm    SAH (subarachnoid hemorrhage)    Subarachnoid bleed    Obstructive hydrocephalus    Anemia due to acute blood loss    Preoperative clearance    Centrilobular emphysema    Mild persistent asthma without complication    Subarachnoid bleed    Essential hypertension    Pure hypercholesterolemia    Ventriculoperitoneal shunt    Insertion of external ventricular drain    Vascular surgery procedure    Angiogram, carotid and cerebral, bilateral    Angiogram, cerebral, with intracranial aneurysm embolization    No significant past surgical history    HEADACHE    90+    [email or web address]        PLAN:  NEURO:  - neuro/vitals checks  - pain control   - cont modafinil for neurostim  - cont vimpat seizure ppx  - stepdown status     CARDIOVASCULAR:  - -200  - echo and CCTA prior to discharge  - EF now 70%    PULMONARY:  - satting well RA    RENAL:  - repletions prn  - sc prn     GI:  - PEG today  - resume feeds tomorrow (24 hours post placement)    HEME:  - h/h stable  - SQL held for PEG    ID:  - afebrile  - off abx  - repeat covid swab 1/5 negative- isolation dc/d    ENDO:  - glucose goal 140-180    DVT PROPHYLAXIS:  [x] Venodynes                                [x] Heparin/Lovenox    DISPOSITION: SDU status, full code, dispo pending    D/w Dr. Serrano, Dr. Alrfedo      Assessment:  Present when checked    []  GCS  E   V  M     Heart Failure: []Acute, [] acute on chronic , []chronic  Heart Failure:  [] Diastolic (HFpEF), [] Systolic (HFrEF), []Combined (HFpEF and HFrEF), [] RHF, [] Pulm HTN, [] Other    [] MICHAELLE, [] ATN, [] AIN, [] other  [] CKD1, [] CKD2, [] CKD 3, [] CKD 4, [] CKD 5, []ESRD    Encephalopathy: [] Metabolic, [] Hepatic, [] toxic, [] Neurological, [] Other    Abnormal Nurtitional Status: [] malnurtition (see nutrition note), [ ]underweight: BMI < 19, [] morbid obesity: BMI >40, [] Cachexia    [] Sepsis  [] hypovolemic shock,[] cardiogenic shock, [] hemorrhagic shock, [] neuogenic shock  [] Acute Respiratory Failure  []Cerebral edema, [] Brain compression/ herniation,   [] Functional quadriplegia  [] Acute blood loss anemia

## 2021-01-07 NOTE — PROCEDURAL SAFETY CHECKLIST WITH OR WITHOUT SEDATION - NSPREPROCLOCFT_GEN_ALL_CORE
[address] [place] [address]
[address] [facility]/2
[address]

## 2021-01-07 NOTE — PROCEDURAL SAFETY CHECKLIST WITH OR WITHOUT SEDATION - NSPREIMAGEREVIEW_GEN_ALL_CORE
not applicable
scope/done

## 2021-01-07 NOTE — CHART NOTE - NSCHARTNOTEFT_GEN_A_CORE
PEG Procedure Note    After the risks, benefits, and alternatives to the procedure were explained,  informed consent was obtained.  The patient was anesthetized with topical anesthesia and the GIF       endoscope was introduced through the mouth and passed through the esophagus, stomach, and duodenum, which appeared normal.    The stomach was then inflated with air, and by a combination of transillumination and manual palpation, the site for the gastrostomy tube placement was selected and marked on the anterior abdominal wall.  The skin of the anterior abdomen was surgically prepped and draped with sterile towels.    Utilizing strict sterile technique, the selected site was then anesthetized with 1% xylocaine by injection into the skin and subcutaneous tissue.  A 1 cm incision was made through the skin and subcutaneous tissue, and the needle/cannula assembly was then passed through the abdominal wall and through the anterior wall of the stomach, maintaining visualization with the endoscope.  A snare device previously placed through the instrument channel was then opened and placed around the cannula, the needle was removed, and the insertion wire was passed through the cannula and into the stomach lumen.  The snare was then loosened from the cannula, and repositioned to snare the insertion wire.  The snare was then pulled up to the endoscope distal tip, and the scope was then withdrawn bringing with it the snare and insertion wire.    The insertion wire was then released from the snare, and then loop-attached to the [Peg Placed] gastrostomy tube.  Using the "pull technique," the G-tube was then pulled into place by traction on the insertion wire at the abdomonial wall end.   [Peg Placed].   The G-tube insertion site was then cleansed once again, and the external bolster was placed over the tube to secure it to the abdominal wall.  A sterile dressing was then applied and the procedure terminated.     Recommendations:   Peg can be used in 4 hours for meds and in 24 hours for feeds

## 2021-01-07 NOTE — PROCEDURAL SAFETY CHECKLIST WITH OR WITHOUT SEDATION - NSPREPROCEDSEDAT_GEN_ALL_CORE
without sedation
with sedation
with sedation/Versed 1mg IV, Fentanyl 25mcg IV
without sedation/Versed 1 mg IVP given; Fentanyl 25 mcg IVP given
without sedation
without sedation
with sedation

## 2021-01-07 NOTE — PROCEDURAL SAFETY CHECKLIST WITH OR WITHOUT SEDATION - NSPOSTCOMMENTFT_GEN_ALL_CORE
abd binder placed as ordered, confirmation scope for placement
CSF samples sent to lab. EVD reading WDL.
EVD placed, for sedation given 1 mg of Versed IV and Fentanyl 75 mcg IV

## 2021-01-07 NOTE — PROGRESS NOTE ADULT - SUBJECTIVE AND OBJECTIVE BOX
SUBJECTIVE: Patient seen and examined at bedside. Drowsy, does not open eyes to command.     amLODIPine   Tablet 10 milliGRAM(s) Oral every 24 hours  labetalol Injectable 2.5 milliGRAM(s) IV Push every 6 hours PRN  nystatin    Suspension 046945 Unit(s) Oral four times a day    MEDICATIONS  (PRN):  acetaminophen    Suspension .. 650 milliGRAM(s) Oral every 6 hours PRN Temp greater or equal to 38C (100.4F), Mild Pain (1 - 3)  albuterol/ipratropium for Nebulization. 3 milliLiter(s) Nebulizer every 6 hours PRN Shortness of Breath  labetalol Injectable 2.5 milliGRAM(s) IV Push every 6 hours PRN Systolic blood pressure > 220      I&O's Detail    06 Jan 2021 07:01  -  07 Jan 2021 07:00  --------------------------------------------------------  IN:    Enteral Tube Flush: 220 mL    Glucerna: 720 mL    Oral Fluid: 240 mL    sodium chloride 0.9%: 400 mL  Total IN: 1580 mL    OUT:    Intermittent Catheterization - Urethral (mL): 1900 mL  Total OUT: 1900 mL    Total NET: -320 mL      07 Jan 2021 07:01  -  07 Jan 2021 08:38  --------------------------------------------------------  IN:    sodium chloride 0.9%: 50 mL  Total IN: 50 mL    OUT:  Total OUT: 0 mL    Total NET: 50 mL          T(C): 37.1 (01-07-21 @ 07:05), Max: 37.1 (01-06-21 @ 22:31)  HR: 73 (01-07-21 @ 08:00) (73 - 90)  BP: 155/72 (01-07-21 @ 08:00) (106/58 - 161/72)  RR: 20 (01-07-21 @ 08:00) (19 - 26)  SpO2: 97% (01-07-21 @ 08:00) (93% - 99%)    GENERAL: NAD, Resting comfortably in bed, does not open eyes, ZAFAR spontaneously  HEENT: head incision c/d/i  RESP: Nonlabored breathing, No respiratory distress  CARD: Normal rate, Normal peripheral perfusion  GI: Soft, ND, NT, No guarding, No rebound tenderness  EXTREM: WWP, No edema, No gross deformity of extremities  SKIN: No rashes, no lesions  Extremities: right groin soft   Pulses:   Right:                                                                          Left:  FEM [ ]2+ [ ]1+ [ ]doppler                                             FEM [ ]2+ [ ]1+ [ ]doppler    POP [ ]2+ [ ]1+ [ ]doppler                                             POP [ ]2+ [ ]1+ [ ]doppler    DP [ ]2+ [  ]1+ [x ]doppler                                                DP [ ]2+ [ ]1+ [x ]doppler  PT[ ]2+ [ ]1+ [X ]doppler                                                PT [ ]2+ [ ]1+ [x ]doppler    LABS:                        11.8   6.45  )-----------( 170      ( 07 Jan 2021 06:29 )             37.6     01-06    132<L>  |  98  |  18  ----------------------------<  157<H>  4.7   |  22  |  0.41<L>    Ca    8.8      06 Jan 2021 14:09  Phos  2.7     01-06  Mg     2.0     01-06

## 2021-01-08 LAB
ANION GAP SERPL CALC-SCNC: 10 MMOL/L — SIGNIFICANT CHANGE UP (ref 5–17)
BUN SERPL-MCNC: 7 MG/DL — SIGNIFICANT CHANGE UP (ref 7–23)
CALCIUM SERPL-MCNC: 9.2 MG/DL — SIGNIFICANT CHANGE UP (ref 8.4–10.5)
CHLORIDE SERPL-SCNC: 99 MMOL/L — SIGNIFICANT CHANGE UP (ref 96–108)
CO2 SERPL-SCNC: 27 MMOL/L — SIGNIFICANT CHANGE UP (ref 22–31)
CREAT SERPL-MCNC: 0.4 MG/DL — LOW (ref 0.5–1.3)
GLUCOSE SERPL-MCNC: 115 MG/DL — HIGH (ref 70–99)
HCT VFR BLD CALC: 35.8 % — SIGNIFICANT CHANGE UP (ref 34.5–45)
HGB BLD-MCNC: 11.2 G/DL — LOW (ref 11.5–15.5)
MAGNESIUM SERPL-MCNC: 2.5 MG/DL — SIGNIFICANT CHANGE UP (ref 1.6–2.6)
MCHC RBC-ENTMCNC: 29 PG — SIGNIFICANT CHANGE UP (ref 27–34)
MCHC RBC-ENTMCNC: 31.3 GM/DL — LOW (ref 32–36)
MCV RBC AUTO: 92.7 FL — SIGNIFICANT CHANGE UP (ref 80–100)
NRBC # BLD: 0 /100 WBCS — SIGNIFICANT CHANGE UP (ref 0–0)
PHOSPHATE SERPL-MCNC: 3.1 MG/DL — SIGNIFICANT CHANGE UP (ref 2.5–4.5)
PLATELET # BLD AUTO: 278 K/UL — SIGNIFICANT CHANGE UP (ref 150–400)
POTASSIUM SERPL-MCNC: 4 MMOL/L — SIGNIFICANT CHANGE UP (ref 3.5–5.3)
POTASSIUM SERPL-SCNC: 4 MMOL/L — SIGNIFICANT CHANGE UP (ref 3.5–5.3)
RBC # BLD: 3.86 M/UL — SIGNIFICANT CHANGE UP (ref 3.8–5.2)
RBC # FLD: 17.9 % — HIGH (ref 10.3–14.5)
SODIUM SERPL-SCNC: 136 MMOL/L — SIGNIFICANT CHANGE UP (ref 135–145)
WBC # BLD: 9.1 K/UL — SIGNIFICANT CHANGE UP (ref 3.8–10.5)
WBC # FLD AUTO: 9.1 K/UL — SIGNIFICANT CHANGE UP (ref 3.8–10.5)

## 2021-01-08 PROCEDURE — 99024 POSTOP FOLLOW-UP VISIT: CPT

## 2021-01-08 PROCEDURE — 99233 SBSQ HOSP IP/OBS HIGH 50: CPT

## 2021-01-08 PROCEDURE — 93971 EXTREMITY STUDY: CPT | Mod: 26,RT

## 2021-01-08 RX ORDER — ENOXAPARIN SODIUM 100 MG/ML
40 INJECTION SUBCUTANEOUS AT BEDTIME
Refills: 0 | Status: DISCONTINUED | OUTPATIENT
Start: 2021-01-08 | End: 2021-01-26

## 2021-01-08 RX ORDER — ASPIRIN/CALCIUM CARB/MAGNESIUM 324 MG
81 TABLET ORAL DAILY
Refills: 0 | Status: DISCONTINUED | OUTPATIENT
Start: 2021-01-08 | End: 2021-01-20

## 2021-01-08 RX ADMIN — AMLODIPINE BESYLATE 10 MILLIGRAM(S): 2.5 TABLET ORAL at 11:38

## 2021-01-08 RX ADMIN — ATORVASTATIN CALCIUM 40 MILLIGRAM(S): 80 TABLET, FILM COATED ORAL at 22:22

## 2021-01-08 RX ADMIN — Medication 500000 UNIT(S): at 11:38

## 2021-01-08 RX ADMIN — Medication 81 MILLIGRAM(S): at 11:41

## 2021-01-08 RX ADMIN — FLUDROCORTISONE ACETATE 0.1 MILLIGRAM(S): 0.1 TABLET ORAL at 05:47

## 2021-01-08 RX ADMIN — ENOXAPARIN SODIUM 40 MILLIGRAM(S): 100 INJECTION SUBCUTANEOUS at 22:22

## 2021-01-08 RX ADMIN — Medication 325 MILLIGRAM(S): at 11:38

## 2021-01-08 RX ADMIN — FLUDROCORTISONE ACETATE 0.1 MILLIGRAM(S): 0.1 TABLET ORAL at 18:17

## 2021-01-08 RX ADMIN — CHLORHEXIDINE GLUCONATE 15 MILLILITER(S): 213 SOLUTION TOPICAL at 18:17

## 2021-01-08 RX ADMIN — MODAFINIL 100 MILLIGRAM(S): 200 TABLET ORAL at 11:41

## 2021-01-08 RX ADMIN — CHLORHEXIDINE GLUCONATE 15 MILLILITER(S): 213 SOLUTION TOPICAL at 05:47

## 2021-01-08 RX ADMIN — LACOSAMIDE 150 MILLIGRAM(S): 50 TABLET ORAL at 05:48

## 2021-01-08 RX ADMIN — Medication 500000 UNIT(S): at 18:17

## 2021-01-08 RX ADMIN — LISINOPRIL 5 MILLIGRAM(S): 2.5 TABLET ORAL at 05:48

## 2021-01-08 RX ADMIN — Medication 500000 UNIT(S): at 05:47

## 2021-01-08 RX ADMIN — PREGABALIN 1000 MICROGRAM(S): 225 CAPSULE ORAL at 11:38

## 2021-01-08 RX ADMIN — CHLORHEXIDINE GLUCONATE 1 APPLICATION(S): 213 SOLUTION TOPICAL at 06:31

## 2021-01-08 RX ADMIN — TIOTROPIUM BROMIDE 1 CAPSULE(S): 18 CAPSULE ORAL; RESPIRATORY (INHALATION) at 11:42

## 2021-01-08 RX ADMIN — PANTOPRAZOLE SODIUM 40 MILLIGRAM(S): 20 TABLET, DELAYED RELEASE ORAL at 18:17

## 2021-01-08 RX ADMIN — MONTELUKAST 10 MILLIGRAM(S): 4 TABLET, CHEWABLE ORAL at 11:38

## 2021-01-08 RX ADMIN — PANTOPRAZOLE SODIUM 40 MILLIGRAM(S): 20 TABLET, DELAYED RELEASE ORAL at 05:48

## 2021-01-08 NOTE — PROGRESS NOTE ADULT - ASSESSMENT
74y/o F with PMHx sig for COPD, asthma, HLD, HTN, BIBEMS to [facility] with SAH s/p VPS    S/p PEG placement on 1/9   Can be used for feeds this afternoon at 2 pm  Please notify GI if any further questions. Thank you

## 2021-01-08 NOTE — PROGRESS NOTE ADULT - SUBJECTIVE AND OBJECTIVE BOX
Pt seen and examined at bedside.  No acute events overnight    PERTINENT REVIEW OF SYSTEMS:  CONSTITUTIONAL: No weakness, fevers or chills  HEENT: No visual changes; No vertigo or throat pain   GASTROINTESTINAL: No abdominal or epigastric pain. No nausea, vomiting, or hematemesis; No diarrhea or constipation. No melena or hematochezia.  NEUROLOGICAL: No numbness or weakness  SKIN: No itching, burning, rashes, or lesions     Allergies    No Known Allergies    Intolerances      MEDICATIONS:  MEDICATIONS  (STANDING):  amLODIPine   Tablet 10 milliGRAM(s) Oral every 24 hours  aspirin enteric coated 81 milliGRAM(s) Oral daily  atorvastatin 40 milliGRAM(s) Oral at bedtime  chlorhexidine 0.12% Liquid 15 milliLiter(s) Oral Mucosa two times a day  chlorhexidine 2% Cloths 1 Application(s) Topical <User Schedule>  cyanocobalamin 1000 MICROGram(s) Oral daily  dextrose 40% Gel 15 Gram(s) Oral once  dextrose 5%. 1000 milliLiter(s) (50 mL/Hr) IV Continuous <Continuous>  dextrose 5%. 1000 milliLiter(s) (100 mL/Hr) IV Continuous <Continuous>  dextrose 50% Injectable 25 Gram(s) IV Push once  ferrous    sulfate 325 milliGRAM(s) Oral daily  fludroCORTISONE 0.1 milliGRAM(s) Oral every 12 hours  glucagon  Injectable 1 milliGRAM(s) IntraMuscular once  lacosamide Solution 150 milliGRAM(s) Oral every 12 hours  lisinopril 5 milliGRAM(s) Oral daily  modafinil 100 milliGRAM(s) Oral daily  montelukast 10 milliGRAM(s) Oral daily  nystatin    Suspension [id number] Unit(s) Oral four times a day  pantoprazole   Suspension 40 milliGRAM(s) Oral two times a day  sodium chloride 0.9%. 1000 milliLiter(s) (50 mL/Hr) IV Continuous <Continuous>  sodium chloride 3%. 250 milliLiter(s) (250 mL/Hr) IV Continuous <Continuous>  tiotropium 18 MICROgram(s) Capsule 1 Capsule(s) Inhalation daily    MEDICATIONS  (PRN):  acetaminophen    Suspension .. 650 milliGRAM(s) Oral every 6 hours PRN Temp greater or equal to 38C (100.4F), Mild Pain (1 - 3)  albuterol/ipratropium for Nebulization. 3 milliLiter(s) Nebulizer every 6 hours PRN Shortness of Breath  labetalol Injectable 2.5 milliGRAM(s) IV Push every 6 hours PRN Systolic blood pressure > 220    Vital Signs Last 24 Hrs  T(C): 37.1 (08 Jan 2021 07:00), Max: 37.2 (07 Jan 2021 21:00)  T(F): 98.8 (08 Jan 2021 07:00), Max: 99 (07 Jan 2021 21:00)  HR: 74 (08 Jan 2021 09:00) (73 - 94)  BP: 158/72 (08 Jan 2021 09:00) (121/60 - 185/76)  BP(mean): 103 (08 Jan 2021 09:00) (86 - 115)  RR: 19 (08 Jan 2021 09:00) (19 - 36)  SpO2: 100% (08 Jan 2021 09:00) (96% - 100%)    01-07 @ 07:01 - 01-08 @ 07:00  --------------------------------------------------------  IN: 1600 mL / OUT: 2425 mL / NET: -825 mL    01-08 @ 07:01 - 01-08 @ 10:03  --------------------------------------------------------  IN: 100 mL / OUT: 600 mL / NET: -500 mL      PHYSICAL EXAM:    General: more alert today   HEENT: MMM, conjunctiva and sclera clear  Gastrointestinal: Soft non-tender non-distended; Normal bowel sounds; No hepatosplenomegaly. No rebound or guarding, peg in place c/d/i  Skin: Warm and dry. No obvious rash    LABS:                        11.2   9.10  )-----------( 278      ( 08 Jan 2021 05:21 )             35.8     01-08    136  |  99  |  7   ----------------------------<  115<H>  4.0   |  27  |  0.40<L>    Ca    9.2      08 Jan 2021 05:21  Phos  3.1     01-08  Mg     2.5     01-08                        RADIOLOGY & ADDITIONAL STUDIES:

## 2021-01-08 NOTE — PROGRESS NOTE ADULT - ASSESSMENT
75y/Female with:  aneursymal subarachnoid hemorrhage, L pcomm aneurysm, L parophthalmic aneurysm; brain compression, cerebral edema  osbtructive hydrocephalus  lacunar infarcts R caudate, B thalami, cerebellar hemispheres ?artery to artery vs cardioembolic?  atherosclerotic cardiovascular disease; mod to severe bilateral ICA stenosis (R>L), R VA stenosis  Hypertension dyslipidemia   COPD asthma  newly diagnosed Diabetes Mellitus?  troponin leak    PLAN: Day 1 = 12-09 ; Day 4 =  12/12; Day 21 = 12/29  NEURO: neurochecks q 4 h, VS q 4, PRN pain meds with Tylenol   SAH:  off nimodipine   bilateral ICA stenosis - start ASA if ok with NS  Hydrocephalus:  s/p VPS   Seizure treatment (cortical ICH, associated SDH, unclear etiology):  lacosamide 150mg PO BID   REHAB:  physical therapy evaluation and management    EARLY MOB:  OOB to chair    PULM:  Room air, incentive spirometry, continue montelukast, ipratropium / albuterol PRN, tiotropium daily     CARDIO:  SBP goal 120-200mm Hg, TTE EF 70%; d/c midodrine    ENDO:  Blood sugar goals 140-180 mg/dL, d/c insulin sliding scale, atorvastatin 40mg    GI:  BID protonix for GIB; s/p PEG tube 1/7    DIET: add ensure    RENAL: maintain euvolemia, accurate Is and Os; cont salt tabs and fludrocortisone; straight q6h, Na goal 135-145    HEM/ONC: Hb stable    VTE Prophylaxis: SCDs, SQL; R UE cephalic vein thrombosis - repeat UE doppler 01/06    ID: afebrile, no leukocytosis,  piperacillin/tazobactam (last day 01/05/2021)    Social: will update family    CORE MEASURES  1.  Nath and Jacobo Score = 3  2.  VTE prophylaxis:  [ ] administered within 24 hours of admission OR [X] reason not done: fresh bleed, unsecured aneurysm.  3.  Dysphagia screening:   [X] performed before any oral meds / liquids / food  4.  Nimodipine treatment:  [X] administered within 24 hours of admission OR [ ] reason not done:    ATTENDING ATTESTATION:  I was physically present for the key portions of the evaluation and management (E/M) service provided.  I agree with the above history, physical and plan, which I have reviewed and edited where appropriate.    Patient at high risk for neurological deterioration or death due to:  ICU delirium, aspiration PNA, DVT / PE.  Critical care time:  I have personally provided 30 minutes of critical care time, excluding time spent on separate procedures.      Plan discussed with RN, house staff.    Discharge Checklist:    Completed: 01-03 @ 09:27    [X] hemodynamically stable – VS WNL and stable x 24hours, UO adequate  [x ] if  previously on HDA - off pressors x 24h with stable neuro exam  --> bilateral ICA stenosis, starting ASA today; will keep in ICU x 24h  [X] no new symptoms x 24h (i.e. new fever, new-onset nausea/vomiting)  [X] stable labs: (i.e. WBC not rising, sodium not dropping)  [x] no high aspiration risk (modified MASA score):                  [ ] should have definitive plans for trach/PEG (alternative option is to discharge from ICU to facilty)                  [ ] stepdown to bed close to nurse’s station  [x] low suctioning requirements (i.e. q4h or less)  [X] sign-off from primary RN*  [X] drains do not require ICU level of care  [X] if patient previously agitated or with behavioral issues – controlled   [X] pain controlled

## 2021-01-08 NOTE — PROGRESS NOTE ADULT - SUBJECTIVE AND OBJECTIVE BOX
HPI:  76y/o F with PMHx sig for COPD, asthma, HLD, HTN, BIBEMS to [facility] from home after HHA discovered patient found down in floor covered in non-bloody vomitus. Perr HHA Pt went to the bathroom and was taking a long time, went in to check on her and found her sitting on floor, awake, however with vomitus on front of shirt. On arrival to [facility], patient was taken for stat head CT, which revealed diffuse subarachnoid hemorrhage mainly at basilar cisterns with IVH and obstructive hydrocephalus. Patient was given a bolus of 1g keppra and dilaudid pushes for generalized headache. CTA concerning for 3mm left pcomm aneurysm and a 1.6mm outpouching of distal cavernous segment of the left internal carotid artery likely aneurysm. NIHSS2 HH3 MF4. Patient was transferred to [facility] for further intervention. Patient currently reports headaches. Pt denies acute changes in vision, seizures, CP, SOB, weakness/paresthesias of arms or legs. (09 Dec 2020 23:37)    Hospital Course:   12/9: BD1 Patient admitted for SAH HH3, MF4.   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 EVD at 5, on Levo overnight, nimodipine discontinued d/t hypotension  12/12: BD4 POD#2, YUMIKO overnight, neuro stable, passed TOV  12/13: BD5 POD#3: YUMIKO x 24 hrs  12/14: BD6 POD#4. YUMIKO o/n, neuro stable. EVD at 5cm H2O  12/15: BD7 POD #5 YUMIKO overnight, neuro stable. EVD remains at 5. Plan for CTA today.   12/16: BD8 POD #6 YUMIKO overnight, neuro stable, EVD at 0, on Levo, started on 3% at 15 overnight   12/17: BD9 POD#1 s/p cerebral angio for verapamil c/b device malfunction of Proglide, s/p right groin cutdown for removal of proglide catheter and femoral artery repair.  YUMIKO overnight, neuro stable, EVD at 0. patient remained intubated overnight, on propofol for sedation, and vEEG  12/18: BD#10, POD#8 s/p coil/embo of L pcomm aneurysm, POD#2 s/p IA verapamil, POD#2 R groin cutdown for removal of proglide catheter w/ repair of femoral artery. YUMIKO overnight. EVD remains open @0cmH2O   12/19: BD#11, POD#9 s/p coil/embo of L pcomm aneurysm. POD#3 s/p IA verapamil, POD#3 s/p R femoral artery cutdown of proglide catheter w/ femoral artery repair. YUMIKO overnight. EVD remains open @0cmH2O. EEG shows b/l frontal sharp discharges, cont monitoring, vimpat was increased yesterday. Gentle hydration, total IVF 50cc/hr. Cont vanc/zosyn for empiric coverage, next vanc trough due @1pm on 12/19. F/u daily CXR.   12/20 BD#12 POD#10 YUMIKO overnight, Neuro exam stable, EVD @ 0cm H2O, vEEG, 3% @ 15 goal WNL, TF via NGT  12/21: BD13, POD11 YUMIKO overnight. EVD at 5cmH20 (raised yesterday), vEEG in place  12/22 BD#14, EVD raised to 15 yesterday, 3% @ 30cc Goal WNL, -180, Milrinone, TTE prior to DC as per Card for takotsubo cardiomyopathy, failed S&S pending reeval, TF via NGT, Neuro exam stable  12/23: BD#15. YUMIKO o/n, neuro stable. EVD clamped. 30%@30cc/hr  12/24 BD#16 YUMIKO overnight, spiked 102, EVD @ 0cm H2O, 3% @ 30cc/hr Goal WNL, Vasc following, TF via NGT, -200,   12/25: BD#17. YUMIKO overnight. Pan cx from 12/23, NGTD on CSF and blood. EVD clamped. 3% @15cc/hr, rate kept same overnight. NGT feeds at goal. SBP goal 160-200.   12/26: BD#18. YUMIKO overnight, neuro exam stable. NGTD from pan cx on 12/23. EVD removed today. 3% discontinued 12/25. NGT feeds at goal, IVF off. SBP goal 160-200.   12/27: BD#19 YUMIKO overnight, neuro stable. 3% at 15, stepdown status  12/28: BD20 YUMIKO overnight, neuro stable. 3% continues.  Patient became increasingly more somnolent and underwent LP for CSF high volume tap.  Temporary improvement.  Spiked fever, pancultured.  12/29: BD21 Patient increasingly more somnolent, EVD placed at bedside.    12/30: BD22 pt. is s/p R VPS placement, certas @5 POD#1, post op ct head c/a/p done. afebrile o/n.   1/1: BD#23: pt. is s/p R VPS placement, certas @5 POD#2, post op ct head c/a/p done. zosyn for enterobacter   1/2: BD #24. POD#3 s/p R VPS placement, CErtas @5. Dressings removed from head and abdomen. Cont zosyn for enterobacter and e. coli in urine, end date 1/4/21. Post-op imaging completed. Pend S&S re-eval, improvement in mental status/neuro exam.   1/3: BD25, POD4. SBP goals relaxed 120-200. zosyn for enterobacter UTI until 1/4. FOB+, protonix bid started, yumiko o/n  1/4: BD 26, YUMIKO o/n  1/5: BD 27, YUMIKO o/n   1/6: BD 28, POD 7 VPS (Certas @ 5). YUMIKO overnight. Plan for PEG with GI Thursday. Repeat Covid swab negative. Stepdown status.  1/7: BD 29, POD 8. YUMIKO overnight. PEG placed at bedside by GI today. Stepdown status  1/8: BD 30 POD 9, YUMIKO o/n, resume TF 24 hrs after PEG, stepdown     Vital Signs Last 24 Hrs  T(C): 37.2 (07 Jan 2021 21:00), Max: 37.2 (07 Jan 2021 21:00)  T(F): 99 (07 Jan 2021 21:00), Max: 99 (07 Jan 2021 21:00)  HR: 86 (08 Jan 2021 00:00) (70 - 94)  BP: 166/74 (08 Jan 2021 00:00) (121/60 - 185/76)  BP(mean): 107 (08 Jan 2021 00:00) (86 - 109)  RR: 23 (08 Jan 2021 00:00) (17 - 36)  SpO2: 98% (08 Jan 2021 00:00) (93% - 100%)    I&O's Summary    06 Jan 2021 07:01  -  07 Jan 2021 07:00  --------------------------------------------------------  IN: 1580 mL / OUT: 1900 mL / NET: -320 mL    07 Jan 2021 07:01  -  08 Jan 2021 02:06  --------------------------------------------------------  IN: 1300 mL / OUT: 1500 mL / NET: -200 mL    PHYSICAL EXAM:  GEN: laying in bed, appears well, NAD  NEURO: AOx2. FC, OE spont, speech intact, face symmetric. CNII-XII intact. PERRL, EOMI. No pronator drift. MAEx4. 5/5 strength throughout. SILT  CV: RRR +S1/S2  PULM: CTAB  GI: Abd soft, NT/ND  EXT: ext warm, dry, nontender  WOUND: evd site c/d/i, PEG site C/D/I     TUBES/LINES:  [] Soriano  [] Lumbar Drain  [] Wound Drains  [] Others    DIET:  [] NPO  [] Mechanical  [x] Tube feeds - PEG     LABS:                        11.8   6.45  )-----------( 170      ( 07 Jan 2021 06:29 )             37.6     01-07    139  |  106  |  8   ----------------------------<  119<H>  3.6   |  22  |  0.34<L>    Ca    8.8      07 Jan 2021 18:20  Phos  3.4     01-07  Mg     1.8     01-07        CAPILLARY BLOOD GLUCOSE    Drug Levels: [] N/A    CSF Analysis: [] N/A    Allergies    No Known Allergies    Intolerances      MEDICATIONS:  Antibiotics:  nystatin    Suspension 936320 Unit(s) Oral four times a day    Neuro:  acetaminophen    Suspension .. 650 milliGRAM(s) Oral every 6 hours PRN  lacosamide Solution 150 milliGRAM(s) Oral every 12 hours  modafinil 100 milliGRAM(s) Oral daily    Anticoagulation:    OTHER:  albuterol/ipratropium for Nebulization. 3 milliLiter(s) Nebulizer every 6 hours PRN  amLODIPine   Tablet 10 milliGRAM(s) Oral every 24 hours  atorvastatin 40 milliGRAM(s) Oral at bedtime  chlorhexidine 0.12% Liquid 15 milliLiter(s) Oral Mucosa two times a day  chlorhexidine 2% Cloths 1 Application(s) Topical <User Schedule>  dextrose 40% Gel 15 Gram(s) Oral once  dextrose 50% Injectable 25 Gram(s) IV Push once  fludroCORTISONE 0.1 milliGRAM(s) Oral every 12 hours  glucagon  Injectable 1 milliGRAM(s) IntraMuscular once  labetalol Injectable 2.5 milliGRAM(s) IV Push every 6 hours PRN  lisinopril 5 milliGRAM(s) Oral daily  montelukast 10 milliGRAM(s) Oral daily  pantoprazole   Suspension 40 milliGRAM(s) Oral two times a day  tiotropium 18 MICROgram(s) Capsule 1 Capsule(s) Inhalation daily    IVF:  cyanocobalamin 1000 MICROGram(s) Oral daily  dextrose 5%. 1000 milliLiter(s) IV Continuous <Continuous>  dextrose 5%. 1000 milliLiter(s) IV Continuous <Continuous>  ferrous    sulfate 325 milliGRAM(s) Oral daily  sodium chloride 0.9%. 1000 milliLiter(s) IV Continuous <Continuous>  sodium chloride 3%. 250 milliLiter(s) IV Continuous <Continuous>    CULTURES:  Culture Results:   No growth at 5 days. (12-31 @ 22:37)  Culture Results:   No growth at 5 days. (12-31 @ 22:37)    RADIOLOGY & ADDITIONAL TESTS:      ASSESSMENT:  75y Female  with PMHx of COPD, asthma, HLD, HTN, BIBEMS to [facility] from home after HHA found patient down on the floor covered in non-bloody vomitus. CTH reveals diffuse subarachnoid hemorrhage mainly at basilar cisterns with IVH and obstructive hydrocephalus, CTA shows 3mm left pcomm aneurysm, now s/p bedside right frontal EVD placement 12/10, s/p cerebral angiogram for coil embolization of left PCOMM aneurysm 12/10, now   s/p cerebral angio for verapamil c/b device malfunction of Proglide, s/p right groin cutdown for removal of proglide catheter and femoral artery repair (12/17/2020), NIHSS2 HH3 MF4), s/p EVD removal 12/25,  s/p bedside EVD placement 12/29, POD 9 from R VPS certas at 5.       NEURO:  - neuro/vitals checks   - pain control   - cont modafinil for neurostim   - cont vimpat seizure ppx  -Consider ASA for B/L ICA stenosis   - stepdown status     CARDIOVASCULAR:  - -200  -Continue Lisinopril and amlodipine  - echo and CCTA prior to discharge  - EF now 70%    PULMONARY:  - satting well RA  -Contiue monelukast, Spiriva, and duonebs     RENAL:  - repletions prn  - sc prn     GI:  - PEG 1/6 – no note, reach out to GI what time to start TF  - Resume feeds today (24 hours post placement)    HEME:  - h/h stable  - SQL held for PEG    ID:  - afebrile  - off abx  - repeat covid swab 1/5 negative- isolation dc/d    ENDO:  - glucose goal 140-180    DVT PROPHYLAXIS:  -SCDs  - SQL held for PEG, resume  - Rt upper extremity doppler (R upper cephalic DVT)     VASCULAR: s/p proglide malfunction, R groin cutdown & femoral artery repair (12/16)   -Following, pulse/groin checks RLE     DISPOSITION: SDU status, full code, dispo pending    D/w Dr. Serrano and Dr. Nino

## 2021-01-08 NOTE — PROGRESS NOTE ADULT - SUBJECTIVE AND OBJECTIVE BOX
=================================  NEUROCRITICAL CARE ATTENDING NOTE  =================================    CARA CANCHOLA   MRN-[id number]  Summary:  75y/F with COPD, asthma, dyslipidemia Hypertension found down.  Brought to [facility] where imaging showed SAH basilar cisterns with IVH and obstructive hydrocephalus L Pcomm aneurysm.  Given levetiracetam, hydromorphone.  NIHSS 2 HH3 MF4, transferred to [facility] for further management.      COURSE IN THE HOSPITAL:   admitted to [facility], EVD inserted; given 20 lasix for pulmo edema, T inversion on CT  12/10 No significant events overnight.    No significant events overnight.    No significant events overnight.    No significant events overnight. drain stopped working at 730 a.m., off levophed    No significant events overnight.   12/15 confusion   stepped down   readmitted to ICU    more confused somnolent overnight, high volume LP - 30cc removed, CT scan, EVD inserted, febrile pancultured, UA (+) rocephin started   Tmax 38.6  EVD clamped this morning, given 1L fluid bolus overnight, s/p VPS   Tmax 38.2    Tmax 39 500cc bolus overnight x2, pancultured for fever   Tmax 38.1 multiple bowel movements overnight   Tmax 37.8 wheezing overnight, given nebs     VIVIEN    s/p PEG    Overnight Events:       PHYSICAL EXAMINATION  T(C): 36.8 ( @ 02:30), Max: 37.2 ( @ 21:00)  HR: 76 ( @ 05:00) (70 - 94)  BP: 159/70 ( @ 05:00) (121/60 - 185/76)  RR: 20 ( @ 05:00) (17 - 36)  SpO2: 96% ( @ 05:00) (96% - 100%)  CVP(mm Hg): --    LABS:  CAPILLARY BLOOD GLUCOSE                              11.2   9.10  )-----------( 278      ( 2021 05:21 )             35.8         136  |  99  |  7   ----------------------------<  115<H>  4.0   |  27  |  0.40<L>    Ca    9.2      2021 05:21  Phos  3.1       Mg     2.5      @ 07:01  -   @ 07:00  --------------------------------------------------------  IN: 1600 mL / OUT: 2425 mL / NET: -825 mL        MEDICATIONS:  MEDICATIONS  (STANDING):  amLODIPine   Tablet 10 milliGRAM(s) Oral every 24 hours  atorvastatin 40 milliGRAM(s) Oral at bedtime  chlorhexidine 0.12% Liquid 15 milliLiter(s) Oral Mucosa two times a day  chlorhexidine 2% Cloths 1 Application(s) Topical <User Schedule>  cyanocobalamin 1000 MICROGram(s) Oral daily  dextrose 40% Gel 15 Gram(s) Oral once  dextrose 5%. 1000 milliLiter(s) (50 mL/Hr) IV Continuous <Continuous>  dextrose 5%. 1000 milliLiter(s) (100 mL/Hr) IV Continuous <Continuous>  dextrose 50% Injectable 25 Gram(s) IV Push once  ferrous    sulfate 325 milliGRAM(s) Oral daily  fludroCORTISONE 0.1 milliGRAM(s) Oral every 12 hours  glucagon  Injectable 1 milliGRAM(s) IntraMuscular once  lacosamide Solution 150 milliGRAM(s) Oral every 12 hours  lisinopril 5 milliGRAM(s) Oral daily  modafinil 100 milliGRAM(s) Oral daily  montelukast 10 milliGRAM(s) Oral daily  nystatin    Suspension 245595 Unit(s) Oral four times a day  pantoprazole   Suspension 40 milliGRAM(s) Oral two times a day  sodium chloride 0.9%. 1000 milliLiter(s) (50 mL/Hr) IV Continuous <Continuous>  sodium chloride 3%. 250 milliLiter(s) (250 mL/Hr) IV Continuous <Continuous>  tiotropium 18 MICROgram(s) Capsule 1 Capsule(s) Inhalation daily    MEDICATIONS  (PRN):  acetaminophen    Suspension .. 650 milliGRAM(s) Oral every 6 hours PRN Temp greater or equal to 38C (100.4F), Mild Pain (1 - 3)  albuterol/ipratropium for Nebulization. 3 milliLiter(s) Nebulizer every 6 hours PRN Shortness of Breath  labetalol Injectable 2.5 milliGRAM(s) IV Push every 6 hours PRN Systolic blood pressure > 220          Past Medical History: Hyperlipidemia Hypertension COPD (chronic obstructive pulmonary disease) Asthma  Allergies:  No Known Allergies  Home meds:   ·	famotidine 20 mg oral tablet: 1 tab(s) orally once a day  ·	lisinopril 2.5 mg oral tablet: 1 tab(s) orally once a day  ·	montelukast 10 mg oral tablet: 1 tab(s) orally once a day  ·	predniSONE 50 mg oral tablet: 1 tab(s) orally once a day  ·	ProAir HFA CFC free 90 mcg/inh inhalation aerosol: 2 puff(s) inhaled 4 times a day PRN  ·	simvastatin 20 mg oral tablet: 1 tab(s) orally once a day (at bedtime)    PHYSICAL EXAMINATION    NEUROLOGIC EXAMINATION:  Patient is awake, oriented x 2, CLEOPATRA, following commands, moving all 4s, R shoulder movement pain limited    CARDIOVASCULAR: (+) S1 S2, normal rate and regular rhythm  PULMONARY: clear to auscultation bilaterally  ABDOMEN: soft, nontender with normoactive bowel sounds  EXTREMITIES: no edema  SKIN: no rash      HbA1C = 6.7 (12-10) 6.4 ()  LDL = 112 ()   HDL = 66 ()  TG = 114 ()   TSH = 0.990 ()    Bacteriology:  UA (+) LE (+) N   Blood CS NG2D x2    CSF NGTD   Urine Enterobacter E coli   Blood culture NG5D (final)   CSF NGTD    Blood NG5D x2    CSF studies:    L   *** [id number] WBC9 *** %N3 %L5     L   *** [id number] WBC20 *** %N10 %L8   12-23  L   *** [id number] WBC24 *** %N7 %L13     EEG:  Neuroimagin/10 CT head:  EVD placement, stable   CTA:  L pcomm aneurysm, L ICA (distal cavernous) aneurysm vs infundibulum, extensive calcified plaque cavernous bilateral ICA with mild to mod stenosis; thick plaque bilateral carotid bifurcations - mod to severe R and mild to mod L stenosis, focal calcified plaque R VA off subclavian artery - high grate stenosis / partial occlusion, upper lung bilateral opacification - pulmo edema, chronic deformity R humeral neck   CT head:  SAH, basilar cisterns, IV extension, obstructive hydrocephalus SVID, lacunar infarcts R caudate, both thalami, cerebellar hemispheres, no CT evidence of territorial infarction  Other imagin/28 LE Doppler: NEG   LE Doppler: NEG   CT abd:  small paraesophageal hiatal hernia, gastritis; ?impaction, septal thickening bilateral lower lobes ?pulmo edema, hepatic steatosis      IV FLUIDS: IVL  DRIPS:  DIET: Glucerna  Lines:   Drains: rectal tube     EVD at 5cm H20   EVD at 5cm H20 ICP < 10   EVD at 5cm H20 ICP <10   EVD clamped

## 2021-01-09 ENCOUNTER — FORM ENCOUNTER (OUTPATIENT)
Age: 76
End: 2021-01-09

## 2021-01-09 LAB
ANION GAP SERPL CALC-SCNC: 11 MMOL/L — SIGNIFICANT CHANGE UP (ref 5–17)
ANION GAP SERPL CALC-SCNC: 12 MMOL/L — SIGNIFICANT CHANGE UP (ref 5–17)
BUN SERPL-MCNC: 16 MG/DL — SIGNIFICANT CHANGE UP (ref 7–23)
BUN SERPL-MCNC: 17 MG/DL — SIGNIFICANT CHANGE UP (ref 7–23)
CALCIUM SERPL-MCNC: 8.6 MG/DL — SIGNIFICANT CHANGE UP (ref 8.4–10.5)
CALCIUM SERPL-MCNC: 8.8 MG/DL — SIGNIFICANT CHANGE UP (ref 8.4–10.5)
CHLORIDE SERPL-SCNC: 96 MMOL/L — SIGNIFICANT CHANGE UP (ref 96–108)
CHLORIDE SERPL-SCNC: 99 MMOL/L — SIGNIFICANT CHANGE UP (ref 96–108)
CO2 SERPL-SCNC: 24 MMOL/L — SIGNIFICANT CHANGE UP (ref 22–31)
CO2 SERPL-SCNC: 25 MMOL/L — SIGNIFICANT CHANGE UP (ref 22–31)
CREAT SERPL-MCNC: 0.38 MG/DL — LOW (ref 0.5–1.3)
CREAT SERPL-MCNC: 0.42 MG/DL — LOW (ref 0.5–1.3)
GLUCOSE SERPL-MCNC: 159 MG/DL — HIGH (ref 70–99)
GLUCOSE SERPL-MCNC: 172 MG/DL — HIGH (ref 70–99)
HCT VFR BLD CALC: 34.3 % — LOW (ref 34.5–45)
HGB BLD-MCNC: 10.4 G/DL — LOW (ref 11.5–15.5)
MAGNESIUM SERPL-MCNC: 1.9 MG/DL — SIGNIFICANT CHANGE UP (ref 1.6–2.6)
MCHC RBC-ENTMCNC: 29 PG — SIGNIFICANT CHANGE UP (ref 27–34)
MCHC RBC-ENTMCNC: 30.3 GM/DL — LOW (ref 32–36)
MCV RBC AUTO: 95.5 FL — SIGNIFICANT CHANGE UP (ref 80–100)
NRBC # BLD: 0 /100 WBCS — SIGNIFICANT CHANGE UP (ref 0–0)
PHOSPHATE SERPL-MCNC: 2.7 MG/DL — SIGNIFICANT CHANGE UP (ref 2.5–4.5)
PLATELET # BLD AUTO: 276 K/UL — SIGNIFICANT CHANGE UP (ref 150–400)
POTASSIUM SERPL-MCNC: 3.7 MMOL/L — SIGNIFICANT CHANGE UP (ref 3.5–5.3)
POTASSIUM SERPL-MCNC: 3.9 MMOL/L — SIGNIFICANT CHANGE UP (ref 3.5–5.3)
POTASSIUM SERPL-SCNC: 3.7 MMOL/L — SIGNIFICANT CHANGE UP (ref 3.5–5.3)
POTASSIUM SERPL-SCNC: 3.9 MMOL/L — SIGNIFICANT CHANGE UP (ref 3.5–5.3)
RBC # BLD: 3.59 M/UL — LOW (ref 3.8–5.2)
RBC # FLD: 17.3 % — HIGH (ref 10.3–14.5)
SODIUM SERPL-SCNC: 132 MMOL/L — LOW (ref 135–145)
SODIUM SERPL-SCNC: 135 MMOL/L — SIGNIFICANT CHANGE UP (ref 135–145)
WBC # BLD: 8.12 K/UL — SIGNIFICANT CHANGE UP (ref 3.8–10.5)
WBC # FLD AUTO: 8.12 K/UL — SIGNIFICANT CHANGE UP (ref 3.8–10.5)

## 2021-01-09 PROCEDURE — 70450 CT HEAD/BRAIN W/O DYE: CPT | Mod: 26

## 2021-01-09 PROCEDURE — 99232 SBSQ HOSP IP/OBS MODERATE 35: CPT

## 2021-01-09 RX ORDER — SODIUM CHLORIDE 5 G/100ML
250 INJECTION, SOLUTION INTRAVENOUS
Refills: 0 | Status: DISCONTINUED | OUTPATIENT
Start: 2021-01-09 | End: 2021-01-09

## 2021-01-09 RX ORDER — SODIUM CHLORIDE 9 MG/ML
2 INJECTION INTRAMUSCULAR; INTRAVENOUS; SUBCUTANEOUS EVERY 8 HOURS
Refills: 0 | Status: DISCONTINUED | OUTPATIENT
Start: 2021-01-09 | End: 2021-01-21

## 2021-01-09 RX ORDER — POTASSIUM CHLORIDE 20 MEQ
20 PACKET (EA) ORAL ONCE
Refills: 0 | Status: COMPLETED | OUTPATIENT
Start: 2021-01-09 | End: 2021-01-09

## 2021-01-09 RX ORDER — SODIUM CHLORIDE 5 G/100ML
1000 INJECTION, SOLUTION INTRAVENOUS
Refills: 0 | Status: DISCONTINUED | OUTPATIENT
Start: 2021-01-09 | End: 2021-01-11

## 2021-01-09 RX ORDER — FLUDROCORTISONE ACETATE 0.1 MG/1
0.2 TABLET ORAL EVERY 12 HOURS
Refills: 0 | Status: DISCONTINUED | OUTPATIENT
Start: 2021-01-09 | End: 2021-01-22

## 2021-01-09 RX ORDER — IPRATROPIUM/ALBUTEROL SULFATE 18-103MCG
3 AEROSOL WITH ADAPTER (GRAM) INHALATION EVERY 6 HOURS
Refills: 0 | Status: DISCONTINUED | OUTPATIENT
Start: 2021-01-09 | End: 2021-01-26

## 2021-01-09 RX ORDER — BUDESONIDE, MICRONIZED 100 %
0.5 POWDER (GRAM) MISCELLANEOUS EVERY 12 HOURS
Refills: 0 | Status: DISCONTINUED | OUTPATIENT
Start: 2021-01-09 | End: 2021-01-26

## 2021-01-09 RX ADMIN — AMLODIPINE BESYLATE 10 MILLIGRAM(S): 2.5 TABLET ORAL at 13:23

## 2021-01-09 RX ADMIN — Medication 20 MILLIEQUIVALENT(S): at 18:56

## 2021-01-09 RX ADMIN — FLUDROCORTISONE ACETATE 0.2 MILLIGRAM(S): 0.1 TABLET ORAL at 18:56

## 2021-01-09 RX ADMIN — Medication 3 MILLILITER(S): at 23:00

## 2021-01-09 RX ADMIN — ATORVASTATIN CALCIUM 40 MILLIGRAM(S): 80 TABLET, FILM COATED ORAL at 23:00

## 2021-01-09 RX ADMIN — PANTOPRAZOLE SODIUM 40 MILLIGRAM(S): 20 TABLET, DELAYED RELEASE ORAL at 06:15

## 2021-01-09 RX ADMIN — MONTELUKAST 10 MILLIGRAM(S): 4 TABLET, CHEWABLE ORAL at 12:25

## 2021-01-09 RX ADMIN — Medication 0.5 MILLIGRAM(S): at 23:00

## 2021-01-09 RX ADMIN — TIOTROPIUM BROMIDE 1 CAPSULE(S): 18 CAPSULE ORAL; RESPIRATORY (INHALATION) at 12:00

## 2021-01-09 RX ADMIN — SODIUM CHLORIDE 2 GRAM(S): 9 INJECTION INTRAMUSCULAR; INTRAVENOUS; SUBCUTANEOUS at 23:00

## 2021-01-09 RX ADMIN — Medication 500000 UNIT(S): at 18:19

## 2021-01-09 RX ADMIN — SODIUM CHLORIDE 50 MILLILITER(S): 5 INJECTION, SOLUTION INTRAVENOUS at 14:41

## 2021-01-09 RX ADMIN — LACOSAMIDE 150 MILLIGRAM(S): 50 TABLET ORAL at 18:19

## 2021-01-09 RX ADMIN — SODIUM CHLORIDE 2 GRAM(S): 9 INJECTION INTRAMUSCULAR; INTRAVENOUS; SUBCUTANEOUS at 13:23

## 2021-01-09 RX ADMIN — LACOSAMIDE 150 MILLIGRAM(S): 50 TABLET ORAL at 13:30

## 2021-01-09 RX ADMIN — LACOSAMIDE 150 MILLIGRAM(S): 50 TABLET ORAL at 00:01

## 2021-01-09 RX ADMIN — Medication 500000 UNIT(S): at 06:15

## 2021-01-09 RX ADMIN — PANTOPRAZOLE SODIUM 40 MILLIGRAM(S): 20 TABLET, DELAYED RELEASE ORAL at 18:56

## 2021-01-09 RX ADMIN — Medication 62.5 MILLIMOLE(S): at 18:55

## 2021-01-09 RX ADMIN — CHLORHEXIDINE GLUCONATE 1 APPLICATION(S): 213 SOLUTION TOPICAL at 09:21

## 2021-01-09 RX ADMIN — CHLORHEXIDINE GLUCONATE 15 MILLILITER(S): 213 SOLUTION TOPICAL at 13:28

## 2021-01-09 RX ADMIN — FLUDROCORTISONE ACETATE 0.1 MILLIGRAM(S): 0.1 TABLET ORAL at 06:15

## 2021-01-09 RX ADMIN — ENOXAPARIN SODIUM 40 MILLIGRAM(S): 100 INJECTION SUBCUTANEOUS at 23:00

## 2021-01-09 RX ADMIN — CHLORHEXIDINE GLUCONATE 15 MILLILITER(S): 213 SOLUTION TOPICAL at 18:20

## 2021-01-09 RX ADMIN — LISINOPRIL 5 MILLIGRAM(S): 2.5 TABLET ORAL at 06:26

## 2021-01-09 RX ADMIN — PREGABALIN 1000 MICROGRAM(S): 225 CAPSULE ORAL at 12:25

## 2021-01-09 RX ADMIN — Medication 81 MILLIGRAM(S): at 13:23

## 2021-01-09 RX ADMIN — Medication 325 MILLIGRAM(S): at 10:24

## 2021-01-09 NOTE — PROGRESS NOTE ADULT - SUBJECTIVE AND OBJECTIVE BOX
prHPI:  76y/o F with PMHx sig for COPD, asthma, HLD, HTN, BIBEMS to [facility] from home after HHA discovered patient found down in floor covered in non-bloody vomitus. Perr HHA Pt went to the bathroom and was taking a long time, went in to check on her and found her sitting on floor, awake, however with vomitus on front of shirt. On arrival to [facility], patient was taken for stat head CT, which revealed diffuse subarachnoid hemorrhage mainly at basilar cisterns with IVH and obstructive hydrocephalus. Patient was given a bolus of 1g keppra and dilaudid pushes for generalized headache. CTA concerning for 3mm left pcomm aneurysm and a 1.6mm outpouching of distal cavernous segment of the left internal carotid artery likely aneurysm. NIHSS2 3 MF4. Patient was transferred to [facility] for further intervention. Patient currently reports headaches. Pt denies acute changes in vision, seizures, CP, SOB, weakness/paresthesias of arms or legs. (09 Dec 2020 23:37)    Hospital Course:   12/9: BD1 Patient admitted for SAH HH3, MF4.   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 EVD at 5, on Levo overnight, nimodipine discontinued d/t hypotension  12/12: BD4 POD#2, YUMIKO overnight, neuro stable, passed TOV  12/13: BD5 POD#3: YUMIKO x 24 hrs  12/14: BD6 POD#4. YUMIKO o/n, neuro stable. EVD at 5cm H2O  12/15: BD7 POD #5 YUMIKO overnight, neuro stable. EVD remains at 5. Plan for CTA today.   12/16: BD8 POD #6 YUMIKO overnight, neuro stable, EVD at 0, on Levo, started on 3% at 15 overnight   12/17: BD9 POD#1 s/p cerebral angio for verapamil c/b device malfunction of Proglide, s/p right groin cutdown for removal of proglide catheter and femoral artery repair.  YUMIKO overnight, neuro stable, EVD at 0. patient remained intubated overnight, on propofol for sedation, and vEEG  12/18: BD#10, POD#8 s/p coil/embo of L pcomm aneurysm, POD#2 s/p IA verapamil, POD#2 R groin cutdown for removal of proglide catheter w/ repair of femoral artery. YUMIKO overnight. EVD remains open @0cmH2O   12/19: BD#11, POD#9 s/p coil/embo of L pcomm aneurysm. POD#3 s/p IA verapamil, POD#3 s/p R femoral artery cutdown of proglide catheter w/ femoral artery repair. YUMIKO overnight. EVD remains open @0cmH2O. EEG shows b/l frontal sharp discharges, cont monitoring, vimpat was increased yesterday. Gentle hydration, total IVF 50cc/hr. Cont vanc/zosyn for empiric coverage, next vanc trough due @1pm on 12/19. F/u daily CXR.   12/20 BD#12 POD#10 YUMIKO overnight, Neuro exam stable, EVD @ 0cm H2O, vEEG, 3% @ 15 goal WNL, TF via NGT  12/21: BD13, POD11 YUMIKO overnight. EVD at 5cmH20 (raised yesterday), vEEG in place  12/22 BD#14, EVD raised to 15 yesterday, 3% @ 30cc Goal WNL, -180, Milrinone, TTE prior to DC as per Card for takotsubo cardiomyopathy, failed S&S pending reeval, TF via NGT, Neuro exam stable  12/23: BD#15. YUMIKO o/n, neuro stable. EVD clamped. 30%@30cc/hr  12/24 BD#16 YUMIKO overnight, spiked 102, EVD @ 0cm H2O, 3% @ 30cc/hr Goal WNL, Vasc following, TF via NGT, -200,   12/25: BD#17. YUMIKO overnight. Pan cx from 12/23, NGTD on CSF and blood. EVD clamped. 3% @15cc/hr, rate kept same overnight. NGT feeds at goal. SBP goal 160-200.   12/26: BD#18. YUMIKO overnight, neuro exam stable. NGTD from pan cx on 12/23. EVD removed today. 3% discontinued 12/25. NGT feeds at goal, IVF off. SBP goal 160-200.   12/27: BD#19 YUMIKO overnight, neuro stable. 3% at 15, stepdown status  12/28: BD20 YUMIKO overnight, neuro stable. 3% continues.  Patient became increasingly more somnolent and underwent LP for CSF high volume tap.  Temporary improvement.  Spiked fever, pancultured.  12/29: BD21 Patient increasingly more somnolent, EVD placed at bedside.    12/30: BD22 pt. is s/p R VPS placement, certas @5 POD#1, post op ct head c/a/p done. afebrile o/n.   1/1: BD#23: pt. is s/p R VPS placement, certas @5 POD#2, post op ct head c/a/p done. zosyn for enterobacter   1/2: BD #24. POD#3 s/p R VPS placement, CErtas @5. Dressings removed from head and abdomen. Cont zosyn for enterobacter and e. coli in urine, end date 1/4/21. Post-op imaging completed. Pend S&S re-eval, improvement in mental status/neuro exam.   1/3: BD25, POD4. SBP goals relaxed 120-200. zosyn for enterobacter UTI until 1/4. FOB+, protonix bid started, yumiko o/n  1/4: BD 26, YUMIKO o/n  1/5: BD 27, YUMIOK o/n   1/6: BD 28, POD 7 VPS (Certas @ 5). YUMIKO overnight. Plan for PEG with GI Thursday. Repeat Covid swab negative. Stepdown status.  1/7: BD 29, POD 8. YUMIKO overnight. PEG placed at bedside by GI today. Stepdown status  1/8: BD 30 POD 9, YUMIKO o/n, resume TF 24 hrs after PEG, stepdown   1/9 BD 31. POD 10. No events overnight. s/p PEG. Pending AR vs JOSE      Vital Signs Last 24 Hrs  T(C): 36.9 (08 Jan 2021 22:06), Max: 37.1 (08 Jan 2021 07:00)  T(F): 98.4 (08 Jan 2021 22:06), Max: 98.8 (08 Jan 2021 07:00)  HR: 74 (09 Jan 2021 00:50) (73 - 80)  BP: 107/55 (09 Jan 2021 00:50) (107/55 - 185/72)  BP(mean): 79 (09 Jan 2021 00:50) (79 - 115)  RR: 16 (09 Jan 2021 00:50) (16 - 24)  SpO2: 98% (09 Jan 2021 00:50) (96% - 100%)    I&O's Detail    07 Jan 2021 07:01  -  08 Jan 2021 07:00  --------------------------------------------------------  IN:    Enteral Tube Flush: 100 mL    IV PiggyBack: 50 mL    sodium chloride 0.9%: 1200 mL    sodium chloride 3%: 250 mL  Total IN: 1600 mL    OUT:    Intermittent Catheterization - Urethral (mL): 2425 mL  Total OUT: 2425 mL    Total NET: -825 mL      08 Jan 2021 07:01  -  09 Jan 2021 02:44  --------------------------------------------------------  IN:    Glucerna: 330 mL    sodium chloride 0.9%: 500 mL  Total IN: 830 mL    OUT:    Intermittent Catheterization - Urethral (mL): 1200 mL  Total OUT: 1200 mL    Total NET: -370 mL        I&O's Summary    07 Jan 2021 07:01  -  08 Jan 2021 07:00  --------------------------------------------------------  IN: 1600 mL / OUT: 2425 mL / NET: -825 mL    08 Jan 2021 07:01  -  09 Jan 2021 02:44  --------------------------------------------------------  IN: 830 mL / OUT: 1200 mL / NET: -370 mL        PHYSICAL EXAM:  Gen:  HEENT:  Neck:  Lungs:  Heart:  Abd:  Exts:  Neuro:    TUBES/LINES:  [] CVC  [] A-line  [] Lumbar Drain  [] Ventriculostomy  [] Other    DIET:  [] NPO  [] Mechanical  [] Tube feeds    LABS:                        11.2   9.10  )-----------( 278      ( 08 Jan 2021 05:21 )             35.8     01-08    136  |  99  |  7   ----------------------------<  115<H>  4.0   |  27  |  0.40<L>    Ca    9.2      08 Jan 2021 05:21  Phos  3.1     01-08  Mg     2.5     01-08              CAPILLARY BLOOD GLUCOSE          Drug Levels: [] N/A    CSF Analysis: [] N/A      Allergies    No Known Allergies    Intolerances      MEDICATIONS:  Antibiotics:  nystatin    Suspension 731088 Unit(s) Oral four times a day    Neuro:  acetaminophen    Suspension .. 650 milliGRAM(s) Oral every 6 hours PRN  lacosamide Solution 150 milliGRAM(s) Oral every 12 hours    Anticoagulation:  aspirin enteric coated 81 milliGRAM(s) Oral daily  enoxaparin Injectable 40 milliGRAM(s) SubCutaneous at bedtime    OTHER:  albuterol/ipratropium for Nebulization. 3 milliLiter(s) Nebulizer every 6 hours PRN  amLODIPine   Tablet 10 milliGRAM(s) Oral every 24 hours  atorvastatin 40 milliGRAM(s) Oral at bedtime  chlorhexidine 0.12% Liquid 15 milliLiter(s) Oral Mucosa two times a day  chlorhexidine 2% Cloths 1 Application(s) Topical <User Schedule>  dextrose 40% Gel 15 Gram(s) Oral once  dextrose 50% Injectable 25 Gram(s) IV Push once  fludroCORTISONE 0.1 milliGRAM(s) Oral every 12 hours  glucagon  Injectable 1 milliGRAM(s) IntraMuscular once  labetalol Injectable 2.5 milliGRAM(s) IV Push every 6 hours PRN  lisinopril 5 milliGRAM(s) Oral daily  montelukast 10 milliGRAM(s) Oral daily  pantoprazole   Suspension 40 milliGRAM(s) Oral two times a day  tiotropium 18 MICROgram(s) Capsule 1 Capsule(s) Inhalation daily    IVF:  cyanocobalamin 1000 MICROGram(s) Oral daily  dextrose 5%. 1000 milliLiter(s) IV Continuous <Continuous>  dextrose 5%. 1000 milliLiter(s) IV Continuous <Continuous>  ferrous    sulfate 325 milliGRAM(s) Oral daily    CULTURES:  Culture Results:   No growth at 5 days. (12-31 @ 22:37)  Culture Results:   No growth at 5 days. (12-31 @ 22:37)    RADIOLOGY & ADDITIONAL TESTS:      ASSESSMENT:  76y Female s/p    HEADACHE    Handoff    MEWS Score    Hyperlipidemia    Hypertension    COPD (chronic obstructive pulmonary disease)    Asthma    COPD (chronic obstructive pulmonary disease)    Asthma    Hydrocephalus, adult    Injury of right femoral artery    Cerebral artery vasospasm    SAH (subarachnoid hemorrhage)    Hydrocephalus, adult    Injury of femoral artery    Cerebral artery vasospasm    SAH (subarachnoid hemorrhage)    Subarachnoid bleed    Obstructive hydrocephalus    Anemia due to acute blood loss    Preoperative clearance    Centrilobular emphysema    Mild persistent asthma without complication    Subarachnoid bleed    Essential hypertension    Pure hypercholesterolemia    Ventriculoperitoneal shunt    Insertion of external ventricular drain    Vascular surgery procedure    Angiogram, carotid and cerebral, bilateral    Angiogram, cerebral, with intracranial aneurysm embolization    No significant past surgical history    HEADACHE    90+    [email or web address]        PLAN:  NEURO:    CARDIOVASCULAR:    PULMONARY:    RENAL:    GI:    ID:    ENDO:    DVT PROPHYLAXIS:    DISPOSITION:       Assessment:  Present when checked    []  GCS  E   V  M     Heart Failure: []Acute, [] acute on chronic , []chronic  Heart Failure:  [] Diastolic (HFpEF), [] Systolic (HFrEF), []Combined (HFpEF and HFrEF), [] RHF, [] Pulm HTN, [] Other    [] MICHAELLE, [] ATN, [] AIN, [] other  [] CKD1, [] CKD2, [] CKD 3, [] CKD 4, [] CKD 5, []ESRD    Encephalopathy: [] Metabolic, [] Hepatic, [] toxic, [] Neurological, [] Other    Abnormal Nurtitional Status: [] malnurtition (see nutrition note), [ ]underweight: BMI < 19, [] morbid obesity: BMI >40, [] Cachexia    [] Sepsis  [] hypovolemic shock,[] cardiogenic shock, [] hemorrhagic shock, [] neuogenic shock  [] Acute Respiratory Failure  []Cerebral edema, [] Brain compression/ herniation,   [] Functional quadriplegia  [] Acute blood loss anemia   prHPI:  74y/o F with PMHx sig for COPD, asthma, HLD, HTN, BIBEMS to [facility] from home after HHA discovered patient found down in floor covered in non-bloody vomitus. Perr HHA Pt went to the bathroom and was taking a long time, went in to check on her and found her sitting on floor, awake, however with vomitus on front of shirt. On arrival to [facility], patient was taken for stat head CT, which revealed diffuse subarachnoid hemorrhage mainly at basilar cisterns with IVH and obstructive hydrocephalus. Patient was given a bolus of 1g keppra and dilaudid pushes for generalized headache. CTA concerning for 3mm left pcomm aneurysm and a 1.6mm outpouching of distal cavernous segment of the left internal carotid artery likely aneurysm. NIHSS2 3 MF4. Patient was transferred to [facility] for further intervention. Patient currently reports headaches. Pt denies acute changes in vision, seizures, CP, SOB, weakness/paresthesias of arms or legs. (09 Dec 2020 23:37)    Hospital Course:   12/9: BD1 Patient admitted for SAH HH3, MF4.   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 EVD at 5, on Levo overnight, nimodipine discontinued d/t hypotension  12/12: BD4 POD#2, YUMIKO overnight, neuro stable, passed TOV  12/13: BD5 POD#3: YUMIKO x 24 hrs  12/14: BD6 POD#4. YUMIKO o/n, neuro stable. EVD at 5cm H2O  12/15: BD7 POD #5 YUMIKO overnight, neuro stable. EVD remains at 5. Plan for CTA today.   12/16: BD8 POD #6 YUMIKO overnight, neuro stable, EVD at 0, on Levo, started on 3% at 15 overnight   12/17: BD9 POD#1 s/p cerebral angio for verapamil c/b device malfunction of Proglide, s/p right groin cutdown for removal of proglide catheter and femoral artery repair.  YUMIKO overnight, neuro stable, EVD at 0. patient remained intubated overnight, on propofol for sedation, and vEEG  12/18: BD#10, POD#8 s/p coil/embo of L pcomm aneurysm, POD#2 s/p IA verapamil, POD#2 R groin cutdown for removal of proglide catheter w/ repair of femoral artery. YUMIKO overnight. EVD remains open @0cmH2O   12/19: BD#11, POD#9 s/p coil/embo of L pcomm aneurysm. POD#3 s/p IA verapamil, POD#3 s/p R femoral artery cutdown of proglide catheter w/ femoral artery repair. YUMIKO overnight. EVD remains open @0cmH2O. EEG shows b/l frontal sharp discharges, cont monitoring, vimpat was increased yesterday. Gentle hydration, total IVF 50cc/hr. Cont vanc/zosyn for empiric coverage, next vanc trough due @1pm on 12/19. F/u daily CXR.   12/20 BD#12 POD#10 YUMIKO overnight, Neuro exam stable, EVD @ 0cm H2O, vEEG, 3% @ 15 goal WNL, TF via NGT  12/21: BD13, POD11 YUMIKO overnight. EVD at 5cmH20 (raised yesterday), vEEG in place  12/22 BD#14, EVD raised to 15 yesterday, 3% @ 30cc Goal WNL, -180, Milrinone, TTE prior to DC as per Card for takotsubo cardiomyopathy, failed S&S pending reeval, TF via NGT, Neuro exam stable  12/23: BD#15. YUMIKO o/n, neuro stable. EVD clamped. 30%@30cc/hr  12/24 BD#16 YUMIKO overnight, spiked 102, EVD @ 0cm H2O, 3% @ 30cc/hr Goal WNL, Vasc following, TF via NGT, -200,   12/25: BD#17. YUMIKO overnight. Pan cx from 12/23, NGTD on CSF and blood. EVD clamped. 3% @15cc/hr, rate kept same overnight. NGT feeds at goal. SBP goal 160-200.   12/26: BD#18. YUMIKO overnight, neuro exam stable. NGTD from pan cx on 12/23. EVD removed today. 3% discontinued 12/25. NGT feeds at goal, IVF off. SBP goal 160-200.   12/27: BD#19 YUMIKO overnight, neuro stable. 3% at 15, stepdown status  12/28: BD20 YUMIKO overnight, neuro stable. 3% continues.  Patient became increasingly more somnolent and underwent LP for CSF high volume tap.  Temporary improvement.  Spiked fever, pancultured.  12/29: BD21 Patient increasingly more somnolent, EVD placed at bedside.    12/30: BD22 pt. is s/p R VPS placement, certas @5 POD#1, post op ct head c/a/p done. afebrile o/n.   1/1: BD#23: pt. is s/p R VPS placement, certas @5 POD#2, post op ct head c/a/p done. zosyn for enterobacter   1/2: BD #24. POD#3 s/p R VPS placement, CErtas @5. Dressings removed from head and abdomen. Cont zosyn for enterobacter and e. coli in urine, end date 1/4/21. Post-op imaging completed. Pend S&S re-eval, improvement in mental status/neuro exam.   1/3: BD25, POD4. SBP goals relaxed 120-200. zosyn for enterobacter UTI until 1/4. FOB+, protonix bid started, yumiko o/n  1/4: BD 26, YUMIKO o/n  1/5: BD 27, YUMIKO o/n   1/6: BD 28, POD 7 VPS (Certas @ 5). YUMIKO overnight. Plan for PEG with GI Thursday. Repeat Covid swab negative. Stepdown status.  1/7: BD 29, POD 8. YUMIKO overnight. PEG placed at bedside by GI today. Stepdown status  1/8: BD 30 POD 9, YUMIKO o/n, resume TF 24 hrs after PEG, stepdown   1/9 BD 31. POD 10. No events overnight. s/p PEG. Pending AR vs JOSE      Vital Signs Last 24 Hrs  T(C): 36.9 (08 Jan 2021 22:06), Max: 37.1 (08 Jan 2021 07:00)  T(F): 98.4 (08 Jan 2021 22:06), Max: 98.8 (08 Jan 2021 07:00)  HR: 74 (09 Jan 2021 00:50) (73 - 80)  BP: 107/55 (09 Jan 2021 00:50) (107/55 - 185/72)  BP(mean): 79 (09 Jan 2021 00:50) (79 - 115)  RR: 16 (09 Jan 2021 00:50) (16 - 24)  SpO2: 98% (09 Jan 2021 00:50) (96% - 100%)    I&O's Detail    07 Jan 2021 07:01  -  08 Jan 2021 07:00  --------------------------------------------------------  IN:    Enteral Tube Flush: 100 mL    IV PiggyBack: 50 mL    sodium chloride 0.9%: 1200 mL    sodium chloride 3%: 250 mL  Total IN: 1600 mL    OUT:    Intermittent Catheterization - Urethral (mL): 2425 mL  Total OUT: 2425 mL    Total NET: -825 mL      08 Jan 2021 07:01  -  09 Jan 2021 02:44  --------------------------------------------------------  IN:    Glucerna: 330 mL    sodium chloride 0.9%: 500 mL  Total IN: 830 mL    OUT:    Intermittent Catheterization - Urethral (mL): 1200 mL  Total OUT: 1200 mL    Total NET: -370 mL        I&O's Summary    07 Jan 2021 07:01  -  08 Jan 2021 07:00  --------------------------------------------------------  IN: 1600 mL / OUT: 2425 mL / NET: -825 mL    08 Jan 2021 07:01  -  09 Jan 2021 02:44  --------------------------------------------------------  IN: 830 mL / OUT: 1200 mL / NET: -370 mL        PHYSICAL EXAM:  GEN: laying in bed, appears well, NAD  NEURO: AOx2. FC, OE spont, speech intact, face symmetric. CNII-XII intact. PERRL, EOMI. No pronator drift. MAEx4. 5/5 strength throughout. SILT  CV: RRR +S1/S2  PULM: CTAB  GI: Abd soft, NT/ND  EXT: ext warm, dry, nontender  WOUND: evd site c/d/i, PEG site C/D/I     TUBES/LINES:  [] CVC  [] A-line  [] Lumbar Drain  [] Ventriculostomy  [] Other    DIET:  [] NPO  [] Mechanical  [] Tube feeds    LABS:                        11.2   9.10  )-----------( 278      ( 08 Jan 2021 05:21 )             35.8     01-08    136  |  99  |  7   ----------------------------<  115<H>  4.0   |  27  |  0.40<L>    Ca    9.2      08 Jan 2021 05:21  Phos  3.1     01-08  Mg     2.5     01-08              CAPILLARY BLOOD GLUCOSE          Drug Levels: [] N/A    CSF Analysis: [] N/A      Allergies    No Known Allergies    Intolerances      MEDICATIONS:  Antibiotics:  nystatin    Suspension 617034 Unit(s) Oral four times a day    Neuro:  acetaminophen    Suspension .. 650 milliGRAM(s) Oral every 6 hours PRN  lacosamide Solution 150 milliGRAM(s) Oral every 12 hours    Anticoagulation:  aspirin enteric coated 81 milliGRAM(s) Oral daily  enoxaparin Injectable 40 milliGRAM(s) SubCutaneous at bedtime    OTHER:  albuterol/ipratropium for Nebulization. 3 milliLiter(s) Nebulizer every 6 hours PRN  amLODIPine   Tablet 10 milliGRAM(s) Oral every 24 hours  atorvastatin 40 milliGRAM(s) Oral at bedtime  chlorhexidine 0.12% Liquid 15 milliLiter(s) Oral Mucosa two times a day  chlorhexidine 2% Cloths 1 Application(s) Topical <User Schedule>  dextrose 40% Gel 15 Gram(s) Oral once  dextrose 50% Injectable 25 Gram(s) IV Push once  fludroCORTISONE 0.1 milliGRAM(s) Oral every 12 hours  glucagon  Injectable 1 milliGRAM(s) IntraMuscular once  labetalol Injectable 2.5 milliGRAM(s) IV Push every 6 hours PRN  lisinopril 5 milliGRAM(s) Oral daily  montelukast 10 milliGRAM(s) Oral daily  pantoprazole   Suspension 40 milliGRAM(s) Oral two times a day  tiotropium 18 MICROgram(s) Capsule 1 Capsule(s) Inhalation daily    IVF:  cyanocobalamin 1000 MICROGram(s) Oral daily  dextrose 5%. 1000 milliLiter(s) IV Continuous <Continuous>  dextrose 5%. 1000 milliLiter(s) IV Continuous <Continuous>  ferrous    sulfate 325 milliGRAM(s) Oral daily    CULTURES:  Culture Results:   No growth at 5 days. (12-31 @ 22:37)  Culture Results:   No growth at 5 days. (12-31 @ 22:37)    RADIOLOGY & ADDITIONAL TESTS:      ASSESSMENT:  75y Female  with PMHx of COPD, asthma, HLD, HTN, BIBEMS to [facility] from home after HHA found patient down on the floor covered in non-bloody vomitus. CTH reveals diffuse subarachnoid hemorrhage mainly at basilar cisterns with IVH and obstructive hydrocephalus, CTA shows 3mm left pcomm aneurysm, now s/p bedside right frontal EVD placement 12/10, s/p cerebral angiogram for coil embolization of left PCOMM aneurysm 12/10, now   s/p cerebral angio for verapamil c/b device malfunction of Proglide, s/p right groin cutdown for removal of proglide catheter and femoral artery repair (12/17/2020), NIHSS2 HH3 MF4), s/p EVD removal 12/25,  s/p bedside EVD placement 12/29, POD 10 from [facility] at 5.     HEADACHE    Handoff    MEWS Score    Hyperlipidemia    Hypertension    COPD (chronic obstructive pulmonary disease)    Asthma    COPD (chronic obstructive pulmonary disease)    Asthma    Hydrocephalus, adult    Injury of right femoral artery    Cerebral artery vasospasm    SAH (subarachnoid hemorrhage)    Hydrocephalus, adult    Injury of femoral artery    Cerebral artery vasospasm    SAH (subarachnoid hemorrhage)    Subarachnoid bleed    Obstructive hydrocephalus    Anemia due to acute blood loss    Preoperative clearance    Centrilobular emphysema    Mild persistent asthma without complication    Subarachnoid bleed    Essential hypertension    Pure hypercholesterolemia    Ventriculoperitoneal shunt    Insertion of external ventricular drain    Vascular surgery procedure    Angiogram, carotid and cerebral, bilateral    Angiogram, cerebral, with intracranial aneurysm embolization    No significant past surgical history    HEADACHE    90+    [email or web address]        NEURO:  - neuro/vitals checks   - pain control   - cont modafinil for neurostim   - cont vimpat seizure ppx  -Restarted on ASA for B/L ICA stenosis     CARDIOVASCULAR:  - -200  -Continue Lisinopril and amlodipine  - echo and CCTA prior to discharge    PULMONARY:  - satting well RA  -Contiue monelukast, Spiriva, and duonebs     RENAL:  - repletions prn  - sc prn     GI:  - TF via PEG    HEME:  - h/h stable  - SQL held for PEG    ID:  - afebrile  - off abx  - repeat covid swab 1/5 negative- isolation dc/d    ENDO:  - glucose goal 140-180    DVT PROPHYLAXIS:  -SCDs  - Rt upper extremity doppler (R upper cephalic DVT)     VASCULAR: s/p proglide malfunction, R groin cutdown & femoral artery repair (12/16)   -Following, pulse/groin checks RLE     DISPOSITION: SDU status, full code, dispo pending    D/w Dr. Serrano and Dr. Nino         Assessment:  Present when checked    []  GCS  E   V  M     Heart Failure: []Acute, [] acute on chronic , []chronic  Heart Failure:  [] Diastolic (HFpEF), [] Systolic (HFrEF), []Combined (HFpEF and HFrEF), [] RHF, [] Pulm HTN, [] Other    [] MICHAELLE, [] ATN, [] AIN, [] other  [] CKD1, [] CKD2, [] CKD 3, [] CKD 4, [] CKD 5, []ESRD    Encephalopathy: [] Metabolic, [] Hepatic, [] toxic, [] Neurological, [] Other    Abnormal Nurtitional Status: [] malnurtition (see nutrition note), [ ]underweight: BMI < 19, [] morbid obesity: BMI >40, [] Cachexia    [] Sepsis  [] hypovolemic shock,[] cardiogenic shock, [] hemorrhagic shock, [] neuogenic shock  [] Acute Respiratory Failure  []Cerebral edema, [] Brain compression/ herniation,   [] Functional quadriplegia  [] Acute blood loss anemia

## 2021-01-10 LAB
ALBUMIN SERPL ELPH-MCNC: 3.3 G/DL — SIGNIFICANT CHANGE UP (ref 3.3–5)
ALP SERPL-CCNC: 99 U/L — SIGNIFICANT CHANGE UP (ref 40–120)
ALT FLD-CCNC: 29 U/L — SIGNIFICANT CHANGE UP (ref 10–45)
AMYLASE P1 CFR SERPL: 42 U/L — SIGNIFICANT CHANGE UP (ref 25–125)
ANION GAP SERPL CALC-SCNC: 13 MMOL/L — SIGNIFICANT CHANGE UP (ref 5–17)
APPEARANCE CSF: CLEAR — SIGNIFICANT CHANGE UP
APPEARANCE SPUN FLD: COLORLESS — SIGNIFICANT CHANGE UP
APTT BLD: 24.8 SEC — LOW (ref 27.5–35.5)
AST SERPL-CCNC: 24 U/L — SIGNIFICANT CHANGE UP (ref 10–40)
BASOPHILS # BLD AUTO: 0.05 K/UL — SIGNIFICANT CHANGE UP (ref 0–0.2)
BASOPHILS NFR BLD AUTO: 0.2 % — SIGNIFICANT CHANGE UP (ref 0–2)
BILIRUB SERPL-MCNC: 0.3 MG/DL — SIGNIFICANT CHANGE UP (ref 0.2–1.2)
BUN SERPL-MCNC: 9 MG/DL — SIGNIFICANT CHANGE UP (ref 7–23)
CALCIUM SERPL-MCNC: 8.7 MG/DL — SIGNIFICANT CHANGE UP (ref 8.4–10.5)
CHLORIDE SERPL-SCNC: 102 MMOL/L — SIGNIFICANT CHANGE UP (ref 96–108)
CO2 SERPL-SCNC: 26 MMOL/L — SIGNIFICANT CHANGE UP (ref 22–31)
COLOR CSF: YELLOW
CREAT SERPL-MCNC: 0.42 MG/DL — LOW (ref 0.5–1.3)
CSF COMMENTS: SIGNIFICANT CHANGE UP
EOSINOPHIL # BLD AUTO: 0.01 K/UL — SIGNIFICANT CHANGE UP (ref 0–0.5)
EOSINOPHIL NFR BLD AUTO: 0 % — SIGNIFICANT CHANGE UP (ref 0–6)
GLUCOSE CSF-MCNC: 91 MG/DL — HIGH (ref 40–70)
GLUCOSE SERPL-MCNC: 165 MG/DL — HIGH (ref 70–99)
GRAM STN FLD: SIGNIFICANT CHANGE UP
HCT VFR BLD CALC: 33.7 % — LOW (ref 34.5–45)
HGB BLD-MCNC: 10.7 G/DL — LOW (ref 11.5–15.5)
IMM GRANULOCYTES NFR BLD AUTO: 1.1 % — SIGNIFICANT CHANGE UP (ref 0–1.5)
INR BLD: 1.12 — SIGNIFICANT CHANGE UP (ref 0.88–1.16)
LACTATE SERPL-SCNC: 1.3 MMOL/L — SIGNIFICANT CHANGE UP (ref 0.5–2)
LIDOCAIN IGE QN: 15 U/L — SIGNIFICANT CHANGE UP (ref 7–60)
LYMPHOCYTES # BLD AUTO: 1.42 K/UL — SIGNIFICANT CHANGE UP (ref 1–3.3)
LYMPHOCYTES # BLD AUTO: 6.9 % — LOW (ref 13–44)
LYMPHOCYTES # CSF: 11 % — SIGNIFICANT CHANGE UP (ref 40–80)
MCHC RBC-ENTMCNC: 29.2 PG — SIGNIFICANT CHANGE UP (ref 27–34)
MCHC RBC-ENTMCNC: 31.8 GM/DL — LOW (ref 32–36)
MCV RBC AUTO: 91.8 FL — SIGNIFICANT CHANGE UP (ref 80–100)
MONOCYTES # BLD AUTO: 1.14 K/UL — HIGH (ref 0–0.9)
MONOCYTES NFR BLD AUTO: 5.5 % — SIGNIFICANT CHANGE UP (ref 2–14)
MONOS+MACROS NFR CSF: 1 % — SIGNIFICANT CHANGE UP (ref 15–45)
NEUTROPHILS # BLD AUTO: 17.83 K/UL — HIGH (ref 1.8–7.4)
NEUTROPHILS # CSF: 0 % — SIGNIFICANT CHANGE UP (ref 0–6)
NEUTROPHILS NFR BLD AUTO: 86.3 % — HIGH (ref 43–77)
NRBC # BLD: 0 /100 WBCS — SIGNIFICANT CHANGE UP (ref 0–0)
NRBC NFR CSF: 12 /UL — HIGH (ref 0–5)
PLATELET # BLD AUTO: 300 K/UL — SIGNIFICANT CHANGE UP (ref 150–400)
POTASSIUM SERPL-MCNC: 3.2 MMOL/L — LOW (ref 3.5–5.3)
POTASSIUM SERPL-SCNC: 3.2 MMOL/L — LOW (ref 3.5–5.3)
PROCALCITONIN SERPL-MCNC: 0.43 NG/ML — HIGH (ref 0.02–0.1)
PROT CSF-MCNC: 39 MG/DL — SIGNIFICANT CHANGE UP (ref 15–45)
PROT SERPL-MCNC: 7.1 G/DL — SIGNIFICANT CHANGE UP (ref 6–8.3)
PROTHROM AB SERPL-ACNC: 13.4 SEC — SIGNIFICANT CHANGE UP (ref 10.6–13.6)
RBC # BLD: 3.67 M/UL — LOW (ref 3.8–5.2)
RBC # CSF: 600 /UL — HIGH (ref 0–0)
RBC # FLD: 17.9 % — HIGH (ref 10.3–14.5)
SODIUM SERPL-SCNC: 141 MMOL/L — SIGNIFICANT CHANGE UP (ref 135–145)
SPECIMEN SOURCE: SIGNIFICANT CHANGE UP
TUBE TYPE: SIGNIFICANT CHANGE UP
WBC # BLD: 20.68 K/UL — HIGH (ref 3.8–10.5)
WBC # FLD AUTO: 20.68 K/UL — HIGH (ref 3.8–10.5)

## 2021-01-10 PROCEDURE — 74018 RADEX ABDOMEN 1 VIEW: CPT | Mod: 26

## 2021-01-10 PROCEDURE — 36415 COLL VENOUS BLD VENIPUNCTURE: CPT

## 2021-01-10 PROCEDURE — 71045 X-RAY EXAM CHEST 1 VIEW: CPT | Mod: 26

## 2021-01-10 RX ORDER — POTASSIUM CHLORIDE 20 MEQ
40 PACKET (EA) ORAL EVERY 4 HOURS
Refills: 0 | Status: COMPLETED | OUTPATIENT
Start: 2021-01-10 | End: 2021-01-11

## 2021-01-10 RX ORDER — FENTANYL CITRATE 50 UG/ML
25 INJECTION INTRAVENOUS ONCE
Refills: 0 | Status: DISCONTINUED | OUTPATIENT
Start: 2021-01-10 | End: 2021-01-10

## 2021-01-10 RX ORDER — SODIUM CHLORIDE 9 MG/ML
1000 INJECTION INTRAMUSCULAR; INTRAVENOUS; SUBCUTANEOUS ONCE
Refills: 0 | Status: COMPLETED | OUTPATIENT
Start: 2021-01-10 | End: 2021-01-10

## 2021-01-10 RX ORDER — CEFEPIME 1 G/1
2000 INJECTION, POWDER, FOR SOLUTION INTRAMUSCULAR; INTRAVENOUS ONCE
Refills: 0 | Status: COMPLETED | OUTPATIENT
Start: 2021-01-10 | End: 2021-01-10

## 2021-01-10 RX ORDER — ACETAMINOPHEN 500 MG
1000 TABLET ORAL ONCE
Refills: 0 | Status: COMPLETED | OUTPATIENT
Start: 2021-01-10 | End: 2021-01-10

## 2021-01-10 RX ORDER — VANCOMYCIN HCL 1 G
750 VIAL (EA) INTRAVENOUS EVERY 12 HOURS
Refills: 0 | Status: DISCONTINUED | OUTPATIENT
Start: 2021-01-10 | End: 2021-01-12

## 2021-01-10 RX ORDER — CEFEPIME 1 G/1
2000 INJECTION, POWDER, FOR SOLUTION INTRAMUSCULAR; INTRAVENOUS EVERY 8 HOURS
Refills: 0 | Status: DISCONTINUED | OUTPATIENT
Start: 2021-01-10 | End: 2021-01-10

## 2021-01-10 RX ORDER — VANCOMYCIN HCL 1 G
1000 VIAL (EA) INTRAVENOUS EVERY 12 HOURS
Refills: 0 | Status: DISCONTINUED | OUTPATIENT
Start: 2021-01-10 | End: 2021-01-10

## 2021-01-10 RX ORDER — POTASSIUM CHLORIDE 20 MEQ
20 PACKET (EA) ORAL
Refills: 0 | Status: DISCONTINUED | OUTPATIENT
Start: 2021-01-10 | End: 2021-01-10

## 2021-01-10 RX ORDER — CEFEPIME 1 G/1
2000 INJECTION, POWDER, FOR SOLUTION INTRAMUSCULAR; INTRAVENOUS EVERY 12 HOURS
Refills: 0 | Status: DISCONTINUED | OUTPATIENT
Start: 2021-01-10 | End: 2021-01-12

## 2021-01-10 RX ORDER — POTASSIUM CHLORIDE 20 MEQ
40 PACKET (EA) ORAL ONCE
Refills: 0 | Status: DISCONTINUED | OUTPATIENT
Start: 2021-01-10 | End: 2021-01-10

## 2021-01-10 RX ADMIN — ENOXAPARIN SODIUM 40 MILLIGRAM(S): 100 INJECTION SUBCUTANEOUS at 22:57

## 2021-01-10 RX ADMIN — Medication 500000 UNIT(S): at 19:37

## 2021-01-10 RX ADMIN — Medication 3 MILLILITER(S): at 16:45

## 2021-01-10 RX ADMIN — SODIUM CHLORIDE 2 GRAM(S): 9 INJECTION INTRAMUSCULAR; INTRAVENOUS; SUBCUTANEOUS at 13:44

## 2021-01-10 RX ADMIN — Medication 500000 UNIT(S): at 11:32

## 2021-01-10 RX ADMIN — SODIUM CHLORIDE 1000 MILLILITER(S): 9 INJECTION INTRAMUSCULAR; INTRAVENOUS; SUBCUTANEOUS at 11:28

## 2021-01-10 RX ADMIN — FENTANYL CITRATE 25 MICROGRAM(S): 50 INJECTION INTRAVENOUS at 16:52

## 2021-01-10 RX ADMIN — LISINOPRIL 5 MILLIGRAM(S): 2.5 TABLET ORAL at 05:58

## 2021-01-10 RX ADMIN — CHLORHEXIDINE GLUCONATE 15 MILLILITER(S): 213 SOLUTION TOPICAL at 19:37

## 2021-01-10 RX ADMIN — Medication 3 MILLILITER(S): at 10:22

## 2021-01-10 RX ADMIN — Medication 3 MILLILITER(S): at 22:57

## 2021-01-10 RX ADMIN — Medication 3 MILLILITER(S): at 05:59

## 2021-01-10 RX ADMIN — FLUDROCORTISONE ACETATE 0.2 MILLIGRAM(S): 0.1 TABLET ORAL at 18:58

## 2021-01-10 RX ADMIN — PANTOPRAZOLE SODIUM 40 MILLIGRAM(S): 20 TABLET, DELAYED RELEASE ORAL at 19:38

## 2021-01-10 RX ADMIN — Medication 81 MILLIGRAM(S): at 11:32

## 2021-01-10 RX ADMIN — PANTOPRAZOLE SODIUM 40 MILLIGRAM(S): 20 TABLET, DELAYED RELEASE ORAL at 06:05

## 2021-01-10 RX ADMIN — PREGABALIN 1000 MICROGRAM(S): 225 CAPSULE ORAL at 11:32

## 2021-01-10 RX ADMIN — CEFEPIME 100 MILLIGRAM(S): 1 INJECTION, POWDER, FOR SOLUTION INTRAMUSCULAR; INTRAVENOUS at 13:46

## 2021-01-10 RX ADMIN — CHLORHEXIDINE GLUCONATE 1 APPLICATION(S): 213 SOLUTION TOPICAL at 06:35

## 2021-01-10 RX ADMIN — CEFEPIME 100 MILLIGRAM(S): 1 INJECTION, POWDER, FOR SOLUTION INTRAMUSCULAR; INTRAVENOUS at 23:02

## 2021-01-10 RX ADMIN — Medication 40 MILLIEQUIVALENT(S): at 22:56

## 2021-01-10 RX ADMIN — Medication 250 MILLIGRAM(S): at 13:43

## 2021-01-10 RX ADMIN — Medication 0.5 MILLIGRAM(S): at 05:59

## 2021-01-10 RX ADMIN — LACOSAMIDE 150 MILLIGRAM(S): 50 TABLET ORAL at 19:37

## 2021-01-10 RX ADMIN — Medication 0.5 MILLIGRAM(S): at 16:44

## 2021-01-10 RX ADMIN — SODIUM CHLORIDE 2 GRAM(S): 9 INJECTION INTRAMUSCULAR; INTRAVENOUS; SUBCUTANEOUS at 05:58

## 2021-01-10 RX ADMIN — Medication 250 MILLIGRAM(S): at 19:38

## 2021-01-10 RX ADMIN — SODIUM CHLORIDE 2 GRAM(S): 9 INJECTION INTRAMUSCULAR; INTRAVENOUS; SUBCUTANEOUS at 22:56

## 2021-01-10 RX ADMIN — ATORVASTATIN CALCIUM 40 MILLIGRAM(S): 80 TABLET, FILM COATED ORAL at 22:57

## 2021-01-10 RX ADMIN — FLUDROCORTISONE ACETATE 0.2 MILLIGRAM(S): 0.1 TABLET ORAL at 05:59

## 2021-01-10 RX ADMIN — Medication 325 MILLIGRAM(S): at 11:32

## 2021-01-10 RX ADMIN — Medication 400 MILLIGRAM(S): at 04:42

## 2021-01-10 RX ADMIN — SODIUM CHLORIDE 50 MILLILITER(S): 5 INJECTION, SOLUTION INTRAVENOUS at 16:44

## 2021-01-10 RX ADMIN — LACOSAMIDE 150 MILLIGRAM(S): 50 TABLET ORAL at 05:58

## 2021-01-10 RX ADMIN — MONTELUKAST 10 MILLIGRAM(S): 4 TABLET, CHEWABLE ORAL at 11:32

## 2021-01-10 NOTE — PROGRESS NOTE ADULT - SUBJECTIVE AND OBJECTIVE BOX
HPI:  76y/o F with PMHx sig for COPD, asthma, HLD, HTN, BIBEMS to [facility] from home after HHA discovered patient found down in floor covered in non-bloody vomitus. Perr HHA Pt went to the bathroom and was taking a long time, went in to check on her and found her sitting on floor, awake, however with vomitus on front of shirt. On arrival to [facility], patient was taken for stat head CT, which revealed diffuse subarachnoid hemorrhage mainly at basilar cisterns with IVH and obstructive hydrocephalus. Patient was given a bolus of 1g keppra and dilaudid pushes for generalized headache. CTA concerning for 3mm left pcomm aneurysm and a 1.6mm outpouching of distal cavernous segment of the left internal carotid artery likely aneurysm. NIHSS2 HH3 MF4. Patient was transferred to [facility] for further intervention. Patient currently reports headaches. Pt denies acute changes in vision, seizures, CP, SOB, weakness/paresthesias of arms or legs. (09 Dec 2020 23:37)    Hospital Course:   12/9: BD1 Patient admitted for SAH HH3, MF4.   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 EVD at 5, on Levo overnight, nimodipine discontinued d/t hypotension  12/12: BD4 POD#2, VIVIEN overnight, neuro stable, passed TOV  12/13: BD5 POD#3: VIVIEN x 24 hrs  12/14: BD6 POD#4. VIVIEN o/n, neuro stable. EVD at 5cm H2O  12/15: BD7 POD #5 VIVIEN overnight, neuro stable. EVD remains at 5. Plan for CTA today.   12/16: BD8 POD #6 VIVIEN overnight, neuro stable, EVD at 0, on Levo, started on 3% at 15 overnight   12/17: BD9 POD#1 s/p cerebral angio for verapamil c/b device malfunction of Proglide, s/p right groin cutdown for removal of proglide catheter and femoral artery repair.  VIVIEN overnight, neuro stable, EVD at 0. patient remained intubated overnight, on propofol for sedation, and vEEG  12/18: BD#10, POD#8 s/p coil/embo of L pcomm aneurysm, POD#2 s/p IA verapamil, POD#2 R groin cutdown for removal of proglide catheter w/ repair of femoral artery. VIVIEN overnight. EVD remains open @0cmH2O   12/19: BD#11, POD#9 s/p coil/embo of L pcomm aneurysm. POD#3 s/p IA verapamil, POD#3 s/p R femoral artery cutdown of proglide catheter w/ femoral artery repair. VIVIEN overnight. EVD remains open @0cmH2O. EEG shows b/l frontal sharp discharges, cont monitoring, vimpat was increased yesterday. Gentle hydration, total IVF 50cc/hr. Cont vanc/zosyn for empiric coverage, next vanc trough due @1pm on 12/19. F/u daily CXR.   12/20 BD#12 POD#10 VIVIEN overnight, Neuro exam stable, EVD @ 0cm H2O, vEEG, 3% @ 15 goal WNL, TF via NGT  12/21: BD13, POD11 VIVIEN overnight. EVD at 5cmH20 (raised yesterday), vEEG in place  12/22 BD#14, EVD raised to 15 yesterday, 3% @ 30cc Goal WNL, -180, Milrinone, TTE prior to DC as per Card for takotsubo cardiomyopathy, failed S&S pending reeval, TF via NGT, Neuro exam stable  12/23: BD#15. VIVIEN o/n, neuro stable. EVD clamped. 30%@30cc/hr  12/24 BD#16 VIVIEN overnight, spiked 102, EVD @ 0cm H2O, 3% @ 30cc/hr Goal WNL, Vasc following, TF via NGT, -200,   12/25: BD#17. VIVIEN overnight. Pan cx from 12/23, NGTD on CSF and blood. EVD clamped. 3% @15cc/hr, rate kept same overnight. NGT feeds at goal. SBP goal 160-200.   12/26: BD#18. VIVIEN overnight, neuro exam stable. NGTD from pan cx on 12/23. EVD removed today. 3% discontinued 12/25. NGT feeds at goal, IVF off. SBP goal 160-200.   12/27: BD#19 VIVIEN overnight, neuro stable. 3% at 15, stepdown status  12/28: BD20 VIVIEN overnight, neuro stable. 3% continues.  Patient became increasingly more somnolent and underwent LP for CSF high volume tap.  Temporary improvement.  Spiked fever, pancultured.  12/29: BD21 Patient increasingly more somnolent, EVD placed at bedside.    12/30: BD22 pt. is s/p R VPS placement, certas @5 POD#1, post op ct head c/a/p done. afebrile o/n.   1/1: BD#23: pt. is s/p R VPS placement, certas @5 POD#2, post op ct head c/a/p done. zosyn for enterobacter   1/2: BD #24. POD#3 s/p R VPS placement, CErtas @5. Dressings removed from head and abdomen. Cont zosyn for enterobacter and e. coli in urine, end date 1/4/21. Post-op imaging completed. Pend S&S re-eval, improvement in mental status/neuro exam.   1/3: BD25, POD4. SBP goals relaxed 120-200. zosyn for enterobacter UTI until 1/4. FOB+, protonix bid started, vivien o/n  1/4: BD 26, VIVIEN o/n  1/5: BD 27, VIVIEN o/n   1/6: BD 28, POD 7 VPS (Certas @ 5). VIVIEN overnight. Plan for PEG with GI Thursday. Repeat Covid swab negative. Stepdown status.  1/7: BD 29, POD 8. VIVIEN overnight. PEG placed at bedside by GI today. Stepdown status  1/8: BD 30 POD 9, VIVIEN o/n, resume TF 24 hrs after PEG, stepdown   1/9 BD 31. POD 10. No events overnight. s/p PEG. Pending AR vs JOSE  1/10 BD32, POD11. Lethargic but arousable with stimulation, CTH was ordered and demonstrated stable scan in comparison from 1/1. Continue to trend Na, this  improved to 135 on 2%@50/hr and salt tabs started. Pending AR vs JOSE.    Vital Signs Last 24 Hrs  T(C): 36.9 (09 Jan 2021 22:00), Max: 37.2 (09 Jan 2021 14:18)  T(F): 98.4 (09 Jan 2021 22:00), Max: 98.9 (09 Jan 2021 14:18)  HR: 84 (10 Christian 2021 00:19) (78 - 86)  BP: 115/53 (10 Christian 2021 00:19) (115/53 - 150/65)  BP(mean): 87 (09 Jan 2021 19:42) (86 - 105)  RR: 18 (10 Christian 2021 00:19) (16 - 18)  SpO2: 97% (10 Christian 2021 00:19) (96% - 100%)    I&O's Summary    08 Jan 2021 07:01  -  09 Jan 2021 07:00  --------------------------------------------------------  IN: 1260 mL / OUT: 1200 mL / NET: 60 mL    09 Jan 2021 07:01  -  10 Christian 2021 01:34  --------------------------------------------------------  IN: 1135 mL / OUT: 400 mL / NET: 735 mL    PHYSICAL EXAM:  GEN: laying in bed, appears well, NAD, lethargic but arousable with stimulation.  NEURO: AOx2. FC, OE spont, speech intact, face symmetric. CNII-XII intact. PERRL, EOMI. No pronator drift. MAEx4. 5/5 strength throughout. SILT  CV: RRR +S1/S2  PULM: CTAB  GI: Abd soft, NT/ND  EXT: ext warm, dry, nontender  WOUND: evd site c/d/i, PEG site C/D/I     TUBES/LINES:  [] Soriano  [] Lumbar Drain  [] Wound Drains  [] Others      DIET:  [] NPO  [] Mechanical  [X] Tube feeds    LABS:                        10.4   8.12  )-----------( 276      ( 09 Jan 2021 07:13 )             34.3     01-09    135  |  99  |  14  ----------------------------<  175<H>  3.6   |  25  |  0.35<L>    Ca    8.5      09 Jan 2021 22:00  Phos  2.7     01-09  Mg     1.9     01-09          CAPILLARY BLOOD GLUCOSE      Drug Levels: [] N/A    CSF Analysis: [] N/A      Allergies    No Known Allergies    Intolerances      MEDICATIONS:  Antibiotics:  nystatin    Suspension 509714 Unit(s) Oral four times a day    Neuro:  acetaminophen    Suspension .. 650 milliGRAM(s) Oral every 6 hours PRN  lacosamide Solution 150 milliGRAM(s) Oral every 12 hours    Anticoagulation:  aspirin enteric coated 81 milliGRAM(s) Oral daily  enoxaparin Injectable 40 milliGRAM(s) SubCutaneous at bedtime    OTHER:  albuterol/ipratropium for Nebulization 3 milliLiter(s) Nebulizer every 6 hours  amLODIPine   Tablet 10 milliGRAM(s) Oral every 24 hours  atorvastatin 40 milliGRAM(s) Oral at bedtime  buDESOnide    Inhalation Suspension 0.5 milliGRAM(s) Inhalation every 12 hours  chlorhexidine 0.12% Liquid 15 milliLiter(s) Oral Mucosa two times a day  chlorhexidine 2% Cloths 1 Application(s) Topical <User Schedule>  dextrose 40% Gel 15 Gram(s) Oral once  dextrose 50% Injectable 25 Gram(s) IV Push once  fludroCORTISONE 0.2 milliGRAM(s) Oral every 12 hours  glucagon  Injectable 1 milliGRAM(s) IntraMuscular once  labetalol Injectable 2.5 milliGRAM(s) IV Push every 6 hours PRN  lisinopril 5 milliGRAM(s) Oral daily  montelukast 10 milliGRAM(s) Oral daily  pantoprazole   Suspension 40 milliGRAM(s) Oral two times a day    IVF:  cyanocobalamin 1000 MICROGram(s) Oral daily  dextrose 5%. 1000 milliLiter(s) IV Continuous <Continuous>  dextrose 5%. 1000 milliLiter(s) IV Continuous <Continuous>  ferrous    sulfate 325 milliGRAM(s) Oral daily  sodium chloride 2 Gram(s) Oral every 8 hours  sodium chloride 2% . 1000 milliLiter(s) IV Continuous <Continuous>    CULTURES:  Culture Results:   No growth at 5 days. (12-31 @ 22:37)  Culture Results:   No growth at 5 days. (12-31 @ 22:37)    RADIOLOGY & ADDITIONAL TESTS:      ASSESSMENT:  76 year old Female  with PMHx of COPD, asthma, HLD, HTN, BIBEMS to [facility] from home after HHA found patient down on the floor covered in non-bloody vomitus. CTH reveals diffuse subarachnoid hemorrhage mainly at basilar cisterns with IVH and obstructive hydrocephalus, CTA shows 3mm left pcomm aneurysm, now s/p bedside right frontal EVD placement 12/10, s/p cerebral angiogram for coil embolization of left PCOMM aneurysm 12/10, now   s/p cerebral angio for verapamil c/b device malfunction of Proglide, s/p right groin cutdown for removal of proglide catheter and femoral artery repair (12/17/2020), NIHSS2 HH3 MF4), s/p EVD removal 12/25,  s/p bedside EVD placement 12/29, POD 11 from R VPS certas at 5 and POD2 s/p PEG.     HEADACHE    Handoff    MEWS Score    Hyperlipidemia    Hypertension    COPD (chronic obstructive pulmonary disease)    Asthma    COPD (chronic obstructive pulmonary disease)    Asthma    Hydrocephalus, adult    Injury of right femoral artery    Cerebral artery vasospasm    SAH (subarachnoid hemorrhage)    Hydrocephalus, adult    Injury of femoral artery    Cerebral artery vasospasm    SAH (subarachnoid hemorrhage)    Subarachnoid bleed    Obstructive hydrocephalus    Anemia due to acute blood loss    Preoperative clearance    Centrilobular emphysema    Mild persistent asthma without complication    Subarachnoid bleed    Essential hypertension    Pure hypercholesterolemia    Ventriculoperitoneal shunt    Insertion of external ventricular drain    Vascular surgery procedure    Angiogram, carotid and cerebral, bilateral    Angiogram, cerebral, with intracranial aneurysm embolization    No significant past surgical history    HEADACHE    90+    SysAdmin_VisitLink    PLAN:  NEURO:  - neuro/vitals checks   - pain control   - cont modafinil for neurostim   - cont vimpat seizure ppx  -Restarted on ASA 81 for B/L ICA stenosis   -CTH stable with increased mild lethargy    CARDIOVASCULAR:  - -200  -Continue Lisinopril and amlodipine  - echo and CTA prior to discharge    PULMONARY:  - Satting well RA  -Contiue monelukast, Spiriva, and duonebs     RENAL:  - repletions prn  - sc prn   - Salt tabs 2g q8  - 1/2NS @ 50/hr     GI:  - TF via PEG    HEME:  - H/h stable    ID:  - afebrile  - off abx  - repeat covid swab 1/5 negative- isolation dc/d    ENDO:  - glucose goal 140-180    DVT PROPHYLAXIS:  -SCDs  -Lovenox 40mg daily  - Rt upper extremity doppler (R upper cephalic DVT)     VASCULAR: s/p proglide malfunction, R groin cutdown & femoral artery repair (12/16)   -Following, pulse/groin checks RLE     DISPOSITION: SDU status, full code, dispo pending to JOSE vs AR.    D/w Dr. Serrano and Dr. Nino     Assessment:  Present when checked    []  GCS  E   V  M     Heart Failure: []Acute, [] acute on chronic , []chronic  Heart Failure:  [] Diastolic (HFpEF), [] Systolic (HFrEF), []Combined (HFpEF and HFrEF), [] RHF, [] Pulm HTN, [] Other    [] MICHAELLE, [] ATN, [] AIN, [] other  [] CKD1, [] CKD2, [] CKD 3, [] CKD 4, [] CKD 5, []ESRD    Encephalopathy: [] Metabolic, [] Hepatic, [] toxic, [] Neurological, [] Other    Abnormal Nurtitional Status: [] malnurtition (see nutrition note), [ ]underweight: BMI < 19, [] morbid obesity: BMI >40, [] Cachexia    [] Sepsis  [] hypovolemic shock,[] cardiogenic shock, [] hemorrhagic shock, [] neuogenic shock  [] Acute Respiratory Failure  []Cerebral edema, [] Brain compression/ herniation,   [] Functional quadriplegia  [] Acute blood loss anemia   HPI:  76y/o F with PMHx sig for COPD, asthma, HLD, HTN, BIBEMS to [facility] from home after HHA discovered patient found down in floor covered in non-bloody vomitus. Perr HHA Pt went to the bathroom and was taking a long time, went in to check on her and found her sitting on floor, awake, however with vomitus on front of shirt. On arrival to [facility], patient was taken for stat head CT, which revealed diffuse subarachnoid hemorrhage mainly at basilar cisterns with IVH and obstructive hydrocephalus. Patient was given a bolus of 1g keppra and dilaudid pushes for generalized headache. CTA concerning for 3mm left pcomm aneurysm and a 1.6mm outpouching of distal cavernous segment of the left internal carotid artery likely aneurysm. NIHSS2 HH3 MF4. Patient was transferred to [facility] for further intervention. Patient currently reports headaches. Pt denies acute changes in vision, seizures, CP, SOB, weakness/paresthesias of arms or legs. (09 Dec 2020 23:37)    Hospital Course:   12/9: BD1 Patient admitted for SAH HH3, MF4.   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 EVD at 5, on Levo overnight, nimodipine discontinued d/t hypotension  12/12: BD4 POD#2, VIVIEN overnight, neuro stable, passed TOV  12/13: BD5 POD#3: VIVIEN x 24 hrs  12/14: BD6 POD#4. VIVIEN o/n, neuro stable. EVD at 5cm H2O  12/15: BD7 POD #5 VIVIEN overnight, neuro stable. EVD remains at 5. Plan for CTA today.   12/16: BD8 POD #6 VIVIEN overnight, neuro stable, EVD at 0, on Levo, started on 3% at 15 overnight   12/17: BD9 POD#1 s/p cerebral angio for verapamil c/b device malfunction of Proglide, s/p right groin cutdown for removal of proglide catheter and femoral artery repair.  VIVIEN overnight, neuro stable, EVD at 0. patient remained intubated overnight, on propofol for sedation, and vEEG  12/18: BD#10, POD#8 s/p coil/embo of L pcomm aneurysm, POD#2 s/p IA verapamil, POD#2 R groin cutdown for removal of proglide catheter w/ repair of femoral artery. VIVIEN overnight. EVD remains open @0cmH2O   12/19: BD#11, POD#9 s/p coil/embo of L pcomm aneurysm. POD#3 s/p IA verapamil, POD#3 s/p R femoral artery cutdown of proglide catheter w/ femoral artery repair. VIVIEN overnight. EVD remains open @0cmH2O. EEG shows b/l frontal sharp discharges, cont monitoring, vimpat was increased yesterday. Gentle hydration, total IVF 50cc/hr. Cont vanc/zosyn for empiric coverage, next vanc trough due @1pm on 12/19. F/u daily CXR.   12/20 BD#12 POD#10 VIVIEN overnight, Neuro exam stable, EVD @ 0cm H2O, vEEG, 3% @ 15 goal WNL, TF via NGT  12/21: BD13, POD11 VIVIEN overnight. EVD at 5cmH20 (raised yesterday), vEEG in place  12/22 BD#14, EVD raised to 15 yesterday, 3% @ 30cc Goal WNL, -180, Milrinone, TTE prior to DC as per Card for takotsubo cardiomyopathy, failed S&S pending reeval, TF via NGT, Neuro exam stable  12/23: BD#15. VIVIEN o/n, neuro stable. EVD clamped. 30%@30cc/hr  12/24 BD#16 VIVIEN overnight, spiked 102, EVD @ 0cm H2O, 3% @ 30cc/hr Goal WNL, Vasc following, TF via NGT, -200,   12/25: BD#17. VIVIEN overnight. Pan cx from 12/23, NGTD on CSF and blood. EVD clamped. 3% @15cc/hr, rate kept same overnight. NGT feeds at goal. SBP goal 160-200.   12/26: BD#18. VIVIEN overnight, neuro exam stable. NGTD from pan cx on 12/23. EVD removed today. 3% discontinued 12/25. NGT feeds at goal, IVF off. SBP goal 160-200.   12/27: BD#19 VIVIEN overnight, neuro stable. 3% at 15, stepdown status  12/28: BD20 VIVIEN overnight, neuro stable. 3% continues.  Patient became increasingly more somnolent and underwent LP for CSF high volume tap.  Temporary improvement.  Spiked fever, pancultured.  12/29: BD21 Patient increasingly more somnolent, EVD placed at bedside.    12/30: BD22 pt. is s/p R VPS placement, certas @5 POD#1, post op ct head c/a/p done. afebrile o/n.   1/1: BD#23: pt. is s/p R VPS placement, certas @5 POD#2, post op ct head c/a/p done. zosyn for enterobacter   1/2: BD #24. POD#3 s/p R VPS placement, CErtas @5. Dressings removed from head and abdomen. Cont zosyn for enterobacter and e. coli in urine, end date 1/4/21. Post-op imaging completed. Pend S&S re-eval, improvement in mental status/neuro exam.   1/3: BD25, POD4. SBP goals relaxed 120-200. zosyn for enterobacter UTI until 1/4. FOB+, protonix bid started, vivien o/n  1/4: BD 26, VIVIEN o/n  1/5: BD 27, VIVIEN o/n   1/6: BD 28, POD 7 VPS (Certas @ 5). VIVIEN overnight. Plan for PEG with GI Thursday. Repeat Covid swab negative. Stepdown status.  1/7: BD 29, POD 8. VIVIEN overnight. PEG placed at bedside by GI today. Stepdown status  1/8: BD 30 POD 9, VIVIEN o/n, resume TF 24 hrs after PEG, stepdown   1/9 BD 31. POD 10. No events overnight. s/p PEG. Pending AR vs JOSE  1/10 BD32, POD11. Lethargic but arousable with stimulation, CTH was ordered and demonstrated stable scan in comparison from 1/1. Continue to trend Na, this  improved to 135 on 2%@50/hr and salt tabs started. Pending AR vs JOSE.    Vital Signs Last 24 Hrs  T(C): 36.9 (09 Jan 2021 22:00), Max: 37.2 (09 Jan 2021 14:18)  T(F): 98.4 (09 Jan 2021 22:00), Max: 98.9 (09 Jan 2021 14:18)  HR: 84 (10 Christian 2021 00:19) (78 - 86)  BP: 115/53 (10 Christian 2021 00:19) (115/53 - 150/65)  BP(mean): 87 (09 Jan 2021 19:42) (86 - 105)  RR: 18 (10 Christian 2021 00:19) (16 - 18)  SpO2: 97% (10 Christian 2021 00:19) (96% - 100%)    I&O's Summary    08 Jan 2021 07:01  -  09 Jan 2021 07:00  --------------------------------------------------------  IN: 1260 mL / OUT: 1200 mL / NET: 60 mL    09 Jan 2021 07:01  -  10 Christian 2021 01:34  --------------------------------------------------------  IN: 1135 mL / OUT: 400 mL / NET: 735 mL    PHYSICAL EXAM:  GEN: laying in bed, appears well, NAD, lethargic but arousable with stimulation.  NEURO: AOx2. FC, OE spont, speech intact, face symmetric. CNII-XII intact. PERRL, EOMI. No pronator drift. MAEx4. 5/5 strength throughout. SILT  CV: RRR +S1/S2  PULM: CTAB  GI: Abd soft, NT/ND  EXT: ext warm, dry, nontender  WOUND: evd site c/d/i, PEG site C/D/I     TUBES/LINES:  [] Soriano  [] Lumbar Drain  [] Wound Drains  [] Others      DIET:  [] NPO  [] Mechanical  [X] Tube feeds    LABS:                        10.4   8.12  )-----------( 276      ( 09 Jan 2021 07:13 )             34.3     01-09    135  |  99  |  14  ----------------------------<  175<H>  3.6   |  25  |  0.35<L>    Ca    8.5      09 Jan 2021 22:00  Phos  2.7     01-09  Mg     1.9     01-09          CAPILLARY BLOOD GLUCOSE      Drug Levels: [] N/A    CSF Analysis: [] N/A      Allergies    No Known Allergies    Intolerances      MEDICATIONS:  Antibiotics:  nystatin    Suspension 322492 Unit(s) Oral four times a day    Neuro:  acetaminophen    Suspension .. 650 milliGRAM(s) Oral every 6 hours PRN  lacosamide Solution 150 milliGRAM(s) Oral every 12 hours    Anticoagulation:  aspirin enteric coated 81 milliGRAM(s) Oral daily  enoxaparin Injectable 40 milliGRAM(s) SubCutaneous at bedtime    OTHER:  albuterol/ipratropium for Nebulization 3 milliLiter(s) Nebulizer every 6 hours  amLODIPine   Tablet 10 milliGRAM(s) Oral every 24 hours  atorvastatin 40 milliGRAM(s) Oral at bedtime  buDESOnide    Inhalation Suspension 0.5 milliGRAM(s) Inhalation every 12 hours  chlorhexidine 0.12% Liquid 15 milliLiter(s) Oral Mucosa two times a day  chlorhexidine 2% Cloths 1 Application(s) Topical <User Schedule>  dextrose 40% Gel 15 Gram(s) Oral once  dextrose 50% Injectable 25 Gram(s) IV Push once  fludroCORTISONE 0.2 milliGRAM(s) Oral every 12 hours  glucagon  Injectable 1 milliGRAM(s) IntraMuscular once  labetalol Injectable 2.5 milliGRAM(s) IV Push every 6 hours PRN  lisinopril 5 milliGRAM(s) Oral daily  montelukast 10 milliGRAM(s) Oral daily  pantoprazole   Suspension 40 milliGRAM(s) Oral two times a day    IVF:  cyanocobalamin 1000 MICROGram(s) Oral daily  dextrose 5%. 1000 milliLiter(s) IV Continuous <Continuous>  dextrose 5%. 1000 milliLiter(s) IV Continuous <Continuous>  ferrous    sulfate 325 milliGRAM(s) Oral daily  sodium chloride 2 Gram(s) Oral every 8 hours  sodium chloride 2% . 1000 milliLiter(s) IV Continuous <Continuous>    CULTURES:  Culture Results:   No growth at 5 days. (12-31 @ 22:37)  Culture Results:   No growth at 5 days. (12-31 @ 22:37)    RADIOLOGY & ADDITIONAL TESTS:      ASSESSMENT:  76 year old Female  with PMHx of COPD, asthma, HLD, HTN, BIBEMS to [facility] from home after HHA found patient down on the floor covered in non-bloody vomitus. CTH reveals diffuse subarachnoid hemorrhage mainly at basilar cisterns with IVH and obstructive hydrocephalus, CTA shows 3mm left pcomm aneurysm, now s/p bedside right frontal EVD placement 12/10, s/p cerebral angiogram for coil embolization of left PCOMM aneurysm 12/10, now   s/p cerebral angio for verapamil c/b device malfunction of Proglide, s/p right groin cutdown for removal of proglide catheter and femoral artery repair (12/17/2020), NIHSS2 HH3 MF4), s/p EVD removal 12/25,  s/p bedside EVD placement 12/29, POD 11 from R VPS certas at 5 and POD2 s/p PEG.     HEADACHE    Handoff    MEWS Score    Hyperlipidemia    Hypertension    COPD (chronic obstructive pulmonary disease)    Asthma    COPD (chronic obstructive pulmonary disease)    Asthma    Hydrocephalus, adult    Injury of right femoral artery    Cerebral artery vasospasm    SAH (subarachnoid hemorrhage)    Hydrocephalus, adult    Injury of femoral artery    Cerebral artery vasospasm    SAH (subarachnoid hemorrhage)    Subarachnoid bleed    Obstructive hydrocephalus    Anemia due to acute blood loss    Preoperative clearance    Centrilobular emphysema    Mild persistent asthma without complication    Subarachnoid bleed    Essential hypertension    Pure hypercholesterolemia    Ventriculoperitoneal shunt    Insertion of external ventricular drain    Vascular surgery procedure    Angiogram, carotid and cerebral, bilateral    Angiogram, cerebral, with intracranial aneurysm embolization    No significant past surgical history    HEADACHE    90+    SysAdmin_VisitLink    PLAN:  NEURO:  - neuro/vitals checks   - pain control   - cont modafinil for neurostim   - cont vimpat seizure ppx  -Restarted on ASA 81 for B/L ICA stenosis   -CTH stable with increased mild lethargy    CARDIOVASCULAR:  - -200  -Continue Lisinopril and amlodipine  - echo and CTA prior to discharge    PULMONARY:  - Satting well RA  -Contiue monelukast, Spiriva, and duonebs     RENAL:  - repletions prn  - sc prn   - Salt tabs 2g q8  - 1/2NS @ 50/hr   - Continue to trend Na    GI:  - TF via PEG    HEME:  - H/h stable    ID:  - afebrile  - off abx  - repeat covid swab 1/5 negative- isolation dc/d    ENDO:  - glucose goal 140-180    DVT PROPHYLAXIS:  -SCDs  -Lovenox 40mg daily  - Rt upper extremity doppler (R upper cephalic DVT)     VASCULAR: s/p proglide malfunction, R groin cutdown & femoral artery repair (12/16)   -Following, pulse/groin checks RLE     DISPOSITION: SDU status, full code, dispo pending to JOSE vs AR.    D/w Dr. Serrano and Dr. Nino     Assessment:  Present when checked    []  GCS  E   V  M     Heart Failure: []Acute, [] acute on chronic , []chronic  Heart Failure:  [] Diastolic (HFpEF), [] Systolic (HFrEF), []Combined (HFpEF and HFrEF), [] RHF, [] Pulm HTN, [] Other    [] MICHAELLE, [] ATN, [] AIN, [] other  [] CKD1, [] CKD2, [] CKD 3, [] CKD 4, [] CKD 5, []ESRD    Encephalopathy: [] Metabolic, [] Hepatic, [] toxic, [] Neurological, [] Other    Abnormal Nurtitional Status: [] malnurtition (see nutrition note), [ ]underweight: BMI < 19, [] morbid obesity: BMI >40, [] Cachexia    [] Sepsis  [] hypovolemic shock,[] cardiogenic shock, [] hemorrhagic shock, [] neuogenic shock  [] Acute Respiratory Failure  []Cerebral edema, [] Brain compression/ herniation,   [] Functional quadriplegia  [] Acute blood loss anemia

## 2021-01-10 NOTE — PROCEDURE NOTE - GENERAL PROCEDURE DETAILS
2 sets of blood cultures were obtained in a sterile fashion 1 set of blood culture was obtained in a sterile fashion via venipuncture

## 2021-01-11 DIAGNOSIS — Z98.890 OTHER SPECIFIED POSTPROCEDURAL STATES: ICD-10-CM

## 2021-01-11 LAB
ANION GAP SERPL CALC-SCNC: 10 MMOL/L — SIGNIFICANT CHANGE UP (ref 5–17)
ANION GAP SERPL CALC-SCNC: 12 MMOL/L — SIGNIFICANT CHANGE UP (ref 5–17)
BUN SERPL-MCNC: 5 MG/DL — LOW (ref 7–23)
BUN SERPL-MCNC: 5 MG/DL — LOW (ref 7–23)
CALCIUM SERPL-MCNC: 8 MG/DL — LOW (ref 8.4–10.5)
CALCIUM SERPL-MCNC: 8.5 MG/DL — SIGNIFICANT CHANGE UP (ref 8.4–10.5)
CHLORIDE SERPL-SCNC: 100 MMOL/L — SIGNIFICANT CHANGE UP (ref 96–108)
CHLORIDE SERPL-SCNC: 106 MMOL/L — SIGNIFICANT CHANGE UP (ref 96–108)
CO2 SERPL-SCNC: 24 MMOL/L — SIGNIFICANT CHANGE UP (ref 22–31)
CO2 SERPL-SCNC: 25 MMOL/L — SIGNIFICANT CHANGE UP (ref 22–31)
CREAT SERPL-MCNC: 0.33 MG/DL — LOW (ref 0.5–1.3)
CREAT SERPL-MCNC: 0.37 MG/DL — LOW (ref 0.5–1.3)
GLUCOSE SERPL-MCNC: 127 MG/DL — HIGH (ref 70–99)
GLUCOSE SERPL-MCNC: 213 MG/DL — HIGH (ref 70–99)
HCT VFR BLD CALC: 31.2 % — LOW (ref 34.5–45)
HGB BLD-MCNC: 8.8 G/DL — LOW (ref 11.5–15.5)
LACTATE SERPL-SCNC: 0.8 MMOL/L — SIGNIFICANT CHANGE UP (ref 0.5–2)
MAGNESIUM SERPL-MCNC: 1.6 MG/DL — SIGNIFICANT CHANGE UP (ref 1.6–2.6)
MCHC RBC-ENTMCNC: 28.2 GM/DL — LOW (ref 32–36)
MCHC RBC-ENTMCNC: 28.9 PG — SIGNIFICANT CHANGE UP (ref 27–34)
MCV RBC AUTO: 102.6 FL — HIGH (ref 80–100)
NRBC # BLD: 0 /100 WBCS — SIGNIFICANT CHANGE UP (ref 0–0)
PHOSPHATE SERPL-MCNC: 2.1 MG/DL — LOW (ref 2.5–4.5)
PLATELET # BLD AUTO: 258 K/UL — SIGNIFICANT CHANGE UP (ref 150–400)
POTASSIUM SERPL-MCNC: 3.1 MMOL/L — LOW (ref 3.5–5.3)
POTASSIUM SERPL-MCNC: 3.7 MMOL/L — SIGNIFICANT CHANGE UP (ref 3.5–5.3)
POTASSIUM SERPL-SCNC: 3.1 MMOL/L — LOW (ref 3.5–5.3)
POTASSIUM SERPL-SCNC: 3.7 MMOL/L — SIGNIFICANT CHANGE UP (ref 3.5–5.3)
PROCALCITONIN SERPL-MCNC: 0.31 NG/ML — HIGH (ref 0.02–0.1)
RBC # BLD: 3.04 M/UL — LOW (ref 3.8–5.2)
RBC # FLD: 17.8 % — HIGH (ref 10.3–14.5)
SODIUM SERPL-SCNC: 137 MMOL/L — SIGNIFICANT CHANGE UP (ref 135–145)
SODIUM SERPL-SCNC: 140 MMOL/L — SIGNIFICANT CHANGE UP (ref 135–145)
VANCOMYCIN TROUGH SERPL-MCNC: 9.5 UG/ML — LOW (ref 10–20)
WBC # BLD: 11.58 K/UL — HIGH (ref 3.8–10.5)
WBC # FLD AUTO: 11.58 K/UL — HIGH (ref 3.8–10.5)

## 2021-01-11 PROCEDURE — 99024 POSTOP FOLLOW-UP VISIT: CPT

## 2021-01-11 PROCEDURE — 95720 EEG PHY/QHP EA INCR W/VEEG: CPT

## 2021-01-11 PROCEDURE — 99232 SBSQ HOSP IP/OBS MODERATE 35: CPT | Mod: GC

## 2021-01-11 RX ORDER — POTASSIUM CHLORIDE 20 MEQ
40 PACKET (EA) ORAL EVERY 4 HOURS
Refills: 0 | Status: COMPLETED | OUTPATIENT
Start: 2021-01-11 | End: 2021-01-12

## 2021-01-11 RX ORDER — MEGESTROL ACETATE 40 MG/ML
800 SUSPENSION ORAL DAILY
Refills: 0 | Status: DISCONTINUED | OUTPATIENT
Start: 2021-01-11 | End: 2021-01-11

## 2021-01-11 RX ORDER — SODIUM CHLORIDE 5 G/100ML
1000 INJECTION, SOLUTION INTRAVENOUS
Refills: 0 | Status: DISCONTINUED | OUTPATIENT
Start: 2021-01-11 | End: 2021-01-12

## 2021-01-11 RX ORDER — POTASSIUM CHLORIDE 20 MEQ
20 PACKET (EA) ORAL ONCE
Refills: 0 | Status: COMPLETED | OUTPATIENT
Start: 2021-01-11 | End: 2021-01-11

## 2021-01-11 RX ORDER — MEGESTROL ACETATE 40 MG/ML
160 SUSPENSION ORAL DAILY
Refills: 0 | Status: DISCONTINUED | OUTPATIENT
Start: 2021-01-11 | End: 2021-01-11

## 2021-01-11 RX ORDER — MAGNESIUM SULFATE 500 MG/ML
2 VIAL (ML) INJECTION ONCE
Refills: 0 | Status: COMPLETED | OUTPATIENT
Start: 2021-01-11 | End: 2021-01-11

## 2021-01-11 RX ADMIN — FLUDROCORTISONE ACETATE 0.2 MILLIGRAM(S): 0.1 TABLET ORAL at 07:00

## 2021-01-11 RX ADMIN — PREGABALIN 1000 MICROGRAM(S): 225 CAPSULE ORAL at 11:32

## 2021-01-11 RX ADMIN — Medication 500000 UNIT(S): at 11:32

## 2021-01-11 RX ADMIN — Medication 3 MILLILITER(S): at 22:43

## 2021-01-11 RX ADMIN — Medication 81 MILLIGRAM(S): at 11:32

## 2021-01-11 RX ADMIN — LISINOPRIL 5 MILLIGRAM(S): 2.5 TABLET ORAL at 06:59

## 2021-01-11 RX ADMIN — ENOXAPARIN SODIUM 40 MILLIGRAM(S): 100 INJECTION SUBCUTANEOUS at 22:43

## 2021-01-11 RX ADMIN — CHLORHEXIDINE GLUCONATE 1 APPLICATION(S): 213 SOLUTION TOPICAL at 07:02

## 2021-01-11 RX ADMIN — AMLODIPINE BESYLATE 10 MILLIGRAM(S): 2.5 TABLET ORAL at 11:34

## 2021-01-11 RX ADMIN — Medication 500000 UNIT(S): at 06:59

## 2021-01-11 RX ADMIN — CEFEPIME 100 MILLIGRAM(S): 1 INJECTION, POWDER, FOR SOLUTION INTRAMUSCULAR; INTRAVENOUS at 11:33

## 2021-01-11 RX ADMIN — Medication 20 MILLIEQUIVALENT(S): at 14:49

## 2021-01-11 RX ADMIN — SODIUM CHLORIDE 2 GRAM(S): 9 INJECTION INTRAMUSCULAR; INTRAVENOUS; SUBCUTANEOUS at 07:00

## 2021-01-11 RX ADMIN — CEFEPIME 100 MILLIGRAM(S): 1 INJECTION, POWDER, FOR SOLUTION INTRAMUSCULAR; INTRAVENOUS at 22:43

## 2021-01-11 RX ADMIN — Medication 40 MILLIEQUIVALENT(S): at 02:07

## 2021-01-11 RX ADMIN — Medication 3 MILLILITER(S): at 11:31

## 2021-01-11 RX ADMIN — Medication 250 MILLIGRAM(S): at 17:29

## 2021-01-11 RX ADMIN — LACOSAMIDE 150 MILLIGRAM(S): 50 TABLET ORAL at 17:25

## 2021-01-11 RX ADMIN — FLUDROCORTISONE ACETATE 0.2 MILLIGRAM(S): 0.1 TABLET ORAL at 17:26

## 2021-01-11 RX ADMIN — PANTOPRAZOLE SODIUM 40 MILLIGRAM(S): 20 TABLET, DELAYED RELEASE ORAL at 17:25

## 2021-01-11 RX ADMIN — CHLORHEXIDINE GLUCONATE 15 MILLILITER(S): 213 SOLUTION TOPICAL at 06:59

## 2021-01-11 RX ADMIN — LACOSAMIDE 150 MILLIGRAM(S): 50 TABLET ORAL at 07:01

## 2021-01-11 RX ADMIN — Medication 500000 UNIT(S): at 17:32

## 2021-01-11 RX ADMIN — Medication 0.5 MILLIGRAM(S): at 07:00

## 2021-01-11 RX ADMIN — Medication 50 GRAM(S): at 14:49

## 2021-01-11 RX ADMIN — Medication 3 MILLILITER(S): at 14:49

## 2021-01-11 RX ADMIN — MONTELUKAST 10 MILLIGRAM(S): 4 TABLET, CHEWABLE ORAL at 11:33

## 2021-01-11 RX ADMIN — CHLORHEXIDINE GLUCONATE 15 MILLILITER(S): 213 SOLUTION TOPICAL at 17:33

## 2021-01-11 RX ADMIN — PANTOPRAZOLE SODIUM 40 MILLIGRAM(S): 20 TABLET, DELAYED RELEASE ORAL at 07:00

## 2021-01-11 RX ADMIN — Medication 500000 UNIT(S): at 23:05

## 2021-01-11 RX ADMIN — Medication 3 MILLILITER(S): at 03:34

## 2021-01-11 RX ADMIN — SODIUM CHLORIDE 2 GRAM(S): 9 INJECTION INTRAMUSCULAR; INTRAVENOUS; SUBCUTANEOUS at 22:43

## 2021-01-11 RX ADMIN — Medication 250 MILLIGRAM(S): at 06:59

## 2021-01-11 RX ADMIN — ATORVASTATIN CALCIUM 40 MILLIGRAM(S): 80 TABLET, FILM COATED ORAL at 22:43

## 2021-01-11 RX ADMIN — Medication 500000 UNIT(S): at 00:53

## 2021-01-11 RX ADMIN — Medication 325 MILLIGRAM(S): at 11:32

## 2021-01-11 RX ADMIN — Medication 85 MILLIMOLE(S): at 14:49

## 2021-01-11 RX ADMIN — Medication 40 MILLIEQUIVALENT(S): at 23:44

## 2021-01-11 RX ADMIN — Medication 0.5 MILLIGRAM(S): at 17:26

## 2021-01-11 RX ADMIN — Medication 40 MILLIEQUIVALENT(S): at 07:00

## 2021-01-11 RX ADMIN — SODIUM CHLORIDE 2 GRAM(S): 9 INJECTION INTRAMUSCULAR; INTRAVENOUS; SUBCUTANEOUS at 14:48

## 2021-01-11 NOTE — PROGRESS NOTE ADULT - ATTENDING COMMENTS
Patient seen and examined with house-staff during bedside rounds.  Resident note read, including vitals, physical findings, laboratory data, and radiological reports.   Revisions included below.  Direct personal management at bed side and extensive interpretation of the data.  Plan was outlined and discussed in details with the housestaff.  Decision making of high complexity  Action taken for acute disease activity to reflect the level of care provided:  - medication reconciliation  - review laboratory data  cultures negative on antibiotics vanco and cefipime

## 2021-01-11 NOTE — CHART NOTE - NSCHARTNOTEFT_GEN_A_CORE
Admitting Diagnosis:   Patient is a 76y old  Female who presents with a chief complaint of SAH (11 Jan 2021 02:22)      PAST MEDICAL & SURGICAL HISTORY:  Hyperlipidemia    Hypertension    COPD (chronic obstructive pulmonary disease)    Asthma    No significant past surgical history        Current Nutrition Order: NPO w/t TF via PEG, Glucerna 1.2 @ 45mL/hr x 24 hrs, at goal to provide 1080mL TV, 1296kcal, ~65g pro, 869mL free H2O (26kcal/kg IBW, 1.3g pro/ABW)       PO Intake: Good (%) [   ]  Fair (50-75%) [   ] Poor (<25%) [   ]-N/A d/t NPO     GI Issues: RN states TF was stopped 1/9PM d/t vomiting- restarted this AM with no signs of intolerance. Last BM noted 1/9    Pain: non-verbal indicators of pain/discomfort absent at this time    Skin Integrity: no edema, no pressure injuries, Greg score 11    Labs:   01-11    140  |  106  |  5<L>  ----------------------------<  127<H>  3.7   |  24  |  0.37<L>    Ca    8.5      11 Jan 2021 06:01  Phos  2.1     01-11  Mg     1.6     01-11    TPro  7.1  /  Alb  3.3  /  TBili  0.3  /  DBili  x   /  AST  24  /  ALT  29  /  AlkPhos  99  01-10    CAPILLARY BLOOD GLUCOSE          Medications:  MEDICATIONS  (STANDING):  albuterol/ipratropium for Nebulization 3 milliLiter(s) Nebulizer every 6 hours  amLODIPine   Tablet 10 milliGRAM(s) Oral every 24 hours  aspirin enteric coated 81 milliGRAM(s) Oral daily  atorvastatin 40 milliGRAM(s) Oral at bedtime  buDESOnide    Inhalation Suspension 0.5 milliGRAM(s) Inhalation every 12 hours  cefepime   IVPB 2000 milliGRAM(s) IV Intermittent every 12 hours  chlorhexidine 0.12% Liquid 15 milliLiter(s) Oral Mucosa two times a day  chlorhexidine 2% Cloths 1 Application(s) Topical <User Schedule>  cyanocobalamin 1000 MICROGram(s) Oral daily  dextrose 40% Gel 15 Gram(s) Oral once  dextrose 50% Injectable 25 Gram(s) IV Push once  enoxaparin Injectable 40 milliGRAM(s) SubCutaneous at bedtime  ferrous    sulfate 325 milliGRAM(s) Oral daily  fludroCORTISONE 0.2 milliGRAM(s) Oral every 12 hours  glucagon  Injectable 1 milliGRAM(s) IntraMuscular once  lacosamide Solution 150 milliGRAM(s) Oral every 12 hours  lisinopril 5 milliGRAM(s) Oral daily  megestrol 40 milliGRAM(s) Oral daily  montelukast 10 milliGRAM(s) Oral daily  nystatin    Suspension 928973 Unit(s) Oral four times a day  pantoprazole   Suspension 40 milliGRAM(s) Oral two times a day  sodium chloride 2 Gram(s) Oral every 8 hours  sodium chloride 2% . 1000 milliLiter(s) (50 mL/Hr) IV Continuous <Continuous>  vancomycin  IVPB 750 milliGRAM(s) IV Intermittent every 12 hours    MEDICATIONS  (PRN):  acetaminophen    Suspension .. 650 milliGRAM(s) Oral every 6 hours PRN Temp greater or equal to 38C (100.4F), Mild Pain (1 - 3)  labetalol Injectable 2.5 milliGRAM(s) IV Push every 6 hours PRN Systolic blood pressure > 220      Admitted Anthropometrics  Height: 59"" Weight: 110lbs/49.9kg  IBW 98lbs/44.5kg+/-10%, %%, BMI 22.2     Weight Change: No new weights to assess. Please obtain and trend bi-weekly weights for assessment.    Estimated energy needs:   ABW (49.9kg) used to calculate energy needs due to pt's current body weight within % IBW (112%)  Nutrient needs based on [facility] standards of care for maintenance in older adults. Needs adjusted for post-op, Fluid needs per team.    Energy: 0385-0424 kcal/day (25-30kcal/kg)  Protein: 60-70g pro/day (1.2-1.4g pro/kg)    Subjective:   74 yo Female with PMHx of COPD, asthma, HLD, HTN, BIBEMS to [facility] from home after HHA found pt down on the floor covered in non-bloody vomitus. CTH reveals diffuse subarachnoid hemorrhage mainly at basilar cisterns with IVH and obstructive hydrocephalus, CTA shows 3mm left pcomm aneurysm, now s/p bedside right frontal EVD placement 12/10, s/p cerebral angiogram for coil embolization of left PCOMM aneurysm 12/10, now s/p cerebral angio for verapamil c/b device malfunction of Proglide, s/p right groin cutdown for removal of proglide catheter and femoral artery repair 12/17, NIHSS2 HH3 MF4. PT underwent LP for CSF high volume tap 12/28. EVD Re-insertion 12/29 (had been removed 12/25). CSF NGTD 12/29. EVD clamped 12/30. Ventriculoperitoneal shunt placed 12/30. S/p PEG placed at bedside 1/7. Dispo pend AR vs JOSE.  Pt sleeping, unarousable at time of RD visit. Current TF regimen Glucerna 1.2 @45mL/hr x 24 hrs, running at 30mL/hr at time of visit. Spoke with RN who states pt's TF were running at goal until they were stopped 1/9 PM d/t vomiting. TFs resumed early this AM 1/11- pt with good tolerance currently, and with plans to increase to goal per RN. Denies pt with n/v/c/d- last BM noted 1/9. Current regimen meets low-end of pt's estimated calorie needs, please see nutrition recommendations below, RD to monitor and f/u per protocol.     Previous Nutrition Diagnosis: Increased nutrient need RT increased kcal/protein demand AEB post-op    Active [  X ]  Resolved [   ]    Goal: Pt to meet >/=75%EER via most appropriate route with good tolerance    Recommendations:   1) Recommend switch current TG regimen to Jevity 1.2 @ 50mL/hr x 24hrs via PEG at goal provides 1200 mL TV, 1440 kcal, 66g pro, 986mL free water (120%RDI, ~29kcal/kg ABW, ~1.3gm pro/kgABW)   >>start at 20mL/ht and increase as tolerated   >> free water flushes per team   >> maintain aspiration precautions at all times  2) As medically appropriate/mental status, resume diet recommended per SLP  >>if intake increases, assess need to change TF regimen (please consult nutrition as needed)  3) Monitor and replete lytes prn  4) Monitor weights, labs, skin integrity, GI distress  5) Pain and bowel regimens per team  6) RD to remain available for additional nutritional interventions prn     Education: N/A d/t pt mental/clinical status    Risk Level: High [ X  ] Moderate [   ] Low [   ] Admitting Diagnosis:   Patient is a 76y old  Female who presents with a chief complaint of SAH (11 Jan 2021 02:22)      PAST MEDICAL & SURGICAL HISTORY:  Hyperlipidemia    Hypertension    COPD (chronic obstructive pulmonary disease)    Asthma    No significant past surgical history        Current Nutrition Order: NPO w/t TF via PEG, Glucerna 1.2 @ 45mL/hr x 24 hrs, at goal to provide 1080mL TV, 1296kcal, ~65g pro, 869mL free H2O (26kcal/kg IBW, 1.3g pro/ABW)       PO Intake: Good (%) [   ]  Fair (50-75%) [   ] Poor (<25%) [   ]-N/A d/t NPO     GI Issues: RN states TF was stopped 1/9PM d/t vomiting- restarted this AM with no signs of intolerance. Last BM noted 1/9    Pain: non-verbal indicators of pain/discomfort absent at this time    Skin Integrity: no edema, no pressure injuries, Greg score 11    Labs:   01-11    140  |  106  |  5<L>  ----------------------------<  127<H>  3.7   |  24  |  0.37<L>    Ca    8.5      11 Jan 2021 06:01  Phos  2.1     01-11  Mg     1.6     01-11    TPro  7.1  /  Alb  3.3  /  TBili  0.3  /  DBili  x   /  AST  24  /  ALT  29  /  AlkPhos  99  01-10    CAPILLARY BLOOD GLUCOSE          Medications:  MEDICATIONS  (STANDING):  albuterol/ipratropium for Nebulization 3 milliLiter(s) Nebulizer every 6 hours  amLODIPine   Tablet 10 milliGRAM(s) Oral every 24 hours  aspirin enteric coated 81 milliGRAM(s) Oral daily  atorvastatin 40 milliGRAM(s) Oral at bedtime  buDESOnide    Inhalation Suspension 0.5 milliGRAM(s) Inhalation every 12 hours  cefepime   IVPB 2000 milliGRAM(s) IV Intermittent every 12 hours  chlorhexidine 0.12% Liquid 15 milliLiter(s) Oral Mucosa two times a day  chlorhexidine 2% Cloths 1 Application(s) Topical <User Schedule>  cyanocobalamin 1000 MICROGram(s) Oral daily  dextrose 40% Gel 15 Gram(s) Oral once  dextrose 50% Injectable 25 Gram(s) IV Push once  enoxaparin Injectable 40 milliGRAM(s) SubCutaneous at bedtime  ferrous    sulfate 325 milliGRAM(s) Oral daily  fludroCORTISONE 0.2 milliGRAM(s) Oral every 12 hours  glucagon  Injectable 1 milliGRAM(s) IntraMuscular once  lacosamide Solution 150 milliGRAM(s) Oral every 12 hours  lisinopril 5 milliGRAM(s) Oral daily  megestrol 40 milliGRAM(s) Oral daily  montelukast 10 milliGRAM(s) Oral daily  nystatin    Suspension 013913 Unit(s) Oral four times a day  pantoprazole   Suspension 40 milliGRAM(s) Oral two times a day  sodium chloride 2 Gram(s) Oral every 8 hours  sodium chloride 2% . 1000 milliLiter(s) (50 mL/Hr) IV Continuous <Continuous>  vancomycin  IVPB 750 milliGRAM(s) IV Intermittent every 12 hours    MEDICATIONS  (PRN):  acetaminophen    Suspension .. 650 milliGRAM(s) Oral every 6 hours PRN Temp greater or equal to 38C (100.4F), Mild Pain (1 - 3)  labetalol Injectable 2.5 milliGRAM(s) IV Push every 6 hours PRN Systolic blood pressure > 220      Admitted Anthropometrics  Height: 59"" Weight: 110lbs/49.9kg  IBW 98lbs/44.5kg+/-10%, %%, BMI 22.2     Weight Change: No new weights to assess. Please obtain and trend bi-weekly weights for assessment.    Estimated energy needs:   ABW (49.9kg) used to calculate energy needs due to pt's current body weight within % IBW (112%)  Nutrient needs based on [facility] standards of care for maintenance in older adults. Needs adjusted for post-op, Fluid needs per team.    Energy: 8299-0149 kcal/day (25-30kcal/kg)  Protein: 60-70g pro/day (1.2-1.4g pro/kg)    Subjective:   76 yo Female with PMHx of COPD, asthma, HLD, HTN, BIBEMS to [facility] from home after HHA found pt down on the floor covered in non-bloody vomitus. CTH reveals diffuse subarachnoid hemorrhage mainly at basilar cisterns with IVH and obstructive hydrocephalus, CTA shows 3mm left pcomm aneurysm, now s/p bedside right frontal EVD placement 12/10, s/p cerebral angiogram for coil embolization of left PCOMM aneurysm 12/10, now s/p cerebral angio for verapamil c/b device malfunction of Proglide, s/p right groin cutdown for removal of proglide catheter and femoral artery repair 12/17, NIHSS2 HH3 MF4. PT underwent LP for CSF high volume tap 12/28. EVD Re-insertion 12/29 (had been removed 12/25). CSF NGTD 12/29. EVD clamped 12/30. Ventriculoperitoneal shunt placed 12/30. S/p PEG placed at bedside 1/7. Dispo pend AR vs JOSE.  Pt sleeping, unarousable at time of RD visit. Current TF regimen Glucerna 1.2 @45mL/hr x 24 hrs, running at 30mL/hr at time of visit. Spoke with RN who states pt's TF were running at goal until they were stopped 1/9 PM d/t vomiting. TFs resumed early this AM 1/11- pt with good tolerance currently, and with plans to increase to goal per RN. Denies pt with n/v/c/d- last BM noted 1/9. Current regimen meets low-end of pt's estimated calorie needs, please see nutrition recommendations below, RD to monitor and f/u per protocol.     Previous Nutrition Diagnosis: Increased nutrient need RT increased kcal/protein demand AEB post-op    Active [  X ]  Resolved [   ]    Goal: Pt to meet >/=75%EER via most appropriate route with good tolerance    Recommendations:   1) Recommend increased current TG regimen to Glucerna 1.2 @ 50mL/hr x 24hrs via PEG at goal provides 1200 mL TV, 1440 kcal, 72g pro, 966mL free water (120%RDI, ~29kcal/kg ABW, ~1.4gm pro/kgABW)   >>continue to monitor BS and ability to switch to Jevity 1.2- consult RD prn   >>start at 20mL/ht and increase as tolerated   >> free water flushes per team   >> maintain aspiration precautions at all times  2) As medically appropriate/mental status, resume diet recommended per SLP  >>if intake increases, assess need to change TF regimen (please consult nutrition as needed)  3) Monitor and replete lytes prn  4) Monitor weights, labs, skin integrity, GI distress  5) Pain and bowel regimens per team  6) RD to remain available for additional nutritional interventions prn     Education: N/A d/t pt mental/clinical status    Risk Level: High [ X  ] Moderate [   ] Low [   ]

## 2021-01-11 NOTE — PROCEDURE NOTE - NSPROCDETAILS_GEN_ALL_CORE
location identified, draped/prepped, sterile technique used, needle inserted/introduced/positive blood return obtained via catheter/connected to a pressurized flush line/sutured in place/hemostasis with direct pressure, dressing applied
location identified, draped/prepped, sterile technique used/blood seen on insertion/dressing applied/flushes easily/secured in place
location identified, draped/prepped, sterile technique used, needle inserted/introduced/hemostasis with direct pressure, dressing applied
location identified, draped/prepped, sterile technique used, needle inserted/introduced/all materials/supplies accounted for at end of procedure/connected to a pressurized flush line/Seldinger technique/positive blood return obtained via catheter/sutured in place/hemostasis with direct pressure, dressing applied
ultrasound guidance with use of sterile gel and probe cove/sterile technique, catheter placed/guidewire recovered/sterile dressing applied/lumen(s) aspirated and flushed

## 2021-01-11 NOTE — PROCEDURE NOTE - NSANATOMICLLOCATION_GEN_A_CORE
left
left/wrist
right/radial artery
left/right/radial artery
left/radial artery
right
right
right/radial artery

## 2021-01-11 NOTE — PROGRESS NOTE ADULT - SUBJECTIVE AND OBJECTIVE BOX
HPI:  76y/o F with PMHx sig for COPD, asthma, HLD, HTN, BIBEMS to [facility] from home after HHA discovered patient found down in floor covered in non-bloody vomitus. Perr HHA Pt went to the bathroom and was taking a long time, went in to check on her and found her sitting on floor, awake, however with vomitus on front of shirt. On arrival to [facility], patient was taken for stat head CT, which revealed diffuse subarachnoid hemorrhage mainly at basilar cisterns with IVH and obstructive hydrocephalus. Patient was given a bolus of 1g keppra and dilaudid pushes for generalized headache. CTA concerning for 3mm left pcomm aneurysm and a 1.6mm outpouching of distal cavernous segment of the left internal carotid artery likely aneurysm. NIHSS2 HH3 MF4. Patient was transferred to [facility] for further intervention. Patient currently reports headaches. Pt denies acute changes in vision, seizures, CP, SOB, weakness/paresthesias of arms or legs. (09 Dec 2020 23:37)    Hospital Course:   : BD1 Patient admitted for SAH HH3, MF4.   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 EVD at 5, on Levo overnight, nimodipine discontinued d/t hypotension  12: BD4 POD#2, YUMIKO overnight, neuro stable, passed TOV  12: BD5 POD#3: YUMIKO x 24 hrs  : BD6 POD#4. YUMIKO o/n, neuro stable. EVD at 5cm H2O  12/15: BD7 POD #5 YUMIKO overnight, neuro stable. EVD remains at 5. Plan for CTA today.   : BD8 POD #6 YUMIKO overnight, neuro stable, EVD at 0, on Levo, started on 3% at 15 overnight   : BD9 POD#1 s/p cerebral angio for verapamil c/b device malfunction of Proglide, s/p right groin cutdown for removal of proglide catheter and femoral artery repair.  YUMIKO overnight, neuro stable, EVD at 0. patient remained intubated overnight, on propofol for sedation, and vEEG  18: BD#10, POD#8 s/p coil/embo of L pcomm aneurysm, POD#2 s/p IA verapamil, POD#2 R groin cutdown for removal of proglide catheter w/ repair of femoral artery. YUMIKO overnight. EVD remains open @0cmH2O   : BD#11, POD#9 s/p coil/embo of L pcomm aneurysm. POD#3 s/p IA verapamil, POD#3 s/p R femoral artery cutdown of proglide catheter w/ femoral artery repair. YUMIKO overnight. EVD remains open @0cmH2O. EEG shows b/l frontal sharp discharges, cont monitoring, vimpat was increased yesterday. Gentle hydration, total IVF 50cc/hr. Cont vanc/zosyn for empiric coverage, next vanc trough due @1pm on . F/u daily CXR.    BD#12 POD#10 YUMIKO overnight, Neuro exam stable, EVD @ 0cm H2O, vEEG, 3% @ 15 goal WNL, TF via NGT  : BD13, POD11 YUMIKO overnight. EVD at 5cmH20 (raised yesterday), vEEG in place   BD#14, EVD raised to 15 yesterday, 3% @ 30cc Goal WNL, -180, Milrinone, TTE prior to DC as per Card for takotsubo cardiomyopathy, failed S&S pending reeval, TF via NGT, Neuro exam stable  : BD#15. YUMIKO o/n, neuro stable. EVD clamped. 30%@30cc/hr   BD#16 YUMIKO overnight, spiked 102, EVD @ 0cm H2O, 3% @ 30cc/hr Goal WNL, Vasc following, TF via NGT, -200,   : BD#17. YUMIKO overnight. Pan cx from , NGTD on CSF and blood. EVD clamped. 3% @15cc/hr, rate kept same overnight. NGT feeds at goal. SBP goal 160-200.   : BD#18. YUMIKO overnight, neuro exam stable. NGTD from pan cx on . EVD removed today. 3% discontinued . NGT feeds at goal, IVF off. SBP goal 160-200.   : BD#19 YUMIKO overnight, neuro stable. 3% at 15, stepdown status  : BD20 YUMIKO overnight, neuro stable. 3% continues.  Patient became increasingly more somnolent and underwent LP for CSF high volume tap.  Temporary improvement.  Spiked fever, pancultured.  : BD21 Patient increasingly more somnolent, EVD placed at bedside.    : BD22 pt. is s/p R VPS placement, certas @5 POD#1, post op ct head c/a/p done. afebrile o/n.   : BD#23: pt. is s/p R VPS placement, certas @5 POD#2, post op ct head c/a/p done. zosyn for enterobacter   : BD #24. POD#3 s/p R VPS placement, CErtas @5. Dressings removed from head and abdomen. Cont zosyn for enterobacter and e. coli in urine, end date 21. Post-op imaging completed. Pend S&S re-eval, improvement in mental status/neuro exam.   1/3: BD25, POD4. SBP goals relaxed 120-200. zosyn for enterobacter UTI until . FOB+, protonix bid started, yumiko o/n  : BD 26, YUMIKO o/n  : BD 27, YUMIKO o/n   : BD 28, POD 7 VPS (Certas @ 5). YUMIKO overnight. Plan for PEG with GI Thursday. Repeat Covid swab negative. Stepdown status.  : BD 29, POD 8. YUMIKO overnight. PEG placed at bedside by GI today. Stepdown status  : BD 30 POD 9, YUMIKO o/n, resume TF 24 hrs after PEG, stepdown    BD 31. POD 10. No events overnight. s/p PEG. Pending AR vs JOSE  1/10 BD32, POD11. Lethargic but arousable with stimulation, CTH was ordered and demonstrated stable scan in comparison from . Continue to trend Na, this  improved to 135 on 2%@50/hr and salt tabs started. Pending AR vs JOSE.  : BD#33 POD#12. YUMIKO overnight, peripheral IV x2 inserted. F/u pan cx from 1/10, NGTD. Cont 2% @50cc/hr, f/u Na in am. Dispo pend AR vs JOSE.     OVERNIGHT EVENTS:  Vital Signs Last 24 Hrs  T(C): 37 (10 Christian 2021 22:28), Max: 39.4 (10 Christian 2021 02:49)  T(F): 98.6 (10 Christian 2021 22:28), Max: 102.9 (10 Christian 2021 02:49)  HR: 80 (10 Christian 2021 20:32) (80 - 100)  BP: 133/61 (10 Christian 2021 20:32) (75/37 - 175/67)  BP(mean): 88 (10 Christian 2021 20:32) (52 - 107)  RR: 18 (10 Christian 2021 20:32) (17 - 18)  SpO2: 100% (10 Christian 2021 20:32) (97% - 100%)    I&O's Summary    2021 07:01  -  10 Christian 2021 07:00  --------------------------------------------------------  IN: 1135 mL / OUT: 400 mL / NET: 735 mL    10 Christian 2021 07:01  -  2021 02:24  --------------------------------------------------------  IN: 1550 mL / OUT: 700 mL / NET: 850 mL        PHYSICAL EXAM:      TUBES/LINES:  [] Soriano  [] Lumbar Drain  [] Wound Drains  [] Others      DIET:  [] NPO  [] Mechanical  [] Tube feeds    LABS:                        9.6    19.16 )-----------( 260      ( 10 Christian 2021 10:45 )             30.7     01-10    139  |  105  |  7   ----------------------------<  111<H>  2.7<LL>   |  25  |  0.36<L>    Ca    8.4      10 Christian 2021 17:25  Phos  3.2     01-10  Mg     1.8     01-10    TPro  7.1  /  Alb  3.3  /  TBili  0.3  /  DBili  x   /  AST  24  /  ALT  29  /  AlkPhos  99  10    PT/INR - ( 10 Christian 2021 13:58 )   PT: 13.4 sec;   INR: 1.12          PTT - ( 10 Christian 2021 13:58 )  PTT:24.8 sec  Urinalysis Basic - ( 10 Christian 2021 03:46 )    Color: Yellow / Appearance: Clear / S.020 / pH: x  Gluc: x / Ketone: NEGATIVE  / Bili: Negative / Urobili: 0.2 E.U./dL   Blood: x / Protein: NEGATIVE mg/dL / Nitrite: NEGATIVE   Leuk Esterase: NEGATIVE / RBC: 5-10 /HPF / WBC < 5 /HPF   Sq Epi: x / Non Sq Epi: 0-5 /HPF / Bacteria: Many /HPF          CAPILLARY BLOOD GLUCOSE          Drug Levels: [] N/A    CSF Analysis: [] N/A  RBC Count - Spinal Fluid: 600 /uL (01-10 @ 16:43)  CSF Lymphocytes: 11 % (01-10 @ 16:43)  Glucose, CSF: 91 mg/dL (01-10 @ 16:43)  Protein, CSF: 39 mg/dL (01-10 @ 16:43)      Allergies    No Known Allergies    Intolerances      MEDICATIONS:  Antibiotics:  cefepime   IVPB 2000 milliGRAM(s) IV Intermittent every 12 hours  nystatin    Suspension 652019 Unit(s) Oral four times a day  vancomycin  IVPB 750 milliGRAM(s) IV Intermittent every 12 hours    Neuro:  acetaminophen    Suspension .. 650 milliGRAM(s) Oral every 6 hours PRN  lacosamide Solution 150 milliGRAM(s) Oral every 12 hours    Anticoagulation:  aspirin enteric coated 81 milliGRAM(s) Oral daily  enoxaparin Injectable 40 milliGRAM(s) SubCutaneous at bedtime    OTHER:  albuterol/ipratropium for Nebulization 3 milliLiter(s) Nebulizer every 6 hours  amLODIPine   Tablet 10 milliGRAM(s) Oral every 24 hours  atorvastatin 40 milliGRAM(s) Oral at bedtime  buDESOnide    Inhalation Suspension 0.5 milliGRAM(s) Inhalation every 12 hours  chlorhexidine 0.12% Liquid 15 milliLiter(s) Oral Mucosa two times a day  chlorhexidine 2% Cloths 1 Application(s) Topical <User Schedule>  dextrose 40% Gel 15 Gram(s) Oral once  dextrose 50% Injectable 25 Gram(s) IV Push once  fludroCORTISONE 0.2 milliGRAM(s) Oral every 12 hours  glucagon  Injectable 1 milliGRAM(s) IntraMuscular once  labetalol Injectable 2.5 milliGRAM(s) IV Push every 6 hours PRN  lisinopril 5 milliGRAM(s) Oral daily  montelukast 10 milliGRAM(s) Oral daily  pantoprazole   Suspension 40 milliGRAM(s) Oral two times a day    IVF:  cyanocobalamin 1000 MICROGram(s) Oral daily  ferrous    sulfate 325 milliGRAM(s) Oral daily  potassium chloride   Powder 40 milliEquivalent(s) Oral every 4 hours  sodium chloride 2 Gram(s) Oral every 8 hours  sodium chloride 2% . 1000 milliLiter(s) IV Continuous <Continuous>    CULTURES:  Culture Results:   No growth at 12 hours (01-10 @ 10:46)  Culture Results:   No growth at 12 hours (01-10 @ 06:39)    RADIOLOGY & ADDITIONAL TESTS:      ASSESSMENT:  76y Female s/p    HEADACHE    Handoff    MEWS Score    Hyperlipidemia    Hypertension    COPD (chronic obstructive pulmonary disease)    Asthma    COPD (chronic obstructive pulmonary disease)    Asthma    Hydrocephalus, adult    Injury of right femoral artery    Cerebral artery vasospasm    SAH (subarachnoid hemorrhage)    Hydrocephalus, adult    Injury of femoral artery    Cerebral artery vasospasm    SAH (subarachnoid hemorrhage)    Subarachnoid bleed    Obstructive hydrocephalus    Anemia due to acute blood loss    Preoperative clearance    Centrilobular emphysema    Mild persistent asthma without complication    Subarachnoid bleed    Essential hypertension    Pure hypercholesterolemia    Ventriculoperitoneal shunt    Insertion of external ventricular drain    Vascular surgery procedure    Angiogram, carotid and cerebral, bilateral    Angiogram, cerebral, with intracranial aneurysm embolization    No significant past surgical history    HEADACHE    90+    [email or web address]        PLAN:  NEURO:    CARDIOVASCULAR:    PULMONARY:    RENAL:    GI:    HEME:    ID:    ENDO:    DVT PROPHYLAXIS:  [] Venodynes                                [] Heparin/Lovenox    DISPOSITION: HPI:  76y/o F with PMHx sig for COPD, asthma, HLD, HTN, BIBEMS to [facility] from home after HHA discovered patient found down in floor covered in non-bloody vomitus. Perr HHA Pt went to the bathroom and was taking a long time, went in to check on her and found her sitting on floor, awake, however with vomitus on front of shirt. On arrival to [facility], patient was taken for stat head CT, which revealed diffuse subarachnoid hemorrhage mainly at basilar cisterns with IVH and obstructive hydrocephalus. Patient was given a bolus of 1g keppra and dilaudid pushes for generalized headache. CTA concerning for 3mm left pcomm aneurysm and a 1.6mm outpouching of distal cavernous segment of the left internal carotid artery likely aneurysm. NIHSS2 HH3 MF4. Patient was transferred to [facility] for further intervention. Patient currently reports headaches. Pt denies acute changes in vision, seizures, CP, SOB, weakness/paresthesias of arms or legs. (09 Dec 2020 23:37)    Hospital Course:   : BD1 Patient admitted for SAH HH3, MF4.   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 EVD at 5, on Levo overnight, nimodipine discontinued d/t hypotension  12: BD4 POD#2, VIVIEN overnight, neuro stable, passed TOV  12: BD5 POD#3: VIVIEN x 24 hrs  : BD6 POD#4. VIVIEN o/n, neuro stable. EVD at 5cm H2O  12/15: BD7 POD #5 VIVIEN overnight, neuro stable. EVD remains at 5. Plan for CTA today.   : BD8 POD #6 VIVIEN overnight, neuro stable, EVD at 0, on Levo, started on 3% at 15 overnight   : BD9 POD#1 s/p cerebral angio for verapamil c/b device malfunction of Proglide, s/p right groin cutdown for removal of proglide catheter and femoral artery repair.  VIVIEN overnight, neuro stable, EVD at 0. patient remained intubated overnight, on propofol for sedation, and vEEG  18: BD#10, POD#8 s/p coil/embo of L pcomm aneurysm, POD#2 s/p IA verapamil, POD#2 R groin cutdown for removal of proglide catheter w/ repair of femoral artery. VIVIEN overnight. EVD remains open @0cmH2O   : BD#11, POD#9 s/p coil/embo of L pcomm aneurysm. POD#3 s/p IA verapamil, POD#3 s/p R femoral artery cutdown of proglide catheter w/ femoral artery repair. VIVIEN overnight. EVD remains open @0cmH2O. EEG shows b/l frontal sharp discharges, cont monitoring, vimpat was increased yesterday. Gentle hydration, total IVF 50cc/hr. Cont vanc/zosyn for empiric coverage, next vanc trough due @1pm on . F/u daily CXR.    BD#12 POD#10 VIVIEN overnight, Neuro exam stable, EVD @ 0cm H2O, vEEG, 3% @ 15 goal WNL, TF via NGT  : BD13, POD11 VIVIEN overnight. EVD at 5cmH20 (raised yesterday), vEEG in place   BD#14, EVD raised to 15 yesterday, 3% @ 30cc Goal WNL, -180, Milrinone, TTE prior to DC as per Card for takotsubo cardiomyopathy, failed S&S pending reeval, TF via NGT, Neuro exam stable  : BD#15. VIVIEN o/n, neuro stable. EVD clamped. 30%@30cc/hr   BD#16 VIVIEN overnight, spiked 102, EVD @ 0cm H2O, 3% @ 30cc/hr Goal WNL, Vasc following, TF via NGT, -200,   : BD#17. VIVIEN overnight. Pan cx from , NGTD on CSF and blood. EVD clamped. 3% @15cc/hr, rate kept same overnight. NGT feeds at goal. SBP goal 160-200.   : BD#18. VIVIEN overnight, neuro exam stable. NGTD from pan cx on . EVD removed today. 3% discontinued . NGT feeds at goal, IVF off. SBP goal 160-200.   : BD#19 VIVIEN overnight, neuro stable. 3% at 15, stepdown status  : BD20 VIVIEN overnight, neuro stable. 3% continues.  Patient became increasingly more somnolent and underwent LP for CSF high volume tap.  Temporary improvement.  Spiked fever, pancultured.  : BD21 Patient increasingly more somnolent, EVD placed at bedside.    : BD22 pt. is s/p R VPS placement, certas @5 POD#1, post op ct head c/a/p done. afebrile o/n.   : BD#23: pt. is s/p R VPS placement, certas @5 POD#2, post op ct head c/a/p done. zosyn for enterobacter   : BD #24. POD#3 s/p R VPS placement, CErtas @5. Dressings removed from head and abdomen. Cont zosyn for enterobacter and e. coli in urine, end date 21. Post-op imaging completed. Pend S&S re-eval, improvement in mental status/neuro exam.   1/3: BD25, POD4. SBP goals relaxed 120-200. zosyn for enterobacter UTI until . FOB+, protonix bid started, vivien o/n  : BD 26, VIVIEN o/n  : BD 27, VIVIEN o/n   : BD 28, POD 7 VPS (Certas @ 5). VIVIEN overnight. Plan for PEG with GI Thursday. Repeat Covid swab negative. Stepdown status.  : BD 29, POD 8. VIVIEN overnight. PEG placed at bedside by GI today. Stepdown status  : BD 30 POD 9, VIVIEN o/n, resume TF 24 hrs after PEG, stepdown    BD 31. POD 10. No events overnight. s/p PEG. Pending AR vs JOSE  1/10 BD32, POD11. Lethargic but arousable with stimulation, CTH was ordered and demonstrated stable scan in comparison from . Continue to trend Na, this  improved to 135 on 2%@50/hr and salt tabs started. Pending AR vs JOSE.  : BD#33 POD#12. VIVIEN overnight, peripheral IV x2 inserted. F/u pan cx from 1/10, NGTD. Cont 2% @50cc/hr, f/u Na in am. Dispo pend AR vs JOSE.     OVERNIGHT EVENTS:  Vital Signs Last 24 Hrs  T(C): 37 (10 Christian 2021 22:28), Max: 39.4 (10 Christian 2021 02:49)  T(F): 98.6 (10 Christian 2021 22:28), Max: 102.9 (10 Christian 2021 02:49)  HR: 80 (10 Christian 2021 20:32) (80 - 100)  BP: 133/61 (10 Christian 2021 20:32) (75/37 - 175/67)  BP(mean): 88 (10 Christian 2021 20:32) (52 - 107)  RR: 18 (10 Christian 2021 20:32) (17 - 18)  SpO2: 100% (10 Christian 2021 20:32) (97% - 100%)    I&O's Summary    2021 07:01  -  10 Christian 2021 07:00  --------------------------------------------------------  IN: 1135 mL / OUT: 400 mL / NET: 735 mL    10 Christian 2021 07:01  -  2021 02:24  --------------------------------------------------------  IN: 1550 mL / OUT: 700 mL / NET: 850 mL        PHYSICAL EXAM:  General: NAD, pt is comfortably sitting up in bed, A&O x2    HEENT: PERRL 3mm, EOMI b/l, face symmetric, tongue midline, +hard of hearing  Cardiovascular: RRR, normal S1 and S2   Respiratory: lungs CTAB, no wheezing, rhonchi, or crackles   GI: normoactive BS to auscultation, abd soft, NTND, +PEG   Neuro: no aphasia, speech clear, no dysmetria, no pronator drift, following commands   ZAFAR x4 spontaneously and with good strength  Extremities: distal pulses 2+ x4   Wound/incision: VPS incision C/D/I     TUBES/LINES:  [] Soriano  [] Lumbar Drain  [] Wound Drains  [] Others      DIET:  [] NPO  [] Mechanical  [X] Tube feeds - PEG    LABS:                        9.6    19.16 )-----------( 260      ( 10 Christian 2021 10:45 )             30.7     01-10    139  |  105  |  7   ----------------------------<  111<H>  2.7<LL>   |  25  |  0.36<L>    Ca    8.4      10 Christian 2021 17:25  Phos  3.2     01-10  Mg     1.8     01-10    TPro  7.1  /  Alb  3.3  /  TBili  0.3  /  DBili  x   /  AST  24  /  ALT  29  /  AlkPhos  99  10    PT/INR - ( 10 Christian 2021 13:58 )   PT: 13.4 sec;   INR: 1.12          PTT - ( 10 Christian 2021 13:58 )  PTT:24.8 sec  Urinalysis Basic - ( 10 Christian 2021 03:46 )    Color: Yellow / Appearance: Clear / S.020 / pH: x  Gluc: x / Ketone: NEGATIVE  / Bili: Negative / Urobili: 0.2 E.U./dL   Blood: x / Protein: NEGATIVE mg/dL / Nitrite: NEGATIVE   Leuk Esterase: NEGATIVE / RBC: 5-10 /HPF / WBC < 5 /HPF   Sq Epi: x / Non Sq Epi: 0-5 /HPF / Bacteria: Many /HPF          CAPILLARY BLOOD GLUCOSE          Drug Levels: [] N/A    CSF Analysis: [] N/A  RBC Count - Spinal Fluid: 600 /uL (01-10 @ 16:43)  CSF Lymphocytes: 11 % (01-10 @ 16:43)  Glucose, CSF: 91 mg/dL (01-10 @ 16:43)  Protein, CSF: 39 mg/dL (01-10 @ 16:43)      Allergies    No Known Allergies    Intolerances      MEDICATIONS:  Antibiotics:  cefepime   IVPB 2000 milliGRAM(s) IV Intermittent every 12 hours  nystatin    Suspension 163325 Unit(s) Oral four times a day  vancomycin  IVPB 750 milliGRAM(s) IV Intermittent every 12 hours    Neuro:  acetaminophen    Suspension .. 650 milliGRAM(s) Oral every 6 hours PRN  lacosamide Solution 150 milliGRAM(s) Oral every 12 hours    Anticoagulation:  aspirin enteric coated 81 milliGRAM(s) Oral daily  enoxaparin Injectable 40 milliGRAM(s) SubCutaneous at bedtime    OTHER:  albuterol/ipratropium for Nebulization 3 milliLiter(s) Nebulizer every 6 hours  amLODIPine   Tablet 10 milliGRAM(s) Oral every 24 hours  atorvastatin 40 milliGRAM(s) Oral at bedtime  buDESOnide    Inhalation Suspension 0.5 milliGRAM(s) Inhalation every 12 hours  chlorhexidine 0.12% Liquid 15 milliLiter(s) Oral Mucosa two times a day  chlorhexidine 2% Cloths 1 Application(s) Topical <User Schedule>  dextrose 40% Gel 15 Gram(s) Oral once  dextrose 50% Injectable 25 Gram(s) IV Push once  fludroCORTISONE 0.2 milliGRAM(s) Oral every 12 hours  glucagon  Injectable 1 milliGRAM(s) IntraMuscular once  labetalol Injectable 2.5 milliGRAM(s) IV Push every 6 hours PRN  lisinopril 5 milliGRAM(s) Oral daily  montelukast 10 milliGRAM(s) Oral daily  pantoprazole   Suspension 40 milliGRAM(s) Oral two times a day    IVF:  cyanocobalamin 1000 MICROGram(s) Oral daily  ferrous    sulfate 325 milliGRAM(s) Oral daily  potassium chloride   Powder 40 milliEquivalent(s) Oral every 4 hours  sodium chloride 2 Gram(s) Oral every 8 hours  sodium chloride 2% . 1000 milliLiter(s) IV Continuous <Continuous>    CULTURES:  Culture Results:   No growth at 12 hours (01-10 @ 10:46)  Culture Results:   No growth at 12 hours (01-10 @ 06:39)    RADIOLOGY & ADDITIONAL TESTS:      ASSESSMENT:  76 year old Female  with PMHx of COPD, asthma, HLD, HTN, BIBEMS to [facility] from home after HHA found patient down on the floor covered in non-bloody vomitus. CTH reveals diffuse subarachnoid hemorrhage mainly at basilar cisterns with IVH and obstructive hydrocephalus, CTA shows 3mm left pcomm aneurysm, now s/p bedside right frontal EVD placement 12/10, s/p cerebral angiogram for coil embolization of left PCOMM aneurysm 12/10, now   s/p cerebral angio for verapamil c/b device malfunction of Proglide, s/p right groin cutdown for removal of proglide catheter and femoral artery repair (2020), NIHSS2 HH3 MF4), s/p EVD removal ,  s/p bedside EVD placement , POD#12 from R VPS certas at 5 and POD#3 s/p PEG.       HEADACHE    Handoff    MEWS Score    Hyperlipidemia    Hypertension    COPD (chronic obstructive pulmonary disease)    Asthma    COPD (chronic obstructive pulmonary disease)    Asthma    Hydrocephalus, adult    Injury of right femoral artery    Cerebral artery vasospasm    SAH (subarachnoid hemorrhage)    Hydrocephalus, adult    Injury of femoral artery    Cerebral artery vasospasm    SAH (subarachnoid hemorrhage)    Subarachnoid bleed    Obstructive hydrocephalus    Anemia due to acute blood loss    Preoperative clearance    Centrilobular emphysema    Mild persistent asthma without complication    Subarachnoid bleed    Essential hypertension    Pure hypercholesterolemia    Ventriculoperitoneal shunt    Insertion of external ventricular drain    Vascular surgery procedure    Angiogram, carotid and cerebral, bilateral    Angiogram, cerebral, with intracranial aneurysm embolization    No significant past surgical history    HEADACHE    90+    [email or web address]        PLAN:  NEURO:  - neuro/vitals checks   - pain control   - cont modafinil for neurostim   - cont vimpat seizure ppx  - Restarted on ASA 81 for B/L ICA stenosis   - CTH stable with increased mild lethargy    CARDIOVASCULAR:  - -200  -Continue Lisinopril and amlodipine  - echo and CTA prior to discharge    PULMONARY:  - Satting well RA  -Contiue monelukast, Spiriva, and duonebs     RENAL:  - repletions prn  - sc prn   - Salt tabs 2g q8  - 1/2NS @ 50/hr   - Continue to trend Na    GI:  - TF via PEG    HEME:  - H/h stable    ID:  - afebrile  - off abx  - repeat covid swab  negative- isolation dc/d    ENDO:  - glucose goal 140-180    DVT PROPHYLAXIS:  -SCDs  -Lovenox 40mg daily  - Rt upper extremity doppler (R upper cephalic DVT)     VASCULAR: s/p proglide malfunction, R groin cutdown & femoral artery repair ()   -Following, pulse/groin checks RLE     DISPOSITION: SDU status, full code, dispo pending to JOSE vs AR.  Assessment and plan discussed w/ Dr. Serrano       Assessment:  Present when checked    []  GCS  E   V  M     Heart Failure: []Acute, [] acute on chronic , []chronic  Heart Failure:  [] Diastolic (HFpEF), [] Systolic (HFrEF), []Combined (HFpEF and HFrEF), [] RHF, [] Pulm HTN, [] Other    [] MICHAELLE, [] ATN, [] AIN, [] other  [] CKD1, [] CKD2, [] CKD 3, [] CKD 4, [] CKD 5, []ESRD    Encephalopathy: [] Metabolic, [] Hepatic, [] toxic, [] Neurological, [] Other    Abnormal Nurtitional Status: [] malnurtition (see nutrition note), [ ]underweight: BMI < 19, [] morbid obesity: BMI >40, [] Cachexia    [] Sepsis  [] hypovolemic shock,[] cardiogenic shock, [] hemorrhagic shock, [] neuogenic shock  [] Acute Respiratory Failure  []Cerebral edema, [] Brain compression/ herniation,   [] Functional quadriplegia  [] Acute blood loss anemia

## 2021-01-11 NOTE — PROGRESS NOTE ADULT - SUBJECTIVE AND OBJECTIVE BOX
STATUS POST:  S/p right groin cutdown for removal of proglide catheter and femoral artery repair on 20     SUBJECTIVE: Patient seen and examined bedside by vascular resident. Patient somnolent and nonverbal at this time.     amLODIPine   Tablet 10 milliGRAM(s) Oral every 24 hours  aspirin enteric coated 81 milliGRAM(s) Oral daily  cefepime   IVPB 2000 milliGRAM(s) IV Intermittent every 12 hours  enoxaparin Injectable 40 milliGRAM(s) SubCutaneous at bedtime  labetalol Injectable 2.5 milliGRAM(s) IV Push every 6 hours PRN  lisinopril 5 milliGRAM(s) Oral daily  nystatin    Suspension 648771 Unit(s) Oral four times a day  vancomycin  IVPB 750 milliGRAM(s) IV Intermittent every 12 hours      Vital Signs Last 24 Hrs  T(C): 36.6 (2021 09:45), Max: 37.1 (10 Christian 2021 18:49)  T(F): 97.9 (2021 09:45), Max: 98.7 (10 Christian 2021 18:49)  HR: 88 (2021 04:31) (80 - 90)  BP: 136/78 (2021 04:31) (121/56 - 175/67)  BP(mean): 84 (2021 04:31) (81 - 107)  RR: 17 (2021 04:31) (17 - 18)  SpO2: 99% (2021 04:31) (97% - 100%)  I&O's Detail    10 Christian 2021 07:01  -  2021 07:00  --------------------------------------------------------  IN:    Glucerna: 80 mL    IV PiggyBack: 350 mL    Sodium Chloride 0.9% Bolus: 1000 mL    sodium chloride 2%: 200 mL  Total IN: 1630 mL    OUT:    Intermittent Catheterization - Urethral (mL): 1050 mL  Total OUT: 1050 mL    Total NET: 580 mL      2021 07:01  -  2021 14:38  --------------------------------------------------------  IN:    Glucerna: 30 mL  Total IN: 30 mL    OUT:  Total OUT: 0 mL    Total NET: 30 mL      GENERAL: NAD, Resting comfortably in bed, does not open eyes, ZAFAR spontaneously  HEENT: head incision with staples in place without any signs of erythema, swelling, or discharge.  RESP: Nonlabored breathing, No respiratory distress  CARD: Normal rate, Normal peripheral perfusion  GI: Soft, ND, NT, No guarding, No rebound tenderness  EXTREM: WWP, No edema, No gross deformity of extremities  SKIN: No rashes, no lesions  Extremities: Right groin wound with staples all removed and without signs of swelling, hematoma, erythema, or discharge.  Pulses:   Right:                                                                          Left:  FEM [ ]2+ [ ]1+ [ ]doppler                                             FEM [ ]2+ [ ]1+ [ ]doppler    POP [ ]2+ [ ]1+ [ ]doppler                                             POP [ ]2+ [ ]1+ [ ]doppler    DP [ ]2+ [  ]1+ [x ]doppler                                                DP [ ]2+ [ ]1+ [x ]doppler  PT[ ]2+ [ ]1+ [X ]doppler                                                PT [ ]2+ [ ]1+ [x ]doppler    LABS:                        8.8    11.58 )-----------( 258      ( 2021 06:01 )             31.2     01-11    140  |  106  |  5<L>  ----------------------------<  127<H>  3.7   |  24  |  0.37<L>    Ca    8.5      2021 06:01  Phos  2.1       Mg     1.6         TPro  7.1  /  Alb  3.3  /  TBili  0.3  /  DBili  x   /  AST  24  /  ALT  29  /  AlkPhos  99  01-10    PT/INR - ( 10 Christian 2021 13:58 )   PT: 13.4 sec;   INR: 1.12          PTT - ( 10 Christian 2021 13:58 )  PTT:24.8 sec  Urinalysis Basic - ( 10 Christian 2021 03:46 )    Color: Yellow / Appearance: Clear / S.020 / pH: x  Gluc: x / Ketone: NEGATIVE  / Bili: Negative / Urobili: 0.2 E.U./dL   Blood: x / Protein: NEGATIVE mg/dL / Nitrite: NEGATIVE   Leuk Esterase: NEGATIVE / RBC: 5-10 /HPF / WBC < 5 /HPF   Sq Epi: x / Non Sq Epi: 0-5 /HPF / Bacteria: Many /HPF        RADIOLOGY & ADDITIONAL STUDIES:    Assessment  76yo F  s/p cerebral angio for verapamil c/b device malfunction of Proglide, s/p right groin cutdown for removal of proglide catheter and femoral artery repair on 20     Plan  -Right groin monitoring, look for signs of infection. Staples have all been removed and the wound is healing well at this time.  -Regular Pulse checks RLE  -Vascular surgery will continue to follow

## 2021-01-11 NOTE — EEG REPORT - NS EEG TEXT BOX
Department of Neurology  Inpatient Continuous video-Electroencephalogram    Patient Name:	CARA CANCHOLA    :	1945  MRN:	[id number]    Study Start Date/Time:  1/10/2021, 5:42:49 PM  Study End Date/Time: in progress    Referred by: Bebeto Serrano MD    Brief Clinical History:  CARA CANCHOLA is a 76 year old woman with diffuse subarachnoid hemorrhage and altered mental status; study performed to investigate for seizures or markers of epilepsy.     Diagnosis Code:   R40.4 Transient alteration of awareness  CPT: 11711 EEG with video 12-26h  Technical CPT: 38643 set-up +  57828 EEG unmonitored 12-26h    Pertinent Medications:  Vimpat 150 mg Q12H    Acquisition Details:  Electroencephalography was acquired using a minimum of 21 channels on an [employer] system v 8.5.1 with electrode placement according to the standard International 10-20 system following ACNS (American Clinical Neurophysiology Society) guidelines for Long-Term Video EEG monitoring.  Anterior temporal T1 and T2 electrodes were utilized whenever possible.   The [employer] automated spike & seizure detections were all reviewed in detail, in addition to extensive portions of raw EEG.    Day 1: 1/10/2021 @ 5:42:49 PM to next morning @ 07:00 AM    Background:  continuous, with predominantly theta frequencies.  Symmetry:  No persistent asymmetries of voltage or frequency.  Posterior Dominant Rhythm:  8 Hz symmetric, well-organized, and well-modulated.  Organization: Rudimentary.  Voltage:  Normal (20+ uV)  Variability: Yes. 		Reactivity: Yes.  N2 sleep: state changes were present, no N2 sleep trainsients.  Spontaneous Activity:  Frequent Generalized frontally-predominant  sharply contoured discharges, at times with triphasic morphology and anterior–posterior lag, at times quasiperiodic at up to 0.5-1 Hz.         Periodic/rhythmic activity:  Yes (described below). Frequent frontally predominant intermittent rhythmic delta activity (FIRDA).  Events:  No electrographic seizures or significant clinical events.  Provocations:  Hyperventilation and Photic stimulation: was not performed.    Daily Summary:      1)	Mild generalized background slowing with intermixed frontal intermittent rhythmic delta activity (FIRDA) and sharply contoured waveforms with triphasic morphology, indicating diffuse or multifocal cerebral dysfunction.  2)	No epileptiform activity and no significant clinical events occurred.    Lacey Rahman MD  Clinical Neurophysiology Fellow    Simona Gan MD  Attending Neurologist, [facility]

## 2021-01-11 NOTE — PROCEDURE NOTE - NSANESTHESIA_GEN_A_CORE
1% lidocaine
no anesthesia administered
1% lidocaine
2% lidocaine

## 2021-01-11 NOTE — PROGRESS NOTE ADULT - SUBJECTIVE AND OBJECTIVE BOX
Interval Events: Reviewed  Patient seen and examined at bedside.    Patient is a 76y old  Female who presents with a chief complaint of SAH (2021 14:37)  he is fine occasionally he has a cough    PAST MEDICAL & SURGICAL HISTORY:  Hyperlipidemia    Hypertension    COPD (chronic obstructive pulmonary disease)    Asthma    No significant past surgical history        MEDICATIONS:  Pulmonary:  albuterol/ipratropium for Nebulization 3 milliLiter(s) Nebulizer every 6 hours  buDESOnide    Inhalation Suspension 0.5 milliGRAM(s) Inhalation every 12 hours  montelukast 10 milliGRAM(s) Oral daily    Antimicrobials:  cefepime   IVPB 2000 milliGRAM(s) IV Intermittent every 12 hours  nystatin    Suspension 755356 Unit(s) Oral four times a day  vancomycin  IVPB 750 milliGRAM(s) IV Intermittent every 12 hours    Anticoagulants:  aspirin enteric coated 81 milliGRAM(s) Oral daily  enoxaparin Injectable 40 milliGRAM(s) SubCutaneous at bedtime    Cardiac:  amLODIPine   Tablet 10 milliGRAM(s) Oral every 24 hours  labetalol Injectable 2.5 milliGRAM(s) IV Push every 6 hours PRN  lisinopril 5 milliGRAM(s) Oral daily      Allergies    No Known Allergies    Intolerances        Vital Signs Last 24 Hrs  T(C): 36.1 (2021 18:03), Max: 37.1 (2021 09:45)  T(F): 96.9 (2021 18:03), Max: 98.7 (2021 09:45)  HR: 92 (2021 19:07) (86 - 96)  BP: 144/63 (2021 19:07) (121/56 - 157/71)  BP(mean): 91 (2021 19:07) (81 - 102)  RR: 17 (2021 04:31) (17 - 18)  SpO2: 97% (2021 19:07) (97% - 100%)    01-10 @ 07:  -   @ 07:00  --------------------------------------------------------  IN: 1630 mL / OUT: 1050 mL / NET: 580 mL     @ 07: @ 20:46  --------------------------------------------------------  IN: 1615 mL / OUT: 351 mL / NET: 1264 mL          Review of Systems:   •	General: negative  •	Skin/Breast: negative  •	Ophthalmologic: negative  •	ENMT: negative  •	Respiratory and Thorax: negative  •	Cardiovascular: negative  •	Gastrointestinal: negative  •	Genitourinary: negative  •	Musculoskeletal: negative  •	Neurological: negative  •	Psychiatric: negative  •	Hematology/Lymphatics: negative  •	Endocrine: negative  •	Allergic/Immunologic: negative    Physical Exam:   • Constitutional:	Well-developed, well nourished  • Eyes:	EOMI; PERRL; no drainage or redness  • ENMT:	No oral lesions; no gross abnormalities  • Neck	No bruits; no thyromegaly or nodules  • Breasts:	not examined  • Back:	No deformity or limitation of movement  • Respiratory:	Breath Sounds equal & clear to percussion & auscultation, no accessory muscle use  • Cardiovascular:	Regular rate & rhythm, normal S1, S2; no murmurs, gallops or rubs; no S3, S4  • Gastrointestinal:	Soft, non-tender, no hepatosplenomegaly, normal bowel sounds  • Genitourinary:	not examined  • Rectal: not examined  • Extremities:	No cyanosis, clubbing or edema  • Vascular:	Equal and normal pulses (carotid, femoral, dorsalis pedis)  • Neurologica:l	not examined  • Skin:	No lesions; no rash  • Lymph Nodes:	No lymphadedenopathy  • Musculoskeletal:	No joint pain, swelling or deformity; no limitation of movement        LABS:      CBC Full  -  ( 2021 06:01 )  WBC Count : 11.58 K/uL  RBC Count : 3.04 M/uL  Hemoglobin : 8.8 g/dL  Hematocrit : 31.2 %  Platelet Count - Automated : 258 K/uL  Mean Cell Volume : 102.6 fl  Mean Cell Hemoglobin : 28.9 pg  Mean Cell Hemoglobin Concentration : 28.2 gm/dL  Auto Neutrophil # : x  Auto Lymphocyte # : x  Auto Monocyte # : x  Auto Eosinophil # : x  Auto Basophil # : x  Auto Neutrophil % : x  Auto Lymphocyte % : x  Auto Monocyte % : x  Auto Eosinophil % : x  Auto Basophil % : x        140  |  106  |  5<L>  ----------------------------<  127<H>  3.7   |  24  |  0.37<L>    Ca    8.5      2021 06:01  Phos  2.1       Mg     1.6         TPro  7.1  /  Alb  3.3  /  TBili  0.3  /  DBili  x   /  AST  24  /  ALT  29  /  AlkPhos  99  01-10    PT/INR - ( 10 Christian 2021 13:58 )   PT: 13.4 sec;   INR: 1.12          PTT - ( 10 Christian 2021 13:58 )  PTT:24.8 sec      Urinalysis Basic - ( 10 Christian 2021 03:46 )    Color: Yellow / Appearance: Clear / S.020 / pH: x  Gluc: x / Ketone: NEGATIVE  / Bili: Negative / Urobili: 0.2 E.U./dL   Blood: x / Protein: NEGATIVE mg/dL / Nitrite: NEGATIVE   Leuk Esterase: NEGATIVE / RBC: 5-10 /HPF / WBC < 5 /HPF   Sq Epi: x / Non Sq Epi: 0-5 /HPF / Bacteria: Many /HPF      < from: Xray Chest 1 View- PORTABLE-Urgent (Xray Chest 1 View- PORTABLE-Urgent .) (01.10.21 @ 03:21) >    EXAM:  XR CHEST PORTABLE URGENT 1V                          PROCEDURE DATE:  01/10/2021          INTERPRETATION:  Clinical History: Fever    Frontal examination of the chest demonstrates the heart to be within normal limits in transverse diameter. No interval change this remaining support devices in comparison to prior examination of the chest 2021. Chronic interstitial changes. Patchy lung infiltrates cannot be excluded.    IMPRESSION: Chronic interstitial changes. Patchy lung infiltrates cannot be excluded    < end of copied text >  < from: Xray Abdomen 1 View Portable, IMMEDIATE (Xray Abdomen 1 View Portable, IMMEDIATE .) (01.10.21 @ 10:57) >    EXAM:  XR ABDOMEN PORTABLE IMMED 1V                          PROCEDURE DATE:  01/10/2021          INTERPRETATION:  Clinical History: Ileus    Supine film of the abdomen demonstrates nonspecific bowel gas pattern. No evidence of obstruction. Right sided ventriculoperitoneal shunt catheter. Probable PEG Device overlying mid abdomen. Surgical clips overlying right abdomen.    Impression nonspecific bowel gas pattern. No evidence of obstruction    < end of copied text >          Culture Results:   No growth to date (01-10 @ 17:24)  Culture Results:   No growth at 1 day. (01-10 @ 10:46)  Culture Results:   No growth at 1 day. (01-10 @ 06:39)  Culture Results:   10,000 CFU/ml Escherichia coli  Susceptibility to follow.  >100,000 CFU/ml Enterococcus faecalis  Susceptibility to follow. (01-10 @ 05:21)      RADIOLOGY & ADDITIONAL STUDIES (The following images were personally reviewed):  Soriano:                                     No  Urine output:                       adequate  DVT prophylaxis:                 Yes  Flattus:                                  Yes  Bowel movement:              No

## 2021-01-12 LAB
ANION GAP SERPL CALC-SCNC: 11 MMOL/L — SIGNIFICANT CHANGE UP (ref 5–17)
BUN SERPL-MCNC: 6 MG/DL — LOW (ref 7–23)
CALCIUM SERPL-MCNC: 8.1 MG/DL — LOW (ref 8.4–10.5)
CHLORIDE SERPL-SCNC: 102 MMOL/L — SIGNIFICANT CHANGE UP (ref 96–108)
CO2 SERPL-SCNC: 23 MMOL/L — SIGNIFICANT CHANGE UP (ref 22–31)
CREAT SERPL-MCNC: 0.35 MG/DL — LOW (ref 0.5–1.3)
CULTURE RESULTS: NO GROWTH — SIGNIFICANT CHANGE UP
CULTURE RESULTS: NO GROWTH — SIGNIFICANT CHANGE UP
GLUCOSE SERPL-MCNC: 162 MG/DL — HIGH (ref 70–99)
HCT VFR BLD CALC: 28.6 % — LOW (ref 34.5–45)
HGB BLD-MCNC: 8.9 G/DL — LOW (ref 11.5–15.5)
MAGNESIUM SERPL-MCNC: 2 MG/DL — SIGNIFICANT CHANGE UP (ref 1.6–2.6)
MCHC RBC-ENTMCNC: 29.3 PG — SIGNIFICANT CHANGE UP (ref 27–34)
MCHC RBC-ENTMCNC: 31.1 GM/DL — LOW (ref 32–36)
MCV RBC AUTO: 94.1 FL — SIGNIFICANT CHANGE UP (ref 80–100)
NRBC # BLD: 0 /100 WBCS — SIGNIFICANT CHANGE UP (ref 0–0)
PHOSPHATE SERPL-MCNC: 2.5 MG/DL — SIGNIFICANT CHANGE UP (ref 2.5–4.5)
PLATELET # BLD AUTO: 276 K/UL — SIGNIFICANT CHANGE UP (ref 150–400)
POTASSIUM SERPL-MCNC: 3.8 MMOL/L — SIGNIFICANT CHANGE UP (ref 3.5–5.3)
POTASSIUM SERPL-SCNC: 3.8 MMOL/L — SIGNIFICANT CHANGE UP (ref 3.5–5.3)
RBC # BLD: 3.04 M/UL — LOW (ref 3.8–5.2)
RBC # FLD: 17.6 % — HIGH (ref 10.3–14.5)
SARS-COV-2 RNA SPEC QL NAA+PROBE: SIGNIFICANT CHANGE UP
SODIUM SERPL-SCNC: 136 MMOL/L — SIGNIFICANT CHANGE UP (ref 135–145)
SPECIMEN SOURCE: SIGNIFICANT CHANGE UP
SPECIMEN SOURCE: SIGNIFICANT CHANGE UP
VANCOMYCIN TROUGH SERPL-MCNC: 7.7 UG/ML — LOW (ref 10–20)
WBC # BLD: 8.91 K/UL — SIGNIFICANT CHANGE UP (ref 3.8–10.5)
WBC # FLD AUTO: 8.91 K/UL — SIGNIFICANT CHANGE UP (ref 3.8–10.5)

## 2021-01-12 PROCEDURE — 99024 POSTOP FOLLOW-UP VISIT: CPT

## 2021-01-12 PROCEDURE — 95720 EEG PHY/QHP EA INCR W/VEEG: CPT

## 2021-01-12 RX ORDER — PIPERACILLIN AND TAZOBACTAM 4; .5 G/20ML; G/20ML
3.38 INJECTION, POWDER, LYOPHILIZED, FOR SOLUTION INTRAVENOUS ONCE
Refills: 0 | Status: COMPLETED | OUTPATIENT
Start: 2021-01-12 | End: 2021-01-12

## 2021-01-12 RX ORDER — PIPERACILLIN AND TAZOBACTAM 4; .5 G/20ML; G/20ML
3.38 INJECTION, POWDER, LYOPHILIZED, FOR SOLUTION INTRAVENOUS EVERY 6 HOURS
Refills: 0 | Status: DISCONTINUED | OUTPATIENT
Start: 2021-01-12 | End: 2021-01-14

## 2021-01-12 RX ADMIN — SODIUM CHLORIDE 2 GRAM(S): 9 INJECTION INTRAMUSCULAR; INTRAVENOUS; SUBCUTANEOUS at 05:57

## 2021-01-12 RX ADMIN — SODIUM CHLORIDE 2 GRAM(S): 9 INJECTION INTRAMUSCULAR; INTRAVENOUS; SUBCUTANEOUS at 21:50

## 2021-01-12 RX ADMIN — PANTOPRAZOLE SODIUM 40 MILLIGRAM(S): 20 TABLET, DELAYED RELEASE ORAL at 18:21

## 2021-01-12 RX ADMIN — CHLORHEXIDINE GLUCONATE 15 MILLILITER(S): 213 SOLUTION TOPICAL at 18:21

## 2021-01-12 RX ADMIN — PREGABALIN 1000 MICROGRAM(S): 225 CAPSULE ORAL at 11:57

## 2021-01-12 RX ADMIN — Medication 500000 UNIT(S): at 18:21

## 2021-01-12 RX ADMIN — ATORVASTATIN CALCIUM 40 MILLIGRAM(S): 80 TABLET, FILM COATED ORAL at 21:50

## 2021-01-12 RX ADMIN — LACOSAMIDE 150 MILLIGRAM(S): 50 TABLET ORAL at 18:19

## 2021-01-12 RX ADMIN — Medication 650 MILLIGRAM(S): at 07:42

## 2021-01-12 RX ADMIN — CHLORHEXIDINE GLUCONATE 15 MILLILITER(S): 213 SOLUTION TOPICAL at 05:59

## 2021-01-12 RX ADMIN — Medication 250 MILLIGRAM(S): at 07:20

## 2021-01-12 RX ADMIN — ENOXAPARIN SODIUM 40 MILLIGRAM(S): 100 INJECTION SUBCUTANEOUS at 21:50

## 2021-01-12 RX ADMIN — Medication 40 MILLIEQUIVALENT(S): at 01:19

## 2021-01-12 RX ADMIN — PIPERACILLIN AND TAZOBACTAM 200 GRAM(S): 4; .5 INJECTION, POWDER, LYOPHILIZED, FOR SOLUTION INTRAVENOUS at 20:19

## 2021-01-12 RX ADMIN — PANTOPRAZOLE SODIUM 40 MILLIGRAM(S): 20 TABLET, DELAYED RELEASE ORAL at 05:58

## 2021-01-12 RX ADMIN — SODIUM CHLORIDE 2 GRAM(S): 9 INJECTION INTRAMUSCULAR; INTRAVENOUS; SUBCUTANEOUS at 15:06

## 2021-01-12 RX ADMIN — AMLODIPINE BESYLATE 10 MILLIGRAM(S): 2.5 TABLET ORAL at 11:57

## 2021-01-12 RX ADMIN — LISINOPRIL 5 MILLIGRAM(S): 2.5 TABLET ORAL at 05:57

## 2021-01-12 RX ADMIN — Medication 3 MILLILITER(S): at 05:58

## 2021-01-12 RX ADMIN — Medication 0.5 MILLIGRAM(S): at 05:57

## 2021-01-12 RX ADMIN — Medication 250 MILLIGRAM(S): at 18:19

## 2021-01-12 RX ADMIN — Medication 3 MILLILITER(S): at 21:50

## 2021-01-12 RX ADMIN — Medication 3 MILLILITER(S): at 12:00

## 2021-01-12 RX ADMIN — Medication 500000 UNIT(S): at 11:57

## 2021-01-12 RX ADMIN — FLUDROCORTISONE ACETATE 0.2 MILLIGRAM(S): 0.1 TABLET ORAL at 05:59

## 2021-01-12 RX ADMIN — CEFEPIME 100 MILLIGRAM(S): 1 INJECTION, POWDER, FOR SOLUTION INTRAMUSCULAR; INTRAVENOUS at 11:56

## 2021-01-12 RX ADMIN — CHLORHEXIDINE GLUCONATE 1 APPLICATION(S): 213 SOLUTION TOPICAL at 05:59

## 2021-01-12 RX ADMIN — FLUDROCORTISONE ACETATE 0.2 MILLIGRAM(S): 0.1 TABLET ORAL at 18:20

## 2021-01-12 RX ADMIN — Medication 0.5 MILLIGRAM(S): at 18:20

## 2021-01-12 RX ADMIN — Medication 500000 UNIT(S): at 05:59

## 2021-01-12 RX ADMIN — Medication 81 MILLIGRAM(S): at 11:57

## 2021-01-12 RX ADMIN — MONTELUKAST 10 MILLIGRAM(S): 4 TABLET, CHEWABLE ORAL at 11:57

## 2021-01-12 RX ADMIN — Medication 40 MILLIEQUIVALENT(S): at 06:00

## 2021-01-12 RX ADMIN — LACOSAMIDE 150 MILLIGRAM(S): 50 TABLET ORAL at 05:58

## 2021-01-12 RX ADMIN — Medication 3 MILLILITER(S): at 18:20

## 2021-01-12 RX ADMIN — Medication 325 MILLIGRAM(S): at 11:57

## 2021-01-12 NOTE — PROGRESS NOTE ADULT - SUBJECTIVE AND OBJECTIVE BOX
HPI:  74y/o F with PMHx sig for COPD, asthma, HLD, HTN, BIBEMS to [facility] from home after HHA discovered patient found down in floor covered in non-bloody vomitus. Perr HHA Pt went to the bathroom and was taking a long time, went in to check on her and found her sitting on floor, awake, however with vomitus on front of shirt. On arrival to [facility], patient was taken for stat head CT, which revealed diffuse subarachnoid hemorrhage mainly at basilar cisterns with IVH and obstructive hydrocephalus. Patient was given a bolus of 1g keppra and dilaudid pushes for generalized headache. CTA concerning for 3mm left pcomm aneurysm and a 1.6mm outpouching of distal cavernous segment of the left internal carotid artery likely aneurysm. NIHSS2 3 MF4. Patient was transferred to [facility] for further intervention. Patient currently reports headaches. Pt denies acute changes in vision, seizures, CP, SOB, weakness/paresthesias of arms or legs. (09 Dec 2020 23:37)    OVERNIGHT EVENTS: No significant issues overnight. Patient denies pain but sensitive to passive range of motion.    Hospital Course:  : BD1 Patient admitted for SAH HH3, MF4.   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 EVD at 5, on Levo overnight, nimodipine discontinued d/t hypotension  12: BD4 POD#2, VIVIEN overnight, neuro stable, passed TOV  12: BD5 POD#3: VIVIEN x 24 hrs  : BD6 POD#4. VIVIEN o/n, neuro stable. EVD at 5cm H2O  12/15: BD7 POD #5 VIVIEN overnight, neuro stable. EVD remains at 5. Plan for CTA today.   : BD8 POD #6 VIVIEN overnight, neuro stable, EVD at 0, on Levo, started on 3% at 15 overnight   : BD9 POD#1 s/p cerebral angio for verapamil c/b device malfunction of Proglide, s/p right groin cutdown for removal of proglide catheter and femoral artery repair.  VIVIEN overnight, neuro stable, EVD at 0. patient remained intubated overnight, on propofol for sedation, and vEEG  : BD#10, POD#8 s/p coil/embo of L pcomm aneurysm, POD#2 s/p IA verapamil, POD#2 R groin cutdown for removal of proglide catheter w/ repair of femoral artery. VIVIEN overnight. EVD remains open @0cmH2O   : BD#11, POD#9 s/p coil/embo of L pcomm aneurysm. POD#3 s/p IA verapamil, POD#3 s/p R femoral artery cutdown of proglide catheter w/ femoral artery repair. VIVIEN overnight. EVD remains open @0cmH2O. EEG shows b/l frontal sharp discharges, cont monitoring, vimpat was increased yesterday. Gentle hydration, total IVF 50cc/hr. Cont vanc/zosyn for empiric coverage, next vanc trough due @1pm on . F/u daily CXR.    BD#12 POD#10 VIVIEN overnight, Neuro exam stable, EVD @ 0cm H2O, vEEG, 3% @ 15 goal WNL, TF via NGT  : BD13, POD11 VIVIEN overnight. EVD at 5cmH20 (raised yesterday), vEEG in place   BD#14, EVD raised to 15 yesterday, 3% @ 30cc Goal WNL, -180, Milrinone, TTE prior to DC as per Card for takotsubo cardiomyopathy, failed S&S pending reeval, TF via NGT, Neuro exam stable  : BD#15. VIVIEN o/n, neuro stable. EVD clamped. 30%@30cc/hr   BD#16 VIVIEN overnight, spiked 102, EVD @ 0cm H2O, 3% @ 30cc/hr Goal WNL, Vasc following, TF via NGT, -200,   : BD#17. VIVIEN overnight. Pan cx from , NGTD on CSF and blood. EVD clamped. 3% @15cc/hr, rate kept same overnight. NGT feeds at goal. SBP goal 160-200.   : BD#18. VIVIEN overnight, neuro exam stable. NGTD from pan cx on . EVD removed today. 3% discontinued . NGT feeds at goal, IVF off. SBP goal 160-200.   : BD#19 VIVIEN overnight, neuro stable. 3% at 15, stepdown status  : BD20 VIVIEN overnight, neuro stable. 3% continues.  Patient became increasingly more somnolent and underwent LP for CSF high volume tap.  Temporary improvement.  Spiked fever, pancultured.  : BD21 Patient increasingly more somnolent, EVD placed at bedside.    : BD22 pt. is s/p R VPS placement, certas @5 POD#1, post op ct head c/a/p done. afebrile o/n.   : BD#23: pt. is s/p R VPS placement, certas @5 POD#2, post op ct head c/a/p done. zosyn for enterobacter   : BD #24. POD#3 s/p R VPS placement, CErtas @5. Dressings removed from head and abdomen. Cont zosyn for enterobacter and e. coli in urine, end date 21. Post-op imaging completed. Pend S&S re-eval, improvement in mental status/neuro exam.   1/3: BD25, POD4. SBP goals relaxed 120-200. zosyn for enterobacter UTI until . FOB+, protonix bid started, vivien o/n  : BD 26, VIVIEN o/n  : BD 27, VIVIEN o/n   : BD 28, POD 7 VPS (Certas @ 5). VIVIEN overnight. Plan for PEG with GI Thursday. Repeat Covid swab negative. Stepdown status.  : BD 29, POD 8. VIVIEN overnight. PEG placed at bedside by GI today. Stepdown status  : BD 30 POD 9, VIVIEN o/n, resume TF 24 hrs after PEG, stepdown    BD 31. POD 10. No events overnight. s/p PEG. Pending AR vs JOSE  1/10 BD32, POD11. Lethargic but arousable with stimulation, CTH was ordered and demonstrated stable scan in comparison from 1/1. Continue to trend Na, this  improved to 135 on 2%@50/hr and salt tabs started. Pending AR vs JOSE.  : BD#33 POD#12. VIVIEN overnight, peripheral IV x2 inserted. F/u pan cx from 1/10, NGTD. Cont 2% @50cc/hr, f/u Na in am. Dispo pend AR vs JOSE.   : BD#34 POD#13: Afebrile, on 2% at 25cc/hr, Na 137, pending AM labs. Pending JOSE placement. Abx to stop today, fever work-up negative. COVID swab for dispo planning.    Vital Signs Last 24 Hrs  T(C): 36.7 (2021 21:56), Max: 37.1 (2021 09:45)  T(F): 98 (2021 21:56), Max: 98.7 (2021 09:45)  HR: 94 (2021 23:45) (88 - 96)  BP: 154/65 (2021 23:45) (136/78 - 157/71)  BP(mean): 94 (2021 23:45) (84 - 102)  RR: 18 (2021 23:45) (17 - 18)  SpO2: 97% (2021 23:45) (97% - 100%)    I&O's Summary    10 Christian 2021 07:01  -  2021 07:00  --------------------------------------------------------  IN: 1630 mL / OUT: 1050 mL / NET: 580 mL    2021 07:01  -  2021 03:05  --------------------------------------------------------  IN: 2105 mL / OUT: 351 mL / NET: 1754 mL        PHYSICAL EXAM:  General: NAD,  A&O x2    HEENT: PERRL 3mm, EOMI b/l, face symmetric, tongue midline, +hard of hearing. Right scalp incision C/D/I w/ staples.  Cardiovascular: RRR, normal S1 and S2   Respiratory: lungs CTAB, no wheezing, rhonchi, or crackles   GI: normoactive BS to auscultation, abd soft, NTND, +PEG, Right side incision C/D/I w/ staples.  Neuro: speech clear, no dysmetria, no pronator drift, following commands   ZAFAR x4 spontaneously and with good strength  Extremities: distal pulses 2+ throughout      TUBES/LINES:  [] Soriano  [] Lumbar Drain  [] Wound Drains  [] Others      DIET:  [] NPO  [] Mechanical  [] Tube feeds    LABS:                        8.8    11.58 )-----------( 258      ( 2021 06:01 )             31.2         137  |  100  |  5<L>  ----------------------------<  213<H>  3.1<L>   |  25  |  0.33<L>    Ca    8.0<L>      2021 20:22  Phos  2.1       Mg     1.6         TPro  7.1  /  Alb  3.3  /  TBili  0.3  /  DBili  x   /  AST  24  /  ALT  29  /  AlkPhos  99  01-10    PT/INR - ( 10 Christian 2021 13:58 )   PT: 13.4 sec;   INR: 1.12          PTT - ( 10 Christian 2021 13:58 )  PTT:24.8 sec  Urinalysis Basic - ( 10 Christian 2021 03:46 )    Color: Yellow / Appearance: Clear / S.020 / pH: x  Gluc: x / Ketone: NEGATIVE  / Bili: Negative / Urobili: 0.2 E.U./dL   Blood: x / Protein: NEGATIVE mg/dL / Nitrite: NEGATIVE   Leuk Esterase: NEGATIVE / RBC: 5-10 /HPF / WBC < 5 /HPF   Sq Epi: x / Non Sq Epi: 0-5 /HPF / Bacteria: Many /HPF          CAPILLARY BLOOD GLUCOSE          Drug Levels: [] N/A  Vancomycin Level, Trough: 9.5 ug/mL ( @ 17:22)    CSF Analysis: [] N/A      Allergies    No Known Allergies    Intolerances      MEDICATIONS:  Antibiotics:  cefepime   IVPB 2000 milliGRAM(s) IV Intermittent every 12 hours  nystatin    Suspension 788921 Unit(s) Oral four times a day  vancomycin  IVPB 750 milliGRAM(s) IV Intermittent every 12 hours    Neuro:  acetaminophen    Suspension .. 650 milliGRAM(s) Oral every 6 hours PRN  lacosamide Solution 150 milliGRAM(s) Oral every 12 hours    Anticoagulation:  aspirin enteric coated 81 milliGRAM(s) Oral daily  enoxaparin Injectable 40 milliGRAM(s) SubCutaneous at bedtime    OTHER:  albuterol/ipratropium for Nebulization 3 milliLiter(s) Nebulizer every 6 hours  amLODIPine   Tablet 10 milliGRAM(s) Oral every 24 hours  atorvastatin 40 milliGRAM(s) Oral at bedtime  buDESOnide    Inhalation Suspension 0.5 milliGRAM(s) Inhalation every 12 hours  chlorhexidine 0.12% Liquid 15 milliLiter(s) Oral Mucosa two times a day  chlorhexidine 2% Cloths 1 Application(s) Topical <User Schedule>  dextrose 40% Gel 15 Gram(s) Oral once  dextrose 50% Injectable 25 Gram(s) IV Push once  fludroCORTISONE 0.2 milliGRAM(s) Oral every 12 hours  glucagon  Injectable 1 milliGRAM(s) IntraMuscular once  labetalol Injectable 2.5 milliGRAM(s) IV Push every 6 hours PRN  lisinopril 5 milliGRAM(s) Oral daily  montelukast 10 milliGRAM(s) Oral daily  pantoprazole   Suspension 40 milliGRAM(s) Oral two times a day    IVF:  cyanocobalamin 1000 MICROGram(s) Oral daily  ferrous    sulfate 325 milliGRAM(s) Oral daily  potassium chloride   Powder 40 milliEquivalent(s) Oral every 4 hours  sodium chloride 2 Gram(s) Oral every 8 hours  sodium chloride 2% . 1000 milliLiter(s) IV Continuous <Continuous>    CULTURES:  Culture Results:   No growth to date (01-10 @ 17:24)  Culture Results:   No growth at 1 day. (01-10 @ 10:46)    RADIOLOGY & ADDITIONAL TESTS:      ASSESSMENT:  76 year old Female  with PMHx of COPD, asthma, HLD, HTN, BIBEMS to [facility] from home after HHA found patient down on the floor covered in non-bloody vomitus. CTH reveals diffuse subarachnoid hemorrhage mainly at basilar cisterns with IVH and obstructive hydrocephalus, CTA shows 3mm left pcomm aneurysm, now s/p bedside right frontal EVD placement 12/10, s/p cerebral angiogram for coil embolization of left PCOMM aneurysm 12/10, now   s/p cerebral angio for verapamil c/b device malfunction of Proglide, s/p right groin cutdown for removal of proglide catheter and femoral artery repair (2020), NIHSS2 HH3 MF4), s/p EVD removal ,  s/p bedside EVD placement , POD#12 from R VPS certas at 5 and POD#3 s/p PEG.     HEADACHE    Handoff    MEWS Score    Hyperlipidemia    Hypertension    COPD (chronic obstructive pulmonary disease)    Asthma    Hydrocephalus, adult    Injury of right femoral artery    Cerebral artery vasospasm    SAH (subarachnoid hemorrhage)    Hydrocephalus, adult    Injury of femoral artery    Cerebral artery vasospasm    SAH (subarachnoid hemorrhage)    Subarachnoid bleed    Postoperative state    Obstructive hydrocephalus    Anemia due to acute blood loss    Preoperative clearance    Centrilobular emphysema    Mild persistent asthma without complication    Subarachnoid bleed    Insertion of external ventricular drain    Vascular surgery procedure    Angiogram, carotid and cerebral, bilateral    Angiogram, cerebral, with intracranial aneurysm embolization    No significant past surgical history    HEADACHE        PLAN:  NEURO:  - neuro checks   - pain control   - cont modafinil for neurostim   - cont vimpat seizure ppx  - Restarted on ASA 81 for B/L ICA stenosis   - CTH stable  with evolving ICH and decreasing pneumocephalus    CARDIOVASCULAR:  - -200  -Continue Lisinopril and amlodipine  - echo and CTA prior to discharge    PULMONARY:  -Saturating well on RA  -Contiue montelukast, Spiriva, and duonebs     RENAL:  - repletions prn  - Salt tabs 2g q8, 2% @25cc/hr,  - Continue to trend Na    GI:  - TF via PEG  - Stool softeners PRN    ID:  - afebrile, fever work-up negative to date,  - Last day for empiric Abx  - COVID swab for dispo planning    ENDO:  - glucose goal 140-180    DVT PROPHYLAXIS:  -SCDs  -Lovenox 40mg daily    VASCULAR: s/p proglide malfunction, R groin cutdown & femoral artery repair ()   -Following, pulse/groin checks RLE     DISPOSITION: SDU status, full code,   dispo pending to [facility], OOB as tolerated,  D/w Dr. Serrano     Assessment:  Present when checked    []  GCS  E   V  M     Heart Failure: []Acute, [] acute on chronic , []chronic  Heart Failure:  [] Diastolic (HFpEF), [] Systolic (HFrEF), []Combined (HFpEF and HFrEF), [] RHF, [] Pulm HTN, [] Other    [] MICHAELLE, [] ATN, [] AIN, [] other  [] CKD1, [] CKD2, [] CKD 3, [] CKD 4, [] CKD 5, []ESRD    Encephalopathy: [] Metabolic, [] Hepatic, [] toxic, [] Neurological, [] Other    Abnormal Nurtitional Status: [] malnurtition (see nutrition note), [ ]underweight: BMI < 19, [] morbid obesity: BMI >40, [] Cachexia    [] Sepsis  [] hypovolemic shock,[] cardiogenic shock, [] hemorrhagic shock, [] neuogenic shock  [] Acute Respiratory Failure  []Cerebral edema, [] Brain compression/ herniation,   [] Functional quadriplegia  [] Acute blood loss anemia

## 2021-01-12 NOTE — EEG REPORT - NS EEG TEXT BOX
[facility] Department of Neurology  Inpatient Continuous video-Electroencephalogram    Patient Name:	CARA CANCHOLA    :	1945  MRN:	[id number]    Study Start Date/Time:  1/10/2021, 5:42:49 PM  Study End Date/Time:  2021, 2:05 PM    Referred by: Bebeto Serrano MD    Brief Clinical History:  CARA CANCHOLA is a 76 year old woman with diffuse subarachnoid hemorrhage and altered mental status; study performed to investigate for seizures or markers of epilepsy.     Diagnosis Code:   R40.4 Transient alteration of awareness  CPT: 65750 EEG with video 12-26h  Technical CPT: 57804 set-up +  97109 EEG unmonitored 12-26h    Pertinent Medications:  Vimpat 150 mg Q12H    Acquisition Details:  Electroencephalography was acquired using a minimum of 21 channels on an [employer]. Neurology system v 8.5.1 with electrode placement according to the standard International 10-20 system following ACNS (American Clinical Neurophysiology Society) guidelines for Long-Term Video EEG monitoring.  Anterior temporal T1 and T2 electrodes were utilized whenever possible.   The [employer] automated spike & seizure detections were all reviewed in detail, in addition to extensive portions of raw EEG.    Day 1: 1/10/2021 @ 5:42:49 PM to next morning @ 07:00 AM    Background:  continuous, with predominantly theta frequencies.  Symmetry:  No persistent asymmetries of voltage or frequency.  Posterior Dominant Rhythm:  8 Hz symmetric, well-organized, and well-modulated.  Organization: Rudimentary.  Voltage:  Normal (20+ uV)  Variability: Yes. 		Reactivity: Yes.  N2 sleep: state changes were present, no N2 sleep trainsients.  Spontaneous Activity:  Frequent Generalized frontally-predominant  sharply contoured discharges, at times with triphasic morphology and anterior–posterior lag, at times quasiperiodic at up to 0.5-1 Hz.         Periodic/rhythmic activity:  Yes (described below). Frequent frontally predominant intermittent rhythmic delta activity (FIRDA).  Events:  No electrographic seizures or significant clinical events.  Provocations:  Hyperventilation and Photic stimulation: was not performed.    Daily Summary:      1)	Mild generalized background slowing with intermixed frontal intermittent rhythmic delta activity (FIRDA) and sharply contoured waveforms with triphasic morphology, indicating diffuse or multifocal cerebral dysfunction.  2)	No epileptiform activity and no significant clinical events occurred.    Lacey Rahman MD  Clinical Neurophysiology Fellow    Simona Gan MD  Attending Neurologist, [facility]        Daily Updates:    Day 2  2020 7 AM to 2:05 PM      Pertinent medications: no changes    Background:  continuous, with predominantly theta frequencies.  Symmetry:  No persistent asymmetries of voltage or frequency.  Posterior Dominant Rhythm:  8.5 Hz symmetric, well-organized, and well-modulated.  Organization: Rudimentary.  Voltage:  Normal (20+ uV)  Variability: Yes. 		Reactivity: Yes.  N2 sleep: state changes were present, no N2 sleep trainsients.  Spontaneous Activity:  Frequent Generalized frontally-predominant  sharply contoured discharges with triphasic morphology and anterior–posterior lag, at times quasiperiodic at up to 0.5-1 Hz.  Periodic/rhythmic activity: Yes (described below). Occasional frontally predominant intermittent rhythmic delta activity (FIRDA).  Events:  No electrographic seizures or significant clinical events.  Provocations:  Hyperventilation and Photic stimulation: not performed.    Daily Summary:    1)	Mild generalized background slowing with intermixed FIRDA and triphasic waves, indicating diffuse or multifocal cerebral dysfunction.  2)	No epileptiform activity and no significant clinical events occurred.  3)	This is similar to the prior recording.    Lacey Rahman MD  Clinical Neurophysiology Fellow    Simona Gan MD  Attending Neurologist, [facility]      FINAL Summary:  Abnormal continuous av-EEG study.  Mild generalized background slowing with intermixed frontal intermittent rhythmic delta activity (FIRDA) and sharply contoured waveforms with triphasic morphology.      Final Clinical Correlation:  These findings are suggestive of diffuse or multifocal cerebral dysfunction.  There were no findings of active epilepsy, however this alone does not rule out the diagnosis.     Lacey Rahman MD  Clinical Neurophysiology Fellow    Simona Gan MD  Attending Neurologist, [facility]

## 2021-01-13 LAB
ANION GAP SERPL CALC-SCNC: 12 MMOL/L — SIGNIFICANT CHANGE UP (ref 5–17)
BUN SERPL-MCNC: 9 MG/DL — SIGNIFICANT CHANGE UP (ref 7–23)
CALCIUM SERPL-MCNC: 8.7 MG/DL — SIGNIFICANT CHANGE UP (ref 8.4–10.5)
CHLORIDE SERPL-SCNC: 97 MMOL/L — SIGNIFICANT CHANGE UP (ref 96–108)
CO2 SERPL-SCNC: 26 MMOL/L — SIGNIFICANT CHANGE UP (ref 22–31)
CREAT SERPL-MCNC: 0.37 MG/DL — LOW (ref 0.5–1.3)
GLUCOSE SERPL-MCNC: 205 MG/DL — HIGH (ref 70–99)
HCT VFR BLD CALC: 29.1 % — LOW (ref 34.5–45)
HGB BLD-MCNC: 9.1 G/DL — LOW (ref 11.5–15.5)
MAGNESIUM SERPL-MCNC: 1.8 MG/DL — SIGNIFICANT CHANGE UP (ref 1.6–2.6)
MCHC RBC-ENTMCNC: 28.7 PG — SIGNIFICANT CHANGE UP (ref 27–34)
MCHC RBC-ENTMCNC: 31.3 GM/DL — LOW (ref 32–36)
MCV RBC AUTO: 91.8 FL — SIGNIFICANT CHANGE UP (ref 80–100)
NRBC # BLD: 0 /100 WBCS — SIGNIFICANT CHANGE UP (ref 0–0)
PHOSPHATE SERPL-MCNC: 2.9 MG/DL — SIGNIFICANT CHANGE UP (ref 2.5–4.5)
PLATELET # BLD AUTO: 337 K/UL — SIGNIFICANT CHANGE UP (ref 150–400)
POTASSIUM SERPL-MCNC: 3.1 MMOL/L — LOW (ref 3.5–5.3)
POTASSIUM SERPL-SCNC: 3.1 MMOL/L — LOW (ref 3.5–5.3)
RBC # BLD: 3.17 M/UL — LOW (ref 3.8–5.2)
RBC # FLD: 17.2 % — HIGH (ref 10.3–14.5)
SODIUM SERPL-SCNC: 135 MMOL/L — SIGNIFICANT CHANGE UP (ref 135–145)
WBC # BLD: 7.42 K/UL — SIGNIFICANT CHANGE UP (ref 3.8–10.5)
WBC # FLD AUTO: 7.42 K/UL — SIGNIFICANT CHANGE UP (ref 3.8–10.5)

## 2021-01-13 PROCEDURE — 99024 POSTOP FOLLOW-UP VISIT: CPT

## 2021-01-13 RX ORDER — POTASSIUM CHLORIDE 20 MEQ
40 PACKET (EA) ORAL
Refills: 0 | Status: COMPLETED | OUTPATIENT
Start: 2021-01-13 | End: 2021-01-13

## 2021-01-13 RX ORDER — MAGNESIUM SULFATE 500 MG/ML
2 VIAL (ML) INJECTION ONCE
Refills: 0 | Status: COMPLETED | OUTPATIENT
Start: 2021-01-13 | End: 2021-01-13

## 2021-01-13 RX ORDER — POTASSIUM CHLORIDE 20 MEQ
40 PACKET (EA) ORAL
Refills: 0 | Status: DISCONTINUED | OUTPATIENT
Start: 2021-01-13 | End: 2021-01-13

## 2021-01-13 RX ADMIN — Medication 50 GRAM(S): at 07:54

## 2021-01-13 RX ADMIN — Medication 500000 UNIT(S): at 11:48

## 2021-01-13 RX ADMIN — Medication 500000 UNIT(S): at 23:51

## 2021-01-13 RX ADMIN — PIPERACILLIN AND TAZOBACTAM 200 GRAM(S): 4; .5 INJECTION, POWDER, LYOPHILIZED, FOR SOLUTION INTRAVENOUS at 23:51

## 2021-01-13 RX ADMIN — Medication 500000 UNIT(S): at 01:04

## 2021-01-13 RX ADMIN — Medication 0.5 MILLIGRAM(S): at 05:18

## 2021-01-13 RX ADMIN — SODIUM CHLORIDE 2 GRAM(S): 9 INJECTION INTRAMUSCULAR; INTRAVENOUS; SUBCUTANEOUS at 05:18

## 2021-01-13 RX ADMIN — FLUDROCORTISONE ACETATE 0.2 MILLIGRAM(S): 0.1 TABLET ORAL at 05:18

## 2021-01-13 RX ADMIN — PANTOPRAZOLE SODIUM 40 MILLIGRAM(S): 20 TABLET, DELAYED RELEASE ORAL at 05:18

## 2021-01-13 RX ADMIN — Medication 3 MILLILITER(S): at 11:11

## 2021-01-13 RX ADMIN — PIPERACILLIN AND TAZOBACTAM 200 GRAM(S): 4; .5 INJECTION, POWDER, LYOPHILIZED, FOR SOLUTION INTRAVENOUS at 01:03

## 2021-01-13 RX ADMIN — Medication 3 MILLILITER(S): at 17:20

## 2021-01-13 RX ADMIN — Medication 40 MILLIEQUIVALENT(S): at 09:31

## 2021-01-13 RX ADMIN — Medication 0.5 MILLIGRAM(S): at 17:20

## 2021-01-13 RX ADMIN — Medication 500000 UNIT(S): at 17:19

## 2021-01-13 RX ADMIN — ENOXAPARIN SODIUM 40 MILLIGRAM(S): 100 INJECTION SUBCUTANEOUS at 22:29

## 2021-01-13 RX ADMIN — LISINOPRIL 5 MILLIGRAM(S): 2.5 TABLET ORAL at 05:18

## 2021-01-13 RX ADMIN — SODIUM CHLORIDE 2 GRAM(S): 9 INJECTION INTRAMUSCULAR; INTRAVENOUS; SUBCUTANEOUS at 22:29

## 2021-01-13 RX ADMIN — CHLORHEXIDINE GLUCONATE 15 MILLILITER(S): 213 SOLUTION TOPICAL at 05:17

## 2021-01-13 RX ADMIN — PANTOPRAZOLE SODIUM 40 MILLIGRAM(S): 20 TABLET, DELAYED RELEASE ORAL at 17:19

## 2021-01-13 RX ADMIN — PIPERACILLIN AND TAZOBACTAM 200 GRAM(S): 4; .5 INJECTION, POWDER, LYOPHILIZED, FOR SOLUTION INTRAVENOUS at 11:48

## 2021-01-13 RX ADMIN — AMLODIPINE BESYLATE 10 MILLIGRAM(S): 2.5 TABLET ORAL at 11:49

## 2021-01-13 RX ADMIN — SODIUM CHLORIDE 2 GRAM(S): 9 INJECTION INTRAMUSCULAR; INTRAVENOUS; SUBCUTANEOUS at 13:22

## 2021-01-13 RX ADMIN — PIPERACILLIN AND TAZOBACTAM 200 GRAM(S): 4; .5 INJECTION, POWDER, LYOPHILIZED, FOR SOLUTION INTRAVENOUS at 05:18

## 2021-01-13 RX ADMIN — ATORVASTATIN CALCIUM 40 MILLIGRAM(S): 80 TABLET, FILM COATED ORAL at 22:29

## 2021-01-13 RX ADMIN — LACOSAMIDE 150 MILLIGRAM(S): 50 TABLET ORAL at 17:20

## 2021-01-13 RX ADMIN — Medication 500000 UNIT(S): at 05:17

## 2021-01-13 RX ADMIN — Medication 81 MILLIGRAM(S): at 11:49

## 2021-01-13 RX ADMIN — CHLORHEXIDINE GLUCONATE 15 MILLILITER(S): 213 SOLUTION TOPICAL at 17:19

## 2021-01-13 RX ADMIN — Medication 3 MILLILITER(S): at 03:51

## 2021-01-13 RX ADMIN — FLUDROCORTISONE ACETATE 0.2 MILLIGRAM(S): 0.1 TABLET ORAL at 17:21

## 2021-01-13 RX ADMIN — LACOSAMIDE 150 MILLIGRAM(S): 50 TABLET ORAL at 05:17

## 2021-01-13 RX ADMIN — Medication 40 MILLIEQUIVALENT(S): at 13:20

## 2021-01-13 RX ADMIN — MONTELUKAST 10 MILLIGRAM(S): 4 TABLET, CHEWABLE ORAL at 11:49

## 2021-01-13 RX ADMIN — PREGABALIN 1000 MICROGRAM(S): 225 CAPSULE ORAL at 11:48

## 2021-01-13 RX ADMIN — Medication 3 MILLILITER(S): at 22:28

## 2021-01-13 RX ADMIN — Medication 325 MILLIGRAM(S): at 11:49

## 2021-01-13 RX ADMIN — PIPERACILLIN AND TAZOBACTAM 200 GRAM(S): 4; .5 INJECTION, POWDER, LYOPHILIZED, FOR SOLUTION INTRAVENOUS at 17:19

## 2021-01-13 NOTE — PROGRESS NOTE ADULT - SUBJECTIVE AND OBJECTIVE BOX
HPI:  76y/o F with PMHx sig for COPD, asthma, HLD, HTN, BIBEMS to [facility] from home after HHA discovered patient found down in floor covered in non-bloody vomitus. Perr HHA Pt went to the bathroom and was taking a long time, went in to check on her and found her sitting on floor, awake, however with vomitus on front of shirt. On arrival to [facility], patient was taken for stat head CT, which revealed diffuse subarachnoid hemorrhage mainly at basilar cisterns with IVH and obstructive hydrocephalus. Patient was given a bolus of 1g keppra and dilaudid pushes for generalized headache. CTA concerning for 3mm left pcomm aneurysm and a 1.6mm outpouching of distal cavernous segment of the left internal carotid artery likely aneurysm. NIHSS2 HH3 MF4. Patient was transferred to [facility] for further intervention. Patient currently reports headaches. Pt denies acute changes in vision, seizures, CP, SOB, weakness/paresthesias of arms or legs. (09 Dec 2020 23:37)      Hospital Course:  12/9: BD1 Patient admitted for SAH HH3, MF4.   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 EVD at 5, on Levo overnight, nimodipine discontinued d/t hypotension  12/12: BD4 POD#2, YUMIKO overnight, neuro stable, passed TOV  12/13: BD5 POD#3: YUMIKO x 24 hrs  12/14: BD6 POD#4. YUMIKO o/n, neuro stable. EVD at 5cm H2O  12/15: BD7 POD #5 YUMIKO overnight, neuro stable. EVD remains at 5. Plan for CTA today.   12/16: BD8 POD #6 YUMIKO overnight, neuro stable, EVD at 0, on Levo, started on 3% at 15 overnight   12/17: BD9 POD#1 s/p cerebral angio for verapamil c/b device malfunction of Proglide, s/p right groin cutdown for removal of proglide catheter and femoral artery repair.  YUMIKO overnight, neuro stable, EVD at 0. patient remained intubated overnight, on propofol for sedation, and vEEG  12/18: BD#10, POD#8 s/p coil/embo of L pcomm aneurysm, POD#2 s/p IA verapamil, POD#2 R groin cutdown for removal of proglide catheter w/ repair of femoral artery. YUMIKO overnight. EVD remains open @0cmH2O   12/19: BD#11, POD#9 s/p coil/embo of L pcomm aneurysm. POD#3 s/p IA verapamil, POD#3 s/p R femoral artery cutdown of proglide catheter w/ femoral artery repair. YUMIKO overnight. EVD remains open @0cmH2O. EEG shows b/l frontal sharp discharges, cont monitoring, vimpat was increased yesterday. Gentle hydration, total IVF 50cc/hr. Cont vanc/zosyn for empiric coverage, next vanc trough due @1pm on 12/19. F/u daily CXR.   12/20 BD#12 POD#10 YUMIKO overnight, Neuro exam stable, EVD @ 0cm H2O, vEEG, 3% @ 15 goal WNL, TF via NGT  12/21: BD13, POD11 YUMIKO overnight. EVD at 5cmH20 (raised yesterday), vEEG in place  12/22 BD#14, EVD raised to 15 yesterday, 3% @ 30cc Goal WNL, -180, Milrinone, TTE prior to DC as per Card for takotsubo cardiomyopathy, failed S&S pending reeval, TF via NGT, Neuro exam stable  12/23: BD#15. YUMIKO o/n, neuro stable. EVD clamped. 30%@30cc/hr  12/24 BD#16 YUMIKO overnight, spiked 102, EVD @ 0cm H2O, 3% @ 30cc/hr Goal WNL, Vasc following, TF via NGT, -200,   12/25: BD#17. YUMIKO overnight. Pan cx from 12/23, NGTD on CSF and blood. EVD clamped. 3% @15cc/hr, rate kept same overnight. NGT feeds at goal. SBP goal 160-200.   12/26: BD#18. YUMIKO overnight, neuro exam stable. NGTD from pan cx on 12/23. EVD removed today. 3% discontinued 12/25. NGT feeds at goal, IVF off. SBP goal 160-200.   12/27: BD#19 YUMIKO overnight, neuro stable. 3% at 15, stepdown status  12/28: BD20 YUMIKO overnight, neuro stable. 3% continues.  Patient became increasingly more somnolent and underwent LP for CSF high volume tap.  Temporary improvement.  Spiked fever, pancultured.  12/29: BD21 Patient increasingly more somnolent, EVD placed at bedside.    12/30: BD22 pt. is s/p R VPS placement, certas @5 POD#1, post op ct head c/a/p done. afebrile o/n.   1/1: BD#23: pt. is s/p R VPS placement, certas @5 POD#2, post op ct head c/a/p done. zosyn for enterobacter   1/2: BD #24. POD#3 s/p R VPS placement, CErtas @5. Dressings removed from head and abdomen. Cont zosyn for enterobacter and e. coli in urine, end date 1/4/21. Post-op imaging completed. Pend S&S re-eval, improvement in mental status/neuro exam.   1/3: BD25, POD4. SBP goals relaxed 120-200. zosyn for enterobacter UTI until 1/4. FOB+, protonix bid started, yumiko o/n  1/4: BD 26, YUMIKO o/n  1/5: BD 27, YUMIKO o/n   1/6: BD 28, POD 7 VPS (Certas @ 5). YUMIKO overnight. Plan for PEG with GI Thursday. Repeat Covid swab negative. Stepdown status.  1/7: BD 29, POD 8. YUMIKO overnight. PEG placed at bedside by GI today. Stepdown status  1/8: BD 30 POD 9, YUMIKO o/n, resume TF 24 hrs after PEG, stepdown   1/9 BD 31. POD 10. No events overnight. s/p PEG. Pending AR vs JOSE  1/10 BD32, POD11. Lethargic but arousable with stimulation, CTH was ordered and demonstrated stable scan in comparison from 1/1. Continue to trend Na, this  improved to 135 on 2%@50/hr and salt tabs started. Pending AR vs JOSE.  1/11: BD#33 POD#12. YUMIKO overnight, peripheral IV x2 inserted. F/u pan cx from 1/10, NGTD. Cont 2% @50cc/hr, f/u Na in am. Dispo pend AR vs JOSE.   1/12: BD#34 POD#13: Afebrile, on 2% at 25cc/hr, Na 137, pending AM labs. Pending JOSE placement. Abx to stop today, fever work-up negative. COVID swab for dispo planning.  1/13: BD35 Neuro stable. Off 2%, Na stable. Started on Zosyn for UTI. Pending dispo to [facility].      Vital Signs Last 24 Hrs  T(C): 36.4 (12 Jan 2021 22:45), Max: 38.1 (12 Jan 2021 07:32)  T(F): 97.6 (12 Jan 2021 22:45), Max: 100.5 (12 Jan 2021 07:32)  HR: 88 (13 Jan 2021 03:43) (86 - 92)  BP: 168/74 (13 Jan 2021 03:43) (148/67 - 168/74)  BP(mean): 107 (13 Jan 2021 03:43) (97 - 107)  RR: 16 (13 Jan 2021 03:43) (16 - 18)  SpO2: 96% (13 Jan 2021 03:43) (96% - 100%)    I&O's Summary    11 Jan 2021 07:01  -  12 Jan 2021 07:00  --------------------------------------------------------  IN: 2385 mL / OUT: 651 mL / NET: 1734 mL    12 Jan 2021 07:01  -  13 Jan 2021 03:58  --------------------------------------------------------  IN: 955 mL / OUT: 750 mL / NET: 205 mL      PHYSICAL EXAM:  General: NAD,  A&O x 1-2   HEENT: PERRL 3mm, EOMI b/l, face symmetric, tongue midline, +hard of hearing. Right scalp incision C/D/I  Cardiovascular: RRR, normal S1 and S2   Respiratory: lungs CTAB, no wheezing, rhonchi, or crackles   GI: normoactive BS to auscultation, abd soft, NTND, +PEG, Right side incision C/D/I   Neuro: speech clear, no dysmetria, no pronator drift, following commands   ZAFAR x4 spontaneously and with good strength  Extremities: distal pulses 2+ throughout      TUBES/LINES:  [] Soriano  [] A-line  [] Lumbar Drain  [] Wound Drains  [] NGT   [] EVD   [] CVC  [] Other      DIET:  [] NPO  [x] Mechanical  [] Tube feeds    LABS:                        8.9    8.91  )-----------( 276      ( 12 Jan 2021 05:47 )             28.6     01-12    136  |  102  |  6<L>  ----------------------------<  162<H>  3.8   |  23  |  0.35<L>    Ca    8.1<L>      12 Jan 2021 05:47  Phos  2.5     01-12  Mg     2.0     01-12              CAPILLARY BLOOD GLUCOSE      POCT Blood Glucose.: 133 mg/dL (12 Jan 2021 12:00)      Drug Levels: [] N/A  Vancomycin Level, Trough: 7.7 ug/mL (01-12 @ 05:47)  Vancomycin Level, Trough: 9.5 ug/mL (01-11 @ 17:22)    CSF Analysis: [] N/A      Allergies    No Known Allergies    Intolerances        Home Medications:  famotidine 20 mg oral tablet: 1 tab(s) orally once a day (10 Dec 2020 00:38)  lisinopril 2.5 mg oral tablet: 1 tab(s) orally once a day (10 Dec 2020 00:38)  montelukast 10 mg oral tablet: 1 tab(s) orally once a day (10 Dec 2020 00:38)  omeprazole 20 mg oral delayed release capsule: 1 cap(s) orally once a day (12 Dec 2020 16:14)  simvastatin 20 mg oral tablet: 1 tab(s) orally once a day (at bedtime) (10 Dec 2020 00:38)      MEDICATIONS:  MEDICATIONS  (STANDING):  albuterol/ipratropium for Nebulization 3 milliLiter(s) Nebulizer every 6 hours  amLODIPine   Tablet 10 milliGRAM(s) Oral every 24 hours  aspirin enteric coated 81 milliGRAM(s) Oral daily  atorvastatin 40 milliGRAM(s) Oral at bedtime  buDESOnide    Inhalation Suspension 0.5 milliGRAM(s) Inhalation every 12 hours  chlorhexidine 0.12% Liquid 15 milliLiter(s) Oral Mucosa two times a day  chlorhexidine 2% Cloths 1 Application(s) Topical <User Schedule>  cyanocobalamin 1000 MICROGram(s) Oral daily  dextrose 40% Gel 15 Gram(s) Oral once  dextrose 50% Injectable 25 Gram(s) IV Push once  enoxaparin Injectable 40 milliGRAM(s) SubCutaneous at bedtime  ferrous    sulfate 325 milliGRAM(s) Oral daily  fludroCORTISONE 0.2 milliGRAM(s) Oral every 12 hours  glucagon  Injectable 1 milliGRAM(s) IntraMuscular once  lacosamide Solution 150 milliGRAM(s) Oral every 12 hours  lisinopril 5 milliGRAM(s) Oral daily  montelukast 10 milliGRAM(s) Oral daily  nystatin    Suspension 914592 Unit(s) Oral four times a day  pantoprazole   Suspension 40 milliGRAM(s) Oral two times a day  piperacillin/tazobactam IVPB.. 3.375 Gram(s) IV Intermittent every 6 hours  sodium chloride 2 Gram(s) Oral every 8 hours    MEDICATIONS  (PRN):  acetaminophen    Suspension .. 650 milliGRAM(s) Oral every 6 hours PRN Temp greater or equal to 38C (100.4F), Mild Pain (1 - 3)  labetalol Injectable 2.5 milliGRAM(s) IV Push every 6 hours PRN Systolic blood pressure > 220      CULTURES:  Culture Results:   No growth to date (01-10 @ 17:24)  Culture Results:   No growth at 2 days. (01-10 @ 10:46)      RADIOLOGY & ADDITIONAL TESTS:      ASSESSMENT:  76 year old Female  with PMHx of COPD, asthma, HLD, HTN, BIBEMS to [facility] from home after HHA found patient down on the floor covered in non-bloody vomitus. CTH reveals diffuse subarachnoid hemorrhage mainly at basilar cisterns with IVH and obstructive hydrocephalus, CTA shows 3mm left pcomm aneurysm, now s/p bedside right frontal EVD placement 12/10, s/p cerebral angiogram for coil embolization of left PCOMM aneurysm 12/10, now   s/p cerebral angio for verapamil c/b device malfunction of Proglide, s/p right groin cutdown for removal of proglide catheter and femoral artery repair (12/17/2020), NIHSS2 HH3 MF4), s/p EVD removal 12/25,  s/p bedside EVD placement 12/29, POD#14 from R VPS certas at 5 and POD#6 s/p PEG.       PLAN:  -neuro/vital checks  -pain control prn  -restarted on home dose ASA 81  -off 2%, continue salt tabs and florinef  -zosyn x 3 days started for UTI  -OOB/PT/OT  -dispo pending JOSE, pending auth  -d/w Dr. Serrano      Assessment: present when checked     [] GCS   E   V   M     Heart Failure: [] Acute, [] acute on chronic, [] chronic   Heart Failure: [] Diastolic (HFpEF), [] Systolic (HRrEF), [] Combined (HFpEF and HFrEF), [] RHF, [] Pulm HTN, [] Other     [] MICHAELLE, [] ATN, [] AIN, [] other   [] CKD1, [] CKD2, [] CKD3, [] CKD4, [] CKD5, [] ESRD     Encephalopathy: [] Metabolic, [] Hepatic, [] Toxic, [] Neurological, [] Other     Abnormal Nutritional Status: [] malnutrition (see nutrition note), []underweight: BMI <19, [] morbid obesity: BMI >40, [] Cachexia     [] Sepsis   [] Hypovolemic shock, [] Cardiogenic shock, [] Hemorrhagic shock, [] Neurogenic shock   [] Acute respiratory failure   [] Cerebral edema, [] Brain compression / herniation   [] Functional quadriplegia   [] Acute blood loss anemia

## 2021-01-13 NOTE — CHART NOTE - NSCHARTNOTEFT_GEN_A_CORE
Admitting Diagnosis:   Patient is a 76y old  Female who presents with a chief complaint of SAH (13 Jan 2021 03:58)      PAST MEDICAL & SURGICAL HISTORY:  Hyperlipidemia    Hypertension    COPD (chronic obstructive pulmonary disease)    Asthma    No significant past surgical history        Current Nutrition Order: ordered for 50mL/hr however running at 45ml/hr  Glucerna 1.2 @ 45mL/hr x 24 hrs, at goal to provide 1080mL TV, 1296kcal, ~65g pro, 869mL free H2O (26kcal/kg IBW, 1.3g pro/ABW)       PO Intake: Good (%) [   ]  Fair (50-75%) [   ] Poor (<25%) [   ] - n/a NPO     GI Issues:   +BM 1/12  No n/v/d/c    Pain: resting comfortably in bed     Skin Integrity:  Greg score 13  PEG  No edema    VPS incision, incision to groin     Labs:   01-13    135  |  97  |  9   ----------------------------<  205<H>  3.1<L>   |  26  |  0.37<L>    Ca    8.7      13 Jan 2021 06:32  Phos  2.9     01-13  Mg     1.8     01-13      CAPILLARY BLOOD GLUCOSE          Medications:  MEDICATIONS  (STANDING):  albuterol/ipratropium for Nebulization 3 milliLiter(s) Nebulizer every 6 hours  amLODIPine   Tablet 10 milliGRAM(s) Oral every 24 hours  aspirin enteric coated 81 milliGRAM(s) Oral daily  atorvastatin 40 milliGRAM(s) Oral at bedtime  buDESOnide    Inhalation Suspension 0.5 milliGRAM(s) Inhalation every 12 hours  chlorhexidine 0.12% Liquid 15 milliLiter(s) Oral Mucosa two times a day  chlorhexidine 2% Cloths 1 Application(s) Topical <User Schedule>  cyanocobalamin 1000 MICROGram(s) Oral daily  dextrose 40% Gel 15 Gram(s) Oral once  dextrose 50% Injectable 25 Gram(s) IV Push once  enoxaparin Injectable 40 milliGRAM(s) SubCutaneous at bedtime  ferrous    sulfate 325 milliGRAM(s) Oral daily  fludroCORTISONE 0.2 milliGRAM(s) Oral every 12 hours  glucagon  Injectable 1 milliGRAM(s) IntraMuscular once  lacosamide Solution 150 milliGRAM(s) Oral every 12 hours  lisinopril 5 milliGRAM(s) Oral daily  montelukast 10 milliGRAM(s) Oral daily  nystatin    Suspension 205853 Unit(s) Oral four times a day  pantoprazole   Suspension 40 milliGRAM(s) Oral two times a day  piperacillin/tazobactam IVPB.. 3.375 Gram(s) IV Intermittent every 6 hours  sodium chloride 2 Gram(s) Oral every 8 hours    MEDICATIONS  (PRN):  acetaminophen    Suspension .. 650 milliGRAM(s) Oral every 6 hours PRN Temp greater or equal to 38C (100.4F), Mild Pain (1 - 3)  labetalol Injectable 2.5 milliGRAM(s) IV Push every 6 hours PRN Systolic blood pressure > 220      Admitted Anthropometrics  Height: 59"" Weight: 110lbs/49.9kg  IBW 98lbs/44.5kg+/-10%, %%, BMI 22.2     Weight Change: No new weights to assess. Please obtain and trend bi-weekly weights for assessment.      Nutrition Focused Physical Exam: Completed [   ]  Not Pertinent [ x  ]    Estimated energy needs:   ABW (49.9kg) used to calculate energy needs due to pt's current body weight within % IBW (112%)  Nutrient needs based on [facility] standards of care for maintenance in older adults. Needs adjusted for post-op, Fluid needs per team.    Energy: 8220-8950 kcal/day (25-30kcal/kg)  Protein: 60-70g pro/day (1.2-1.4g pro/kg)    Subjective:   76 yo Female with PMHx of COPD, asthma, HLD, HTN, [facility] HHA found pt down on the floor covered in non-bloody vomitus. CTH reveals diffuse subarachnoid hemorrhage mainly at basilar cisterns with IVH and obstructive hydrocephalus, CTA shows 3mm left pcomm aneurysm, now s/p bedside right frontal EVD placement 12/10, s/p cerebral angiogram for coil embolization of left PCOMM aneurysm 12/10, now s/p cerebral angio for verapamil c/b device malfunction of Proglide, s/p right groin cutdown for removal of proglide catheter and femoral artery repair 12/17, NIHSS2 HH3 MF4. PT underwent LP for CSF high volume tap 12/28. EVD Re-insertion 12/29 (had been removed 12/25). CSF NGTD 12/29. EVD clamped 12/30. Ventriculoperitoneal shunt placed 12/30. S/p PEG placed at bedside 1/7. Dispo pend AR vs JOSE. Decision made for JOSE, dispo issues in setting of pts insurance coverage. Pt now receiving abx course for UTI.   Pt sleeping, unarousable at time of RD visit. Current TF regimen Glucerna 1.2 @50mL/hr x 24 hrs, running at 45mL/hr at time of visit. Denies pt with n/v/c/d- last BM noted 1/12. Current regimen meets low-end of pt's estimated calorie needs, please see nutrition recommendations below, RD to monitor and f/u per protocol.     Previous Nutrition Diagnosis:  Increased nutrient need RT increased kcal/protein demand AEB post-op    Active [ x  ]  Resolved [   ]    If resolved, new PES:     Goal: Pt to meet >/=75%EER via most appropriate route with good tolerance    Recommendations:  1) Recommend increased current TG regimen to Glucerna 1.2 @ 50mL/hr x 24hrs via PEG at goal provides 1200 mL TV, 1440 kcal, 72g pro, 966mL free water (120%RDI, ~29kcal/kg ABW, ~1.4gm pro/kgABW)   >>continue to monitor BS and ability to switch to Jevity 1.2- consult RD prn   >>start at 20mL/ht and increase as tolerated   >> free water flushes per team   >> maintain aspiration precautions at all times  2) As medically appropriate/mental status, resume diet recommended per SLP  >>if intake increases, assess need to change TF regimen (please consult nutrition as needed)  3) Monitor and replete lytes prn  4) Monitor weights, labs, skin integrity, GI distress  5) Pain and bowel regimens per team  6) RD to remain available for additional nutritional interventions prn     Education: n/a    Risk Level: High [ x  ] Moderate [   ] Low [   ]

## 2021-01-14 LAB
ANION GAP SERPL CALC-SCNC: 11 MMOL/L — SIGNIFICANT CHANGE UP (ref 5–17)
BUN SERPL-MCNC: 10 MG/DL — SIGNIFICANT CHANGE UP (ref 7–23)
CALCIUM SERPL-MCNC: 8.8 MG/DL — SIGNIFICANT CHANGE UP (ref 8.4–10.5)
CHLORIDE SERPL-SCNC: 99 MMOL/L — SIGNIFICANT CHANGE UP (ref 96–108)
CO2 SERPL-SCNC: 28 MMOL/L — SIGNIFICANT CHANGE UP (ref 22–31)
CREAT SERPL-MCNC: 0.36 MG/DL — LOW (ref 0.5–1.3)
GLUCOSE SERPL-MCNC: 182 MG/DL — HIGH (ref 70–99)
HCT VFR BLD CALC: 30.9 % — LOW (ref 34.5–45)
HGB BLD-MCNC: 9.5 G/DL — LOW (ref 11.5–15.5)
MAGNESIUM SERPL-MCNC: 2 MG/DL — SIGNIFICANT CHANGE UP (ref 1.6–2.6)
MCHC RBC-ENTMCNC: 29.1 PG — SIGNIFICANT CHANGE UP (ref 27–34)
MCHC RBC-ENTMCNC: 30.7 GM/DL — LOW (ref 32–36)
MCV RBC AUTO: 94.8 FL — SIGNIFICANT CHANGE UP (ref 80–100)
NRBC # BLD: 0 /100 WBCS — SIGNIFICANT CHANGE UP (ref 0–0)
PHOSPHATE SERPL-MCNC: 2.8 MG/DL — SIGNIFICANT CHANGE UP (ref 2.5–4.5)
PLATELET # BLD AUTO: 372 K/UL — SIGNIFICANT CHANGE UP (ref 150–400)
POTASSIUM SERPL-MCNC: 3.1 MMOL/L — LOW (ref 3.5–5.3)
POTASSIUM SERPL-SCNC: 3.1 MMOL/L — LOW (ref 3.5–5.3)
RBC # BLD: 3.26 M/UL — LOW (ref 3.8–5.2)
RBC # FLD: 17.4 % — HIGH (ref 10.3–14.5)
SODIUM SERPL-SCNC: 138 MMOL/L — SIGNIFICANT CHANGE UP (ref 135–145)
WBC # BLD: 6.34 K/UL — SIGNIFICANT CHANGE UP (ref 3.8–10.5)
WBC # FLD AUTO: 6.34 K/UL — SIGNIFICANT CHANGE UP (ref 3.8–10.5)

## 2021-01-14 PROCEDURE — 99024 POSTOP FOLLOW-UP VISIT: CPT

## 2021-01-14 PROCEDURE — 99232 SBSQ HOSP IP/OBS MODERATE 35: CPT | Mod: GC

## 2021-01-14 PROCEDURE — 71045 X-RAY EXAM CHEST 1 VIEW: CPT | Mod: 26

## 2021-01-14 PROCEDURE — 99223 1ST HOSP IP/OBS HIGH 75: CPT | Mod: GC

## 2021-01-14 RX ORDER — LISINOPRIL 2.5 MG/1
10 TABLET ORAL DAILY
Refills: 0 | Status: DISCONTINUED | OUTPATIENT
Start: 2021-01-14 | End: 2021-01-26

## 2021-01-14 RX ORDER — POTASSIUM CHLORIDE 20 MEQ
20 PACKET (EA) ORAL
Refills: 0 | Status: DISCONTINUED | OUTPATIENT
Start: 2021-01-14 | End: 2021-01-14

## 2021-01-14 RX ORDER — POTASSIUM CHLORIDE 20 MEQ
40 PACKET (EA) ORAL EVERY 4 HOURS
Refills: 0 | Status: COMPLETED | OUTPATIENT
Start: 2021-01-14 | End: 2021-01-14

## 2021-01-14 RX ORDER — POTASSIUM CHLORIDE 20 MEQ
40 PACKET (EA) ORAL EVERY 4 HOURS
Refills: 0 | Status: DISCONTINUED | OUTPATIENT
Start: 2021-01-14 | End: 2021-01-14

## 2021-01-14 RX ADMIN — Medication 500000 UNIT(S): at 05:00

## 2021-01-14 RX ADMIN — PIPERACILLIN AND TAZOBACTAM 200 GRAM(S): 4; .5 INJECTION, POWDER, LYOPHILIZED, FOR SOLUTION INTRAVENOUS at 11:22

## 2021-01-14 RX ADMIN — PIPERACILLIN AND TAZOBACTAM 200 GRAM(S): 4; .5 INJECTION, POWDER, LYOPHILIZED, FOR SOLUTION INTRAVENOUS at 05:00

## 2021-01-14 RX ADMIN — Medication 3 MILLILITER(S): at 18:54

## 2021-01-14 RX ADMIN — Medication 3 MILLILITER(S): at 10:45

## 2021-01-14 RX ADMIN — ENOXAPARIN SODIUM 40 MILLIGRAM(S): 100 INJECTION SUBCUTANEOUS at 22:00

## 2021-01-14 RX ADMIN — PANTOPRAZOLE SODIUM 40 MILLIGRAM(S): 20 TABLET, DELAYED RELEASE ORAL at 18:55

## 2021-01-14 RX ADMIN — CHLORHEXIDINE GLUCONATE 15 MILLILITER(S): 213 SOLUTION TOPICAL at 18:54

## 2021-01-14 RX ADMIN — LACOSAMIDE 150 MILLIGRAM(S): 50 TABLET ORAL at 18:54

## 2021-01-14 RX ADMIN — Medication 500000 UNIT(S): at 22:00

## 2021-01-14 RX ADMIN — Medication 0.5 MILLIGRAM(S): at 18:54

## 2021-01-14 RX ADMIN — LISINOPRIL 5 MILLIGRAM(S): 2.5 TABLET ORAL at 05:00

## 2021-01-14 RX ADMIN — AMLODIPINE BESYLATE 10 MILLIGRAM(S): 2.5 TABLET ORAL at 11:22

## 2021-01-14 RX ADMIN — SODIUM CHLORIDE 2 GRAM(S): 9 INJECTION INTRAMUSCULAR; INTRAVENOUS; SUBCUTANEOUS at 14:50

## 2021-01-14 RX ADMIN — PIPERACILLIN AND TAZOBACTAM 200 GRAM(S): 4; .5 INJECTION, POWDER, LYOPHILIZED, FOR SOLUTION INTRAVENOUS at 18:55

## 2021-01-14 RX ADMIN — Medication 325 MILLIGRAM(S): at 11:22

## 2021-01-14 RX ADMIN — PANTOPRAZOLE SODIUM 40 MILLIGRAM(S): 20 TABLET, DELAYED RELEASE ORAL at 05:00

## 2021-01-14 RX ADMIN — CHLORHEXIDINE GLUCONATE 15 MILLILITER(S): 213 SOLUTION TOPICAL at 05:00

## 2021-01-14 RX ADMIN — Medication 81 MILLIGRAM(S): at 11:22

## 2021-01-14 RX ADMIN — ATORVASTATIN CALCIUM 40 MILLIGRAM(S): 80 TABLET, FILM COATED ORAL at 22:00

## 2021-01-14 RX ADMIN — SODIUM CHLORIDE 2 GRAM(S): 9 INJECTION INTRAMUSCULAR; INTRAVENOUS; SUBCUTANEOUS at 05:00

## 2021-01-14 RX ADMIN — PREGABALIN 1000 MICROGRAM(S): 225 CAPSULE ORAL at 11:22

## 2021-01-14 RX ADMIN — Medication 40 MILLIEQUIVALENT(S): at 14:50

## 2021-01-14 RX ADMIN — Medication 500000 UNIT(S): at 18:54

## 2021-01-14 RX ADMIN — LACOSAMIDE 150 MILLIGRAM(S): 50 TABLET ORAL at 05:05

## 2021-01-14 RX ADMIN — MONTELUKAST 10 MILLIGRAM(S): 4 TABLET, CHEWABLE ORAL at 11:22

## 2021-01-14 RX ADMIN — FLUDROCORTISONE ACETATE 0.2 MILLIGRAM(S): 0.1 TABLET ORAL at 05:00

## 2021-01-14 RX ADMIN — FLUDROCORTISONE ACETATE 0.2 MILLIGRAM(S): 0.1 TABLET ORAL at 18:54

## 2021-01-14 RX ADMIN — Medication 3 MILLILITER(S): at 22:00

## 2021-01-14 RX ADMIN — Medication 500000 UNIT(S): at 11:22

## 2021-01-14 RX ADMIN — Medication 40 MILLIEQUIVALENT(S): at 10:45

## 2021-01-14 RX ADMIN — Medication 3 MILLILITER(S): at 03:48

## 2021-01-14 RX ADMIN — SODIUM CHLORIDE 2 GRAM(S): 9 INJECTION INTRAMUSCULAR; INTRAVENOUS; SUBCUTANEOUS at 22:00

## 2021-01-14 RX ADMIN — Medication 0.5 MILLIGRAM(S): at 05:00

## 2021-01-14 NOTE — PROGRESS NOTE ADULT - SUBJECTIVE AND OBJECTIVE BOX
HPI:  76y/o F with PMHx sig for COPD, asthma, HLD, HTN, BIBEMS to [facility] from home after HHA discovered patient found down in floor covered in non-bloody vomitus. Perr HHA Pt went to the bathroom and was taking a long time, went in to check on her and found her sitting on floor, awake, however with vomitus on front of shirt. On arrival to [facility], patient was taken for stat head CT, which revealed diffuse subarachnoid hemorrhage mainly at basilar cisterns with IVH and obstructive hydrocephalus. Patient was given a bolus of 1g keppra and dilaudid pushes for generalized headache. CTA concerning for 3mm left pcomm aneurysm and a 1.6mm outpouching of distal cavernous segment of the left internal carotid artery likely aneurysm. NIHSS2 HH3 MF4. Patient was transferred to [facility] for further intervention. Patient currently reports headaches. Pt denies acute changes in vision, seizures, CP, SOB, weakness/paresthesias of arms or legs. (09 Dec 2020 23:37)      Hospital Course:  12/9: BD1 Patient admitted for SAH HH3, MF4.   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 EVD at 5, on Levo overnight, nimodipine discontinued d/t hypotension  12/12: BD4 POD#2, YUMIKO overnight, neuro stable, passed TOV  12/13: BD5 POD#3: YUMIKO x 24 hrs  12/14: BD6 POD#4. YUMIKO o/n, neuro stable. EVD at 5cm H2O  12/15: BD7 POD #5 YUMIKO overnight, neuro stable. EVD remains at 5. Plan for CTA today.   12/16: BD8 POD #6 YUMIKO overnight, neuro stable, EVD at 0, on Levo, started on 3% at 15 overnight   12/17: BD9 POD#1 s/p cerebral angio for verapamil c/b device malfunction of Proglide, s/p right groin cutdown for removal of proglide catheter and femoral artery repair.  YUMIKO overnight, neuro stable, EVD at 0. patient remained intubated overnight, on propofol for sedation, and vEEG  12/18: BD#10, POD#8 s/p coil/embo of L pcomm aneurysm, POD#2 s/p IA verapamil, POD#2 R groin cutdown for removal of proglide catheter w/ repair of femoral artery. YUMIKO overnight. EVD remains open @0cmH2O   12/19: BD#11, POD#9 s/p coil/embo of L pcomm aneurysm. POD#3 s/p IA verapamil, POD#3 s/p R femoral artery cutdown of proglide catheter w/ femoral artery repair. YUMIKO overnight. EVD remains open @0cmH2O. EEG shows b/l frontal sharp discharges, cont monitoring, vimpat was increased yesterday. Gentle hydration, total IVF 50cc/hr. Cont vanc/zosyn for empiric coverage, next vanc trough due @1pm on 12/19. F/u daily CXR.   12/20 BD#12 POD#10 YUMIKO overnight, Neuro exam stable, EVD @ 0cm H2O, vEEG, 3% @ 15 goal WNL, TF via NGT  12/21: BD13, POD11 YUMIKO overnight. EVD at 5cmH20 (raised yesterday), vEEG in place  12/22 BD#14, EVD raised to 15 yesterday, 3% @ 30cc Goal WNL, -180, Milrinone, TTE prior to DC as per Card for takotsubo cardiomyopathy, failed S&S pending reeval, TF via NGT, Neuro exam stable  12/23: BD#15. YUMIKO o/n, neuro stable. EVD clamped. 30%@30cc/hr  12/24 BD#16 YUMIKO overnight, spiked 102, EVD @ 0cm H2O, 3% @ 30cc/hr Goal WNL, Vasc following, TF via NGT, -200,   12/25: BD#17. YUMIKO overnight. Pan cx from 12/23, NGTD on CSF and blood. EVD clamped. 3% @15cc/hr, rate kept same overnight. NGT feeds at goal. SBP goal 160-200.   12/26: BD#18. YUMIKO overnight, neuro exam stable. NGTD from pan cx on 12/23. EVD removed today. 3% discontinued 12/25. NGT feeds at goal, IVF off. SBP goal 160-200.   12/27: BD#19 YUMIKO overnight, neuro stable. 3% at 15, stepdown status  12/28: BD20 YUMIKO overnight, neuro stable. 3% continues.  Patient became increasingly more somnolent and underwent LP for CSF high volume tap.  Temporary improvement.  Spiked fever, pancultured.  12/29: BD21 Patient increasingly more somnolent, EVD placed at bedside.    12/30: BD22 pt. is s/p R VPS placement, certas @5 POD#1, post op ct head c/a/p done. afebrile o/n.   1/1: BD#23: pt. is s/p R VPS placement, certas @5 POD#2, post op ct head c/a/p done. zosyn for enterobacter   1/2: BD #24. POD#3 s/p R VPS placement, CErtas @5. Dressings removed from head and abdomen. Cont zosyn for enterobacter and e. coli in urine, end date 1/4/21. Post-op imaging completed. Pend S&S re-eval, improvement in mental status/neuro exam.   1/3: BD25, POD4. SBP goals relaxed 120-200. zosyn for enterobacter UTI until 1/4. FOB+, protonix bid started, yumiko o/n  1/4: BD 26, YUMIKO o/n  1/5: BD 27, YUMIKO o/n   1/6: BD 28, POD 7 VPS (Certas @ 5). YUMIKO overnight. Plan for PEG with GI Thursday. Repeat Covid swab negative. Stepdown status.  1/7: BD 29, POD 8. YUMIKO overnight. PEG placed at bedside by GI today. Stepdown status  1/8: BD 30 POD 9, YUMIKO o/n, resume TF 24 hrs after PEG, stepdown   1/9 BD 31. POD 10. No events overnight. s/p PEG. Pending AR vs JOSE  1/10 BD32, POD11. Lethargic but arousable with stimulation, CTH was ordered and demonstrated stable scan in comparison from 1/1. Continue to trend Na, this  improved to 135 on 2%@50/hr and salt tabs started. Pending AR vs JOSE.  1/11: BD#33 POD#12. YUMIKO overnight, peripheral IV x2 inserted. F/u pan cx from 1/10, NGTD. Cont 2% @50cc/hr, f/u Na in am. Dispo pend AR vs JOSE.   1/12: BD#34 POD#13: Afebrile, on 2% at 25cc/hr, Na 137, pending AM labs. Pending JOSE placement. Abx to stop today, fever work-up negative. COVID swab for dispo planning.  1/13: BD35 Neuro stable. Off 2%, Na stable. Started on Zosyn for UTI. Pending dispo to JOSE.  1/14 POD#15 YUMIKO overnight, Neuro exam stable, staples DC prior to discharge, TF via PEG, pending JOSE placement, Zosyn UTI til 1/15      ICU Vital Signs Last 24 Hrs  T(C): 37.2 (13 Jan 2021 23:42), Max: 37.2 (13 Jan 2021 23:42)  T(F): 98.9 (13 Jan 2021 23:42), Max: 98.9 (13 Jan 2021 23:42)  HR: 94 (13 Jan 2021 23:42) (78 - 94)  BP: 163/75 (13 Jan 2021 23:42) (122/56 - 168/74)  BP(mean): 108 (13 Jan 2021 23:42) (81 - 108)  ABP: --  ABP(mean): --  RR: 18 (13 Jan 2021 23:42) (16 - 18)  SpO2: 95% (13 Jan 2021 23:42) (93% - 97%)      PHYSICAL EXAM:  General: NAD,  A&O x 1-2   HEENT: PERRL 3mm, EOMI b/l, face symmetric, tongue midline, +hard of hearing. Right scalp incision C/D/I  Cardiovascular: RRR, normal S1 and S2   Respiratory: lungs CTAB, no wheezing, rhonchi, or crackles   GI: normoactive BS to auscultation, abd soft, NTND, +PEG, Right side incision C/D/I   Neuro: speech clear, no dysmetria, no pronator drift, following commands   ZAFAR x4 spontaneously and with good strength  Extremities: distal pulses 2+ throughout      TUBES/LINES:  [] Soriano  [] A-line  [] Lumbar Drain  [] Wound Drains  [] NGT   [] EVD   [] CVC  [] Other      DIET:  [] NPO  [] Mechanical  [x] Tube feeds    LABS:                        Pending AM Collection              CAPILLARY BLOOD GLUCOSE      POCT Blood Glucose.: 133 mg/dL (12 Jan 2021 12:00)      Drug Levels: [] N/A  Vancomycin Level, Trough: 7.7 ug/mL (01-12 @ 05:47)  Vancomycin Level, Trough: 9.5 ug/mL (01-11 @ 17:22)    CSF Analysis: [] N/A      Allergies    No Known Allergies    Intolerances        Home Medications:  famotidine 20 mg oral tablet: 1 tab(s) orally once a day (10 Dec 2020 00:38)  lisinopril 2.5 mg oral tablet: 1 tab(s) orally once a day (10 Dec 2020 00:38)  montelukast 10 mg oral tablet: 1 tab(s) orally once a day (10 Dec 2020 00:38)  omeprazole 20 mg oral delayed release capsule: 1 cap(s) orally once a day (12 Dec 2020 16:14)  simvastatin 20 mg oral tablet: 1 tab(s) orally once a day (at bedtime) (10 Dec 2020 00:38)      MEDICATIONS:  MEDICATIONS  (STANDING):  albuterol/ipratropium for Nebulization 3 milliLiter(s) Nebulizer every 6 hours  amLODIPine   Tablet 10 milliGRAM(s) Oral every 24 hours  aspirin enteric coated 81 milliGRAM(s) Oral daily  atorvastatin 40 milliGRAM(s) Oral at bedtime  buDESOnide    Inhalation Suspension 0.5 milliGRAM(s) Inhalation every 12 hours  chlorhexidine 0.12% Liquid 15 milliLiter(s) Oral Mucosa two times a day  chlorhexidine 2% Cloths 1 Application(s) Topical <User Schedule>  cyanocobalamin 1000 MICROGram(s) Oral daily  dextrose 40% Gel 15 Gram(s) Oral once  dextrose 50% Injectable 25 Gram(s) IV Push once  enoxaparin Injectable 40 milliGRAM(s) SubCutaneous at bedtime  ferrous    sulfate 325 milliGRAM(s) Oral daily  fludroCORTISONE 0.2 milliGRAM(s) Oral every 12 hours  glucagon  Injectable 1 milliGRAM(s) IntraMuscular once  lacosamide Solution 150 milliGRAM(s) Oral every 12 hours  lisinopril 5 milliGRAM(s) Oral daily  montelukast 10 milliGRAM(s) Oral daily  nystatin    Suspension 439835 Unit(s) Oral four times a day  pantoprazole   Suspension 40 milliGRAM(s) Oral two times a day  piperacillin/tazobactam IVPB.. 3.375 Gram(s) IV Intermittent every 6 hours  sodium chloride 2 Gram(s) Oral every 8 hours    MEDICATIONS  (PRN):  acetaminophen    Suspension .. 650 milliGRAM(s) Oral every 6 hours PRN Temp greater or equal to 38C (100.4F), Mild Pain (1 - 3)  labetalol Injectable 2.5 milliGRAM(s) IV Push every 6 hours PRN Systolic blood pressure > 220      CULTURES:  Culture Results:   No growth to date (01-10 @ 17:24)  Culture Results:   No growth at 2 days. (01-10 @ 10:46)      RADIOLOGY & ADDITIONAL TESTS:      ASSESSMENT:  76 year old Female  with PMHx of COPD, asthma, HLD, HTN, BIBEMS to [facility] from home after HHA found patient down on the floor covered in non-bloody vomitus. CTH reveals diffuse subarachnoid hemorrhage mainly at basilar cisterns with IVH and obstructive hydrocephalus, CTA shows 3mm left pcomm aneurysm, now s/p bedside right frontal EVD placement 12/10, s/p cerebral angiogram for coil embolization of left PCOMM aneurysm 12/10, now   s/p cerebral angio for verapamil c/b device malfunction of Proglide, s/p right groin cutdown for removal of proglide catheter and femoral artery repair (12/17/2020), NIHSS2 HH3 MF4), s/p EVD removal 12/25,  s/p bedside EVD placement 12/29, POD#15 from R VPS certas at 5 and  s/p PEG.       PLAN:  -neuro/vital checks  -pain control prn  -restarted on home dose ASA 81  -off 2%, continue salt tabs and florinef  -zosyn x 3 days started for UTI  -OOB/PT/OT  -dispo pending JOSE, pending auth  -d/w Dr. Serrano      Assessment: present when checked     [] GCS   E   V   M     Heart Failure: [] Acute, [] acute on chronic, [] chronic   Heart Failure: [] Diastolic (HFpEF), [] Systolic (HRrEF), [] Combined (HFpEF and HFrEF), [] RHF, [] Pulm HTN, [] Other     [] MICHAELLE, [] ATN, [] AIN, [] other   [] CKD1, [] CKD2, [] CKD3, [] CKD4, [] CKD5, [] ESRD     Encephalopathy: [] Metabolic, [] Hepatic, [] Toxic, [] Neurological, [] Other     Abnormal Nutritional Status: [] malnutrition (see nutrition note), []underweight: BMI <19, [] morbid obesity: BMI >40, [] Cachexia     [] Sepsis   [] Hypovolemic shock, [] Cardiogenic shock, [] Hemorrhagic shock, [] Neurogenic shock   [] Acute respiratory failure   [] Cerebral edema, [] Brain compression / herniation   [] Functional quadriplegia   [] Acute blood loss anemia

## 2021-01-14 NOTE — CONSULT NOTE ADULT - SUBJECTIVE AND OBJECTIVE BOX
Consultation Requested by:    Patient is a 76y old  Female who presents with a chief complaint of SAH (14 Jan 2021 02:43)    HPI:  74y/o F with PMHx sig for COPD, asthma, HLD, HTN, BIBEMS to [facility] from home after HHA discovered patient found down in floor covered in non-bloody vomitus. Perr HHA Pt went to the bathroom and was taking a long time, went in to check on her and found her sitting on floor, awake, however with vomitus on front of shirt. On arrival to [facility], patient was taken for stat head CT, which revealed diffuse subarachnoid hemorrhage mainly at basilar cisterns with IVH and obstructive hydrocephalus. Patient was given a bolus of 1g keppra and dilaudid pushes for generalized headache. CTA concerning for 3mm left pcomm aneurysm and a 1.6mm outpouching of distal cavernous segment of the left internal carotid artery likely aneurysm. NIHSS2 HH3 MF4. Patient was transferred to [facility] for further intervention. Patient currently reports headaches. Pt denies acute changes in vision, seizures, CP, SOB, weakness/paresthesias of arms or legs. (09 Dec 2020 23:37)    ID HPI:  76year old female with COPD, asthma, HTN/HLD, BIBEMS to [facility] after being found down on floor covered in non bloody emesis. She was found to have diffuse SAH with obstructive hydrocephalus and transferred to [facility] 12/9 for surgical intervention. She received cerebral angiogram and embolization on 12/10 and she is currently also POD#15 from VPS and is s/p PEG. Patient has been intermittently on Zosyn for enterobacter claocae & e coli UTI (S to Zosyn). However, she has recurrent fevers starting 1/10 and work up at that time revealed persistence of Ucx but negative Bc and CSF studies from LP. She has not had any localizing symptoms since. Nursing states that she has a bit of phlegm production and on 1/10 it seems like her CXR did show some chronic interstitial changes but also patchy lung infiltrates that could not be excluded. Otherwise, patient has not had abdominal pain, SOB, N/V/D. No signs of surgical site infection.       REVIEW OF SYSTEMS  All review of systems negative, except for those marked:  General:		[] Abnormal:  	[] Night Sweats		[] Fever		[] Weight Loss  Pulmonary/Cough:	[] Abnormal:  Cardiac/Chest Pain:	[] Abnormal:  Gastrointestinal:   	[] Abnormal:  Eyes:			        [] Abnormal:  ENT:		         	[] Abnormal:  Dysuria:		                [] Abnormal:  Musculoskeletal	:	[] Abnormal:  Endocrine:		        [] Abnormal:  Lymph Nodes:		[] Abnormal:  Headache:		        [] Abnormal:  Skin:			        [] Abnormal:  Allergy/Immune:       	[] Abnormal:  Psychiatric:		        [] Abnormal:  [] All other review of systems negative  [] Unable to obtain (explain):    Recent Ill Contacts:	[] No	[] Yes:  Recent Travel History:	[] No	[] Yes:  Recent Animal/Insect Exposure/Tick Bites:	[] No	[] Yes:    Allergies    No Known Allergies    Intolerances      Antimicrobials:  nystatin    Suspension 116348 Unit(s) Oral four times a day  piperacillin/tazobactam IVPB.. 3.375 Gram(s) IV Intermittent every 6 hours      Other Medications:  acetaminophen    Suspension .. 650 milliGRAM(s) Oral every 6 hours PRN  albuterol/ipratropium for Nebulization 3 milliLiter(s) Nebulizer every 6 hours  amLODIPine   Tablet 10 milliGRAM(s) Oral every 24 hours  aspirin enteric coated 81 milliGRAM(s) Oral daily  atorvastatin 40 milliGRAM(s) Oral at bedtime  buDESOnide    Inhalation Suspension 0.5 milliGRAM(s) Inhalation every 12 hours  chlorhexidine 0.12% Liquid 15 milliLiter(s) Oral Mucosa two times a day  chlorhexidine 2% Cloths 1 Application(s) Topical <User Schedule>  cyanocobalamin 1000 MICROGram(s) Oral daily  dextrose 40% Gel 15 Gram(s) Oral once  dextrose 50% Injectable 25 Gram(s) IV Push once  enoxaparin Injectable 40 milliGRAM(s) SubCutaneous at bedtime  ferrous    sulfate 325 milliGRAM(s) Oral daily  fludroCORTISONE 0.2 milliGRAM(s) Oral every 12 hours  glucagon  Injectable 1 milliGRAM(s) IntraMuscular once  labetalol Injectable 2.5 milliGRAM(s) IV Push every 6 hours PRN  lacosamide Solution 150 milliGRAM(s) Oral every 12 hours  lisinopril 5 milliGRAM(s) Oral daily  montelukast 10 milliGRAM(s) Oral daily  pantoprazole   Suspension 40 milliGRAM(s) Oral two times a day  potassium chloride   Solution 40 milliEquivalent(s) Oral every 4 hours  sodium chloride 2 Gram(s) Oral every 8 hours      FAMILY HISTORY:    PAST MEDICAL & SURGICAL HISTORY:  Hyperlipidemia    Hypertension    COPD (chronic obstructive pulmonary disease)    Asthma    No significant past surgical history      SOCIAL HISTORY:    IMMUNIZATIONS  [] Up to Date		[] Not Up to Date:  Recent Immunizations:	[] No	[] Yes:    Daily     Daily   Head Circumference:  Vital Signs Last 24 Hrs  T(C): 36.4 (14 Jan 2021 09:02), Max: 37.2 (13 Jan 2021 23:42)  T(F): 97.5 (14 Jan 2021 09:02), Max: 98.9 (13 Jan 2021 23:42)  HR: 92 (14 Jan 2021 09:12) (78 - 94)  BP: 175/82 (14 Jan 2021 09:12) (122/56 - 175/82)  BP(mean): 99 (14 Jan 2021 03:52) (81 - 108)  RR: 17 (14 Jan 2021 09:12) (17 - 18)  SpO2: 96% (14 Jan 2021 09:12) (93% - 97%)    PHYSICAL EXAM    .  VITAL SIGNS:  T(C): 36.4 (01-14-21 @ 09:02), Max: 37.2 (01-13-21 @ 23:42)  T(F): 97.5 (01-14-21 @ 09:02), Max: 98.9 (01-13-21 @ 23:42)  HR: 92 (01-14-21 @ 09:12) (78 - 94)  BP: 175/82 (01-14-21 @ 09:12) (122/56 - 175/82)  BP(mean): 99 (01-14-21 @ 03:52) (81 - 108)  RR: 17 (01-14-21 @ 09:12) (17 - 18)  SpO2: 96% (01-14-21 @ 09:12) (93% - 97%)  Wt(kg): --    PHYSICAL EXAM:    Constitutional: NAD  Head: incision sites c/d/i  Eyes: PERRL, EOMI, anicteric sclera  HENT: MMM  Neck: supple; no JVD or thyromegaly  Respiratory: CTA B/L; no W/R/R, no retractions  Cardiac: +S1/S2; RRR; no M/R/G; PMI non-displaced  Gastrointestinal: abdomen soft, non distended, +PEG site c/d/i no erythema   Back: spine midline, no bony tenderness or step-offs; no CVAT B/L  Extremities: WWP, no clubbing or cyanosis; no peripheral edema  Musculoskeletal: NROM x4; no joint swelling, tenderness or erythema  Vascular: 2+ radial, femoral, DP/PT pulses B/L  Dermatologic: skin warm, dry and intact; no rashes, wounds, or scars  Neurologic: AAOx1, not following commands today   Psychiatric: affect and characteristics of appearance, verbalizations, behaviors are appropriate        General: NAD,  A&O x 1-2   HEENT: PERRL 3mm, EOMI b/l, face symmetric, tongue midline, +hard of hearing. Right scalp incision C/D/I  Cardiovascular: RRR, normal S1 and S2   Respiratory: lungs CTAB, no wheezing, rhonchi, or crackles   GI: normoactive BS to auscultation, abd soft, NTND, +PEG, Right side incision C/D/I   Neuro: speech clear, no dysmetria, no pronator drift, following commands   ZAFAR x4 spontaneously and with good strength  Extremities: distal pulses 2+ throughout        Lab Results:                        9.5    6.34  )-----------( 372      ( 14 Jan 2021 05:44 )             30.9     01-14    138  |  99  |  10  ----------------------------<  182<H>  3.1<L>   |  28  |  0.36<L>    Ca    8.8      14 Jan 2021 05:44  Phos  2.8     01-14  Mg     2.0     01-14              MICROBIOLOGY  [] Pathology slides reviewed and/or discussed with pathologist  [] Microbiology findings discussed with microbiologist or slides reviewed  [] Images erviewed with radiologist  [] Case discussed with an attending physician in addition to the patient's primary physician  [] Records, reports from outside [facility] reviewed    [] Patient requires continued monitoring for:  [] Total time spent by attending physician: __ minutes, excluding procedure time.

## 2021-01-14 NOTE — CONSULT NOTE ADULT - ATTENDING COMMENTS
I saw and evaluated the patient on the above date of service and discussed the care with the resident. I agree with the findings and plan as documented in the note above except for the followinF h/o COPD initially p/w SDH/hydrocephalus on , s/p embolization on 12/10.  She underwent VPS placement on  and eventually PEG as well.  Her mental status has been waxing and waning, but has been stable these days.  She had fever on and was found to have UTI w enterobacter and E.coli, was treated with zosyn.  Then she had another febrile episode on 1/10 and was pan-cutlured.  BCx neg, LP and CSF culture neg, COVID neg, CXR showed interstitial markings - cannot r/o infiltrate. UCx grew E. faecalis and E.coli.  Procal 0.31.  NCHCT unrevealing. She was initially started on vanc/cefepime on 1/10, then switched to zosyn.  Repeat CXR showed no infiltrate.  The cause of fever possibly due to aspiration pneumonitis, which is now resolved.  Stop antibiotics, and monitor clinically.        Team 1 will continue to follow you.    Velma Gutierrez MD, MS  Infectious disease attending  Work cell [phone] I saw and evaluated the patient on the above date of service and discussed the care with the resident. I agree with the findings and plan as documented in the note above except for the followinF h/o COPD initially p/w SDH/hydrocephalus on , s/p embolization on 12/10.  She underwent VPS placement on  and eventually PEG as well.  Her mental status has been waxing and waning, but has been stable these days.  She had fever on and was found to have UTI w enterobacter and E.coli, was treated with zosyn.  Then she had another febrile episode on 1/10 and was pan-cutlured.  BCx neg, LP and CSF culture neg, COVID neg, CXR showed interstitial markings - cannot r/o infiltrate. UCx grew E. faecalis and E.coli.  Procal 0.31.  NCHCT unrevealing. She was initially started on vanc/cefepime on 1/10, then switched to zosyn.  Repeat CXR showed no infiltrate.  The cause of fever possibly due to aspiration pneumonitis, which is now resolved, or recurrent UTI.  She already got 5 days of antibiotics which would had treated her UTI.  Stop antibiotics, and monitor clinically.        Team 1 will continue to follow you.    Velma Gutierrez MD, MS  Infectious disease attending  Work cell [phone]

## 2021-01-14 NOTE — CONSULT NOTE ADULT - CONSULT REQUESTED DATE/TIME
04-Jan-2021 17:00
10-Dec-2020 06:34
05-Jan-2021 13:49
05-Jan-2021 15:40
05-Jan-2021 17:00
14-Jan-2021 09:51
29-Dec-2020 06:59

## 2021-01-14 NOTE — PROGRESS NOTE ADULT - ATTENDING COMMENTS
Patient seen and examined with house-staff during bedside rounds.  Resident note read, including vitals, physical findings, laboratory data, and radiological reports.   Revisions included below.  Direct personal management at bed side and extensive interpretation of the data.  Plan was outlined and discussed in details with the housestaff.  Decision making of high complexity  Action taken for acute disease activity to reflect the level of care provided:  - medication reconciliation  - review laboratory data  cultures negative off anttibiotics

## 2021-01-14 NOTE — PROGRESS NOTE ADULT - SUBJECTIVE AND OBJECTIVE BOX
Interval Events: Reviewed  Patient seen and examined at bedside.    Patient is a 76y old  Female who presents with a chief complaint of SAH (14 Jan 2021 09:50)  not in distress and no cough.    PAST MEDICAL & SURGICAL HISTORY:  Hyperlipidemia    Hypertension    COPD (chronic obstructive pulmonary disease)    Asthma    No significant past surgical history        MEDICATIONS:  Pulmonary:  albuterol/ipratropium for Nebulization 3 milliLiter(s) Nebulizer every 6 hours  buDESOnide    Inhalation Suspension 0.5 milliGRAM(s) Inhalation every 12 hours  montelukast 10 milliGRAM(s) Oral daily    Antimicrobials:  nystatin    Suspension 387383 Unit(s) Oral four times a day    Anticoagulants:  aspirin enteric coated 81 milliGRAM(s) Oral daily  enoxaparin Injectable 40 milliGRAM(s) SubCutaneous at bedtime    Cardiac:  amLODIPine   Tablet 10 milliGRAM(s) Oral every 24 hours  labetalol Injectable 2.5 milliGRAM(s) IV Push every 6 hours PRN  lisinopril 10 milliGRAM(s) Oral daily      Allergies    No Known Allergies    Intolerances        Vital Signs Last 24 Hrs  T(C): 36.8 (14 Jan 2021 22:37), Max: 37.2 (13 Jan 2021 23:42)  T(F): 98.2 (14 Jan 2021 22:37), Max: 98.9 (13 Jan 2021 23:42)  HR: 96 (14 Jan 2021 20:27) (92 - 96)  BP: 176/98 (14 Jan 2021 20:27) (156/70 - 176/98)  BP(mean): 132 (14 Jan 2021 20:27) (99 - 132)  RR: 18 (14 Jan 2021 20:27) (17 - 18)  SpO2: 94% (14 Jan 2021 20:27) (94% - 97%)    01-13 @ 07:01  -  01-14 @ 07:00  --------------------------------------------------------  IN: 1500 mL / OUT: 1450 mL / NET: 50 mL    01-14 @ 07:01 - 01-14 @ 22:55  --------------------------------------------------------  IN: 800 mL / OUT: 750 mL / NET: 50 mL          Review of Systems:   •	General: negative  •	Skin/Breast: negative  •	Ophthalmologic: negative  •	ENMT: negative  •	Respiratory and Thorax: negative  •	Cardiovascular: negative  •	Gastrointestinal: negative  •	Genitourinary: negative  •	Musculoskeletal: negative  •	Neurological: negative  •	Psychiatric: negative  •	Hematology/Lymphatics: negative  •	Endocrine: negative  •	Allergic/Immunologic: negative    Physical Exam:   • Constitutional:	Well-developed, well nourished  • Eyes:	EOMI; PERRL; no drainage or redness  • ENMT:	No oral lesions; no gross abnormalities  • Neck	No bruits; no thyromegaly or nodules  • Breasts:	not examined  • Back:	No deformity or limitation of movement  • Respiratory:	Breath Sounds equal & clear to percussion & auscultation, no accessory muscle use  • Cardiovascular:	Regular rate & rhythm, normal S1, S2; no murmurs, gallops or rubs; no S3, S4  • Gastrointestinal:	Soft, non-tender, no hepatosplenomegaly, normal bowel sounds  • Genitourinary:	not examined  • Rectal: not examined  • Extremities:	No cyanosis, clubbing or edema  • Vascular:	Equal and normal pulses (carotid, femoral, dorsalis pedis)  • Neurologica:l	not examined  • Skin:	No lesions; no rash  • Lymph Nodes:	No lymphadedenopathy  • Musculoskeletal:	No joint pain, swelling or deformity; no limitation of movement        LABS:      CBC Full  -  ( 14 Jan 2021 05:44 )  WBC Count : 6.34 K/uL  RBC Count : 3.26 M/uL  Hemoglobin : 9.5 g/dL  Hematocrit : 30.9 %  Platelet Count - Automated : 372 K/uL  Mean Cell Volume : 94.8 fl  Mean Cell Hemoglobin : 29.1 pg  Mean Cell Hemoglobin Concentration : 30.7 gm/dL  Auto Neutrophil # : x  Auto Lymphocyte # : x  Auto Monocyte # : x  Auto Eosinophil # : x  Auto Basophil # : x  Auto Neutrophil % : x  Auto Lymphocyte % : x  Auto Monocyte % : x  Auto Eosinophil % : x  Auto Basophil % : x    01-14    138  |  99  |  10  ----------------------------<  182<H>  3.1<L>   |  28  |  0.36<L>    Ca    8.8      14 Jan 2021 05:44  Phos  2.8     01-14  Mg     2.0     01-14        < from: Xray Chest 1 View- PORTABLE-Urgent (Xray Chest 1 View- PORTABLE-Urgent .) (01.14.21 @ 10:18) >  EXAM:  XR CHEST PORTABLE URGENT 1V                          PROCEDURE DATE:  01/14/2021          INTERPRETATION:  Clinical History: Shortness of breath    Frontal examination of the chest demonstrates the heart to be within normal limits in transverse diameter. No acute infiltrates. Mild chronic interstitial changes. Right-sided ventricular peritoneal shunt catheter noted. PEG tube overlying left upper abdomen.    IMPRESSION: No acute infiltrates    < end of copied text >                    RADIOLOGY & ADDITIONAL STUDIES (The following images were personally reviewed):  Soriano:                                     No  Urine output:                       adequate  DVT prophylaxis:                 Yes  Flattus:                                  Yes  Bowel movement:              No

## 2021-01-14 NOTE — CONSULT NOTE ADULT - ASSESSMENT
77yo F with COPD, asthma, HTN/HLD, BIBEMS to [facility] after being found down on floor covered in non bloody emesis. Transferred and admitted on 12/9/20 for diffuse SAH with obstructive hydrocephalus, s/p cerebral angiogram and embolization on 12/10, POD#15 from [facility], s/p PEG. Patient has been intermittently on Zosyn for enterobacter claocae & e coli UTI (S to Zosyn). However, recurrent fever starting 1/10. ID consulted for fevers.     Recommendations:  - Continue IV Zosyn 3.375 q6H, will monitor clinically for now as has been afebrile for 48hrs  - Obtain CXR, sputum cx, bcx      ID Team 1 will follow 77yo F with COPD, asthma, HTN/HLD, BIBEMS to [facility] after being found down on floor covered in non bloody emesis. Transferred and admitted on 12/9/20 for diffuse SAH with obstructive hydrocephalus, s/p cerebral angiogram and embolization on 12/10, POD#15 from [facility], s/p PEG. Patient has been intermittently on Zosyn for enterobacter claocae & e coli UTI (S to Zosyn). However, recurrent fever starting 1/10. ID consulted for fevers, likely in the setting of aspiration pneumonitis.     Recommendations:  - CXR from today noted; much improved from prior CXR 1/10. Fevers likely represented aspiration pneumonitis event that seems to be largely resolving  - Discontinue IV Zosyn at this time, will monitor clinically for now as patient has been afebrile for 48hrs    ID Team 1 will follow.

## 2021-01-14 NOTE — CONSULT NOTE ADULT - PROVIDER SPECIALTY LIST ADULT
Surgery
Surgery
Gastroenterology
Infectious Disease
Intervent Radiology
Cardiology
Internal Medicine

## 2021-01-15 LAB
ANION GAP SERPL CALC-SCNC: 12 MMOL/L — SIGNIFICANT CHANGE UP (ref 5–17)
BUN SERPL-MCNC: 15 MG/DL — SIGNIFICANT CHANGE UP (ref 7–23)
CALCIUM SERPL-MCNC: 9.4 MG/DL — SIGNIFICANT CHANGE UP (ref 8.4–10.5)
CHLORIDE SERPL-SCNC: 104 MMOL/L — SIGNIFICANT CHANGE UP (ref 96–108)
CO2 SERPL-SCNC: 28 MMOL/L — SIGNIFICANT CHANGE UP (ref 22–31)
CREAT SERPL-MCNC: 0.39 MG/DL — LOW (ref 0.5–1.3)
CULTURE RESULTS: SIGNIFICANT CHANGE UP
GLUCOSE SERPL-MCNC: 180 MG/DL — HIGH (ref 70–99)
HCT VFR BLD CALC: 31 % — LOW (ref 34.5–45)
HGB BLD-MCNC: 9.5 G/DL — LOW (ref 11.5–15.5)
MAGNESIUM SERPL-MCNC: 1.9 MG/DL — SIGNIFICANT CHANGE UP (ref 1.6–2.6)
MCHC RBC-ENTMCNC: 28.8 PG — SIGNIFICANT CHANGE UP (ref 27–34)
MCHC RBC-ENTMCNC: 30.6 GM/DL — LOW (ref 32–36)
MCV RBC AUTO: 93.9 FL — SIGNIFICANT CHANGE UP (ref 80–100)
NRBC # BLD: 0 /100 WBCS — SIGNIFICANT CHANGE UP (ref 0–0)
PHOSPHATE SERPL-MCNC: 3.8 MG/DL — SIGNIFICANT CHANGE UP (ref 2.5–4.5)
PLATELET # BLD AUTO: 433 K/UL — HIGH (ref 150–400)
POTASSIUM SERPL-MCNC: 3.7 MMOL/L — SIGNIFICANT CHANGE UP (ref 3.5–5.3)
POTASSIUM SERPL-SCNC: 3.7 MMOL/L — SIGNIFICANT CHANGE UP (ref 3.5–5.3)
RBC # BLD: 3.3 M/UL — LOW (ref 3.8–5.2)
RBC # FLD: 17.7 % — HIGH (ref 10.3–14.5)
SODIUM SERPL-SCNC: 144 MMOL/L — SIGNIFICANT CHANGE UP (ref 135–145)
SPECIMEN SOURCE: SIGNIFICANT CHANGE UP
WBC # BLD: 7.72 K/UL — SIGNIFICANT CHANGE UP (ref 3.8–10.5)
WBC # FLD AUTO: 7.72 K/UL — SIGNIFICANT CHANGE UP (ref 3.8–10.5)

## 2021-01-15 PROCEDURE — 99232 SBSQ HOSP IP/OBS MODERATE 35: CPT | Mod: GC

## 2021-01-15 PROCEDURE — 99024 POSTOP FOLLOW-UP VISIT: CPT

## 2021-01-15 PROCEDURE — 99231 SBSQ HOSP IP/OBS SF/LOW 25: CPT | Mod: GC

## 2021-01-15 PROCEDURE — 71045 X-RAY EXAM CHEST 1 VIEW: CPT | Mod: 26

## 2021-01-15 RX ORDER — LABETALOL HCL 100 MG
10 TABLET ORAL EVERY 4 HOURS
Refills: 0 | Status: DISCONTINUED | OUTPATIENT
Start: 2021-01-15 | End: 2021-01-26

## 2021-01-15 RX ORDER — ACETYLCYSTEINE 200 MG/ML
4 VIAL (ML) MISCELLANEOUS THREE TIMES A DAY
Refills: 0 | Status: DISCONTINUED | OUTPATIENT
Start: 2021-01-15 | End: 2021-01-26

## 2021-01-15 RX ADMIN — Medication 0.5 MILLIGRAM(S): at 17:11

## 2021-01-15 RX ADMIN — ATORVASTATIN CALCIUM 40 MILLIGRAM(S): 80 TABLET, FILM COATED ORAL at 21:58

## 2021-01-15 RX ADMIN — Medication 3 MILLILITER(S): at 11:18

## 2021-01-15 RX ADMIN — Medication 3 MILLILITER(S): at 14:04

## 2021-01-15 RX ADMIN — Medication 81 MILLIGRAM(S): at 11:18

## 2021-01-15 RX ADMIN — LISINOPRIL 10 MILLIGRAM(S): 2.5 TABLET ORAL at 05:02

## 2021-01-15 RX ADMIN — Medication 3 MILLILITER(S): at 05:01

## 2021-01-15 RX ADMIN — FLUDROCORTISONE ACETATE 0.2 MILLIGRAM(S): 0.1 TABLET ORAL at 05:02

## 2021-01-15 RX ADMIN — AMLODIPINE BESYLATE 10 MILLIGRAM(S): 2.5 TABLET ORAL at 11:19

## 2021-01-15 RX ADMIN — SODIUM CHLORIDE 2 GRAM(S): 9 INJECTION INTRAMUSCULAR; INTRAVENOUS; SUBCUTANEOUS at 05:01

## 2021-01-15 RX ADMIN — PANTOPRAZOLE SODIUM 40 MILLIGRAM(S): 20 TABLET, DELAYED RELEASE ORAL at 05:02

## 2021-01-15 RX ADMIN — LACOSAMIDE 150 MILLIGRAM(S): 50 TABLET ORAL at 06:22

## 2021-01-15 RX ADMIN — SODIUM CHLORIDE 2 GRAM(S): 9 INJECTION INTRAMUSCULAR; INTRAVENOUS; SUBCUTANEOUS at 21:58

## 2021-01-15 RX ADMIN — CHLORHEXIDINE GLUCONATE 1 APPLICATION(S): 213 SOLUTION TOPICAL at 05:16

## 2021-01-15 RX ADMIN — ENOXAPARIN SODIUM 40 MILLIGRAM(S): 100 INJECTION SUBCUTANEOUS at 21:58

## 2021-01-15 RX ADMIN — MONTELUKAST 10 MILLIGRAM(S): 4 TABLET, CHEWABLE ORAL at 11:19

## 2021-01-15 RX ADMIN — Medication 500000 UNIT(S): at 11:19

## 2021-01-15 RX ADMIN — SODIUM CHLORIDE 2 GRAM(S): 9 INJECTION INTRAMUSCULAR; INTRAVENOUS; SUBCUTANEOUS at 14:04

## 2021-01-15 RX ADMIN — Medication 500000 UNIT(S): at 05:01

## 2021-01-15 RX ADMIN — Medication 3 MILLILITER(S): at 21:58

## 2021-01-15 RX ADMIN — Medication 4 MILLILITER(S): at 03:30

## 2021-01-15 RX ADMIN — FLUDROCORTISONE ACETATE 0.2 MILLIGRAM(S): 0.1 TABLET ORAL at 17:11

## 2021-01-15 RX ADMIN — CHLORHEXIDINE GLUCONATE 15 MILLILITER(S): 213 SOLUTION TOPICAL at 05:01

## 2021-01-15 RX ADMIN — Medication 0.5 MILLIGRAM(S): at 05:02

## 2021-01-15 RX ADMIN — PREGABALIN 1000 MICROGRAM(S): 225 CAPSULE ORAL at 11:18

## 2021-01-15 RX ADMIN — LACOSAMIDE 150 MILLIGRAM(S): 50 TABLET ORAL at 17:55

## 2021-01-15 RX ADMIN — CHLORHEXIDINE GLUCONATE 15 MILLILITER(S): 213 SOLUTION TOPICAL at 17:14

## 2021-01-15 RX ADMIN — PANTOPRAZOLE SODIUM 40 MILLIGRAM(S): 20 TABLET, DELAYED RELEASE ORAL at 17:11

## 2021-01-15 RX ADMIN — Medication 325 MILLIGRAM(S): at 11:18

## 2021-01-15 RX ADMIN — Medication 500000 UNIT(S): at 17:11

## 2021-01-15 NOTE — PROGRESS NOTE ADULT - ASSESSMENT
77yo F with COPD, asthma, HTN/HLD, BIBEMS to [facility] after being found down on floor covered in non bloody emesis. Transferred and admitted on 12/9/20 for diffuse SAH with obstructive hydrocephalus, s/p cerebral angiogram and embolization on 12/10, POD#15 from [facility], s/p PEG. Patient has been intermittently on Zosyn for enterobacter claocae & e coli UTI (S to Zosyn). However, recurrent fever starting 1/10. ID consulted for fevers, likely in the setting of aspiration pneumonitis.     Recommendations:  - Afebrile with no clinical signs of infection at this time  - No indications for abx at this time    ID Team 1 will sign off

## 2021-01-15 NOTE — PROGRESS NOTE ADULT - SUBJECTIVE AND OBJECTIVE BOX
Interval Events: Reviewed  Patient seen and examined at bedside.    Patient is a 76y old  Female who presents with a chief complaint of SAH (15 Christian 2021 08:00)    awake not in distress not answering commands  PAST MEDICAL & SURGICAL HISTORY:  Hyperlipidemia    Hypertension    COPD (chronic obstructive pulmonary disease)    Asthma    No significant past surgical history        MEDICATIONS:  Pulmonary:  acetylcysteine 20%  Inhalation 4 milliLiter(s) Inhalation three times a day PRN  albuterol/ipratropium for Nebulization 3 milliLiter(s) Nebulizer every 6 hours  buDESOnide    Inhalation Suspension 0.5 milliGRAM(s) Inhalation every 12 hours  montelukast 10 milliGRAM(s) Oral daily    Antimicrobials:  nystatin    Suspension 374031 Unit(s) Oral four times a day    Anticoagulants:  aspirin enteric coated 81 milliGRAM(s) Oral daily  enoxaparin Injectable 40 milliGRAM(s) SubCutaneous at bedtime    Cardiac:  amLODIPine   Tablet 10 milliGRAM(s) Oral every 24 hours  labetalol Injectable 10 milliGRAM(s) IV Push every 4 hours PRN  lisinopril 10 milliGRAM(s) Oral daily      Allergies    No Known Allergies    Intolerances        Vital Signs Last 24 Hrs  T(C): 37.4 (15 Christian 2021 18:57), Max: 37.4 (15 Christian 2021 18:57)  T(F): 99.3 (15 Christian 2021 18:57), Max: 99.3 (15 Christian 2021 18:57)  HR: 99 (15 Christian 2021 18:57) (84 - 99)  BP: 186/77 (15 Christian 2021 18:57) (147/65 - 186/77)  BP(mean): 109 (15 Christian 2021 17:25) (94 - 132)  RR: 18 (15 Christian 2021 18:57) (18 - 18)  SpO2: 94% (15 Christian 2021 18:57) (94% - 100%)    01-14 @ 07:01  -  01-15 @ 07:00  --------------------------------------------------------  IN: 1200 mL / OUT: 1050 mL / NET: 150 mL    01-15 @ 07:01  -  01-15 @ 19:34  --------------------------------------------------------  IN: 350 mL / OUT: 200 mL / NET: 150 mL          Review of Systems:   •	General: negative  •	Skin/Breast: negative  •	Ophthalmologic: negative  •	ENMT: negative  •	Respiratory and Thorax: negative  •	Cardiovascular: negative  •	Gastrointestinal: negative  •	Genitourinary: negative  •	Musculoskeletal: negative  •	Neurological: negative  •	Psychiatric: negative  •	Hematology/Lymphatics: negative  •	Endocrine: negative  •	Allergic/Immunologic: negative    Physical Exam:   • Constitutional:	Well-developed, well nourished  • Eyes:	EOMI; PERRL; no drainage or redness  • ENMT:	No oral lesions; no gross abnormalities  • Neck	No bruits; no thyromegaly or nodules  • Breasts:	not examined  • Back:	No deformity or limitation of movement  • Respiratory:	Breath Sounds equal & clear to percussion & auscultation, no accessory muscle use  • Cardiovascular:	Regular rate & rhythm, normal S1, S2; no murmurs, gallops or rubs; no S3, S4  • Gastrointestinal:	Soft, non-tender, no hepatosplenomegaly, normal bowel sounds  • Genitourinary:	not examined  • Rectal: not examined  • Extremities:	No cyanosis, clubbing or edema  • Vascular:	Equal and normal pulses (carotid, femoral, dorsalis pedis)  • Neurologica:l	not examined  • Skin:	No lesions; no rash  • Lymph Nodes:	No lymphadedenopathy  • Musculoskeletal:	No joint pain, swelling or deformity; no limitation of movement        LABS:      CBC Full  -  ( 15 Christian 2021 06:29 )  WBC Count : 7.72 K/uL  RBC Count : 3.30 M/uL  Hemoglobin : 9.5 g/dL  Hematocrit : 31.0 %  Platelet Count - Automated : 433 K/uL  Mean Cell Volume : 93.9 fl  Mean Cell Hemoglobin : 28.8 pg  Mean Cell Hemoglobin Concentration : 30.6 gm/dL  Auto Neutrophil # : x  Auto Lymphocyte # : x  Auto Monocyte # : x  Auto Eosinophil # : x  Auto Basophil # : x  Auto Neutrophil % : x  Auto Lymphocyte % : x  Auto Monocyte % : x  Auto Eosinophil % : x  Auto Basophil % : x    01-15    144  |  104  |  15  ----------------------------<  180<H>  3.7   |  28  |  0.39<L>    Ca    9.4      15 Christian 2021 06:29  Phos  3.8     01-15  Mg     1.9     01-15                          RADIOLOGY & ADDITIONAL STUDIES (The following images were personally reviewed):  Soriano:                                     No  Urine output:                       adequate  DVT prophylaxis:                 Yes  Flattus:                                  Yes  Bowel movement:              No

## 2021-01-15 NOTE — PROGRESS NOTE ADULT - SUBJECTIVE AND OBJECTIVE BOX
INFECTIOUS DISEASES FOLLOW UP    This is a follow up note for this  76yFemale with  HEADACHE        update/ S:     ROS:  negative except as above    Allergies    No Known Allergies    Intolerances        ANTIBIOTICS/RELEVANT:  antimicrobials  nystatin    Suspension 102515 Unit(s) Oral four times a day    immunologic:    OTHER:  acetaminophen    Suspension .. 650 milliGRAM(s) Oral every 6 hours PRN  acetylcysteine 20%  Inhalation 4 milliLiter(s) Inhalation three times a day PRN  albuterol/ipratropium for Nebulization 3 milliLiter(s) Nebulizer every 6 hours  amLODIPine   Tablet 10 milliGRAM(s) Oral every 24 hours  aspirin enteric coated 81 milliGRAM(s) Oral daily  atorvastatin 40 milliGRAM(s) Oral at bedtime  buDESOnide    Inhalation Suspension 0.5 milliGRAM(s) Inhalation every 12 hours  chlorhexidine 0.12% Liquid 15 milliLiter(s) Oral Mucosa two times a day  chlorhexidine 2% Cloths 1 Application(s) Topical <User Schedule>  cyanocobalamin 1000 MICROGram(s) Oral daily  dextrose 40% Gel 15 Gram(s) Oral once  dextrose 50% Injectable 25 Gram(s) IV Push once  enoxaparin Injectable 40 milliGRAM(s) SubCutaneous at bedtime  ferrous    sulfate 325 milliGRAM(s) Oral daily  fludroCORTISONE 0.2 milliGRAM(s) Oral every 12 hours  glucagon  Injectable 1 milliGRAM(s) IntraMuscular once  labetalol Injectable 2.5 milliGRAM(s) IV Push every 6 hours PRN  lacosamide Solution 150 milliGRAM(s) Oral every 12 hours  lisinopril 10 milliGRAM(s) Oral daily  montelukast 10 milliGRAM(s) Oral daily  pantoprazole   Suspension 40 milliGRAM(s) Oral two times a day  sodium chloride 2 Gram(s) Oral every 8 hours      Objective:  Vital Signs Last 24 Hrs  T(C): 36.3 (15 Christian 2021 04:45), Max: 36.9 (14 Jan 2021 14:29)  T(F): 97.3 (15 Christian 2021 04:45), Max: 98.4 (14 Jan 2021 14:29)  HR: 84 (15 Christian 2021 04:03) (84 - 98)  BP: 162/72 (15 Christian 2021 04:03) (156/70 - 176/98)  BP(mean): 103 (15 Christian 2021 04:03) (100 - 132)  RR: 18 (15 Christian 2021 04:03) (17 - 18)  SpO2: 100% (15 Christian 2021 04:03) (94% - 100%)    PHYSICAL EXAM:                    LABS:                        9.5    7.72  )-----------( 433      ( 15 Christian 2021 06:29 )             31.0     01-15    x   |  104  |  x   ----------------------------<  180<H>  3.7   |  28  |  0.39<L>    Ca    9.4      15 Christian 2021 06:29  Phos  3.8     01-15  Mg     1.9     01-15            MICROBIOLOGY:            RECENT CULTURES:  01-10 @ 17:24  .CSF CSF  --  --  --    No growth to date  --  01-10 @ 10:46  .Blood None  --  --  --    No growth at 4 days.  --  01-10 @ 06:39  .Blood Blood  --  --  --    No growth at 5 days.  --  01-10 @ 05:21  .Urine None  Enterococcus faecalis  Escherichia coli  Enterococcus faecalis  MARIAN    10,000 CFU/ml Escherichia coli  >100,000 CFU/ml Enterococcus faecalis  --      RADIOLOGY & ADDITIONAL STUDIES: INFECTIOUS DISEASES FOLLOW UP    This is a follow up note for this  76yFemale with  HEADACHE        update/ S: VIVIEN, more sluggish this morning    ROS:  negative except as above    Allergies    No Known Allergies    Intolerances        ANTIBIOTICS/RELEVANT:  antimicrobials  nystatin    Suspension 263202 Unit(s) Oral four times a day    immunologic:    OTHER:  acetaminophen    Suspension .. 650 milliGRAM(s) Oral every 6 hours PRN  acetylcysteine 20%  Inhalation 4 milliLiter(s) Inhalation three times a day PRN  albuterol/ipratropium for Nebulization 3 milliLiter(s) Nebulizer every 6 hours  amLODIPine   Tablet 10 milliGRAM(s) Oral every 24 hours  aspirin enteric coated 81 milliGRAM(s) Oral daily  atorvastatin 40 milliGRAM(s) Oral at bedtime  buDESOnide    Inhalation Suspension 0.5 milliGRAM(s) Inhalation every 12 hours  chlorhexidine 0.12% Liquid 15 milliLiter(s) Oral Mucosa two times a day  chlorhexidine 2% Cloths 1 Application(s) Topical <User Schedule>  cyanocobalamin 1000 MICROGram(s) Oral daily  dextrose 40% Gel 15 Gram(s) Oral once  dextrose 50% Injectable 25 Gram(s) IV Push once  enoxaparin Injectable 40 milliGRAM(s) SubCutaneous at bedtime  ferrous    sulfate 325 milliGRAM(s) Oral daily  fludroCORTISONE 0.2 milliGRAM(s) Oral every 12 hours  glucagon  Injectable 1 milliGRAM(s) IntraMuscular once  labetalol Injectable 2.5 milliGRAM(s) IV Push every 6 hours PRN  lacosamide Solution 150 milliGRAM(s) Oral every 12 hours  lisinopril 10 milliGRAM(s) Oral daily  montelukast 10 milliGRAM(s) Oral daily  pantoprazole   Suspension 40 milliGRAM(s) Oral two times a day  sodium chloride 2 Gram(s) Oral every 8 hours      Objective:  Vital Signs Last 24 Hrs  T(C): 36.3 (15 Christian 2021 04:45), Max: 36.9 (14 Jan 2021 14:29)  T(F): 97.3 (15 Christian 2021 04:45), Max: 98.4 (14 Jan 2021 14:29)  HR: 84 (15 Christian 2021 04:03) (84 - 98)  BP: 162/72 (15 Christian 2021 04:03) (156/70 - 176/98)  BP(mean): 103 (15 Christian 2021 04:03) (100 - 132)  RR: 18 (15 Christian 2021 04:03) (17 - 18)  SpO2: 100% (15 Christian 2021 04:03) (94% - 100%)    PHYSICAL EXAM:  Constitutional: NAD  Head: incision sites c/d/i  Eyes: PERRL, EOMI, anicteric sclera  HENT: MMM  Neck: supple; no JVD or thyromegaly  Respiratory: CTA B/L; no W/R/R, no retractions  Cardiac: +S1/S2; RRR; no M/R/G; PMI non-displaced  Gastrointestinal: abdomen soft, non distended, +PEG site c/d/i no erythema   Back: spine midline, no bony tenderness or step-offs; no CVAT B/L  Extremities: WWP, no clubbing or cyanosis; no peripheral edema  Musculoskeletal: NROM x4; no joint swelling, tenderness or erythema  Vascular: 2+ radial, femoral, DP/PT pulses B/L  Dermatologic: skin warm, dry and intact; no rashes, wounds, or scars  Neurologic: AAOx0, not following commands today   Psychiatric: affect and characteristics of appearance, verbalizations, behaviors are appropriate                LABS:                        9.5    7.72  )-----------( 433      ( 15 Christian 2021 06:29 )             31.0     01-15    x   |  104  |  x   ----------------------------<  180<H>  3.7   |  28  |  0.39<L>    Ca    9.4      15 Christian 2021 06:29  Phos  3.8     01-15  Mg     1.9     01-15            MICROBIOLOGY:            RECENT CULTURES:  01-10 @ 17:24  .CSF CSF  --  --  --    No growth to date  --  01-10 @ 10:46  .Blood None  --  --  --    No growth at 4 days.  --  01-10 @ 06:39  .Blood Blood  --  --  --    No growth at 5 days.  --  01-10 @ 05:21  .Urine None  Enterococcus faecalis  Escherichia coli  Enterococcus faecalis  MARIAN    10,000 CFU/ml Escherichia coli  >100,000 CFU/ml Enterococcus faecalis  --      RADIOLOGY & ADDITIONAL STUDIES:

## 2021-01-15 NOTE — PROGRESS NOTE ADULT - ATTENDING COMMENTS
I saw and evaluated the patient on the above date of service and discussed the care with the resident. I agree with the findings and plan as documented in the note above.    Velma Gutierrez MD, MS  Infectious disease attending  Work cell [phone]

## 2021-01-15 NOTE — PROGRESS NOTE ADULT - SUBJECTIVE AND OBJECTIVE BOX
HPI:  76y/o F with PMHx sig for COPD, asthma, HLD, HTN, BIBEMS to [facility] from home after HHA discovered patient found down in floor covered in non-bloody vomitus. Perr HHA Pt went to the bathroom and was taking a long time, went in to check on her and found her sitting on floor, awake, however with vomitus on front of shirt. On arrival to [facility], patient was taken for stat head CT, which revealed diffuse subarachnoid hemorrhage mainly at basilar cisterns with IVH and obstructive hydrocephalus. Patient was given a bolus of 1g keppra and dilaudid pushes for generalized headache. CTA concerning for 3mm left pcomm aneurysm and a 1.6mm outpouching of distal cavernous segment of the left internal carotid artery likely aneurysm. NIHSS2 HH3 MF4. Patient was transferred to [facility] for further intervention. Patient currently reports headaches. Pt denies acute changes in vision, seizures, CP, SOB, weakness/paresthesias of arms or legs. (09 Dec 2020 23:37)    Hospital Course:   12/9: BD1 Patient admitted for SAH HH3, MF4.   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 EVD at 5, on Levo overnight, nimodipine discontinued d/t hypotension  12/12: BD4 POD#2, VIVIEN overnight, neuro stable, passed TOV  12/13: BD5 POD#3: VIVIEN x 24 hrs  12/14: BD6 POD#4. VIVIEN o/n, neuro stable. EVD at 5cm H2O  12/15: BD7 POD #5 VIVIEN overnight, neuro stable. EVD remains at 5. Plan for CTA today.   12/16: BD8 POD #6 VIVIEN overnight, neuro stable, EVD at 0, on Levo, started on 3% at 15 overnight   12/17: BD9 POD#1 s/p cerebral angio for verapamil c/b device malfunction of Proglide, s/p right groin cutdown for removal of proglide catheter and femoral artery repair.  VIVIEN overnight, neuro stable, EVD at 0. patient remained intubated overnight, on propofol for sedation, and vEEG  12/18: BD#10, POD#8 s/p coil/embo of L pcomm aneurysm, POD#2 s/p IA verapamil, POD#2 R groin cutdown for removal of proglide catheter w/ repair of femoral artery. VIVIEN overnight. EVD remains open @0cmH2O   12/19: BD#11, POD#9 s/p coil/embo of L pcomm aneurysm. POD#3 s/p IA verapamil, POD#3 s/p R femoral artery cutdown of proglide catheter w/ femoral artery repair. VIVIEN overnight. EVD remains open @0cmH2O. EEG shows b/l frontal sharp discharges, cont monitoring, vimpat was increased yesterday. Gentle hydration, total IVF 50cc/hr. Cont vanc/zosyn for empiric coverage, next vanc trough due @1pm on 12/19. F/u daily CXR.   12/20 BD#12 POD#10 VIVIEN overnight, Neuro exam stable, EVD @ 0cm H2O, vEEG, 3% @ 15 goal WNL, TF via NGT  12/21: BD13, POD11 VIVIEN overnight. EVD at 5cmH20 (raised yesterday), vEEG in place  12/22 BD#14, EVD raised to 15 yesterday, 3% @ 30cc Goal WNL, -180, Milrinone, TTE prior to DC as per Card for takotsubo cardiomyopathy, failed S&S pending reeval, TF via NGT, Neuro exam stable  12/23: BD#15. VIVIEN o/n, neuro stable. EVD clamped. 30%@30cc/hr  12/24 BD#16 VIVIEN overnight, spiked 102, EVD @ 0cm H2O, 3% @ 30cc/hr Goal WNL, Vasc following, TF via NGT, -200,   12/25: BD#17. VIVIEN overnight. Pan cx from 12/23, NGTD on CSF and blood. EVD clamped. 3% @15cc/hr, rate kept same overnight. NGT feeds at goal. SBP goal 160-200.   12/26: BD#18. VIVIEN overnight, neuro exam stable. NGTD from pan cx on 12/23. EVD removed today. 3% discontinued 12/25. NGT feeds at goal, IVF off. SBP goal 160-200.   12/27: BD#19 VIVIEN overnight, neuro stable. 3% at 15, stepdown status  12/28: BD20 VIVIEN overnight, neuro stable. 3% continues.  Patient became increasingly more somnolent and underwent LP for CSF high volume tap.  Temporary improvement.  Spiked fever, pancultured.  12/29: BD21 Patient increasingly more somnolent, EVD placed at bedside.    12/30: BD22 pt. is s/p R VPS placement, certas @5 POD#1, post op ct head c/a/p done. afebrile o/n.   1/1: BD#23: pt. is s/p R VPS placement, certas @5 POD#2, post op ct head c/a/p done. zosyn for enterobacter   1/2: BD #24. POD#3 s/p R VPS placement, CErtas @5. Dressings removed from head and abdomen. Cont zosyn for enterobacter and e. coli in urine, end date 1/4/21. Post-op imaging completed. Pend S&S re-eval, improvement in mental status/neuro exam.   1/3: BD25, POD4. SBP goals relaxed 120-200. zosyn for enterobacter UTI until 1/4. FOB+, protonix bid started, vivien o/n  1/4: BD 26, VIVIEN o/n  1/5: BD 27, VIVIEN o/n   1/6: BD 28, POD 7 VPS (Certas @ 5). VIVIEN overnight. Plan for PEG with GI Thursday. Repeat Covid swab negative. Stepdown status.  1/7: BD 29, POD 8. VIVIEN overnight. PEG placed at bedside by GI today. Stepdown status  1/8: BD 30 POD 9, VIVIEN o/n, resume TF 24 hrs after PEG, stepdown   1/9 BD 31. POD 10. No events overnight. s/p PEG. Pending AR vs JOSE  1/10 BD32, POD11. Lethargic but arousable with stimulation, CTH was ordered and demonstrated stable scan in comparison from 1/1. Continue to trend Na, this  improved to 135 on 2%@50/hr and salt tabs started. Pending AR vs JOSE.  1/11: BD#33 POD#12. VIVIEN overnight, peripheral IV x2 inserted. F/u pan cx from 1/10, NGTD. Cont 2% @50cc/hr, f/u Na in am. Dispo pend AR vs JOSE.   1/12: BD#34 POD#13: Afebrile, on 2% at 25cc/hr, Na 137, pending AM labs. Pending JOSE placement. Abx to stop today, fever work-up negative. COVID swab for dispo planning.  1/13: BD35 Neuro stable. Off 2%, Na stable. Started on Zosyn for UTI. Pending dispo to JOSE.  1/14 POD#15 VIVIEN overnight, Neuro exam stable, staples DC prior to discharge, TF via PEG, pending JOSE placement, Zosyn UTI til 1/15  1/15: POD #16 Episode of desaturating overnight, improved with chest PT, suctioning, mucomyst, CXR obtained. pending rehab      Vital Signs Last 24 Hrs  T(C): 36.8 (14 Jan 2021 22:37), Max: 36.9 (14 Jan 2021 14:29)  T(F): 98.2 (14 Jan 2021 22:37), Max: 98.4 (14 Jan 2021 14:29)  HR: 98 (14 Jan 2021 23:48) (92 - 98)  BP: 169/81 (14 Jan 2021 23:48) (156/70 - 176/98)  BP(mean): 117 (14 Jan 2021 23:48) (99 - 132)  RR: 18 (14 Jan 2021 23:48) (17 - 18)  SpO2: 94% (14 Jan 2021 23:48) (94% - 97%)    I&O's Detail    13 Jan 2021 07:01  -  14 Jan 2021 07:00  --------------------------------------------------------  IN:    Glucerna: 1200 mL    IV PiggyBack: 300 mL  Total IN: 1500 mL    OUT:    Voided (mL): 1450 mL  Total OUT: 1450 mL    Total NET: 50 mL      14 Jan 2021 07:01  -  15 Christian 2021 03:47  --------------------------------------------------------  IN:    Glucerna: 850 mL  Total IN: 850 mL    OUT:    Voided (mL): 1050 mL  Total OUT: 1050 mL    Total NET: -200 mL        I&O's Summary    13 Jan 2021 07:01  -  14 Jan 2021 07:00  --------------------------------------------------------  IN: 1500 mL / OUT: 1450 mL / NET: 50 mL    14 Jan 2021 07:01  -  15 Christian 2021 03:47  --------------------------------------------------------  IN: 850 mL / OUT: 1050 mL / NET: -200 mL        PHYSICAL EXAM:  General: NAD,  A&O x 1-2   HEENT: PERRL 3mm, EOMI b/l, face symmetric, tongue midline, +hard of hearing. Right scalp incision C/D/I  Cardiovascular: RRR, normal S1 and S2   Respiratory: lungs CTAB, no wheezing, rhonchi, or crackles   GI: normoactive BS to auscultation, abd soft, NTND, +PEG, Right side incision C/D/I   Neuro: speech clear, no dysmetria, no pronator drift, following commands   ZAFAR x4 spontaneously and with good strength  Extremities: distal pulses 2+ throughout      TUBES/LINES:  [] CVC  [] A-line  [] Lumbar Drain  [] Ventriculostomy  [] Other    DIET:  [] NPO  [] Mechanical  [x] Tube feeds    LABS:                        9.5    6.34  )-----------( 372      ( 14 Jan 2021 05:44 )             30.9     01-14    138  |  99  |  10  ----------------------------<  182<H>  3.1<L>   |  28  |  0.36<L>    Ca    8.8      14 Jan 2021 05:44  Phos  2.8     01-14  Mg     2.0     01-14              CAPILLARY BLOOD GLUCOSE          Drug Levels: [] N/A  Vancomycin Level, Trough: 7.7 ug/mL (01-12 @ 05:47)  Vancomycin Level, Trough: 9.5 ug/mL (01-11 @ 17:22)    CSF Analysis: [] N/A      Allergies    No Known Allergies    Intolerances      MEDICATIONS:  Antibiotics:  nystatin    Suspension 299754 Unit(s) Oral four times a day    Neuro:  acetaminophen    Suspension .. 650 milliGRAM(s) Oral every 6 hours PRN  lacosamide Solution 150 milliGRAM(s) Oral every 12 hours    Anticoagulation:  aspirin enteric coated 81 milliGRAM(s) Oral daily  enoxaparin Injectable 40 milliGRAM(s) SubCutaneous at bedtime    OTHER:  acetylcysteine 20%  Inhalation 4 milliLiter(s) Inhalation three times a day PRN  albuterol/ipratropium for Nebulization 3 milliLiter(s) Nebulizer every 6 hours  amLODIPine   Tablet 10 milliGRAM(s) Oral every 24 hours  atorvastatin 40 milliGRAM(s) Oral at bedtime  buDESOnide    Inhalation Suspension 0.5 milliGRAM(s) Inhalation every 12 hours  chlorhexidine 0.12% Liquid 15 milliLiter(s) Oral Mucosa two times a day  chlorhexidine 2% Cloths 1 Application(s) Topical <User Schedule>  dextrose 40% Gel 15 Gram(s) Oral once  dextrose 50% Injectable 25 Gram(s) IV Push once  fludroCORTISONE 0.2 milliGRAM(s) Oral every 12 hours  glucagon  Injectable 1 milliGRAM(s) IntraMuscular once  labetalol Injectable 2.5 milliGRAM(s) IV Push every 6 hours PRN  lisinopril 10 milliGRAM(s) Oral daily  montelukast 10 milliGRAM(s) Oral daily  pantoprazole   Suspension 40 milliGRAM(s) Oral two times a day    IVF:  cyanocobalamin 1000 MICROGram(s) Oral daily  ferrous    sulfate 325 milliGRAM(s) Oral daily  sodium chloride 2 Gram(s) Oral every 8 hours    CULTURES:  Culture Results:   No growth to date (01-10 @ 17:24)  Culture Results:   No growth at 4 days. (01-10 @ 10:46)    RADIOLOGY & ADDITIONAL TESTS:      ASSESSMENT:  76 year old Female  with PMHx of COPD, asthma, HLD, HTN, BIBEMS to [facility] from home after HHA found patient down on the floor covered in non-bloody vomitus. CTH reveals diffuse subarachnoid hemorrhage mainly at basilar cisterns with IVH and obstructive hydrocephalus, CTA shows 3mm left pcomm aneurysm, now s/p bedside right frontal EVD placement 12/10, s/p cerebral angiogram for coil embolization of left PCOMM aneurysm 12/10, now   s/p cerebral angio for verapamil c/b device malfunction of Proglide, s/p right groin cutdown for removal of proglide catheter and femoral artery repair (12/17/2020), NIHSS2 HH3 MF4), s/p EVD removal 12/25,  s/p bedside EVD placement 12/29, POD#16 from R VPS certas at 5 and  s/p PEG.       HEADACHE    Handoff    MEWS Score    Hyperlipidemia    Hypertension    COPD (chronic obstructive pulmonary disease)    Asthma    COPD (chronic obstructive pulmonary disease)    Asthma    Hydrocephalus, adult    Injury of right femoral artery    Cerebral artery vasospasm    SAH (subarachnoid hemorrhage)    Hydrocephalus, adult    Injury of femoral artery    Cerebral artery vasospasm    SAH (subarachnoid hemorrhage)    Subarachnoid bleed    Postoperative state    Obstructive hydrocephalus    Anemia due to acute blood loss    Preoperative clearance    Centrilobular emphysema    Mild persistent asthma without complication    Subarachnoid bleed    Essential hypertension    Pure hypercholesterolemia    Ventriculoperitoneal shunt    Insertion of external ventricular drain    Vascular surgery procedure    Angiogram, carotid and cerebral, bilateral    Angiogram, cerebral, with intracranial aneurysm embolization    No significant past surgical history    HEADACHE    90+    [email or web address]        PLAN:  - neuro checks  - vitals checks  - pain control  - cont Vimpat  - cont ASA 81  - cont florinef, salt tabs  - regular diet  - bowel regimen  - off Zosyn  - DVT PROPHYLAXIS: [x] Venodynes [x] Heparin/Lovenox    DISPOSITION: stepdown status, full code, dispo pending    d/w Dr. Serrano    Assessment:  Present when checked    []  GCS  E   V  M     Heart Failure: []Acute, [] acute on chronic , []chronic  Heart Failure:  [] Diastolic (HFpEF), [] Systolic (HFrEF), []Combined (HFpEF and HFrEF), [] RHF, [] Pulm HTN, [] Other    [] MICHAELLE, [] ATN, [] AIN, [] other  [] CKD1, [] CKD2, [] CKD 3, [] CKD 4, [] CKD 5, []ESRD    Encephalopathy: [] Metabolic, [] Hepatic, [] toxic, [] Neurological, [] Other    Abnormal Nurtitional Status: [] malnurtition (see nutrition note), [ ]underweight: BMI < 19, [] morbid obesity: BMI >40, [] Cachexia    [] Sepsis  [] hypovolemic shock,[] cardiogenic shock, [] hemorrhagic shock, [] neuogenic shock  [] Acute Respiratory Failure  []Cerebral edema, [] Brain compression/ herniation,   [] Functional quadriplegia  [] Acute blood loss anemia

## 2021-01-16 LAB
ANION GAP SERPL CALC-SCNC: 13 MMOL/L — SIGNIFICANT CHANGE UP (ref 5–17)
BUN SERPL-MCNC: 14 MG/DL — SIGNIFICANT CHANGE UP (ref 7–23)
CALCIUM SERPL-MCNC: 9 MG/DL — SIGNIFICANT CHANGE UP (ref 8.4–10.5)
CHLORIDE SERPL-SCNC: 105 MMOL/L — SIGNIFICANT CHANGE UP (ref 96–108)
CO2 SERPL-SCNC: 28 MMOL/L — SIGNIFICANT CHANGE UP (ref 22–31)
CREAT SERPL-MCNC: 0.4 MG/DL — LOW (ref 0.5–1.3)
GLUCOSE SERPL-MCNC: 179 MG/DL — HIGH (ref 70–99)
HCT VFR BLD CALC: 28.9 % — LOW (ref 34.5–45)
HGB BLD-MCNC: 8.9 G/DL — LOW (ref 11.5–15.5)
MAGNESIUM SERPL-MCNC: 1.8 MG/DL — SIGNIFICANT CHANGE UP (ref 1.6–2.6)
MCHC RBC-ENTMCNC: 29.5 PG — SIGNIFICANT CHANGE UP (ref 27–34)
MCHC RBC-ENTMCNC: 30.8 GM/DL — LOW (ref 32–36)
MCV RBC AUTO: 95.7 FL — SIGNIFICANT CHANGE UP (ref 80–100)
NRBC # BLD: 0 /100 WBCS — SIGNIFICANT CHANGE UP (ref 0–0)
PHOSPHATE SERPL-MCNC: 3.2 MG/DL — SIGNIFICANT CHANGE UP (ref 2.5–4.5)
PLATELET # BLD AUTO: 417 K/UL — HIGH (ref 150–400)
POTASSIUM SERPL-MCNC: 3 MMOL/L — LOW (ref 3.5–5.3)
POTASSIUM SERPL-SCNC: 3 MMOL/L — LOW (ref 3.5–5.3)
RBC # BLD: 3.02 M/UL — LOW (ref 3.8–5.2)
RBC # FLD: 17.7 % — HIGH (ref 10.3–14.5)
SODIUM SERPL-SCNC: 146 MMOL/L — HIGH (ref 135–145)
WBC # BLD: 7.81 K/UL — SIGNIFICANT CHANGE UP (ref 3.8–10.5)
WBC # FLD AUTO: 7.81 K/UL — SIGNIFICANT CHANGE UP (ref 3.8–10.5)

## 2021-01-16 PROCEDURE — 99024 POSTOP FOLLOW-UP VISIT: CPT

## 2021-01-16 RX ORDER — MAGNESIUM SULFATE 500 MG/ML
2 VIAL (ML) INJECTION ONCE
Refills: 0 | Status: COMPLETED | OUTPATIENT
Start: 2021-01-16 | End: 2021-01-16

## 2021-01-16 RX ORDER — POTASSIUM CHLORIDE 20 MEQ
40 PACKET (EA) ORAL THREE TIMES A DAY
Refills: 0 | Status: COMPLETED | OUTPATIENT
Start: 2021-01-16 | End: 2021-01-16

## 2021-01-16 RX ADMIN — Medication 0.5 MILLIGRAM(S): at 21:07

## 2021-01-16 RX ADMIN — CHLORHEXIDINE GLUCONATE 15 MILLILITER(S): 213 SOLUTION TOPICAL at 06:17

## 2021-01-16 RX ADMIN — Medication 40 MILLIEQUIVALENT(S): at 22:01

## 2021-01-16 RX ADMIN — PANTOPRAZOLE SODIUM 40 MILLIGRAM(S): 20 TABLET, DELAYED RELEASE ORAL at 18:02

## 2021-01-16 RX ADMIN — SODIUM CHLORIDE 2 GRAM(S): 9 INJECTION INTRAMUSCULAR; INTRAVENOUS; SUBCUTANEOUS at 06:18

## 2021-01-16 RX ADMIN — LACOSAMIDE 150 MILLIGRAM(S): 50 TABLET ORAL at 18:04

## 2021-01-16 RX ADMIN — Medication 500000 UNIT(S): at 00:39

## 2021-01-16 RX ADMIN — Medication 3 MILLILITER(S): at 22:00

## 2021-01-16 RX ADMIN — Medication 0.5 MILLIGRAM(S): at 05:02

## 2021-01-16 RX ADMIN — AMLODIPINE BESYLATE 10 MILLIGRAM(S): 2.5 TABLET ORAL at 11:38

## 2021-01-16 RX ADMIN — Medication 3 MILLILITER(S): at 18:02

## 2021-01-16 RX ADMIN — Medication 500000 UNIT(S): at 06:17

## 2021-01-16 RX ADMIN — Medication 40 MILLIEQUIVALENT(S): at 14:28

## 2021-01-16 RX ADMIN — SODIUM CHLORIDE 2 GRAM(S): 9 INJECTION INTRAMUSCULAR; INTRAVENOUS; SUBCUTANEOUS at 22:00

## 2021-01-16 RX ADMIN — Medication 81 MILLIGRAM(S): at 11:38

## 2021-01-16 RX ADMIN — SODIUM CHLORIDE 2 GRAM(S): 9 INJECTION INTRAMUSCULAR; INTRAVENOUS; SUBCUTANEOUS at 14:28

## 2021-01-16 RX ADMIN — PREGABALIN 1000 MICROGRAM(S): 225 CAPSULE ORAL at 11:38

## 2021-01-16 RX ADMIN — PANTOPRAZOLE SODIUM 40 MILLIGRAM(S): 20 TABLET, DELAYED RELEASE ORAL at 06:18

## 2021-01-16 RX ADMIN — Medication 40 MILLIEQUIVALENT(S): at 11:37

## 2021-01-16 RX ADMIN — Medication 50 GRAM(S): at 11:38

## 2021-01-16 RX ADMIN — FLUDROCORTISONE ACETATE 0.2 MILLIGRAM(S): 0.1 TABLET ORAL at 18:02

## 2021-01-16 RX ADMIN — ENOXAPARIN SODIUM 40 MILLIGRAM(S): 100 INJECTION SUBCUTANEOUS at 22:00

## 2021-01-16 RX ADMIN — ATORVASTATIN CALCIUM 40 MILLIGRAM(S): 80 TABLET, FILM COATED ORAL at 22:00

## 2021-01-16 RX ADMIN — LACOSAMIDE 150 MILLIGRAM(S): 50 TABLET ORAL at 06:17

## 2021-01-16 RX ADMIN — CHLORHEXIDINE GLUCONATE 1 APPLICATION(S): 213 SOLUTION TOPICAL at 04:34

## 2021-01-16 RX ADMIN — Medication 325 MILLIGRAM(S): at 11:38

## 2021-01-16 RX ADMIN — Medication 3 MILLILITER(S): at 04:33

## 2021-01-16 RX ADMIN — LISINOPRIL 10 MILLIGRAM(S): 2.5 TABLET ORAL at 06:18

## 2021-01-16 RX ADMIN — Medication 500000 UNIT(S): at 11:37

## 2021-01-16 RX ADMIN — FLUDROCORTISONE ACETATE 0.2 MILLIGRAM(S): 0.1 TABLET ORAL at 06:18

## 2021-01-16 RX ADMIN — MONTELUKAST 10 MILLIGRAM(S): 4 TABLET, CHEWABLE ORAL at 11:37

## 2021-01-16 RX ADMIN — Medication 3 MILLILITER(S): at 11:37

## 2021-01-16 NOTE — PROGRESS NOTE ADULT - SUBJECTIVE AND OBJECTIVE BOX
HPI:  74y/o F with PMHx sig for COPD, asthma, HLD, HTN, BIBEMS to [facility] from home after HHA discovered patient found down in floor covered in non-bloody vomitus. Perr HHA Pt went to the bathroom and was taking a long time, went in to check on her and found her sitting on floor, awake, however with vomitus on front of shirt. On arrival to [facility], patient was taken for stat head CT, which revealed diffuse subarachnoid hemorrhage mainly at basilar cisterns with IVH and obstructive hydrocephalus. Patient was given a bolus of 1g keppra and dilaudid pushes for generalized headache. CTA concerning for 3mm left pcomm aneurysm and a 1.6mm outpouching of distal cavernous segment of the left internal carotid artery likely aneurysm. NIHSS2 HH3 MF4. Patient was transferred to [facility] for further intervention. Patient currently reports headaches. Pt denies acute changes in vision, seizures, CP, SOB, weakness/paresthesias of arms or legs. (09 Dec 2020 23:37)      Hospital Course:   12/9: BD1 Patient admitted for SAH HH3, MF4.   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 EVD at 5, on Levo overnight, nimodipine discontinued d/t hypotension  12/12: BD4 POD#2, VIVIEN overnight, neuro stable, passed TOV  12/13: BD5 POD#3: VIVIEN x 24 hrs  12/14: BD6 POD#4. VIVIEN o/n, neuro stable. EVD at 5cm H2O  12/15: BD7 POD #5 VIVIEN overnight, neuro stable. EVD remains at 5. Plan for CTA today.   12/16: BD8 POD #6 VIVIEN overnight, neuro stable, EVD at 0, on Levo, started on 3% at 15 overnight   12/17: BD9 POD#1 s/p cerebral angio for verapamil c/b device malfunction of Proglide, s/p right groin cutdown for removal of proglide catheter and femoral artery repair.  VIVIEN overnight, neuro stable, EVD at 0. patient remained intubated overnight, on propofol for sedation, and vEEG  12/18: BD#10, POD#8 s/p coil/embo of L pcomm aneurysm, POD#2 s/p IA verapamil, POD#2 R groin cutdown for removal of proglide catheter w/ repair of femoral artery. VIVIEN overnight. EVD remains open @0cmH2O   12/19: BD#11, POD#9 s/p coil/embo of L pcomm aneurysm. POD#3 s/p IA verapamil, POD#3 s/p R femoral artery cutdown of proglide catheter w/ femoral artery repair. VIVIEN overnight. EVD remains open @0cmH2O. EEG shows b/l frontal sharp discharges, cont monitoring, vimpat was increased yesterday. Gentle hydration, total IVF 50cc/hr. Cont vanc/zosyn for empiric coverage, next vanc trough due @1pm on 12/19. F/u daily CXR.   12/20 BD#12 POD#10 VIVIEN overnight, Neuro exam stable, EVD @ 0cm H2O, vEEG, 3% @ 15 goal WNL, TF via NGT  12/21: BD13, POD11 VIVIEN overnight. EVD at 5cmH20 (raised yesterday), vEEG in place  12/22 BD#14, EVD raised to 15 yesterday, 3% @ 30cc Goal WNL, -180, Milrinone, TTE prior to DC as per Card for takotsubo cardiomyopathy, failed S&S pending reeval, TF via NGT, Neuro exam stable  12/23: BD#15. VIVIEN o/n, neuro stable. EVD clamped. 30%@30cc/hr  12/24 BD#16 VIVIEN overnight, spiked 102, EVD @ 0cm H2O, 3% @ 30cc/hr Goal WNL, Vasc following, TF via NGT, -200,   12/25: BD#17. VIVIEN overnight. Pan cx from 12/23, NGTD on CSF and blood. EVD clamped. 3% @15cc/hr, rate kept same overnight. NGT feeds at goal. SBP goal 160-200.   12/26: BD#18. VIVIEN overnight, neuro exam stable. NGTD from pan cx on 12/23. EVD removed today. 3% discontinued 12/25. NGT feeds at goal, IVF off. SBP goal 160-200.   12/27: BD#19 VIVIEN overnight, neuro stable. 3% at 15, stepdown status  12/28: BD20 VIVIEN overnight, neuro stable. 3% continues.  Patient became increasingly more somnolent and underwent LP for CSF high volume tap.  Temporary improvement.  Spiked fever, pancultured.  12/29: BD21 Patient increasingly more somnolent, EVD placed at bedside.    12/30: BD22 pt. is s/p R VPS placement, certas @5 POD#1, post op ct head c/a/p done. afebrile o/n.   1/1: BD#23: pt. is s/p R VPS placement, certas @5 POD#2, post op ct head c/a/p done. zosyn for enterobacter   1/2: BD #24. POD#3 s/p R VPS placement, CErtas @5. Dressings removed from head and abdomen. Cont zosyn for enterobacter and e. coli in urine, end date 1/4/21. Post-op imaging completed. Pend S&S re-eval, improvement in mental status/neuro exam.   1/3: BD25, POD4. SBP goals relaxed 120-200. zosyn for enterobacter UTI until 1/4. FOB+, protonix bid started, vivien o/n  1/4: BD 26, VIVIEN o/n  1/5: BD 27, VIVIEN o/n   1/6: BD 28, POD 7 VPS (Certas @ 5). VIVIEN overnight. Plan for PEG with GI Thursday. Repeat Covid swab negative. Stepdown status.  1/7: BD 29, POD 8. VIVIEN overnight. PEG placed at bedside by GI today. Stepdown status  1/8: BD 30 POD 9, VIVIEN o/n, resume TF 24 hrs after PEG, stepdown   1/9 BD 31. POD 10. No events overnight. s/p PEG. Pending AR vs JOSE  1/10 BD32, POD11. Lethargic but arousable with stimulation, CTH was ordered and demonstrated stable scan in comparison from 1/1. Continue to trend Na, this  improved to 135 on 2%@50/hr and salt tabs started. Pending AR vs JOSE.  1/11: BD#33 POD#12. VIVIEN overnight, peripheral IV x2 inserted. F/u pan cx from 1/10, NGTD. Cont 2% @50cc/hr, f/u Na in am. Dispo pend AR vs JOSE.   1/12: BD#34 POD#13: Afebrile, on 2% at 25cc/hr, Na 137, pending AM labs. Pending JOSE placement. Abx to stop today, fever work-up negative. COVID swab for dispo planning.  1/13: BD35 Neuro stable. Off 2%, Na stable. Started on Zosyn for UTI. Pending dispo to JOSE.  1/14 POD#15 VIVIEN overnight, Neuro exam stable, staples DC prior to discharge, TF via PEG, pending JOSE placement, Zosyn UTI til 1/15  1/15: POD #16 Episode of desaturating overnight, improved with chest PT, suctioning, mucomyst, CXR obtained. pending rehab  1/16: POD17 VIVIEN overnight, neuro stable        Vital Signs Last 24 Hrs  T(C): 36.7 (15 Christian 2021 20:48), Max: 37.4 (15 Christian 2021 18:57)  T(F): 98.1 (15 Christian 2021 20:48), Max: 99.3 (15 Christian 2021 18:57)  HR: 98 (15 Christian 2021 20:48) (84 - 99)  BP: 136/59 (15 Christian 2021 20:48) (136/59 - 186/77)  BP(mean): 109 (15 Christian 2021 17:25) (94 - 109)  RR: 20 (15 Christian 2021 20:48) (18 - 20)  SpO2: 95% (15 Christian 2021 20:48) (94% - 100%)    I&O's Detail    14 Jan 2021 07:01  -  15 Christian 2021 07:00  --------------------------------------------------------  IN:    Glucerna: 1200 mL  Total IN: 1200 mL    OUT:    Voided (mL): 1050 mL  Total OUT: 1050 mL    Total NET: 150 mL      15 Christian 2021 07:01  -  16 Jan 2021 03:44  --------------------------------------------------------  IN:    Enteral Tube Flush: 90 mL    Glucerna: 750 mL  Total IN: 840 mL    OUT:    Voided (mL): 200 mL  Total OUT: 200 mL    Total NET: 640 mL        I&O's Summary    14 Jan 2021 07:01  -  15 Christian 2021 07:00  --------------------------------------------------------  IN: 1200 mL / OUT: 1050 mL / NET: 150 mL    15 Christian 2021 07:01  -  16 Jan 2021 03:44  --------------------------------------------------------  IN: 840 mL / OUT: 200 mL / NET: 640 mL        PHYSICAL EXAM:  General: NAD,  A&O x 1-2   HEENT: PERRL 3mm, EOMI b/l, face symmetric, tongue midline, +hard of hearing. Right scalp incision C/D/I  Cardiovascular: RRR, normal S1 and S2   Respiratory: lungs CTAB, no wheezing, rhonchi, or crackles   GI: normoactive BS to auscultation, abd soft, NTND, +PEG, Right side incision C/D/I   Neuro: speech clear, no dysmetria, no pronator drift, following commands   ZAFAR x4 spontaneously and with good strength  Extremities: distal pulses 2+ throughout      TUBES/LINES:  [] CVC  [] A-line  [] Lumbar Drain  [] Ventriculostomy  [] Other    DIET:  [] NPO  [] Mechanical  [x] Tube feeds    LABS:                        9.5    7.72  )-----------( 433      ( 15 Christian 2021 06:29 )             31.0     01-15    144  |  104  |  15  ----------------------------<  180<H>  3.7   |  28  |  0.39<L>    Ca    9.4      15 Christian 2021 06:29  Phos  3.8     01-15  Mg     1.9     01-15              CAPILLARY BLOOD GLUCOSE          Drug Levels: [] N/A  Vancomycin Level, Trough: 7.7 ug/mL (01-12 @ 05:47)  Vancomycin Level, Trough: 9.5 ug/mL (01-11 @ 17:22)    CSF Analysis: [] N/A      Allergies    No Known Allergies    Intolerances      MEDICATIONS:  Antibiotics:  nystatin    Suspension 893199 Unit(s) Oral four times a day    Neuro:  acetaminophen    Suspension .. 650 milliGRAM(s) Oral every 6 hours PRN  lacosamide Solution 150 milliGRAM(s) Oral every 12 hours    Anticoagulation:  aspirin enteric coated 81 milliGRAM(s) Oral daily  enoxaparin Injectable 40 milliGRAM(s) SubCutaneous at bedtime    OTHER:  acetylcysteine 20%  Inhalation 4 milliLiter(s) Inhalation three times a day PRN  albuterol/ipratropium for Nebulization 3 milliLiter(s) Nebulizer every 6 hours  amLODIPine   Tablet 10 milliGRAM(s) Oral every 24 hours  atorvastatin 40 milliGRAM(s) Oral at bedtime  buDESOnide    Inhalation Suspension 0.5 milliGRAM(s) Inhalation every 12 hours  chlorhexidine 0.12% Liquid 15 milliLiter(s) Oral Mucosa two times a day  chlorhexidine 2% Cloths 1 Application(s) Topical <User Schedule>  dextrose 40% Gel 15 Gram(s) Oral once  dextrose 50% Injectable 25 Gram(s) IV Push once  fludroCORTISONE 0.2 milliGRAM(s) Oral every 12 hours  glucagon  Injectable 1 milliGRAM(s) IntraMuscular once  labetalol Injectable 10 milliGRAM(s) IV Push every 4 hours PRN  lisinopril 10 milliGRAM(s) Oral daily  montelukast 10 milliGRAM(s) Oral daily  pantoprazole   Suspension 40 milliGRAM(s) Oral two times a day    IVF:  cyanocobalamin 1000 MICROGram(s) Oral daily  ferrous    sulfate 325 milliGRAM(s) Oral daily  sodium chloride 2 Gram(s) Oral every 8 hours    CULTURES:  Culture Results:   No growth to date (01-10 @ 17:24)  Culture Results:   No growth at 5 days. (01-10 @ 10:46)    RADIOLOGY & ADDITIONAL TESTS:      ASSESSMENT:  76 year old Female  with PMHx of COPD, asthma, HLD, HTN, BIBEMS to [facility] from home after HHA found patient down on the floor covered in non-bloody vomitus. CTH reveals diffuse subarachnoid hemorrhage mainly at basilar cisterns with IVH and obstructive hydrocephalus, CTA shows 3mm left pcomm aneurysm, now s/p bedside right frontal EVD placement 12/10, s/p cerebral angiogram for coil embolization of left PCOMM aneurysm 12/10, now   s/p cerebral angio for verapamil c/b device malfunction of Proglide, s/p right groin cutdown for removal of proglide catheter and femoral artery repair (12/17/2020), NIHSS2 HH3 MF4), s/p EVD removal 12/25,  s/p bedside EVD placement 12/29, POD#17 from R VPS certas at 5 and  s/p PEG.       HEADACHE    Handoff    MEWS Score    Hyperlipidemia    Hypertension    COPD (chronic obstructive pulmonary disease)    Asthma    COPD (chronic obstructive pulmonary disease)    Asthma    Hydrocephalus, adult    Injury of right femoral artery    Cerebral artery vasospasm    SAH (subarachnoid hemorrhage)    Hydrocephalus, adult    Injury of femoral artery    Cerebral artery vasospasm    SAH (subarachnoid hemorrhage)    Subarachnoid bleed    Postoperative state    Obstructive hydrocephalus    Anemia due to acute blood loss    Preoperative clearance    Centrilobular emphysema    Mild persistent asthma without complication    Subarachnoid bleed    Essential hypertension    Pure hypercholesterolemia    Ventriculoperitoneal shunt    Insertion of external ventricular drain    Vascular surgery procedure    Angiogram, carotid and cerebral, bilateral    Angiogram, cerebral, with intracranial aneurysm embolization    No significant past surgical history    HEADACHE    90+    [email or web address]        PLAN:  - neuro checks  - vitals checks  - pain control  - cont Vimpat  - cont ASA 81  - cont florinef, salt tabs  - regular diet  - bowel regimen  - off Zosyn  - DVT PROPHYLAXIS: [x] Venodynes [x] Heparin/Lovenox    DISPOSITION: stepdown status, full code, dispo pending    d/w Dr. Serrano        Assessment:  Present when checked    []  GCS  E   V  M     Heart Failure: []Acute, [] acute on chronic , []chronic  Heart Failure:  [] Diastolic (HFpEF), [] Systolic (HFrEF), []Combined (HFpEF and HFrEF), [] RHF, [] Pulm HTN, [] Other    [] MICHAELLE, [] ATN, [] AIN, [] other  [] CKD1, [] CKD2, [] CKD 3, [] CKD 4, [] CKD 5, []ESRD    Encephalopathy: [] Metabolic, [] Hepatic, [] toxic, [] Neurological, [] Other    Abnormal Nurtitional Status: [] malnurtition (see nutrition note), [ ]underweight: BMI < 19, [] morbid obesity: BMI >40, [] Cachexia    [] Sepsis  [] hypovolemic shock,[] cardiogenic shock, [] hemorrhagic shock, [] neuogenic shock  [] Acute Respiratory Failure  []Cerebral edema, [] Brain compression/ herniation,   [] Functional quadriplegia  [] Acute blood loss anemia

## 2021-01-17 LAB
ANION GAP SERPL CALC-SCNC: 10 MMOL/L — SIGNIFICANT CHANGE UP (ref 5–17)
BUN SERPL-MCNC: 15 MG/DL — SIGNIFICANT CHANGE UP (ref 7–23)
CALCIUM SERPL-MCNC: 8.7 MG/DL — SIGNIFICANT CHANGE UP (ref 8.4–10.5)
CHLORIDE SERPL-SCNC: 109 MMOL/L — HIGH (ref 96–108)
CO2 SERPL-SCNC: 27 MMOL/L — SIGNIFICANT CHANGE UP (ref 22–31)
CREAT SERPL-MCNC: 0.38 MG/DL — LOW (ref 0.5–1.3)
GLUCOSE SERPL-MCNC: 210 MG/DL — HIGH (ref 70–99)
HCT VFR BLD CALC: 28.8 % — LOW (ref 34.5–45)
HGB BLD-MCNC: 8.7 G/DL — LOW (ref 11.5–15.5)
MAGNESIUM SERPL-MCNC: 2 MG/DL — SIGNIFICANT CHANGE UP (ref 1.6–2.6)
MCHC RBC-ENTMCNC: 29.5 PG — SIGNIFICANT CHANGE UP (ref 27–34)
MCHC RBC-ENTMCNC: 30.2 GM/DL — LOW (ref 32–36)
MCV RBC AUTO: 97.6 FL — SIGNIFICANT CHANGE UP (ref 80–100)
NRBC # BLD: 0 /100 WBCS — SIGNIFICANT CHANGE UP (ref 0–0)
PHOSPHATE SERPL-MCNC: 3.3 MG/DL — SIGNIFICANT CHANGE UP (ref 2.5–4.5)
PLATELET # BLD AUTO: 405 K/UL — HIGH (ref 150–400)
POTASSIUM SERPL-MCNC: 3.6 MMOL/L — SIGNIFICANT CHANGE UP (ref 3.5–5.3)
POTASSIUM SERPL-SCNC: 3.6 MMOL/L — SIGNIFICANT CHANGE UP (ref 3.5–5.3)
RBC # BLD: 2.95 M/UL — LOW (ref 3.8–5.2)
RBC # FLD: 18 % — HIGH (ref 10.3–14.5)
SODIUM SERPL-SCNC: 146 MMOL/L — HIGH (ref 135–145)
WBC # BLD: 6.81 K/UL — SIGNIFICANT CHANGE UP (ref 3.8–10.5)
WBC # FLD AUTO: 6.81 K/UL — SIGNIFICANT CHANGE UP (ref 3.8–10.5)

## 2021-01-17 PROCEDURE — 99024 POSTOP FOLLOW-UP VISIT: CPT

## 2021-01-17 RX ORDER — MAGNESIUM SULFATE 500 MG/ML
1 VIAL (ML) INJECTION ONCE
Refills: 0 | Status: COMPLETED | OUTPATIENT
Start: 2021-01-17 | End: 2021-01-17

## 2021-01-17 RX ORDER — POTASSIUM CHLORIDE 20 MEQ
40 PACKET (EA) ORAL EVERY 4 HOURS
Refills: 0 | Status: COMPLETED | OUTPATIENT
Start: 2021-01-17 | End: 2021-01-17

## 2021-01-17 RX ADMIN — PANTOPRAZOLE SODIUM 40 MILLIGRAM(S): 20 TABLET, DELAYED RELEASE ORAL at 05:53

## 2021-01-17 RX ADMIN — ENOXAPARIN SODIUM 40 MILLIGRAM(S): 100 INJECTION SUBCUTANEOUS at 21:04

## 2021-01-17 RX ADMIN — Medication 0.5 MILLIGRAM(S): at 05:53

## 2021-01-17 RX ADMIN — LACOSAMIDE 150 MILLIGRAM(S): 50 TABLET ORAL at 17:29

## 2021-01-17 RX ADMIN — Medication 50 GRAM(S): at 08:07

## 2021-01-17 RX ADMIN — Medication 3 MILLILITER(S): at 09:10

## 2021-01-17 RX ADMIN — SODIUM CHLORIDE 2 GRAM(S): 9 INJECTION INTRAMUSCULAR; INTRAVENOUS; SUBCUTANEOUS at 21:04

## 2021-01-17 RX ADMIN — SODIUM CHLORIDE 2 GRAM(S): 9 INJECTION INTRAMUSCULAR; INTRAVENOUS; SUBCUTANEOUS at 05:53

## 2021-01-17 RX ADMIN — Medication 325 MILLIGRAM(S): at 08:27

## 2021-01-17 RX ADMIN — LACOSAMIDE 150 MILLIGRAM(S): 50 TABLET ORAL at 05:54

## 2021-01-17 RX ADMIN — MONTELUKAST 10 MILLIGRAM(S): 4 TABLET, CHEWABLE ORAL at 08:26

## 2021-01-17 RX ADMIN — AMLODIPINE BESYLATE 10 MILLIGRAM(S): 2.5 TABLET ORAL at 08:26

## 2021-01-17 RX ADMIN — LISINOPRIL 10 MILLIGRAM(S): 2.5 TABLET ORAL at 05:53

## 2021-01-17 RX ADMIN — Medication 40 MILLIEQUIVALENT(S): at 16:14

## 2021-01-17 RX ADMIN — Medication 81 MILLIGRAM(S): at 08:26

## 2021-01-17 RX ADMIN — FLUDROCORTISONE ACETATE 0.2 MILLIGRAM(S): 0.1 TABLET ORAL at 05:53

## 2021-01-17 RX ADMIN — PANTOPRAZOLE SODIUM 40 MILLIGRAM(S): 20 TABLET, DELAYED RELEASE ORAL at 17:30

## 2021-01-17 RX ADMIN — ATORVASTATIN CALCIUM 40 MILLIGRAM(S): 80 TABLET, FILM COATED ORAL at 21:04

## 2021-01-17 RX ADMIN — FLUDROCORTISONE ACETATE 0.2 MILLIGRAM(S): 0.1 TABLET ORAL at 17:30

## 2021-01-17 RX ADMIN — SODIUM CHLORIDE 2 GRAM(S): 9 INJECTION INTRAMUSCULAR; INTRAVENOUS; SUBCUTANEOUS at 14:25

## 2021-01-17 RX ADMIN — Medication 3 MILLILITER(S): at 16:14

## 2021-01-17 RX ADMIN — Medication 40 MILLIEQUIVALENT(S): at 12:02

## 2021-01-17 RX ADMIN — CHLORHEXIDINE GLUCONATE 1 APPLICATION(S): 213 SOLUTION TOPICAL at 05:53

## 2021-01-17 RX ADMIN — PREGABALIN 1000 MICROGRAM(S): 225 CAPSULE ORAL at 12:02

## 2021-01-17 RX ADMIN — Medication 0.5 MILLIGRAM(S): at 17:30

## 2021-01-17 RX ADMIN — Medication 3 MILLILITER(S): at 21:04

## 2021-01-17 RX ADMIN — Medication 3 MILLILITER(S): at 04:48

## 2021-01-17 NOTE — PROGRESS NOTE ADULT - SUBJECTIVE AND OBJECTIVE BOX
HPI:  74y/o F with PMHx sig for COPD, asthma, HLD, HTN, BIBEMS to [facility] from home after HHA discovered patient found down in floor covered in non-bloody vomitus. Perr HHA Pt went to the bathroom and was taking a long time, went in to check on her and found her sitting on floor, awake, however with vomitus on front of shirt. On arrival to [facility], patient was taken for stat head CT, which revealed diffuse subarachnoid hemorrhage mainly at basilar cisterns with IVH and obstructive hydrocephalus. Patient was given a bolus of 1g keppra and dilaudid pushes for generalized headache. CTA concerning for 3mm left pcomm aneurysm and a 1.6mm outpouching of distal cavernous segment of the left internal carotid artery likely aneurysm. NIHSS2 3 MF4. Patient was transferred to [facility] for further intervention. Patient currently reports headaches. Pt denies acute changes in vision, seizures, CP, SOB, weakness/paresthesias of arms or legs. (09 Dec 2020 23:37)    OVERNIGHT EVENTS: Rita o/n, neuro stable    Hospital Course:   12/9: BD1 Patient admitted for SAH HH3, MF4.   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 EVD at 5, on Levo overnight, nimodipine discontinued d/t hypotension  12/12: BD4 POD#2, RITA overnight, neuro stable, passed TOV  12/13: BD5 POD#3: RITA x 24 hrs  12/14: BD6 POD#4. RITA o/n, neuro stable. EVD at 5cm H2O  12/15: BD7 POD #5 RITA overnight, neuro stable. EVD remains at 5. Plan for CTA today.   12/16: BD8 POD #6 RITA overnight, neuro stable, EVD at 0, on Levo, started on 3% at 15 overnight   12/17: BD9 POD#1 s/p cerebral angio for verapamil c/b device malfunction of Proglide, s/p right groin cutdown for removal of proglide catheter and femoral artery repair.  RITA overnight, neuro stable, EVD at 0. patient remained intubated overnight, on propofol for sedation, and vEEG  12/18: BD#10, POD#8 s/p coil/embo of L pcomm aneurysm, POD#2 s/p IA verapamil, POD#2 R groin cutdown for removal of proglide catheter w/ repair of femoral artery. RITA overnight. EVD remains open @0cmH2O   12/19: BD#11, POD#9 s/p coil/embo of L pcomm aneurysm. POD#3 s/p IA verapamil, POD#3 s/p R femoral artery cutdown of proglide catheter w/ femoral artery repair. RITA overnight. EVD remains open @0cmH2O. EEG shows b/l frontal sharp discharges, cont monitoring, vimpat was increased yesterday. Gentle hydration, total IVF 50cc/hr. Cont vanc/zosyn for empiric coverage, next vanc trough due @1pm on 12/19. F/u daily CXR.   12/20 BD#12 POD#10 RITA overnight, Neuro exam stable, EVD @ 0cm H2O, vEEG, 3% @ 15 goal WNL, TF via NGT  12/21: BD13, POD11 RITA overnight. EVD at 5cmH20 (raised yesterday), vEEG in place  12/22 BD#14, EVD raised to 15 yesterday, 3% @ 30cc Goal WNL, -180, Milrinone, TTE prior to DC as per Card for takotsubo cardiomyopathy, failed S&S pending reeval, TF via NGT, Neuro exam stable  12/23: BD#15. RITA o/n, neuro stable. EVD clamped. 30%@30cc/hr  12/24 BD#16 RITA overnight, spiked 102, EVD @ 0cm H2O, 3% @ 30cc/hr Goal WNL, Vasc following, TF via NGT, -200,   12/25: BD#17. RITA overnight. Pan cx from 12/23, NGTD on CSF and blood. EVD clamped. 3% @15cc/hr, rate kept same overnight. NGT feeds at goal. SBP goal 160-200.   12/26: BD#18. RITA overnight, neuro exam stable. NGTD from pan cx on 12/23. EVD removed today. 3% discontinued 12/25. NGT feeds at goal, IVF off. SBP goal 160-200.   12/27: BD#19 RITA overnight, neuro stable. 3% at 15, stepdown status  12/28: BD20 RITA overnight, neuro stable. 3% continues.  Patient became increasingly more somnolent and underwent LP for CSF high volume tap.  Temporary improvement.  Spiked fever, pancultured.  12/29: BD21 Patient increasingly more somnolent, EVD placed at bedside.    12/30: BD22 pt. is s/p R VPS placement, certas @5 POD#1, post op ct head c/a/p done. afebrile o/n.   1/1: BD#23: pt. is s/p R VPS placement, certas @5 POD#2, post op ct head c/a/p done. zosyn for enterobacter   1/2: BD #24. POD#3 s/p R VPS placement, CErtas @5. Dressings removed from head and abdomen. Cont zosyn for enterobacter and e. coli in urine, end date 1/4/21. Post-op imaging completed. Pend S&S re-eval, improvement in mental status/neuro exam.   1/3: BD25, POD4. SBP goals relaxed 120-200. zosyn for enterobacter UTI until 1/4. FOB+, protonix bid started, rita o/n  1/4: BD 26, RITA o/n  1/5: BD 27, RITA o/n   1/6: BD 28, POD 7 VPS (Certas @ 5). RITA overnight. Plan for PEG with GI Thursday. Repeat Covid swab negative. Stepdown status.  1/7: BD 29, POD 8. RITA overnight. PEG placed at bedside by GI today. Stepdown status  1/8: BD 30 POD 9, RITA o/n, resume TF 24 hrs after PEG, stepdown   1/9 BD 31. POD 10. No events overnight. s/p PEG. Pending AR vs JOHANNA  1/10 BD32, POD11. Lethargic but arousable with stimulation, CTH was ordered and demonstrated stable scan in comparison from 1/1. Continue to trend Na, this  improved to 135 on 2%@50/hr and salt tabs started. Pending AR vs JOHANNA.  1/11: BD#33 POD#12. RITA overnight, peripheral IV x2 inserted. F/u pan cx from 1/10, NGTD. Cont 2% @50cc/hr, f/u Na in am. Dispo pend AR vs JOHANNA.   1/12: BD#34 POD#13: Afebrile, on 2% at 25cc/hr, Na 137, pending AM labs. Pending JOHANNA placement. Abx to stop today, fever work-up negative. COVID swab for dispo planning.  1/13: BD35 Neuro stable. Off 2%, Na stable. Started on Zosyn for UTI. Pending dispo to JOHANNA.  1/14 POD#15 RITA overnight, Neuro exam stable, staples DC prior to discharge, TF via PEG, pending JOHANNA placement, Zosyn UTI til 1/15  1/15: POD #16 Episode of desaturating overnight, improved with chest PT, suctioning, mucomyst, CXR obtained. pending rehab  1/16: POD17 RITA overnight, neuro stable  1/17: POD18. RITA o/n, neuro stable. pending johanna    Vital Signs Last 24 Hrs  T(C): 36.7 (17 Jan 2021 08:08), Max: 37.7 (16 Jan 2021 20:20)  T(F): 98.1 (17 Jan 2021 08:08), Max: 99.8 (16 Jan 2021 20:20)  HR: 98 (17 Jan 2021 08:08) (90 - 99)  BP: 165/82 (17 Jan 2021 08:08) (129/69 - 171/73)  BP(mean): --  RR: 18 (17 Jan 2021 08:08) (16 - 18)  SpO2: 96% (17 Jan 2021 08:08) (92% - 100%)    I&O's Summary    16 Jan 2021 07:01  -  17 Jan 2021 07:00  --------------------------------------------------------  IN: 1450 mL / OUT: 700 mL / NET: 750 mL    17 Jan 2021 07:01  -  17 Jan 2021 11:12  --------------------------------------------------------  IN: 300 mL / OUT: 0 mL / NET: 300 mL        PHYSICAL EXAM:  General: NAD,  A&O x 1-2   HEENT: PERRL 3mm, EOMI b/l, face symmetric, tongue midline, +hard of hearing. Right scalp incision C/D/I  Cardiovascular: RRR, normal S1 and S2   Respiratory: lungs CTAB, no wheezing, rhonchi, or crackles   GI: normoactive BS to auscultation, abd soft, NTND, +PEG, Right side incision C/D/I   Neuro: speech clear, no dysmetria, no pronator drift, following commands   ZAFAR x4 spontaneously and with good strength  Extremities: distal pulses 2+ throughout    TUBES/LINES:  [] Soriano  [] Lumbar Drain  [] Wound Drains  [] Others      DIET:  [] NPO  [] Mechanical  [x] Tube feeds    LABS:                        8.7    6.81  )-----------( 405      ( 17 Jan 2021 07:37 )             28.8     01-17    146<H>  |  109<H>  |  15  ----------------------------<  210<H>  3.6   |  27  |  0.38<L>    Ca    8.7      17 Jan 2021 07:37  Phos  3.3     01-17  Mg     2.0     01-17              CAPILLARY BLOOD GLUCOSE          Drug Levels: [] N/A  Vancomycin Level, Trough: 7.7 ug/mL (01-12 @ 05:47)  Vancomycin Level, Trough: 9.5 ug/mL (01-11 @ 17:22)    CSF Analysis: [] N/A      Allergies    No Known Allergies    Intolerances      MEDICATIONS:  Antibiotics:    Neuro:  acetaminophen    Suspension .. 650 milliGRAM(s) Oral every 6 hours PRN  lacosamide Solution 150 milliGRAM(s) Oral every 12 hours    Anticoagulation:  aspirin enteric coated 81 milliGRAM(s) Oral daily  enoxaparin Injectable 40 milliGRAM(s) SubCutaneous at bedtime    OTHER:  acetylcysteine 20%  Inhalation 4 milliLiter(s) Inhalation three times a day PRN  albuterol/ipratropium for Nebulization 3 milliLiter(s) Nebulizer every 6 hours  amLODIPine   Tablet 10 milliGRAM(s) Oral every 24 hours  atorvastatin 40 milliGRAM(s) Oral at bedtime  buDESOnide    Inhalation Suspension 0.5 milliGRAM(s) Inhalation every 12 hours  chlorhexidine 0.12% Liquid 15 milliLiter(s) Oral Mucosa two times a day  chlorhexidine 2% Cloths 1 Application(s) Topical <User Schedule>  dextrose 40% Gel 15 Gram(s) Oral once  dextrose 50% Injectable 25 Gram(s) IV Push once  fludroCORTISONE 0.2 milliGRAM(s) Oral every 12 hours  glucagon  Injectable 1 milliGRAM(s) IntraMuscular once  labetalol Injectable 10 milliGRAM(s) IV Push every 4 hours PRN  lisinopril 10 milliGRAM(s) Oral daily  montelukast 10 milliGRAM(s) Oral daily  pantoprazole   Suspension 40 milliGRAM(s) Oral two times a day    IVF:  cyanocobalamin 1000 MICROGram(s) Oral daily  ferrous    sulfate 325 milliGRAM(s) Oral daily  potassium chloride   Solution 40 milliEquivalent(s) Oral every 4 hours  sodium chloride 2 Gram(s) Oral every 8 hours    CULTURES:  Culture Results:   No growth to date (01-10 @ 17:24)  Culture Results:   No growth at 5 days. (01-10 @ 10:46)    RADIOLOGY & ADDITIONAL TESTS:      ASSESSMENT:  76 year old Female  with PMHx of COPD, asthma, HLD, HTN, BIBEMS to [facility] from home after HHA found patient down on the floor covered in non-bloody vomitus. CTH reveals diffuse subarachnoid hemorrhage mainly at basilar cisterns with IVH and obstructive hydrocephalus, CTA shows 3mm left pcomm aneurysm, now s/p bedside right frontal EVD placement 12/10, s/p cerebral angiogram for coil embolization of left PCOMM aneurysm 12/10, now   s/p cerebral angio for verapamil c/b device malfunction of Proglide, s/p right groin cutdown for removal of proglide catheter and femoral artery repair (12/17/2020), NIHSS2 HH3 MF4), s/p EVD removal 12/25,  s/p bedside EVD placement 12/29, POD#18 from R VPS certas at 5 and  s/p PEG.     HEADACHE    Handoff    MEWS Score    Hyperlipidemia    Hypertension    COPD (chronic obstructive pulmonary disease)    Asthma    COPD (chronic obstructive pulmonary disease)    Asthma    Hydrocephalus, adult    Injury of right femoral artery    Cerebral artery vasospasm    SAH (subarachnoid hemorrhage)    Hydrocephalus, adult    Injury of femoral artery    Cerebral artery vasospasm    SAH (subarachnoid hemorrhage)    Subarachnoid bleed    Postoperative state    Obstructive hydrocephalus    Anemia due to acute blood loss    Preoperative clearance    Centrilobular emphysema    Mild persistent asthma without complication    Subarachnoid bleed    Essential hypertension    Pure hypercholesterolemia    Ventriculoperitoneal shunt    Insertion of external ventricular drain    Vascular surgery procedure    Angiogram, carotid and cerebral, bilateral    Angiogram, cerebral, with intracranial aneurysm embolization    No significant past surgical history    HEADACHE    90+    [email or web address]        PLAN:  NEURO:  - neuro checks  - pain control   - vimpat 150 bid    CARDIOVASCULAR:  - SBP goal <200  - cont lisinopril, norvasc, lipitor  - ASA 81 for b/l carotid stenosis    PULMONARY:  - satting well RA  - cont montelukast, mucomyst, duonebs, pulmicort    RENAL:  - IVL  - repletions prn   - normonatremia goal, cont salt tabs 2q8, florinef 0.2 bid    GI:  - TF via PEG   - bowel regimen  - gi ppx protonix    HEME:  - h/h stable  - SCDs/SQL    ID:  - afebrile  - no leukocytosis    ENDO:  - glucose goal 140-180    DVT PROPHYLAXIS:  [x] Venodynes                                [x] Heparin/Lovenox    DISPOSITION: stable, full code, pending JOHANNA auth    d/w Dr. Serrano    Assessment:  Present when checked    []  GCS  E   V  M     Heart Failure: []Acute, [] acute on chronic , []chronic  Heart Failure:  [] Diastolic (HFpEF), [] Systolic (HFrEF), []Combined (HFpEF and HFrEF), [] RHF, [] Pulm HTN, [] Other    [] MICHAELLE, [] ATN, [] AIN, [] other  [] CKD1, [] CKD2, [] CKD 3, [] CKD 4, [] CKD 5, []ESRD    Encephalopathy: [] Metabolic, [] Hepatic, [] toxic, [] Neurological, [] Other    Abnormal Nurtitional Status: [] malnurtition (see nutrition note), [ ]underweight: BMI < 19, [] morbid obesity: BMI >40, [] Cachexia    [] Sepsis  [] hypovolemic shock,[] cardiogenic shock, [] hemorrhagic shock, [] neuogenic shock  [] Acute Respiratory Failure  []Cerebral edema, [] Brain compression/ herniation,   [] Functional quadriplegia  [] Acute blood loss anemia

## 2021-01-18 ENCOUNTER — TRANSCRIPTION ENCOUNTER (OUTPATIENT)
Age: 76
End: 2021-01-18

## 2021-01-18 LAB
ANION GAP SERPL CALC-SCNC: 11 MMOL/L — SIGNIFICANT CHANGE UP (ref 5–17)
BUN SERPL-MCNC: 18 MG/DL — SIGNIFICANT CHANGE UP (ref 7–23)
CALCIUM SERPL-MCNC: 9.3 MG/DL — SIGNIFICANT CHANGE UP (ref 8.4–10.5)
CHLORIDE SERPL-SCNC: 109 MMOL/L — HIGH (ref 96–108)
CO2 SERPL-SCNC: 29 MMOL/L — SIGNIFICANT CHANGE UP (ref 22–31)
CREAT SERPL-MCNC: 0.39 MG/DL — LOW (ref 0.5–1.3)
GLUCOSE SERPL-MCNC: 238 MG/DL — HIGH (ref 70–99)
HCT VFR BLD CALC: 32.1 % — LOW (ref 34.5–45)
HGB BLD-MCNC: 9.8 G/DL — LOW (ref 11.5–15.5)
MAGNESIUM SERPL-MCNC: 2.1 MG/DL — SIGNIFICANT CHANGE UP (ref 1.6–2.6)
MCHC RBC-ENTMCNC: 30 PG — SIGNIFICANT CHANGE UP (ref 27–34)
MCHC RBC-ENTMCNC: 30.5 GM/DL — LOW (ref 32–36)
MCV RBC AUTO: 98.2 FL — SIGNIFICANT CHANGE UP (ref 80–100)
NRBC # BLD: 0 /100 WBCS — SIGNIFICANT CHANGE UP (ref 0–0)
PHOSPHATE SERPL-MCNC: 3.3 MG/DL — SIGNIFICANT CHANGE UP (ref 2.5–4.5)
PLATELET # BLD AUTO: 462 K/UL — HIGH (ref 150–400)
POTASSIUM SERPL-MCNC: 3.6 MMOL/L — SIGNIFICANT CHANGE UP (ref 3.5–5.3)
POTASSIUM SERPL-SCNC: 3.6 MMOL/L — SIGNIFICANT CHANGE UP (ref 3.5–5.3)
RBC # BLD: 3.27 M/UL — LOW (ref 3.8–5.2)
RBC # FLD: 18.1 % — HIGH (ref 10.3–14.5)
SARS-COV-2 RNA SPEC QL NAA+PROBE: NEGATIVE — SIGNIFICANT CHANGE UP
SODIUM SERPL-SCNC: 149 MMOL/L — HIGH (ref 135–145)
WBC # BLD: 9.22 K/UL — SIGNIFICANT CHANGE UP (ref 3.8–10.5)
WBC # FLD AUTO: 9.22 K/UL — SIGNIFICANT CHANGE UP (ref 3.8–10.5)

## 2021-01-18 PROCEDURE — 36415 COLL VENOUS BLD VENIPUNCTURE: CPT

## 2021-01-18 PROCEDURE — 71045 X-RAY EXAM CHEST 1 VIEW: CPT | Mod: 26

## 2021-01-18 PROCEDURE — 99024 POSTOP FOLLOW-UP VISIT: CPT

## 2021-01-18 RX ORDER — SIMVASTATIN 20 MG/1
1 TABLET, FILM COATED ORAL
Qty: 0 | Refills: 0 | DISCHARGE

## 2021-01-18 RX ORDER — LISINOPRIL 2.5 MG/1
1 TABLET ORAL
Qty: 0 | Refills: 0 | DISCHARGE
Start: 2021-01-18

## 2021-01-18 RX ORDER — ACETAMINOPHEN 500 MG
20.31 TABLET ORAL
Qty: 0 | Refills: 0 | DISCHARGE
Start: 2021-01-18

## 2021-01-18 RX ORDER — LACOSAMIDE 50 MG/1
15 TABLET ORAL
Qty: 0 | Refills: 0 | DISCHARGE
Start: 2021-01-18

## 2021-01-18 RX ORDER — LABETALOL HCL 100 MG
2 TABLET ORAL
Qty: 0 | Refills: 0 | DISCHARGE
Start: 2021-01-18

## 2021-01-18 RX ORDER — SODIUM CHLORIDE 9 MG/ML
1000 INJECTION, SOLUTION INTRAVENOUS
Refills: 0 | Status: DISCONTINUED | OUTPATIENT
Start: 2021-01-18 | End: 2021-01-26

## 2021-01-18 RX ORDER — MONTELUKAST 4 MG/1
1 TABLET, CHEWABLE ORAL
Qty: 0 | Refills: 0 | DISCHARGE

## 2021-01-18 RX ORDER — ATORVASTATIN CALCIUM 80 MG/1
1 TABLET, FILM COATED ORAL
Qty: 0 | Refills: 0 | DISCHARGE
Start: 2021-01-18

## 2021-01-18 RX ORDER — INSULIN LISPRO 100/ML
VIAL (ML) SUBCUTANEOUS
Refills: 0 | Status: DISCONTINUED | OUTPATIENT
Start: 2021-01-18 | End: 2021-01-26

## 2021-01-18 RX ORDER — MONTELUKAST 4 MG/1
1 TABLET, CHEWABLE ORAL
Qty: 0 | Refills: 0 | DISCHARGE
Start: 2021-01-18

## 2021-01-18 RX ORDER — OMEPRAZOLE 10 MG/1
1 CAPSULE, DELAYED RELEASE ORAL
Qty: 0 | Refills: 0 | DISCHARGE

## 2021-01-18 RX ORDER — FLUDROCORTISONE ACETATE 0.1 MG/1
2 TABLET ORAL
Qty: 0 | Refills: 0 | DISCHARGE
Start: 2021-01-18

## 2021-01-18 RX ORDER — SODIUM CHLORIDE 9 MG/ML
2 INJECTION INTRAMUSCULAR; INTRAVENOUS; SUBCUTANEOUS
Qty: 0 | Refills: 0 | DISCHARGE
Start: 2021-01-18

## 2021-01-18 RX ORDER — ASPIRIN/CALCIUM CARB/MAGNESIUM 324 MG
1 TABLET ORAL
Qty: 0 | Refills: 0 | DISCHARGE
Start: 2021-01-18

## 2021-01-18 RX ORDER — ENOXAPARIN SODIUM 100 MG/ML
40 INJECTION SUBCUTANEOUS
Qty: 0 | Refills: 0 | DISCHARGE
Start: 2021-01-18

## 2021-01-18 RX ORDER — BUDESONIDE, MICRONIZED 100 %
0.5 POWDER (GRAM) MISCELLANEOUS
Qty: 0 | Refills: 0 | DISCHARGE
Start: 2021-01-18

## 2021-01-18 RX ORDER — POTASSIUM CHLORIDE 20 MEQ
40 PACKET (EA) ORAL ONCE
Refills: 0 | Status: COMPLETED | OUTPATIENT
Start: 2021-01-18 | End: 2021-01-18

## 2021-01-18 RX ORDER — FERROUS SULFATE 325(65) MG
1 TABLET ORAL
Qty: 0 | Refills: 0 | DISCHARGE
Start: 2021-01-18

## 2021-01-18 RX ORDER — LISINOPRIL 2.5 MG/1
1 TABLET ORAL
Qty: 0 | Refills: 0 | DISCHARGE

## 2021-01-18 RX ORDER — AMLODIPINE BESYLATE 2.5 MG/1
1 TABLET ORAL
Qty: 0 | Refills: 0 | DISCHARGE
Start: 2021-01-18

## 2021-01-18 RX ORDER — FAMOTIDINE 10 MG/ML
1 INJECTION INTRAVENOUS
Qty: 0 | Refills: 0 | DISCHARGE

## 2021-01-18 RX ORDER — PANTOPRAZOLE SODIUM 20 MG/1
40 TABLET, DELAYED RELEASE ORAL
Qty: 0 | Refills: 0 | DISCHARGE
Start: 2021-01-18

## 2021-01-18 RX ORDER — IPRATROPIUM/ALBUTEROL SULFATE 18-103MCG
3 AEROSOL WITH ADAPTER (GRAM) INHALATION
Qty: 0 | Refills: 0 | DISCHARGE
Start: 2021-01-18

## 2021-01-18 RX ORDER — ACETYLCYSTEINE 200 MG/ML
4 VIAL (ML) MISCELLANEOUS
Qty: 0 | Refills: 0 | DISCHARGE
Start: 2021-01-18

## 2021-01-18 RX ORDER — PREGABALIN 225 MG/1
1 CAPSULE ORAL
Qty: 0 | Refills: 0 | DISCHARGE
Start: 2021-01-18

## 2021-01-18 RX ADMIN — Medication 81 MILLIGRAM(S): at 12:16

## 2021-01-18 RX ADMIN — Medication 0.5 MILLIGRAM(S): at 05:53

## 2021-01-18 RX ADMIN — ENOXAPARIN SODIUM 40 MILLIGRAM(S): 100 INJECTION SUBCUTANEOUS at 23:27

## 2021-01-18 RX ADMIN — AMLODIPINE BESYLATE 10 MILLIGRAM(S): 2.5 TABLET ORAL at 12:16

## 2021-01-18 RX ADMIN — PANTOPRAZOLE SODIUM 40 MILLIGRAM(S): 20 TABLET, DELAYED RELEASE ORAL at 17:57

## 2021-01-18 RX ADMIN — Medication 325 MILLIGRAM(S): at 12:16

## 2021-01-18 RX ADMIN — CHLORHEXIDINE GLUCONATE 15 MILLILITER(S): 213 SOLUTION TOPICAL at 17:55

## 2021-01-18 RX ADMIN — Medication 650 MILLIGRAM(S): at 17:55

## 2021-01-18 RX ADMIN — Medication 0.5 MILLIGRAM(S): at 17:55

## 2021-01-18 RX ADMIN — LISINOPRIL 10 MILLIGRAM(S): 2.5 TABLET ORAL at 05:54

## 2021-01-18 RX ADMIN — SODIUM CHLORIDE 2 GRAM(S): 9 INJECTION INTRAMUSCULAR; INTRAVENOUS; SUBCUTANEOUS at 14:38

## 2021-01-18 RX ADMIN — FLUDROCORTISONE ACETATE 0.2 MILLIGRAM(S): 0.1 TABLET ORAL at 05:54

## 2021-01-18 RX ADMIN — Medication 3 MILLILITER(S): at 04:05

## 2021-01-18 RX ADMIN — LACOSAMIDE 150 MILLIGRAM(S): 50 TABLET ORAL at 05:54

## 2021-01-18 RX ADMIN — CHLORHEXIDINE GLUCONATE 1 APPLICATION(S): 213 SOLUTION TOPICAL at 05:53

## 2021-01-18 RX ADMIN — Medication 3 MILLILITER(S): at 15:49

## 2021-01-18 RX ADMIN — SODIUM CHLORIDE 2 GRAM(S): 9 INJECTION INTRAMUSCULAR; INTRAVENOUS; SUBCUTANEOUS at 05:54

## 2021-01-18 RX ADMIN — PREGABALIN 1000 MICROGRAM(S): 225 CAPSULE ORAL at 12:16

## 2021-01-18 RX ADMIN — Medication 3 MILLILITER(S): at 23:27

## 2021-01-18 RX ADMIN — PANTOPRAZOLE SODIUM 40 MILLIGRAM(S): 20 TABLET, DELAYED RELEASE ORAL at 05:54

## 2021-01-18 RX ADMIN — ATORVASTATIN CALCIUM 40 MILLIGRAM(S): 80 TABLET, FILM COATED ORAL at 23:27

## 2021-01-18 RX ADMIN — FLUDROCORTISONE ACETATE 0.2 MILLIGRAM(S): 0.1 TABLET ORAL at 17:56

## 2021-01-18 RX ADMIN — SODIUM CHLORIDE 2 GRAM(S): 9 INJECTION INTRAMUSCULAR; INTRAVENOUS; SUBCUTANEOUS at 23:28

## 2021-01-18 RX ADMIN — MONTELUKAST 10 MILLIGRAM(S): 4 TABLET, CHEWABLE ORAL at 12:16

## 2021-01-18 RX ADMIN — LACOSAMIDE 150 MILLIGRAM(S): 50 TABLET ORAL at 17:54

## 2021-01-18 RX ADMIN — Medication 3 MILLILITER(S): at 09:24

## 2021-01-18 RX ADMIN — Medication 40 MILLIEQUIVALENT(S): at 12:17

## 2021-01-18 NOTE — PROGRESS NOTE ADULT - SUBJECTIVE AND OBJECTIVE BOX
HPI:  74y/o F with PMHx sig for COPD, asthma, HLD, HTN, BIBEMS to [facility] from home after HHA discovered patient found down in floor covered in non-bloody vomitus. Perr HHA Pt went to the bathroom and was taking a long time, went in to check on her and found her sitting on floor, awake, however with vomitus on front of shirt. On arrival to [facility], patient was taken for stat head CT, which revealed diffuse subarachnoid hemorrhage mainly at basilar cisterns with IVH and obstructive hydrocephalus. Patient was given a bolus of 1g keppra and dilaudid pushes for generalized headache. CTA concerning for 3mm left pcomm aneurysm and a 1.6mm outpouching of distal cavernous segment of the left internal carotid artery likely aneurysm. NIHSS2 3 MF4. Patient was transferred to [facility] for further intervention. Patient currently reports headaches. Pt denies acute changes in vision, seizures, CP, SOB, weakness/paresthesias of arms or legs. (09 Dec 2020 23:37)    OVERNIGHT EVENTS: VIVIEN o/n, neuro stable    Hospital Course:   12/9: BD1 Patient admitted for SAH HH3, MF4.   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 EVD at 5, on Levo overnight, nimodipine discontinued d/t hypotension  12/12: BD4 POD#2, VIVIEN overnight, neuro stable, passed TOV  12/13: BD5 POD#3: VIVIEN x 24 hrs  12/14: BD6 POD#4. VIVIEN o/n, neuro stable. EVD at 5cm H2O  12/15: BD7 POD #5 VIVIEN overnight, neuro stable. EVD remains at 5. Plan for CTA today.   12/16: BD8 POD #6 VIVIEN overnight, neuro stable, EVD at 0, on Levo, started on 3% at 15 overnight   12/17: BD9 POD#1 s/p cerebral angio for verapamil c/b device malfunction of Proglide, s/p right groin cutdown for removal of proglide catheter and femoral artery repair.  VIVIEN overnight, neuro stable, EVD at 0. patient remained intubated overnight, on propofol for sedation, and vEEG  12/18: BD#10, POD#8 s/p coil/embo of L pcomm aneurysm, POD#2 s/p IA verapamil, POD#2 R groin cutdown for removal of proglide catheter w/ repair of femoral artery. VIVIEN overnight. EVD remains open @0cmH2O   12/19: BD#11, POD#9 s/p coil/embo of L pcomm aneurysm. POD#3 s/p IA verapamil, POD#3 s/p R femoral artery cutdown of proglide catheter w/ femoral artery repair. VIVIEN overnight. EVD remains open @0cmH2O. EEG shows b/l frontal sharp discharges, cont monitoring, vimpat was increased yesterday. Gentle hydration, total IVF 50cc/hr. Cont vanc/zosyn for empiric coverage, next vanc trough due @1pm on 12/19. F/u daily CXR.   12/20 BD#12 POD#10 VIVIEN overnight, Neuro exam stable, EVD @ 0cm H2O, vEEG, 3% @ 15 goal WNL, TF via NGT  12/21: BD13, POD11 VIVIEN overnight. EVD at 5cmH20 (raised yesterday), vEEG in place  12/22 BD#14, EVD raised to 15 yesterday, 3% @ 30cc Goal WNL, -180, Milrinone, TTE prior to DC as per Card for takotsubo cardiomyopathy, failed S&S pending reeval, TF via NGT, Neuro exam stable  12/23: BD#15. VIVIEN o/n, neuro stable. EVD clamped. 30%@30cc/hr  12/24 BD#16 VIVIEN overnight, spiked 102, EVD @ 0cm H2O, 3% @ 30cc/hr Goal WNL, Vasc following, TF via NGT, -200,   12/25: BD#17. VIVIEN overnight. Pan cx from 12/23, NGTD on CSF and blood. EVD clamped. 3% @15cc/hr, rate kept same overnight. NGT feeds at goal. SBP goal 160-200.   12/26: BD#18. VIVIEN overnight, neuro exam stable. NGTD from pan cx on 12/23. EVD removed today. 3% discontinued 12/25. NGT feeds at goal, IVF off. SBP goal 160-200.   12/27: BD#19 VIVIEN overnight, neuro stable. 3% at 15, stepdown status  12/28: BD20 VIVIEN overnight, neuro stable. 3% continues.  Patient became increasingly more somnolent and underwent LP for CSF high volume tap.  Temporary improvement.  Spiked fever, pancultured.  12/29: BD21 Patient increasingly more somnolent, EVD placed at bedside.    12/30: BD22 pt. is s/p R VPS placement, certas @5 POD#1, post op ct head c/a/p done. afebrile o/n.   1/1: BD#23: pt. is s/p R VPS placement, certas @5 POD#2, post op ct head c/a/p done. zosyn for enterobacter   1/2: BD #24. POD#3 s/p R VPS placement, CErtas @5. Dressings removed from head and abdomen. Cont zosyn for enterobacter and e. coli in urine, end date 1/4/21. Post-op imaging completed. Pend S&S re-eval, improvement in mental status/neuro exam.   1/3: BD25, POD4. SBP goals relaxed 120-200. zosyn for enterobacter UTI until 1/4. FOB+, protonix bid started, vivien o/n  1/4: BD 26, VIVIEN o/n  1/5: BD 27, VIVIEN o/n   1/6: BD 28, POD 7 VPS (Certas @ 5). VIVIEN overnight. Plan for PEG with GI Thursday. Repeat Covid swab negative. Stepdown status.  1/7: BD 29, POD 8. VIVIEN overnight. PEG placed at bedside by GI today. Stepdown status  1/8: BD 30 POD 9, VIVIEN o/n, resume TF 24 hrs after PEG, stepdown   1/9 BD 31. POD 10. No events overnight. s/p PEG. Pending AR vs JOHANNA  1/10 BD32, POD11. Lethargic but arousable with stimulation, CTH was ordered and demonstrated stable scan in comparison from 1/1. Continue to trend Na, this  improved to 135 on 2%@50/hr and salt tabs started. Pending AR vs JOHANNA.  1/11: BD#33 POD#12. VIVIEN overnight, peripheral IV x2 inserted. F/u pan cx from 1/10, NGTD. Cont 2% @50cc/hr, f/u Na in am. Dispo pend AR vs JOHANNA.   1/12: BD#34 POD#13: Afebrile, on 2% at 25cc/hr, Na 137, pending AM labs. Pending JOHANNA placement. Abx to stop today, fever work-up negative. COVID swab for dispo planning.  1/13: BD35 Neuro stable. Off 2%, Na stable. Started on Zosyn for UTI. Pending dispo to JOHANNA.  1/14 POD#15 VIVIEN overnight, Neuro exam stable, staples DC prior to discharge, TF via PEG, pending JOHANNA placement, Zosyn UTI til 1/15  1/15: POD #16 Episode of desaturating overnight, improved with chest PT, suctioning, mucomyst, CXR obtained. pending rehab  1/16: POD17 VIVIEN overnight, neuro stable  1/17: POD18. VIVIEN o/n, neuro stable. pending johanna  1/18: POD20. VIVIEN o/n, neuro stable    Vital Signs Last 24 Hrs  T(C): 37 (18 Jan 2021 04:11), Max: 37.3 (17 Jan 2021 23:58)  T(F): 98.6 (18 Jan 2021 04:11), Max: 99.1 (17 Jan 2021 23:58)  HR: 88 (18 Jan 2021 04:11) (88 - 98)  BP: 178/79 (18 Jan 2021 04:11) (108/66 - 190/79)  BP(mean): --  RR: 20 (18 Jan 2021 04:11) (18 - 22)  SpO2: 99% (18 Jan 2021 04:11) (94% - 99%)    I&O's Summary    16 Jan 2021 07:01  -  17 Jan 2021 07:00  --------------------------------------------------------  IN: 1450 mL / OUT: 700 mL / NET: 750 mL    17 Jan 2021 07:01  -  18 Jan 2021 06:39  --------------------------------------------------------  IN: 1150 mL / OUT: 900 mL / NET: 250 mL        PHYSICAL EXAM:  General: NAD,  A&O x 1-2   HEENT: PERRL 3mm, EOMI b/l, face symmetric, tongue midline, +hard of hearing. Right scalp incision C/D/I  Cardiovascular: RRR, normal S1 and S2   Respiratory: lungs CTAB, no wheezing, rhonchi, or crackles   GI: normoactive BS to auscultation, abd soft, NTND, +PEG, Right side incision C/D/I   Neuro: speech clear, no dysmetria, no pronator drift, following commands   ZAFAR x4 spontaneously and with good strength  Extremities: distal pulses 2+ throughout    TUBES/LINES:  [] Soriano  [] Lumbar Drain  [] Wound Drains  [x] peg      DIET:  [] NPO  [] Mechanical  [x] Tube feeds    LABS:                        8.7    6.81  )-----------( 405      ( 17 Jan 2021 07:37 )             28.8     01-17    146<H>  |  109<H>  |  15  ----------------------------<  210<H>  3.6   |  27  |  0.38<L>    Ca    8.7      17 Jan 2021 07:37  Phos  3.3     01-17  Mg     2.0     01-17              CAPILLARY BLOOD GLUCOSE          Drug Levels: [] N/A  Vancomycin Level, Trough: 7.7 ug/mL (01-12 @ 05:47)  Vancomycin Level, Trough: 9.5 ug/mL (01-11 @ 17:22)    CSF Analysis: [] N/A      Allergies    No Known Allergies    Intolerances      MEDICATIONS:  Antibiotics:    Neuro:  acetaminophen    Suspension .. 650 milliGRAM(s) Oral every 6 hours PRN  lacosamide Solution 150 milliGRAM(s) Oral every 12 hours    Anticoagulation:  aspirin enteric coated 81 milliGRAM(s) Oral daily  enoxaparin Injectable 40 milliGRAM(s) SubCutaneous at bedtime    OTHER:  acetylcysteine 20%  Inhalation 4 milliLiter(s) Inhalation three times a day PRN  albuterol/ipratropium for Nebulization 3 milliLiter(s) Nebulizer every 6 hours  amLODIPine   Tablet 10 milliGRAM(s) Oral every 24 hours  atorvastatin 40 milliGRAM(s) Oral at bedtime  buDESOnide    Inhalation Suspension 0.5 milliGRAM(s) Inhalation every 12 hours  chlorhexidine 0.12% Liquid 15 milliLiter(s) Oral Mucosa two times a day  chlorhexidine 2% Cloths 1 Application(s) Topical <User Schedule>  dextrose 40% Gel 15 Gram(s) Oral once  dextrose 50% Injectable 25 Gram(s) IV Push once  fludroCORTISONE 0.2 milliGRAM(s) Oral every 12 hours  glucagon  Injectable 1 milliGRAM(s) IntraMuscular once  labetalol Injectable 10 milliGRAM(s) IV Push every 4 hours PRN  lisinopril 10 milliGRAM(s) Oral daily  montelukast 10 milliGRAM(s) Oral daily  pantoprazole   Suspension 40 milliGRAM(s) Oral two times a day    IVF:  cyanocobalamin 1000 MICROGram(s) Oral daily  ferrous    sulfate 325 milliGRAM(s) Oral daily  sodium chloride 2 Gram(s) Oral every 8 hours    CULTURES:  Culture Results:   No growth to date (01-10 @ 17:24)  Culture Results:   No growth at 5 days. (01-10 @ 10:46)    RADIOLOGY & ADDITIONAL TESTS:      ASSESSMENT:  76 year old Female  with PMHx of COPD, asthma, HLD, HTN, BIBEMS to [facility] from home after HHA found patient down on the floor covered in non-bloody vomitus. CTH reveals diffuse subarachnoid hemorrhage mainly at basilar cisterns with IVH and obstructive hydrocephalus, CTA shows 3mm left pcomm aneurysm, now s/p bedside right frontal EVD placement 12/10, s/p cerebral angiogram for coil embolization of left PCOMM aneurysm 12/10, now   s/p cerebral angio for verapamil c/b device malfunction of Proglide, s/p right groin cutdown for removal of proglide catheter and femoral artery repair (12/17/2020), NIHSS2 HH3 MF4), s/p EVD removal 12/25,  s/p bedside EVD placement 12/29, POD#19 from R VPS certas at 5 and  s/p PEG.     HEADACHE    Handoff    MEWS Score    Hyperlipidemia    Hypertension    COPD (chronic obstructive pulmonary disease)    Asthma    COPD (chronic obstructive pulmonary disease)    Asthma    Hydrocephalus, adult    Injury of right femoral artery    Cerebral artery vasospasm    SAH (subarachnoid hemorrhage)    Hydrocephalus, adult    Injury of femoral artery    Cerebral artery vasospasm    SAH (subarachnoid hemorrhage)    Subarachnoid bleed    Postoperative state    Obstructive hydrocephalus    Anemia due to acute blood loss    Preoperative clearance    Centrilobular emphysema    Mild persistent asthma without complication    Subarachnoid bleed    Essential hypertension    Pure hypercholesterolemia    Ventriculoperitoneal shunt    Insertion of external ventricular drain    Vascular surgery procedure    Angiogram, carotid and cerebral, bilateral    Angiogram, cerebral, with intracranial aneurysm embolization    No significant past surgical history    HEADACHE    90+    [email or web address]        PLAN:  NEURO:  - neuro checks  - pain control   - vimpat 150 bid    CARDIOVASCULAR:  - SBP goal <200  - cont lisinopril, norvasc, lipitor  - ASA 81 for b/l carotid stenosis    PULMONARY:  - satting well RA  - cont montelukast, mucomyst, duonebs, pulmicort    RENAL:  - IVL  - repletions prn   - normonatremia goal, cont salt tabs 2q8, florinef 0.2 bid    GI:  - TF via PEG   - bowel regimen  - gi ppx protonix    HEME:  - h/h stable  - SCDs/SQL    ID:  - afebrile  - no leukocytosis    ENDO:  - glucose goal 140-180    DVT PROPHYLAXIS:  [x] Venodynes                                [x] Heparin/Lovenox    DISPOSITION: step down status, full code, pending JOHANNA    D/w Dr. Serrano    Assessment:  Present when checked    []  GCS  E   V  M     Heart Failure: []Acute, [] acute on chronic , []chronic  Heart Failure:  [] Diastolic (HFpEF), [] Systolic (HFrEF), []Combined (HFpEF and HFrEF), [] RHF, [] Pulm HTN, [] Other    [] MICHAELLE, [] ATN, [] AIN, [] other  [] CKD1, [] CKD2, [] CKD 3, [] CKD 4, [] CKD 5, []ESRD    Encephalopathy: [] Metabolic, [] Hepatic, [] toxic, [] Neurological, [] Other    Abnormal Nurtitional Status: [] malnurtition (see nutrition note), [ ]underweight: BMI < 19, [] morbid obesity: BMI >40, [] Cachexia    [] Sepsis  [] hypovolemic shock,[] cardiogenic shock, [] hemorrhagic shock, [] neuogenic shock  [] Acute Respiratory Failure  []Cerebral edema, [] Brain compression/ herniation,   [] Functional quadriplegia  [] Acute blood loss anemia

## 2021-01-18 NOTE — DISCHARGE NOTE PROVIDER - NSDCFUADDAPPT_GEN_ALL_CORE_FT
Please call to schedule an appointment with Dr. Serrano [phone]    Please call to schedule an appointment with your primary care physician  Please call to schedule an appointment with Dr. Serrano [phone]  Please schedule follow up with infectious disease, Dr. Gutierrez, regarding antibiotic regimen and culture results.   Please call to schedule an appointment with your primary care physician

## 2021-01-18 NOTE — DISCHARGE NOTE PROVIDER - NSDCCPCAREPLAN_GEN_ALL_CORE_FT
PRINCIPAL DISCHARGE DIAGNOSIS  Diagnosis: Subarachnoid bleed  Assessment and Plan of Treatment:

## 2021-01-18 NOTE — DISCHARGE NOTE PROVIDER - NSDCCPTREATMENT_GEN_ALL_CORE_FT
PRINCIPAL PROCEDURE  Procedure: Angiogram, cerebral, with intracranial aneurysm embolization  Findings and Treatment: - Monitor groin site for tenderness, hematoma  - Monitor distal pulses   - Systolic blood pressure admissable from 100-200      SECONDARY PROCEDURE  Procedure: Ventriculoperitoneal shunt  Findings and Treatment: - Monitor abdominal site for signs of infection (erythema, tenderness, drainage)     PRINCIPAL PROCEDURE  Procedure: Angiogram, cerebral, with intracranial aneurysm embolization  Findings and Treatment: - Monitor groin site for tenderness, hematoma  - Monitor distal pulses   - Systolic blood pressure admissable from 100-200      SECONDARY PROCEDURE  Procedure: Ventriculoperitoneal shunt  Findings and Treatment: - Monitor abdominal site for signs of infection (erythema, tenderness, drainage)  All staples are removed  Able to shower and get the incisions wet, pat it dry with a clean towel  Do not apply any lotions or creams to the incision  Keep it opened to air   Enteral feeds are per nutritionist  Once out of rehab call the office of Dr Serrano to make a follow up appt     PRINCIPAL PROCEDURE  Procedure: Angiogram, cerebral, with intracranial aneurysm embolization  Findings and Treatment: - Monitor groin site for tenderness, hematoma  - Monitor distal pulses   - Systolic blood pressure admissable from 100-200      SECONDARY PROCEDURE  Procedure: Ventriculoperitoneal shunt  Findings and Treatment: - Monitor abdominal site for signs of infection (erythema, tenderness, drainage)  All staples are removed  Able to shower and get the incisions wet, pat it dry with a clean towel  Do not apply any lotions or creams to the incision  Keep it opened to air   Enteral feeds are per nutritionist  Once out of rehab call the office of Dr Serrano to make a follow up appt  Also have a Medical Dr at the facility readjust sliding scale insulin as needed ans well as the standing lantus according to your needs

## 2021-01-18 NOTE — DISCHARGE NOTE PROVIDER - NSDCFUADDINST_GEN_ALL_CORE_FT
1. You may wash your hair.  2. Mild swelling around the incision is common. Keep incision open to air and dry.  3. Call our office at [phone] to set up the appointment with an NP for wound check  once you get home.  4. Inform the doctor immediately if you have a fever (above 101), chills, night  sweats, wound drainage, increasing wound redness or pain, nausea, vomiting, or  worsening headache.  B. ACTIVITY LEVEL  1. Fatigue is common following brain surgery, rest if you are tired.  2. No bending, lifting or twisting for the first 3 weeks, but walking is  recommended.

## 2021-01-18 NOTE — DISCHARGE NOTE PROVIDER - NSDCMRMEDTOKEN_GEN_ALL_CORE_FT
acetaminophen 160 mg/5 mL oral suspension: 20.31 milliliter(s) orally every 6 hours, As needed, Temp greater or equal to 38C (100.4F), Mild Pain (1 - 3)  acetylcysteine 20% inhalation solution: 4 milliliter(s) inhaled 3 times a day, As needed, congestion  amLODIPine 10 mg oral tablet: 1 tab(s) orally every 24 hours  aspirin 81 mg oral delayed release tablet: 1 tab(s) orally once a day  atorvastatin 40 mg oral tablet: 1 tab(s) orally once a day (at bedtime)  budesonide: 0.5 milligram(s) inhaled every 12 hours  cyanocobalamin 1000 mcg oral tablet: 1 tab(s) orally once a day  enoxaparin: 40 milligram(s) subcutaneous once a day (at bedtime)  ferrous sulfate 325 mg (65 mg elemental iron) oral tablet: 1 tab(s) orally once a day  fludrocortisone 0.1 mg oral tablet: 2 tab(s) orally every 12 hours  ipratropium-albuterol 0.5 mg-2.5 mg/3 mLinhalation solution: 3 milliliter(s) inhaled every 6 hours  labetalol 5 mg/mL intravenous solution: 2 milliliter(s) intravenous every 4 hours, As needed, SBP &gt; 200  lacosamide 10 mg/mL oral solution: 15 milliliter(s) orally every 12 hours  lisinopril 10 mg oral tablet: 1 tab(s) orally once a day  montelukast 10 mg oral tablet: 1 tab(s) orally once a day  pantoprazole 40 mg oral granule, delayed release: 40 milligram(s) orally once a day  sodium chloride 1 g oral tablet: 2 tab(s) orally every 8 hours   acetaminophen 160 mg/5 mL oral suspension: 20.31 milliliter(s) orally every 6 hours, As needed, Temp greater or equal to 38C (100.4F), Mild Pain (1 - 3)  acetylcysteine 20% inhalation solution: 4 milliliter(s) inhaled 3 times a day, As needed, congestion  amLODIPine 10 mg oral tablet: 1 tab(s) orally every 24 hours  aspirin 81 mg oral delayed release tablet: 1 tab(s) orally once a day  atorvastatin 40 mg oral tablet: 1 tab(s) orally once a day (at bedtime)  budesonide: 0.5 milligram(s) inhaled every 12 hours  cyanocobalamin 1000 mcg oral tablet: 1 tab(s) orally once a day  enoxaparin: 40 milligram(s) subcutaneous once a day (at bedtime)  ferrous sulfate 325 mg (65 mg elemental iron) oral tablet: 1 tab(s) orally once a day  fludrocortisone 0.1 mg oral tablet: 2 tab(s) orally every 12 hours  ipratropium-albuterol 0.5 mg-2.5 mg/3 mLinhalation solution: 3 milliliter(s) inhaled every 6 hours  lacosamide 10 mg/mL oral solution: 15 milliliter(s) orally every 12 hours  lisinopril 10 mg oral tablet: 1 tab(s) orally once a day  montelukast 10 mg oral tablet: 1 tab(s) orally once a day  pantoprazole 40 mg oral granule, delayed release: 40 milligram(s) orally once a day  sodium chloride 1 g oral tablet: 2 tab(s) orally every 8 hours   acetaminophen 160 mg/5 mL oral suspension: 20.31 milliliter(s) orally every 6 hours, As needed, Temp greater or equal to 38C (100.4F), Mild Pain (1 - 3)  acetylcysteine 20% inhalation solution: 4 milliliter(s) inhaled 3 times a day, As needed, congestion  amLODIPine 10 mg oral tablet: 1 tab(s) orally every 24 hours  aspirin 81 mg oral delayed release tablet: 1 tab(s) orally once a day  atorvastatin 40 mg oral tablet: 1 tab(s) orally once a day (at bedtime)  budesonide: 0.5 milligram(s) inhaled every 12 hours  cyanocobalamin 1000 mcg oral tablet: 1 tab(s) orally once a day  enoxaparin: 40 milligram(s) subcutaneous once a day (at bedtime)  ferrous sulfate 325 mg (65 mg elemental iron) oral tablet: 1 tab(s) orally once a day  insulin glargine:   ipratropium-albuterol 0.5 mg-2.5 mg/3 mLinhalation solution: 3 milliliter(s) inhaled every 6 hours  lacosamide 10 mg/mL oral solution: 15 milliliter(s) orally every 12 hours  lisinopril 10 mg oral tablet: 1 tab(s) orally once a day  montelukast 10 mg oral tablet: 1 tab(s) orally once a day  pantoprazole 40 mg oral granule, delayed release: 40 milligram(s) orally once a day  senna oral tablet: 2 tab(s) orally once a day (at bedtime)  sodium chloride 1 g oral tablet: 2 tab(s) orally every 8 hours   acetaminophen 160 mg/5 mL oral suspension: 20.31 milliliter(s) orally every 6 hours, As needed, Temp greater or equal to 38C (100.4F), Mild Pain (1 - 3)  acetylcysteine 20% inhalation solution: 4 milliliter(s) inhaled 3 times a day, As needed, congestion  amLODIPine 10 mg oral tablet: 1 tab(s) orally every 24 hours  aspirin 81 mg oral delayed release tablet: 1 tab(s) orally once a day  atorvastatin 40 mg oral tablet: 1 tab(s) orally once a day (at bedtime)  budesonide: 0.5 milligram(s) inhaled every 12 hours  cyanocobalamin 1000 mcg oral tablet: 1 tab(s) orally once a day  enoxaparin: 40 milligram(s) subcutaneous once a day (at bedtime)  ferrous sulfate 325 mg (65 mg elemental iron) oral tablet: 1 tab(s) orally once a day  insulin glargine: 5units at morning time  insulin lispro 100 units/mL injectable solution:  injectable   insulin lispro 100 units/mL injectable solution:  injectable   ipratropium-albuterol 0.5 mg-2.5 mg/3 mLinhalation solution: 3 milliliter(s) inhaled every 6 hours  lacosamide 10 mg/mL oral solution: 15 milliliter(s) orally every 12 hours  lisinopril 10 mg oral tablet: 1 tab(s) orally once a day  montelukast 10 mg oral tablet: 1 tab(s) orally once a day  pantoprazole 40 mg oral granule, delayed release: 40 milligram(s) orally once a day  senna oral tablet: 2 tab(s) orally once a day (at bedtime)  sodium chloride 1 g oral tablet: 2 tab(s) orally every 8 hours

## 2021-01-18 NOTE — DISCHARGE NOTE PROVIDER - CARE PROVIDERS DIRECT ADDRESSES
[email or web address]

## 2021-01-18 NOTE — DISCHARGE NOTE PROVIDER - CARE PROVIDER_API CALL
Bebeto Serrano)  Neurosurgery  [address], [place] [facility]  Phone: [phone]  Fax: [phone]  Follow Up Time:    Bebeto Serrano)  Neurosurgery  [address], [address] [place] [id number]  Phone: [phone]  Fax: [phone]  Follow Up Time:     Velma Gutierrez)  Infectious Disease; Internal Medicine  [address] [place] [address]  Phone: [phone]  Fax: [phone]  Follow Up Time:

## 2021-01-18 NOTE — DISCHARGE NOTE PROVIDER - HOSPITAL COURSE
HPI:  76y/o F with PMHx sig for COPD, asthma, HLD, HTN, BIBEMS to [facility] from home after HHA discovered patient found down in floor covered in non-bloody vomitus. Perr HHA Pt went to the bathroom and was taking a long time, went in to check on her and found her sitting on floor, awake, however with vomitus on front of shirt. On arrival to [facility], patient was taken for stat head CT, which revealed diffuse subarachnoid hemorrhage mainly at basilar cisterns with IVH and obstructive hydrocephalus. Patient was given a bolus of 1g keppra and dilaudid pushes for generalized headache. CTA concerning for 3mm left pcomm aneurysm and a 1.6mm outpouching of distal cavernous segment of the left internal carotid artery likely aneurysm. NIHSS2 HH3 MF4. Patient was transferred to [facility] for further intervention. Patient currently reports headaches. Pt denies acute changes in vision, seizures, CP, SOB, weakness/paresthesias of arms or legs. (09 Dec 2020 23:37)      Hospital Course:   12/9: BD1 Patient admitted for SAH HH3, MF4.   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 EVD at 5, on Levo overnight, nimodipine discontinued d/t hypotension  12/12: BD4 POD#2, YUMIKO overnight, neuro stable, passed TOV  12/13: BD5 POD#3: YUMIKO x 24 hrs  12/14: BD6 POD#4. YUMIKO o/n, neuro stable. EVD at 5cm H2O  12/15: BD7 POD #5 YUMIKO overnight, neuro stable. EVD remains at 5. Plan for CTA today.   12/16: BD8 POD #6 YUMIKO overnight, neuro stable, EVD at 0, on Levo, started on 3% at 15 overnight   12/17: BD9 POD#1 s/p cerebral angio for verapamil c/b device malfunction of Proglide, s/p right groin cutdown for removal of proglide catheter and femoral artery repair.  YUMIKO overnight, neuro stable, EVD at 0. patient remained intubated overnight, on propofol for sedation, and vEEG  12/18: BD#10, POD#8 s/p coil/embo of L pcomm aneurysm, POD#2 s/p IA verapamil, POD#2 R groin cutdown for removal of proglide catheter w/ repair of femoral artery. YUMIKO overnight. EVD remains open @0cmH2O   12/19: BD#11, POD#9 s/p coil/embo of L pcomm aneurysm. POD#3 s/p IA verapamil, POD#3 s/p R femoral artery cutdown of proglide catheter w/ femoral artery repair. YUMIKO overnight. EVD remains open @0cmH2O. EEG shows b/l frontal sharp discharges, cont monitoring, vimpat was increased yesterday. Gentle hydration, total IVF 50cc/hr. Cont vanc/zosyn for empiric coverage, next vanc trough due @1pm on 12/19. F/u daily CXR.   12/20 BD#12 POD#10 YUMIKO overnight, Neuro exam stable, EVD @ 0cm H2O, vEEG, 3% @ 15 goal WNL, TF via NGT  12/21: BD13, POD11 YUMIKO overnight. EVD at 5cmH20 (raised yesterday), vEEG in place  12/22 BD#14, EVD raised to 15 yesterday, 3% @ 30cc Goal WNL, -180, Milrinone, TTE prior to DC as per Card for takotsubo cardiomyopathy, failed S&S pending reeval, TF via NGT, Neuro exam stable  12/23: BD#15. YUMIKO o/n, neuro stable. EVD clamped. 30%@30cc/hr  12/24 BD#16 YUMIKO overnight, spiked 102, EVD @ 0cm H2O, 3% @ 30cc/hr Goal WNL, Vasc following, TF via NGT, -200,   12/25: BD#17. YUMIKO overnight. Pan cx from 12/23, NGTD on CSF and blood. EVD clamped. 3% @15cc/hr, rate kept same overnight. NGT feeds at goal. SBP goal 160-200.   12/26: BD#18. YUMIKO overnight, neuro exam stable. NGTD from pan cx on 12/23. EVD removed today. 3% discontinued 12/25. NGT feeds at goal, IVF off. SBP goal 160-200.   12/27: BD#19 YUMIKO overnight, neuro stable. 3% at 15, stepdown status  12/28: BD20 YUMIKO overnight, neuro stable. 3% continues.  Patient became increasingly more somnolent and underwent LP for CSF high volume tap.  Temporary improvement.  Spiked fever, pancultured.  12/29: BD21 Patient increasingly more somnolent, EVD placed at bedside.    12/30: BD22 pt. is s/p R VPS placement, certas @5 POD#1, post op ct head c/a/p done. afebrile o/n.   1/1: BD#23: pt. is s/p R VPS placement, certas @5 POD#2, post op ct head c/a/p done. zosyn for enterobacter   1/2: BD #24. POD#3 s/p R VPS placement, CErtas @5. Dressings removed from head and abdomen. Cont zosyn for enterobacter and e. coli in urine, end date 1/4/21. Post-op imaging completed. Pend S&S re-eval, improvement in mental status/neuro exam.   1/3: BD25, POD4. SBP goals relaxed 120-200. zosyn for enterobacter UTI until 1/4. FOB+, protonix bid started, yumiko o/n  1/4: BD 26, YUMIKO o/n  1/5: BD 27, YUMIKO o/n   1/6: BD 28, POD 7 VPS (Certas @ 5). YUMIKO overnight. Plan for PEG with GI Thursday. Repeat Covid swab negative. Stepdown status.  1/7: BD 29, POD 8. YUMIKO overnight. PEG placed at bedside by GI today. Stepdown status  1/8: BD 30 POD 9, YUMIKO o/n, resume TF 24 hrs after PEG, stepdown   1/9 BD 31. POD 10. No events overnight. s/p PEG. Pending AR vs JOHANNA  1/10 BD32, POD11. Lethargic but arousable with stimulation, CTH was ordered and demonstrated stable scan in comparison from 1/1. Continue to trend Na, this  improved to 135 on 2%@50/hr and salt tabs started. Pending AR vs JOHANNA.  1/11: BD#33 POD#12. YUMIKO overnight, peripheral IV x2 inserted. F/u pan cx from 1/10, NGTD. Cont 2% @50cc/hr, f/u Na in am. Dispo pend AR vs JOHANNA.   1/12: BD#34 POD#13: Afebrile, on 2% at 25cc/hr, Na 137, pending AM labs. Pending JOHANNA placement. Abx to stop today, fever work-up negative. COVID swab for dispo planning.  1/13: BD35 Neuro stable. Off 2%, Na stable. Started on Zosyn for UTI. Pending dispo to [facility].  1/14 POD#15 YUMIKO overnight, Neuro exam stable, staples DC prior to discharge, TF via PEG, pending JOHANNA placement, Zosyn UTI til 1/15  1/15: POD #16 Episode of desaturating overnight, improved with chest PT, suctioning, mucomyst, CXR obtained. pending rehab  1/16: POD17 YUMIKO overnight, neuro stable  1/17: POD18. YUMIKO o/n, neuro stable. pending johanna  1/18: POD20. YUMIKO o/n, neuro stable   HPI:  74y/o F with PMHx sig for COPD, asthma, HLD, HTN, BIBEMS to [facility] from home after HHA discovered patient found down in floor covered in non-bloody vomitus. Perr HHA Pt went to the bathroom and was taking a long time, went in to check on her and found her sitting on floor, awake, however with vomitus on front of shirt. On arrival to [facility], patient was taken for stat head CT, which revealed diffuse subarachnoid hemorrhage mainly at basilar cisterns with IVH and obstructive hydrocephalus. Patient was given a bolus of 1g keppra and dilaudid pushes for generalized headache. CTA concerning for 3mm left pcomm aneurysm and a 1.6mm outpouching of distal cavernous segment of the left internal carotid artery likely aneurysm. NIHSS2 HH3 MF4. Patient was transferred to [facility] for further intervention. Patient currently reports headaches. Pt denies acute changes in vision, seizures, CP, SOB, weakness/paresthesias of arms or legs. (09 Dec 2020 23:37)      Hospital Course:   12/9: BD1 Patient admitted for SAH HH3, MF4.   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 EVD at 5, on Levo overnight, nimodipine discontinued d/t hypotension  12/12: BD4 POD#2, YUMIKO overnight, neuro stable, passed TOV  12/13: BD5 POD#3: YUMIKO x 24 hrs  12/14: BD6 POD#4. YUMIKO o/n, neuro stable. EVD at 5cm H2O  12/15: BD7 POD #5 YUMIKO overnight, neuro stable. EVD remains at 5. Plan for CTA today.   12/16: BD8 POD #6 YUMIKO overnight, neuro stable, EVD at 0, on Levo, started on 3% at 15 overnight   12/17: BD9 POD#1 s/p cerebral angio for verapamil c/b device malfunction of Proglide, s/p right groin cutdown for removal of proglide catheter and femoral artery repair.  YUMIKO overnight, neuro stable, EVD at 0. patient remained intubated overnight, on propofol for sedation, and vEEG  12/18: BD#10, POD#8 s/p coil/embo of L pcomm aneurysm, POD#2 s/p IA verapamil, POD#2 R groin cutdown for removal of proglide catheter w/ repair of femoral artery. YUMIKO overnight. EVD remains open @0cmH2O   12/19: BD#11, POD#9 s/p coil/embo of L pcomm aneurysm. POD#3 s/p IA verapamil, POD#3 s/p R femoral artery cutdown of proglide catheter w/ femoral artery repair. YUMIKO overnight. EVD remains open @0cmH2O. EEG shows b/l frontal sharp discharges, cont monitoring, vimpat was increased yesterday. Gentle hydration, total IVF 50cc/hr. Cont vanc/zosyn for empiric coverage, next vanc trough due @1pm on 12/19. F/u daily CXR.   12/20 BD#12 POD#10 YUMIKO overnight, Neuro exam stable, EVD @ 0cm H2O, vEEG, 3% @ 15 goal WNL, TF via NGT  12/21: BD13, POD11 YUMIKO overnight. EVD at 5cmH20 (raised yesterday), vEEG in place  12/22 BD#14, EVD raised to 15 yesterday, 3% @ 30cc Goal WNL, -180, Milrinone, TTE prior to DC as per Card for takotsubo cardiomyopathy, failed S&S pending reeval, TF via NGT, Neuro exam stable  12/23: BD#15. YUMIKO o/n, neuro stable. EVD clamped. 30%@30cc/hr  12/24 BD#16 YUMIKO overnight, spiked 102, EVD @ 0cm H2O, 3% @ 30cc/hr Goal WNL, Vasc following, TF via NGT, -200,   12/25: BD#17. YUMIKO overnight. Pan cx from 12/23, NGTD on CSF and blood. EVD clamped. 3% @15cc/hr, rate kept same overnight. NGT feeds at goal. SBP goal 160-200.   12/26: BD#18. YUMIKO overnight, neuro exam stable. NGTD from pan cx on 12/23. EVD removed today. 3% discontinued 12/25. NGT feeds at goal, IVF off. SBP goal 160-200.   12/27: BD#19 YUMIKO overnight, neuro stable. 3% at 15, stepdown status  12/28: BD20 YUMIKO overnight, neuro stable. 3% continues.  Patient became increasingly more somnolent and underwent LP for CSF high volume tap.  Temporary improvement.  Spiked fever, pancultured.  12/29: BD21 Patient increasingly more somnolent, EVD placed at bedside.    12/30: BD22 pt. is s/p R VPS placement, certas @5 POD#1, post op ct head c/a/p done. afebrile o/n.   1/1: BD#23: pt. is s/p R VPS placement, certas @5 POD#2, post op ct head c/a/p done. zosyn for enterobacter   1/2: BD #24. POD#3 s/p R VPS placement, CErtas @5. Dressings removed from head and abdomen. Cont zosyn for enterobacter and e. coli in urine, end date 1/4/21. Post-op imaging completed. Pend S&S re-eval, improvement in mental status/neuro exam.   1/3: BD25, POD4. SBP goals relaxed 120-200. zosyn for enterobacter UTI until 1/4. FOB+, protonix bid started, yumiko o/n  1/4: BD 26, YUMIKO o/n  1/5: BD 27, YUMIKO o/n   1/6: BD 28, POD 7 VPS (Certas @ 5). YUMIKO overnight. Plan for PEG with GI Thursday. Repeat Covid swab negative. Stepdown status.  1/7: BD 29, POD 8. YUMIKO overnight. PEG placed at bedside by GI today. Stepdown status  1/8: BD 30 POD 9, YUMIKO o/n, resume TF 24 hrs after PEG, stepdown   1/9 BD 31. POD 10. No events overnight. s/p PEG. Pending AR vs JOHANNA  1/10 BD32, POD11. Lethargic but arousable with stimulation, CTH was ordered and demonstrated stable scan in comparison from 1/1. Continue to trend Na, this  improved to 135 on 2%@50/hr and salt tabs started. Pending AR vs JOHANNA.  1/11: BD#33 POD#12. YUMIKO overnight, peripheral IV x2 inserted. F/u pan cx from 1/10, NGTD. Cont 2% @50cc/hr, f/u Na in am. Dispo pend AR vs JOHANNA.   1/12: BD#34 POD#13: Afebrile, on 2% at 25cc/hr, Na 137, pending AM labs. Pending JOHANNA placement. Abx to stop today, fever work-up negative. COVID swab for dispo planning.  1/13: BD35 Neuro stable. Off 2%, Na stable. Started on Zosyn for UTI. Pending dispo to [facility].  1/14 POD#15 YUMIKO overnight, Neuro exam stable, staples DC prior to discharge, TF via PEG, pending JOHANNA placement, Zosyn UTI til 1/15  1/15: POD #16 Episode of desaturating overnight, improved with chest PT, suctioning, mucomyst, CXR obtained. pending rehab  1/16: POD17 YUMIKO overnight, neuro stable  1/17: POD18. YUMIKO o/n, neuro stable. pending johanna  1/18: POD20. YUMIKO o/n, neuro stable  1/19: POD21 spiked fever 101F overnight, pancultured, neuro exam stable  1/20: POD#22. Rapid response called last night for tachycardia / tachypnea. Transferred to telemetry.  Urine cx sent.  1/21: POD23 YUMIKO overnight - afebrile, neuro stable HPI:  74y/o F with PMHx sig for COPD, asthma, HLD, HTN, BIBEMS to [facility] from home after HHA discovered patient found down in floor covered in non-bloody vomitus. Perr HHA Pt went to the bathroom and was taking a long time, went in to check on her and found her sitting on floor, awake, however with vomitus on front of shirt. On arrival to [facility], patient was taken for stat head CT, which revealed diffuse subarachnoid hemorrhage mainly at basilar cisterns with IVH and obstructive hydrocephalus. Patient was given a bolus of 1g keppra and dilaudid pushes for generalized headache. CTA concerning for 3mm left pcomm aneurysm and a 1.6mm outpouching of distal cavernous segment of the left internal carotid artery likely aneurysm. NIHSS2 HH3 MF4. Patient was transferred to [facility] for further intervention. Patient currently reports headaches. Pt denies acute changes in vision, seizures, CP, SOB, weakness/paresthesias of arms or legs. (09 Dec 2020 23:37)      Hospital Course:   12/9: BD1 Patient admitted for SAH HH3, MF4.   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 EVD at 5, on Levo overnight, nimodipine discontinued d/t hypotension  12/12: BD4 POD#2, VIVIEN overnight, neuro stable, passed TOV  12/13: BD5 POD#3: VIVIEN x 24 hrs  12/14: BD6 POD#4. VIVIEN o/n, neuro stable. EVD at 5cm H2O  12/15: BD7 POD #5 VIVIEN overnight, neuro stable. EVD remains at 5. Plan for CTA today.   12/16: BD8 POD #6 VIVIEN overnight, neuro stable, EVD at 0, on Levo, started on 3% at 15 overnight   12/17: BD9 POD#1 s/p cerebral angio for verapamil c/b device malfunction of Proglide, s/p right groin cutdown for removal of proglide catheter and femoral artery repair.  VIVIEN overnight, neuro stable, EVD at 0. patient remained intubated overnight, on propofol for sedation, and vEEG  12/18: BD#10, POD#8 s/p coil/embo of L pcomm aneurysm, POD#2 s/p IA verapamil, POD#2 R groin cutdown for removal of proglide catheter w/ repair of femoral artery. VIVIEN overnight. EVD remains open @0cmH2O   12/19: BD#11, POD#9 s/p coil/embo of L pcomm aneurysm. POD#3 s/p IA verapamil, POD#3 s/p R femoral artery cutdown of proglide catheter w/ femoral artery repair. VIVIEN overnight. EVD remains open @0cmH2O. EEG shows b/l frontal sharp discharges, cont monitoring, vimpat was increased yesterday. Gentle hydration, total IVF 50cc/hr. Cont vanc/zosyn for empiric coverage, next vanc trough due @1pm on 12/19. F/u daily CXR.   12/20 BD#12 POD#10 VIVIEN overnight, Neuro exam stable, EVD @ 0cm H2O, vEEG, 3% @ 15 goal WNL, TF via NGT  12/21: BD13, POD11 VIVIEN overnight. EVD at 5cmH20 (raised yesterday), vEEG in place  12/22 BD#14, EVD raised to 15 yesterday, 3% @ 30cc Goal WNL, -180, Milrinone, TTE prior to DC as per Card for takotsubo cardiomyopathy, failed S&S pending reeval, TF via NGT, Neuro exam stable  12/23: BD#15. VIVIEN o/n, neuro stable. EVD clamped. 30%@30cc/hr  12/24 BD#16 VIVIEN overnight, spiked 102, EVD @ 0cm H2O, 3% @ 30cc/hr Goal WNL, Vasc following, TF via NGT, -200,   12/25: BD#17. VIVIEN overnight. Pan cx from 12/23, NGTD on CSF and blood. EVD clamped. 3% @15cc/hr, rate kept same overnight. NGT feeds at goal. SBP goal 160-200.   12/26: BD#18. VIVIEN overnight, neuro exam stable. NGTD from pan cx on 12/23. EVD removed today. 3% discontinued 12/25. NGT feeds at goal, IVF off. SBP goal 160-200.   12/27: BD#19 VIVIEN overnight, neuro stable. 3% at 15, stepdown status  12/28: BD20 VIVIEN overnight, neuro stable. 3% continues.  Patient became increasingly more somnolent and underwent LP for CSF high volume tap.  Temporary improvement.  Spiked fever, pancultured.  12/29: BD21 Patient increasingly more somnolent, EVD placed at bedside.    12/30: BD22 pt. is s/p R VPS placement, certas @5 POD#1, post op ct head c/a/p done. afebrile o/n.   1/1: BD#23: pt. is s/p R VPS placement, certas @5 POD#2, post op ct head c/a/p done. zosyn for enterobacter   1/2: BD #24. POD#3 s/p R VPS placement, CErtas @5. Dressings removed from head and abdomen. Cont zosyn for enterobacter and e. coli in urine, end date 1/4/21. Post-op imaging completed. Pend S&S re-eval, improvement in mental status/neuro exam.   1/3: BD25, POD4. SBP goals relaxed 120-200. zosyn for enterobacter UTI until 1/4. FOB+, protonix bid started, vivien o/n  1/4: BD 26, VIVIEN o/n  1/5: BD 27, VIVIEN o/n   1/6: BD 28, POD 7 VPS (Certas @ 5). VIVIEN overnight. Plan for PEG with GI Thursday. Repeat Covid swab negative. Stepdown status.  1/7: BD 29, POD 8. VIVIEN overnight. PEG placed at bedside by GI today. Stepdown status  1/8: BD 30 POD 9, VIVIEN o/n, resume TF 24 hrs after PEG, stepdown   1/9 BD 31. POD 10. No events overnight. s/p PEG. Pending AR vs JOHANNA  1/10 BD32, POD11. Lethargic but arousable with stimulation, CTH was ordered and demonstrated stable scan in comparison from 1/1. Continue to trend Na, this  improved to 135 on 2%@50/hr and salt tabs started. Pending AR vs JOHANNA.  1/11: BD#33 POD#12. VIVIEN overnight, peripheral IV x2 inserted. F/u pan cx from 1/10, NGTD. Cont 2% @50cc/hr, f/u Na in am. Dispo pend AR vs JOHANNA.   1/12: BD#34 POD#13: Afebrile, on 2% at 25cc/hr, Na 137, pending AM labs. Pending JOHANNA placement. Abx to stop today, fever work-up negative. COVID swab for dispo planning.  1/13: BD35 Neuro stable. Off 2%, Na stable. Started on Zosyn for UTI. Pending dispo to [facility].  1/14 POD#15 VIVIEN overnight, Neuro exam stable, staples DC prior to discharge, TF via PEG, pending JOHANNA placement, Zosyn UTI til 1/15  1/15: POD #16 Episode of desaturating overnight, improved with chest PT, suctioning, mucomyst, CXR obtained. pending rehab  1/16: POD17 VIVIEN overnight, neuro stable  1/17: POD18. VIVIEN o/n, neuro stable. pending johanna  1/18: POD20. VIVIEN o/n, neuro stable  1/19: POD21 spiked fever 101F overnight, pancultured, neuro exam stable  1/20: POD#22. Rapid response called last night for tachycardia / tachypnea. Transferred to telemetry.  Urine cx sent.  1/21: POD23 VIVIEN overnight - afebrile, neuro stable  1/23: POD#25 VIVIEN, Afebrile overnight. Off salt tabs and florineff. She was more coopeartive overnight.  1/24: POD26 VIVIEN overnight, zosyn for UTI completed today, pending JOHANNA  1/25: POD27. VIVIEN o/n, neuro stable  1/26: POD28. VIVIEN o/n, neuro stable, pending JOHANNA  1/26 Pt accepted JOHANNA  [facility] transfer today

## 2021-01-18 NOTE — CHART NOTE - NSCHARTNOTEFT_GEN_A_CORE
Admitting Diagnosis:   Patient is a 76y old  Female who presents with a chief complaint of SAH (18 Jan 2021 06:39)    PAST MEDICAL & SURGICAL HISTORY:  Hyperlipidemia  Hypertension  COPD (chronic obstructive pulmonary disease)  Asthma  No significant past surgical history    Current Nutrition Order: NPO diet with EN  EN diet regime: Glucerna 1.2 @ 50mL/hr x 24hrs via PEG at goal provides 1200 mL TV, 1440 kcal, 72g pro, 966mL free water (120%RDI, ~29kcal/kg ABW, ~1.4gm pro/kgABW)      PO Intake: Good (%) [   ]  Fair (50-75%) [   ] Poor (<25%) [   ]- N/A    GI Issues:   WDL, last BM 1/17  PEG tube for EN- tolerating well without s/sx of intolerance  No n/v/d/c    Pain:  No noted pain at this time    Skin Integrity:  Surgical incision, tran score 12  No edema present  No pressure ulcers noted    Labs:   01-18    149<H>  |  109<H>  |  18  ----------------------------<  238<H>  3.6   |  29  |  0.39<L>    Ca    9.3      18 Jan 2021 06:52  Phos  3.3     01-18  Mg     2.1     01-18    Medications:  MEDICATIONS  (STANDING):  albuterol/ipratropium for Nebulization 3 milliLiter(s) Nebulizer every 6 hours  amLODIPine   Tablet 10 milliGRAM(s) Oral every 24 hours  aspirin enteric coated 81 milliGRAM(s) Oral daily  atorvastatin 40 milliGRAM(s) Oral at bedtime  buDESOnide    Inhalation Suspension 0.5 milliGRAM(s) Inhalation every 12 hours  chlorhexidine 0.12% Liquid 15 milliLiter(s) Oral Mucosa two times a day  chlorhexidine 2% Cloths 1 Application(s) Topical <User Schedule>  cyanocobalamin 1000 MICROGram(s) Oral daily  dextrose 40% Gel 15 Gram(s) Oral once  dextrose 50% Injectable 25 Gram(s) IV Push once  enoxaparin Injectable 40 milliGRAM(s) SubCutaneous at bedtime  ferrous    sulfate 325 milliGRAM(s) Oral daily  fludroCORTISONE 0.2 milliGRAM(s) Oral every 12 hours  glucagon  Injectable 1 milliGRAM(s) IntraMuscular once  lacosamide Solution 150 milliGRAM(s) Oral every 12 hours  lisinopril 10 milliGRAM(s) Oral daily  montelukast 10 milliGRAM(s) Oral daily  pantoprazole   Suspension 40 milliGRAM(s) Oral two times a day  potassium chloride   Solution 40 milliEquivalent(s) Oral once  sodium chloride 2 Gram(s) Oral every 8 hours    MEDICATIONS  (PRN):  acetaminophen    Suspension .. 650 milliGRAM(s) Oral every 6 hours PRN Temp greater or equal to 38C (100.4F), Mild Pain (1 - 3)  acetylcysteine 20%  Inhalation 4 milliLiter(s) Inhalation three times a day PRN congestion  labetalol Injectable 10 milliGRAM(s) IV Push every 4 hours PRN SBP > 200    Admitted Anthropometrics  Height: 59"" Weight: 110lbs/49.9kg  IBW 98lbs/44.5kg+/-10%, %%, BMI 22.2     Weight Change: No new weights to assess. Please obtain and trend bi-weekly weights for assessment.    Nutrition Focused Physical Exam: Completed [   ]  Not Pertinent [ x  ]    Estimated energy needs:   ABW (49.9kg) used to calculate energy needs due to pt's current body weight within % IBW (112%)  Nutrient needs based on [facility] standards of care for maintenance in older adults. Needs adjusted for post-op, Fluid needs per team.    Energy: 7040-1832 kcal/day (25-30kcal/kg)  Protein: 60-70g pro/day (1.2-1.4g pro/kg)    Subjective:   74 yo Female with PMHx of COPD, asthma, HLD, HTN, BIBEMS to [facility] from home after HHA found pt down on the floor covered in non-bloody vomitus. CTH reveals diffuse subarachnoid hemorrhage mainly at basilar cisterns with IVH and obstructive hydrocephalus, CTA shows 3mm left pcomm aneurysm, now s/p bedside right frontal EVD placement 12/10, s/p cerebral angiogram for coil embolization of left PCOMM aneurysm 12/10, now s/p cerebral angio for verapamil c/b device malfunction of Proglide, s/p right groin cutdown for removal of proglide catheter and femoral artery repair 12/17, NIHSS2 HH3 MF4. PT underwent LP for CSF high volume tap 12/28. EVD Re-insertion 12/29 (had been removed 12/25). CSF NGTD 12/29. EVD clamped 12/30. Ventriculoperitoneal shunt placed 12/30. S/p PEG placed at bedside 1/7. Dispo pend AR vs JOSE. Decision made for JOSE, dispo issues in setting of pts insurance coverage. Pt now receiving abx course for UTI. Currently pending placement to JOSE per EMR.     On assessment, pt resting in bed without complaints. Currently NPO but on EN regime meeting 100% of est needs. No s/sx of intolerance. No n/v/d/c reported. Last BM 1/17. Education deferred 2/2 mental status of AO x 1-2. Please see nutr recs below. RD to follow up per protocol.     Previous Nutrition Diagnosis:  Increased nutrient need RT increased kcal/protein demand AEB post-op    Active [ x  ]  Resolved [   ]    If resolved, new PES:     Goal: Pt to meet >/=75%EER via most appropriate route with good tolerance    Recommendations:  1) Cont with current EN regime of Glucerna 1.2 @ 50mL/hr x 24hrs via PEG at goal provides 1200 mL TV, 1440 kcal, 72g pro, 966mL free water (120%RDI, ~29kcal/kg ABW, ~1.4gm pro/kgABW)   >>continue to monitor BS and ability to switch to Jevity 1.2- consult RD prn   >>start at 20mL/ht and increase as tolerated   >> free water flushes per team   >> maintain aspiration precautions at all times  2) As medically appropriate/mental status, resume diet recommended per SLP  >>if intake increases, assess need to change TF regimen (please consult nutrition as needed)  3) Monitor and replete lytes prn  4) Monitor weights, labs, skin integrity, GI distress  5) Pain and bowel regimens per team  6) RD to remain available for additional nutritional interventions prn     Education: n/a    Risk Level: High [ x  ] Moderate [   ] Low [   ].

## 2021-01-18 NOTE — DISCHARGE NOTE PROVIDER - PROVIDER TOKENS
PROVIDER:[TOKEN:[[id number]]] PROVIDER:[TOKEN:[[id number]]],PROVIDER:[TOKEN:[[id number]]]

## 2021-01-19 LAB
ALBUMIN SERPL ELPH-MCNC: 3.2 G/DL — LOW (ref 3.3–5)
ALP SERPL-CCNC: 136 U/L — HIGH (ref 40–120)
ALT FLD-CCNC: 39 U/L — SIGNIFICANT CHANGE UP (ref 10–45)
ANION GAP SERPL CALC-SCNC: 12 MMOL/L — SIGNIFICANT CHANGE UP (ref 5–17)
ANION GAP SERPL CALC-SCNC: 13 MMOL/L — SIGNIFICANT CHANGE UP (ref 5–17)
APPEARANCE UR: ABNORMAL
APTT BLD: 26.8 SEC — LOW (ref 27.5–35.5)
AST SERPL-CCNC: 24 U/L — SIGNIFICANT CHANGE UP (ref 10–40)
BACTERIA # UR AUTO: PRESENT /HPF
BASE EXCESS BLDV CALC-SCNC: 5.7 MMOL/L — SIGNIFICANT CHANGE UP
BILIRUB SERPL-MCNC: 0.2 MG/DL — SIGNIFICANT CHANGE UP (ref 0.2–1.2)
BILIRUB UR-MCNC: NEGATIVE — SIGNIFICANT CHANGE UP
BUN SERPL-MCNC: 22 MG/DL — SIGNIFICANT CHANGE UP (ref 7–23)
BUN SERPL-MCNC: 27 MG/DL — HIGH (ref 7–23)
CA-I SERPL-SCNC: 1.24 MMOL/L — SIGNIFICANT CHANGE UP (ref 1.12–1.3)
CALCIUM SERPL-MCNC: 9.2 MG/DL — SIGNIFICANT CHANGE UP (ref 8.4–10.5)
CALCIUM SERPL-MCNC: 9.7 MG/DL — SIGNIFICANT CHANGE UP (ref 8.4–10.5)
CHLORIDE SERPL-SCNC: 111 MMOL/L — HIGH (ref 96–108)
CHLORIDE SERPL-SCNC: 112 MMOL/L — HIGH (ref 96–108)
CO2 SERPL-SCNC: 26 MMOL/L — SIGNIFICANT CHANGE UP (ref 22–31)
CO2 SERPL-SCNC: 27 MMOL/L — SIGNIFICANT CHANGE UP (ref 22–31)
COLOR SPEC: YELLOW — SIGNIFICANT CHANGE UP
COMMENT - URINE: SIGNIFICANT CHANGE UP
CREAT SERPL-MCNC: 0.35 MG/DL — LOW (ref 0.5–1.3)
CREAT SERPL-MCNC: 0.51 MG/DL — SIGNIFICANT CHANGE UP (ref 0.5–1.3)
DIFF PNL FLD: ABNORMAL
EPI CELLS # UR: SIGNIFICANT CHANGE UP /HPF (ref 0–5)
GAS PNL BLDV: 152 MMOL/L — HIGH (ref 138–146)
GAS PNL BLDV: SIGNIFICANT CHANGE UP
GLUCOSE SERPL-MCNC: 214 MG/DL — HIGH (ref 70–99)
GLUCOSE SERPL-MCNC: 254 MG/DL — HIGH (ref 70–99)
GLUCOSE UR QL: NEGATIVE — SIGNIFICANT CHANGE UP
HCO3 BLDV-SCNC: 29 MMOL/L — HIGH (ref 20–27)
HCT VFR BLD CALC: 34.3 % — LOW (ref 34.5–45)
HCT VFR BLD CALC: 34.8 % — SIGNIFICANT CHANGE UP (ref 34.5–45)
HGB BLD-MCNC: 10.1 G/DL — LOW (ref 11.5–15.5)
HGB BLD-MCNC: 10.3 G/DL — LOW (ref 11.5–15.5)
INR BLD: 1.03 — SIGNIFICANT CHANGE UP (ref 0.88–1.16)
KETONES UR-MCNC: NEGATIVE — SIGNIFICANT CHANGE UP
LACTATE SERPL-SCNC: 1.1 MMOL/L — SIGNIFICANT CHANGE UP (ref 0.5–2)
LEUKOCYTE ESTERASE UR-ACNC: ABNORMAL
MAGNESIUM SERPL-MCNC: 2 MG/DL — SIGNIFICANT CHANGE UP (ref 1.6–2.6)
MCHC RBC-ENTMCNC: 28.8 PG — SIGNIFICANT CHANGE UP (ref 27–34)
MCHC RBC-ENTMCNC: 29.4 GM/DL — LOW (ref 32–36)
MCHC RBC-ENTMCNC: 29.4 PG — SIGNIFICANT CHANGE UP (ref 27–34)
MCHC RBC-ENTMCNC: 29.6 GM/DL — LOW (ref 32–36)
MCV RBC AUTO: 97.2 FL — SIGNIFICANT CHANGE UP (ref 80–100)
MCV RBC AUTO: 99.7 FL — SIGNIFICANT CHANGE UP (ref 80–100)
NITRITE UR-MCNC: POSITIVE
NRBC # BLD: 0 /100 WBCS — SIGNIFICANT CHANGE UP (ref 0–0)
NRBC # BLD: 0 /100 WBCS — SIGNIFICANT CHANGE UP (ref 0–0)
PCO2 BLDV: 37 MMHG — LOW (ref 41–51)
PH BLDV: 7.51 — HIGH (ref 7.32–7.43)
PH UR: 7 — SIGNIFICANT CHANGE UP (ref 5–8)
PHOSPHATE SERPL-MCNC: 3.3 MG/DL — SIGNIFICANT CHANGE UP (ref 2.5–4.5)
PLATELET # BLD AUTO: 435 K/UL — HIGH (ref 150–400)
PLATELET # BLD AUTO: 483 K/UL — HIGH (ref 150–400)
PO2 BLDV: 94 MMHG — SIGNIFICANT CHANGE UP
POTASSIUM BLDV-SCNC: 4.2 MMOL/L — SIGNIFICANT CHANGE UP (ref 3.5–4.9)
POTASSIUM SERPL-MCNC: 3.5 MMOL/L — SIGNIFICANT CHANGE UP (ref 3.5–5.3)
POTASSIUM SERPL-MCNC: 4.2 MMOL/L — SIGNIFICANT CHANGE UP (ref 3.5–5.3)
POTASSIUM SERPL-SCNC: 3.5 MMOL/L — SIGNIFICANT CHANGE UP (ref 3.5–5.3)
POTASSIUM SERPL-SCNC: 4.2 MMOL/L — SIGNIFICANT CHANGE UP (ref 3.5–5.3)
PROT SERPL-MCNC: 7.3 G/DL — SIGNIFICANT CHANGE UP (ref 6–8.3)
PROT UR-MCNC: 100 MG/DL
PROTHROM AB SERPL-ACNC: 12.3 SEC — SIGNIFICANT CHANGE UP (ref 10.6–13.6)
RBC # BLD: 3.44 M/UL — LOW (ref 3.8–5.2)
RBC # BLD: 3.58 M/UL — LOW (ref 3.8–5.2)
RBC # FLD: 18.2 % — HIGH (ref 10.3–14.5)
RBC # FLD: 18.8 % — HIGH (ref 10.3–14.5)
RBC CASTS # UR COMP ASSIST: ABNORMAL /HPF
SAO2 % BLDV: 98 % — SIGNIFICANT CHANGE UP
SODIUM SERPL-SCNC: 150 MMOL/L — HIGH (ref 135–145)
SODIUM SERPL-SCNC: 151 MMOL/L — HIGH (ref 135–145)
SP GR SPEC: 1.02 — SIGNIFICANT CHANGE UP (ref 1–1.03)
UROBILINOGEN FLD QL: 0.2 E.U./DL — SIGNIFICANT CHANGE UP
WBC # BLD: 12.37 K/UL — HIGH (ref 3.8–10.5)
WBC # BLD: 9.92 K/UL — SIGNIFICANT CHANGE UP (ref 3.8–10.5)
WBC # FLD AUTO: 12.37 K/UL — HIGH (ref 3.8–10.5)
WBC # FLD AUTO: 9.92 K/UL — SIGNIFICANT CHANGE UP (ref 3.8–10.5)
WBC UR QL: ABNORMAL /HPF

## 2021-01-19 PROCEDURE — 99291 CRITICAL CARE FIRST HOUR: CPT

## 2021-01-19 PROCEDURE — 71045 X-RAY EXAM CHEST 1 VIEW: CPT | Mod: 26

## 2021-01-19 PROCEDURE — 99024 POSTOP FOLLOW-UP VISIT: CPT

## 2021-01-19 RX ORDER — SODIUM CHLORIDE 9 MG/ML
500 INJECTION INTRAMUSCULAR; INTRAVENOUS; SUBCUTANEOUS ONCE
Refills: 0 | Status: COMPLETED | OUTPATIENT
Start: 2021-01-19 | End: 2021-01-19

## 2021-01-19 RX ORDER — PIPERACILLIN AND TAZOBACTAM 4; .5 G/20ML; G/20ML
3.38 INJECTION, POWDER, LYOPHILIZED, FOR SOLUTION INTRAVENOUS EVERY 6 HOURS
Refills: 0 | Status: DISCONTINUED | OUTPATIENT
Start: 2021-01-19 | End: 2021-01-21

## 2021-01-19 RX ORDER — ACETAMINOPHEN 500 MG
1000 TABLET ORAL ONCE
Refills: 0 | Status: COMPLETED | OUTPATIENT
Start: 2021-01-19 | End: 2021-01-19

## 2021-01-19 RX ORDER — POTASSIUM CHLORIDE 20 MEQ
40 PACKET (EA) ORAL EVERY 4 HOURS
Refills: 0 | Status: COMPLETED | OUTPATIENT
Start: 2021-01-19 | End: 2021-01-19

## 2021-01-19 RX ORDER — VANCOMYCIN HCL 1 G
1000 VIAL (EA) INTRAVENOUS EVERY 12 HOURS
Refills: 0 | Status: DISCONTINUED | OUTPATIENT
Start: 2021-01-19 | End: 2021-01-21

## 2021-01-19 RX ADMIN — AMLODIPINE BESYLATE 10 MILLIGRAM(S): 2.5 TABLET ORAL at 11:50

## 2021-01-19 RX ADMIN — Medication 400 MILLIGRAM(S): at 21:45

## 2021-01-19 RX ADMIN — CHLORHEXIDINE GLUCONATE 1 APPLICATION(S): 213 SOLUTION TOPICAL at 05:11

## 2021-01-19 RX ADMIN — CHLORHEXIDINE GLUCONATE 15 MILLILITER(S): 213 SOLUTION TOPICAL at 18:27

## 2021-01-19 RX ADMIN — Medication 325 MILLIGRAM(S): at 11:50

## 2021-01-19 RX ADMIN — FLUDROCORTISONE ACETATE 0.2 MILLIGRAM(S): 0.1 TABLET ORAL at 05:11

## 2021-01-19 RX ADMIN — PIPERACILLIN AND TAZOBACTAM 200 GRAM(S): 4; .5 INJECTION, POWDER, LYOPHILIZED, FOR SOLUTION INTRAVENOUS at 21:45

## 2021-01-19 RX ADMIN — MONTELUKAST 10 MILLIGRAM(S): 4 TABLET, CHEWABLE ORAL at 11:50

## 2021-01-19 RX ADMIN — LISINOPRIL 10 MILLIGRAM(S): 2.5 TABLET ORAL at 05:11

## 2021-01-19 RX ADMIN — Medication 81 MILLIGRAM(S): at 11:50

## 2021-01-19 RX ADMIN — Medication 6: at 06:21

## 2021-01-19 RX ADMIN — Medication 4: at 00:00

## 2021-01-19 RX ADMIN — Medication 3 MILLILITER(S): at 04:31

## 2021-01-19 RX ADMIN — Medication 4: at 12:18

## 2021-01-19 RX ADMIN — PREGABALIN 1000 MICROGRAM(S): 225 CAPSULE ORAL at 11:51

## 2021-01-19 RX ADMIN — PANTOPRAZOLE SODIUM 40 MILLIGRAM(S): 20 TABLET, DELAYED RELEASE ORAL at 18:28

## 2021-01-19 RX ADMIN — FLUDROCORTISONE ACETATE 0.2 MILLIGRAM(S): 0.1 TABLET ORAL at 18:28

## 2021-01-19 RX ADMIN — Medication 0.5 MILLIGRAM(S): at 18:28

## 2021-01-19 RX ADMIN — Medication 3 MILLILITER(S): at 10:22

## 2021-01-19 RX ADMIN — LACOSAMIDE 150 MILLIGRAM(S): 50 TABLET ORAL at 18:27

## 2021-01-19 RX ADMIN — Medication 4 MILLILITER(S): at 20:37

## 2021-01-19 RX ADMIN — Medication 2: at 22:55

## 2021-01-19 RX ADMIN — Medication 6: at 18:28

## 2021-01-19 RX ADMIN — ENOXAPARIN SODIUM 40 MILLIGRAM(S): 100 INJECTION SUBCUTANEOUS at 22:55

## 2021-01-19 RX ADMIN — SODIUM CHLORIDE 2 GRAM(S): 9 INJECTION INTRAMUSCULAR; INTRAVENOUS; SUBCUTANEOUS at 05:11

## 2021-01-19 RX ADMIN — SODIUM CHLORIDE 2 GRAM(S): 9 INJECTION INTRAMUSCULAR; INTRAVENOUS; SUBCUTANEOUS at 13:01

## 2021-01-19 RX ADMIN — Medication 3 MILLILITER(S): at 16:36

## 2021-01-19 RX ADMIN — LACOSAMIDE 150 MILLIGRAM(S): 50 TABLET ORAL at 05:11

## 2021-01-19 RX ADMIN — Medication 3 MILLILITER(S): at 22:55

## 2021-01-19 RX ADMIN — Medication 40 MILLIEQUIVALENT(S): at 10:22

## 2021-01-19 RX ADMIN — PANTOPRAZOLE SODIUM 40 MILLIGRAM(S): 20 TABLET, DELAYED RELEASE ORAL at 05:11

## 2021-01-19 RX ADMIN — SODIUM CHLORIDE 1000 MILLILITER(S): 9 INJECTION INTRAMUSCULAR; INTRAVENOUS; SUBCUTANEOUS at 21:45

## 2021-01-19 RX ADMIN — Medication 250 MILLIGRAM(S): at 22:55

## 2021-01-19 RX ADMIN — Medication 0.5 MILLIGRAM(S): at 05:11

## 2021-01-19 RX ADMIN — CHLORHEXIDINE GLUCONATE 15 MILLILITER(S): 213 SOLUTION TOPICAL at 05:14

## 2021-01-19 RX ADMIN — Medication 40 MILLIEQUIVALENT(S): at 13:00

## 2021-01-19 NOTE — PROGRESS NOTE ADULT - SUBJECTIVE AND OBJECTIVE BOX
HPI:  76y/o F with PMHx sig for COPD, asthma, HLD, HTN, BIBEMS to [facility] from home after HHA discovered patient found down in floor covered in non-bloody vomitus. Perr HHA Pt went to the bathroom and was taking a long time, went in to check on her and found her sitting on floor, awake, however with vomitus on front of shirt. On arrival to [facility], patient was taken for stat head CT, which revealed diffuse subarachnoid hemorrhage mainly at basilar cisterns with IVH and obstructive hydrocephalus. Patient was given a bolus of 1g keppra and dilaudid pushes for generalized headache. CTA concerning for 3mm left pcomm aneurysm and a 1.6mm outpouching of distal cavernous segment of the left internal carotid artery likely aneurysm. NIHSS2 HH3 MF4. Patient was transferred to [facility] for further intervention. Patient currently reports headaches. Pt denies acute changes in vision, seizures, CP, SOB, weakness/paresthesias of arms or legs. (09 Dec 2020 23:37)      Hospital Course:   : BD1 Patient admitted for SAH HH3, MF4.   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 EVD at 5, on Levo overnight, nimodipine discontinued d/t hypotension  12: BD4 POD#2, VIVIEN overnight, neuro stable, passed TOV  12: BD5 POD#3: VIVIEN x 24 hrs  : BD6 POD#4. VIVIEN o/n, neuro stable. EVD at 5cm H2O  12/15: BD7 POD #5 VIVIEN overnight, neuro stable. EVD remains at 5. Plan for CTA today.   : BD8 POD #6 VIVIEN overnight, neuro stable, EVD at 0, on Levo, started on 3% at 15 overnight   : BD9 POD#1 s/p cerebral angio for verapamil c/b device malfunction of Proglide, s/p right groin cutdown for removal of proglide catheter and femoral artery repair.  VIVIEN overnight, neuro stable, EVD at 0. patient remained intubated overnight, on propofol for sedation, and vEEG  18: BD#10, POD#8 s/p coil/embo of L pcomm aneurysm, POD#2 s/p IA verapamil, POD#2 R groin cutdown for removal of proglide catheter w/ repair of femoral artery. VIVIEN overnight. EVD remains open @0cmH2O   : BD#11, POD#9 s/p coil/embo of L pcomm aneurysm. POD#3 s/p IA verapamil, POD#3 s/p R femoral artery cutdown of proglide catheter w/ femoral artery repair. VIVIEN overnight. EVD remains open @0cmH2O. EEG shows b/l frontal sharp discharges, cont monitoring, vimpat was increased yesterday. Gentle hydration, total IVF 50cc/hr. Cont vanc/zosyn for empiric coverage, next vanc trough due @1pm on . F/u daily CXR.    BD#12 POD#10 VIVIEN overnight, Neuro exam stable, EVD @ 0cm H2O, vEEG, 3% @ 15 goal WNL, TF via NGT  : BD13, POD11 VIVIEN overnight. EVD at 5cmH20 (raised yesterday), vEEG in place   BD#14, EVD raised to 15 yesterday, 3% @ 30cc Goal WNL, -180, Milrinone, TTE prior to DC as per Card for takotsubo cardiomyopathy, failed S&S pending reeval, TF via NGT, Neuro exam stable  : BD#15. VIVIEN o/n, neuro stable. EVD clamped. 30%@30cc/hr   BD#16 VIVIEN overnight, spiked 102, EVD @ 0cm H2O, 3% @ 30cc/hr Goal WNL, Vasc following, TF via NGT, -200,   : BD#17. VIVIEN overnight. Pan cx from , NGTD on CSF and blood. EVD clamped. 3% @15cc/hr, rate kept same overnight. NGT feeds at goal. SBP goal 160-200.   : BD#18. VIVIEN overnight, neuro exam stable. NGTD from pan cx on . EVD removed today. 3% discontinued . NGT feeds at goal, IVF off. SBP goal 160-200.   : BD#19 VIVIEN overnight, neuro stable. 3% at 15, stepdown status  : BD20 VIVIEN overnight, neuro stable. 3% continues.  Patient became increasingly more somnolent and underwent LP for CSF high volume tap.  Temporary improvement.  Spiked fever, pancultured.  : BD21 Patient increasingly more somnolent, EVD placed at bedside.    : BD22 pt. is s/p R VPS placement, certas @5 POD#1, post op ct head c/a/p done. afebrile o/n.   : BD#23: pt. is s/p R VPS placement, certas @5 POD#2, post op ct head c/a/p done. zosyn for enterobacter   : BD #24. POD#3 s/p R VPS placement, CErtas @5. Dressings removed from head and abdomen. Cont zosyn for enterobacter and e. coli in urine, end date 21. Post-op imaging completed. Pend S&S re-eval, improvement in mental status/neuro exam.   1/3: BD25, POD4. SBP goals relaxed 120-200. zosyn for enterobacter UTI until . FOB+, protonix bid started, vivien o/n  : BD 26, VIVIEN o/n  : BD 27, VIVIEN o/n   : BD 28, POD 7 VPS (Certas @ 5). VIVIEN overnight. Plan for PEG with GI Thursday. Repeat Covid swab negative. Stepdown status.  : BD 29, POD 8. VIVIEN overnight. PEG placed at bedside by GI today. Stepdown status  : BD 30 POD 9, VIVIEN o/n, resume TF 24 hrs after PEG, stepdown    BD 31. POD 10. No events overnight. s/p PEG. Pending AR vs JOHANNA  1/10 BD32, POD11. Lethargic but arousable with stimulation, CTH was ordered and demonstrated stable scan in comparison from . Continue to trend Na, this  improved to 135 on 2%@50/hr and salt tabs started. Pending AR vs JOHANNA.  : BD#33 POD#12. VIVIEN overnight, peripheral IV x2 inserted. F/u pan cx from 1/10, NGTD. Cont 2% @50cc/hr, f/u Na in am. Dispo pend AR vs JOHANNA.   : BD#34 POD#13: Afebrile, on 2% at 25cc/hr, Na 137, pending AM labs. Pending JOHANNA placement. Abx to stop today, fever work-up negative. COVID swab for dispo planning.  : BD35 Neuro stable. Off 2%, Na stable. Started on Zosyn for UTI. Pending dispo to JOHANNA.   POD#15 VIVIEN overnight, Neuro exam stable, staples DC prior to discharge, TF via PEG, pending JOHANNA placement, Zosyn UTI til 1/15  1/15: POD #16 Episode of desaturating overnight, improved with chest PT, suctioning, mucomyst, CXR obtained. pending rehab  : POD17 VIVIEN overnight, neuro stable  : POD18. VIVIEN o/n, neuro stable. pending johanna  : POD20. VIVIEN o/n, neuro stable  : POD21 spiked fever 101F overnight, pancultured, neuro exam stable    Vital Signs Last 24 Hrs  T(C): 36.4 (2021 00:00), Max: 38.3 (2021 20:01)  T(F): 97.6 (2021 00:00), Max: 101 (2021 20:23)  HR: 93 (2021 00:00) (88 - 104)  BP: 172/88 (2021 00:00) (129/71 - 191/98)  BP(mean): 116 (2021 00:00) (116 - 116)  RR: 19 (2021 00:00) (19 - 20)  SpO2: 95% (2021 00:00) (93% - 99%)    I&O's Summary    2021 07:01  -  2021 07:00  --------------------------------------------------------  IN: 1300 mL / OUT: 1350 mL / NET: -50 mL    2021 07:01  -  2021 02:51  --------------------------------------------------------  IN: 900 mL / OUT: 1175 mL / NET: -275 mL        PHYSICAL EXAM:  General: NAD, not answering orientation questions  HEENT: PERRL 3mm, OEs spont, EOMI b/l, face symmetric, tongue midline, +hard of hearing. Right scalp incision C/D/I  Cardiovascular: RRR, normal S1 and S2   Respiratory: lungs CTAB  GI:  abd soft, NTND, +PEG, Right side incision C/D/I   Neuro: speech clear, no dysmetria, not following commands, ZAFAR x 4 spontaneously with good strength  Extremities: distal pulses 2+ throughout    TUBES/LINES:  [] Soriano  [] Lumbar Drain  [] Wound Drains  [] Others      DIET:  [] NPO  [] Mechanical  [x] Tube feeds    LABS:                        9.8    9.22  )-----------( 462      ( 2021 06:52 )             32.1     -    149<H>  |  109<H>  |  18  ----------------------------<  238<H>  3.6   |  29  |  0.39<L>    Ca    9.3      2021 06:52  Phos  3.3       Mg     2.1             Urinalysis Basic - ( 2021 07:35 )    Color: Yellow / Appearance: Turbid / S.020 / pH: x  Gluc: x / Ketone: NEGATIVE  / Bili: Negative / Urobili: 1.0 E.U./dL   Blood: x / Protein: NEGATIVE mg/dL / Nitrite: NEGATIVE   Leuk Esterase: Small / RBC: < 5 /HPF / WBC 5-10 /HPF   Sq Epi: x / Non Sq Epi: 0-5 /HPF / Bacteria: Present /HPF          CAPILLARY BLOOD GLUCOSE      POCT Blood Glucose.: 236 mg/dL (2021 23:50)      Drug Levels: [] N/A  Vancomycin Level, Trough: 7.7 ug/mL ( @ 05:47)    CSF Analysis: [] N/A      Allergies    No Known Allergies    Intolerances      MEDICATIONS:  Antibiotics:    Neuro:  acetaminophen    Suspension .. 650 milliGRAM(s) Oral every 6 hours PRN  lacosamide Solution 150 milliGRAM(s) Oral every 12 hours    Anticoagulation:  aspirin enteric coated 81 milliGRAM(s) Oral daily  enoxaparin Injectable 40 milliGRAM(s) SubCutaneous at bedtime    OTHER:  acetylcysteine 20%  Inhalation 4 milliLiter(s) Inhalation three times a day PRN  albuterol/ipratropium for Nebulization 3 milliLiter(s) Nebulizer every 6 hours  amLODIPine   Tablet 10 milliGRAM(s) Oral every 24 hours  atorvastatin 40 milliGRAM(s) Oral at bedtime  buDESOnide    Inhalation Suspension 0.5 milliGRAM(s) Inhalation every 12 hours  chlorhexidine 0.12% Liquid 15 milliLiter(s) Oral Mucosa two times a day  chlorhexidine 2% Cloths 1 Application(s) Topical <User Schedule>  dextrose 40% Gel 15 Gram(s) Oral once  dextrose 50% Injectable 25 Gram(s) IV Push once  fludroCORTISONE 0.2 milliGRAM(s) Oral every 12 hours  glucagon  Injectable 1 milliGRAM(s) IntraMuscular once  insulin lispro (ADMELOG) corrective regimen sliding scale   SubCutaneous Before meals and at bedtime  labetalol Injectable 10 milliGRAM(s) IV Push every 4 hours PRN  lisinopril 10 milliGRAM(s) Oral daily  montelukast 10 milliGRAM(s) Oral daily  pantoprazole   Suspension 40 milliGRAM(s) Oral two times a day    IVF:  cyanocobalamin 1000 MICROGram(s) Oral daily  dextrose 5%. 1000 milliLiter(s) IV Continuous <Continuous>  dextrose 5%. 1000 milliLiter(s) IV Continuous <Continuous>  ferrous    sulfate 325 milliGRAM(s) Oral daily  sodium chloride 2 Gram(s) Oral every 8 hours    CULTURES:  Culture Results:   No growth to date (01-10 @ 17:24)  Culture Results:   No growth at 5 days. (01-10 @ 10:46)    RADIOLOGY & ADDITIONAL TESTS:      ASSESSMENT:  76 year old Female  with PMHx of COPD, asthma, HLD, HTN, BIBEMS to [facility] from home after HHA found patient down on the floor covered in non-bloody vomitus. CTH reveals diffuse subarachnoid hemorrhage mainly at basilar cisterns with IVH and obstructive hydrocephalus, CTA shows 3mm left pcomm aneurysm, now s/p bedside right frontal EVD placement 12/10, s/p cerebral angiogram for coil embolization of left PCOMM aneurysm 12/10, now   s/p cerebral angio for verapamil c/b device malfunction of Proglide, s/p right groin cutdown for removal of proglide catheter and femoral artery repair (2020), NIHSS2 HH3 MF4), s/p EVD removal ,  s/p bedside EVD placement , POD#21 from R VPS certas at 5 and  s/p PEG.     HEADACHE    Handoff    MEWS Score    Hyperlipidemia    Hypertension    COPD (chronic obstructive pulmonary disease)    Asthma    COPD (chronic obstructive pulmonary disease)    Asthma    Hydrocephalus, adult    Injury of right femoral artery    Cerebral artery vasospasm    SAH (subarachnoid hemorrhage)    Hydrocephalus, adult    Injury of femoral artery    Cerebral artery vasospasm    SAH (subarachnoid hemorrhage)    Angiogram, cerebral, with intracranial aneurysm embolization    Subarachnoid bleed    Postoperative state    Obstructive hydrocephalus    Anemia due to acute blood loss    Preoperative clearance    Centrilobular emphysema    Mild persistent asthma without complication    Subarachnoid bleed    Essential hypertension    Pure hypercholesterolemia    Ventriculoperitoneal shunt    Insertion of external ventricular drain    Vascular surgery procedure    Angiogram, carotid and cerebral, bilateral    Angiogram, cerebral, with intracranial aneurysm embolization    No significant past surgical history    HEADACHE    90+    Ventriculoperitoneal shunt    [email or web address]        PLAN:  NEURO:  - neuro checks  - pain control   - vimpat 150 bid    CARDIOVASCULAR:  - SBP goal <200  - cont lisinopril, norvasc, lipitor  - ASA 81 for b/l carotid stenosis    PULMONARY:  - satting well RA  - cont montelukast, mucomyst, duonebs, pulmicort    RENAL:  - IVL  - repletions prn   - normonatremia goal, cont salt tabs 2q8, florinef 0.2 bid    GI:  - TF via PEG   - bowel regimen  - gi ppx protonix    HEME:  - h/h stable  - SCDs/SQL    ID:  - fever 101F o/n, f/u panculture  - no leukocytosis    ENDO:  - glucose goal 140-180    DVT PROPHYLAXIS:  [x] Venodynes                                [x] Heparin/Lovenox    DISPOSITION: step down status, full code, pending JOHANNA    D/w Dr. Serrano    Assessment:  Present when checked    []  GCS  E   V  M     Heart Failure: []Acute, [] acute on chronic , []chronic  Heart Failure:  [] Diastolic (HFpEF), [] Systolic (HFrEF), []Combined (HFpEF and HFrEF), [] RHF, [] Pulm HTN, [] Other    [] MICHAELLE, [] ATN, [] AIN, [] other  [] CKD1, [] CKD2, [] CKD 3, [] CKD 4, [] CKD 5, []ESRD    Encephalopathy: [] Metabolic, [] Hepatic, [] toxic, [] Neurological, [] Other    Abnormal Nurtitional Status: [] malnurtition (see nutrition note), [ ]underweight: BMI < 19, [] morbid obesity: BMI >40, [] Cachexia    [] Sepsis  [] hypovolemic shock,[] cardiogenic shock, [] hemorrhagic shock, [] neuogenic shock  [] Acute Respiratory Failure  []Cerebral edema, [] Brain compression/ herniation,   [] Functional quadriplegia  [] Acute blood loss anemia

## 2021-01-19 NOTE — CHART NOTE - NSCHARTNOTEFT_GEN_A_CORE
***Rapid Response Clinical Impact Nurse Practitioner Note***    Patient is a 76y old  Female with PMHx of COPD, asthma, HLD, HTN, BIBEMS to [facility] from home after HHA found patient down on the floor covered in non-bloody vomitus. CTH reveals diffuse subarachnoid hemorrhage mainly at basilar cisterns with IVH and obstructive hydrocephalus, CTA shows 3mm left pcomm aneurysm, now s/p bedside right frontal EVD placement 12/10, s/p cerebral angiogram for coil embolization of left PCOMM aneurysm 12/10, now   s/p cerebral angio for verapamil c/b device malfunction of Proglide, s/p right groin cutdown for removal of proglide catheter and femoral artery repair (12/17/2020), NIHSS2 HH3 MF4), s/p EVD removal 12/25,  s/p bedside EVD placement 12/29, POD#21 from R VPS certas at 5 and s/p PEG was scheduled for discharge when patient spiked a temperature on 1/19.    Tonight found by primary nursing team to be tachypneic in the 30's so a RRT was called. Patient found to be febrile to 102.2 rectally, tachycardic in the 110's and tachypneic. Patient with lactate of 1.1. Was pan cultured on 1/19.    Review of systems: Unable to obtain. Patient at baseline is alert but not oriented and patient is at baseline other than tachypnea at this time.    Physical exam:   General: Elderly cachetic chronically ill female lying in bed in semi-fowlers position tachypneic but does not appear to be in any major respiratory distress.  HEENT: +PERRL, Conjunctiva pink and moist. Mucus membranes appear dry. Trachea midline. No JVD.  Cardiac: Tachycardic, S1, S2.   Pulm: Good bilateral chest rise. LS diminished bilaterally. No rhonchi. SpO2 98% on 2LPM  GI: Abdomen SNT to palpation.   : Primafit in place with cloudy urine ~ 800 out for day.  MSK: +Pulses. ***Rapid Response Clinical Impact Nurse Practitioner Note***    Patient is a 76y old  Female with PMHx of COPD, asthma, HLD, HTN, BIBEMS to [facility] from home after HHA found patient down on the floor covered in non-bloody vomitus. CTH reveals diffuse subarachnoid hemorrhage mainly at basilar cisterns with IVH and obstructive hydrocephalus, CTA shows 3mm left pcomm aneurysm, now s/p bedside right frontal EVD placement 12/10, s/p cerebral angiogram for coil embolization of left PCOMM aneurysm 12/10, now   s/p cerebral angio for verapamil c/b device malfunction of Proglide, s/p right groin cutdown for removal of proglide catheter and femoral artery repair (12/17/2020), NIHSS2 HH3 MF4), s/p EVD removal 12/25,  s/p bedside EVD placement 12/29, POD#21 from R VPS certas at 5 and s/p PEG was scheduled for discharge when patient spiked a temperature on 1/19.    Tonight found by primary nursing team to be tachypneic in the 30's so a RRT was called. Patient found to be febrile to 102.2 rectally, tachycardic in the 110's and tachypneic. Patient with lactate of 1.1. Was pan cultured on 1/19.    Review of systems: Unable to obtain. Patient at baseline is alert but not oriented and patient is at baseline other than tachypnea at this time.    Physical exam:   General: Elderly cachetic chronically ill female lying in bed in semi-fowlers position tachypneic but does not appear to be in any major respiratory distress.  HEENT: +PERRL, Conjunctiva pink and moist. Mucus membranes appear dry. Trachea midline. No JVD.  Cardiac: Tachycardic, S1, S2.   Pulm: Good bilateral chest rise. LS diminished bilaterally. No rhonchi. SpO2 98% on 2LPM  GI: Abdomen SNT to palpation.   : Primafit in place with cloudy urine ~ 800 out for day.  MSK: +Pulses X 4.  Skin: Hot to touch and moist.    Assessment: Patient is a 76y old  Female with PMHx of COPD, asthma, HLD, HTN, BIBEMS to [facility] from home after HHA found patient down on the floor covered in non-bloody vomitus. CTH reveals diffuse subarachnoid hemorrhage mainly at basilar cisterns with IVH and obstructive hydrocephalus, CTA shows 3mm left pcomm aneurysm, now s/p bedside right frontal EVD placement 12/10, s/p cerebral angiogram for coil embolization of left PCOMM aneurysm 12/10, now   s/p cerebral angio for verapamil c/b device malfunction of Proglide, s/p right groin cutdown for removal of proglide catheter and femoral artery repair (12/17/2020), NIHSS2 HH3 MF4), s/p EVD removal 12/25,  s/p bedside EVD placement 12/29, POD#21 from R VPS certas at 5 and s/p PEG was scheduled for discharge when patient spiked a temperature on 1/19 now status post RRT for tachypnea in setting of Sepsis.    Plan:    -Patient with SIRS 4/4. Suspected infection with +Enteroccosus in urine.  -APAP IV for fever as patients baseline mental status is A and O X 0.  -Crystalloid 30cc/kg in 500 cc aliqots. Per primary team patient's EF is normal. Fluids started at 2145.  -Ice to groin and armpits for cooling.  -Lactate 1.1  -Zosyn and Vancomycin  -Follow up CMP and VBG.  -CXR appears unchanged from prior.  -Status post panculture 1/19 with NGTD.  -Asked bedside nursing to straight cath patient for urine sample.   -Monitor I and O's.  -VS per protocol of floor. Please repeat VS 1 hour post interventions.    Rest of plan per primary neurosurgery team at bedside. ***Rapid Response Clinical Impact Nurse Practitioner Note***    Patient is a 76y old  Female with PMHx of COPD, asthma, HLD, HTN, BIBEMS to [facility] from home after HHA found patient down on the floor covered in non-bloody vomitus. CTH reveals diffuse subarachnoid hemorrhage mainly at basilar cisterns with IVH and obstructive hydrocephalus, CTA shows 3mm left pcomm aneurysm, now s/p bedside right frontal EVD placement 12/10, s/p cerebral angiogram for coil embolization of left PCOMM aneurysm 12/10, now   s/p cerebral angio for verapamil c/b device malfunction of Proglide, s/p right groin cutdown for removal of proglide catheter and femoral artery repair (12/17/2020), NIHSS2 HH3 MF4), s/p EVD removal 12/25,  s/p bedside EVD placement 12/29, POD#21 from R VPS certas at 5 and s/p PEG was scheduled for discharge when patient spiked a temperature on 1/19.    Tonight found by primary nursing team to be tachypneic in the 30's so a RRT was called. Patient found to be febrile to 102.2 rectally, tachycardic in the 110's and tachypneic. Patient with lactate of 1.1. Was pan cultured on 1/19.    Review of systems: Unable to obtain. Patient at baseline is alert but not oriented and patient is at baseline other than tachypnea at this time.    Physical exam:   General: Elderly cachetic chronically ill female lying in bed in semi-fowlers position tachypneic but does not appear to be in any major respiratory distress.  HEENT: +PERRL, Conjunctiva pink and moist. Mucus membranes appear dry. Trachea midline. No JVD.  Cardiac: Tachycardic, S1, S2.   Pulm: Good bilateral chest rise. LS diminished bilaterally. No rhonchi. SpO2 98% on 2LPM  GI: Abdomen SNT to palpation.   : Primafit in place with cloudy urine ~ 800 out for day.  MSK: +Pulses X 4.  Skin: Hot to touch and moist.    Assessment: Patient is a 76y old  Female with PMHx of COPD, asthma, HLD, HTN, BIBEMS to [facility] from home after HHA found patient down on the floor covered in non-bloody vomitus. CTH reveals diffuse subarachnoid hemorrhage mainly at basilar cisterns with IVH and obstructive hydrocephalus, CTA shows 3mm left pcomm aneurysm, now s/p bedside right frontal EVD placement 12/10, s/p cerebral angiogram for coil embolization of left PCOMM aneurysm 12/10, now   s/p cerebral angio for verapamil c/b device malfunction of Proglide, s/p right groin cutdown for removal of proglide catheter and femoral artery repair (12/17/2020), NIHSS2 HH3 MF4), s/p EVD removal 12/25,  s/p bedside EVD placement 12/29, POD#21 from R VPS certas at 5 and s/p PEG was scheduled for discharge when patient spiked a temperature on 1/19 now status post RRT for tachypnea in setting of Sepsis.    Plan:    -Patient with SIRS 4/4. Suspected infection with +Enterococcus in urine.  -APAP IV for fever as patients baseline mental status is A and O X 0.  -Crystalloid 30cc/kg in 500 cc aliqots. Per primary team patient's EF is normal. Fluids started at 2145.  -Ice to groin and armpits for cooling.  -Lactate 1.1  -Zosyn and Vancomycin  -Follow up CMP and VBG.  -CXR appears unchanged from prior.  -Status post panculture 1/19 with NGTD.  -Asked bedside nursing to straight cath patient for urine sample.   -Monitor I and O's.  -Patient protecting airway, awakens to stimuli which is patients baseline per primary team.  -Stable for RMF at this time.  -VS per protocol of floor. Please repeat VS 1 hour post interventions.    Rest of plan per primary neurosurgery team at bedside. ***Rapid Response Clinical Impact Nurse Practitioner Note***    Patient is a 76y old  Female with PMHx of COPD, asthma, HLD, HTN, BIBEMS to [facility] from home after HHA found patient down on the floor covered in non-bloody vomitus. CTH reveals diffuse subarachnoid hemorrhage mainly at basilar cisterns with IVH and obstructive hydrocephalus, CTA shows 3mm left pcomm aneurysm, now s/p bedside right frontal EVD placement 12/10, s/p cerebral angiogram for coil embolization of left PCOMM aneurysm 12/10, now   s/p cerebral angio for verapamil c/b device malfunction of Proglide, s/p right groin cutdown for removal of proglide catheter and femoral artery repair (12/17/2020), NIHSS2 HH3 MF4), s/p EVD removal 12/25,  s/p bedside EVD placement 12/29, POD#21 from R VPS certas at 5 and s/p PEG was scheduled for discharge when patient spiked a temperature on 1/19.    Tonight found by primary nursing team to be tachypneic in the 30's so a RRT was called. Patient found to be febrile to 102.2 rectally, tachycardic in the 110's and tachypneic. Patient with lactate of 1.1. Was pan cultured on 1/19.    Review of systems: Unable to obtain. Patient at baseline is alert but not oriented and patient is at baseline other than tachypnea at this time.    Physical exam:   General: Elderly cachetic chronically ill female lying in bed in semi-fowlers position tachypneic but does not appear to be in any major respiratory distress.  HEENT: +PERRL, Conjunctiva pink and moist. Mucus membranes appear dry. Trachea midline. No JVD.  Cardiac: Tachycardic, S1, S2.   Pulm: Good bilateral chest rise. LS diminished bilaterally. No rhonchi. SpO2 98% on 2LPM  GI: Abdomen SNT to palpation.   : Primafit in place with cloudy urine ~ 800 out for day.  MSK: +Pulses X 4.  Skin: Hot to touch and moist.    Assessment: Patient is a 76y old  Female with PMHx of COPD, asthma, HLD, HTN, BIBEMS to [facility] from home after HHA found patient down on the floor covered in non-bloody vomitus. CTH reveals diffuse subarachnoid hemorrhage mainly at basilar cisterns with IVH and obstructive hydrocephalus, CTA shows 3mm left pcomm aneurysm, now s/p bedside right frontal EVD placement 12/10, s/p cerebral angiogram for coil embolization of left PCOMM aneurysm 12/10, now   s/p cerebral angio for verapamil c/b device malfunction of Proglide, s/p right groin cutdown for removal of proglide catheter and femoral artery repair (12/17/2020), NIHSS2 HH3 MF4), s/p EVD removal 12/25,  s/p bedside EVD placement 12/29, POD#21 from R VPS certas at 5 and s/p PEG was scheduled for discharge when patient spiked a temperature on 1/19 now status post RRT for tachypnea in setting of Sepsis.    Plan:    -Patient with SIRS 4/4. Suspected infection with +Enterococcus in urine.  -Tachypnea likely from fever and sepsis. No hypoxia, no increased WOB on exam.   -APAP IV for fever as patients baseline mental status is A and O X 0.  -Crystalloid 30cc/kg in 500 cc aliqots. Per primary team patient's EF is normal. Fluids started at 2145.  -Ice to groin and armpits for cooling.  -Lactate 1.1  -Zosyn and Vancomycin  -Follow up CMP and VBG.  -CXR appears unchanged from prior.  -Status post panculture 1/19 with NGTD.  -Asked bedside nursing to straight cath patient for urine sample.   -Monitor I and O's.  -Patient protecting airway, awakens to stimuli which is patients baseline per primary team.  -Stable for RMF at this time.  -VS per protocol of floor. Please repeat VS 1 hour post interventions.    Rest of plan per primary neurosurgery team at bedside.

## 2021-01-20 PROBLEM — Z00.00 ENCOUNTER FOR PREVENTIVE HEALTH EXAMINATION: Status: ACTIVE | Noted: 2021-01-20

## 2021-01-20 LAB
ANION GAP SERPL CALC-SCNC: 11 MMOL/L — SIGNIFICANT CHANGE UP (ref 5–17)
BLD GP AB SCN SERPL QL: NEGATIVE — SIGNIFICANT CHANGE UP
BUN SERPL-MCNC: 23 MG/DL — SIGNIFICANT CHANGE UP (ref 7–23)
CALCIUM SERPL-MCNC: 8.9 MG/DL — SIGNIFICANT CHANGE UP (ref 8.4–10.5)
CHLORIDE SERPL-SCNC: 108 MMOL/L — SIGNIFICANT CHANGE UP (ref 96–108)
CO2 SERPL-SCNC: 27 MMOL/L — SIGNIFICANT CHANGE UP (ref 22–31)
CREAT SERPL-MCNC: 0.4 MG/DL — LOW (ref 0.5–1.3)
GLUCOSE SERPL-MCNC: 317 MG/DL — HIGH (ref 70–99)
HCT VFR BLD CALC: 28.7 % — LOW (ref 34.5–45)
HGB BLD-MCNC: 8.4 G/DL — LOW (ref 11.5–15.5)
MAGNESIUM SERPL-MCNC: 2 MG/DL — SIGNIFICANT CHANGE UP (ref 1.6–2.6)
MCHC RBC-ENTMCNC: 29.3 GM/DL — LOW (ref 32–36)
MCHC RBC-ENTMCNC: 29.3 PG — SIGNIFICANT CHANGE UP (ref 27–34)
MCV RBC AUTO: 100 FL — SIGNIFICANT CHANGE UP (ref 80–100)
NRBC # BLD: 0 /100 WBCS — SIGNIFICANT CHANGE UP (ref 0–0)
PHOSPHATE SERPL-MCNC: 3.5 MG/DL — SIGNIFICANT CHANGE UP (ref 2.5–4.5)
PLATELET # BLD AUTO: 417 K/UL — HIGH (ref 150–400)
POTASSIUM SERPL-MCNC: 3.3 MMOL/L — LOW (ref 3.5–5.3)
POTASSIUM SERPL-SCNC: 3.3 MMOL/L — LOW (ref 3.5–5.3)
RBC # BLD: 2.87 M/UL — LOW (ref 3.8–5.2)
RBC # FLD: 18.6 % — HIGH (ref 10.3–14.5)
RH IG SCN BLD-IMP: POSITIVE — SIGNIFICANT CHANGE UP
SODIUM SERPL-SCNC: 146 MMOL/L — HIGH (ref 135–145)
WBC # BLD: 10.07 K/UL — SIGNIFICANT CHANGE UP (ref 3.8–10.5)
WBC # FLD AUTO: 10.07 K/UL — SIGNIFICANT CHANGE UP (ref 3.8–10.5)

## 2021-01-20 PROCEDURE — 99232 SBSQ HOSP IP/OBS MODERATE 35: CPT | Mod: GC

## 2021-01-20 RX ORDER — ASPIRIN/CALCIUM CARB/MAGNESIUM 324 MG
325 TABLET ORAL ONCE
Refills: 0 | Status: DISCONTINUED | OUTPATIENT
Start: 2021-01-20 | End: 2021-01-20

## 2021-01-20 RX ORDER — SODIUM CHLORIDE 9 MG/ML
500 INJECTION, SOLUTION INTRAVENOUS ONCE
Refills: 0 | Status: COMPLETED | OUTPATIENT
Start: 2021-01-20 | End: 2021-01-20

## 2021-01-20 RX ORDER — ASPIRIN/CALCIUM CARB/MAGNESIUM 324 MG
81 TABLET ORAL DAILY
Refills: 0 | Status: DISCONTINUED | OUTPATIENT
Start: 2021-01-20 | End: 2021-01-20

## 2021-01-20 RX ORDER — INSULIN GLARGINE 100 [IU]/ML
5 INJECTION, SOLUTION SUBCUTANEOUS EVERY MORNING
Refills: 0 | Status: DISCONTINUED | OUTPATIENT
Start: 2021-01-20 | End: 2021-01-26

## 2021-01-20 RX ORDER — ASPIRIN/CALCIUM CARB/MAGNESIUM 324 MG
81 TABLET ORAL DAILY
Refills: 0 | Status: DISCONTINUED | OUTPATIENT
Start: 2021-01-20 | End: 2021-01-26

## 2021-01-20 RX ORDER — INSULIN LISPRO 100/ML
3 VIAL (ML) SUBCUTANEOUS EVERY 6 HOURS
Refills: 0 | Status: DISCONTINUED | OUTPATIENT
Start: 2021-01-20 | End: 2021-01-26

## 2021-01-20 RX ORDER — HYDRALAZINE HCL 50 MG
5 TABLET ORAL ONCE
Refills: 0 | Status: DISCONTINUED | OUTPATIENT
Start: 2021-01-20 | End: 2021-01-26

## 2021-01-20 RX ADMIN — Medication 325 MILLIGRAM(S): at 13:14

## 2021-01-20 RX ADMIN — Medication 250 MILLIGRAM(S): at 06:08

## 2021-01-20 RX ADMIN — FLUDROCORTISONE ACETATE 0.2 MILLIGRAM(S): 0.1 TABLET ORAL at 06:04

## 2021-01-20 RX ADMIN — AMLODIPINE BESYLATE 10 MILLIGRAM(S): 2.5 TABLET ORAL at 13:14

## 2021-01-20 RX ADMIN — FLUDROCORTISONE ACETATE 0.2 MILLIGRAM(S): 0.1 TABLET ORAL at 19:17

## 2021-01-20 RX ADMIN — LISINOPRIL 10 MILLIGRAM(S): 2.5 TABLET ORAL at 06:05

## 2021-01-20 RX ADMIN — SODIUM CHLORIDE 2 GRAM(S): 9 INJECTION INTRAMUSCULAR; INTRAVENOUS; SUBCUTANEOUS at 22:45

## 2021-01-20 RX ADMIN — Medication 0.5 MILLIGRAM(S): at 06:05

## 2021-01-20 RX ADMIN — Medication 3 MILLILITER(S): at 09:52

## 2021-01-20 RX ADMIN — Medication 3 MILLILITER(S): at 17:50

## 2021-01-20 RX ADMIN — PANTOPRAZOLE SODIUM 40 MILLIGRAM(S): 20 TABLET, DELAYED RELEASE ORAL at 19:33

## 2021-01-20 RX ADMIN — Medication 2: at 22:44

## 2021-01-20 RX ADMIN — CHLORHEXIDINE GLUCONATE 1 APPLICATION(S): 213 SOLUTION TOPICAL at 04:29

## 2021-01-20 RX ADMIN — Medication 2: at 18:16

## 2021-01-20 RX ADMIN — Medication 3 MILLILITER(S): at 22:44

## 2021-01-20 RX ADMIN — CHLORHEXIDINE GLUCONATE 15 MILLILITER(S): 213 SOLUTION TOPICAL at 19:17

## 2021-01-20 RX ADMIN — PIPERACILLIN AND TAZOBACTAM 200 GRAM(S): 4; .5 INJECTION, POWDER, LYOPHILIZED, FOR SOLUTION INTRAVENOUS at 16:30

## 2021-01-20 RX ADMIN — PREGABALIN 1000 MICROGRAM(S): 225 CAPSULE ORAL at 13:13

## 2021-01-20 RX ADMIN — ATORVASTATIN CALCIUM 40 MILLIGRAM(S): 80 TABLET, FILM COATED ORAL at 22:44

## 2021-01-20 RX ADMIN — LACOSAMIDE 150 MILLIGRAM(S): 50 TABLET ORAL at 06:06

## 2021-01-20 RX ADMIN — Medication 3 UNIT(S): at 18:15

## 2021-01-20 RX ADMIN — Medication 3 MILLILITER(S): at 04:46

## 2021-01-20 RX ADMIN — Medication 6: at 13:11

## 2021-01-20 RX ADMIN — SODIUM CHLORIDE 1000 MILLILITER(S): 9 INJECTION, SOLUTION INTRAVENOUS at 16:30

## 2021-01-20 RX ADMIN — PIPERACILLIN AND TAZOBACTAM 200 GRAM(S): 4; .5 INJECTION, POWDER, LYOPHILIZED, FOR SOLUTION INTRAVENOUS at 09:52

## 2021-01-20 RX ADMIN — Medication 0.5 MILLIGRAM(S): at 19:17

## 2021-01-20 RX ADMIN — Medication 3 UNIT(S): at 23:01

## 2021-01-20 RX ADMIN — LACOSAMIDE 150 MILLIGRAM(S): 50 TABLET ORAL at 19:20

## 2021-01-20 RX ADMIN — Medication 8: at 06:39

## 2021-01-20 RX ADMIN — SODIUM CHLORIDE 2 GRAM(S): 9 INJECTION INTRAMUSCULAR; INTRAVENOUS; SUBCUTANEOUS at 13:13

## 2021-01-20 RX ADMIN — PIPERACILLIN AND TAZOBACTAM 200 GRAM(S): 4; .5 INJECTION, POWDER, LYOPHILIZED, FOR SOLUTION INTRAVENOUS at 04:32

## 2021-01-20 RX ADMIN — PANTOPRAZOLE SODIUM 40 MILLIGRAM(S): 20 TABLET, DELAYED RELEASE ORAL at 06:07

## 2021-01-20 RX ADMIN — CHLORHEXIDINE GLUCONATE 15 MILLILITER(S): 213 SOLUTION TOPICAL at 06:06

## 2021-01-20 RX ADMIN — PIPERACILLIN AND TAZOBACTAM 200 GRAM(S): 4; .5 INJECTION, POWDER, LYOPHILIZED, FOR SOLUTION INTRAVENOUS at 22:44

## 2021-01-20 RX ADMIN — MONTELUKAST 10 MILLIGRAM(S): 4 TABLET, CHEWABLE ORAL at 13:13

## 2021-01-20 RX ADMIN — Medication 81 MILLIGRAM(S): at 19:17

## 2021-01-20 RX ADMIN — Medication 250 MILLIGRAM(S): at 18:16

## 2021-01-20 RX ADMIN — ENOXAPARIN SODIUM 40 MILLIGRAM(S): 100 INJECTION SUBCUTANEOUS at 22:44

## 2021-01-20 NOTE — PROGRESS NOTE ADULT - SUBJECTIVE AND OBJECTIVE BOX
Brief history of present illness  76F PMH COPD, asthma, HLD, HTN, BIBEMS to [facility] after patient found down at home by HHA and found to have diffuse subarachnoid hemorrhage with obstructive hydrocephalus. Patient now with EVD placed on 12/10/20 and s/p cerebral angiogram for coil embolization of left cerebral aneurysm. Patient was scheduled for discharge however spiked a fever on . A rapid response was called overnight on  when the patient was found to be febrile to 102.2, tachycardic to the 110s and tachypneic to the 30s. Cultures were obtained at that time with NGTD. Patient previously had been found to have enterococcus in urine. Started on emperic antibiotic coverage with vancomycin and zosyn.    OVERNIGHT EVENTS:    SUBJECTIVE / INTERVAL HPI: Patient seen and examined at bedside.     VITAL SIGNS:  Vital Signs Last 24 Hrs  T(C): 36.6 (2021 04:45), Max: 39 (2021 21:55)  T(F): 97.9 (2021 04:45), Max: 102.2 (2021 21:55)  HR: 87 (2021 05:34) (87 - 114)  BP: 126/61 (2021 05:34) (126/61 - 174/84)  BP(mean): --  RR: 18 (2021 05:34) (18 - 30)  SpO2: 100% (2021 05:34) (92% - 100%)    PHYSICAL EXAM:  General: [place], frail, cathectic  HEENT: NC/AT; PERRL, anicteric sclera; MMM  Neck: supple  Cardiovascular: +S1/S2; RRR  Respiratory: CTA B/L; no W/R/R  Gastrointestinal: soft, nontender, nondistended  Extremities: WWP; no edema, clubbing or cyanosis  Vascular: 2+ radial    MEDICATIONS:  MEDICATIONS  (STANDING):  albuterol/ipratropium for Nebulization 3 milliLiter(s) Nebulizer every 6 hours  amLODIPine   Tablet 10 milliGRAM(s) Oral every 24 hours  aspirin enteric coated 81 milliGRAM(s) Oral daily  atorvastatin 40 milliGRAM(s) Oral at bedtime  buDESOnide    Inhalation Suspension 0.5 milliGRAM(s) Inhalation every 12 hours  chlorhexidine 0.12% Liquid 15 milliLiter(s) Oral Mucosa two times a day  chlorhexidine 2% Cloths 1 Application(s) Topical <User Schedule>  cyanocobalamin 1000 MICROGram(s) Oral daily  dextrose 40% Gel 15 Gram(s) Oral once  dextrose 5%. 1000 milliLiter(s) (50 mL/Hr) IV Continuous <Continuous>  dextrose 5%. 1000 milliLiter(s) (100 mL/Hr) IV Continuous <Continuous>  dextrose 50% Injectable 25 Gram(s) IV Push once  enoxaparin Injectable 40 milliGRAM(s) SubCutaneous at bedtime  ferrous    sulfate 325 milliGRAM(s) Oral daily  fludroCORTISONE 0.2 milliGRAM(s) Oral every 12 hours  glucagon  Injectable 1 milliGRAM(s) IntraMuscular once  hydrALAZINE Injectable 5 milliGRAM(s) IV Push once  insulin lispro (ADMELOG) corrective regimen sliding scale   SubCutaneous Before meals and at bedtime  lacosamide Solution 150 milliGRAM(s) Oral every 12 hours  lisinopril 10 milliGRAM(s) Oral daily  montelukast 10 milliGRAM(s) Oral daily  pantoprazole   Suspension 40 milliGRAM(s) Oral two times a day  piperacillin/tazobactam IVPB.. 3.375 Gram(s) IV Intermittent every 6 hours  sodium chloride 2 Gram(s) Oral every 8 hours  vancomycin  IVPB 1000 milliGRAM(s) IV Intermittent every 12 hours    MEDICATIONS  (PRN):  acetaminophen    Suspension .. 650 milliGRAM(s) Oral every 6 hours PRN Temp greater or equal to 38C (100.4F), Mild Pain (1 - 3)  acetylcysteine 20%  Inhalation 4 milliLiter(s) Inhalation three times a day PRN congestion  labetalol Injectable 10 milliGRAM(s) IV Push every 4 hours PRN SBP > 200      ALLERGIES:  Allergies    No Known Allergies    Intolerances        LABS:                        8.4    10.07 )-----------( 417      ( 2021 07:58 )             28.7     01-20    146<H>  |  108  |  23  ----------------------------<  317<H>  3.3<L>   |  27  |  0.40<L>    Ca    8.9      2021 07:58  Phos  3.5     -  Mg     2.0     -    TPro  7.3  /  Alb  3.2<L>  /  TBili  0.2  /  DBili  x   /  AST  24  /  ALT  39  /  AlkPhos  136<H>      PT/INR - ( 2021 21:55 )   PT: 12.3 sec;   INR: 1.03          PTT - ( 2021 21:55 )  PTT:26.8 sec  Urinalysis Basic - ( 2021 22:35 )    Color: Yellow / Appearance: Turbid / S.025 / pH: x  Gluc: x / Ketone: NEGATIVE  / Bili: Negative / Urobili: 0.2 E.U./dL   Blood: x / Protein: 100 mg/dL / Nitrite: POSITIVE   Leuk Esterase: Moderate / RBC: Many /HPF / WBC Many /HPF   Sq Epi: x / Non Sq Epi: 0-5 /HPF / Bacteria: Present /HPF      CAPILLARY BLOOD GLUCOSE      POCT Blood Glucose.: 304 mg/dL (2021 06:32)      RADIOLOGY & ADDITIONAL TESTS: Reviewed.   Brief history of present illness  76F PMH COPD, asthma, HLD, HTN, BIBEMS to [facility] after patient found down at home by HHA and found to have diffuse subarachnoid hemorrhage with obstructive hydrocephalus. Patient now with EVD placed on 12/10/20 and s/p cerebral angiogram for coil embolization of left cerebral aneurysm. Patient was scheduled for discharge however spiked a fever on . A rapid response was called overnight on  when the patient was found to be febrile to 102.2, tachycardic to the 110s and tachypneic to the 30s. Cultures were obtained at that time with NGTD. Patient previously had been found to have enterococcus in urine. Started on emperic antibiotic coverage with vancomycin and zosyn.    OVERNIGHT EVENTS:    SUBJECTIVE / INTERVAL HPI: Patient seen and examined at bedside.     VITAL SIGNS:  Vital Signs Last 24 Hrs  T(C): 36.6 (2021 04:45), Max: 39 (2021 21:55)  T(F): 97.9 (2021 04:45), Max: 102.2 (2021 21:55)  HR: 87 (2021 05:34) (87 - 114)  BP: 126/61 (2021 05:34) (126/61 - 174/84)  BP(mean): --  RR: 18 (2021 05:34) (18 - 30)  SpO2: 100% (2021 05:34) (92% - 100%)    PHYSICAL EXAM:  General: [place], frail, cathectic  HEENT: NC/AT; PERRL, anicteric sclera; MMM  Neck: supple  Cardiovascular: +S1/S2; RRR  Respiratory: CTA B/L; no W/R/R  Gastrointestinal: soft, nontender, nondistended  Extremities: WWP; no edema, clubbing or cyanosis  Vascular: 2+ radial  Neurologic: alert to verbal and tactile stimulation, but does not follow commands or answer questions at time of exam    MEDICATIONS:  MEDICATIONS  (STANDING):  albuterol/ipratropium for Nebulization 3 milliLiter(s) Nebulizer every 6 hours  amLODIPine   Tablet 10 milliGRAM(s) Oral every 24 hours  aspirin enteric coated 81 milliGRAM(s) Oral daily  atorvastatin 40 milliGRAM(s) Oral at bedtime  buDESOnide    Inhalation Suspension 0.5 milliGRAM(s) Inhalation every 12 hours  chlorhexidine 0.12% Liquid 15 milliLiter(s) Oral Mucosa two times a day  chlorhexidine 2% Cloths 1 Application(s) Topical <User Schedule>  cyanocobalamin 1000 MICROGram(s) Oral daily  dextrose 40% Gel 15 Gram(s) Oral once  dextrose 5%. 1000 milliLiter(s) (50 mL/Hr) IV Continuous <Continuous>  dextrose 5%. 1000 milliLiter(s) (100 mL/Hr) IV Continuous <Continuous>  dextrose 50% Injectable 25 Gram(s) IV Push once  enoxaparin Injectable 40 milliGRAM(s) SubCutaneous at bedtime  ferrous    sulfate 325 milliGRAM(s) Oral daily  fludroCORTISONE 0.2 milliGRAM(s) Oral every 12 hours  glucagon  Injectable 1 milliGRAM(s) IntraMuscular once  hydrALAZINE Injectable 5 milliGRAM(s) IV Push once  insulin lispro (ADMELOG) corrective regimen sliding scale   SubCutaneous Before meals and at bedtime  lacosamide Solution 150 milliGRAM(s) Oral every 12 hours  lisinopril 10 milliGRAM(s) Oral daily  montelukast 10 milliGRAM(s) Oral daily  pantoprazole   Suspension 40 milliGRAM(s) Oral two times a day  piperacillin/tazobactam IVPB.. 3.375 Gram(s) IV Intermittent every 6 hours  sodium chloride 2 Gram(s) Oral every 8 hours  vancomycin  IVPB 1000 milliGRAM(s) IV Intermittent every 12 hours    MEDICATIONS  (PRN):  acetaminophen    Suspension .. 650 milliGRAM(s) Oral every 6 hours PRN Temp greater or equal to 38C (100.4F), Mild Pain (1 - 3)  acetylcysteine 20%  Inhalation 4 milliLiter(s) Inhalation three times a day PRN congestion  labetalol Injectable 10 milliGRAM(s) IV Push every 4 hours PRN SBP > 200      ALLERGIES:  Allergies    No Known Allergies    Intolerances        LABS:                        8.4    10.07 )-----------( 417      ( 2021 07:58 )             28.7     01-20    146<H>  |  108  |  23  ----------------------------<  317<H>  3.3<L>   |  27  |  0.40<L>    Ca    8.9      2021 07:58  Phos  3.5     -  Mg     2.0     -    TPro  7.3  /  Alb  3.2<L>  /  TBili  0.2  /  DBili  x   /  AST  24  /  ALT  39  /  AlkPhos  136<H>      PT/INR - ( 2021 21:55 )   PT: 12.3 sec;   INR: 1.03          PTT - ( 2021 21:55 )  PTT:26.8 sec  Urinalysis Basic - ( 2021 22:35 )    Color: Yellow / Appearance: Turbid / S.025 / pH: x  Gluc: x / Ketone: NEGATIVE  / Bili: Negative / Urobili: 0.2 E.U./dL   Blood: x / Protein: 100 mg/dL / Nitrite: POSITIVE   Leuk Esterase: Moderate / RBC: Many /HPF / WBC Many /HPF   Sq Epi: x / Non Sq Epi: 0-5 /HPF / Bacteria: Present /HPF      CAPILLARY BLOOD GLUCOSE      POCT Blood Glucose.: 304 mg/dL (2021 06:32)      RADIOLOGY & ADDITIONAL TESTS: Reviewed.

## 2021-01-20 NOTE — PROGRESS NOTE ADULT - ASSESSMENT
76F PMH COPD, asthma, HLD, HTN, [facility] after patient found down at home by HHA and found to have diffuse subarachnoid hemorrhage, now status post neurosurgical intervention and patient spiked fever, tachycardia and tachypnea yesterday. Was pan cultured and started on epimeric vancomycin and zosyn. Of note, patient had previously had e. coli and enterococcus in urine.     NOTE IN PROGRESS 76F PMH COPD, asthma, HLD, HTN, [facility] at home by HHA and found to have diffuse subarachnoid hemorrhage, now status post neurosurgical intervention and patient spiked fever, tachycardia and tachypnea yesterday. Was pan cultured and started on epimeric vancomycin and zosyn. Of note, patient had previously had e. coli and enterococcus in urine.     Plan  - F/u BCx and UCx  - C/w vancomycin and zosyn as antimicrobial therapy for now, plan to deescalate antimicrobials following culture results    ID team 1 will continue to follow

## 2021-01-20 NOTE — PROCEDURE NOTE - NSPROCNAME_GEN_A_CORE
Arterial Puncture/Cannulation
Arterial Puncture/Cannulation
Central Line Insertion
General
Lumbar Puncture
underweight/patient is thin however denies any changes in weight. Reports that he has always been small-framed
Feeding Tube Placement
General
General
Arterial Puncture/Cannulation
General
Arterial Puncture/Cannulation
General
General
Arterial Puncture/Cannulation
General
Peripheral Line Insertion

## 2021-01-20 NOTE — PROCEDURE NOTE - GENERAL PROCEDURE DETAILS
Venipuncture performed to left hand and left foot. 2 sets of blood cultures collected and brought to laboratory.

## 2021-01-20 NOTE — PROCEDURE NOTE - NSCOMPLICATION_GEN_A_CORE
no complications

## 2021-01-20 NOTE — PROCEDURE NOTE - GENERAL PROCEDURE NAME
Placement of External Ventricular Drain
external ventricular drain soft pass
venepuncture
Collection of blood cultures
blood culture x2
blood cultures x 2
CSF draw
CSF Collection
Re-insertion of EVD

## 2021-01-20 NOTE — PROGRESS NOTE ADULT - ATTENDING COMMENTS
Patient seen and examined with house-staff during bedside rounds.  Resident note read, including vitals, physical findings, laboratory data, and radiological reports.   Revisions included below.  Direct personal management at bed side and extensive interpretation of the data.  Plan was outlined and discussed in details with the housestaff.  Decision making of high complexity  Action taken for acute disease activity to reflect the level of care provided:  - medication reconciliation  - review laboratory data  cultures negative on antibiotics as she spiked fever and had a rapid response

## 2021-01-20 NOTE — PROCEDURE NOTE - PROCEDURE DATE TIME, MLM
16-Dec-2020 04:04
25-Dec-2020 18:13
10-Christian-2021 05:00
13-Dec-2020 12:53
16-Dec-2020 04:05
28-Dec-2020 15:44
29-Dec-2020 01:49
31-Dec-2020
10-Dec-2020 03:33
10-Dec-2020 18:00
15-Dec-2020 18:34
29-Dec-2020 18:00
23-Dec-2020 15:18
23-Dec-2020 06:27
18-Dec-2020
18-Jan-2021 22:00
29-Dec-2020 02:09
01-Jan-2021 13:15
11-Jan-2021 02:09

## 2021-01-20 NOTE — PROGRESS NOTE ADULT - ATTENDING COMMENTS
I saw and evaluated the patient on the above date of service and discussed the care with the resident. I agree with the findings and plan as documented in the note above except for the following:      Re-consulted for new onset fever.  Patient pan-cultured, UA grossly positive, BCx so far ngtd, CXR clear.  Suspect sepsis 2/2 UTI.  f/u BCx and UCx.  Cont vanc/zosyn for now.  Check vanc trough before 4th dose.      Team 1 will continue to follow you.  Dr. Cote will take over the care tomorrow.    Velma Gutierrez MD, MS  Infectious disease attending  Work cell [phone]

## 2021-01-20 NOTE — PROGRESS NOTE ADULT - SUBJECTIVE AND OBJECTIVE BOX
HPI:  74y/o F with PMHx sig for COPD, asthma, HLD, HTN, BIBEMS to [facility] from home after HHA discovered patient found down in floor covered in non-bloody vomitus. Perr HHA Pt went to the bathroom and was taking a long time, went in to check on her and found her sitting on floor, awake, however with vomitus on front of shirt. On arrival to [facility], patient was taken for stat head CT, which revealed diffuse subarachnoid hemorrhage mainly at basilar cisterns with IVH and obstructive hydrocephalus. Patient was given a bolus of 1g keppra and dilaudid pushes for generalized headache. CTA concerning for 3mm left pcomm aneurysm and a 1.6mm outpouching of distal cavernous segment of the left internal carotid artery likely aneurysm. NIHSS2 HH3 MF4. Patient was transferred to [facility] for further intervention. Patient currently reports headaches. Pt denies acute changes in vision, seizures, CP, SOB, weakness/paresthesias of arms or legs. (09 Dec 2020 23:37)      Hospital Course:   : BD1 Patient admitted for SAH HH3, MF4.   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 EVD at 5, on Levo overnight, nimodipine discontinued d/t hypotension  12: BD4 POD#2, VIVIEN overnight, neuro stable, passed TOV  12: BD5 POD#3: VIVIEN x 24 hrs  : BD6 POD#4. VIVIEN o/n, neuro stable. EVD at 5cm H2O  12/15: BD7 POD #5 VIVIEN overnight, neuro stable. EVD remains at 5. Plan for CTA today.   : BD8 POD #6 VIVIEN overnight, neuro stable, EVD at 0, on Levo, started on 3% at 15 overnight   : BD9 POD#1 s/p cerebral angio for verapamil c/b device malfunction of Proglide, s/p right groin cutdown for removal of proglide catheter and femoral artery repair.  VIVIEN overnight, neuro stable, EVD at 0. patient remained intubated overnight, on propofol for sedation, and vEEG  18: BD#10, POD#8 s/p coil/embo of L pcomm aneurysm, POD#2 s/p IA verapamil, POD#2 R groin cutdown for removal of proglide catheter w/ repair of femoral artery. VIVIEN overnight. EVD remains open @0cmH2O   : BD#11, POD#9 s/p coil/embo of L pcomm aneurysm. POD#3 s/p IA verapamil, POD#3 s/p R femoral artery cutdown of proglide catheter w/ femoral artery repair. VIVIEN overnight. EVD remains open @0cmH2O. EEG shows b/l frontal sharp discharges, cont monitoring, vimpat was increased yesterday. Gentle hydration, total IVF 50cc/hr. Cont vanc/zosyn for empiric coverage, next vanc trough due @1pm on . F/u daily CXR.    BD#12 POD#10 VIVIEN overnight, Neuro exam stable, EVD @ 0cm H2O, vEEG, 3% @ 15 goal WNL, TF via NGT  : BD13, POD11 VIVIEN overnight. EVD at 5cmH20 (raised yesterday), vEEG in place   BD#14, EVD raised to 15 yesterday, 3% @ 30cc Goal WNL, -180, Milrinone, TTE prior to DC as per Card for takotsubo cardiomyopathy, failed S&S pending reeval, TF via NGT, Neuro exam stable  : BD#15. VIVIEN o/n, neuro stable. EVD clamped. 30%@30cc/hr   BD#16 VIVIEN overnight, spiked 102, EVD @ 0cm H2O, 3% @ 30cc/hr Goal WNL, Vasc following, TF via NGT, -200,   : BD#17. VIVIEN overnight. Pan cx from , NGTD on CSF and blood. EVD clamped. 3% @15cc/hr, rate kept same overnight. NGT feeds at goal. SBP goal 160-200.   : BD#18. VIIVEN overnight, neuro exam stable. NGTD from pan cx on . EVD removed today. 3% discontinued . NGT feeds at goal, IVF off. SBP goal 160-200.   : BD#19 VIVIEN overnight, neuro stable. 3% at 15, stepdown status  : BD20 VIVIEN overnight, neuro stable. 3% continues.  Patient became increasingly more somnolent and underwent LP for CSF high volume tap.  Temporary improvement.  Spiked fever, pancultured.  : BD21 Patient increasingly more somnolent, EVD placed at bedside.    : BD22 pt. is s/p R VPS placement, certas @5 POD#1, post op ct head c/a/p done. afebrile o/n.   : BD#23: pt. is s/p R VPS placement, certas @5 POD#2, post op ct head c/a/p done. zosyn for enterobacter   : BD #24. POD#3 s/p R VPS placement, CErtas @5. Dressings removed from head and abdomen. Cont zosyn for enterobacter and e. coli in urine, end date 21. Post-op imaging completed. Pend S&S re-eval, improvement in mental status/neuro exam.   1/3: BD25, POD4. SBP goals relaxed 120-200. zosyn for enterobacter UTI until . FOB+, protonix bid started, vivien o/n  : BD 26, VIVIEN o/n  : BD 27, VIVIEN o/n   : BD 28, POD 7 VPS (Certas @ 5). VIVIEN overnight. Plan for PEG with GI Thursday. Repeat Covid swab negative. Stepdown status.  : BD 29, POD 8. VIVIEN overnight. PEG placed at bedside by GI today. Stepdown status  : BD 30 POD 9, VIVIEN o/n, resume TF 24 hrs after PEG, stepdown    BD 31. POD 10. No events overnight. s/p PEG. Pending AR vs JOHANNA  1/10 BD32, POD11. Lethargic but arousable with stimulation, CTH was ordered and demonstrated stable scan in comparison from . Continue to trend Na, this  improved to 135 on 2%@50/hr and salt tabs started. Pending AR vs JOHANNA.  : BD#33 POD#12. VIVIEN overnight, peripheral IV x2 inserted. F/u pan cx from 1/10, NGTD. Cont 2% @50cc/hr, f/u Na in am. Dispo pend AR vs JOHANNA.   : BD#34 POD#13: Afebrile, on 2% at 25cc/hr, Na 137, pending AM labs. Pending JOHANNA placement. Abx to stop today, fever work-up negative. COVID swab for dispo planning.  : BD35 Neuro stable. Off 2%, Na stable. Started on Zosyn for UTI. Pending dispo to JOHANNA.   POD#15 VIVIEN overnight, Neuro exam stable, staples DC prior to discharge, TF via PEG, pending JOHANNA placement, Zosyn UTI til 1/15  1/15: POD #16 Episode of desaturating overnight, improved with chest PT, suctioning, mucomyst, CXR obtained. pending rehab  : POD17 VIVIEN overnight, neuro stable  : POD18. VIVIEN o/n, neuro stable. pending johanna  : POD20. VIVIEN o/n, neuro stable  : POD21 spiked fever 101F overnight, pancultured, neuro exam stable  : POD#22. Rapid response called last night for tachycardia / tachypnea. Transferred to telemetry.    OVERNIGHT EVENTS: Rapid response as above. Started n Vanco and zosyn empirically. UA grossly positive for UTI.   Vital Signs Last 24 Hrs  T(C): 36.7 (2021 02:11), Max: 39 (2021 21:55)  T(F): 98 (2021 02:11), Max: 102.2 (2021 21:55)  HR: 90 (2021 01:21) (90 - 114)  BP: 160/70 (2021 01:21) (150/73 - 174/84)  BP(mean): 107 (2021 05:00) (107 - 107)  RR: 18 (2021 01:21) (18 - 30)  SpO2: 100% (2021 01:21) (92% - 100%)    I&O's Summary    2021 07:01  -  2021 07:00  --------------------------------------------------------  IN: 900 mL / OUT: 1175 mL / NET: -275 mL    2021 07:01  -  2021 03:55  --------------------------------------------------------  IN: 650 mL / OUT: 875 mL / NET: -225 mL        PHYSICAL EXAM:   GEN: Cachetic, open eyes spontaneously. Does not follow commands.  YVAN: non verbal, PERRL, face is symmetric.   head: R. surgical wound is healing well.  Heart: tachycardia, S1S2 regular  Lung: poor inspiratory effort, Clear lung sounds  Abd: soft    DIET:  PEG feeds  [] NPO  [] Mechanical  [] Tube feeds    LABS:                        10.1   12.37 )-----------( 435      ( 2021 21:55 )             34.3         151<H>  |  112<H>  |  27<H>  ----------------------------<  214<H>  4.2   |  26  |  0.51    Ca    9.7      2021 21:55  Phos  3.3       Mg     2.0         TPro  7.3  /  Alb  3.2<L>  /  TBili  0.2  /  DBili  x   /  AST  24  /  ALT  39  /  AlkPhos  136<H>      PT/INR - ( 2021 21:55 )   PT: 12.3 sec;   INR: 1.03          PTT - ( 2021 21:55 )  PTT:26.8 sec  Urinalysis Basic - ( 2021 22:35 )    Color: Yellow / Appearance: Turbid / S.025 / pH: x  Gluc: x / Ketone: NEGATIVE  / Bili: Negative / Urobili: 0.2 E.U./dL   Blood: x / Protein: 100 mg/dL / Nitrite: POSITIVE   Leuk Esterase: Moderate / RBC: Many /HPF / WBC Many /HPF   Sq Epi: x / Non Sq Epi: 0-5 /HPF / Bacteria: Present /HPF          CAPILLARY BLOOD GLUCOSE      POCT Blood Glucose.: 190 mg/dL (2021 22:43)  POCT Blood Glucose.: 204 mg/dL (2021 21:31)  POCT Blood Glucose.: 257 mg/dL (2021 18:04)  POCT Blood Glucose.: 217 mg/dL (2021 12:06)  POCT Blood Glucose.: 251 mg/dL (2021 05:59)      Drug Levels: [] N/A    CSF Analysis: [] N/A      Allergies    No Known Allergies    Intolerances      MEDICATIONS:  Antibiotics:  piperacillin/tazobactam IVPB.. 3.375 Gram(s) IV Intermittent every 6 hours  vancomycin  IVPB 1000 milliGRAM(s) IV Intermittent every 12 hours    Neuro:  acetaminophen    Suspension .. 650 milliGRAM(s) Oral every 6 hours PRN  lacosamide Solution 150 milliGRAM(s) Oral every 12 hours    Anticoagulation:  aspirin enteric coated 81 milliGRAM(s) Oral daily  enoxaparin Injectable 40 milliGRAM(s) SubCutaneous at bedtime    OTHER:  acetylcysteine 20%  Inhalation 4 milliLiter(s) Inhalation three times a day PRN  albuterol/ipratropium for Nebulization 3 milliLiter(s) Nebulizer every 6 hours  amLODIPine   Tablet 10 milliGRAM(s) Oral every 24 hours  atorvastatin 40 milliGRAM(s) Oral at bedtime  buDESOnide    Inhalation Suspension 0.5 milliGRAM(s) Inhalation every 12 hours  chlorhexidine 0.12% Liquid 15 milliLiter(s) Oral Mucosa two times a day  chlorhexidine 2% Cloths 1 Application(s) Topical <User Schedule>  dextrose 40% Gel 15 Gram(s) Oral once  dextrose 50% Injectable 25 Gram(s) IV Push once  fludroCORTISONE 0.2 milliGRAM(s) Oral every 12 hours  glucagon  Injectable 1 milliGRAM(s) IntraMuscular once  hydrALAZINE Injectable 5 milliGRAM(s) IV Push once  insulin lispro (ADMELOG) corrective regimen sliding scale   SubCutaneous Before meals and at bedtime  labetalol Injectable 10 milliGRAM(s) IV Push every 4 hours PRN  lisinopril 10 milliGRAM(s) Oral daily  montelukast 10 milliGRAM(s) Oral daily  pantoprazole   Suspension 40 milliGRAM(s) Oral two times a day    IVF:  cyanocobalamin 1000 MICROGram(s) Oral daily  dextrose 5%. 1000 milliLiter(s) IV Continuous <Continuous>  dextrose 5%. 1000 milliLiter(s) IV Continuous <Continuous>  ferrous    sulfate 325 milliGRAM(s) Oral daily  sodium chloride 2 Gram(s) Oral every 8 hours    CULTURES:  Culture Results:   No growth at 1 day. ( @ 21:59)  Culture Results:   No growth at 1 day. ( @ 21:59)    RADIOLOGY & ADDITIONAL TESTS:      ASSESSMENT:  76 year old Female  with PMHx of COPD, asthma, HLD, HTN, BIBEMS to [facility] from home after HHA found patient down on the floor covered in non-bloody vomitus. CTH reveals diffuse subarachnoid hemorrhage mainly at basilar cisterns with IVH and obstructive hydrocephalus, CTA shows 3mm left pcomm aneurysm, now s/p bedside right frontal EVD placement 12/10, s/p cerebral angiogram for coil embolization of left PCOMM aneurysm 12/10, now   s/p cerebral angio for verapamil c/b device malfunction of Proglide, s/p right groin cutdown for removal of proglide catheter and femoral artery repair (2020), NIHSS2 HH3 MF4), s/p EVD removal ,  s/p bedside EVD placement , POD#22 from R VPS certas at 5 and  s/p PEG.       HEADACHE    Handoff    MEWS Score    Hyperlipidemia    Hypertension    COPD (chronic obstructive pulmonary disease)    Asthma    COPD (chronic obstructive pulmonary disease)    Asthma    Hydrocephalus, adult    Injury of right femoral artery    Cerebral artery vasospasm    SAH (subarachnoid hemorrhage)    Hydrocephalus, adult    Injury of femoral artery    Cerebral artery vasospasm    SAH (subarachnoid hemorrhage)    Angiogram, cerebral, with intracranial aneurysm embolization    Subarachnoid bleed    Postoperative state    Obstructive hydrocephalus    Anemia due to acute blood loss    Preoperative clearance    Centrilobular emphysema    Mild persistent asthma without complication    Subarachnoid bleed    Essential hypertension    Pure hypercholesterolemia    Ventriculoperitoneal shunt    Insertion of external ventricular drain    Vascular surgery procedure    Angiogram, carotid and cerebral, bilateral    Angiogram, cerebral, with intracranial aneurysm embolization    No significant past surgical history    HEADACHE    90+    Ventriculoperitoneal shunt    [email or web address]        PLAN:  Continue vanco and zosyn - ID consult  F/U cultures  LE doppler  cont. O2 via nasal canula.  Medicine to follow  SW for subacute rehab placement.   SANCHEZ Gonsalez/. Zach.    DVT PROPHYLAXIS:  [x] Venodynes                                [x] Heparin/Lovenox    DISPOSITION: full code JOHANNA.    Assessment:  Present when checked    []  GCS  E   V  M     Heart Failure: []Acute, [] acute on chronic , []chronic  Heart Failure:  [] Diastolic (HFpEF), [] Systolic (HFrEF), []Combined (HFpEF and HFrEF), [] RHF, [] Pulm HTN, [] Other    [] MICHAELLE, [] ATN, [] AIN, [] other  [] CKD1, [] CKD2, [] CKD 3, [] CKD 4, [] CKD 5, []ESRD    Encephalopathy: [] Metabolic, [] Hepatic, [] toxic, [] Neurological, [] Other    Abnormal Nurtitional Status: [] malnurtition (see nutrition note), [ ]underweight: BMI < 19, [] morbid obesity: BMI >40, [] Cachexia    [] Sepsis  [] hypovolemic shock,[] cardiogenic shock, [] hemorrhagic shock, [] neuogenic shock  [] Acute Respiratory Failure  []Cerebral edema, [] Brain compression/ herniation,   [] Functional quadriplegia  [] Acute blood loss anemia

## 2021-01-20 NOTE — PROGRESS NOTE ADULT - SUBJECTIVE AND OBJECTIVE BOX
Interval Events: Reviewed  Patient seen and examined at bedside.    Patient is a 76y old  Female who presents with a chief complaint of SAH (2021 09:39)  she is sleepy but accusable and not in distress    PAST MEDICAL & SURGICAL HISTORY:  Hyperlipidemia    Hypertension    COPD (chronic obstructive pulmonary disease)    Asthma    No significant past surgical history        MEDICATIONS:  Pulmonary:  acetylcysteine 20%  Inhalation 4 milliLiter(s) Inhalation three times a day PRN  albuterol/ipratropium for Nebulization 3 milliLiter(s) Nebulizer every 6 hours  buDESOnide    Inhalation Suspension 0.5 milliGRAM(s) Inhalation every 12 hours  montelukast 10 milliGRAM(s) Oral daily    Antimicrobials:  piperacillin/tazobactam IVPB.. 3.375 Gram(s) IV Intermittent every 6 hours  vancomycin  IVPB 1000 milliGRAM(s) IV Intermittent every 12 hours    Anticoagulants:  aspirin  chewable 81 milliGRAM(s) Oral daily  enoxaparin Injectable 40 milliGRAM(s) SubCutaneous at bedtime    Cardiac:  amLODIPine   Tablet 10 milliGRAM(s) Oral every 24 hours  hydrALAZINE Injectable 5 milliGRAM(s) IV Push once  labetalol Injectable 10 milliGRAM(s) IV Push every 4 hours PRN  lisinopril 10 milliGRAM(s) Oral daily      Allergies    No Known Allergies    Intolerances        Vital Signs Last 24 Hrs  T(C): 36.6 (2021 17:54), Max: 39 (2021 21:55)  T(F): 97.8 (2021 17:54), Max: 102.2 (2021 21:55)  HR: 90 (2021 17:54) (83 - 114)  BP: 137/63 (2021 17:54) (125/60 - 173/58)  BP(mean): --  RR: 16 (2021 17:54) (16 - 30)  SpO2: 100% (2021 17:54) (92% - 100%)     @ 07:01  -   @ 07:00  --------------------------------------------------------  IN: 1050 mL / OUT: 1425 mL / NET: -375 mL     @ 07: @ 19:28  --------------------------------------------------------  IN: 1250 mL / OUT: 0 mL / NET: 1250 mL          Review of Systems:   •	General: negative  •	Skin/Breast: negative  •	Ophthalmologic: negative  •	ENMT: negative  •	Respiratory and Thorax: negative  •	Cardiovascular: negative  •	Gastrointestinal: negative  •	Genitourinary: negative  •	Musculoskeletal: negative  •	Neurological: negative  •	Psychiatric: negative  •	Hematology/Lymphatics: negative  •	Endocrine: negative  •	Allergic/Immunologic: negative    Physical Exam:   • Constitutional:	Well-developed, well nourished  • Eyes:	EOMI; PERRL; no drainage or redness  • ENMT:	No oral lesions; no gross abnormalities  • Neck	No bruits; no thyromegaly or nodules  • Breasts:	not examined  • Back:	No deformity or limitation of movement  • Respiratory:	Breath Sounds equal & clear to percussion & auscultation, no accessory muscle use  • Cardiovascular:	Regular rate & rhythm, normal S1, S2; no murmurs, gallops or rubs; no S3, S4  • Gastrointestinal:	Soft, non-tender, no hepatosplenomegaly, normal bowel sounds  • Genitourinary:	not examined  • Rectal: not examined  • Extremities:	No cyanosis, clubbing or edema  • Vascular:	Equal and normal pulses (carotid, femoral, dorsalis pedis)  • Neurologica:l	not examined  • Skin:	No lesions; no rash  • Lymph Nodes:	No lymphadedenopathy  • Musculoskeletal:	No joint pain, swelling or deformity; no limitation of movement        LABS:      CBC Full  -  ( 2021 07:58 )  WBC Count : 10.07 K/uL  RBC Count : 2.87 M/uL  Hemoglobin : 8.4 g/dL  Hematocrit : 28.7 %  Platelet Count - Automated : 417 K/uL  Mean Cell Volume : 100.0 fl  Mean Cell Hemoglobin : 29.3 pg  Mean Cell Hemoglobin Concentration : 29.3 gm/dL  Auto Neutrophil # : x  Auto Lymphocyte # : x  Auto Monocyte # : x  Auto Eosinophil # : x  Auto Basophil # : x  Auto Neutrophil % : x  Auto Lymphocyte % : x  Auto Monocyte % : x  Auto Eosinophil % : x  Auto Basophil % : x        146<H>  |  108  |  23  ----------------------------<  317<H>  3.3<L>   |  27  |  0.40<L>    Ca    8.9      2021 07:58  Phos  3.5       Mg     2.0         TPro  7.3  /  Alb  3.2<L>  /  TBili  0.2  /  DBili  x   /  AST  24  /  ALT  39  /  AlkPhos  136<H>      PT/INR - ( 2021 21:55 )   PT: 12.3 sec;   INR: 1.03          PTT - ( 2021 21:55 )  PTT:26.8 sec      Urinalysis Basic - ( 2021 22:35 )    Color: Yellow / Appearance: Turbid / S.025 / pH: x  Gluc: x / Ketone: NEGATIVE  / Bili: Negative / Urobili: 0.2 E.U./dL   Blood: x / Protein: 100 mg/dL / Nitrite: POSITIVE   Leuk Esterase: Moderate / RBC: Many /HPF / WBC Many /HPF   Sq Epi: x / Non Sq Epi: 0-5 /HPF / Bacteria: Present /HPF    < from: Xray Chest 1 View- PORTABLE-Urgent (Xray Chest 1 View- PORTABLE-Urgent .) (21 @ 22:03) >    EXAM:  XR CHEST PORTABLE URGENT 1V                          PROCEDURE DATE:  2021          INTERPRETATION:  PORTABLE CHEST X-RAY    Indication: fever    Bedside examination of the chest dated 2021 10:03 PM is compared to the previous study of 1/15/2021.    FINDINGS: Study limited by patient rotation. Right-sided ventriculoperitoneal shunt again noted. Small linear atelectasis both bases. No pleural effusion. Heart size within normal limits. Calcified plaque aorta. Degenerative changes of spine.    IMPRESSION: No evidence of pneumonia.      < end of copied text >        Culture Results:   No growth at 12 hours ( @ 22:05)  Culture Results:   No growth at 1 day. ( @ 21:59)  Culture Results:   No growth at 1 day. ( @ 21:59)      RADIOLOGY & ADDITIONAL STUDIES (The following images were personally reviewed):  Soriano:                                     No  Urine output:                       adequate  DVT prophylaxis:                 Yes  Flattus:                                  Yes  Bowel movement:              No

## 2021-01-20 NOTE — CHART NOTE - NSCHARTNOTEFT_GEN_A_CORE
Admitting Diagnosis:   Patient is a 76y old  Female who presents with a chief complaint of SAH (20 Jan 2021 09:39)    PAST MEDICAL & SURGICAL HISTORY:  Hyperlipidemia  Hypertension  COPD (chronic obstructive pulmonary disease)  Asthma  No significant past surgical history    Current Nutrition Order: NPO diet  EN regime: Glucerna 1.2 @ 50mL/hr x 24hrs via PEG at goal provides 1200 mL TV, 1440 kcal, 72g pro, 966mL free water (120%RDI, , 1.4gm pro/kgABW)      PO Intake: Good (%) [   ]  Fair (50-75%) [   ] Poor (<25%) [   ]- N/A    GI Issues:   WDL, last BM 1/20 (fecal incontinence)  PEG tube for EN- tolerating well without s/sx of intolerance  No n/v/d/c    Pain:  No noted pain at this time    Skin Integrity:  Surgical incision, tran score 12  No edema present  No pressure ulcers noted    Labs:   01-20    146<H>  |  108  |  23  ----------------------------<  317<H>  3.3<L>   |  27  |  0.40<L>    Ca    8.9      20 Jan 2021 07:58  Phos  3.5     01-20  Mg     2.0     01-20    TPro  7.3  /  Alb  3.2<L>  /  TBili  0.2  /  DBili  x   /  AST  24  /  ALT  39  /  AlkPhos  136<H>  01-19    CAPILLARY BLOOD GLUCOSE    POCT Blood Glucose.: 304 mg/dL (20 Jan 2021 06:32)  POCT Blood Glucose.: 190 mg/dL (19 Jan 2021 22:43)  POCT Blood Glucose.: 204 mg/dL (19 Jan 2021 21:31)  POCT Blood Glucose.: 257 mg/dL (19 Jan 2021 18:04)  POCT Blood Glucose.: 217 mg/dL (19 Jan 2021 12:06)    Medications:  MEDICATIONS  (STANDING):  albuterol/ipratropium for Nebulization 3 milliLiter(s) Nebulizer every 6 hours  amLODIPine   Tablet 10 milliGRAM(s) Oral every 24 hours  aspirin enteric coated 81 milliGRAM(s) Oral daily  atorvastatin 40 milliGRAM(s) Oral at bedtime  buDESOnide    Inhalation Suspension 0.5 milliGRAM(s) Inhalation every 12 hours  chlorhexidine 0.12% Liquid 15 milliLiter(s) Oral Mucosa two times a day  chlorhexidine 2% Cloths 1 Application(s) Topical <User Schedule>  cyanocobalamin 1000 MICROGram(s) Oral daily  dextrose 40% Gel 15 Gram(s) Oral once  dextrose 5%. 1000 milliLiter(s) (50 mL/Hr) IV Continuous <Continuous>  dextrose 5%. 1000 milliLiter(s) (100 mL/Hr) IV Continuous <Continuous>  dextrose 50% Injectable 25 Gram(s) IV Push once  enoxaparin Injectable 40 milliGRAM(s) SubCutaneous at bedtime  ferrous    sulfate 325 milliGRAM(s) Oral daily  fludroCORTISONE 0.2 milliGRAM(s) Oral every 12 hours  glucagon  Injectable 1 milliGRAM(s) IntraMuscular once  hydrALAZINE Injectable 5 milliGRAM(s) IV Push once  insulin lispro (ADMELOG) corrective regimen sliding scale   SubCutaneous Before meals and at bedtime  lacosamide Solution 150 milliGRAM(s) Oral every 12 hours  lisinopril 10 milliGRAM(s) Oral daily  montelukast 10 milliGRAM(s) Oral daily  pantoprazole   Suspension 40 milliGRAM(s) Oral two times a day  piperacillin/tazobactam IVPB.. 3.375 Gram(s) IV Intermittent every 6 hours  sodium chloride 2 Gram(s) Oral every 8 hours  vancomycin  IVPB 1000 milliGRAM(s) IV Intermittent every 12 hours    MEDICATIONS  (PRN):  acetaminophen    Suspension .. 650 milliGRAM(s) Oral every 6 hours PRN Temp greater or equal to 38C (100.4F), Mild Pain (1 - 3)  acetylcysteine 20%  Inhalation 4 milliLiter(s) Inhalation three times a day PRN congestion  labetalol Injectable 10 milliGRAM(s) IV Push every 4 hours PRN SBP > 200    Admitted Anthropometrics  Height: 59"" Weight: 110lbs/49.9kg  IBW 98lbs/44.5kg+/-10%, %%, BMI 22.2     Weight Change: No new weights to assess. Please obtain and trend bi-weekly weights for assessment.    Nutrition Focused Physical Exam: Completed [   ]  Not Pertinent [ x  ]    Estimated energy needs:   ABW (49.9kg) used to calculate energy needs due to pt's current body weight within % IBW (112%)  Nutrient needs based on [facility] standards of care for maintenance in older adults. Needs adjusted for post-op, Fluid needs per team.    Energy: 5006-5501 kcal/day (25-30kcal/kg)  Protein: 60-70g pro/day (1.2-1.4g pro/kg)    Subjective:   76 yo Female with PMHx of COPD, asthma, HLD, HTN, [facility]IBEMS to [facility] from home after HHA found pt down on the floor covered in non-bloody vomitus. CTH reveals diffuse subarachnoid hemorrhage mainly at basilar cisterns with IVH and obstructive hydrocephalus, CTA shows 3mm left pcomm aneurysm, now s/p bedside right frontal EVD placement 12/10, s/p cerebral angiogram for coil embolization of left PCOMM aneurysm 12/10, now s/p cerebral angio for verapamil c/b device malfunction of Proglide, s/p right groin cutdown for removal of proglide catheter and femoral artery repair 12/17, NIHSS2 HH3 MF4. PT underwent LP for CSF high volume tap 12/28. EVD Re-insertion 12/29 (had been removed 12/25). CSF NGTD 12/29. EVD clamped 12/30. Ventriculoperitoneal shunt placed 12/30. S/p PEG placed at bedside 1/7. Dispo pend AR vs JOSE. Decision made for JOSE, dispo issues in setting of pts insurance coverage. Pt now receiving abx course for UTI. Pt was ready for d/c but RR was called 1/19 for tachycardia / tachypnea and transferred back to [facility].     On assessment, pt resting in bed without complaints at this time. Currently remains NPO on EN diet meeting 100% of est needs. Pt cont to tolerate EN within s/sx of intolerance. No n/v/d/c. Plan for d/c to rehab once pt is medically stable. Education deferred at this time- already well educated on EN. Glu 317H, Na 146H, K 3.3L. Please see nutr recs below. RD to follow.     Previous Nutrition Diagnosis:  Increased nutrient need RT increased kcal/protein demand AEB post-op    Active [ x  ]  Resolved [   ]    If resolved, new PES:     Goal: Pt to meet >/=75%EER via most appropriate route with good tolerance    Recommendations:  1) Cont with current EN regime of Glucerna 1.2 @ 50mL/hr x 24hrs via PEG at goal provides 1200 mL TV, 1440 kcal, 72g pro, 966mL free water (120%RDI, ~29kcal/kg ABW, ~1.4gm pro/kgABW)   >>continue to monitor BS and ability to switch to Jevity 1.2- consult RD prn   >>start at 20mL/ht and increase as tolerated   >> free water flushes per team   >> maintain aspiration precautions at all times  2) As medically appropriate/mental status, resume diet recommended per SLP  >>if intake increases, assess need to change TF regimen (please consult nutrition as needed)  3) Monitor and replete lytes prn  4) Monitor weights, labs, skin integrity, GI distress  5) Pain and bowel regimens per team  6) RD to remain available for additional nutritional interventions prn   **team paged.     Education: n/a    Risk Level: High [ x  ] Moderate [   ] Low [   ].

## 2021-01-20 NOTE — PROCEDURE NOTE - NSSITEPREP_SKIN_A_CORE
chlorhexidine
alcohol/Adherence to aseptic technique: hand hygiene prior to donning barriers (gown, gloves), don cap and mask, sterile drape over patient
chlorhexidine

## 2021-01-20 NOTE — PROCEDURE NOTE - ATTENDING PROVIDER
Dr. Serrano
Zach
Dr. Serrano
RICK Serrano

## 2021-01-21 LAB
ANION GAP SERPL CALC-SCNC: 12 MMOL/L — SIGNIFICANT CHANGE UP (ref 5–17)
ANION GAP SERPL CALC-SCNC: 9 MMOL/L — SIGNIFICANT CHANGE UP (ref 5–17)
BUN SERPL-MCNC: 14 MG/DL — SIGNIFICANT CHANGE UP (ref 7–23)
BUN SERPL-MCNC: 17 MG/DL — SIGNIFICANT CHANGE UP (ref 7–23)
CALCIUM SERPL-MCNC: 8.9 MG/DL — SIGNIFICANT CHANGE UP (ref 8.4–10.5)
CALCIUM SERPL-MCNC: 9.4 MG/DL — SIGNIFICANT CHANGE UP (ref 8.4–10.5)
CHLORIDE SERPL-SCNC: 105 MMOL/L — SIGNIFICANT CHANGE UP (ref 96–108)
CHLORIDE SERPL-SCNC: 105 MMOL/L — SIGNIFICANT CHANGE UP (ref 96–108)
CO2 SERPL-SCNC: 32 MMOL/L — HIGH (ref 22–31)
CO2 SERPL-SCNC: 34 MMOL/L — HIGH (ref 22–31)
CREAT SERPL-MCNC: 0.38 MG/DL — LOW (ref 0.5–1.3)
CREAT SERPL-MCNC: 0.41 MG/DL — LOW (ref 0.5–1.3)
CULTURE RESULTS: SIGNIFICANT CHANGE UP
GLUCOSE SERPL-MCNC: 140 MG/DL — HIGH (ref 70–99)
GLUCOSE SERPL-MCNC: 196 MG/DL — HIGH (ref 70–99)
HCT VFR BLD CALC: 27.1 % — LOW (ref 34.5–45)
HGB BLD-MCNC: 7.9 G/DL — LOW (ref 11.5–15.5)
MAGNESIUM SERPL-MCNC: 1.9 MG/DL — SIGNIFICANT CHANGE UP (ref 1.6–2.6)
MCHC RBC-ENTMCNC: 28.6 PG — SIGNIFICANT CHANGE UP (ref 27–34)
MCHC RBC-ENTMCNC: 29.2 GM/DL — LOW (ref 32–36)
MCV RBC AUTO: 98.2 FL — SIGNIFICANT CHANGE UP (ref 80–100)
NRBC # BLD: 0 /100 WBCS — SIGNIFICANT CHANGE UP (ref 0–0)
PHOSPHATE SERPL-MCNC: 4.6 MG/DL — HIGH (ref 2.5–4.5)
PLATELET # BLD AUTO: 345 K/UL — SIGNIFICANT CHANGE UP (ref 150–400)
POTASSIUM SERPL-MCNC: 2.6 MMOL/L — CRITICAL LOW (ref 3.5–5.3)
POTASSIUM SERPL-MCNC: 3 MMOL/L — LOW (ref 3.5–5.3)
POTASSIUM SERPL-SCNC: 2.6 MMOL/L — CRITICAL LOW (ref 3.5–5.3)
POTASSIUM SERPL-SCNC: 3 MMOL/L — LOW (ref 3.5–5.3)
RBC # BLD: 2.76 M/UL — LOW (ref 3.8–5.2)
RBC # FLD: 17.4 % — HIGH (ref 10.3–14.5)
SODIUM SERPL-SCNC: 148 MMOL/L — HIGH (ref 135–145)
SODIUM SERPL-SCNC: 149 MMOL/L — HIGH (ref 135–145)
SPECIMEN SOURCE: SIGNIFICANT CHANGE UP
WBC # BLD: 9.08 K/UL — SIGNIFICANT CHANGE UP (ref 3.8–10.5)
WBC # FLD AUTO: 9.08 K/UL — SIGNIFICANT CHANGE UP (ref 3.8–10.5)

## 2021-01-21 PROCEDURE — 93970 EXTREMITY STUDY: CPT | Mod: 26

## 2021-01-21 PROCEDURE — 99232 SBSQ HOSP IP/OBS MODERATE 35: CPT

## 2021-01-21 PROCEDURE — 99024 POSTOP FOLLOW-UP VISIT: CPT

## 2021-01-21 RX ORDER — PIPERACILLIN AND TAZOBACTAM 4; .5 G/20ML; G/20ML
3.38 INJECTION, POWDER, LYOPHILIZED, FOR SOLUTION INTRAVENOUS EVERY 6 HOURS
Refills: 0 | Status: DISCONTINUED | OUTPATIENT
Start: 2021-01-21 | End: 2021-01-22

## 2021-01-21 RX ORDER — PIPERACILLIN AND TAZOBACTAM 4; .5 G/20ML; G/20ML
3.38 INJECTION, POWDER, LYOPHILIZED, FOR SOLUTION INTRAVENOUS EVERY 8 HOURS
Refills: 0 | Status: DISCONTINUED | OUTPATIENT
Start: 2021-01-21 | End: 2021-01-21

## 2021-01-21 RX ORDER — POTASSIUM CHLORIDE 20 MEQ
10 PACKET (EA) ORAL
Refills: 0 | Status: COMPLETED | OUTPATIENT
Start: 2021-01-21 | End: 2021-01-21

## 2021-01-21 RX ORDER — POTASSIUM CHLORIDE 20 MEQ
40 PACKET (EA) ORAL EVERY 4 HOURS
Refills: 0 | Status: COMPLETED | OUTPATIENT
Start: 2021-01-21 | End: 2021-01-21

## 2021-01-21 RX ADMIN — ENOXAPARIN SODIUM 40 MILLIGRAM(S): 100 INJECTION SUBCUTANEOUS at 22:47

## 2021-01-21 RX ADMIN — CHLORHEXIDINE GLUCONATE 15 MILLILITER(S): 213 SOLUTION TOPICAL at 05:52

## 2021-01-21 RX ADMIN — Medication 3 MILLILITER(S): at 03:48

## 2021-01-21 RX ADMIN — MONTELUKAST 10 MILLIGRAM(S): 4 TABLET, CHEWABLE ORAL at 12:26

## 2021-01-21 RX ADMIN — SODIUM CHLORIDE 2 GRAM(S): 9 INJECTION INTRAMUSCULAR; INTRAVENOUS; SUBCUTANEOUS at 05:53

## 2021-01-21 RX ADMIN — SODIUM CHLORIDE 2 GRAM(S): 9 INJECTION INTRAMUSCULAR; INTRAVENOUS; SUBCUTANEOUS at 14:40

## 2021-01-21 RX ADMIN — PANTOPRAZOLE SODIUM 40 MILLIGRAM(S): 20 TABLET, DELAYED RELEASE ORAL at 05:53

## 2021-01-21 RX ADMIN — Medication 3 UNIT(S): at 12:27

## 2021-01-21 RX ADMIN — PIPERACILLIN AND TAZOBACTAM 200 GRAM(S): 4; .5 INJECTION, POWDER, LYOPHILIZED, FOR SOLUTION INTRAVENOUS at 03:48

## 2021-01-21 RX ADMIN — PANTOPRAZOLE SODIUM 40 MILLIGRAM(S): 20 TABLET, DELAYED RELEASE ORAL at 18:36

## 2021-01-21 RX ADMIN — Medication 3 UNIT(S): at 22:56

## 2021-01-21 RX ADMIN — FLUDROCORTISONE ACETATE 0.2 MILLIGRAM(S): 0.1 TABLET ORAL at 18:37

## 2021-01-21 RX ADMIN — Medication 3 MILLILITER(S): at 18:36

## 2021-01-21 RX ADMIN — Medication 81 MILLIGRAM(S): at 12:26

## 2021-01-21 RX ADMIN — ATORVASTATIN CALCIUM 40 MILLIGRAM(S): 80 TABLET, FILM COATED ORAL at 22:47

## 2021-01-21 RX ADMIN — PREGABALIN 1000 MICROGRAM(S): 225 CAPSULE ORAL at 12:26

## 2021-01-21 RX ADMIN — Medication 325 MILLIGRAM(S): at 12:26

## 2021-01-21 RX ADMIN — Medication 3 UNIT(S): at 17:56

## 2021-01-21 RX ADMIN — LACOSAMIDE 150 MILLIGRAM(S): 50 TABLET ORAL at 08:10

## 2021-01-21 RX ADMIN — Medication 100 MILLIEQUIVALENT(S): at 18:35

## 2021-01-21 RX ADMIN — Medication 2: at 22:56

## 2021-01-21 RX ADMIN — PIPERACILLIN AND TAZOBACTAM 200 GRAM(S): 4; .5 INJECTION, POWDER, LYOPHILIZED, FOR SOLUTION INTRAVENOUS at 12:28

## 2021-01-21 RX ADMIN — Medication 0.5 MILLIGRAM(S): at 05:52

## 2021-01-21 RX ADMIN — Medication 3 UNIT(S): at 07:10

## 2021-01-21 RX ADMIN — LISINOPRIL 10 MILLIGRAM(S): 2.5 TABLET ORAL at 05:52

## 2021-01-21 RX ADMIN — AMLODIPINE BESYLATE 10 MILLIGRAM(S): 2.5 TABLET ORAL at 12:26

## 2021-01-21 RX ADMIN — Medication 100 MILLIEQUIVALENT(S): at 23:18

## 2021-01-21 RX ADMIN — CHLORHEXIDINE GLUCONATE 15 MILLILITER(S): 213 SOLUTION TOPICAL at 18:36

## 2021-01-21 RX ADMIN — Medication 3 MILLILITER(S): at 10:29

## 2021-01-21 RX ADMIN — Medication 250 MILLIGRAM(S): at 10:29

## 2021-01-21 RX ADMIN — Medication 4: at 12:27

## 2021-01-21 RX ADMIN — Medication 40 MILLIEQUIVALENT(S): at 12:26

## 2021-01-21 RX ADMIN — Medication 40 MILLIEQUIVALENT(S): at 18:35

## 2021-01-21 RX ADMIN — LACOSAMIDE 150 MILLIGRAM(S): 50 TABLET ORAL at 18:37

## 2021-01-21 RX ADMIN — FLUDROCORTISONE ACETATE 0.2 MILLIGRAM(S): 0.1 TABLET ORAL at 05:52

## 2021-01-21 RX ADMIN — Medication 3 MILLILITER(S): at 22:47

## 2021-01-21 RX ADMIN — Medication 2: at 07:09

## 2021-01-21 RX ADMIN — Medication 0.5 MILLIGRAM(S): at 18:36

## 2021-01-21 RX ADMIN — PIPERACILLIN AND TAZOBACTAM 200 GRAM(S): 4; .5 INJECTION, POWDER, LYOPHILIZED, FOR SOLUTION INTRAVENOUS at 22:48

## 2021-01-21 RX ADMIN — CHLORHEXIDINE GLUCONATE 1 APPLICATION(S): 213 SOLUTION TOPICAL at 05:53

## 2021-01-21 RX ADMIN — INSULIN GLARGINE 5 UNIT(S): 100 INJECTION, SOLUTION SUBCUTANEOUS at 07:02

## 2021-01-21 NOTE — PROVIDER CONTACT NOTE (CRITICAL VALUE NOTIFICATION) - ACTION/TREATMENT ORDERED:
MD notified, no new orders at this time
Potassium replacements ordered.
No interventions at present as per PA. Will continue to monitor.

## 2021-01-21 NOTE — CHART NOTE - NSCHARTNOTEFT_GEN_A_CORE
Admitting Diagnosis:   Patient is a 76y old  Female who presents with a chief complaint of SAH (21 Jan 2021 09:26)    PAST MEDICAL & SURGICAL HISTORY:  Hyperlipidemia  Hypertension  COPD (chronic obstructive pulmonary disease)  Asthma  No significant past surgical history    Current Nutrition Order: NPO diet  EN regime: Glucerna 1.2 @ 50mL/hr x 24hrs via PEG at goal provides 1200 mL TV, 1440 kcal, 72g pro, 966mL free water (120%RDI, , 1.4gm pro/kgABW)      PO Intake: Good (%) [   ]  Fair (50-75%) [   ] Poor (<25%) [   ]- N/A    GI Issues:   WDL, last BM 1/20 (fecal incontinence)  PEG tube for EN- tolerating well without s/sx of intolerance  No n/v/d/c    Pain:  No noted pain at this time    Skin Integrity:  Surgical incision, tran score 15  No edema present  No pressure ulcers noted    Labs:   01-21    149<H>  |  105  |  17  ----------------------------<  196<H>  2.6<LL>   |  32<H>  |  0.41<L>    Ca    8.9      21 Jan 2021 08:00  Phos  4.6     01-21  Mg     1.9     01-21    TPro  7.3  /  Alb  3.2<L>  /  TBili  0.2  /  DBili  x   /  AST  24  /  ALT  39  /  AlkPhos  136<H>  01-19    CAPILLARY BLOOD GLUCOSE    POCT Blood Glucose.: 211 mg/dL (21 Jan 2021 11:49)  POCT Blood Glucose.: 197 mg/dL (21 Jan 2021 06:33)  POCT Blood Glucose.: 189 mg/dL (20 Jan 2021 22:01)  POCT Blood Glucose.: 195 mg/dL (20 Jan 2021 18:03)    Medications:  MEDICATIONS  (STANDING):  albuterol/ipratropium for Nebulization 3 milliLiter(s) Nebulizer every 6 hours  amLODIPine   Tablet 10 milliGRAM(s) Oral every 24 hours  aspirin  chewable 81 milliGRAM(s) Oral daily  atorvastatin 40 milliGRAM(s) Oral at bedtime  buDESOnide    Inhalation Suspension 0.5 milliGRAM(s) Inhalation every 12 hours  chlorhexidine 0.12% Liquid 15 milliLiter(s) Oral Mucosa two times a day  chlorhexidine 2% Cloths 1 Application(s) Topical <User Schedule>  cyanocobalamin 1000 MICROGram(s) Oral daily  dextrose 40% Gel 15 Gram(s) Oral once  dextrose 5%. 1000 milliLiter(s) (50 mL/Hr) IV Continuous <Continuous>  dextrose 5%. 1000 milliLiter(s) (100 mL/Hr) IV Continuous <Continuous>  dextrose 50% Injectable 25 Gram(s) IV Push once  enoxaparin Injectable 40 milliGRAM(s) SubCutaneous at bedtime  ferrous    sulfate 325 milliGRAM(s) Oral daily  fludroCORTISONE 0.2 milliGRAM(s) Oral every 12 hours  glucagon  Injectable 1 milliGRAM(s) IntraMuscular once  hydrALAZINE Injectable 5 milliGRAM(s) IV Push once  insulin glargine Injectable (LANTUS) 5 Unit(s) SubCutaneous every morning  insulin lispro (ADMELOG) corrective regimen sliding scale   SubCutaneous Before meals and at bedtime  insulin lispro Injectable (ADMELOG) 3 Unit(s) SubCutaneous every 6 hours  lacosamide Solution 150 milliGRAM(s) Oral every 12 hours  lisinopril 10 milliGRAM(s) Oral daily  montelukast 10 milliGRAM(s) Oral daily  pantoprazole   Suspension 40 milliGRAM(s) Oral two times a day  piperacillin/tazobactam IVPB.. 3.375 Gram(s) IV Intermittent every 6 hours  potassium chloride   Solution 40 milliEquivalent(s) Oral every 4 hours  sodium chloride 2 Gram(s) Oral every 8 hours    MEDICATIONS  (PRN):  acetaminophen    Suspension .. 650 milliGRAM(s) Oral every 6 hours PRN Temp greater or equal to 38C (100.4F), Mild Pain (1 - 3)  acetylcysteine 20%  Inhalation 4 milliLiter(s) Inhalation three times a day PRN congestion  labetalol Injectable 10 milliGRAM(s) IV Push every 4 hours PRN SBP > 200    Admitted Anthropometrics  Height: 59"" Weight: 110lbs/49.9kg  IBW 98lbs/44.5kg+/-10%, %%, BMI 22.2     Weight Change: No new weights to assess. Please obtain and trend bi-weekly weights for assessment.    Nutrition Focused Physical Exam: Completed [   ]  Not Pertinent [ x  ]    Estimated energy needs:   ABW (49.9kg) used to calculate energy needs due to pt's current body weight within % IBW (112%)  Nutrient needs based on [facility] standards of care for maintenance in older adults. Needs adjusted for post-op, Fluid needs per team.    Energy: 5360-8215 kcal/day (25-30kcal/kg)  Protein: 60-70g pro/day (1.2-1.4g pro/kg)    Subjective:   76 yo Female with PMHx of COPD, asthma, HLD, HTN, BIBEMS to [facility] from home after HHA found pt down on the floor covered in non-bloody vomitus. CTH reveals diffuse subarachnoid hemorrhage mainly at basilar cisterns with IVH and obstructive hydrocephalus, CTA shows 3mm left pcomm aneurysm, now s/p bedside right frontal EVD placement 12/10, s/p cerebral angiogram for coil embolization of left PCOMM aneurysm 12/10, now s/p cerebral angio for verapamil c/b device malfunction of Proglide, s/p right groin cutdown for removal of proglide catheter and femoral artery repair 12/17, NIHSS2 HH3 MF4. PT underwent LP for CSF high volume tap 12/28. EVD Re-insertion 12/29 (had been removed 12/25). CSF NGTD 12/29. EVD clamped 12/30. Ventriculoperitoneal shunt placed 12/30. S/p PEG placed at bedside 1/7. Dispo pend AR vs JOSE. Decision made for JOSE, dispo issues in setting of pts insurance coverage. Pt now receiving abx course for UTI. Pt was ready for d/c but RR was called 1/19 for tachycardia / tachypnea and transferred back to [facility]. Plan for d/c to rehab once cleared medically per disc with team during IDR.    On assessment, pt resting in bed. Pt unable to participate in exam 2/2 AMS (AOx0). Currently NPO with EN regime meeting 100% of est needs- RD cont to monitor and adjust prn. No n/v/d/c. Education deferred at this time. Please see nutr recs below. RD to follow.     Previous Nutrition Diagnosis:  Increased nutrient need RT increased kcal/protein demand AEB post-op    Active [ x  ]  Resolved [   ]    If resolved, new PES:     Goal: Pt to meet >/=75%EER via most appropriate route with good tolerance    Recommendations:  1) Cont with current EN regime of Glucerna 1.2 @ 50mL/hr x 24hrs via PEG at goal provides 1200 mL TV, 1440 kcal, 72g pro, 966mL free water (120%RDI, ~29kcal/kg ABW, ~1.4gm pro/kgABW)   >>continue to monitor BS and ability to switch to Jevity 1.2- consult RD prn   >>start at 20mL/ht and increase as tolerated   >> free water flushes per team   >> maintain aspiration precautions at all times  2) As medically appropriate/mental status, resume diet recommended per SLP  >>if intake increases, assess need to change TF regimen (please consult nutrition as needed)  3) Monitor and replete lytes prn  4) Monitor weights, labs, skin integrity, GI distress  5) Pain and bowel regimens per team  6) RD to remain available for additional nutritional interventions prn   **no changes at this time    Education: n/a    Risk Level: High [ x  ] Moderate [   ] Low [   ].

## 2021-01-21 NOTE — CHART NOTE - NSCHARTNOTESELECT_GEN_ALL_CORE
Event Note
Follow Up Nutrition Assessment/Nutrition Services
Nutrition Follow Up/Nutrition Services
Rapid Response
follow up/Nutrition Services
Event Note/VTE
Follow Up Nutrition Assessment/Nutrition Services
Follow Up Nutrition Assessment/Nutrition Services
Nutrition Follow-up/Nutrition Services
Nutrition Services
Nutrition Services
anti-infective approval note/Event Note
follow up/Nutrition Services

## 2021-01-21 NOTE — PROGRESS NOTE ADULT - SUBJECTIVE AND OBJECTIVE BOX
HPI:  76y/o F with PMHx sig for COPD, asthma, HLD, HTN, BIBEMS to [facility] from home after HHA discovered patient found down in floor covered in non-bloody vomitus. Perr HHA Pt went to the bathroom and was taking a long time, went in to check on her and found her sitting on floor, awake, however with vomitus on front of shirt. On arrival to [facility], patient was taken for stat head CT, which revealed diffuse subarachnoid hemorrhage mainly at basilar cisterns with IVH and obstructive hydrocephalus. Patient was given a bolus of 1g keppra and dilaudid pushes for generalized headache. CTA concerning for 3mm left pcomm aneurysm and a 1.6mm outpouching of distal cavernous segment of the left internal carotid artery likely aneurysm. NIHSS2 HH3 MF4. Patient was transferred to [facility] for further intervention. Patient currently reports headaches. Pt denies acute changes in vision, seizures, CP, SOB, weakness/paresthesias of arms or legs. (09 Dec 2020 23:37)    Hospital Course:   : BD1 Patient admitted for SAH HH3, MF4.   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 EVD at 5, on Levo overnight, nimodipine discontinued d/t hypotension  12: BD4 POD#2, VIVIEN overnight, neuro stable, passed TOV  12: BD5 POD#3: VIVIEN x 24 hrs  : BD6 POD#4. VIVIEN o/n, neuro stable. EVD at 5cm H2O  12/15: BD7 POD #5 VIVIEN overnight, neuro stable. EVD remains at 5. Plan for CTA today.   : BD8 POD #6 VIVIEN overnight, neuro stable, EVD at 0, on Levo, started on 3% at 15 overnight   : BD9 POD#1 s/p cerebral angio for verapamil c/b device malfunction of Proglide, s/p right groin cutdown for removal of proglide catheter and femoral artery repair.  VIVIEN overnight, neuro stable, EVD at 0. patient remained intubated overnight, on propofol for sedation, and vEEG  18: BD#10, POD#8 s/p coil/embo of L pcomm aneurysm, POD#2 s/p IA verapamil, POD#2 R groin cutdown for removal of proglide catheter w/ repair of femoral artery. VIVIEN overnight. EVD remains open @0cmH2O   : BD#11, POD#9 s/p coil/embo of L pcomm aneurysm. POD#3 s/p IA verapamil, POD#3 s/p R femoral artery cutdown of proglide catheter w/ femoral artery repair. VIVIEN overnight. EVD remains open @0cmH2O. EEG shows b/l frontal sharp discharges, cont monitoring, vimpat was increased yesterday. Gentle hydration, total IVF 50cc/hr. Cont vanc/zosyn for empiric coverage, next vanc trough due @1pm on . F/u daily CXR.    BD#12 POD#10 VIVIEN overnight, Neuro exam stable, EVD @ 0cm H2O, vEEG, 3% @ 15 goal WNL, TF via NGT  : BD13, POD11 VIVIEN overnight. EVD at 5cmH20 (raised yesterday), vEEG in place   BD#14, EVD raised to 15 yesterday, 3% @ 30cc Goal WNL, -180, Milrinone, TTE prior to DC as per Card for takotsubo cardiomyopathy, failed S&S pending reeval, TF via NGT, Neuro exam stable  : BD#15. VIVIEN o/n, neuro stable. EVD clamped. 30%@30cc/hr   BD#16 VIVIEN overnight, spiked 102, EVD @ 0cm H2O, 3% @ 30cc/hr Goal WNL, Vasc following, TF via NGT, -200,   : BD#17. VIVIEN overnight. Pan cx from , NGTD on CSF and blood. EVD clamped. 3% @15cc/hr, rate kept same overnight. NGT feeds at goal. SBP goal 160-200.   : BD#18. VIVIEN overnight, neuro exam stable. NGTD from pan cx on . EVD removed today. 3% discontinued . NGT feeds at goal, IVF off. SBP goal 160-200.   : BD#19 VIVIEN overnight, neuro stable. 3% at 15, stepdown status  : BD20 VIVIEN overnight, neuro stable. 3% continues.  Patient became increasingly more somnolent and underwent LP for CSF high volume tap.  Temporary improvement.  Spiked fever, pancultured.  : BD21 Patient increasingly more somnolent, EVD placed at bedside.    : BD22 pt. is s/p R VPS placement, certas @5 POD#1, post op ct head c/a/p done. afebrile o/n.   : BD#23: pt. is s/p R VPS placement, certas @5 POD#2, post op ct head c/a/p done. zosyn for enterobacter   : BD #24. POD#3 s/p R VPS placement, CErtas @5. Dressings removed from head and abdomen. Cont zosyn for enterobacter and e. coli in urine, end date 21. Post-op imaging completed. Pend S&S re-eval, improvement in mental status/neuro exam.   1/3: BD25, POD4. SBP goals relaxed 120-200. zosyn for enterobacter UTI until . FOB+, protonix bid started, vivien o/n  : BD 26, VIVIEN o/n  : BD 27, VIVIEN o/n   : BD 28, POD 7 VPS (Certas @ 5). VIVIEN overnight. Plan for PEG with GI Thursday. Repeat Covid swab negative. Stepdown status.  : BD 29, POD 8. VIVIEN overnight. PEG placed at bedside by GI today. Stepdown status  : BD 30 POD 9, VIVIEN o/n, resume TF 24 hrs after PEG, stepdown    BD 31. POD 10. No events overnight. s/p PEG. Pending AR vs JOHANNA  1/10 BD32, POD11. Lethargic but arousable with stimulation, CTH was ordered and demonstrated stable scan in comparison from . Continue to trend Na, this  improved to 135 on 2%@50/hr and salt tabs started. Pending AR vs JOHANNA.  : BD#33 POD#12. VIVIEN overnight, peripheral IV x2 inserted. F/u pan cx from 1/10, NGTD. Cont 2% @50cc/hr, f/u Na in am. Dispo pend AR vs JOHANNA.   : BD#34 POD#13: Afebrile, on 2% at 25cc/hr, Na 137, pending AM labs. Pending JOHANNA placement. Abx to stop today, fever work-up negative. COVID swab for dispo planning.  : BD35 Neuro stable. Off 2%, Na stable. Started on Zosyn for UTI. Pending dispo to [facility].   POD#15 VIVIEN overnight, Neuro exam stable, staples DC prior to discharge, TF via PEG, pending OJHANNA placement, Zosyn UTI til 1/15  1/15: POD #16 Episode of desaturating overnight, improved with chest PT, suctioning, mucomyst, CXR obtained. pending rehab  : POD17 VIVIEN overnight, neuro stable  : POD18. VIVIEN o/n, neuro stable. pending johanna  : POD20. VIVIEN o/n, neuro stable  : POD21 spiked fever 101F overnight, pancultured, neuro exam stable  : POD#22. Rapid response called last night for tachycardia / tachypnea. Transferred to telemetry.  Urine cx sent.  : POD23 VIVIEN overnight - afebrile, neuro stable    Vital Signs Last 24 Hrs  T(C): 36.4 (2021 04:56), Max: 37.1 (2021 13:13)  T(F): 97.6 (2021 04:56), Max: 98.7 (2021 13:13)  HR: 83 (2021 04:00) (80 - 91)  BP: 140/68 (2021 04:00) (125/60 - 162/75)  BP(mean): 96 (2021 04:00) (91 - 108)  RR: 16 (2021 04:00) (16 - 20)  SpO2: 100% (2021 04:00) (100% - 100%)    I&O's Summary    2021 07:01  -  2021 07:00  --------------------------------------------------------  IN: 1050 mL / OUT: 1425 mL / NET: -375 mL    2021 07:01  -  2021 04:58  --------------------------------------------------------  IN: 2000 mL / OUT: 1100 mL / NET: 900 mL        PHYSICAL EXAM:    Neuro: Ox self, refusal to comply with remainder of orientation questions, does not FC, exclaims "don't pinch me" in response to noxious stimuli, OE spontaneously, face symmetric, moves all extremities spontaneously and strong  HEENT: PERRL  Neck: supple  Cardiac: RRR, S1S2  Pulmonary: chest rise symmetric  Abdomen: soft, nontender, nondistended  Ext: warm, perfusing well        TUBES/LINES:  [] Soriano  [] Lumbar Drain  [] Wound Drains  [] Others      DIET:  [] NPO  [] Mechanical  [x] Tube feeds - PEG    LABS:                        8.4    10.07 )-----------( 417      ( 2021 07:58 )             28.7     01-    146<H>  |  108  |  23  ----------------------------<  317<H>  3.3<L>   |  27  |  0.40<L>    Ca    8.9      2021 07:58  Phos  3.5       Mg     2.0         TPro  7.3  /  Alb  3.2<L>  /  TBili  0.2  /  DBili  x   /  AST  24  /  ALT  39  /  AlkPhos  136<H>      PT/INR - ( 2021 21:55 )   PT: 12.3 sec;   INR: 1.03          PTT - ( 2021 21:55 )  PTT:26.8 sec  Urinalysis Basic - ( 2021 22:35 )    Color: Yellow / Appearance: Turbid / S.025 / pH: x  Gluc: x / Ketone: NEGATIVE  / Bili: Negative / Urobili: 0.2 E.U./dL   Blood: x / Protein: 100 mg/dL / Nitrite: POSITIVE   Leuk Esterase: Moderate / RBC: Many /HPF / WBC Many /HPF   Sq Epi: x / Non Sq Epi: 0-5 /HPF / Bacteria: Present /HPF          CAPILLARY BLOOD GLUCOSE      POCT Blood Glucose.: 189 mg/dL (2021 22:01)  POCT Blood Glucose.: 195 mg/dL (2021 18:03)  POCT Blood Glucose.: 262 mg/dL (2021 12:39)  POCT Blood Glucose.: 304 mg/dL (2021 06:32)      Drug Levels: [] N/A    CSF Analysis: [] N/A      Allergies    No Known Allergies    Intolerances      MEDICATIONS:  Antibiotics:  piperacillin/tazobactam IVPB.. 3.375 Gram(s) IV Intermittent every 6 hours  vancomycin  IVPB 1000 milliGRAM(s) IV Intermittent every 12 hours    Neuro:  acetaminophen    Suspension .. 650 milliGRAM(s) Oral every 6 hours PRN  lacosamide Solution 150 milliGRAM(s) Oral every 12 hours    Anticoagulation:  aspirin  chewable 81 milliGRAM(s) Oral daily  enoxaparin Injectable 40 milliGRAM(s) SubCutaneous at bedtime    OTHER:  acetylcysteine 20%  Inhalation 4 milliLiter(s) Inhalation three times a day PRN  albuterol/ipratropium for Nebulization 3 milliLiter(s) Nebulizer every 6 hours  amLODIPine   Tablet 10 milliGRAM(s) Oral every 24 hours  atorvastatin 40 milliGRAM(s) Oral at bedtime  buDESOnide    Inhalation Suspension 0.5 milliGRAM(s) Inhalation every 12 hours  chlorhexidine 0.12% Liquid 15 milliLiter(s) Oral Mucosa two times a day  chlorhexidine 2% Cloths 1 Application(s) Topical <User Schedule>  dextrose 40% Gel 15 Gram(s) Oral once  dextrose 50% Injectable 25 Gram(s) IV Push once  fludroCORTISONE 0.2 milliGRAM(s) Oral every 12 hours  glucagon  Injectable 1 milliGRAM(s) IntraMuscular once  hydrALAZINE Injectable 5 milliGRAM(s) IV Push once  insulin glargine Injectable (LANTUS) 5 Unit(s) SubCutaneous every morning  insulin lispro (ADMELOG) corrective regimen sliding scale   SubCutaneous Before meals and at bedtime  insulin lispro Injectable (ADMELOG) 3 Unit(s) SubCutaneous every 6 hours  labetalol Injectable 10 milliGRAM(s) IV Push every 4 hours PRN  lisinopril 10 milliGRAM(s) Oral daily  montelukast 10 milliGRAM(s) Oral daily  pantoprazole   Suspension 40 milliGRAM(s) Oral two times a day    IVF:  cyanocobalamin 1000 MICROGram(s) Oral daily  dextrose 5%. 1000 milliLiter(s) IV Continuous <Continuous>  dextrose 5%. 1000 milliLiter(s) IV Continuous <Continuous>  ferrous    sulfate 325 milliGRAM(s) Oral daily  sodium chloride 2 Gram(s) Oral every 8 hours    CULTURES:  Culture Results:   No growth at 1 day. ( @ 22:05)  Culture Results:   No growth at 2 days. ( @ 21:59)    RADIOLOGY & ADDITIONAL TESTS:      ASSESSMENT:  76 year old Female  with PMHx of COPD, asthma, HLD, HTN, BIBEMS to [facility] from home after HHA found patient down on the floor covered in non-bloody vomitus. CTH reveals diffuse subarachnoid hemorrhage mainly at basilar cisterns with IVH and obstructive hydrocephalus, CTA shows 3mm left pcomm aneurysm, now s/p bedside right frontal EVD placement 12/10, s/p cerebral angiogram for coil embolization of left PCOMM aneurysm 12/10, now   s/p cerebral angio for verapamil c/b device malfunction of Proglide, s/p right groin cutdown for removal of proglide catheter and femoral artery repair (2020), NIHSS2 HH3 MF4), s/p EVD removal ,  s/p bedside EVD placement , POD#23 from R VPS certas at 5 and  s/p PEG.     HEADACHE    Handoff    MEWS Score    Hyperlipidemia    Hypertension    COPD (chronic obstructive pulmonary disease)    Asthma    COPD (chronic obstructive pulmonary disease)    Asthma    Hydrocephalus, adult    Injury of right femoral artery    Cerebral artery vasospasm    SAH (subarachnoid hemorrhage)    Hydrocephalus, adult    Injury of femoral artery    Cerebral artery vasospasm    SAH (subarachnoid hemorrhage)    Angiogram, cerebral, with intracranial aneurysm embolization    Subarachnoid bleed    Postoperative state    Obstructive hydrocephalus    Anemia due to acute blood loss    Preoperative clearance    Centrilobular emphysema    Mild persistent asthma without complication    Subarachnoid bleed    Essential hypertension    Pure hypercholesterolemia    Ventriculoperitoneal shunt    Insertion of external ventricular drain    Vascular surgery procedure    Angiogram, carotid and cerebral, bilateral    Angiogram, cerebral, with intracranial aneurysm embolization    No significant past surgical history    HEADACHE    90+    Ventriculoperitoneal shunt    [email or web address]        PLAN:  Continue vanco and zosyn - ID consult, f/u AM vanc trough  F/U cultures  LE dopplers pending  cont. O2 via nasal canula.  Dr. Whitlock following   for subacute rehab placement.   SANCHEZ Gonsalez/Coreen Serrano.    DVT PROPHYLAXIS:  [x] Venodynes                                [x] Heparin/Lovenox    DISPOSITION: full code, dispo pending, SDU status    Assessment:  Present when checked    []  GCS  E   V  M     Heart Failure: []Acute, [] acute on chronic , []chronic  Heart Failure:  [] Diastolic (HFpEF), [] Systolic (HFrEF), []Combined (HFpEF and HFrEF), [] RHF, [] Pulm HTN, [] Other    [] MICHAELLE, [] ATN, [] AIN, [] other  [] CKD1, [] CKD2, [] CKD 3, [] CKD 4, [] CKD 5, []ESRD    Encephalopathy: [] Metabolic, [] Hepatic, [] toxic, [] Neurological, [] Other    Abnormal Nurtitional Status: [] malnurtition (see nutrition note), [ ]underweight: BMI < 19, [] morbid obesity: BMI >40, [] Cachexia    [] Sepsis  [] hypovolemic shock,[] cardiogenic shock, [] hemorrhagic shock, [] neuogenic shock  [] Acute Respiratory Failure  []Cerebral edema, [] Brain compression/ herniation,   [] Functional quadriplegia  [] Acute blood loss anemia

## 2021-01-21 NOTE — PROGRESS NOTE ADULT - ASSESSMENT
76F PMH COPD, asthma, HLD, HTN, [facility] patient found down at home by HHA and found to have diffuse subarachnoid hemorrhage, now status post neurosurgical intervention and patient spiked fever, tachycardia and tachypnea yesterday. Was pan cultured and started on epimeric vancomycin and zosyn. Of note, patient had previously had e. coli and enterococcus in urine.     Plan  Note in progress 76F PMH COPD, asthma, HLD, HTN, [facility] at home by HHA and found to have diffuse subarachnoid hemorrhage, now status post neurosurgical intervention and patient spiked fever, tachycardia and tachypnea yesterday. Was pan cultured and started on epimeric vancomycin and zosyn. Of note, patient had previously had e. coli and enterococcus in urine.     Plan  - Increase vancomycin to 1250 mg q12hrs with trough prior to 4th dose and continue zosyn, total course to run for 5-7 days  - F/u urine culture  ID team 1 will continue to follow 76F PMH COPD, asthma, HLD, HTN, [facility] found down at home by HHA and found to have diffuse subarachnoid hemorrhage, now status post neurosurgical intervention and patient spiked fever, tachycardia and tachypnea yesterday. Was pan cultured and started on epimeric vancomycin and zosyn. Of note, patient had previously had e. coli and enterococcus in urine.     Plan  - Please d/c vancomycin but continue zosyn, total course to run for 5 day course in total  - F/u urine cultures  - Candida species likely a colonizer, no need for antifungals at this time  ID team 1 will sign off, please consult with further questions  76F PMH COPD, asthma, HLD, HTN, [facility] at home by HHA and found to have diffuse subarachnoid hemorrhage, now status post neurosurgical intervention and patient spiked fever, tachycardia and tachypnea yesterday. Was pan cultured and started on epimeric vancomycin and zosyn. Of note, patient had previously had e. coli and enterococcus in urine.     Plan  - Please d/c vancomycin but continue zosyn, total course to run for 5 day course in total through 1/24/21  - Candida species likely a colonizer, no need for antifungals at this time  ID team 1 will sign off, please consult with further questions

## 2021-01-21 NOTE — PROGRESS NOTE ADULT - SUBJECTIVE AND OBJECTIVE BOX
OVERNIGHT EVENTS: No acute events reported overnight    SUBJECTIVE / INTERVAL HPI: Patient seen and examined at bedside. Patient is alert to verbal and tactile stimulation. Unable to participate in ROS due to underlying neurologic conditions.    VITAL SIGNS:  Vital Signs Last 24 Hrs  T(C): 36.3 (2021 08:31), Max: 37.1 (2021 13:13)  T(F): 97.3 (2021 08:31), Max: 98.7 (2021 13:13)  HR: 88 (2021 08:31) (80 - 91)  BP: 175/78 (2021 09:25) (125/60 - 175/78)  BP(mean): 96 (2021 04:00) (91 - 108)  RR: 16 (2021 08:31) (16 - 20)  SpO2: 100% (2021 08:31) (100% - 100%)    PHYSICAL EXAM:  General: Ill appearing female  HEENT: NC/AT; PERRL, anicteric sclera; MMM  Neck: supple, no JVD  Cardiovascular: +S1/S2; RRR  Respiratory: CTA B/L; no W/R/R  Gastrointestinal: soft, no grimace to light or deep palpation of abdomen  Extremities: WWP; no edema, clubbing or cyanosis  Vascular: 2+ radial, DP/PT pulses B/L  Neurological: AAOx0, responds by opening eyes to sound and tactile stimulation, patient does not follow commands    MEDICATIONS:  MEDICATIONS  (STANDING):  albuterol/ipratropium for Nebulization 3 milliLiter(s) Nebulizer every 6 hours  amLODIPine   Tablet 10 milliGRAM(s) Oral every 24 hours  aspirin  chewable 81 milliGRAM(s) Oral daily  atorvastatin 40 milliGRAM(s) Oral at bedtime  buDESOnide    Inhalation Suspension 0.5 milliGRAM(s) Inhalation every 12 hours  chlorhexidine 0.12% Liquid 15 milliLiter(s) Oral Mucosa two times a day  chlorhexidine 2% Cloths 1 Application(s) Topical <User Schedule>  cyanocobalamin 1000 MICROGram(s) Oral daily  dextrose 40% Gel 15 Gram(s) Oral once  dextrose 5%. 1000 milliLiter(s) (50 mL/Hr) IV Continuous <Continuous>  dextrose 5%. 1000 milliLiter(s) (100 mL/Hr) IV Continuous <Continuous>  dextrose 50% Injectable 25 Gram(s) IV Push once  enoxaparin Injectable 40 milliGRAM(s) SubCutaneous at bedtime  ferrous    sulfate 325 milliGRAM(s) Oral daily  fludroCORTISONE 0.2 milliGRAM(s) Oral every 12 hours  glucagon  Injectable 1 milliGRAM(s) IntraMuscular once  hydrALAZINE Injectable 5 milliGRAM(s) IV Push once  insulin glargine Injectable (LANTUS) 5 Unit(s) SubCutaneous every morning  insulin lispro (ADMELOG) corrective regimen sliding scale   SubCutaneous Before meals and at bedtime  insulin lispro Injectable (ADMELOG) 3 Unit(s) SubCutaneous every 6 hours  lacosamide Solution 150 milliGRAM(s) Oral every 12 hours  lisinopril 10 milliGRAM(s) Oral daily  montelukast 10 milliGRAM(s) Oral daily  pantoprazole   Suspension 40 milliGRAM(s) Oral two times a day  piperacillin/tazobactam IVPB.. 3.375 Gram(s) IV Intermittent every 6 hours  potassium chloride   Solution 40 milliEquivalent(s) Oral every 4 hours  sodium chloride 2 Gram(s) Oral every 8 hours  vancomycin  IVPB 1000 milliGRAM(s) IV Intermittent every 12 hours    MEDICATIONS  (PRN):  acetaminophen    Suspension .. 650 milliGRAM(s) Oral every 6 hours PRN Temp greater or equal to 38C (100.4F), Mild Pain (1 - 3)  acetylcysteine 20%  Inhalation 4 milliLiter(s) Inhalation three times a day PRN congestion  labetalol Injectable 10 milliGRAM(s) IV Push every 4 hours PRN SBP > 200      ALLERGIES:  Allergies    No Known Allergies    Intolerances        LABS:                        7.9    9.08  )-----------( 345      ( 2021 08:00 )             27.1     01-21    149<H>  |  105  |  17  ----------------------------<  196<H>  2.6<LL>   |  32<H>  |  0.41<L>    Ca    8.9      2021 08:00  Phos  4.6     01-  Mg     1.9     -    TPro  7.3  /  Alb  3.2<L>  /  TBili  0.2  /  DBili  x   /  AST  24  /  ALT  39  /  AlkPhos  136<H>      PT/INR - ( 2021 21:55 )   PT: 12.3 sec;   INR: 1.03          PTT - ( 2021 21:55 )  PTT:26.8 sec  Urinalysis Basic - ( 2021 22:35 )    Color: Yellow / Appearance: Turbid / S.025 / pH: x  Gluc: x / Ketone: NEGATIVE  / Bili: Negative / Urobili: 0.2 E.U./dL   Blood: x / Protein: 100 mg/dL / Nitrite: POSITIVE   Leuk Esterase: Moderate / RBC: Many /HPF / WBC Many /HPF   Sq Epi: x / Non Sq Epi: 0-5 /HPF / Bacteria: Present /HPF      CAPILLARY BLOOD GLUCOSE      POCT Blood Glucose.: 197 mg/dL (2021 06:33)      RADIOLOGY & ADDITIONAL TESTS: Reviewed.

## 2021-01-22 LAB
ANION GAP SERPL CALC-SCNC: 9 MMOL/L — SIGNIFICANT CHANGE UP (ref 5–17)
BUN SERPL-MCNC: 15 MG/DL — SIGNIFICANT CHANGE UP (ref 7–23)
CALCIUM SERPL-MCNC: 9.3 MG/DL — SIGNIFICANT CHANGE UP (ref 8.4–10.5)
CHLORIDE SERPL-SCNC: 106 MMOL/L — SIGNIFICANT CHANGE UP (ref 96–108)
CO2 SERPL-SCNC: 31 MMOL/L — SIGNIFICANT CHANGE UP (ref 22–31)
CREAT SERPL-MCNC: 0.47 MG/DL — LOW (ref 0.5–1.3)
GLUCOSE SERPL-MCNC: 196 MG/DL — HIGH (ref 70–99)
HCT VFR BLD CALC: 26 % — LOW (ref 34.5–45)
HGB BLD-MCNC: 8 G/DL — LOW (ref 11.5–15.5)
MAGNESIUM SERPL-MCNC: 2.1 MG/DL — SIGNIFICANT CHANGE UP (ref 1.6–2.6)
MCHC RBC-ENTMCNC: 30.2 PG — SIGNIFICANT CHANGE UP (ref 27–34)
MCHC RBC-ENTMCNC: 30.8 GM/DL — LOW (ref 32–36)
MCV RBC AUTO: 98.1 FL — SIGNIFICANT CHANGE UP (ref 80–100)
NRBC # BLD: 0 /100 WBCS — SIGNIFICANT CHANGE UP (ref 0–0)
PHOSPHATE SERPL-MCNC: 4.3 MG/DL — SIGNIFICANT CHANGE UP (ref 2.5–4.5)
PLATELET # BLD AUTO: 318 K/UL — SIGNIFICANT CHANGE UP (ref 150–400)
POTASSIUM SERPL-MCNC: 3.3 MMOL/L — LOW (ref 3.5–5.3)
POTASSIUM SERPL-SCNC: 3.3 MMOL/L — LOW (ref 3.5–5.3)
RBC # BLD: 2.65 M/UL — LOW (ref 3.8–5.2)
RBC # FLD: 17.2 % — HIGH (ref 10.3–14.5)
SODIUM SERPL-SCNC: 146 MMOL/L — HIGH (ref 135–145)
WBC # BLD: 7.49 K/UL — SIGNIFICANT CHANGE UP (ref 3.8–10.5)
WBC # FLD AUTO: 7.49 K/UL — SIGNIFICANT CHANGE UP (ref 3.8–10.5)

## 2021-01-22 PROCEDURE — 99024 POSTOP FOLLOW-UP VISIT: CPT

## 2021-01-22 RX ORDER — POTASSIUM CHLORIDE 20 MEQ
40 PACKET (EA) ORAL
Refills: 0 | Status: COMPLETED | OUTPATIENT
Start: 2021-01-22 | End: 2021-01-22

## 2021-01-22 RX ORDER — POTASSIUM CHLORIDE 20 MEQ
20 PACKET (EA) ORAL
Refills: 0 | Status: DISCONTINUED | OUTPATIENT
Start: 2021-01-22 | End: 2021-01-22

## 2021-01-22 RX ORDER — PIPERACILLIN AND TAZOBACTAM 4; .5 G/20ML; G/20ML
3.38 INJECTION, POWDER, LYOPHILIZED, FOR SOLUTION INTRAVENOUS EVERY 6 HOURS
Refills: 0 | Status: COMPLETED | OUTPATIENT
Start: 2021-01-22 | End: 2021-01-24

## 2021-01-22 RX ADMIN — Medication 0.5 MILLIGRAM(S): at 06:29

## 2021-01-22 RX ADMIN — Medication 40 MILLIEQUIVALENT(S): at 14:27

## 2021-01-22 RX ADMIN — Medication 3 UNIT(S): at 06:30

## 2021-01-22 RX ADMIN — Medication 81 MILLIGRAM(S): at 12:04

## 2021-01-22 RX ADMIN — PIPERACILLIN AND TAZOBACTAM 200 GRAM(S): 4; .5 INJECTION, POWDER, LYOPHILIZED, FOR SOLUTION INTRAVENOUS at 16:41

## 2021-01-22 RX ADMIN — PANTOPRAZOLE SODIUM 40 MILLIGRAM(S): 20 TABLET, DELAYED RELEASE ORAL at 17:28

## 2021-01-22 RX ADMIN — Medication 3 UNIT(S): at 12:02

## 2021-01-22 RX ADMIN — Medication 4: at 06:30

## 2021-01-22 RX ADMIN — Medication 3 MILLILITER(S): at 04:39

## 2021-01-22 RX ADMIN — CHLORHEXIDINE GLUCONATE 15 MILLILITER(S): 213 SOLUTION TOPICAL at 06:29

## 2021-01-22 RX ADMIN — PANTOPRAZOLE SODIUM 40 MILLIGRAM(S): 20 TABLET, DELAYED RELEASE ORAL at 06:29

## 2021-01-22 RX ADMIN — Medication 3 MILLILITER(S): at 10:24

## 2021-01-22 RX ADMIN — Medication 3 UNIT(S): at 23:57

## 2021-01-22 RX ADMIN — LACOSAMIDE 100 MILLIGRAM(S): 50 TABLET ORAL at 07:34

## 2021-01-22 RX ADMIN — Medication 3 UNIT(S): at 17:27

## 2021-01-22 RX ADMIN — Medication 325 MILLIGRAM(S): at 12:04

## 2021-01-22 RX ADMIN — PIPERACILLIN AND TAZOBACTAM 200 GRAM(S): 4; .5 INJECTION, POWDER, LYOPHILIZED, FOR SOLUTION INTRAVENOUS at 10:25

## 2021-01-22 RX ADMIN — LACOSAMIDE 150 MILLIGRAM(S): 50 TABLET ORAL at 18:32

## 2021-01-22 RX ADMIN — ATORVASTATIN CALCIUM 40 MILLIGRAM(S): 80 TABLET, FILM COATED ORAL at 21:06

## 2021-01-22 RX ADMIN — CHLORHEXIDINE GLUCONATE 1 APPLICATION(S): 213 SOLUTION TOPICAL at 06:31

## 2021-01-22 RX ADMIN — AMLODIPINE BESYLATE 10 MILLIGRAM(S): 2.5 TABLET ORAL at 12:04

## 2021-01-22 RX ADMIN — CHLORHEXIDINE GLUCONATE 15 MILLILITER(S): 213 SOLUTION TOPICAL at 17:26

## 2021-01-22 RX ADMIN — Medication 3 MILLILITER(S): at 16:41

## 2021-01-22 RX ADMIN — PREGABALIN 1000 MICROGRAM(S): 225 CAPSULE ORAL at 12:04

## 2021-01-22 RX ADMIN — Medication 3 MILLILITER(S): at 21:05

## 2021-01-22 RX ADMIN — FLUDROCORTISONE ACETATE 0.2 MILLIGRAM(S): 0.1 TABLET ORAL at 06:29

## 2021-01-22 RX ADMIN — PIPERACILLIN AND TAZOBACTAM 200 GRAM(S): 4; .5 INJECTION, POWDER, LYOPHILIZED, FOR SOLUTION INTRAVENOUS at 04:39

## 2021-01-22 RX ADMIN — Medication 2: at 17:26

## 2021-01-22 RX ADMIN — Medication 40 MILLIEQUIVALENT(S): at 12:04

## 2021-01-22 RX ADMIN — PIPERACILLIN AND TAZOBACTAM 200 GRAM(S): 4; .5 INJECTION, POWDER, LYOPHILIZED, FOR SOLUTION INTRAVENOUS at 21:06

## 2021-01-22 RX ADMIN — ENOXAPARIN SODIUM 40 MILLIGRAM(S): 100 INJECTION SUBCUTANEOUS at 21:06

## 2021-01-22 RX ADMIN — Medication 4: at 22:25

## 2021-01-22 RX ADMIN — MONTELUKAST 10 MILLIGRAM(S): 4 TABLET, CHEWABLE ORAL at 12:04

## 2021-01-22 RX ADMIN — LISINOPRIL 10 MILLIGRAM(S): 2.5 TABLET ORAL at 06:29

## 2021-01-22 RX ADMIN — Medication 2: at 12:03

## 2021-01-22 RX ADMIN — Medication 0.5 MILLIGRAM(S): at 17:27

## 2021-01-22 RX ADMIN — INSULIN GLARGINE 5 UNIT(S): 100 INJECTION, SOLUTION SUBCUTANEOUS at 07:34

## 2021-01-22 NOTE — PROGRESS NOTE ADULT - SUBJECTIVE AND OBJECTIVE BOX
HPI:  76y/o F with PMHx sig for COPD, asthma, HLD, HTN, BIBEMS to [facility] from home after HHA discovered patient found down in floor covered in non-bloody vomitus. Perr HHA Pt went to the bathroom and was taking a long time, went in to check on her and found her sitting on floor, awake, however with vomitus on front of shirt. On arrival to [facility], patient was taken for stat head CT, which revealed diffuse subarachnoid hemorrhage mainly at basilar cisterns with IVH and obstructive hydrocephalus. Patient was given a bolus of 1g keppra and dilaudid pushes for generalized headache. CTA concerning for 3mm left pcomm aneurysm and a 1.6mm outpouching of distal cavernous segment of the left internal carotid artery likely aneurysm. NIHSS2 HH3 MF4. Patient was transferred to [facility] for further intervention. Patient currently reports headaches. Pt denies acute changes in vision, seizures, CP, SOB, weakness/paresthesias of arms or legs. (09 Dec 2020 23:37)    Hospital Course:   12/9: BD1 Patient admitted for SAH HH3, MF4.   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 EVD at 5, on Levo overnight, nimodipine discontinued d/t hypotension  12/12: BD4 POD#2, VIVIEN overnight, neuro stable, passed TOV  12/13: BD5 POD#3: VIVIEN x 24 hrs  12/14: BD6 POD#4. VIVIEN o/n, neuro stable. EVD at 5cm H2O  12/15: BD7 POD #5 VIVIEN overnight, neuro stable. EVD remains at 5. Plan for CTA today.   12/16: BD8 POD #6 VIVIEN overnight, neuro stable, EVD at 0, on Levo, started on 3% at 15 overnight   12/17: BD9 POD#1 s/p cerebral angio for verapamil c/b device malfunction of Proglide, s/p right groin cutdown for removal of proglide catheter and femoral artery repair.  VIVIEN overnight, neuro stable, EVD at 0. patient remained intubated overnight, on propofol for sedation, and vEEG  12/18: BD#10, POD#8 s/p coil/embo of L pcomm aneurysm, POD#2 s/p IA verapamil, POD#2 R groin cutdown for removal of proglide catheter w/ repair of femoral artery. VIVIEN overnight. EVD remains open @0cmH2O   12/19: BD#11, POD#9 s/p coil/embo of L pcomm aneurysm. POD#3 s/p IA verapamil, POD#3 s/p R femoral artery cutdown of proglide catheter w/ femoral artery repair. VIVIEN overnight. EVD remains open @0cmH2O. EEG shows b/l frontal sharp discharges, cont monitoring, vimpat was increased yesterday. Gentle hydration, total IVF 50cc/hr. Cont vanc/zosyn for empiric coverage, next vanc trough due @1pm on 12/19. F/u daily CXR.   12/20 BD#12 POD#10 VIVIEN overnight, Neuro exam stable, EVD @ 0cm H2O, vEEG, 3% @ 15 goal WNL, TF via NGT  12/21: BD13, POD11 VIVIEN overnight. EVD at 5cmH20 (raised yesterday), vEEG in place  12/22 BD#14, EVD raised to 15 yesterday, 3% @ 30cc Goal WNL, -180, Milrinone, TTE prior to DC as per Card for takotsubo cardiomyopathy, failed S&S pending reeval, TF via NGT, Neuro exam stable  12/23: BD#15. VIVIEN o/n, neuro stable. EVD clamped. 30%@30cc/hr  12/24 BD#16 VIVIEN overnight, spiked 102, EVD @ 0cm H2O, 3% @ 30cc/hr Goal WNL, Vasc following, TF via NGT, -200,   12/25: BD#17. VIVIEN overnight. Pan cx from 12/23, NGTD on CSF and blood. EVD clamped. 3% @15cc/hr, rate kept same overnight. NGT feeds at goal. SBP goal 160-200.   12/26: BD#18. VIVIEN overnight, neuro exam stable. NGTD from pan cx on 12/23. EVD removed today. 3% discontinued 12/25. NGT feeds at goal, IVF off. SBP goal 160-200.   12/27: BD#19 VIVIEN overnight, neuro stable. 3% at 15, stepdown status  12/28: BD20 VIVIEN overnight, neuro stable. 3% continues.  Patient became increasingly more somnolent and underwent LP for CSF high volume tap.  Temporary improvement.  Spiked fever, pancultured.  12/29: BD21 Patient increasingly more somnolent, EVD placed at bedside.    12/30: BD22 pt. is s/p R VPS placement, certas @5 POD#1, post op ct head c/a/p done. afebrile o/n.   1/1: BD#23: pt. is s/p R VPS placement, certas @5 POD#2, post op ct head c/a/p done. zosyn for enterobacter   1/2: BD #24. POD#3 s/p R VPS placement, CErtas @5. Dressings removed from head and abdomen. Cont zosyn for enterobacter and e. coli in urine, end date 1/4/21. Post-op imaging completed. Pend S&S re-eval, improvement in mental status/neuro exam.   1/3: BD25, POD4. SBP goals relaxed 120-200. zosyn for enterobacter UTI until 1/4. FOB+, protonix bid started, vivien o/n  1/4: BD 26, VIVIEN o/n  1/5: BD 27, VIVIEN o/n   1/6: BD 28, POD 7 VPS (Certas @ 5). VIVIEN overnight. Plan for PEG with GI Thursday. Repeat Covid swab negative. Stepdown status.  1/7: BD 29, POD 8. VIVIEN overnight. PEG placed at bedside by GI today. Stepdown status  1/8: BD 30 POD 9, VIVIEN o/n, resume TF 24 hrs after PEG, stepdown   1/9 BD 31. POD 10. No events overnight. s/p PEG. Pending AR vs JOHANNA  1/10 BD32, POD11. Lethargic but arousable with stimulation, CTH was ordered and demonstrated stable scan in comparison from 1/1. Continue to trend Na, this  improved to 135 on 2%@50/hr and salt tabs started. Pending AR vs JOHANNA.  1/11: BD#33 POD#12. VIVIEN overnight, peripheral IV x2 inserted. F/u pan cx from 1/10, NGTD. Cont 2% @50cc/hr, f/u Na in am. Dispo pend AR vs JOHANNA.   1/12: BD#34 POD#13: Afebrile, on 2% at 25cc/hr, Na 137, pending AM labs. Pending JOHANNA placement. Abx to stop today, fever work-up negative. COVID swab for dispo planning.  1/13: BD35 Neuro stable. Off 2%, Na stable. Started on Zosyn for UTI. Pending dispo to [facility].  1/14 POD#15 VIVIEN overnight, Neuro exam stable, staples DC prior to discharge, TF via PEG, pending JOHANNA placement, Zosyn UTI til 1/15  1/15: POD #16 Episode of desaturating overnight, improved with chest PT, suctioning, mucomyst, CXR obtained. pending rehab  1/16: POD17 VIVIEN overnight, neuro stable  1/17: POD18. VIVIEN o/n, neuro stable. pending johanna  1/18: POD20. VIVIEN o/n, neuro stable  1/19: POD21 spiked fever 101F overnight, pancultured, neuro exam stable  1/20: POD#22. Rapid response called last night for tachycardia / tachypnea. Transferred to telemetry.  Urine cx sent.  1/21: POD23 VIVIEN overnight - afebrile, neuro stable  1/22: POD24 VIVIEN overnight, neuro stable. weaning off salt tabs and florinef        Vital Signs Last 24 Hrs  T(C): 36.1 (21 Jan 2021 17:10), Max: 36.5 (21 Jan 2021 14:07)  T(F): 97 (21 Jan 2021 17:10), Max: 97.7 (21 Jan 2021 14:07)  HR: 88 (21 Jan 2021 22:48) (85 - 92)  BP: 163/70 (21 Jan 2021 22:48) (102/57 - 186/72)  BP(mean): 101 (21 Jan 2021 22:48) (101 - 103)  RR: 19 (21 Jan 2021 22:48) (16 - 25)  SpO2: 100% (21 Jan 2021 22:48) (94% - 100%)    I&O's Detail    20 Jan 2021 07:01  -  21 Jan 2021 07:00  --------------------------------------------------------  IN:    Glucerna: 1200 mL    IV PiggyBack: 400 mL    Lactated Ringers Bolus: 500 mL  Total IN: 2100 mL    OUT:    Intermittent Catheterization - Urethral (mL): 1300 mL    Voided (mL): 400 mL  Total OUT: 1700 mL    Total NET: 400 mL      21 Jan 2021 07:01  -  22 Jan 2021 04:01  --------------------------------------------------------  IN:    Enteral Tube Flush: 50 mL    Glucerna: 850 mL    IV PiggyBack: 450 mL  Total IN: 1350 mL    OUT:    Intermittent Catheterization - Urethral (mL): 500 mL    Stool (mL): 3 mL    Voided (mL): 675 mL  Total OUT: 1178 mL    Total NET: 172 mL        I&O's Summary    20 Jan 2021 07:01  -  21 Jan 2021 07:00  --------------------------------------------------------  IN: 2100 mL / OUT: 1700 mL / NET: 400 mL    21 Jan 2021 07:01  -  22 Jan 2021 04:01  --------------------------------------------------------  IN: 1350 mL / OUT: 1178 mL / NET: 172 mL        PHYSICAL EXAM:  Gen: laying in hospital bed, NAD  Neuro: Awake and oriented to self, refusal to comply with remainder of orientation questions, does not FC, exclaims "don't pinch me" in response to noxious stimuli, OE spontaneously, face symmetric, moves all extremities spontaneously and strong  HEENT: PERRL  Neck: supple  Cardiac: RRR, S1S2  Pulmonary: chest rise symmetric  Abdomen: soft, nontender, nondistended  Ext: warm, perfusing well        TUBES/LINES:  [] CVC  [] A-line  [] Lumbar Drain  [] Ventriculostomy  [] Other    DIET:  [] NPO  [] Mechanical  [x] Tube feeds    LABS:                        7.9    9.08  )-----------( 345      ( 21 Jan 2021 08:00 )             27.1     01-21    148<H>  |  105  |  14  ----------------------------<  140<H>  3.0<L>   |  34<H>  |  0.38<L>    Ca    9.4      21 Jan 2021 17:39  Phos  4.6     01-21  Mg     1.9     01-21              CAPILLARY BLOOD GLUCOSE      POCT Blood Glucose.: 164 mg/dL (22 Jan 2021 00:32)  POCT Blood Glucose.: 160 mg/dL (21 Jan 2021 22:21)  POCT Blood Glucose.: 135 mg/dL (21 Jan 2021 17:15)  POCT Blood Glucose.: 211 mg/dL (21 Jan 2021 11:49)  POCT Blood Glucose.: 197 mg/dL (21 Jan 2021 06:33)      Drug Levels: [] N/A  Vancomycin Level, Trough: 12.6 ug/mL (01-21 @ 08:02)    CSF Analysis: [] N/A      Allergies    No Known Allergies    Intolerances      MEDICATIONS:  Antibiotics:  piperacillin/tazobactam IVPB.. 3.375 Gram(s) IV Intermittent every 6 hours    Neuro:  acetaminophen    Suspension .. 650 milliGRAM(s) Oral every 6 hours PRN  lacosamide Solution 150 milliGRAM(s) Oral every 12 hours    Anticoagulation:  aspirin  chewable 81 milliGRAM(s) Oral daily  enoxaparin Injectable 40 milliGRAM(s) SubCutaneous at bedtime    OTHER:  acetylcysteine 20%  Inhalation 4 milliLiter(s) Inhalation three times a day PRN  albuterol/ipratropium for Nebulization 3 milliLiter(s) Nebulizer every 6 hours  amLODIPine   Tablet 10 milliGRAM(s) Oral every 24 hours  atorvastatin 40 milliGRAM(s) Oral at bedtime  buDESOnide    Inhalation Suspension 0.5 milliGRAM(s) Inhalation every 12 hours  chlorhexidine 0.12% Liquid 15 milliLiter(s) Oral Mucosa two times a day  chlorhexidine 2% Cloths 1 Application(s) Topical <User Schedule>  dextrose 40% Gel 15 Gram(s) Oral once  dextrose 50% Injectable 25 Gram(s) IV Push once  fludroCORTISONE 0.2 milliGRAM(s) Oral every 12 hours  glucagon  Injectable 1 milliGRAM(s) IntraMuscular once  hydrALAZINE Injectable 5 milliGRAM(s) IV Push once  insulin glargine Injectable (LANTUS) 5 Unit(s) SubCutaneous every morning  insulin lispro (ADMELOG) corrective regimen sliding scale   SubCutaneous Before meals and at bedtime  insulin lispro Injectable (ADMELOG) 3 Unit(s) SubCutaneous every 6 hours  labetalol Injectable 10 milliGRAM(s) IV Push every 4 hours PRN  lisinopril 10 milliGRAM(s) Oral daily  montelukast 10 milliGRAM(s) Oral daily  pantoprazole   Suspension 40 milliGRAM(s) Oral two times a day    IVF:  cyanocobalamin 1000 MICROGram(s) Oral daily  dextrose 5%. 1000 milliLiter(s) IV Continuous <Continuous>  dextrose 5%. 1000 milliLiter(s) IV Continuous <Continuous>  ferrous    sulfate 325 milliGRAM(s) Oral daily    CULTURES:  Culture Results:   25,000 CFU/ml Macy dubliniensis (01-20 @ 08:15)  Culture Results:   Specimen appears CONTAMINATED. Lab suggests repeat clean catch specimen. (01-20 @ 00:18)    RADIOLOGY & ADDITIONAL TESTS:      ASSESSMENT:  76 year old Female  with PMHx of COPD, asthma, HLD, HTN, BIBEMS to [facility] from home after HHA found patient down on the floor covered in non-bloody vomitus. CTH reveals diffuse subarachnoid hemorrhage mainly at basilar cisterns with IVH and obstructive hydrocephalus, CTA shows 3mm left pcomm aneurysm, now s/p bedside right frontal EVD placement 12/10, s/p cerebral angiogram for coil embolization of left PCOMM aneurysm 12/10, now   s/p cerebral angio for verapamil c/b device malfunction of Proglide, s/p right groin cutdown for removal of proglide catheter and femoral artery repair (12/17/2020), NIHSS2 HH3 MF4), s/p EVD removal 12/25,  s/p bedside EVD placement 12/29, s/p R VPS certas at 5 (12/20/2020) and s/p PEG.       HEADACHE    Handoff    MEWS Score    Hyperlipidemia    Hypertension    COPD (chronic obstructive pulmonary disease)    Asthma    COPD (chronic obstructive pulmonary disease)    Asthma    Hydrocephalus, adult    Injury of right femoral artery    Cerebral artery vasospasm    SAH (subarachnoid hemorrhage)    Hydrocephalus, adult    Injury of femoral artery    Cerebral artery vasospasm    SAH (subarachnoid hemorrhage)    Angiogram, cerebral, with intracranial aneurysm embolization    Subarachnoid bleed    Postoperative state    Obstructive hydrocephalus    Anemia due to acute blood loss    Preoperative clearance    Centrilobular emphysema    Mild persistent asthma without complication    Subarachnoid bleed    Essential hypertension    Pure hypercholesterolemia    Ventriculoperitoneal shunt    Insertion of external ventricular drain    Vascular surgery procedure    Angiogram, carotid and cerebral, bilateral    Angiogram, cerebral, with intracranial aneurysm embolization    No significant past surgical history    HEADACHE    90+    Ventriculoperitoneal shunt    [email or web address]        PLAN:  - neuro checks  - vitals checks  - pain control  - cont vimpat  - cont Aspirin   - cont lisinopril, norvasc  - cont duonebs  - cont Zosyn (end date: 1/24) per ID, recs appreciated  - weaning off salt tabs and florinef   - Lantus/Lispro/ISS      DVT PROPHYLAXIS: [x] Venodynes  [x] Heparin/Lovenox    DISPOSITION: stepdown, full code, dispo pending    d/w Dr. Serrano      Assessment:  Present when checked    []  GCS  E   V  M     Heart Failure: []Acute, [] acute on chronic , []chronic  Heart Failure:  [] Diastolic (HFpEF), [] Systolic (HFrEF), []Combined (HFpEF and HFrEF), [] RHF, [] Pulm HTN, [] Other    [] MICHAELLE, [] ATN, [] AIN, [] other  [] CKD1, [] CKD2, [] CKD 3, [] CKD 4, [] CKD 5, []ESRD    Encephalopathy: [] Metabolic, [] Hepatic, [] toxic, [] Neurological, [] Other    Abnormal Nurtitional Status: [] malnurtition (see nutrition note), [ ]underweight: BMI < 19, [] morbid obesity: BMI >40, [] Cachexia    [] Sepsis  [] hypovolemic shock,[] cardiogenic shock, [] hemorrhagic shock, [] neuogenic shock  [] Acute Respiratory Failure  []Cerebral edema, [] Brain compression/ herniation,   [] Functional quadriplegia  [] Acute blood loss anemia

## 2021-01-23 LAB
ANION GAP SERPL CALC-SCNC: 10 MMOL/L — SIGNIFICANT CHANGE UP (ref 5–17)
ANION GAP SERPL CALC-SCNC: 11 MMOL/L — SIGNIFICANT CHANGE UP (ref 5–17)
BASOPHILS # BLD AUTO: 0.04 K/UL — SIGNIFICANT CHANGE UP (ref 0–0.2)
BASOPHILS NFR BLD AUTO: 0.4 % — SIGNIFICANT CHANGE UP (ref 0–2)
BUN SERPL-MCNC: 15 MG/DL — SIGNIFICANT CHANGE UP (ref 7–23)
BUN SERPL-MCNC: 15 MG/DL — SIGNIFICANT CHANGE UP (ref 7–23)
CALCIUM SERPL-MCNC: 9 MG/DL — SIGNIFICANT CHANGE UP (ref 8.4–10.5)
CALCIUM SERPL-MCNC: 9.2 MG/DL — SIGNIFICANT CHANGE UP (ref 8.4–10.5)
CHLORIDE SERPL-SCNC: 100 MMOL/L — SIGNIFICANT CHANGE UP (ref 96–108)
CHLORIDE SERPL-SCNC: 101 MMOL/L — SIGNIFICANT CHANGE UP (ref 96–108)
CO2 SERPL-SCNC: 29 MMOL/L — SIGNIFICANT CHANGE UP (ref 22–31)
CO2 SERPL-SCNC: 30 MMOL/L — SIGNIFICANT CHANGE UP (ref 22–31)
CREAT SERPL-MCNC: 0.49 MG/DL — LOW (ref 0.5–1.3)
CREAT SERPL-MCNC: 0.49 MG/DL — LOW (ref 0.5–1.3)
CULTURE RESULTS: SIGNIFICANT CHANGE UP
CULTURE RESULTS: SIGNIFICANT CHANGE UP
EOSINOPHIL # BLD AUTO: 0.05 K/UL — SIGNIFICANT CHANGE UP (ref 0–0.5)
EOSINOPHIL NFR BLD AUTO: 0.5 % — SIGNIFICANT CHANGE UP (ref 0–6)
GLUCOSE SERPL-MCNC: 148 MG/DL — HIGH (ref 70–99)
GLUCOSE SERPL-MCNC: 225 MG/DL — HIGH (ref 70–99)
HCT VFR BLD CALC: 26.9 % — LOW (ref 34.5–45)
HCT VFR BLD CALC: 28.7 % — LOW (ref 34.5–45)
HGB BLD-MCNC: 8.1 G/DL — LOW (ref 11.5–15.5)
HGB BLD-MCNC: 8.6 G/DL — LOW (ref 11.5–15.5)
IMM GRANULOCYTES NFR BLD AUTO: 0.7 % — SIGNIFICANT CHANGE UP (ref 0–1.5)
LYMPHOCYTES # BLD AUTO: 1.51 K/UL — SIGNIFICANT CHANGE UP (ref 1–3.3)
LYMPHOCYTES # BLD AUTO: 15 % — SIGNIFICANT CHANGE UP (ref 13–44)
MAGNESIUM SERPL-MCNC: 2 MG/DL — SIGNIFICANT CHANGE UP (ref 1.6–2.6)
MCHC RBC-ENTMCNC: 29.1 PG — SIGNIFICANT CHANGE UP (ref 27–34)
MCHC RBC-ENTMCNC: 29.7 PG — SIGNIFICANT CHANGE UP (ref 27–34)
MCHC RBC-ENTMCNC: 30 GM/DL — LOW (ref 32–36)
MCHC RBC-ENTMCNC: 30.1 GM/DL — LOW (ref 32–36)
MCV RBC AUTO: 96.8 FL — SIGNIFICANT CHANGE UP (ref 80–100)
MCV RBC AUTO: 99 FL — SIGNIFICANT CHANGE UP (ref 80–100)
MONOCYTES # BLD AUTO: 0.84 K/UL — SIGNIFICANT CHANGE UP (ref 0–0.9)
MONOCYTES NFR BLD AUTO: 8.4 % — SIGNIFICANT CHANGE UP (ref 2–14)
NEUTROPHILS # BLD AUTO: 7.54 K/UL — HIGH (ref 1.8–7.4)
NEUTROPHILS NFR BLD AUTO: 75 % — SIGNIFICANT CHANGE UP (ref 43–77)
NRBC # BLD: 0 /100 WBCS — SIGNIFICANT CHANGE UP (ref 0–0)
NRBC # BLD: 0 /100 WBCS — SIGNIFICANT CHANGE UP (ref 0–0)
PHOSPHATE SERPL-MCNC: 3.6 MG/DL — SIGNIFICANT CHANGE UP (ref 2.5–4.5)
PLATELET # BLD AUTO: 360 K/UL — SIGNIFICANT CHANGE UP (ref 150–400)
PLATELET # BLD AUTO: 369 K/UL — SIGNIFICANT CHANGE UP (ref 150–400)
POTASSIUM SERPL-MCNC: 3.5 MMOL/L — SIGNIFICANT CHANGE UP (ref 3.5–5.3)
POTASSIUM SERPL-MCNC: 3.7 MMOL/L — SIGNIFICANT CHANGE UP (ref 3.5–5.3)
POTASSIUM SERPL-SCNC: 3.5 MMOL/L — SIGNIFICANT CHANGE UP (ref 3.5–5.3)
POTASSIUM SERPL-SCNC: 3.7 MMOL/L — SIGNIFICANT CHANGE UP (ref 3.5–5.3)
RBC # BLD: 2.78 M/UL — LOW (ref 3.8–5.2)
RBC # BLD: 2.9 M/UL — LOW (ref 3.8–5.2)
RBC # FLD: 17.3 % — HIGH (ref 10.3–14.5)
RBC # FLD: 17.5 % — HIGH (ref 10.3–14.5)
SODIUM SERPL-SCNC: 140 MMOL/L — SIGNIFICANT CHANGE UP (ref 135–145)
SODIUM SERPL-SCNC: 141 MMOL/L — SIGNIFICANT CHANGE UP (ref 135–145)
SPECIMEN SOURCE: SIGNIFICANT CHANGE UP
SPECIMEN SOURCE: SIGNIFICANT CHANGE UP
WBC # BLD: 10.05 K/UL — SIGNIFICANT CHANGE UP (ref 3.8–10.5)
WBC # BLD: 10.64 K/UL — HIGH (ref 3.8–10.5)
WBC # FLD AUTO: 10.05 K/UL — SIGNIFICANT CHANGE UP (ref 3.8–10.5)
WBC # FLD AUTO: 10.64 K/UL — HIGH (ref 3.8–10.5)

## 2021-01-23 PROCEDURE — 99232 SBSQ HOSP IP/OBS MODERATE 35: CPT

## 2021-01-23 RX ORDER — POTASSIUM CHLORIDE 20 MEQ
40 PACKET (EA) ORAL ONCE
Refills: 0 | Status: COMPLETED | OUTPATIENT
Start: 2021-01-23 | End: 2021-01-23

## 2021-01-23 RX ADMIN — Medication 3 MILLILITER(S): at 22:59

## 2021-01-23 RX ADMIN — LACOSAMIDE 150 MILLIGRAM(S): 50 TABLET ORAL at 07:08

## 2021-01-23 RX ADMIN — SENNA PLUS 2 TABLET(S): 8.6 TABLET ORAL at 22:22

## 2021-01-23 RX ADMIN — ENOXAPARIN SODIUM 40 MILLIGRAM(S): 100 INJECTION SUBCUTANEOUS at 22:21

## 2021-01-23 RX ADMIN — Medication 0.5 MILLIGRAM(S): at 07:07

## 2021-01-23 RX ADMIN — Medication 3 UNIT(S): at 19:01

## 2021-01-23 RX ADMIN — ATORVASTATIN CALCIUM 40 MILLIGRAM(S): 80 TABLET, FILM COATED ORAL at 22:22

## 2021-01-23 RX ADMIN — PANTOPRAZOLE SODIUM 40 MILLIGRAM(S): 20 TABLET, DELAYED RELEASE ORAL at 07:07

## 2021-01-23 RX ADMIN — Medication 40 MILLIEQUIVALENT(S): at 12:57

## 2021-01-23 RX ADMIN — Medication 3 MILLILITER(S): at 12:54

## 2021-01-23 RX ADMIN — PIPERACILLIN AND TAZOBACTAM 200 GRAM(S): 4; .5 INJECTION, POWDER, LYOPHILIZED, FOR SOLUTION INTRAVENOUS at 17:00

## 2021-01-23 RX ADMIN — Medication 2: at 12:53

## 2021-01-23 RX ADMIN — Medication 3 UNIT(S): at 06:07

## 2021-01-23 RX ADMIN — PIPERACILLIN AND TAZOBACTAM 200 GRAM(S): 4; .5 INJECTION, POWDER, LYOPHILIZED, FOR SOLUTION INTRAVENOUS at 04:56

## 2021-01-23 RX ADMIN — Medication 3 MILLILITER(S): at 04:56

## 2021-01-23 RX ADMIN — Medication 0.5 MILLIGRAM(S): at 18:59

## 2021-01-23 RX ADMIN — PREGABALIN 1000 MICROGRAM(S): 225 CAPSULE ORAL at 14:15

## 2021-01-23 RX ADMIN — LACOSAMIDE 100 MILLIGRAM(S): 50 TABLET ORAL at 23:48

## 2021-01-23 RX ADMIN — Medication 3 UNIT(S): at 12:53

## 2021-01-23 RX ADMIN — Medication 325 MILLIGRAM(S): at 12:55

## 2021-01-23 RX ADMIN — PANTOPRAZOLE SODIUM 40 MILLIGRAM(S): 20 TABLET, DELAYED RELEASE ORAL at 18:59

## 2021-01-23 RX ADMIN — Medication 4: at 06:07

## 2021-01-23 RX ADMIN — AMLODIPINE BESYLATE 10 MILLIGRAM(S): 2.5 TABLET ORAL at 12:55

## 2021-01-23 RX ADMIN — CHLORHEXIDINE GLUCONATE 15 MILLILITER(S): 213 SOLUTION TOPICAL at 18:59

## 2021-01-23 RX ADMIN — PIPERACILLIN AND TAZOBACTAM 200 GRAM(S): 4; .5 INJECTION, POWDER, LYOPHILIZED, FOR SOLUTION INTRAVENOUS at 22:59

## 2021-01-23 RX ADMIN — CHLORHEXIDINE GLUCONATE 1 APPLICATION(S): 213 SOLUTION TOPICAL at 07:09

## 2021-01-23 RX ADMIN — PIPERACILLIN AND TAZOBACTAM 200 GRAM(S): 4; .5 INJECTION, POWDER, LYOPHILIZED, FOR SOLUTION INTRAVENOUS at 11:44

## 2021-01-23 RX ADMIN — CHLORHEXIDINE GLUCONATE 15 MILLILITER(S): 213 SOLUTION TOPICAL at 07:09

## 2021-01-23 RX ADMIN — LISINOPRIL 10 MILLIGRAM(S): 2.5 TABLET ORAL at 07:07

## 2021-01-23 RX ADMIN — Medication 81 MILLIGRAM(S): at 12:55

## 2021-01-23 RX ADMIN — INSULIN GLARGINE 5 UNIT(S): 100 INJECTION, SOLUTION SUBCUTANEOUS at 08:06

## 2021-01-23 RX ADMIN — Medication 3 MILLILITER(S): at 18:14

## 2021-01-23 RX ADMIN — MONTELUKAST 10 MILLIGRAM(S): 4 TABLET, CHEWABLE ORAL at 13:00

## 2021-01-23 NOTE — PROGRESS NOTE ADULT - SUBJECTIVE AND OBJECTIVE BOX
Interval Events: Reviewed  Patient seen and examined at bedside.    Patient is a 76y old  Female who presents with a chief complaint of SAH (23 Jan 2021 04:05)  no acute event overnight, awake, does not follow command      PAST MEDICAL & SURGICAL HISTORY:  Hyperlipidemia    Hypertension    COPD (chronic obstructive pulmonary disease)    Asthma    No significant past surgical history        MEDICATIONS:  Pulmonary:  acetylcysteine 20%  Inhalation 4 milliLiter(s) Inhalation three times a day PRN  albuterol/ipratropium for Nebulization 3 milliLiter(s) Nebulizer every 6 hours  buDESOnide    Inhalation Suspension 0.5 milliGRAM(s) Inhalation every 12 hours  montelukast 10 milliGRAM(s) Oral daily    Antimicrobials:  piperacillin/tazobactam IVPB.. 3.375 Gram(s) IV Intermittent every 6 hours    Anticoagulants:  aspirin  chewable 81 milliGRAM(s) Oral daily  enoxaparin Injectable 40 milliGRAM(s) SubCutaneous at bedtime    Cardiac:  amLODIPine   Tablet 10 milliGRAM(s) Oral every 24 hours  hydrALAZINE Injectable 5 milliGRAM(s) IV Push once  labetalol Injectable 10 milliGRAM(s) IV Push every 4 hours PRN  lisinopril 10 milliGRAM(s) Oral daily      Allergies    No Known Allergies    Intolerances        Vital Signs Last 24 Hrs  T(C): 36.9 (23 Jan 2021 00:09), Max: 36.9 (22 Jan 2021 20:58)  T(F): 98.5 (23 Jan 2021 00:09), Max: 98.5 (22 Jan 2021 20:58)  HR: 91 (23 Jan 2021 00:09) (79 - 93)  BP: 161/68 (23 Jan 2021 00:09) (127/59 - 161/68)  BP(mean): 105 (22 Jan 2021 20:58) (105 - 105)  RR: 22 (23 Jan 2021 00:09) (17 - 24)  SpO2: 97% (23 Jan 2021 00:09) (95% - 99%)    01-22 @ 07:01  -  01-23 @ 07:00  --------------------------------------------------------  IN: 1310 mL / OUT: 1575 mL / NET: -265 mL          Review of Systems:   •	General: negative  •	Skin/Breast: negative  •	Ophthalmologic: negative  •	ENMT: negative  •	Respiratory and Thorax: negative  •	Cardiovascular: negative  •	Gastrointestinal: negative  •	Genitourinary: negative  •	Musculoskeletal: negative  •	Neurological: negative  •	Psychiatric: negative  •	Hematology/Lymphatics: negative  •	Endocrine: negative  •	Allergic/Immunologic: negative    Physical Exam:   • Constitutional:	Well-developed, well nourished  • Eyes:	EOMI; PERRL; no drainage or redness  • ENMT:	No oral lesions; no gross abnormalities  • Neck	no thyromegaly or nodules  • Breasts:	not examined  • Back:	No deformity or limitation of movement  • Respiratory:	Breath Sounds equal & clear to auscultation, no accessory muscle use  • Cardiovascular:	Regular rate & rhythm, normal S1, S2; no murmurs, gallops or rubs; no S3, S4  • Gastrointestinal:	Soft, non-tender, no hepatosplenomegaly, normal bowel sounds  • Genitourinary:	not examined  • Rectal: not examined  • Extremities:	No cyanosis, clubbing or edema  • Vascular:	Equal and normal pulses (dorsalis pedis)  • Neurologica:l	not examined  • Skin:	No lesions; no rash  • Lymph Nodes:	No lymphadedenopathy  • Musculoskeletal:	No joint pain, swelling or deformity; no limitation of movement        LABS:      CBC Full  -  ( 22 Jan 2021 08:12 )  WBC Count : 7.49 K/uL  RBC Count : 2.65 M/uL  Hemoglobin : 8.0 g/dL  Hematocrit : 26.0 %  Platelet Count - Automated : 318 K/uL  Mean Cell Volume : 98.1 fl  Mean Cell Hemoglobin : 30.2 pg  Mean Cell Hemoglobin Concentration : 30.8 gm/dL  Auto Neutrophil # : x  Auto Lymphocyte # : x  Auto Monocyte # : x  Auto Eosinophil # : x  Auto Basophil # : x  Auto Neutrophil % : x  Auto Lymphocyte % : x  Auto Monocyte % : x  Auto Eosinophil % : x  Auto Basophil % : x    01-22    146<H>  |  106  |  15  ----------------------------<  196<H>  3.3<L>   |  31  |  0.47<L>    Ca    9.3      22 Jan 2021 07:42  Phos  4.3     01-22  Mg     2.1     01-22                          RADIOLOGY & ADDITIONAL STUDIES (The following images were personally reviewed):  Soriano:                                     No  Urine output:                       adequate  DVT prophylaxis:                 Yes  Flattus:                                  Yes  Bowel movement:              No

## 2021-01-23 NOTE — PROGRESS NOTE ADULT - ASSESSMENT
76 year old Female  with PMHx of COPD, asthma, HLD, HTN, BIBEMS to [facility] from home after HHA found patient down on the floor covered in non-bloody vomitus. CTH reveals diffuse subarachnoid hemorrhage mainly at basilar cisterns with IVH and obstructive hydrocephalus, CTA shows 3mm left pcomm aneurysm, now s/p bedside right frontal EVD placement 12/10, s/p cerebral angiogram for coil embolization of left PCOMM aneurysm 12/10, now   s/p cerebral angio for verapamil c/b device malfunction of Proglide, s/p right groin cutdown for removal of proglide catheter and femoral artery repair (12/17/2020), NIHSS2 HH3 MF4), s/p EVD removal 12/25,  s/p bedside EVD placement 12/29, s/p R VPS certas at 5 (12/20/2020) and s/p PEG.

## 2021-01-23 NOTE — PROGRESS NOTE ADULT - SUBJECTIVE AND OBJECTIVE BOX
HPI:  74y/o F with PMHx sig for COPD, asthma, HLD, HTN, BIBEMS to [facility] from home after HHA discovered patient found down in floor covered in non-bloody vomitus. Perr HHA Pt went to the bathroom and was taking a long time, went in to check on her and found her sitting on floor, awake, however with vomitus on front of shirt. On arrival to [facility], patient was taken for stat head CT, which revealed diffuse subarachnoid hemorrhage mainly at basilar cisterns with IVH and obstructive hydrocephalus. Patient was given a bolus of 1g keppra and dilaudid pushes for generalized headache. CTA concerning for 3mm left pcomm aneurysm and a 1.6mm outpouching of distal cavernous segment of the left internal carotid artery likely aneurysm. NIHSS2 HH3 MF4. Patient was transferred to [facility] for further intervention. Patient currently reports headaches. Pt denies acute changes in vision, seizures, CP, SOB, weakness/paresthesias of arms or legs. (09 Dec 2020 23:37)  Hospital Course:   12/9: BD1 Patient admitted for SAH HH3, MF4.   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 EVD at 5, on Levo overnight, nimodipine discontinued d/t hypotension  12/12: BD4 POD#2, VIVIEN overnight, neuro stable, passed TOV  12/13: BD5 POD#3: VIVIEN x 24 hrs  12/14: BD6 POD#4. VIVIEN o/n, neuro stable. EVD at 5cm H2O  12/15: BD7 POD #5 VIVIEN overnight, neuro stable. EVD remains at 5. Plan for CTA today.   12/16: BD8 POD #6 VIVIEN overnight, neuro stable, EVD at 0, on Levo, started on 3% at 15 overnight   12/17: BD9 POD#1 s/p cerebral angio for verapamil c/b device malfunction of Proglide, s/p right groin cutdown for removal of proglide catheter and femoral artery repair.  VIVIEN overnight, neuro stable, EVD at 0. patient remained intubated overnight, on propofol for sedation, and vEEG  12/18: BD#10, POD#8 s/p coil/embo of L pcomm aneurysm, POD#2 s/p IA verapamil, POD#2 R groin cutdown for removal of proglide catheter w/ repair of femoral artery. VIVIEN overnight. EVD remains open @0cmH2O   12/19: BD#11, POD#9 s/p coil/embo of L pcomm aneurysm. POD#3 s/p IA verapamil, POD#3 s/p R femoral artery cutdown of proglide catheter w/ femoral artery repair. VIVIEN overnight. EVD remains open @0cmH2O. EEG shows b/l frontal sharp discharges, cont monitoring, vimpat was increased yesterday. Gentle hydration, total IVF 50cc/hr. Cont vanc/zosyn for empiric coverage, next vanc trough due @1pm on 12/19. F/u daily CXR.   12/20 BD#12 POD#10 VIVIEN overnight, Neuro exam stable, EVD @ 0cm H2O, vEEG, 3% @ 15 goal WNL, TF via NGT  12/21: BD13, POD11 VIVIEN overnight. EVD at 5cmH20 (raised yesterday), vEEG in place  12/22 BD#14, EVD raised to 15 yesterday, 3% @ 30cc Goal WNL, -180, Milrinone, TTE prior to DC as per Card for takotsubo cardiomyopathy, failed S&S pending reeval, TF via NGT, Neuro exam stable  12/23: BD#15. VIVIEN o/n, neuro stable. EVD clamped. 30%@30cc/hr  12/24 BD#16 VIVIEN overnight, spiked 102, EVD @ 0cm H2O, 3% @ 30cc/hr Goal WNL, Vasc following, TF via NGT, -200,   12/25: BD#17. VIVIEN overnight. Pan cx from 12/23, NGTD on CSF and blood. EVD clamped. 3% @15cc/hr, rate kept same overnight. NGT feeds at goal. SBP goal 160-200.   12/26: BD#18. VIVIEN overnight, neuro exam stable. NGTD from pan cx on 12/23. EVD removed today. 3% discontinued 12/25. NGT feeds at goal, IVF off. SBP goal 160-200.   12/27: BD#19 VIVIEN overnight, neuro stable. 3% at 15, stepdown status  12/28: BD20 VIVIEN overnight, neuro stable. 3% continues.  Patient became increasingly more somnolent and underwent LP for CSF high volume tap.  Temporary improvement.  Spiked fever, pancultured.  12/29: BD21 Patient increasingly more somnolent, EVD placed at bedside.    12/30: BD22 pt. is s/p R VPS placement, certas @5 POD#1, post op ct head c/a/p done. afebrile o/n.   1/1: BD#23: pt. is s/p R VPS placement, certas @5 POD#2, post op ct head c/a/p done. zosyn for enterobacter   1/2: BD #24. POD#3 s/p R VPS placement, CErtas @5. Dressings removed from head and abdomen. Cont zosyn for enterobacter and e. coli in urine, end date 1/4/21. Post-op imaging completed. Pend S&S re-eval, improvement in mental status/neuro exam.   1/3: BD25, POD4. SBP goals relaxed 120-200. zosyn for enterobacter UTI until 1/4. FOB+, protonix bid started, vivien o/n  1/4: BD 26, VIVIEN o/n  1/5: BD 27, VIVIEN o/n   1/6: BD 28, POD 7 VPS (Certas @ 5). VIVIEN overnight. Plan for PEG with GI Thursday. Repeat Covid swab negative. Stepdown status.  1/7: BD 29, POD 8. VIVIEN overnight. PEG placed at bedside by GI today. Stepdown status  1/8: BD 30 POD 9, VIVIEN o/n, resume TF 24 hrs after PEG, stepdown   1/9 BD 31. POD 10. No events overnight. s/p PEG. Pending AR vs JOHANNA  1/10 BD32, POD11. Lethargic but arousable with stimulation, CTH was ordered and demonstrated stable scan in comparison from 1/1. Continue to trend Na, this  improved to 135 on 2%@50/hr and salt tabs started. Pending AR vs JOHANNA.  1/11: BD#33 POD#12. VIVIEN overnight, peripheral IV x2 inserted. F/u pan cx from 1/10, NGTD. Cont 2% @50cc/hr, f/u Na in am. Dispo pend AR vs JOHANNA.   1/12: BD#34 POD#13: Afebrile, on 2% at 25cc/hr, Na 137, pending AM labs. Pending JOHANNA placement. Abx to stop today, fever work-up negative. COVID swab for dispo planning.  1/13: BD35 Neuro stable. Off 2%, Na stable. Started on Zosyn for UTI. Pending dispo to [facility].  1/14 POD#15 VIVIEN overnight, Neuro exam stable, staples DC prior to discharge, TF via PEG, pending JOHANNA placement, Zosyn UTI til 1/15  1/15: POD #16 Episode of desaturating overnight, improved with chest PT, suctioning, mucomyst, CXR obtained. pending rehab  1/16: POD17 VIVIEN overnight, neuro stable  1/17: POD18. VIVIEN o/n, neuro stable. pending johanna  1/18: POD20. VIVIEN o/n, neuro stable  1/19: POD21 spiked fever 101F overnight, pancultured, neuro exam stable  1/20: POD#22. Rapid response called last night for tachycardia / tachypnea. Transferred to telemetry.  Urine cx sent.  1/21: POD23 VIVIEN overnight - afebrile, neuro stable  1/22: POD24 VIVIEN overnight, neuro stable. weaning off salt tabs and florinef  1/23: POD#25 VIVIEN, Afebrile overnight. Off salt tabs and florineff. She was more coopeartive overnight.    OVERNIGHT EVENTS: none  Vital Signs Last 24 Hrs  T(C): 36.9 (23 Jan 2021 00:09), Max: 36.9 (22 Jan 2021 20:58)  T(F): 98.5 (23 Jan 2021 00:09), Max: 98.5 (22 Jan 2021 20:58)  HR: 91 (23 Jan 2021 00:09) (79 - 93)  BP: 161/68 (23 Jan 2021 00:09) (127/59 - 161/68)  BP(mean): 105 (22 Jan 2021 20:58) (105 - 105)  RR: 22 (23 Jan 2021 00:09) (17 - 24)  SpO2: 97% (23 Jan 2021 00:09) (95% - 100%)    I&O's Summary    21 Jan 2021 07:01  -  22 Jan 2021 07:00  --------------------------------------------------------  IN: 1600 mL / OUT: 1278 mL / NET: 322 mL    22 Jan 2021 07:01  -  23 Jan 2021 04:05  --------------------------------------------------------  IN: 1310 mL / OUT: 1575 mL / NET: -265 mL        PHYSICAL EXAM:    Gen: laying in hospital bed, NAD  Neuro: Awake and oriented to self, refusal to comply with remainder of orientation questions, does not FC, exclaims "don't pinch me" in response to noxious stimuli, OE spontaneously, face symmetric, moves all extremities spontaneously and strong  HEENT: PERRL  Neck: supple  Cardiac: RRR, S1S2  Pulmonary: chest rise symmetric  Abdomen: soft, nontender, nondistended  Ext: warm, perfusing well        TUBES/LINES:  PEG    DIET: PEG feeds    LABS:                        8.0    7.49  )-----------( 318      ( 22 Jan 2021 08:12 )             26.0     01-22    146<H>  |  106  |  15  ----------------------------<  196<H>  3.3<L>   |  31  |  0.47<L>    Ca    9.3      22 Jan 2021 07:42  Phos  4.3     01-22  Mg     2.1     01-22              CAPILLARY BLOOD GLUCOSE      POCT Blood Glucose.: 142 mg/dL (22 Jan 2021 23:38)  POCT Blood Glucose.: 207 mg/dL (22 Jan 2021 22:19)  POCT Blood Glucose.: 190 mg/dL (22 Jan 2021 17:05)  POCT Blood Glucose.: 160 mg/dL (22 Jan 2021 11:48)  POCT Blood Glucose.: 218 mg/dL (22 Jan 2021 06:26)      Drug Levels: [] N/A  Vancomycin Level, Trough: 12.6 ug/mL (01-21 @ 08:02)    CSF Analysis: [] N/A      Allergies    No Known Allergies    Intolerances      MEDICATIONS:  Antibiotics:  piperacillin/tazobactam IVPB.. 3.375 Gram(s) IV Intermittent every 6 hours    Neuro:  acetaminophen    Suspension .. 650 milliGRAM(s) Oral every 6 hours PRN  lacosamide Solution 150 milliGRAM(s) Oral every 12 hours    Anticoagulation:  aspirin  chewable 81 milliGRAM(s) Oral daily  enoxaparin Injectable 40 milliGRAM(s) SubCutaneous at bedtime    OTHER:  acetylcysteine 20%  Inhalation 4 milliLiter(s) Inhalation three times a day PRN  albuterol/ipratropium for Nebulization 3 milliLiter(s) Nebulizer every 6 hours  amLODIPine   Tablet 10 milliGRAM(s) Oral every 24 hours  atorvastatin 40 milliGRAM(s) Oral at bedtime  buDESOnide    Inhalation Suspension 0.5 milliGRAM(s) Inhalation every 12 hours  chlorhexidine 0.12% Liquid 15 milliLiter(s) Oral Mucosa two times a day  chlorhexidine 2% Cloths 1 Application(s) Topical <User Schedule>  dextrose 40% Gel 15 Gram(s) Oral once  dextrose 50% Injectable 25 Gram(s) IV Push once  glucagon  Injectable 1 milliGRAM(s) IntraMuscular once  hydrALAZINE Injectable 5 milliGRAM(s) IV Push once  insulin glargine Injectable (LANTUS) 5 Unit(s) SubCutaneous every morning  insulin lispro (ADMELOG) corrective regimen sliding scale   SubCutaneous Before meals and at bedtime  insulin lispro Injectable (ADMELOG) 3 Unit(s) SubCutaneous every 6 hours  labetalol Injectable 10 milliGRAM(s) IV Push every 4 hours PRN  lisinopril 10 milliGRAM(s) Oral daily  montelukast 10 milliGRAM(s) Oral daily  pantoprazole   Suspension 40 milliGRAM(s) Oral two times a day    IVF:  cyanocobalamin 1000 MICROGram(s) Oral daily  dextrose 5%. 1000 milliLiter(s) IV Continuous <Continuous>  dextrose 5%. 1000 milliLiter(s) IV Continuous <Continuous>  ferrous    sulfate 325 milliGRAM(s) Oral daily    CULTURES:  Culture Results:   25,000 CFU/ml Macy dubliniensis (01-20 @ 08:15)  Culture Results:   Specimen appears CONTAMINATED. Lab suggests repeat clean catch specimen. (01-20 @ 00:18)    RADIOLOGY & ADDITIONAL TESTS:      ASSESSMENT:  ASSESSMENT:  76 year old Female  with PMHx of COPD, asthma, HLD, HTN, BIBEMS to [facility] from home after HHA found patient down on the floor covered in non-bloody vomitus. CTH reveals diffuse subarachnoid hemorrhage mainly at basilar cisterns with IVH and obstructive hydrocephalus, CTA shows 3mm left pcomm aneurysm, now s/p bedside right frontal EVD placement 12/10, s/p cerebral angiogram for coil embolization of left PCOMM aneurysm 12/10, now   s/p cerebral angio for verapamil c/b device malfunction of Proglide, s/p right groin cutdown for removal of proglide catheter and femoral artery repair (12/17/2020), NIHSS2 HH3 MF4), s/p EVD removal 12/25,  s/p bedside EVD placement 12/29, s/p R VPS certas at 5 (12/20/2020) and s/p PEG.       HEADACHE    Handoff    MEWS Score    Hyperlipidemia    Hypertension    COPD (chronic obstructive pulmonary disease)    Asthma    COPD (chronic obstructive pulmonary disease)    Asthma    Hydrocephalus, adult    Injury of right femoral artery    Cerebral artery vasospasm    SAH (subarachnoid hemorrhage)    Hydrocephalus, adult    Injury of femoral artery    Cerebral artery vasospasm    SAH (subarachnoid hemorrhage)    Angiogram, cerebral, with intracranial aneurysm embolization    Subarachnoid bleed    Postoperative state    Obstructive hydrocephalus    Anemia due to acute blood loss    Preoperative clearance    Centrilobular emphysema    Mild persistent asthma without complication    Subarachnoid bleed    Essential hypertension    Pure hypercholesterolemia    Ventriculoperitoneal shunt    Insertion of external ventricular drain    Vascular surgery procedure    Angiogram, carotid and cerebral, bilateral    Angiogram, cerebral, with intracranial aneurysm embolization    No significant past surgical history    HEADACHE    90+    Ventriculoperitoneal shunt    [email or web address]        PLAN:  PLAN:  - neuro checks  - vitals checks  - pain control  - cont vimpat  - cont Aspirin   - cont lisinopril, norvasc  - cont duonebs  - cont Zosyn (end date: 1/24) per ID, recs appreciated  - weaning off salt tabs and florinef   - Lantus/Lispro/ISS    DVT PROPHYLAXIS:  []x Venodynes                                [x] Heparin/Lovenox    DISPOSITION: JOHANNA    Assessment:  Present when checked    []  GCS  E   V  M     Heart Failure: []Acute, [] acute on chronic , []chronic  Heart Failure:  [] Diastolic (HFpEF), [] Systolic (HFrEF), []Combined (HFpEF and HFrEF), [] RHF, [] Pulm HTN, [] Other    [] MICHAELLE, [] ATN, [] AIN, [] other  [] CKD1, [] CKD2, [] CKD 3, [] CKD 4, [] CKD 5, []ESRD    Encephalopathy: [] Metabolic, [] Hepatic, [] toxic, [] Neurological, [] Other    Abnormal Nurtitional Status: [] malnurtition (see nutrition note), [ ]underweight: BMI < 19, [] morbid obesity: BMI >40, [] Cachexia    [] Sepsis  [] hypovolemic shock,[] cardiogenic shock, [] hemorrhagic shock, [] neuogenic shock  [] Acute Respiratory Failure  []Cerebral edema, [] Brain compression/ herniation,   [] Functional quadriplegia  [] Acute blood loss anemia

## 2021-01-24 LAB
ANION GAP SERPL CALC-SCNC: 15 MMOL/L — SIGNIFICANT CHANGE UP (ref 5–17)
BUN SERPL-MCNC: 13 MG/DL — SIGNIFICANT CHANGE UP (ref 7–23)
CALCIUM SERPL-MCNC: 9.1 MG/DL — SIGNIFICANT CHANGE UP (ref 8.4–10.5)
CHLORIDE SERPL-SCNC: 101 MMOL/L — SIGNIFICANT CHANGE UP (ref 96–108)
CO2 SERPL-SCNC: 23 MMOL/L — SIGNIFICANT CHANGE UP (ref 22–31)
CREAT SERPL-MCNC: 0.52 MG/DL — SIGNIFICANT CHANGE UP (ref 0.5–1.3)
CULTURE RESULTS: NO GROWTH — SIGNIFICANT CHANGE UP
CULTURE RESULTS: SIGNIFICANT CHANGE UP
GLUCOSE SERPL-MCNC: 160 MG/DL — HIGH (ref 70–99)
HCT VFR BLD CALC: 30.5 % — LOW (ref 34.5–45)
HGB BLD-MCNC: 9.1 G/DL — LOW (ref 11.5–15.5)
MAGNESIUM SERPL-MCNC: 2.2 MG/DL — SIGNIFICANT CHANGE UP (ref 1.6–2.6)
MCHC RBC-ENTMCNC: 28.9 PG — SIGNIFICANT CHANGE UP (ref 27–34)
MCHC RBC-ENTMCNC: 29.8 GM/DL — LOW (ref 32–36)
MCV RBC AUTO: 96.8 FL — SIGNIFICANT CHANGE UP (ref 80–100)
NRBC # BLD: 0 /100 WBCS — SIGNIFICANT CHANGE UP (ref 0–0)
PHOSPHATE SERPL-MCNC: 4 MG/DL — SIGNIFICANT CHANGE UP (ref 2.5–4.5)
PLATELET # BLD AUTO: 372 K/UL — SIGNIFICANT CHANGE UP (ref 150–400)
POTASSIUM SERPL-MCNC: 4.1 MMOL/L — SIGNIFICANT CHANGE UP (ref 3.5–5.3)
POTASSIUM SERPL-SCNC: 4.1 MMOL/L — SIGNIFICANT CHANGE UP (ref 3.5–5.3)
RBC # BLD: 3.15 M/UL — LOW (ref 3.8–5.2)
RBC # FLD: 17.4 % — HIGH (ref 10.3–14.5)
SODIUM SERPL-SCNC: 139 MMOL/L — SIGNIFICANT CHANGE UP (ref 135–145)
SPECIMEN SOURCE: SIGNIFICANT CHANGE UP
SPECIMEN SOURCE: SIGNIFICANT CHANGE UP
WBC # BLD: 9.81 K/UL — SIGNIFICANT CHANGE UP (ref 3.8–10.5)
WBC # FLD AUTO: 9.81 K/UL — SIGNIFICANT CHANGE UP (ref 3.8–10.5)

## 2021-01-24 PROCEDURE — 99024 POSTOP FOLLOW-UP VISIT: CPT

## 2021-01-24 PROCEDURE — 99232 SBSQ HOSP IP/OBS MODERATE 35: CPT

## 2021-01-24 RX ADMIN — Medication 0.5 MILLIGRAM(S): at 18:06

## 2021-01-24 RX ADMIN — LACOSAMIDE 150 MILLIGRAM(S): 50 TABLET ORAL at 17:37

## 2021-01-24 RX ADMIN — Medication 3 MILLILITER(S): at 05:58

## 2021-01-24 RX ADMIN — Medication 3 UNIT(S): at 07:19

## 2021-01-24 RX ADMIN — Medication 3 MILLILITER(S): at 16:08

## 2021-01-24 RX ADMIN — Medication 325 MILLIGRAM(S): at 11:28

## 2021-01-24 RX ADMIN — Medication 4: at 17:36

## 2021-01-24 RX ADMIN — AMLODIPINE BESYLATE 10 MILLIGRAM(S): 2.5 TABLET ORAL at 11:28

## 2021-01-24 RX ADMIN — Medication 3 MILLILITER(S): at 21:44

## 2021-01-24 RX ADMIN — PREGABALIN 1000 MICROGRAM(S): 225 CAPSULE ORAL at 11:30

## 2021-01-24 RX ADMIN — Medication 81 MILLIGRAM(S): at 11:28

## 2021-01-24 RX ADMIN — LACOSAMIDE 150 MILLIGRAM(S): 50 TABLET ORAL at 09:02

## 2021-01-24 RX ADMIN — PIPERACILLIN AND TAZOBACTAM 200 GRAM(S): 4; .5 INJECTION, POWDER, LYOPHILIZED, FOR SOLUTION INTRAVENOUS at 05:58

## 2021-01-24 RX ADMIN — CHLORHEXIDINE GLUCONATE 15 MILLILITER(S): 213 SOLUTION TOPICAL at 17:35

## 2021-01-24 RX ADMIN — PIPERACILLIN AND TAZOBACTAM 200 GRAM(S): 4; .5 INJECTION, POWDER, LYOPHILIZED, FOR SOLUTION INTRAVENOUS at 11:28

## 2021-01-24 RX ADMIN — Medication 0.5 MILLIGRAM(S): at 07:44

## 2021-01-24 RX ADMIN — ATORVASTATIN CALCIUM 40 MILLIGRAM(S): 80 TABLET, FILM COATED ORAL at 21:44

## 2021-01-24 RX ADMIN — LISINOPRIL 10 MILLIGRAM(S): 2.5 TABLET ORAL at 07:00

## 2021-01-24 RX ADMIN — Medication 3 UNIT(S): at 00:38

## 2021-01-24 RX ADMIN — Medication 3 MILLILITER(S): at 11:28

## 2021-01-24 RX ADMIN — PIPERACILLIN AND TAZOBACTAM 200 GRAM(S): 4; .5 INJECTION, POWDER, LYOPHILIZED, FOR SOLUTION INTRAVENOUS at 16:08

## 2021-01-24 RX ADMIN — Medication 2: at 07:18

## 2021-01-24 RX ADMIN — Medication 3 UNIT(S): at 17:37

## 2021-01-24 RX ADMIN — ENOXAPARIN SODIUM 40 MILLIGRAM(S): 100 INJECTION SUBCUTANEOUS at 21:44

## 2021-01-24 RX ADMIN — INSULIN GLARGINE 5 UNIT(S): 100 INJECTION, SOLUTION SUBCUTANEOUS at 08:01

## 2021-01-24 RX ADMIN — MONTELUKAST 10 MILLIGRAM(S): 4 TABLET, CHEWABLE ORAL at 11:28

## 2021-01-24 RX ADMIN — CHLORHEXIDINE GLUCONATE 15 MILLILITER(S): 213 SOLUTION TOPICAL at 06:54

## 2021-01-24 RX ADMIN — PANTOPRAZOLE SODIUM 40 MILLIGRAM(S): 20 TABLET, DELAYED RELEASE ORAL at 17:36

## 2021-01-24 RX ADMIN — Medication 3 UNIT(S): at 12:47

## 2021-01-24 RX ADMIN — PANTOPRAZOLE SODIUM 40 MILLIGRAM(S): 20 TABLET, DELAYED RELEASE ORAL at 06:54

## 2021-01-24 NOTE — PROGRESS NOTE ADULT - PROBLEM SELECTOR PROBLEM 5
Centrilobular emphysema

## 2021-01-24 NOTE — PROGRESS NOTE ADULT - PROBLEM SELECTOR PLAN 1
Continue on the statin

## 2021-01-24 NOTE — PROGRESS NOTE ADULT - PROBLEM SELECTOR PLAN 8
The patient is hemodynamically stable.  The pain is controlled.  Patient is on DVT prophylaxis.  Patient is using incentive spirometry.  Oxygen saturation is acceptable.  Advance diet as tolerated.  Advance activity as tolerated.  Monitor for ileus.  Patient is on Laxatives.  Surgical wound is stable.  No indication for monitor bed.
s/p  shunt

## 2021-01-24 NOTE — PROGRESS NOTE ADULT - SUBJECTIVE AND OBJECTIVE BOX
Interval Events: Reviewed  Patient seen and examined at bedside.    Patient is a 76y old  Female who presents with a chief complaint of SAH (24 Jan 2021 07:10)  no acute event overnight, awake, does not follow commands      PAST MEDICAL & SURGICAL HISTORY:  Hyperlipidemia    Hypertension    COPD (chronic obstructive pulmonary disease)    Asthma    No significant past surgical history        MEDICATIONS:  Pulmonary:  acetylcysteine 20%  Inhalation 4 milliLiter(s) Inhalation three times a day PRN  albuterol/ipratropium for Nebulization 3 milliLiter(s) Nebulizer every 6 hours  buDESOnide    Inhalation Suspension 0.5 milliGRAM(s) Inhalation every 12 hours  montelukast 10 milliGRAM(s) Oral daily    Antimicrobials:  piperacillin/tazobactam IVPB.. 3.375 Gram(s) IV Intermittent every 6 hours    Anticoagulants:  aspirin  chewable 81 milliGRAM(s) Oral daily  enoxaparin Injectable 40 milliGRAM(s) SubCutaneous at bedtime    Cardiac:  amLODIPine   Tablet 10 milliGRAM(s) Oral every 24 hours  hydrALAZINE Injectable 5 milliGRAM(s) IV Push once  labetalol Injectable 10 milliGRAM(s) IV Push every 4 hours PRN  lisinopril 10 milliGRAM(s) Oral daily      Allergies    No Known Allergies    Intolerances        Vital Signs Last 24 Hrs  T(C): 36.7 (24 Jan 2021 06:56), Max: 37.1 (23 Jan 2021 16:17)  T(F): 98 (24 Jan 2021 06:56), Max: 98.8 (23 Jan 2021 16:17)  HR: 83 (24 Jan 2021 06:56) (81 - 90)  BP: 140/87 (24 Jan 2021 06:56) (138/67 - 165/73)  BP(mean): --  RR: 20 (24 Jan 2021 06:56) (16 - 23)  SpO2: 100% (24 Jan 2021 06:56) (94% - 100%)    01-23 @ 07:01  -  01-24 @ 07:00  --------------------------------------------------------  IN: 1830 mL / OUT: 1300 mL / NET: 530 mL          Review of Systems:   •	General: negative  •	Skin/Breast: negative  •	Ophthalmologic: negative  •	ENMT: negative  •	Respiratory and Thorax: negative  •	Cardiovascular: negative  •	Gastrointestinal: negative  •	Genitourinary: negative  •	Musculoskeletal: negative  •	Neurological: negative  •	Psychiatric: negative  •	Hematology/Lymphatics: negative  •	Endocrine: negative  •	Allergic/Immunologic: negative    Physical Exam:   • Constitutional: thin, frail   • Eyes:	EOMI; PERRL; no drainage or redness  • ENMT:	No oral lesions; no gross abnormalities  • Neck	no thyromegaly or nodules  • Breasts:	not examined  • Back:	No deformity or limitation of movement  • Respiratory:	Breath Sounds equal & clear to auscultation, no accessory muscle use  • Cardiovascular:	Regular rate & rhythm, normal S1, S2; no murmurs, gallops or rubs; no S3, S4  • Gastrointestinal:	Soft, non-tender, no hepatosplenomegaly, normal bowel sounds  • Genitourinary:	not examined  • Rectal: not examined  • Extremities:	No cyanosis, clubbing or edema  • Vascular:	Equal and normal pulses (dorsalis pedis)  • Neurologica:l	not examined  • Skin:	No lesions; no rash  • Lymph Nodes:	No lymphadedenopathy  • Musculoskeletal:	No joint pain, swelling or deformity; no limitation of movement        LABS:      CBC Full  -  ( 23 Jan 2021 10:44 )  WBC Count : 10.05 K/uL  RBC Count : 2.78 M/uL  Hemoglobin : 8.1 g/dL  Hematocrit : 26.9 %  Platelet Count - Automated : 360 K/uL  Mean Cell Volume : 96.8 fl  Mean Cell Hemoglobin : 29.1 pg  Mean Cell Hemoglobin Concentration : 30.1 gm/dL  Auto Neutrophil # : 7.54 K/uL  Auto Lymphocyte # : 1.51 K/uL  Auto Monocyte # : 0.84 K/uL  Auto Eosinophil # : 0.05 K/uL  Auto Basophil # : 0.04 K/uL  Auto Neutrophil % : 75.0 %  Auto Lymphocyte % : 15.0 %  Auto Monocyte % : 8.4 %  Auto Eosinophil % : 0.5 %  Auto Basophil % : 0.4 %    01-24    139  |  101  |  13  ----------------------------<  160<H>  4.1   |  23  |  0.52    Ca    9.1      24 Jan 2021 07:05  Phos  4.0     01-24  Mg     2.2     01-24                          RADIOLOGY & ADDITIONAL STUDIES (The following images were personally reviewed):  Soriano:                                     No  Urine output:                       adequate  DVT prophylaxis:                 Yes  Flattus:                                  Yes  Bowel movement:              No

## 2021-01-24 NOTE — PROGRESS NOTE ADULT - PROBLEM SELECTOR PLAN 3
Patient had  shunt STIR case complicated by obstructive hydrocephalus
Patient is scheduled had  shunt STIR case complicated by obstructive hydrocephalus
Patient had  shunt STIR case complicated by obstructive hydrocephalus
Patient is scheduled for  shunt STIR case complicated by obstructive hydrocephalus

## 2021-01-24 NOTE — PROGRESS NOTE ADULT - PROBLEM SELECTOR PLAN 7
s/p  shunt
Monitor hemoglobin. Hold transfusion unless hemoglobin is 7, 8 in patient with coronary artery disease, hemodynamic instability such as tachycardia/hypotension, or there is evidence of acute blood loss.  Anemia is due to postoperative blood loss
Monitor hemoglobin. Hold transfusion unless hemoglobin is 7, 8 in patient with coronary artery disease, hemodynamic instability such as tachycardia/hypotension, or there is evidence of acute blood loss.  Anemia is due to postoperative blood loss
s/p  shunt
s/p  shunt

## 2021-01-24 NOTE — PROGRESS NOTE ADULT - PROBLEM SELECTOR PLAN 4
No bronchospasm and continue bronchodilators.  sat is OK and CXR unchanged
No bronchospasm and continue bronchodilators
No bronchospasm and continue bronchodilators
No bronchospasm and continue bronchodilators.  sat is OK and CXR unchanged

## 2021-01-24 NOTE — PROGRESS NOTE ADULT - PROBLEM SELECTOR PLAN 5
As above the baseline oxygen saturation is normal and there is no evidence of acute exacerbation of obstructive lung disease

## 2021-01-24 NOTE — PROGRESS NOTE ADULT - PROBLEM SELECTOR PROBLEM 8
Obstructive hydrocephalus
Postoperative state
Obstructive hydrocephalus
Postoperative state
Postoperative state

## 2021-01-24 NOTE — PROGRESS NOTE ADULT - PROBLEM SELECTOR PROBLEM 4
Mild persistent asthma without complication

## 2021-01-24 NOTE — PROGRESS NOTE ADULT - PROBLEM SELECTOR PROBLEM 7
Obstructive hydrocephalus
Anemia due to acute blood loss
Obstructive hydrocephalus
Anemia due to acute blood loss
Obstructive hydrocephalus

## 2021-01-24 NOTE — PROGRESS NOTE ADULT - SUBJECTIVE AND OBJECTIVE BOX
HPI:  74y/o F with PMHx sig for COPD, asthma, HLD, HTN, BIBEMS to [facility] from home after HHA discovered patient found down in floor covered in non-bloody vomitus. Perr HHA Pt went to the bathroom and was taking a long time, went in to check on her and found her sitting on floor, awake, however with vomitus on front of shirt. On arrival to [facility], patient was taken for stat head CT, which revealed diffuse subarachnoid hemorrhage mainly at basilar cisterns with IVH and obstructive hydrocephalus. Patient was given a bolus of 1g keppra and dilaudid pushes for generalized headache. CTA concerning for 3mm left pcomm aneurysm and a 1.6mm outpouching of distal cavernous segment of the left internal carotid artery likely aneurysm. NIHSS2 HH3 MF4. Patient was transferred to [facility] for further intervention. Patient currently reports headaches. Pt denies acute changes in vision, seizures, CP, SOB, weakness/paresthesias of arms or legs. (09 Dec 2020 23:37)    Hospital Course:   12/9: BD1 Patient admitted for SAH HH3, MF4.   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 EVD at 5, on Levo overnight, nimodipine discontinued d/t hypotension  12/12: BD4 POD#2, VIVIEN overnight, neuro stable, passed TOV  12/13: BD5 POD#3: VIVIEN x 24 hrs  12/14: BD6 POD#4. VIVIEN o/n, neuro stable. EVD at 5cm H2O  12/15: BD7 POD #5 VIVIEN overnight, neuro stable. EVD remains at 5. Plan for CTA today.   12/16: BD8 POD #6 VIVIEN overnight, neuro stable, EVD at 0, on Levo, started on 3% at 15 overnight   12/17: BD9 POD#1 s/p cerebral angio for verapamil c/b device malfunction of Proglide, s/p right groin cutdown for removal of proglide catheter and femoral artery repair.  VIVIEN overnight, neuro stable, EVD at 0. patient remained intubated overnight, on propofol for sedation, and vEEG  12/18: BD#10, POD#8 s/p coil/embo of L pcomm aneurysm, POD#2 s/p IA verapamil, POD#2 R groin cutdown for removal of proglide catheter w/ repair of femoral artery. VIVIEN overnight. EVD remains open @0cmH2O   12/19: BD#11, POD#9 s/p coil/embo of L pcomm aneurysm. POD#3 s/p IA verapamil, POD#3 s/p R femoral artery cutdown of proglide catheter w/ femoral artery repair. VIVIEN overnight. EVD remains open @0cmH2O. EEG shows b/l frontal sharp discharges, cont monitoring, vimpat was increased yesterday. Gentle hydration, total IVF 50cc/hr. Cont vanc/zosyn for empiric coverage, next vanc trough due @1pm on 12/19. F/u daily CXR.   12/20 BD#12 POD#10 VIVIEN overnight, Neuro exam stable, EVD @ 0cm H2O, vEEG, 3% @ 15 goal WNL, TF via NGT  12/21: BD13, POD11 VIVIEN overnight. EVD at 5cmH20 (raised yesterday), vEEG in place  12/22 BD#14, EVD raised to 15 yesterday, 3% @ 30cc Goal WNL, -180, Milrinone, TTE prior to DC as per Card for takotsubo cardiomyopathy, failed S&S pending reeval, TF via NGT, Neuro exam stable  12/23: BD#15. VIVIEN o/n, neuro stable. EVD clamped. 30%@30cc/hr  12/24 BD#16 VIVIEN overnight, spiked 102, EVD @ 0cm H2O, 3% @ 30cc/hr Goal WNL, Vasc following, TF via NGT, -200,   12/25: BD#17. VIVIEN overnight. Pan cx from 12/23, NGTD on CSF and blood. EVD clamped. 3% @15cc/hr, rate kept same overnight. NGT feeds at goal. SBP goal 160-200.   12/26: BD#18. VIVIEN overnight, neuro exam stable. NGTD from pan cx on 12/23. EVD removed today. 3% discontinued 12/25. NGT feeds at goal, IVF off. SBP goal 160-200.   12/27: BD#19 VIVIEN overnight, neuro stable. 3% at 15, stepdown status  12/28: BD20 VIVIEN overnight, neuro stable. 3% continues.  Patient became increasingly more somnolent and underwent LP for CSF high volume tap.  Temporary improvement.  Spiked fever, pancultured.  12/29: BD21 Patient increasingly more somnolent, EVD placed at bedside.    12/30: BD22 pt. is s/p R VPS placement, certas @5 POD#1, post op ct head c/a/p done. afebrile o/n.   1/1: BD#23: pt. is s/p R VPS placement, certas @5 POD#2, post op ct head c/a/p done. zosyn for enterobacter   1/2: BD #24. POD#3 s/p R VPS placement, CErtas @5. Dressings removed from head and abdomen. Cont zosyn for enterobacter and e. coli in urine, end date 1/4/21. Post-op imaging completed. Pend S&S re-eval, improvement in mental status/neuro exam.   1/3: BD25, POD4. SBP goals relaxed 120-200. zosyn for enterobacter UTI until 1/4. FOB+, protonix bid started, vivien o/n  1/4: BD 26, VIVIEN o/n  1/5: BD 27, VIVIEN o/n   1/6: BD 28, POD 7 VPS (Certas @ 5). VIVIEN overnight. Plan for PEG with GI Thursday. Repeat Covid swab negative. Stepdown status.  1/7: BD 29, POD 8. VIVIEN overnight. PEG placed at bedside by GI today. Stepdown status  1/8: BD 30 POD 9, VIVIEN o/n, resume TF 24 hrs after PEG, stepdown   1/9 BD 31. POD 10. No events overnight. s/p PEG. Pending AR vs JOHANNA  1/10 BD32, POD11. Lethargic but arousable with stimulation, CTH was ordered and demonstrated stable scan in comparison from 1/1. Continue to trend Na, this  improved to 135 on 2%@50/hr and salt tabs started. Pending AR vs JOHANNA.  1/11: BD#33 POD#12. VIVIEN overnight, peripheral IV x2 inserted. F/u pan cx from 1/10, NGTD. Cont 2% @50cc/hr, f/u Na in am. Dispo pend AR vs JOHANNA.   1/12: BD#34 POD#13: Afebrile, on 2% at 25cc/hr, Na 137, pending AM labs. Pending JOHANNA placement. Abx to stop today, fever work-up negative. COVID swab for dispo planning.  1/13: BD35 Neuro stable. Off 2%, Na stable. Started on Zosyn for UTI. Pending dispo to [facility].  1/14 POD#15 VIVIEN overnight, Neuro exam stable, staples DC prior to discharge, TF via PEG, pending JOHANNA placement, Zosyn UTI til 1/15  1/15: POD #16 Episode of desaturating overnight, improved with chest PT, suctioning, mucomyst, CXR obtained. pending rehab  1/16: POD17 VIVIEN overnight, neuro stable  1/17: POD18. VIVIEN o/n, neuro stable. pending johanna  1/18: POD20. VIVIEN o/n, neuro stable  1/19: POD21 spiked fever 101F overnight, pancultured, neuro exam stable  1/20: POD#22. Rapid response called last night for tachycardia / tachypnea. Transferred to telemetry.  Urine cx sent.  1/21: POD23 VIVIEN overnight - afebrile, neuro stable  1/22: POD24 VIVIEN overnight, neuro stable. weaning off salt tabs and florinef  1/23: POD#25 VIVIEN, Afebrile overnight. Off salt tabs and florineff. She was more coopeartive overnight.  1/24: POD26     Vital Signs Last 24 Hrs  T(C): 36.9 (24 Jan 2021 00:14), Max: 37.1 (23 Jan 2021 16:17)  T(F): 98.5 (24 Jan 2021 00:14), Max: 98.8 (23 Jan 2021 16:17)  HR: 89 (24 Jan 2021 00:14) (81 - 90)  BP: 158/69 (24 Jan 2021 00:14) (138/67 - 165/73)  BP(mean): --  RR: 20 (24 Jan 2021 00:14) (16 - 23)  SpO2: 94% (24 Jan 2021 00:14) (94% - 100%)    I&O's Summary    23 Jan 2021 07:01  -  24 Jan 2021 07:00  --------------------------------------------------------  IN: 1830 mL / OUT: 800 mL / NET: 1030 mL        PHYSICAL EXAM:    Gen: laying in hospital bed, NAD  Neuro: Awake and oriented to self, refusal to comply with remainder of orientation questions, does not FC, exclaims "don't pinch me" in response to noxious stimuli, OE spontaneously, face symmetric, moves all extremities spontaneously and strong  HEENT: PERRL  Neck: supple  Cardiac: RRR, S1S2  Pulmonary: chest rise symmetric  Abdomen: soft, nontender, nondistended, +PEG  Ext: warm, perfusing well    TUBES/LINES:  [] Soriano  [] Lumbar Drain  [] Wound Drains  [] Others      DIET:  [] NPO  [] Mechanical  [x] Tube feeds    LABS:                        8.1    10.05 )-----------( 360      ( 23 Jan 2021 10:44 )             26.9     01-23    141  |  101  |  15  ----------------------------<  148<H>  3.7   |  30  |  0.49<L>    Ca    9.2      23 Jan 2021 10:44  Phos  3.6     01-23  Mg     2.0     01-23              CAPILLARY BLOOD GLUCOSE      POCT Blood Glucose.: 187 mg/dL (24 Jan 2021 06:21)  POCT Blood Glucose.: 147 mg/dL (23 Jan 2021 21:40)  POCT Blood Glucose.: 145 mg/dL (23 Jan 2021 17:33)  POCT Blood Glucose.: 198 mg/dL (23 Jan 2021 11:53)  POCT Blood Glucose.: 146 mg/dL (23 Jan 2021 08:03)      Drug Levels: [] N/A  Vancomycin Level, Trough: 12.6 ug/mL (01-21 @ 08:02)    CSF Analysis: [] N/A      Allergies    No Known Allergies    Intolerances      MEDICATIONS:  Antibiotics:  piperacillin/tazobactam IVPB.. 3.375 Gram(s) IV Intermittent every 6 hours    Neuro:  acetaminophen    Suspension .. 650 milliGRAM(s) Oral every 6 hours PRN  lacosamide Solution 150 milliGRAM(s) Oral every 12 hours    Anticoagulation:  aspirin  chewable 81 milliGRAM(s) Oral daily  enoxaparin Injectable 40 milliGRAM(s) SubCutaneous at bedtime    OTHER:  acetylcysteine 20%  Inhalation 4 milliLiter(s) Inhalation three times a day PRN  albuterol/ipratropium for Nebulization 3 milliLiter(s) Nebulizer every 6 hours  amLODIPine   Tablet 10 milliGRAM(s) Oral every 24 hours  atorvastatin 40 milliGRAM(s) Oral at bedtime  buDESOnide    Inhalation Suspension 0.5 milliGRAM(s) Inhalation every 12 hours  chlorhexidine 0.12% Liquid 15 milliLiter(s) Oral Mucosa two times a day  chlorhexidine 2% Cloths 1 Application(s) Topical <User Schedule>  dextrose 40% Gel 15 Gram(s) Oral once  dextrose 50% Injectable 25 Gram(s) IV Push once  glucagon  Injectable 1 milliGRAM(s) IntraMuscular once  hydrALAZINE Injectable 5 milliGRAM(s) IV Push once  insulin glargine Injectable (LANTUS) 5 Unit(s) SubCutaneous every morning  insulin lispro (ADMELOG) corrective regimen sliding scale   SubCutaneous Before meals and at bedtime  insulin lispro Injectable (ADMELOG) 3 Unit(s) SubCutaneous every 6 hours  labetalol Injectable 10 milliGRAM(s) IV Push every 4 hours PRN  lisinopril 10 milliGRAM(s) Oral daily  montelukast 10 milliGRAM(s) Oral daily  pantoprazole   Suspension 40 milliGRAM(s) Oral two times a day  senna 2 Tablet(s) Oral at bedtime    IVF:  cyanocobalamin 1000 MICROGram(s) Oral daily  dextrose 5%. 1000 milliLiter(s) IV Continuous <Continuous>  dextrose 5%. 1000 milliLiter(s) IV Continuous <Continuous>  ferrous    sulfate 325 milliGRAM(s) Oral daily    CULTURES:  Culture Results:   25,000 CFU/ml Macy dubliniensis (01-20 @ 08:15)  Culture Results:   Specimen appears CONTAMINATED. Lab suggests repeat clean catch specimen. (01-20 @ 00:18)    RADIOLOGY & ADDITIONAL TESTS:      ASSESSMENT:  76 year old Female  with PMHx of COPD, asthma, HLD, HTN, BIBEMS to [facility] from home after HHA found patient down on the floor covered in non-bloody vomitus. CTH reveals diffuse subarachnoid hemorrhage mainly at basilar cisterns with IVH and obstructive hydrocephalus, CTA shows 3mm left pcomm aneurysm, now s/p bedside right frontal EVD placement 12/10, s/p cerebral angiogram for coil embolization of left PCOMM aneurysm 12/10, now   s/p cerebral angio for verapamil c/b device malfunction of Proglide, s/p right groin cutdown for removal of proglide catheter and femoral artery repair (12/17/2020), NIHSS2 HH3 MF4), s/p EVD removal 12/25,  s/p bedside EVD placement 12/29, s/p R VPS certas at 5 (12/20/2020) and s/p PEG.       HEADACHE    Handoff    MEWS Score    Hyperlipidemia    Hypertension    COPD (chronic obstructive pulmonary disease)    Asthma    COPD (chronic obstructive pulmonary disease)    Asthma    Hydrocephalus, adult    Injury of right femoral artery    Cerebral artery vasospasm    SAH (subarachnoid hemorrhage)    Hydrocephalus, adult    Injury of femoral artery    Cerebral artery vasospasm    SAH (subarachnoid hemorrhage)    Angiogram, cerebral, with intracranial aneurysm embolization    Subarachnoid bleed    Postoperative state    Obstructive hydrocephalus    Anemia due to acute blood loss    Preoperative clearance    Centrilobular emphysema    Mild persistent asthma without complication    Subarachnoid bleed    Essential hypertension    Pure hypercholesterolemia    Ventriculoperitoneal shunt    Insertion of external ventricular drain    Vascular surgery procedure    Angiogram, carotid and cerebral, bilateral    Angiogram, cerebral, with intracranial aneurysm embolization    No significant past surgical history    HEADACHE    90+    Ventriculoperitoneal shunt    [email or web address]        PLAN:  - neuro checks  - vitals checks  - pain control  - cont vimpat  - cont Aspirin   - cont lisinopril, norvasc  - cont duonebs  - cont Zosyn (end date: 1/24) per ID, recs appreciated  - weaning off salt tabs and florinef   - Lantus/Lispro/ISS    DVT PROPHYLAXIS:  []x Venodynes                                [x] Heparin/Lovenox    DISPOSITION: JOHANNA    D/w Dr. Serrano    Assessment:  Present when checked    []  GCS  E   V  M     Heart Failure: []Acute, [] acute on chronic , []chronic  Heart Failure:  [] Diastolic (HFpEF), [] Systolic (HFrEF), []Combined (HFpEF and HFrEF), [] RHF, [] Pulm HTN, [] Other    [] MICHAELLE, [] ATN, [] AIN, [] other  [] CKD1, [] CKD2, [] CKD 3, [] CKD 4, [] CKD 5, []ESRD    Encephalopathy: [] Metabolic, [] Hepatic, [] toxic, [] Neurological, [] Other    Abnormal Nurtitional Status: [] malnurtition (see nutrition note), [ ]underweight: BMI < 19, [] morbid obesity: BMI >40, [] Cachexia    [] Sepsis  [] hypovolemic shock,[] cardiogenic shock, [] hemorrhagic shock, [] neuogenic shock  [] Acute Respiratory Failure  []Cerebral edema, [] Brain compression/ herniation,   [] Functional quadriplegia  [] Acute blood loss anemia   HPI:  76y/o F with PMHx sig for COPD, asthma, HLD, HTN, BIBEMS to [facility] from home after HHA discovered patient found down in floor covered in non-bloody vomitus. Perr HHA Pt went to the bathroom and was taking a long time, went in to check on her and found her sitting on floor, awake, however with vomitus on front of shirt. On arrival to [facility], patient was taken for stat head CT, which revealed diffuse subarachnoid hemorrhage mainly at basilar cisterns with IVH and obstructive hydrocephalus. Patient was given a bolus of 1g keppra and dilaudid pushes for generalized headache. CTA concerning for 3mm left pcomm aneurysm and a 1.6mm outpouching of distal cavernous segment of the left internal carotid artery likely aneurysm. NIHSS2 HH3 MF4. Patient was transferred to [facility] for further intervention. Patient currently reports headaches. Pt denies acute changes in vision, seizures, CP, SOB, weakness/paresthesias of arms or legs. (09 Dec 2020 23:37)    Hospital Course:   12/9: BD1 Patient admitted for SAH HH3, MF4.   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 EVD at 5, on Levo overnight, nimodipine discontinued d/t hypotension  12/12: BD4 POD#2, VIVIEN overnight, neuro stable, passed TOV  12/13: BD5 POD#3: VIVIEN x 24 hrs  12/14: BD6 POD#4. VIVIEN o/n, neuro stable. EVD at 5cm H2O  12/15: BD7 POD #5 VIVIEN overnight, neuro stable. EVD remains at 5. Plan for CTA today.   12/16: BD8 POD #6 VIVIEN overnight, neuro stable, EVD at 0, on Levo, started on 3% at 15 overnight   12/17: BD9 POD#1 s/p cerebral angio for verapamil c/b device malfunction of Proglide, s/p right groin cutdown for removal of proglide catheter and femoral artery repair.  VIVIEN overnight, neuro stable, EVD at 0. patient remained intubated overnight, on propofol for sedation, and vEEG  12/18: BD#10, POD#8 s/p coil/embo of L pcomm aneurysm, POD#2 s/p IA verapamil, POD#2 R groin cutdown for removal of proglide catheter w/ repair of femoral artery. VIVIEN overnight. EVD remains open @0cmH2O   12/19: BD#11, POD#9 s/p coil/embo of L pcomm aneurysm. POD#3 s/p IA verapamil, POD#3 s/p R femoral artery cutdown of proglide catheter w/ femoral artery repair. VIVIEN overnight. EVD remains open @0cmH2O. EEG shows b/l frontal sharp discharges, cont monitoring, vimpat was increased yesterday. Gentle hydration, total IVF 50cc/hr. Cont vanc/zosyn for empiric coverage, next vanc trough due @1pm on 12/19. F/u daily CXR.   12/20 BD#12 POD#10 VIVIEN overnight, Neuro exam stable, EVD @ 0cm H2O, vEEG, 3% @ 15 goal WNL, TF via NGT  12/21: BD13, POD11 VIVIEN overnight. EVD at 5cmH20 (raised yesterday), vEEG in place  12/22 BD#14, EVD raised to 15 yesterday, 3% @ 30cc Goal WNL, -180, Milrinone, TTE prior to DC as per Card for takotsubo cardiomyopathy, failed S&S pending reeval, TF via NGT, Neuro exam stable  12/23: BD#15. VIVIEN o/n, neuro stable. EVD clamped. 30%@30cc/hr  12/24 BD#16 VIVIEN overnight, spiked 102, EVD @ 0cm H2O, 3% @ 30cc/hr Goal WNL, Vasc following, TF via NGT, -200,   12/25: BD#17. VIVIEN overnight. Pan cx from 12/23, NGTD on CSF and blood. EVD clamped. 3% @15cc/hr, rate kept same overnight. NGT feeds at goal. SBP goal 160-200.   12/26: BD#18. VIVIEN overnight, neuro exam stable. NGTD from pan cx on 12/23. EVD removed today. 3% discontinued 12/25. NGT feeds at goal, IVF off. SBP goal 160-200.   12/27: BD#19 VIVIEN overnight, neuro stable. 3% at 15, stepdown status  12/28: BD20 VIVIEN overnight, neuro stable. 3% continues.  Patient became increasingly more somnolent and underwent LP for CSF high volume tap.  Temporary improvement.  Spiked fever, pancultured.  12/29: BD21 Patient increasingly more somnolent, EVD placed at bedside.    12/30: BD22 pt. is s/p R VPS placement, certas @5 POD#1, post op ct head c/a/p done. afebrile o/n.   1/1: BD#23: pt. is s/p R VPS placement, certas @5 POD#2, post op ct head c/a/p done. zosyn for enterobacter   1/2: BD #24. POD#3 s/p R VPS placement, CErtas @5. Dressings removed from head and abdomen. Cont zosyn for enterobacter and e. coli in urine, end date 1/4/21. Post-op imaging completed. Pend S&S re-eval, improvement in mental status/neuro exam.   1/3: BD25, POD4. SBP goals relaxed 120-200. zosyn for enterobacter UTI until 1/4. FOB+, protonix bid started, vivien o/n  1/4: BD 26, VIVIEN o/n  1/5: BD 27, VIVIEN o/n   1/6: BD 28, POD 7 VPS (Certas @ 5). VIVIEN overnight. Plan for PEG with GI Thursday. Repeat Covid swab negative. Stepdown status.  1/7: BD 29, POD 8. VIVIEN overnight. PEG placed at bedside by GI today. Stepdown status  1/8: BD 30 POD 9, VIVIEN o/n, resume TF 24 hrs after PEG, stepdown   1/9 BD 31. POD 10. No events overnight. s/p PEG. Pending AR vs JOHANNA  1/10 BD32, POD11. Lethargic but arousable with stimulation, CTH was ordered and demonstrated stable scan in comparison from 1/1. Continue to trend Na, this  improved to 135 on 2%@50/hr and salt tabs started. Pending AR vs JOHANNA.  1/11: BD#33 POD#12. VIVIEN overnight, peripheral IV x2 inserted. F/u pan cx from 1/10, NGTD. Cont 2% @50cc/hr, f/u Na in am. Dispo pend AR vs JOHANNA.   1/12: BD#34 POD#13: Afebrile, on 2% at 25cc/hr, Na 137, pending AM labs. Pending JOHANNA placement. Abx to stop today, fever work-up negative. COVID swab for dispo planning.  1/13: BD35 Neuro stable. Off 2%, Na stable. Started on Zosyn for UTI. Pending dispo to [facility].  1/14 POD#15 VIVIEN overnight, Neuro exam stable, staples DC prior to discharge, TF via PEG, pending JOHANNA placement, Zosyn UTI til 1/15  1/15: POD #16 Episode of desaturating overnight, improved with chest PT, suctioning, mucomyst, CXR obtained. pending rehab  1/16: POD17 VIVIEN overnight, neuro stable  1/17: POD18. VIVIEN o/n, neuro stable. pending johanna  1/18: POD20. VIVIEN o/n, neuro stable  1/19: POD21 spiked fever 101F overnight, pancultured, neuro exam stable  1/20: POD#22. Rapid response called last night for tachycardia / tachypnea. Transferred to telemetry.  Urine cx sent.  1/21: POD23 VIVIEN overnight - afebrile, neuro stable  1/22: POD24 VIVIEN overnight, neuro stable. weaning off salt tabs and florinef  1/23: POD#25 VIVIEN, Afebrile overnight. Off salt tabs and florineff. She was more coopeartive overnight.  1/24: POD26 VIVIEN overnight, zosyn for UTI completed today, pending [facility]    Vital Signs Last 24 Hrs  T(C): 36.9 (24 Jan 2021 00:14), Max: 37.1 (23 Jan 2021 16:17)  T(F): 98.5 (24 Jan 2021 00:14), Max: 98.8 (23 Jan 2021 16:17)  HR: 89 (24 Jan 2021 00:14) (81 - 90)  BP: 158/69 (24 Jan 2021 00:14) (138/67 - 165/73)  BP(mean): --  RR: 20 (24 Jan 2021 00:14) (16 - 23)  SpO2: 94% (24 Jan 2021 00:14) (94% - 100%)    I&O's Summary    23 Jan 2021 07:01  -  24 Jan 2021 07:00  --------------------------------------------------------  IN: 1830 mL / OUT: 800 mL / NET: 1030 mL        PHYSICAL EXAM:    Gen: laying in hospital bed, NAD  Neuro: refusal to comply with exam, opens eyes briefly to persuasion, does not FC, moves all extremities spontaneously and strong  HEENT: PERRL  Neck: supple  Cardiac: RRR, S1S2  Pulmonary: chest rise symmetric  Abdomen: soft, nontender, nondistended, +PEG  Ext: warm, perfusing well    TUBES/LINES:  [] Soriano  [] Lumbar Drain  [] Wound Drains  [] Others      DIET:  [] NPO  [] Mechanical  [x] Tube feeds    LABS:                        8.1    10.05 )-----------( 360      ( 23 Jan 2021 10:44 )             26.9     01-23    141  |  101  |  15  ----------------------------<  148<H>  3.7   |  30  |  0.49<L>    Ca    9.2      23 Jan 2021 10:44  Phos  3.6     01-23  Mg     2.0     01-23              CAPILLARY BLOOD GLUCOSE      POCT Blood Glucose.: 187 mg/dL (24 Jan 2021 06:21)  POCT Blood Glucose.: 147 mg/dL (23 Jan 2021 21:40)  POCT Blood Glucose.: 145 mg/dL (23 Jan 2021 17:33)  POCT Blood Glucose.: 198 mg/dL (23 Jan 2021 11:53)  POCT Blood Glucose.: 146 mg/dL (23 Jan 2021 08:03)      Drug Levels: [] N/A  Vancomycin Level, Trough: 12.6 ug/mL (01-21 @ 08:02)    CSF Analysis: [] N/A      Allergies    No Known Allergies    Intolerances      MEDICATIONS:  Antibiotics:  piperacillin/tazobactam IVPB.. 3.375 Gram(s) IV Intermittent every 6 hours    Neuro:  acetaminophen    Suspension .. 650 milliGRAM(s) Oral every 6 hours PRN  lacosamide Solution 150 milliGRAM(s) Oral every 12 hours    Anticoagulation:  aspirin  chewable 81 milliGRAM(s) Oral daily  enoxaparin Injectable 40 milliGRAM(s) SubCutaneous at bedtime    OTHER:  acetylcysteine 20%  Inhalation 4 milliLiter(s) Inhalation three times a day PRN  albuterol/ipratropium for Nebulization 3 milliLiter(s) Nebulizer every 6 hours  amLODIPine   Tablet 10 milliGRAM(s) Oral every 24 hours  atorvastatin 40 milliGRAM(s) Oral at bedtime  buDESOnide    Inhalation Suspension 0.5 milliGRAM(s) Inhalation every 12 hours  chlorhexidine 0.12% Liquid 15 milliLiter(s) Oral Mucosa two times a day  chlorhexidine 2% Cloths 1 Application(s) Topical <User Schedule>  dextrose 40% Gel 15 Gram(s) Oral once  dextrose 50% Injectable 25 Gram(s) IV Push once  glucagon  Injectable 1 milliGRAM(s) IntraMuscular once  hydrALAZINE Injectable 5 milliGRAM(s) IV Push once  insulin glargine Injectable (LANTUS) 5 Unit(s) SubCutaneous every morning  insulin lispro (ADMELOG) corrective regimen sliding scale   SubCutaneous Before meals and at bedtime  insulin lispro Injectable (ADMELOG) 3 Unit(s) SubCutaneous every 6 hours  labetalol Injectable 10 milliGRAM(s) IV Push every 4 hours PRN  lisinopril 10 milliGRAM(s) Oral daily  montelukast 10 milliGRAM(s) Oral daily  pantoprazole   Suspension 40 milliGRAM(s) Oral two times a day  senna 2 Tablet(s) Oral at bedtime    IVF:  cyanocobalamin 1000 MICROGram(s) Oral daily  dextrose 5%. 1000 milliLiter(s) IV Continuous <Continuous>  dextrose 5%. 1000 milliLiter(s) IV Continuous <Continuous>  ferrous    sulfate 325 milliGRAM(s) Oral daily    CULTURES:  Culture Results:   25,000 CFU/ml Macy dubliniensis (01-20 @ 08:15)  Culture Results:   Specimen appears CONTAMINATED. Lab suggests repeat clean catch specimen. (01-20 @ 00:18)    RADIOLOGY & ADDITIONAL TESTS:      ASSESSMENT:  76 year old Female  with PMHx of COPD, asthma, HLD, HTN, BIBEMS to [facility] from home after HHA found patient down on the floor covered in non-bloody vomitus. CTH reveals diffuse subarachnoid hemorrhage mainly at basilar cisterns with IVH and obstructive hydrocephalus, CTA shows 3mm left pcomm aneurysm, now s/p bedside right frontal EVD placement 12/10, s/p cerebral angiogram for coil embolization of left PCOMM aneurysm 12/10, now   s/p cerebral angio for verapamil c/b device malfunction of Proglide, s/p right groin cutdown for removal of proglide catheter and femoral artery repair (12/17/2020), NIHSS2 HH3 MF4), s/p EVD removal 12/25,  s/p bedside EVD placement 12/29, s/p R VPS certas at 5 (12/20/2020) and s/p PEG.       HEADACHE    Handoff    MEWS Score    Hyperlipidemia    Hypertension    COPD (chronic obstructive pulmonary disease)    Asthma    COPD (chronic obstructive pulmonary disease)    Asthma    Hydrocephalus, adult    Injury of right femoral artery    Cerebral artery vasospasm    SAH (subarachnoid hemorrhage)    Hydrocephalus, adult    Injury of femoral artery    Cerebral artery vasospasm    SAH (subarachnoid hemorrhage)    Angiogram, cerebral, with intracranial aneurysm embolization    Subarachnoid bleed    Postoperative state    Obstructive hydrocephalus    Anemia due to acute blood loss    Preoperative clearance    Centrilobular emphysema    Mild persistent asthma without complication    Subarachnoid bleed    Essential hypertension    Pure hypercholesterolemia    Ventriculoperitoneal shunt    Insertion of external ventricular drain    Vascular surgery procedure    Angiogram, carotid and cerebral, bilateral    Angiogram, cerebral, with intracranial aneurysm embolization    No significant past surgical history    HEADACHE    90+    Ventriculoperitoneal shunt    [email or web address]        PLAN:  - neuro checks  - vitals checks  - pain control  - cont vimpat  - cont Aspirin   - cont lisinopril, norvasc  - cont duonebs  - cont Zosyn (end date: 1/24) per ID, recs appreciated  - weaning off salt tabs and florinef   - Lantus/Lispro/ISS    DVT PROPHYLAXIS:  []x Venodynes                                [x] Heparin/Lovenox    DISPOSITION: [facility]    D/w Dr. Serrano    Assessment:  Present when checked    []  GCS  E   V  M     Heart Failure: []Acute, [] acute on chronic , []chronic  Heart Failure:  [] Diastolic (HFpEF), [] Systolic (HFrEF), []Combined (HFpEF and HFrEF), [] RHF, [] Pulm HTN, [] Other    [] MICHAELLE, [] ATN, [] AIN, [] other  [] CKD1, [] CKD2, [] CKD 3, [] CKD 4, [] CKD 5, []ESRD    Encephalopathy: [] Metabolic, [] Hepatic, [] toxic, [] Neurological, [] Other    Abnormal Nurtitional Status: [] malnurtition (see nutrition note), [ ]underweight: BMI < 19, [] morbid obesity: BMI >40, [] Cachexia    [] Sepsis  [] hypovolemic shock,[] cardiogenic shock, [] hemorrhagic shock, [] neuogenic shock  [] Acute Respiratory Failure  []Cerebral edema, [] Brain compression/ herniation,   [] Functional quadriplegia  [] Acute blood loss anemia

## 2021-01-24 NOTE — PROGRESS NOTE ADULT - PROBLEM SELECTOR PROBLEM 3
Subarachnoid bleed

## 2021-01-24 NOTE — PROGRESS NOTE ADULT - ASSESSMENT
76 year old Female  with PMHx of COPD, asthma, HLD, HTN, BIBEMS to [facility] from home after HHA found patient down on the floor covered in non-bloody vomitus. CTH reveals diffuse subarachnoid hemorrhage mainly at basilar cisterns with IVH and obstructive hydrocephalus, CTA shows 3mm left pcomm aneurysm, now s/p bedside right frontal EVD placement 12/10, s/p cerebral angiogram for coil embolization of left PCOMM aneurysm 12/10, now   s/p cerebral angio for verapamil c/b device malfunction of Proglide, s/p right groin cutdown for removal of proglide catheter and femoral artery repair (12/17/2020), NIHSS2 HH3 MF4), s/p EVD removal 12/25,  s/p bedside EVD placement 12/29, s/p R VPS certas at 5 (12/20/2020) and s/p PEG.

## 2021-01-24 NOTE — PROGRESS NOTE ADULT - PROBLEM SELECTOR PLAN 6
Monitor hemoglobin. Hold transfusion unless hemoglobin is 7, 8 in patient with coronary artery disease, hemodynamic instability such as tachycardia/hypotension, or there is evidence of acute blood loss.  Anemia is due to postoperative blood loss
The patient is optimized for the minor surgery
Monitor hemoglobin. Hold transfusion unless hemoglobin is 7, 8 in patient with coronary artery disease, hemodynamic instability such as tachycardia/hypotension, or there is evidence of acute blood loss.  Anemia is due to postoperative blood loss
tolerated surgery with no complaications
Monitor hemoglobin. Hold transfusion unless hemoglobin is 7, 8 in patient with coronary artery disease, hemodynamic instability such as tachycardia/hypotension, or there is evidence of acute blood loss.  Anemia is due to postoperative blood loss

## 2021-01-24 NOTE — PROGRESS NOTE ADULT - PROBLEM SELECTOR PROBLEM 6
Anemia due to acute blood loss
Preoperative clearance
Preoperative clearance

## 2021-01-24 NOTE — PROGRESS NOTE ADULT - PROBLEM SELECTOR PLAN 2
Blood pressure was on the high end as instructed by the neurosurgery to avoid any vasospasm.  he is on lisinopril and amlodipine. BP improved with increase in Lisinopril
Blood pressure was on the high end as instructed by the neurosurgery to avoid any vasospasm
Blood pressure was on the high end as instructed by the neurosurgery to avoid any vasospasm.  he is on lisinopril and amlodipine. BP improved with increase in Lisinopril
Blood pressure was on the high end as instructed by the neurosurgery to avoid any vasospasm.  he is on lisinopril and amlodipine.  I increased the Lisinopril
Blood pressure was on the high end as instructed by the neurosurgery to avoid any vasospasm.  he is on lisinopril and amlodipine. BP improved with increase in Lisinopril
Blood pressure was on the high end as instructed by the neurosurgery to avoid any vasospasm
Blood pressure was on the high end as instructed by the neurosurgery to avoid any vasospasm.  he is on lisinopril and amlodipine
Blood pressure was on the high end as instructed by the neurosurgery to avoid any vasospasm.  he is on lisinopril and amlodipine. BP inmproved with increase in Lisinopril

## 2021-01-25 LAB
ANION GAP SERPL CALC-SCNC: 10 MMOL/L — SIGNIFICANT CHANGE UP (ref 5–17)
BUN SERPL-MCNC: 12 MG/DL — SIGNIFICANT CHANGE UP (ref 7–23)
CALCIUM SERPL-MCNC: 8.9 MG/DL — SIGNIFICANT CHANGE UP (ref 8.4–10.5)
CHLORIDE SERPL-SCNC: 102 MMOL/L — SIGNIFICANT CHANGE UP (ref 96–108)
CO2 SERPL-SCNC: 27 MMOL/L — SIGNIFICANT CHANGE UP (ref 22–31)
CREAT SERPL-MCNC: 0.44 MG/DL — LOW (ref 0.5–1.3)
GLUCOSE SERPL-MCNC: 160 MG/DL — HIGH (ref 70–99)
HCT VFR BLD CALC: 27.9 % — LOW (ref 34.5–45)
HGB BLD-MCNC: 8.5 G/DL — LOW (ref 11.5–15.5)
MAGNESIUM SERPL-MCNC: 2.1 MG/DL — SIGNIFICANT CHANGE UP (ref 1.6–2.6)
MCHC RBC-ENTMCNC: 29.4 PG — SIGNIFICANT CHANGE UP (ref 27–34)
MCHC RBC-ENTMCNC: 30.5 GM/DL — LOW (ref 32–36)
MCV RBC AUTO: 96.5 FL — SIGNIFICANT CHANGE UP (ref 80–100)
NRBC # BLD: 0 /100 WBCS — SIGNIFICANT CHANGE UP (ref 0–0)
PHOSPHATE SERPL-MCNC: 3.8 MG/DL — SIGNIFICANT CHANGE UP (ref 2.5–4.5)
PLATELET # BLD AUTO: 373 K/UL — SIGNIFICANT CHANGE UP (ref 150–400)
POTASSIUM SERPL-MCNC: 3.6 MMOL/L — SIGNIFICANT CHANGE UP (ref 3.5–5.3)
POTASSIUM SERPL-SCNC: 3.6 MMOL/L — SIGNIFICANT CHANGE UP (ref 3.5–5.3)
RBC # BLD: 2.89 M/UL — LOW (ref 3.8–5.2)
RBC # FLD: 17.3 % — HIGH (ref 10.3–14.5)
SODIUM SERPL-SCNC: 139 MMOL/L — SIGNIFICANT CHANGE UP (ref 135–145)
WBC # BLD: 8.41 K/UL — SIGNIFICANT CHANGE UP (ref 3.8–10.5)
WBC # FLD AUTO: 8.41 K/UL — SIGNIFICANT CHANGE UP (ref 3.8–10.5)

## 2021-01-25 PROCEDURE — 99024 POSTOP FOLLOW-UP VISIT: CPT

## 2021-01-25 RX ORDER — POTASSIUM CHLORIDE 20 MEQ
40 PACKET (EA) ORAL ONCE
Refills: 0 | Status: COMPLETED | OUTPATIENT
Start: 2021-01-25 | End: 2021-01-25

## 2021-01-25 RX ADMIN — Medication 3 UNIT(S): at 06:52

## 2021-01-25 RX ADMIN — LISINOPRIL 10 MILLIGRAM(S): 2.5 TABLET ORAL at 06:28

## 2021-01-25 RX ADMIN — Medication 3 MILLILITER(S): at 11:45

## 2021-01-25 RX ADMIN — Medication 0.5 MILLIGRAM(S): at 06:28

## 2021-01-25 RX ADMIN — PANTOPRAZOLE SODIUM 40 MILLIGRAM(S): 20 TABLET, DELAYED RELEASE ORAL at 18:18

## 2021-01-25 RX ADMIN — CHLORHEXIDINE GLUCONATE 15 MILLILITER(S): 213 SOLUTION TOPICAL at 06:27

## 2021-01-25 RX ADMIN — LACOSAMIDE 150 MILLIGRAM(S): 50 TABLET ORAL at 06:47

## 2021-01-25 RX ADMIN — Medication 2: at 18:19

## 2021-01-25 RX ADMIN — Medication 3 MILLILITER(S): at 22:48

## 2021-01-25 RX ADMIN — Medication 3 MILLILITER(S): at 18:20

## 2021-01-25 RX ADMIN — Medication 325 MILLIGRAM(S): at 11:47

## 2021-01-25 RX ADMIN — ATORVASTATIN CALCIUM 40 MILLIGRAM(S): 80 TABLET, FILM COATED ORAL at 22:48

## 2021-01-25 RX ADMIN — Medication 40 MILLIEQUIVALENT(S): at 19:45

## 2021-01-25 RX ADMIN — Medication 3 UNIT(S): at 18:19

## 2021-01-25 RX ADMIN — INSULIN GLARGINE 5 UNIT(S): 100 INJECTION, SOLUTION SUBCUTANEOUS at 08:06

## 2021-01-25 RX ADMIN — MONTELUKAST 10 MILLIGRAM(S): 4 TABLET, CHEWABLE ORAL at 11:47

## 2021-01-25 RX ADMIN — Medication 3 UNIT(S): at 00:19

## 2021-01-25 RX ADMIN — CHLORHEXIDINE GLUCONATE 15 MILLILITER(S): 213 SOLUTION TOPICAL at 18:54

## 2021-01-25 RX ADMIN — Medication 3 MILLILITER(S): at 04:14

## 2021-01-25 RX ADMIN — PREGABALIN 1000 MICROGRAM(S): 225 CAPSULE ORAL at 11:47

## 2021-01-25 RX ADMIN — SENNA PLUS 2 TABLET(S): 8.6 TABLET ORAL at 22:48

## 2021-01-25 RX ADMIN — CHLORHEXIDINE GLUCONATE 1 APPLICATION(S): 213 SOLUTION TOPICAL at 06:27

## 2021-01-25 RX ADMIN — PANTOPRAZOLE SODIUM 40 MILLIGRAM(S): 20 TABLET, DELAYED RELEASE ORAL at 06:28

## 2021-01-25 RX ADMIN — Medication 81 MILLIGRAM(S): at 11:47

## 2021-01-25 RX ADMIN — Medication 0.5 MILLIGRAM(S): at 18:19

## 2021-01-25 RX ADMIN — ENOXAPARIN SODIUM 40 MILLIGRAM(S): 100 INJECTION SUBCUTANEOUS at 22:48

## 2021-01-25 RX ADMIN — LACOSAMIDE 150 MILLIGRAM(S): 50 TABLET ORAL at 18:18

## 2021-01-25 RX ADMIN — AMLODIPINE BESYLATE 10 MILLIGRAM(S): 2.5 TABLET ORAL at 11:47

## 2021-01-25 RX ADMIN — Medication 3 UNIT(S): at 12:43

## 2021-01-25 RX ADMIN — Medication 2: at 12:43

## 2021-01-25 NOTE — PROGRESS NOTE ADULT - SUBJECTIVE AND OBJECTIVE BOX
NP Note Neurosurgery    HPI:  76y/o F with PMHx sig for COPD, asthma, HLD, HTN, BIBEMS to [facility] from home after HHA discovered patient found down in floor covered in non-bloody vomitus. Perr HHA Pt went to the bathroom and was taking a long time, went in to check on her and found her sitting on floor, awake, however with vomitus on front of shirt. On arrival to [facility], patient was taken for stat head CT, which revealed diffuse subarachnoid hemorrhage mainly at basilar cisterns with IVH and obstructive hydrocephalus. Patient was given a bolus of 1g keppra and dilaudid pushes for generalized headache. CTA concerning for 3mm left pcomm aneurysm and a 1.6mm outpouching of distal cavernous segment of the left internal carotid artery likely aneurysm. NIHSS2 HH3 MF4. Patient was transferred to [facility] for further intervention. Patient currently reports headaches. Pt denies acute changes in vision, seizures, CP, SOB, weakness/paresthesias of arms or legs. (09 Dec 2020 23:37)    OVERNIGHT EVENTS:  Vital Signs Last 24 Hrs  T(C): 36.9 (25 Jan 2021 05:19), Max: 37.1 (25 Jan 2021 01:03)  T(F): 98.4 (25 Jan 2021 05:19), Max: 98.7 (25 Jan 2021 01:03)  HR: 88 (25 Jan 2021 05:19) (83 - 93)  BP: 148/68 (25 Jan 2021 05:19) (116/56 - 148/68)  BP(mean): --  RR: 19 (25 Jan 2021 05:19) (18 - 20)  SpO2: 97% (25 Jan 2021 05:19) (96% - 100%)    I&O's Detail    24 Jan 2021 07:01  -  25 Jan 2021 07:00  --------------------------------------------------------  IN:    Enteral Tube Flush: 200 mL    Glucerna: 1000 mL    IV PiggyBack: 200 mL  Total IN: 1400 mL    OUT:    Stool (mL): 1 mL    Voided (mL): 1500 mL  Total OUT: 1501 mL    Total NET: -101 mL        I&O's Summary    24 Jan 2021 07:01  -  25 Jan 2021 07:00  --------------------------------------------------------  IN: 1400 mL / OUT: 1501 mL / NET: -101 mL      Hospital Course:   12/9: BD1 Patient admitted for SAH HH3, MF4.   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 EVD at 5, on Levo overnight, nimodipine discontinued d/t hypotension  12/12: BD4 POD#2, VIVIEN overnight, neuro stable, passed TOV  12/13: BD5 POD#3: VIVIEN x 24 hrs  12/14: BD6 POD#4. VIVIEN o/n, neuro stable. EVD at 5cm H2O  12/15: BD7 POD #5 VIVIEN overnight, neuro stable. EVD remains at 5. Plan for CTA today.   12/16: BD8 POD #6 VIVIEN overnight, neuro stable, EVD at 0, on Levo, started on 3% at 15 overnight   12/17: BD9 POD#1 s/p cerebral angio for verapamil c/b device malfunction of Proglide, s/p right groin cutdown for removal of proglide catheter and femoral artery repair.  VIVIEN overnight, neuro stable, EVD at 0. patient remained intubated overnight, on propofol for sedation, and vEEG  12/18: BD#10, POD#8 s/p coil/embo of L pcomm aneurysm, POD#2 s/p IA verapamil, POD#2 R groin cutdown for removal of proglide catheter w/ repair of femoral artery. VIVIEN overnight. EVD remains open @0cmH2O   12/19: BD#11, POD#9 s/p coil/embo of L pcomm aneurysm. POD#3 s/p IA verapamil, POD#3 s/p R femoral artery cutdown of proglide catheter w/ femoral artery repair. VIVIEN overnight. EVD remains open @0cmH2O. EEG shows b/l frontal sharp discharges, cont monitoring, vimpat was increased yesterday. Gentle hydration, total IVF 50cc/hr. Cont vanc/zosyn for empiric coverage, next vanc trough due @1pm on 12/19. F/u daily CXR.   12/20 BD#12 POD#10 VIVIEN overnight, Neuro exam stable, EVD @ 0cm H2O, vEEG, 3% @ 15 goal WNL, TF via NGT  12/21: BD13, POD11 VIVIEN overnight. EVD at 5cmH20 (raised yesterday), vEEG in place  12/22 BD#14, EVD raised to 15 yesterday, 3% @ 30cc Goal WNL, -180, Milrinone, TTE prior to DC as per Card for takotsubo cardiomyopathy, failed S&S pending reeval, TF via NGT, Neuro exam stable  12/23: BD#15. VIVIEN o/n, neuro stable. EVD clamped. 30%@30cc/hr  12/24 BD#16 VIVIEN overnight, spiked 102, EVD @ 0cm H2O, 3% @ 30cc/hr Goal WNL, Vasc following, TF via NGT, -200,   12/25: BD#17. VIVIEN overnight. Pan cx from 12/23, NGTD on CSF and blood. EVD clamped. 3% @15cc/hr, rate kept same overnight. NGT feeds at goal. SBP goal 160-200.   12/26: BD#18. VIVIEN overnight, neuro exam stable. NGTD from pan cx on 12/23. EVD removed today. 3% discontinued 12/25. NGT feeds at goal, IVF off. SBP goal 160-200.   12/27: BD#19 VIVIEN overnight, neuro stable. 3% at 15, stepdown status  12/28: BD20 VIVIEN overnight, neuro stable. 3% continues.  Patient became increasingly more somnolent and underwent LP for CSF high volume tap.  Temporary improvement.  Spiked fever, pancultured.  12/29: BD21 Patient increasingly more somnolent, EVD placed at bedside.    12/30: BD22 pt. is s/p R VPS placement, certas @5 POD#1, post op ct head c/a/p done. afebrile o/n.   1/1: BD#23: pt. is s/p R VPS placement, certas @5 POD#2, post op ct head c/a/p done. zosyn for enterobacter   1/2: BD #24. POD#3 s/p R VPS placement, CErtas @5. Dressings removed from head and abdomen. Cont zosyn for enterobacter and e. coli in urine, end date 1/4/21. Post-op imaging completed. Pend S&S re-eval, improvement in mental status/neuro exam.   1/3: BD25, POD4. SBP goals relaxed 120-200. zosyn for enterobacter UTI until 1/4. FOB+, protonix bid started, vivien o/n  1/4: BD 26, VIVIEN o/n  1/5: BD 27, VIVIEN o/n   1/6: BD 28, POD 7 VPS (Certas @ 5). VIVIEN overnight. Plan for PEG with GI Thursday. Repeat Covid swab negative. Stepdown status.  1/7: BD 29, POD 8. VIVIEN overnight. PEG placed at bedside by GI today. Stepdown status  1/8: BD 30 POD 9, VIVIEN o/n, resume TF 24 hrs after PEG, stepdown   1/9 BD 31. POD 10. No events overnight. s/p PEG. Pending AR vs [facility]  1/10 BD32, POD11. Lethargic but arousable with stimulation, CTH was ordered and demonstrated stable scan in comparison from 1/1. Continue to trend Na, this  improved to 135 on 2%@50/hr and salt tabs started. Pending AR vs [facility]NA.  1/11: BD#33 POD#12. VIVIEN overnight, peripheral IV x2 inserted. F/u pan cx from 1/10, NGTD. Cont 2% @50cc/hr, f/u Na in am. Dispo pend AR vs [facility].   1/12: BD#34 POD#13: Afebrile, on 2% at 25cc/hr, Na 137, pending AM labs. Pending JOHANNA placement. Abx to stop today, fever work-up negative. COVID swab for dispo planning.  1/13: BD35 Neuro stable. Off 2%, Na stable. Started on Zosyn for UTI. Pending dispo to [facility].  1/14 POD#15 VIVIEN overnight, Neuro exam stable, staples DC prior to discharge, TF via PEG, pending [facility] placement, Zosyn UTI til 1/15  1/15: POD #16 Episode of desaturating overnight, improved with chest PT, suctioning, mucomyst, CXR obtained. pending rehab  1/16: POD17 VIVIEN overnight, neuro stable  1/17: POD18. VIVIEN o/n, neuro stable. pending johanna  1/18: POD20. VIVIEN o/n, neuro stable  1/19: POD21 spiked fever 101F overnight, pancultured, neuro exam stable  1/20: POD#22. Rapid response called last night for tachycardia / tachypnea. Transferred to telemetry.  Urine cx sent.  1/21: POD23 VIVIEN overnight - afebrile, neuro stable  1/22: POD24 VIVIEN overnight, neuro stable. weaning off salt tabs and florinef  1/23: POD#25 VIVIEN, Afebrile overnight. Off salt tabs and florineff. She was more coopeartive overnight.  1/24: POD26 VIVIEN overnight, zosyn for UTI completed today, pending [facility]    Neurological    Gen: laying in hospital bed, NAD  Neuro: refusal to comply with exam, opens eyes briefly to persuasion, does not FC, moves all extremities spontaneously and strong  HEENT: PERRL     Neck: supple  Cardiac: RRR, S1S2  Pulmonary: chest rise symmetric  Abdomen: soft, nontender, nondistended, +PEG  Ext: warm, perfusing well     DIET: enteral feeds    LABS:                        8.5    8.41  )-----------( 373      ( 25 Jan 2021 06:43 )             27.9     01-25    139  |  102  |  12  ----------------------------<  160<H>  3.6   |  27  |  0.44<L>    Ca    8.9      25 Jan 2021 06:43  Phos  3.8     01-25  Mg     2.1     01-25    CAPILLARY BLOOD GLUCOSE      POCT Blood Glucose.: 144 mg/dL (25 Jan 2021 06:50)  POCT Blood Glucose.: 179 mg/dL (25 Jan 2021 00:10)  POCT Blood Glucose.: 140 mg/dL (24 Jan 2021 22:03)  POCT Blood Glucose.: 215 mg/dL (24 Jan 2021 17:34)  POCT Blood Glucose.: 149 mg/dL (24 Jan 2021 11:46)      Drug Levels: [] N/A  Vancomycin Level, Trough: 12.6 ug/mL (01-21 @ 08:02)    CSF Analysis: [] N/A      Allergies    No Known Allergies    Intolerances      MEDICATIONS:  Antibiotics:    Neuro:  acetaminophen    Suspension .. 650 milliGRAM(s) Oral every 6 hours PRN  lacosamide Solution 150 milliGRAM(s) Oral every 12 hours    Anticoagulation:  aspirin  chewable 81 milliGRAM(s) Oral daily  enoxaparin Injectable 40 milliGRAM(s) SubCutaneous at bedtime    OTHER:  acetylcysteine 20%  Inhalation 4 milliLiter(s) Inhalation three times a day PRN  albuterol/ipratropium for Nebulization 3 milliLiter(s) Nebulizer every 6 hours  amLODIPine   Tablet 10 milliGRAM(s) Oral every 24 hours  atorvastatin 40 milliGRAM(s) Oral at bedtime  buDESOnide    Inhalation Suspension 0.5 milliGRAM(s) Inhalation every 12 hours  chlorhexidine 0.12% Liquid 15 milliLiter(s) Oral Mucosa two times a day  chlorhexidine 2% Cloths 1 Application(s) Topical <User Schedule>  dextrose 40% Gel 15 Gram(s) Oral once  dextrose 50% Injectable 25 Gram(s) IV Push once  glucagon  Injectable 1 milliGRAM(s) IntraMuscular once  hydrALAZINE Injectable 5 milliGRAM(s) IV Push once  insulin glargine Injectable (LANTUS) 5 Unit(s) SubCutaneous every morning  insulin lispro (ADMELOG) corrective regimen sliding scale   SubCutaneous Before meals and at bedtime  insulin lispro Injectable (ADMELOG) 3 Unit(s) SubCutaneous every 6 hours  labetalol Injectable 10 milliGRAM(s) IV Push every 4 hours PRN  lisinopril 10 milliGRAM(s) Oral daily  montelukast 10 milliGRAM(s) Oral daily  pantoprazole   Suspension 40 milliGRAM(s) Oral two times a day  senna 2 Tablet(s) Oral at bedtime    IVF:  cyanocobalamin 1000 MICROGram(s) Oral daily  dextrose 5%. 1000 milliLiter(s) IV Continuous <Continuous>  dextrose 5%. 1000 milliLiter(s) IV Continuous <Continuous>  ferrous    sulfate 325 milliGRAM(s) Oral daily    CULTURES:  Culture Results:   25,000 CFU/ml Macy dubliniensis (01-20 @ 08:15)  Culture Results:   Specimen appears CONTAMINATED. Lab suggests repeat clean catch specimen. (01-20 @ 00:18)    RADIOLOGY & ADDITIONAL TESTS:        ASSESSMENT:  76 year old Female  with PMHx of COPD, asthma, HLD, HTN, BIBEMS to [facility] from home after HHA found patient down on the floor covered in non-bloody vomitus. CTH reveals diffuse subarachnoid hemorrhage mainly at basilar cisterns with IVH and obstructive hydrocephalus, CTA shows 3mm left pcomm aneurysm, now s/p bedside right     PLAN:    NEURO:    Monitor neuro status  OT/PT  continue current medical regime  Monitor Na level  Monitor glucose   Continue current medical regims    Discussed with attending  amari

## 2021-01-26 ENCOUNTER — TRANSCRIPTION ENCOUNTER (OUTPATIENT)
Age: 76
End: 2021-01-26

## 2021-01-26 VITALS
SYSTOLIC BLOOD PRESSURE: 151 MMHG | RESPIRATION RATE: 18 BRPM | OXYGEN SATURATION: 94 % | TEMPERATURE: 98 F | HEART RATE: 95 BPM | DIASTOLIC BLOOD PRESSURE: 75 MMHG

## 2021-01-26 LAB
ANION GAP SERPL CALC-SCNC: 13 MMOL/L — SIGNIFICANT CHANGE UP (ref 5–17)
BUN SERPL-MCNC: 16 MG/DL — SIGNIFICANT CHANGE UP (ref 7–23)
CALCIUM SERPL-MCNC: 9.8 MG/DL — SIGNIFICANT CHANGE UP (ref 8.4–10.5)
CHLORIDE SERPL-SCNC: 100 MMOL/L — SIGNIFICANT CHANGE UP (ref 96–108)
CO2 SERPL-SCNC: 23 MMOL/L — SIGNIFICANT CHANGE UP (ref 22–31)
CREAT SERPL-MCNC: 0.49 MG/DL — LOW (ref 0.5–1.3)
GLUCOSE SERPL-MCNC: 176 MG/DL — HIGH (ref 70–99)
HCT VFR BLD CALC: 31.1 % — LOW (ref 34.5–45)
HGB BLD-MCNC: 9.3 G/DL — LOW (ref 11.5–15.5)
MAGNESIUM SERPL-MCNC: 2.2 MG/DL — SIGNIFICANT CHANGE UP (ref 1.6–2.6)
MCHC RBC-ENTMCNC: 29.7 PG — SIGNIFICANT CHANGE UP (ref 27–34)
MCHC RBC-ENTMCNC: 29.9 GM/DL — LOW (ref 32–36)
MCV RBC AUTO: 99.4 FL — SIGNIFICANT CHANGE UP (ref 80–100)
NRBC # BLD: 0 /100 WBCS — SIGNIFICANT CHANGE UP (ref 0–0)
PHOSPHATE SERPL-MCNC: 3.9 MG/DL — SIGNIFICANT CHANGE UP (ref 2.5–4.5)
PLATELET # BLD AUTO: 442 K/UL — HIGH (ref 150–400)
POTASSIUM SERPL-MCNC: 4.5 MMOL/L — SIGNIFICANT CHANGE UP (ref 3.5–5.3)
POTASSIUM SERPL-SCNC: 4.5 MMOL/L — SIGNIFICANT CHANGE UP (ref 3.5–5.3)
RBC # BLD: 3.13 M/UL — LOW (ref 3.8–5.2)
RBC # FLD: 17.4 % — HIGH (ref 10.3–14.5)
SARS-COV-2 RNA SPEC QL NAA+PROBE: NEGATIVE — SIGNIFICANT CHANGE UP
SODIUM SERPL-SCNC: 136 MMOL/L — SIGNIFICANT CHANGE UP (ref 135–145)
WBC # BLD: 9.41 K/UL — SIGNIFICANT CHANGE UP (ref 3.8–10.5)
WBC # FLD AUTO: 9.41 K/UL — SIGNIFICANT CHANGE UP (ref 3.8–10.5)

## 2021-01-26 PROCEDURE — U0005: CPT

## 2021-01-26 PROCEDURE — P9040: CPT

## 2021-01-26 PROCEDURE — 93306 TTE W/DOPPLER COMPLETE: CPT

## 2021-01-26 PROCEDURE — P9016: CPT

## 2021-01-26 PROCEDURE — 82945 GLUCOSE OTHER FLUID: CPT

## 2021-01-26 PROCEDURE — 94640 AIRWAY INHALATION TREATMENT: CPT

## 2021-01-26 PROCEDURE — 83735 ASSAY OF MAGNESIUM: CPT

## 2021-01-26 PROCEDURE — 80048 BASIC METABOLIC PNL TOTAL CA: CPT

## 2021-01-26 PROCEDURE — 85018 HEMOGLOBIN: CPT

## 2021-01-26 PROCEDURE — C1889: CPT

## 2021-01-26 PROCEDURE — 82150 ASSAY OF AMYLASE: CPT

## 2021-01-26 PROCEDURE — 97161 PT EVAL LOW COMPLEX 20 MIN: CPT

## 2021-01-26 PROCEDURE — 85610 PROTHROMBIN TIME: CPT

## 2021-01-26 PROCEDURE — 84132 ASSAY OF SERUM POTASSIUM: CPT

## 2021-01-26 PROCEDURE — 93321 DOPPLER ECHO F-UP/LMTD STD: CPT

## 2021-01-26 PROCEDURE — 74019 RADEX ABDOMEN 2 VIEWS: CPT

## 2021-01-26 PROCEDURE — 80061 LIPID PANEL: CPT

## 2021-01-26 PROCEDURE — 80053 COMPREHEN METABOLIC PANEL: CPT

## 2021-01-26 PROCEDURE — 87070 CULTURE OTHR SPECIMN AEROBIC: CPT

## 2021-01-26 PROCEDURE — 71045 X-RAY EXAM CHEST 1 VIEW: CPT

## 2021-01-26 PROCEDURE — 73030 X-RAY EXAM OF SHOULDER: CPT

## 2021-01-26 PROCEDURE — 87635 SARS-COV-2 COVID-19 AMP PRB: CPT

## 2021-01-26 PROCEDURE — 85025 COMPLETE CBC W/AUTO DIFF WBC: CPT

## 2021-01-26 PROCEDURE — 81001 URINALYSIS AUTO W/SCOPE: CPT

## 2021-01-26 PROCEDURE — 83036 HEMOGLOBIN GLYCOSYLATED A1C: CPT

## 2021-01-26 PROCEDURE — C1760: CPT

## 2021-01-26 PROCEDURE — 70450 CT HEAD/BRAIN W/O DYE: CPT

## 2021-01-26 PROCEDURE — 93970 EXTREMITY STUDY: CPT

## 2021-01-26 PROCEDURE — 82803 BLOOD GASES ANY COMBINATION: CPT

## 2021-01-26 PROCEDURE — 95700 EEG CONT REC W/VID EEG TECH: CPT

## 2021-01-26 PROCEDURE — 96375 TX/PRO/DX INJ NEW DRUG ADDON: CPT

## 2021-01-26 PROCEDURE — 86769 SARS-COV-2 COVID-19 ANTIBODY: CPT

## 2021-01-26 PROCEDURE — U0003: CPT

## 2021-01-26 PROCEDURE — 83615 LACTATE (LD) (LDH) ENZYME: CPT

## 2021-01-26 PROCEDURE — 86900 BLOOD TYPING SEROLOGIC ABO: CPT

## 2021-01-26 PROCEDURE — 87186 SC STD MICRODIL/AGAR DIL: CPT

## 2021-01-26 PROCEDURE — 97530 THERAPEUTIC ACTIVITIES: CPT

## 2021-01-26 PROCEDURE — 80076 HEPATIC FUNCTION PANEL: CPT

## 2021-01-26 PROCEDURE — 82553 CREATINE MB FRACTION: CPT

## 2021-01-26 PROCEDURE — 82550 ASSAY OF CK (CPK): CPT

## 2021-01-26 PROCEDURE — 82272 OCCULT BLD FECES 1-3 TESTS: CPT

## 2021-01-26 PROCEDURE — 84295 ASSAY OF SERUM SODIUM: CPT

## 2021-01-26 PROCEDURE — 36430 TRANSFUSION BLD/BLD COMPNT: CPT

## 2021-01-26 PROCEDURE — 88304 TISSUE EXAM BY PATHOLOGIST: CPT

## 2021-01-26 PROCEDURE — 94002 VENT MGMT INPAT INIT DAY: CPT

## 2021-01-26 PROCEDURE — 89051 BODY FLUID CELL COUNT: CPT

## 2021-01-26 PROCEDURE — P9045: CPT

## 2021-01-26 PROCEDURE — 80202 ASSAY OF VANCOMYCIN: CPT

## 2021-01-26 PROCEDURE — 73060 X-RAY EXAM OF HUMERUS: CPT

## 2021-01-26 PROCEDURE — 83010 ASSAY OF HAPTOGLOBIN QUANT: CPT

## 2021-01-26 PROCEDURE — 70496 CT ANGIOGRAPHY HEAD: CPT

## 2021-01-26 PROCEDURE — 87205 SMEAR GRAM STAIN: CPT

## 2021-01-26 PROCEDURE — 74177 CT ABD & PELVIS W/CONTRAST: CPT

## 2021-01-26 PROCEDURE — 0042T: CPT

## 2021-01-26 PROCEDURE — 36415 COLL VENOUS BLD VENIPUNCTURE: CPT

## 2021-01-26 PROCEDURE — 95714 VEEG EA 12-26 HR UNMNTR: CPT

## 2021-01-26 PROCEDURE — 86923 COMPATIBILITY TEST ELECTRIC: CPT

## 2021-01-26 PROCEDURE — 83540 ASSAY OF IRON: CPT

## 2021-01-26 PROCEDURE — 74176 CT ABD & PELVIS W/O CONTRAST: CPT

## 2021-01-26 PROCEDURE — 74018 RADEX ABDOMEN 1 VIEW: CPT

## 2021-01-26 PROCEDURE — 86850 RBC ANTIBODY SCREEN: CPT

## 2021-01-26 PROCEDURE — 97112 NEUROMUSCULAR REEDUCATION: CPT

## 2021-01-26 PROCEDURE — 97164 PT RE-EVAL EST PLAN CARE: CPT

## 2021-01-26 PROCEDURE — 70498 CT ANGIOGRAPHY NECK: CPT

## 2021-01-26 PROCEDURE — 85045 AUTOMATED RETICULOCYTE COUNT: CPT

## 2021-01-26 PROCEDURE — 82330 ASSAY OF CALCIUM: CPT

## 2021-01-26 PROCEDURE — 86901 BLOOD TYPING SEROLOGIC RH(D): CPT

## 2021-01-26 PROCEDURE — 87086 URINE CULTURE/COLONY COUNT: CPT

## 2021-01-26 PROCEDURE — 96374 THER/PROPH/DIAG INJ IV PUSH: CPT | Mod: XU

## 2021-01-26 PROCEDURE — 97110 THERAPEUTIC EXERCISES: CPT

## 2021-01-26 PROCEDURE — 85730 THROMBOPLASTIN TIME PARTIAL: CPT

## 2021-01-26 PROCEDURE — C1894: CPT

## 2021-01-26 PROCEDURE — 82728 ASSAY OF FERRITIN: CPT

## 2021-01-26 PROCEDURE — 83550 IRON BINDING TEST: CPT

## 2021-01-26 PROCEDURE — 85027 COMPLETE CBC AUTOMATED: CPT

## 2021-01-26 PROCEDURE — 93971 EXTREMITY STUDY: CPT

## 2021-01-26 PROCEDURE — C9254: CPT

## 2021-01-26 PROCEDURE — 97162 PT EVAL MOD COMPLEX 30 MIN: CPT

## 2021-01-26 PROCEDURE — 87040 BLOOD CULTURE FOR BACTERIA: CPT

## 2021-01-26 PROCEDURE — 96376 TX/PRO/DX INJ SAME DRUG ADON: CPT

## 2021-01-26 PROCEDURE — C1713: CPT

## 2021-01-26 PROCEDURE — 92526 ORAL FUNCTION THERAPY: CPT

## 2021-01-26 PROCEDURE — 92610 EVALUATE SWALLOWING FUNCTION: CPT

## 2021-01-26 PROCEDURE — 84100 ASSAY OF PHOSPHORUS: CPT

## 2021-01-26 PROCEDURE — 83605 ASSAY OF LACTIC ACID: CPT

## 2021-01-26 PROCEDURE — 84157 ASSAY OF PROTEIN OTHER: CPT

## 2021-01-26 PROCEDURE — 93005 ELECTROCARDIOGRAM TRACING: CPT

## 2021-01-26 PROCEDURE — 84466 ASSAY OF TRANSFERRIN: CPT

## 2021-01-26 PROCEDURE — C1887: CPT

## 2021-01-26 PROCEDURE — 85379 FIBRIN DEGRADATION QUANT: CPT

## 2021-01-26 PROCEDURE — 84484 ASSAY OF TROPONIN QUANT: CPT

## 2021-01-26 PROCEDURE — 95711 VEEG 2-12 HR UNMONITORED: CPT

## 2021-01-26 PROCEDURE — 83690 ASSAY OF LIPASE: CPT

## 2021-01-26 PROCEDURE — C1769: CPT

## 2021-01-26 PROCEDURE — 82962 GLUCOSE BLOOD TEST: CPT

## 2021-01-26 PROCEDURE — 99291 CRITICAL CARE FIRST HOUR: CPT | Mod: 25

## 2021-01-26 PROCEDURE — 84145 PROCALCITONIN (PCT): CPT

## 2021-01-26 PROCEDURE — 84443 ASSAY THYROID STIM HORMONE: CPT

## 2021-01-26 PROCEDURE — 82607 VITAMIN B-12: CPT

## 2021-01-26 RX ORDER — INSULIN LISPRO 100/ML
0 VIAL (ML) SUBCUTANEOUS
Qty: 0 | Refills: 0 | DISCHARGE
Start: 2021-01-26

## 2021-01-26 RX ORDER — INSULIN GLARGINE 100 [IU]/ML
5 INJECTION, SOLUTION SUBCUTANEOUS
Qty: 0 | Refills: 0 | DISCHARGE
Start: 2021-01-26

## 2021-01-26 RX ORDER — SENNA PLUS 8.6 MG/1
2 TABLET ORAL
Qty: 0 | Refills: 0 | DISCHARGE
Start: 2021-01-26

## 2021-01-26 RX ORDER — INSULIN GLARGINE 100 [IU]/ML
0 INJECTION, SOLUTION SUBCUTANEOUS
Qty: 0 | Refills: 0 | DISCHARGE
Start: 2021-01-26

## 2021-01-26 RX ADMIN — LISINOPRIL 10 MILLIGRAM(S): 2.5 TABLET ORAL at 05:40

## 2021-01-26 RX ADMIN — Medication 2: at 12:46

## 2021-01-26 RX ADMIN — Medication 0.5 MILLIGRAM(S): at 06:34

## 2021-01-26 RX ADMIN — INSULIN GLARGINE 5 UNIT(S): 100 INJECTION, SOLUTION SUBCUTANEOUS at 08:40

## 2021-01-26 RX ADMIN — Medication 3 UNIT(S): at 12:46

## 2021-01-26 RX ADMIN — Medication 325 MILLIGRAM(S): at 14:27

## 2021-01-26 RX ADMIN — CHLORHEXIDINE GLUCONATE 1 APPLICATION(S): 213 SOLUTION TOPICAL at 05:41

## 2021-01-26 RX ADMIN — PANTOPRAZOLE SODIUM 40 MILLIGRAM(S): 20 TABLET, DELAYED RELEASE ORAL at 05:40

## 2021-01-26 RX ADMIN — MONTELUKAST 10 MILLIGRAM(S): 4 TABLET, CHEWABLE ORAL at 14:25

## 2021-01-26 RX ADMIN — CHLORHEXIDINE GLUCONATE 15 MILLILITER(S): 213 SOLUTION TOPICAL at 05:40

## 2021-01-26 RX ADMIN — Medication 3 UNIT(S): at 01:15

## 2021-01-26 RX ADMIN — Medication 3 UNIT(S): at 07:44

## 2021-01-26 RX ADMIN — Medication 2: at 07:45

## 2021-01-26 RX ADMIN — PREGABALIN 1000 MICROGRAM(S): 225 CAPSULE ORAL at 14:25

## 2021-01-26 RX ADMIN — Medication 3 MILLILITER(S): at 05:39

## 2021-01-26 RX ADMIN — Medication 81 MILLIGRAM(S): at 14:25

## 2021-01-26 RX ADMIN — LACOSAMIDE 150 MILLIGRAM(S): 50 TABLET ORAL at 06:34

## 2021-01-26 RX ADMIN — AMLODIPINE BESYLATE 10 MILLIGRAM(S): 2.5 TABLET ORAL at 14:27

## 2021-01-26 NOTE — PROGRESS NOTE ADULT - REASON FOR ADMISSION
SAH

## 2021-01-26 NOTE — PROGRESS NOTE ADULT - SUBJECTIVE AND OBJECTIVE BOX
HPI:  76y/o F with PMHx sig for COPD, asthma, HLD, HTN, BIBEMS to [facility] from home after HHA discovered patient found down in floor covered in non-bloody vomitus. Perr HHA Pt went to the bathroom and was taking a long time, went in to check on her and found her sitting on floor, awake, however with vomitus on front of shirt. On arrival to [facility], patient was taken for stat head CT, which revealed diffuse subarachnoid hemorrhage mainly at basilar cisterns with IVH and obstructive hydrocephalus. Patient was given a bolus of 1g keppra and dilaudid pushes for generalized headache. CTA concerning for 3mm left pcomm aneurysm and a 1.6mm outpouching of distal cavernous segment of the left internal carotid artery likely aneurysm. NIHSS2 3 MF4. Patient was transferred to [facility] for further intervention. Patient currently reports headaches. Pt denies acute changes in vision, seizures, CP, SOB, weakness/paresthesias of arms or legs. (09 Dec 2020 23:37)    OVERNIGHT EVENTS: VIVIEN o/n, neuro stable    Hospital Course:   12/9: BD1 Patient admitted for SAH HH3, MF4.   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 EVD at 5, on Levo overnight, nimodipine discontinued d/t hypotension  12/12: BD4 POD#2, VIVIEN overnight, neuro stable, passed TOV  12/13: BD5 POD#3: VIVIEN x 24 hrs  12/14: BD6 POD#4. VIVIEN o/n, neuro stable. EVD at 5cm H2O  12/15: BD7 POD #5 VIVIEN overnight, neuro stable. EVD remains at 5. Plan for CTA today.   12/16: BD8 POD #6 VIVIEN overnight, neuro stable, EVD at 0, on Levo, started on 3% at 15 overnight   12/17: BD9 POD#1 s/p cerebral angio for verapamil c/b device malfunction of Proglide, s/p right groin cutdown for removal of proglide catheter and femoral artery repair.  VIVIEN overnight, neuro stable, EVD at 0. patient remained intubated overnight, on propofol for sedation, and vEEG  12/18: BD#10, POD#8 s/p coil/embo of L pcomm aneurysm, POD#2 s/p IA verapamil, POD#2 R groin cutdown for removal of proglide catheter w/ repair of femoral artery. VIVIEN overnight. EVD remains open @0cmH2O   12/19: BD#11, POD#9 s/p coil/embo of L pcomm aneurysm. POD#3 s/p IA verapamil, POD#3 s/p R femoral artery cutdown of proglide catheter w/ femoral artery repair. VIVIEN overnight. EVD remains open @0cmH2O. EEG shows b/l frontal sharp discharges, cont monitoring, vimpat was increased yesterday. Gentle hydration, total IVF 50cc/hr. Cont vanc/zosyn for empiric coverage, next vanc trough due @1pm on 12/19. F/u daily CXR.   12/20 BD#12 POD#10 VIVIEN overnight, Neuro exam stable, EVD @ 0cm H2O, vEEG, 3% @ 15 goal WNL, TF via NGT  12/21: BD13, POD11 VIVIEN overnight. EVD at 5cmH20 (raised yesterday), vEEG in place  12/22 BD#14, EVD raised to 15 yesterday, 3% @ 30cc Goal WNL, -180, Milrinone, TTE prior to DC as per Card for takotsubo cardiomyopathy, failed S&S pending reeval, TF via NGT, Neuro exam stable  12/23: BD#15. VIVIEN o/n, neuro stable. EVD clamped. 30%@30cc/hr  12/24 BD#16 VIVIEN overnight, spiked 102, EVD @ 0cm H2O, 3% @ 30cc/hr Goal WNL, Vasc following, TF via NGT, -200,   12/25: BD#17. VIVIEN overnight. Pan cx from 12/23, NGTD on CSF and blood. EVD clamped. 3% @15cc/hr, rate kept same overnight. NGT feeds at goal. SBP goal 160-200.   12/26: BD#18. VIVIEN overnight, neuro exam stable. NGTD from pan cx on 12/23. EVD removed today. 3% discontinued 12/25. NGT feeds at goal, IVF off. SBP goal 160-200.   12/27: BD#19 VIVIEN overnight, neuro stable. 3% at 15, stepdown status  12/28: BD20 VIVIEN overnight, neuro stable. 3% continues.  Patient became increasingly more somnolent and underwent LP for CSF high volume tap.  Temporary improvement.  Spiked fever, pancultured.  12/29: BD21 Patient increasingly more somnolent, EVD placed at bedside.    12/30: BD22 pt. is s/p R VPS placement, certas @5 POD#1, post op ct head c/a/p done. afebrile o/n.   1/1: BD#23: pt. is s/p R VPS placement, certas @5 POD#2, post op ct head c/a/p done. zosyn for enterobacter   1/2: BD #24. POD#3 s/p R VPS placement, CErtas @5. Dressings removed from head and abdomen. Cont zosyn for enterobacter and e. coli in urine, end date 1/4/21. Post-op imaging completed. Pend S&S re-eval, improvement in mental status/neuro exam.   1/3: BD25, POD4. SBP goals relaxed 120-200. zosyn for enterobacter UTI until 1/4. FOB+, protonix bid started, vivien o/n  1/4: BD 26, VIVIEN o/n  1/5: BD 27, VIVIEN o/n   1/6: BD 28, POD 7 VPS (Certas @ 5). VIVIEN overnight. Plan for PEG with GI Thursday. Repeat Covid swab negative. Stepdown status.  1/7: BD 29, POD 8. VIVIEN overnight. PEG placed at bedside by GI today. Stepdown status  1/8: BD 30 POD 9, VIVIEN o/n, resume TF 24 hrs after PEG, stepdown   1/9 BD 31. POD 10. No events overnight. s/p PEG. Pending AR vs JOHANNA  1/10 BD32, POD11. Lethargic but arousable with stimulation, CTH was ordered and demonstrated stable scan in comparison from 1/1. Continue to trend Na, this  improved to 135 on 2%@50/hr and salt tabs started. Pending AR vs JOHANNA.  1/11: BD#33 POD#12. VIVIEN overnight, peripheral IV x2 inserted. F/u pan cx from 1/10, NGTD. Cont 2% @50cc/hr, f/u Na in am. Dispo pend AR vs JOHANNA.   1/12: BD#34 POD#13: Afebrile, on 2% at 25cc/hr, Na 137, pending AM labs. Pending JOHANNA placement. Abx to stop today, fever work-up negative. COVID swab for dispo planning.  1/13: BD35 Neuro stable. Off 2%, Na stable. Started on Zosyn for UTI. Pending dispo to [facility].  1/14 POD#15 VIVIEN overnight, Neuro exam stable, staples DC prior to discharge, TF via PEG, pending JOHANNA placement, Zosyn UTI til 1/15  1/15: POD #16 Episode of desaturating overnight, improved with chest PT, suctioning, mucomyst, CXR obtained. pending rehab  1/16: POD17 VIVIEN overnight, neuro stable  1/17: POD18. VIVIEN o/n, neuro stable. pending johanna  1/18: POD20. VIVIEN o/n, neuro stable  1/19: POD21 spiked fever 101F overnight, pancultured, neuro exam stable  1/20: POD#22. Rapid response called last night for tachycardia / tachypnea. Transferred to telemetry.  Urine cx sent.  1/21: POD23 VIVIEN overnight - afebrile, neuro stable  1/22: POD24 VIVIEN overnight, neuro stable. weaning off salt tabs and florinef  1/23: POD#25 VIVIEN, Afebrile overnight. Off salt tabs and florineff. She was more coopeartive overnight.  1/24: POD26 VIVIEN overnight, zosyn for UTI completed today, pending JOHANNA  1/25: POD27. VIVIEN o/n, neuro stable  1/26: POD28. VIVIEN o/n, neuro stable, pending JOHANNA    Vital Signs Last 24 Hrs  T(C): 37 (25 Jan 2021 20:59), Max: 37 (25 Jan 2021 20:59)  T(F): 98.6 (25 Jan 2021 20:59), Max: 98.6 (25 Jan 2021 20:59)  HR: 95 (26 Jan 2021 00:57) (81 - 95)  BP: 164/72 (26 Jan 2021 00:57) (136/64 - 164/72)  BP(mean): 99 (25 Jan 2021 17:23) (92 - 99)  RR: 20 (26 Jan 2021 00:57) (16 - 20)  SpO2: 95% (26 Jan 2021 00:57) (95% - 99%)    I&O's Summary    24 Jan 2021 07:01  -  25 Jan 2021 07:00  --------------------------------------------------------  IN: 1400 mL / OUT: 1501 mL / NET: -101 mL    25 Jan 2021 07:01  -  26 Jan 2021 03:35  --------------------------------------------------------  IN: 650 mL / OUT: 1450 mL / NET: -800 mL        PHYSICAL EXAM:  Gen: laying in hospital bed, NAD  Neuro: refusal to comply with exam, opens eyes briefly to persuasion, does not FC, moves all extremities spontaneously and strong  HEENT: PERRL     Neck: supple  Cardiac: RRR, S1S2  Pulmonary: chest rise symmetric  Abdomen: soft, nontender, nondistended, +PEG  Ext: warm, perfusing well     TUBES/LINES:  [] Soriano  [] Lumbar Drain  [] Wound Drains  [] Others      DIET:  [] NPO  [] Mechanical  [x] Tube feeds    LABS:                        8.5    8.41  )-----------( 373      ( 25 Jan 2021 06:43 )             27.9     01-25    139  |  102  |  12  ----------------------------<  160<H>  3.6   |  27  |  0.44<L>    Ca    8.9      25 Jan 2021 06:43  Phos  3.8     01-25  Mg     2.1     01-25              CAPILLARY BLOOD GLUCOSE      POCT Blood Glucose.: 181 mg/dL (26 Jan 2021 01:02)  POCT Blood Glucose.: 118 mg/dL (25 Jan 2021 22:01)  POCT Blood Glucose.: 153 mg/dL (25 Jan 2021 17:57)  POCT Blood Glucose.: 180 mg/dL (25 Jan 2021 12:14)  POCT Blood Glucose.: 144 mg/dL (25 Jan 2021 06:50)      Drug Levels: [] N/A  Vancomycin Level, Trough: 12.6 ug/mL (01-21 @ 08:02)    CSF Analysis: [] N/A      Allergies    No Known Allergies    Intolerances      MEDICATIONS:  Antibiotics:    Neuro:  acetaminophen    Suspension .. 650 milliGRAM(s) Oral every 6 hours PRN  lacosamide Solution 150 milliGRAM(s) Oral every 12 hours    Anticoagulation:  aspirin  chewable 81 milliGRAM(s) Oral daily  enoxaparin Injectable 40 milliGRAM(s) SubCutaneous at bedtime    OTHER:  acetylcysteine 20%  Inhalation 4 milliLiter(s) Inhalation three times a day PRN  albuterol/ipratropium for Nebulization 3 milliLiter(s) Nebulizer every 6 hours  amLODIPine   Tablet 10 milliGRAM(s) Oral every 24 hours  atorvastatin 40 milliGRAM(s) Oral at bedtime  buDESOnide    Inhalation Suspension 0.5 milliGRAM(s) Inhalation every 12 hours  chlorhexidine 0.12% Liquid 15 milliLiter(s) Oral Mucosa two times a day  chlorhexidine 2% Cloths 1 Application(s) Topical <User Schedule>  dextrose 40% Gel 15 Gram(s) Oral once  dextrose 50% Injectable 25 Gram(s) IV Push once  glucagon  Injectable 1 milliGRAM(s) IntraMuscular once  hydrALAZINE Injectable 5 milliGRAM(s) IV Push once  insulin glargine Injectable (LANTUS) 5 Unit(s) SubCutaneous every morning  insulin lispro (ADMELOG) corrective regimen sliding scale   SubCutaneous Before meals and at bedtime  insulin lispro Injectable (ADMELOG) 3 Unit(s) SubCutaneous every 6 hours  labetalol Injectable 10 milliGRAM(s) IV Push every 4 hours PRN  lisinopril 10 milliGRAM(s) Oral daily  montelukast 10 milliGRAM(s) Oral daily  pantoprazole   Suspension 40 milliGRAM(s) Oral two times a day  senna 2 Tablet(s) Oral at bedtime    IVF:  cyanocobalamin 1000 MICROGram(s) Oral daily  dextrose 5%. 1000 milliLiter(s) IV Continuous <Continuous>  dextrose 5%. 1000 milliLiter(s) IV Continuous <Continuous>  ferrous    sulfate 325 milliGRAM(s) Oral daily    CULTURES:  Culture Results:   25,000 CFU/ml Macy dubliniensis (01-20 @ 08:15)  Culture Results:   Specimen appears CONTAMINATED. Lab suggests repeat clean catch specimen. (01-20 @ 00:18)    RADIOLOGY & ADDITIONAL TESTS:      ASSESSMENT:  76 year old Female  with PMHx of COPD, asthma, HLD, HTN, BIBEMS to [facility] from home after HHA found patient down on the floor covered in non-bloody vomitus. CTH reveals diffuse subarachnoid hemorrhage mainly at basilar cisterns with IVH and obstructive hydrocephalus, CTA shows 3mm left pcomm aneurysm, now s/p bedside right frontal EVD placement 12/10, s/p cerebral angiogram for coil embolization of left PCOMM aneurysm 12/10, now   s/p cerebral angio for verapamil c/b device malfunction of Proglide, s/p right groin cutdown for removal of proglide catheter and femoral artery repair (12/17/2020), NIHSS2 HH3 MF4), s/p EVD removal 12/25,  s/p bedside EVD placement 12/29, s/p R VPS certas at 5 (12/20/2020) and s/p PEG.       HEADACHE    Handoff    MEWS Score    Hyperlipidemia    Hypertension    COPD (chronic obstructive pulmonary disease)    Asthma    COPD (chronic obstructive pulmonary disease)    Asthma    Hydrocephalus, adult    Injury of right femoral artery    Cerebral artery vasospasm    SAH (subarachnoid hemorrhage)    Hydrocephalus, adult    Injury of femoral artery    Cerebral artery vasospasm    SAH (subarachnoid hemorrhage)    Angiogram, cerebral, with intracranial aneurysm embolization    Subarachnoid bleed    Postoperative state    Obstructive hydrocephalus    Anemia due to acute blood loss    Preoperative clearance    Centrilobular emphysema    Mild persistent asthma without complication    Subarachnoid bleed    Essential hypertension    Pure hypercholesterolemia    Insertion of external ventricular drain    Vascular surgery procedure    Angiogram, carotid and cerebral, bilateral    Angiogram, cerebral, with intracranial aneurysm embolization    Ventriculoperitoneal shunt    No significant past surgical history    HEADACHE    90+    Ventriculoperitoneal shunt    [email or web address]        PLAN:  - neuro checks  - vitals checks  - pain control  - cont vimpat  - cont Aspirin   - cont lisinopril, norvasc  - cont duonebs  - cont Zosyn (end date: 1/24) per ID, recs appreciated  - weaning off salt tabs and florinef   - Lantus/Lispro/ISS    DVT PROPHYLAXIS:  [x] Venodynes                                [x] Heparin/Lovenox    DISPOSITION: pending JOHANNA    D/w Dr. Serrano    Assessment:  Present when checked    []  GCS  E   V  M     Heart Failure: []Acute, [] acute on chronic , []chronic  Heart Failure:  [] Diastolic (HFpEF), [] Systolic (HFrEF), []Combined (HFpEF and HFrEF), [] RHF, [] Pulm HTN, [] Other    [] MICHAELLE, [] ATN, [] AIN, [] other  [] CKD1, [] CKD2, [] CKD 3, [] CKD 4, [] CKD 5, []ESRD    Encephalopathy: [] Metabolic, [] Hepatic, [] toxic, [] Neurological, [] Other    Abnormal Nurtitional Status: [] malnurtition (see nutrition note), [ ]underweight: BMI < 19, [] morbid obesity: BMI >40, [] Cachexia    [] Sepsis  [] hypovolemic shock,[] cardiogenic shock, [] hemorrhagic shock, [] neuogenic shock  [] Acute Respiratory Failure  []Cerebral edema, [] Brain compression/ herniation,   [] Functional quadriplegia  [] Acute blood loss anemia

## 2021-01-26 NOTE — DISCHARGE NOTE NURSING/CASE MANAGEMENT/SOCIAL WORK - NSDPFAC_GEN_ALL_CORE
Tyra Villarreal [facility]/ [address] [place] [address], [place] [address]/ Phone: [phone]

## 2021-01-26 NOTE — DISCHARGE NOTE NURSING/CASE MANAGEMENT/SOCIAL WORK - PATIENT PORTAL LINK FT
You can access the FollowMyHealth Patient Portal offered by [facility] by registering at the following website: [email or web address]. By joining [facility]’s FollowMyHealth portal, you will also be able to view your health information using other applications (apps) compatible with our system.

## 2021-01-26 NOTE — PROGRESS NOTE ADULT - PROVIDER SPECIALTY LIST ADULT
Cardiology
Infectious Disease
NSICU
Neurosurgery
Vascular Surgery
Cardiology
Gastroenterology
NSICU
Neurosurgery
Vascular Surgery
Cardiology
Gastroenterology
Infectious Disease
Infectious Disease
NSICU
Neurosurgery
Vascular Surgery
Cardiology
Internal Medicine
NSICU
Neurosurgery
NSICU
Neurosurgery
Internal Medicine

## 2021-01-26 NOTE — PROGRESS NOTE ADULT - THIS PATIENT HAS THE FOLLOWING CONDITION(S)/DIAGNOSES ON THIS ADMISSION:
None
Brain Compression / Herniation
Cerebral Edema/Brain Compression / Herniation
Encephalopathy
None
Cerebral Edema/Brain Compression / Herniation
None
Brain Compression / Herniation/Cerebral Edema
Cerebral Edema
Cerebral Edema/Brain Compression / Herniation
Encephalopathy/Functional Quadriplegia/Brain Compression / Herniation
None
Cerebral Edema/Brain Compression / Herniation
Encephalopathy
Encephalopathy
None
None
Cerebral Edema/Brain Compression / Herniation

## 2021-01-31 ENCOUNTER — INPATIENT (INPATIENT)
Facility: HOSPITAL | Age: 76
LOS: 4 days | Discharge: NURSING, REHAB OR LONG-TERM CARE | DRG: 871 | End: 2021-02-05
Attending: STUDENT IN AN ORGANIZED HEALTH CARE EDUCATION/TRAINING PROGRAM | Admitting: INTERNAL MEDICINE
Payer: MEDICARE

## 2021-01-31 VITALS
OXYGEN SATURATION: 100 % | SYSTOLIC BLOOD PRESSURE: 110 MMHG | HEIGHT: 59.02 IN | TEMPERATURE: 100 F | HEART RATE: 102 BPM | RESPIRATION RATE: 30 BRPM | DIASTOLIC BLOOD PRESSURE: 64 MMHG | WEIGHT: 110.89 LBS

## 2021-01-31 DIAGNOSIS — J44.9 CHRONIC OBSTRUCTIVE PULMONARY DISEASE, UNSPECIFIED: ICD-10-CM

## 2021-01-31 DIAGNOSIS — Z93.1 GASTROSTOMY STATUS: Chronic | ICD-10-CM

## 2021-01-31 DIAGNOSIS — E78.5 HYPERLIPIDEMIA, UNSPECIFIED: ICD-10-CM

## 2021-01-31 DIAGNOSIS — D64.9 ANEMIA, UNSPECIFIED: ICD-10-CM

## 2021-01-31 DIAGNOSIS — I10 ESSENTIAL (PRIMARY) HYPERTENSION: ICD-10-CM

## 2021-01-31 DIAGNOSIS — R41.82 ALTERED MENTAL STATUS, UNSPECIFIED: ICD-10-CM

## 2021-01-31 DIAGNOSIS — N17.9 ACUTE KIDNEY FAILURE, UNSPECIFIED: ICD-10-CM

## 2021-01-31 DIAGNOSIS — A41.9 SEPSIS, UNSPECIFIED ORGANISM: ICD-10-CM

## 2021-01-31 DIAGNOSIS — J69.0 PNEUMONITIS DUE TO INHALATION OF FOOD AND VOMIT: ICD-10-CM

## 2021-01-31 DIAGNOSIS — Z98.890 OTHER SPECIFIED POSTPROCEDURAL STATES: Chronic | ICD-10-CM

## 2021-01-31 DIAGNOSIS — R63.8 OTHER SYMPTOMS AND SIGNS CONCERNING FOOD AND FLUID INTAKE: ICD-10-CM

## 2021-01-31 DIAGNOSIS — J96.91 RESPIRATORY FAILURE, UNSPECIFIED WITH HYPOXIA: ICD-10-CM

## 2021-01-31 LAB
A1C WITH ESTIMATED AVERAGE GLUCOSE RESULT: 5.7 % — HIGH (ref 4–5.6)
ALBUMIN SERPL ELPH-MCNC: 2.1 G/DL — LOW (ref 3.3–5)
ALBUMIN SERPL ELPH-MCNC: 2.6 G/DL — LOW (ref 3.3–5)
ALP SERPL-CCNC: 101 U/L — SIGNIFICANT CHANGE UP (ref 40–120)
ALP SERPL-CCNC: 83 U/L — SIGNIFICANT CHANGE UP (ref 40–120)
ALT FLD-CCNC: 18 U/L — SIGNIFICANT CHANGE UP (ref 10–45)
ALT FLD-CCNC: 20 U/L — SIGNIFICANT CHANGE UP (ref 10–45)
ANION GAP SERPL CALC-SCNC: 12 MMOL/L — SIGNIFICANT CHANGE UP (ref 5–17)
ANION GAP SERPL CALC-SCNC: 14 MMOL/L — SIGNIFICANT CHANGE UP (ref 5–17)
ANION GAP SERPL CALC-SCNC: 15 MMOL/L — SIGNIFICANT CHANGE UP (ref 5–17)
ANISOCYTOSIS BLD QL: SLIGHT — SIGNIFICANT CHANGE UP
APPEARANCE UR: ABNORMAL
APTT BLD: 26.1 SEC — LOW (ref 27.5–35.5)
APTT BLD: 30.3 SEC — SIGNIFICANT CHANGE UP (ref 27.5–35.5)
AST SERPL-CCNC: 14 U/L — SIGNIFICANT CHANGE UP (ref 10–40)
AST SERPL-CCNC: 19 U/L — SIGNIFICANT CHANGE UP (ref 10–40)
BACTERIA # UR AUTO: PRESENT /HPF
BASE EXCESS BLDV CALC-SCNC: -4.1 MMOL/L — SIGNIFICANT CHANGE UP
BASOPHILS # BLD AUTO: 0 K/UL — SIGNIFICANT CHANGE UP (ref 0–0.2)
BASOPHILS # BLD AUTO: 0.02 K/UL — SIGNIFICANT CHANGE UP (ref 0–0.2)
BASOPHILS NFR BLD AUTO: 0 % — SIGNIFICANT CHANGE UP (ref 0–2)
BASOPHILS NFR BLD AUTO: 0.2 % — SIGNIFICANT CHANGE UP (ref 0–2)
BILIRUB SERPL-MCNC: 0.3 MG/DL — SIGNIFICANT CHANGE UP (ref 0.2–1.2)
BILIRUB SERPL-MCNC: 0.3 MG/DL — SIGNIFICANT CHANGE UP (ref 0.2–1.2)
BILIRUB UR-MCNC: NEGATIVE — SIGNIFICANT CHANGE UP
BLD GP AB SCN SERPL QL: NEGATIVE — SIGNIFICANT CHANGE UP
BUN SERPL-MCNC: 34 MG/DL — HIGH (ref 7–23)
BUN SERPL-MCNC: 39 MG/DL — HIGH (ref 7–23)
BUN SERPL-MCNC: 51 MG/DL — HIGH (ref 7–23)
BURR CELLS BLD QL SMEAR: SLIGHT — SIGNIFICANT CHANGE UP
CA-I SERPL-SCNC: 1.05 MMOL/L — LOW (ref 1.12–1.3)
CALCIUM SERPL-MCNC: 7.9 MG/DL — LOW (ref 8.4–10.5)
CALCIUM SERPL-MCNC: 8 MG/DL — LOW (ref 8.4–10.5)
CALCIUM SERPL-MCNC: 8.2 MG/DL — LOW (ref 8.4–10.5)
CHLORIDE SERPL-SCNC: 103 MMOL/L — SIGNIFICANT CHANGE UP (ref 96–108)
CHLORIDE SERPL-SCNC: 108 MMOL/L — SIGNIFICANT CHANGE UP (ref 96–108)
CHLORIDE SERPL-SCNC: 98 MMOL/L — SIGNIFICANT CHANGE UP (ref 96–108)
CO2 SERPL-SCNC: 18 MMOL/L — LOW (ref 22–31)
CO2 SERPL-SCNC: 19 MMOL/L — LOW (ref 22–31)
CO2 SERPL-SCNC: 20 MMOL/L — LOW (ref 22–31)
COLOR SPEC: YELLOW — SIGNIFICANT CHANGE UP
COMMENT - URINE: SIGNIFICANT CHANGE UP
COMMENT - URINE: SIGNIFICANT CHANGE UP
CREAT ?TM UR-MCNC: 58 MG/DL — SIGNIFICANT CHANGE UP
CREAT SERPL-MCNC: 0.81 MG/DL — SIGNIFICANT CHANGE UP (ref 0.5–1.3)
CREAT SERPL-MCNC: 0.94 MG/DL — SIGNIFICANT CHANGE UP (ref 0.5–1.3)
CREAT SERPL-MCNC: 1.52 MG/DL — HIGH (ref 0.5–1.3)
DIFF PNL FLD: ABNORMAL
EOSINOPHIL # BLD AUTO: 0.01 K/UL — SIGNIFICANT CHANGE UP (ref 0–0.5)
EOSINOPHIL # BLD AUTO: 0.18 K/UL — SIGNIFICANT CHANGE UP (ref 0–0.5)
EOSINOPHIL NFR BLD AUTO: 0.1 % — SIGNIFICANT CHANGE UP (ref 0–6)
EOSINOPHIL NFR BLD AUTO: 1.7 % — SIGNIFICANT CHANGE UP (ref 0–6)
EPI CELLS # UR: ABNORMAL /HPF (ref 0–5)
ESTIMATED AVERAGE GLUCOSE: 117 MG/DL — HIGH (ref 68–114)
GAS PNL BLDV: 127 MMOL/L — LOW (ref 138–146)
GAS PNL BLDV: SIGNIFICANT CHANGE UP
GAS PNL BLDV: SIGNIFICANT CHANGE UP
GIANT PLATELETS BLD QL SMEAR: PRESENT — SIGNIFICANT CHANGE UP
GLUCOSE BLDC GLUCOMTR-MCNC: 163 MG/DL — HIGH (ref 70–99)
GLUCOSE BLDC GLUCOMTR-MCNC: 218 MG/DL — HIGH (ref 70–99)
GLUCOSE SERPL-MCNC: 171 MG/DL — HIGH (ref 70–99)
GLUCOSE SERPL-MCNC: 198 MG/DL — HIGH (ref 70–99)
GLUCOSE SERPL-MCNC: 247 MG/DL — HIGH (ref 70–99)
GLUCOSE UR QL: NEGATIVE — SIGNIFICANT CHANGE UP
GRAN CASTS # UR COMP ASSIST: ABNORMAL /LPF
HCO3 BLDV-SCNC: 20 MMOL/L — SIGNIFICANT CHANGE UP (ref 20–27)
HCT VFR BLD CALC: 22.8 % — LOW (ref 34.5–45)
HCT VFR BLD CALC: 27.4 % — LOW (ref 34.5–45)
HGB BLD-MCNC: 6.9 G/DL — CRITICAL LOW (ref 11.5–15.5)
HGB BLD-MCNC: 8.5 G/DL — LOW (ref 11.5–15.5)
IMM GRANULOCYTES NFR BLD AUTO: 0.7 % — SIGNIFICANT CHANGE UP (ref 0–1.5)
INR BLD: 1.19 — HIGH (ref 0.88–1.16)
INR BLD: 1.2 — HIGH (ref 0.88–1.16)
KETONES UR-MCNC: NEGATIVE — SIGNIFICANT CHANGE UP
LACTATE SERPL-SCNC: 1.6 MMOL/L — SIGNIFICANT CHANGE UP (ref 0.5–2)
LACTATE SERPL-SCNC: 2.3 MMOL/L — HIGH (ref 0.5–2)
LEUKOCYTE ESTERASE UR-ACNC: ABNORMAL
LYMPHOCYTES # BLD AUTO: 0.43 K/UL — LOW (ref 1–3.3)
LYMPHOCYTES # BLD AUTO: 0.54 K/UL — LOW (ref 1–3.3)
LYMPHOCYTES # BLD AUTO: 3.6 % — LOW (ref 13–44)
LYMPHOCYTES # BLD AUTO: 5.2 % — LOW (ref 13–44)
MAGNESIUM SERPL-MCNC: 1.7 MG/DL — SIGNIFICANT CHANGE UP (ref 1.6–2.6)
MANUAL SMEAR VERIFICATION: SIGNIFICANT CHANGE UP
MCHC RBC-ENTMCNC: 28.4 PG — SIGNIFICANT CHANGE UP (ref 27–34)
MCHC RBC-ENTMCNC: 29.3 PG — SIGNIFICANT CHANGE UP (ref 27–34)
MCHC RBC-ENTMCNC: 30.3 GM/DL — LOW (ref 32–36)
MCHC RBC-ENTMCNC: 31 GM/DL — LOW (ref 32–36)
MCV RBC AUTO: 93.8 FL — SIGNIFICANT CHANGE UP (ref 80–100)
MCV RBC AUTO: 94.5 FL — SIGNIFICANT CHANGE UP (ref 80–100)
MICROCYTES BLD QL: SLIGHT — SIGNIFICANT CHANGE UP
MONOCYTES # BLD AUTO: 0.37 K/UL — SIGNIFICANT CHANGE UP (ref 0–0.9)
MONOCYTES # BLD AUTO: 0.78 K/UL — SIGNIFICANT CHANGE UP (ref 0–0.9)
MONOCYTES NFR BLD AUTO: 3.5 % — SIGNIFICANT CHANGE UP (ref 2–14)
MONOCYTES NFR BLD AUTO: 6.5 % — SIGNIFICANT CHANGE UP (ref 2–14)
MRSA PCR RESULT.: NEGATIVE — SIGNIFICANT CHANGE UP
NEUTROPHILS # BLD AUTO: 10.74 K/UL — HIGH (ref 1.8–7.4)
NEUTROPHILS # BLD AUTO: 9.39 K/UL — HIGH (ref 1.8–7.4)
NEUTROPHILS NFR BLD AUTO: 88.7 % — HIGH (ref 43–77)
NEUTROPHILS NFR BLD AUTO: 88.9 % — HIGH (ref 43–77)
NEUTS BAND # BLD: 0.9 % — SIGNIFICANT CHANGE UP (ref 0–8)
NITRITE UR-MCNC: NEGATIVE — SIGNIFICANT CHANGE UP
NRBC # BLD: 0 /100 WBCS — SIGNIFICANT CHANGE UP (ref 0–0)
NRBC # BLD: 1 /100 — HIGH (ref 0–0)
NRBC # BLD: SIGNIFICANT CHANGE UP /100 WBCS (ref 0–0)
OSMOLALITY UR: 513 MOSM/KG — SIGNIFICANT CHANGE UP (ref 300–900)
PCO2 BLDV: 32 MMHG — LOW (ref 41–51)
PH BLDV: 7.42 — SIGNIFICANT CHANGE UP (ref 7.32–7.43)
PH UR: 6.5 — SIGNIFICANT CHANGE UP (ref 5–8)
PHOSPHATE SERPL-MCNC: 2.4 MG/DL — LOW (ref 2.5–4.5)
PLAT MORPH BLD: NORMAL — SIGNIFICANT CHANGE UP
PLATELET # BLD AUTO: 297 K/UL — SIGNIFICANT CHANGE UP (ref 150–400)
PLATELET # BLD AUTO: 342 K/UL — SIGNIFICANT CHANGE UP (ref 150–400)
PO2 BLDV: 78 MMHG — SIGNIFICANT CHANGE UP
POIKILOCYTOSIS BLD QL AUTO: SLIGHT — SIGNIFICANT CHANGE UP
POLYCHROMASIA BLD QL SMEAR: SLIGHT — SIGNIFICANT CHANGE UP
POTASSIUM BLDV-SCNC: 3.2 MMOL/L — LOW (ref 3.5–4.9)
POTASSIUM SERPL-MCNC: 3 MMOL/L — LOW (ref 3.5–5.3)
POTASSIUM SERPL-MCNC: 3.5 MMOL/L — SIGNIFICANT CHANGE UP (ref 3.5–5.3)
POTASSIUM SERPL-MCNC: 3.6 MMOL/L — SIGNIFICANT CHANGE UP (ref 3.5–5.3)
POTASSIUM SERPL-SCNC: 3 MMOL/L — LOW (ref 3.5–5.3)
POTASSIUM SERPL-SCNC: 3.5 MMOL/L — SIGNIFICANT CHANGE UP (ref 3.5–5.3)
POTASSIUM SERPL-SCNC: 3.6 MMOL/L — SIGNIFICANT CHANGE UP (ref 3.5–5.3)
PROT SERPL-MCNC: 5.5 G/DL — LOW (ref 6–8.3)
PROT SERPL-MCNC: 6.3 G/DL — SIGNIFICANT CHANGE UP (ref 6–8.3)
PROT UR-MCNC: 30 MG/DL
PROTHROM AB SERPL-ACNC: 14.2 SEC — HIGH (ref 10.6–13.6)
PROTHROM AB SERPL-ACNC: 14.3 SEC — HIGH (ref 10.6–13.6)
RBC # BLD: 2.43 M/UL — LOW (ref 3.8–5.2)
RBC # BLD: 2.9 M/UL — LOW (ref 3.8–5.2)
RBC # FLD: 16.1 % — HIGH (ref 10.3–14.5)
RBC # FLD: 16.3 % — HIGH (ref 10.3–14.5)
RBC BLD AUTO: ABNORMAL
RBC CASTS # UR COMP ASSIST: < 5 /HPF — SIGNIFICANT CHANGE UP
RH IG SCN BLD-IMP: POSITIVE — SIGNIFICANT CHANGE UP
S AUREUS DNA NOSE QL NAA+PROBE: POSITIVE
SAO2 % BLDV: 95 % — SIGNIFICANT CHANGE UP
SARS-COV-2 RNA SPEC QL NAA+PROBE: SIGNIFICANT CHANGE UP
SODIUM SERPL-SCNC: 132 MMOL/L — LOW (ref 135–145)
SODIUM SERPL-SCNC: 135 MMOL/L — SIGNIFICANT CHANGE UP (ref 135–145)
SODIUM SERPL-SCNC: 140 MMOL/L — SIGNIFICANT CHANGE UP (ref 135–145)
SODIUM UR-SCNC: <20 MMOL/L — SIGNIFICANT CHANGE UP
SP GR SPEC: 1.02 — SIGNIFICANT CHANGE UP (ref 1–1.03)
TSH SERPL-MCNC: 1.48 UIU/ML — SIGNIFICANT CHANGE UP (ref 0.35–4.94)
UROBILINOGEN FLD QL: 0.2 E.U./DL — SIGNIFICANT CHANGE UP
UUN UR-MCNC: 1013 MG/DL — SIGNIFICANT CHANGE UP
WBC # BLD: 10.48 K/UL — SIGNIFICANT CHANGE UP (ref 3.8–10.5)
WBC # BLD: 12.07 K/UL — HIGH (ref 3.8–10.5)
WBC # FLD AUTO: 10.48 K/UL — SIGNIFICANT CHANGE UP (ref 3.8–10.5)
WBC # FLD AUTO: 12.07 K/UL — HIGH (ref 3.8–10.5)
WBC UR QL: < 5 /HPF — SIGNIFICANT CHANGE UP

## 2021-01-31 PROCEDURE — 70450 CT HEAD/BRAIN W/O DYE: CPT | Mod: 26,MA

## 2021-01-31 PROCEDURE — 71045 X-RAY EXAM CHEST 1 VIEW: CPT | Mod: 26

## 2021-01-31 PROCEDURE — 99291 CRITICAL CARE FIRST HOUR: CPT

## 2021-01-31 PROCEDURE — 99223 1ST HOSP IP/OBS HIGH 75: CPT | Mod: GC

## 2021-01-31 RX ORDER — DEXTROSE 50 % IN WATER 50 %
12.5 SYRINGE (ML) INTRAVENOUS ONCE
Refills: 0 | Status: DISCONTINUED | OUTPATIENT
Start: 2021-01-31 | End: 2021-02-05

## 2021-01-31 RX ORDER — SODIUM CHLORIDE 9 MG/ML
1000 INJECTION INTRAMUSCULAR; INTRAVENOUS; SUBCUTANEOUS ONCE
Refills: 0 | Status: DISCONTINUED | OUTPATIENT
Start: 2021-01-31 | End: 2021-01-31

## 2021-01-31 RX ORDER — PIPERACILLIN AND TAZOBACTAM 4; .5 G/20ML; G/20ML
3.38 INJECTION, POWDER, LYOPHILIZED, FOR SOLUTION INTRAVENOUS EVERY 8 HOURS
Refills: 0 | Status: DISCONTINUED | OUTPATIENT
Start: 2021-01-31 | End: 2021-02-01

## 2021-01-31 RX ORDER — PIPERACILLIN AND TAZOBACTAM 4; .5 G/20ML; G/20ML
3.38 INJECTION, POWDER, LYOPHILIZED, FOR SOLUTION INTRAVENOUS ONCE
Refills: 0 | Status: COMPLETED | OUTPATIENT
Start: 2021-01-31 | End: 2021-01-31

## 2021-01-31 RX ORDER — ACETAMINOPHEN 500 MG
650 TABLET ORAL ONCE
Refills: 0 | Status: DISCONTINUED | OUTPATIENT
Start: 2021-01-31 | End: 2021-01-31

## 2021-01-31 RX ORDER — SODIUM CHLORIDE 9 MG/ML
500 INJECTION INTRAMUSCULAR; INTRAVENOUS; SUBCUTANEOUS ONCE
Refills: 0 | Status: DISCONTINUED | OUTPATIENT
Start: 2021-01-31 | End: 2021-01-31

## 2021-01-31 RX ORDER — LACOSAMIDE 50 MG/1
50 TABLET ORAL
Refills: 0 | Status: DISCONTINUED | OUTPATIENT
Start: 2021-01-31 | End: 2021-02-01

## 2021-01-31 RX ORDER — SODIUM CHLORIDE 9 MG/ML
1000 INJECTION, SOLUTION INTRAVENOUS
Refills: 0 | Status: DISCONTINUED | OUTPATIENT
Start: 2021-01-31 | End: 2021-02-05

## 2021-01-31 RX ORDER — VANCOMYCIN HCL 1 G
1000 VIAL (EA) INTRAVENOUS ONCE
Refills: 0 | Status: COMPLETED | OUTPATIENT
Start: 2021-01-31 | End: 2021-01-31

## 2021-01-31 RX ORDER — DEXTROSE 50 % IN WATER 50 %
15 SYRINGE (ML) INTRAVENOUS ONCE
Refills: 0 | Status: DISCONTINUED | OUTPATIENT
Start: 2021-01-31 | End: 2021-02-05

## 2021-01-31 RX ORDER — ENOXAPARIN SODIUM 100 MG/ML
40 INJECTION SUBCUTANEOUS EVERY 24 HOURS
Refills: 0 | Status: DISCONTINUED | OUTPATIENT
Start: 2021-01-31 | End: 2021-01-31

## 2021-01-31 RX ORDER — FERROUS SULFATE 325(65) MG
325 TABLET ORAL DAILY
Refills: 0 | Status: DISCONTINUED | OUTPATIENT
Start: 2021-01-31 | End: 2021-02-05

## 2021-01-31 RX ORDER — ENOXAPARIN SODIUM 100 MG/ML
30 INJECTION SUBCUTANEOUS EVERY 24 HOURS
Refills: 0 | Status: DISCONTINUED | OUTPATIENT
Start: 2021-01-31 | End: 2021-02-01

## 2021-01-31 RX ORDER — CHLORHEXIDINE GLUCONATE 213 G/1000ML
15 SOLUTION TOPICAL
Refills: 0 | Status: DISCONTINUED | OUTPATIENT
Start: 2021-01-31 | End: 2021-02-05

## 2021-01-31 RX ORDER — IPRATROPIUM/ALBUTEROL SULFATE 18-103MCG
3 AEROSOL WITH ADAPTER (GRAM) INHALATION ONCE
Refills: 0 | Status: COMPLETED | OUTPATIENT
Start: 2021-01-31 | End: 2021-01-31

## 2021-01-31 RX ORDER — ATORVASTATIN CALCIUM 80 MG/1
40 TABLET, FILM COATED ORAL AT BEDTIME
Refills: 0 | Status: DISCONTINUED | OUTPATIENT
Start: 2021-01-31 | End: 2021-02-05

## 2021-01-31 RX ORDER — GLUCAGON INJECTION, SOLUTION 0.5 MG/.1ML
1 INJECTION, SOLUTION SUBCUTANEOUS ONCE
Refills: 0 | Status: DISCONTINUED | OUTPATIENT
Start: 2021-01-31 | End: 2021-02-05

## 2021-01-31 RX ORDER — SODIUM CHLORIDE 9 MG/ML
1000 INJECTION INTRAMUSCULAR; INTRAVENOUS; SUBCUTANEOUS ONCE
Refills: 0 | Status: COMPLETED | OUTPATIENT
Start: 2021-01-31 | End: 2021-01-31

## 2021-01-31 RX ORDER — ACETAMINOPHEN 500 MG
650 TABLET ORAL EVERY 6 HOURS
Refills: 0 | Status: DISCONTINUED | OUTPATIENT
Start: 2021-01-31 | End: 2021-02-05

## 2021-01-31 RX ORDER — DEXTROSE 50 % IN WATER 50 %
25 SYRINGE (ML) INTRAVENOUS ONCE
Refills: 0 | Status: DISCONTINUED | OUTPATIENT
Start: 2021-01-31 | End: 2021-02-05

## 2021-01-31 RX ORDER — ACETYLCYSTEINE 200 MG/ML
4 VIAL (ML) MISCELLANEOUS THREE TIMES A DAY
Refills: 0 | Status: DISCONTINUED | OUTPATIENT
Start: 2021-01-31 | End: 2021-02-05

## 2021-01-31 RX ORDER — PANTOPRAZOLE SODIUM 20 MG/1
40 TABLET, DELAYED RELEASE ORAL DAILY
Refills: 0 | Status: DISCONTINUED | OUTPATIENT
Start: 2021-01-31 | End: 2021-02-05

## 2021-01-31 RX ORDER — IPRATROPIUM/ALBUTEROL SULFATE 18-103MCG
3 AEROSOL WITH ADAPTER (GRAM) INHALATION EVERY 4 HOURS
Refills: 0 | Status: DISCONTINUED | OUTPATIENT
Start: 2021-01-31 | End: 2021-02-05

## 2021-01-31 RX ORDER — ASPIRIN/CALCIUM CARB/MAGNESIUM 324 MG
81 TABLET ORAL DAILY
Refills: 0 | Status: DISCONTINUED | OUTPATIENT
Start: 2021-01-31 | End: 2021-02-02

## 2021-01-31 RX ORDER — SODIUM CHLORIDE 9 MG/ML
2 INJECTION INTRAMUSCULAR; INTRAVENOUS; SUBCUTANEOUS EVERY 8 HOURS
Refills: 0 | Status: DISCONTINUED | OUTPATIENT
Start: 2021-01-31 | End: 2021-02-03

## 2021-01-31 RX ORDER — INSULIN GLARGINE 100 [IU]/ML
5 INJECTION, SOLUTION SUBCUTANEOUS EVERY MORNING
Refills: 0 | Status: DISCONTINUED | OUTPATIENT
Start: 2021-01-31 | End: 2021-02-05

## 2021-01-31 RX ORDER — MONTELUKAST 4 MG/1
10 TABLET, CHEWABLE ORAL DAILY
Refills: 0 | Status: DISCONTINUED | OUTPATIENT
Start: 2021-01-31 | End: 2021-02-05

## 2021-01-31 RX ORDER — SENNA PLUS 8.6 MG/1
2 TABLET ORAL AT BEDTIME
Refills: 0 | Status: DISCONTINUED | OUTPATIENT
Start: 2021-01-31 | End: 2021-02-05

## 2021-01-31 RX ORDER — POTASSIUM CHLORIDE 20 MEQ
40 PACKET (EA) ORAL
Refills: 0 | Status: COMPLETED | OUTPATIENT
Start: 2021-01-31 | End: 2021-01-31

## 2021-01-31 RX ORDER — PREGABALIN 225 MG/1
1000 CAPSULE ORAL DAILY
Refills: 0 | Status: DISCONTINUED | OUTPATIENT
Start: 2021-01-31 | End: 2021-02-05

## 2021-01-31 RX ORDER — INSULIN LISPRO 100/ML
VIAL (ML) SUBCUTANEOUS
Refills: 0 | Status: DISCONTINUED | OUTPATIENT
Start: 2021-01-31 | End: 2021-02-05

## 2021-01-31 RX ORDER — VANCOMYCIN HCL 1 G
750 VIAL (EA) INTRAVENOUS ONCE
Refills: 0 | Status: DISCONTINUED | OUTPATIENT
Start: 2021-01-31 | End: 2021-01-31

## 2021-01-31 RX ORDER — BUDESONIDE, MICRONIZED 100 %
0.5 POWDER (GRAM) MISCELLANEOUS EVERY 12 HOURS
Refills: 0 | Status: DISCONTINUED | OUTPATIENT
Start: 2021-01-31 | End: 2021-02-05

## 2021-01-31 RX ORDER — ACETAMINOPHEN 500 MG
650 TABLET ORAL ONCE
Refills: 0 | Status: COMPLETED | OUTPATIENT
Start: 2021-01-31 | End: 2021-01-31

## 2021-01-31 RX ADMIN — Medication 2: at 22:41

## 2021-01-31 RX ADMIN — Medication 40 MILLIEQUIVALENT(S): at 19:07

## 2021-01-31 RX ADMIN — PIPERACILLIN AND TAZOBACTAM 25 GRAM(S): 4; .5 INJECTION, POWDER, LYOPHILIZED, FOR SOLUTION INTRAVENOUS at 19:07

## 2021-01-31 RX ADMIN — Medication 3 MILLILITER(S): at 16:16

## 2021-01-31 RX ADMIN — SODIUM CHLORIDE 2 GRAM(S): 9 INJECTION INTRAMUSCULAR; INTRAVENOUS; SUBCUTANEOUS at 15:16

## 2021-01-31 RX ADMIN — ATORVASTATIN CALCIUM 40 MILLIGRAM(S): 80 TABLET, FILM COATED ORAL at 21:18

## 2021-01-31 RX ADMIN — Medication 650 MILLIGRAM(S): at 05:00

## 2021-01-31 RX ADMIN — SENNA PLUS 2 TABLET(S): 8.6 TABLET ORAL at 21:18

## 2021-01-31 RX ADMIN — CHLORHEXIDINE GLUCONATE 15 MILLILITER(S): 213 SOLUTION TOPICAL at 17:17

## 2021-01-31 RX ADMIN — ENOXAPARIN SODIUM 30 MILLIGRAM(S): 100 INJECTION SUBCUTANEOUS at 13:14

## 2021-01-31 RX ADMIN — SODIUM CHLORIDE 1000 MILLILITER(S): 9 INJECTION INTRAMUSCULAR; INTRAVENOUS; SUBCUTANEOUS at 21:30

## 2021-01-31 RX ADMIN — LACOSAMIDE 50 MILLIGRAM(S): 50 TABLET ORAL at 17:59

## 2021-01-31 RX ADMIN — Medication 3 MILLILITER(S): at 10:34

## 2021-01-31 RX ADMIN — Medication 3 MILLILITER(S): at 21:18

## 2021-01-31 RX ADMIN — PREGABALIN 1000 MICROGRAM(S): 225 CAPSULE ORAL at 15:16

## 2021-01-31 RX ADMIN — Medication 0.5 MILLIGRAM(S): at 23:16

## 2021-01-31 RX ADMIN — PIPERACILLIN AND TAZOBACTAM 25 GRAM(S): 4; .5 INJECTION, POWDER, LYOPHILIZED, FOR SOLUTION INTRAVENOUS at 13:11

## 2021-01-31 RX ADMIN — Medication 650 MILLIGRAM(S): at 19:17

## 2021-01-31 RX ADMIN — Medication 325 MILLIGRAM(S): at 13:09

## 2021-01-31 RX ADMIN — SODIUM CHLORIDE 2 GRAM(S): 9 INJECTION INTRAMUSCULAR; INTRAVENOUS; SUBCUTANEOUS at 21:17

## 2021-01-31 RX ADMIN — Medication 1000 MILLIGRAM(S): at 07:04

## 2021-01-31 RX ADMIN — PIPERACILLIN AND TAZOBACTAM 200 GRAM(S): 4; .5 INJECTION, POWDER, LYOPHILIZED, FOR SOLUTION INTRAVENOUS at 05:39

## 2021-01-31 RX ADMIN — PIPERACILLIN AND TAZOBACTAM 3.38 GRAM(S): 4; .5 INJECTION, POWDER, LYOPHILIZED, FOR SOLUTION INTRAVENOUS at 06:13

## 2021-01-31 RX ADMIN — Medication 250 MILLIGRAM(S): at 05:39

## 2021-01-31 RX ADMIN — SODIUM CHLORIDE 1000 MILLILITER(S): 9 INJECTION INTRAMUSCULAR; INTRAVENOUS; SUBCUTANEOUS at 12:53

## 2021-01-31 RX ADMIN — Medication 4: at 17:17

## 2021-01-31 RX ADMIN — Medication 3 MILLILITER(S): at 13:09

## 2021-01-31 RX ADMIN — Medication 81 MILLIGRAM(S): at 13:09

## 2021-01-31 RX ADMIN — SODIUM CHLORIDE 1000 MILLILITER(S): 9 INJECTION INTRAMUSCULAR; INTRAVENOUS; SUBCUTANEOUS at 05:39

## 2021-01-31 RX ADMIN — Medication 4 MILLILITER(S): at 13:10

## 2021-01-31 RX ADMIN — MONTELUKAST 10 MILLIGRAM(S): 4 TABLET, CHEWABLE ORAL at 13:09

## 2021-01-31 RX ADMIN — Medication 2: at 13:22

## 2021-01-31 RX ADMIN — PANTOPRAZOLE SODIUM 40 MILLIGRAM(S): 20 TABLET, DELAYED RELEASE ORAL at 13:14

## 2021-01-31 RX ADMIN — Medication 40 MILLIEQUIVALENT(S): at 21:18

## 2021-01-31 NOTE — H&P ADULT - PROBLEM SELECTOR PLAN 8
- pt with known hx COPD  - magda q6h PRN - pt with known hx HLD, on atorvastatin 40mg daily  - c/w home med

## 2021-01-31 NOTE — H&P ADULT - PROBLEM SELECTOR PLAN 9
- F: s/p 3L NS bolus, no further fluids  - E: replete K<4, Mg<2  - N: NPO, consider tube feeds if secretions improve  - D: lovenox 40mg q24h  - G: protonix 40mg daily    Code: full  Dispo: 7L - pt with known hx COPD  - magda q6h PRN

## 2021-01-31 NOTE — ED PROVIDER NOTE - CLINICAL SUMMARY MEDICAL DECISION MAKING FREE TEXT BOX
here w/ worsening vitals despite IV levaqiun and IV fluids in the NH today. given another 1L NS (so 3 total) with good improvement in BP. Pt sat 94% on non rebreather. vbg wnl, no indication for intubation at this time. GOC discussed w/ family, at this time full code, but they do wish to bring her home eventually and make her more comfortable. cxr concerning for new L infiltrate ?aspiration. s/p HCAP abx. plan for icu consult

## 2021-01-31 NOTE — H&P ADULT - NSHPLABSRESULTS_GEN_ALL_CORE
LABS:                        8.5    12.07 )-----------( 342      ( 2021 05:11 )             27.4         132<L>  |  98  |  51<H>  ----------------------------<  247<H>  3.5   |  19<L>  |  1.52<H>    Ca    8.2<L>      2021 05:11    TPro  6.3  /  Alb  2.6<L>  /  TBili  0.3  /  DBili  x   /  AST  19  /  ALT  20  /  AlkPhos  101      PT/INR - ( 2021 05:11 )   PT: 14.2 sec;   INR: 1.19          PTT - ( 2021 05:11 )  PTT:26.1 sec  Urinalysis Basic - ( 2021 06:17 )    Color: Yellow / Appearance: SL Cloudy / S.020 / pH: x  Gluc: x / Ketone: NEGATIVE  / Bili: Negative / Urobili: 0.2 E.U./dL   Blood: x / Protein: 30 mg/dL / Nitrite: NEGATIVE   Leuk Esterase: Trace / RBC: < 5 /HPF / WBC < 5 /HPF   Sq Epi: x / Non Sq Epi: 5-10 /HPF / Bacteria: Present /HPF      CAPILLARY BLOOD GLUCOSE      POCT Blood Glucose.: 252 mg/dL (2021 05:53)      RADIOLOGY & ADDITIONAL TESTS: Reviewed.

## 2021-01-31 NOTE — ED ADULT NURSE NOTE - OBJECTIVE STATEMENT
Received via stretcher BIBEMS from [facility] with chief complaints shortness of breath and change in mental status (unknown baseline); per EMS sat was low at 70's-80's in NH, and less responsive than she was as described by NOD at facility. Currently patient only responds to painful stimuli.  BL upper and lower extremity contracted, +PEG tube-- abdomen distended, L buttock stage 2. All needs attended. Incontinent care done. Moisture barrier cream applied. Primafit in place. Turned and repositioned q 2 and PRN. Placed on continuos cardiac monitoring. EKG done. Hourly rounding in progress. Fall risk precautions maintained.

## 2021-01-31 NOTE — H&P ADULT - PROBLEM SELECTOR PLAN 2
- on arrival, , RR 30, WBC 12.07, lactate 2.3  - source likely aspiration pneumonia  - s/p vanc/zosyn, c/w zosyn as above  - UA neg, BCx sent  - send sputum Cx  - s/p 3L NS bolus

## 2021-01-31 NOTE — ED PROVIDER NOTE - PROGRESS NOTE DETAILS
had GOC conversation w/ pts son and primary HCP. they are planning to eventually take patient home. However they are still hopeful that she will make a full recovery and walk again. He did not want to further discuss GOC stating "we are not giving up on her yet". He is aware that pts mental status is only withdrawal to pain. Gregg: signed out to me pending ICU consultation.  Pt with sepsis secondary to aspiration pnuemonia.  Baseline mental status, received hydration, iv antibiotics, pending ICU dispo alfreda: pt received at sign out from dr hinton as pending icu consult - pt w/aspiration pna, poor neuro status at baseline, on non rebreather

## 2021-01-31 NOTE — H&P ADULT - PROBLEM SELECTOR PLAN 7
- pt with known hx HLD, on atorvastatin 40mg daily  - c/w home med - on arrival, Hb 8.5 with MCV 94.5  - per chart review, baseline Hb 8-9  - no active s/s bleeding  - keep active T&S, transfuse Hb<7

## 2021-01-31 NOTE — PATIENT PROFILE ADULT - FUNCTIONAL SCREEN CURRENT LEVEL: SWALLOWING (IF SCORE 2 OR MORE FOR ANY ITEM, CONSULT REHAB SERVICES), MLM)
2 = difficulty swallowing liquids/foods Pt on glucerna 1.2 TF at NH/2 = difficulty swallowing liquids/foods

## 2021-01-31 NOTE — ED PROVIDER NOTE - CRITICAL CARE PROVIDED
direct patient care (not related to procedure)/additional history taking/interpretation of diagnostic studies/documentation direct patient care (not related to procedure)/additional history taking/interpretation of diagnostic studies/documentation/consultation with other physicians/conducted a detailed discussion of DNR status/consult w/ pt's family directly relating to pts condition/telephone consultation with the patient's family

## 2021-01-31 NOTE — H&P ADULT - PROBLEM SELECTOR PLAN 5
- pt with known hx HTN  - hypotensive on EMS arrival, currently normotensive  - continue to monitor - unclear baseline, though appears AOx0-1  - currently AOx0, non-participatory with exam, withdraws to pain but does not open eyes to verbal stimulus  - continue to monitor

## 2021-01-31 NOTE — ED PROVIDER NOTE - PHYSICAL EXAMINATION
CONSTITUTIONAL: frail pale chronically ill woman in distress  HEAD: Normocephalic; atraumatic.   EYES:  conjunctiva and sclera clear  ENT: normal nose; no rhinorrhea; dry MM and lips  NECK: Supple; non-tender;   CARDIOVASCULAR: Normal S1, S2; no murmurs, rubs, or gallops. Regular rate and rhythm.   RESPIRATORY: increased rate and effort, diffuse rales, audible and auscultated  GI: Soft; non-distended; non-tender; no palpable organomegaly.   EXT: No cyanosis or edema; N/V intact  SKIN: Normal for age and race; warm; dry; good turgor; no apparent lesions or rash.   NEURO: withdraws to pain, obtunded

## 2021-01-31 NOTE — H&P ADULT - PROBLEM SELECTOR PLAN 1
- pt p/w hypoxia to 60% per EMS, improved to 100% on 10L NRB  - pt with worsening mental status though unclear baseline, not tolerating secretions and 1x episode vomiting with c/f aspiration  - CXR with RLL infiltrate   - s/p 2x aggressive suctioning with copious thick secretions, now comfortable on 4L NC  - s/p vanc/zosyn in ED  - c/w zosyn 3.375g q8h  - send sputum Cx  - duonebs q6h   - oral care and frequent suctioning  - if worsening hypoxia, attempt suctioning and can advance to NRB/BIPAP  - currently full code, multiple extensive GOC discussions with son

## 2021-01-31 NOTE — PATIENT PROFILE ADULT - STATED REASON FOR ADMISSION
SOB hypoxia From NH with hypoxia/respiratory distress and vomiting. Pt was on levaquin and IVF but unable to improve.

## 2021-01-31 NOTE — PATIENT PROFILE ADULT - FALL HARM RISK
coagulation(Bleeding disorder R/T clinical cond/anti-coags)/surgery coagulation(Bleeding disorder R/T clinical cond/anti-coags)/surgery/other

## 2021-01-31 NOTE — H&P ADULT - PROBLEM SELECTOR PLAN 4
- unclear baseline, though appears AOx0-1  - currently AOx0, non-participatory with exam, withdraws to pain but does not open eyes to verbal stimulus  - continue to monitor - on arrival with Cr 1.52, baseline 0.3-0.4  - s/p 3L NS bolus   - urine lytes c/w pre-renal disease (FeNa 0.2%)  - recheck BMP and monitor UO

## 2021-01-31 NOTE — CONSULT NOTE ADULT - ASSESSMENT
Mrs. Rodriguez is a 76 with functional quadriplegia secondary to SAH, who presents with aspiration pneumonia, sepsis secondary to same.     # Respiratory failure with hypoxia, acute.    - pt p/w hypoxia to 60% per EMS, improved to 100% on 10L NRB  - pt with worsening mental status though unclear baseline, not tolerating secretions and 1x episode vomiting with c/f aspiration  - CXR with RLL infiltrate   - s/p 2x aggressive suctioning with copious thick secretions, now comfortable on 4L NC  - s/p vanc/zosyn in ED  - c/w zosyn 3.375g q8h  - send sputum Cx, MRSA swab  - duonebs q6h   - oral care and frequent suctioning  - if worsening hypoxia, attempt suctioning and can advance to NRB/BIPAP  - currently full code, multiple extensive GOC discussions with son.     Discussed with Dr. Whitlock who agrees with plan of care.

## 2021-01-31 NOTE — H&P ADULT - PROBLEM SELECTOR PLAN 6
- on arrival, Hb 8.5 with MCV 94.5  - per chart review, baseline Hb 8-9  - no active s/s bleeding  - keep active T&S, transfuse Hb<7 - pt with known hx HTN  - hypotensive on EMS arrival, currently normotensive  - continue to monitor

## 2021-01-31 NOTE — H&P ADULT - ASSESSMENT
Pt is a 75 yo F with PMH COPD, asthma, HTN, HLD, subarachnoid hemorrhage (12/2020) who p/f [facility] NH for hypoxia and SOB. Found to have RLL infiltrate on CXR. Admitted to  for further observation and management.

## 2021-01-31 NOTE — ED PROVIDER NOTE - OBJECTIVE STATEMENT
76 year old Female  with PMHx of COPD, asthma, HLD, HTN, BIBEMS to [facility] from home after HHA found patient down on the floor covered in non-bloody vomitus. CTH reveals diffuse subarachnoid hemorrhage mainly at basilar cisterns with IVH and obstructive hydrocephalus, CTA shows 3mm left pcomm aneurysm, now s/p bedside right frontal EVD placement 12/10, s/p cerebral angiogram for coil embolization of left PCOMM aneurysm 12/10, now   s/p cerebral angio for verapamil c/b device malfunction of Proglide, s/p right groin cutdown for removal of proglide catheter and femoral artery repair (12/17/2020), NIHSS2 HH3 MF4), s/p EVD removal 12/25,  s/p bedside EVD placement 12/29, s/p R VPS certas at 5 (12/20/2020) and s/p PE  here w/ EMS from [facility] for fever and hypoxia. As per EMS, pt hypoxic to 60s on RA, placed on non rebreather, 100%. Also hypotensive to 80s systolic, already 1L given at NH, and EMS gave another 1L.     [id number] - bora noel

## 2021-01-31 NOTE — PATIENT PROFILE ADULT - ABILITY TO HEAR (WITH HEARING AID OR HEARING APPLIANCE IF NORMALLY USED):
Mildly to Moderately Impaired: difficulty hearing in some environments or speaker may need to increase volume or speak distinctly Pt not following commands or speaking/Unable to assess hearing

## 2021-01-31 NOTE — H&P ADULT - NSHPPHYSICALEXAM_GEN_ALL_CORE
VITAL SIGNS:  T(C): 37.1 (01-31-21 @ 08:28), Max: 37.8 (01-31-21 @ 04:30)  T(F): 98.8 (01-31-21 @ 08:28), Max: 100.1 (01-31-21 @ 04:30)  HR: 104 (01-31-21 @ 11:33) (91 - 104)  BP: 147/67 (01-31-21 @ 11:33) (110/57 - 147/67)  BP(mean): 95 (01-31-21 @ 05:45) (89 - 95)  RR: 22 (01-31-21 @ 11:33) (22 - 30)  SpO2: 100% (01-31-21 @ 11:33) (92% - 100%)  Wt(kg): --    PHYSICAL EXAM:  Constitutional: elderly, frail chronically ill appearing  Head: NC/AT  Eyes: PERRL, does not open eyes to command or verbal stimulus, anicteric sclera  ENT: no nasal discharge; MMM  Neck: supple; no JVD  Respiratory: diffuse crackles most significant at RLL; significant copious thick secretions suctioned; on NC 4L without signs of respiratory distress  Cardiac: +S1/S2; RRR; no M/R/G  Gastrointestinal: soft, NT/ND; no rebound or guarding; +BSx4; PEG in place without drainage or overlying erythema  Extremities: WWP; no peripheral edema; BL UE contractures  Vascular: 2+ radial, DP/PT pulses B/L  Dermatologic: skin warm, dry and intact; no rashes, wounds, or scars  Neurologic: AOx0, non-participatory with exam, withdraws to pain, does not wake to verbal stimulus

## 2021-01-31 NOTE — ED ADULT TRIAGE NOTE - CHIEF COMPLAINT QUOTE
EMS notification; sent from NH for shortness of breath and change in mental status; per EMS sat was low at 70's-80's earlier today, and less responsive than she was

## 2021-01-31 NOTE — H&P ADULT - NSICDXPASTSURGICALHX_GEN_ALL_CORE_FT
PAST SURGICAL HISTORY:  S/P aneurysm repair 12/2019 subarachnoid hemorrhage, IVH, obstructive hydrocephalus s/p cerebral angiogram for coil embolization 3mm L post comm aneurysm with external ventricular drain (EVD) placement 12/10 and removal 12/25 and replacement 12/29

## 2021-01-31 NOTE — PATIENT PROFILE ADULT - NSPRONUTRITIONRISK_GEN_A_NUR
No indicators present Pressure injury stage 2 or greater Enteral/parenteral nutrition prior to admission/Pressure injury stage 2 or greater

## 2021-01-31 NOTE — H&P ADULT - PROBLEM SELECTOR PLAN 10
- F: s/p 3L NS bolus, no further fluids  - E: replete K<4, Mg<2  - N: NPO, consider tube feeds if secretions improve  - D: lovenox 40mg q24h  - G: protonix 40mg daily    Code: full  Dispo: 7L

## 2021-01-31 NOTE — ED ADULT NURSE REASSESSMENT NOTE - NS ED NURSE REASSESS COMMENT FT1
Soriano catheter inserted using sterile technique, draining by gravity, secured with StatLock. Second RN Dedote present to confirm sterility.

## 2021-01-31 NOTE — H&P ADULT - ATTENDING COMMENTS
History of SAH with baseline chronic encephalopathy with acute hypoxic respiratory failure with aspiration pneumonia with sepsis. Continue vancomycin and zosyn. Frequent suctioning. Hold off on tube feed. Poor prognosis. Palliative care consult on Monday. Admit to 7 LA.

## 2021-01-31 NOTE — H&P ADULT - HISTORY OF PRESENT ILLNESS
Pt is a 77 yo F with PMH COPD, asthma, HTN, HLD, subarachnoid hemorrhage (12/2020) who p/f [facility] NH for hypoxia and SOB. Recently admitted 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 Since discharge has been AOx0-1, nonparticipatory with exam, and lethargic. Extensive GOC discussions have been held with family with decision for full code. Noted by NH staff to be hypoxic, per EMS O2sat 70-80% on arrival with c/f aspiration events with thick secretions and ?episode of vomiting while lying down.     On arrival, T 100.1, , /64, RR 30 O2sat 100% NRB 10L/min.  Pt is a 77 yo F with PMH COPD, asthma, HTN, HLD, subarachnoid hemorrhage (12/2020) who p/f [facility] NH for hypoxia and SOB. Recently admitted [facility] 12/9-1/26 for subarachnoid hemorrhage with IVH and obstructive hydrocephalus s/p cerebral angiogram for coil embolization 3mm L post comm aneurysm with external ventricular drain (EVD) placement 12/10 and removal 12/25 and replacement 12/29 c/b UTI with enterobacter and e coli with zosyn ending 1/24; PEG placed 1/7. Since discharge has been AOx0-1, nonparticipatory with exam, and lethargic. Extensive GOC discussions have been held with family with decision for full code. Noted by NH staff to be hypoxic, per EMS O2sat 60% and hypotensive to SBP 80s on arrival with c/f aspiration events with thick secretions and ?episode of vomiting while lying down. Given 2L NS bolus en route to ER.    On arrival, T 100.1, , /64, RR 30 O2sat 100% NRB 10L/min. Labs with WBC 12.07, 88.9% neutrophils, Hb 8.5, MCV 94.5, Na 132, BUN 51, Cr 1.52, glu 247, lactate 2.3. VBG 7.42/32/20/78. UA neg, COVID neg, BCx sent. CXR with RLL infiltrate. CTH with min interval dec in ventrigulomegaly of lateral and third ventricles with unchanged R transfrontal  shunt, interval dec in trace intraventricular hemorrhage L occipital horn, no new or inc hemorrhage, and post-coil embolization of L post comm aneurysm. Soriano placed in ER. Pt given vanc/zosyn and 1L NS bolus. ICU consulted for hypoxia and respiratory distress. Admitted to  for further observation and management.

## 2021-01-31 NOTE — CONSULT NOTE ADULT - SUBJECTIVE AND OBJECTIVE BOX
CONSULT NOTE: CRITICAL CARE MEDICINE    HPI: 77 yo F with PMH COPD, asthma, HTN, HLD, subarachnoid hemorrhage (12/2020) who p/f [facility] NH for hypoxia and SOB. Recently admitted [facility] 12/9-1/26 for subarachnoid hemorrhage with IVH and obstructive hydrocephalus s/p cerebral angiogram for coil embolization 3mm L post comm aneurysm with external ventricular drain (EVD) placement 12/10 and removal 12/25 and replacement 12/29 c/b UTI with enterobacter and e coli with zosyn ending 1/24; PEG placed 1/7. Since discharge has been AOx0-1, nonparticipatory with exam, and lethargic. Extensive GOC discussions have been held with family with decision for full code. Noted by NH staff to be hypoxic, per EMS O2sat 60% and hypotensive to SBP 80s on arrival with c/f aspiration events with thick secretions and ?episode of vomiting while lying down. Given 2L NS bolus en route to ER.    On arrival, T 100.1, , /64, RR 30 O2sat 100% NRB 10L/min. Labs with WBC 12.07, 88.9% neutrophils, Hb 8.5, MCV 94.5, Na 132, BUN 51, Cr 1.52, glu 247, lactate 2.3. VBG 7.42/32/20/78. UA neg, COVID neg, BCx sent. CXR with RLL infiltrate. CTH with min interval dec in ventrigulomegaly of lateral and third ventricles with unchanged R transfrontal  shunt, interval dec in trace intraventricular hemorrhage L occipital horn, no new or inc hemorrhage, and post-coil embolization of L post comm aneurysm. Soriano placed in ER. Pt given vanc/zosyn and 1L NS bolus. ICU consulted for hypoxia and respiratory distress. Admitted to  for further observation and management.    PAST MEDICAL & SURGICAL HISTORY:  Hydrocephalus  Diabetes mellitus, type 2  GERD (gastroesophageal reflux disease)  Seizures  Subarachnoid hemorrhage, nontraumatic  Hyperlipidemia  Hypertension  COPD (chronic obstructive pulmonary disease)  Asthma  Gastrostomy tube in place  S/P aneurysm repair, 12/2019 subarachnoid hemorrhage, IVH, obstructive hydrocephalus s/p cerebral angiogram for coil embolization 3mm L post comm aneurysm with external ventricular drain (EVD) placement 12/10 and removal 12/25 and replacement 12/29    Social History:  Unable to obtain 2/2 mental status, from [facility] NH, baseline AOx0-1     FAMILY HISTORY:  Noncontributory    VITAL SIGNS:  T(C): 38.2 (01-31-21 @ 19:18), Max: 38.2 (01-31-21 @ 19:18)  T(F): 100.8 (01-31-21 @ 19:18), Max: 100.8 (01-31-21 @ 19:18)  HR: 108 (01-31-21 @ 20:26) (91 - 112)  BP: 126/59 (01-31-21 @ 20:26) (88/53 - 160/70)  BP(mean): 85 (01-31-21 @ 20:26) (68 - 101)  RR: 20 (01-31-21 @ 20:26) (20 - 30)  SpO2: 96% (01-31-21 @ 20:26) (92% - 100%)  Wt(kg): --    PHYSICAL EXAM:    Constitutional: Adult male, alert, not following commands, rouses to voice and physical stimuli, tachypneic but not in progressive respiratory distress on exam; on NRB   Respiratory: CTA B/L  Cardiac: RRR  Gastrointestinal: soft, NT/ND; PEG in place; small surgical scar in addition to PEG site, well-healed  Extremities: WWP  Dermatologic: skin warm, dry and intact; no rash  Neurologic: alert, moving extremities except RUE spontaneously; RUE with contracture

## 2021-01-31 NOTE — ED ADULT NURSE NOTE - NSIMPLEMENTINTERV_GEN_ALL_ED
Implemented All Fall with Harm Risk Interventions:  [place] to call system. Call bell, personal items and telephone within reach. Instruct patient to call for assistance. Room bathroom lighting operational. Non-slip footwear when patient is off stretcher. Physically safe environment: no spills, clutter or unnecessary equipment. Stretcher in lowest position, wheels locked, appropriate side rails in place. Provide visual cue, wrist band, yellow gown, etc. Monitor gait and stability. Monitor for mental status changes and reorient to person, place, and time. Review medications for side effects contributing to fall risk. Reinforce activity limits and safety measures with patient and family. Provide visual clues: red socks.

## 2021-01-31 NOTE — ED PROVIDER NOTE - DATE/TIME 1
Carlos Manuel again- asked patient to be patient- we will try and get her in if there is a cancellation-- when she calls please let her know I cannot move her appt up- all of the Dr's are busy 31-Jan-2021 06:53

## 2021-02-01 DIAGNOSIS — I60.9 NONTRAUMATIC SUBARACHNOID HEMORRHAGE, UNSPECIFIED: ICD-10-CM

## 2021-02-01 DIAGNOSIS — N39.0 URINARY TRACT INFECTION, SITE NOT SPECIFIED: ICD-10-CM

## 2021-02-01 DIAGNOSIS — F17.210 NICOTINE DEPENDENCE, CIGARETTES, UNCOMPLICATED: ICD-10-CM

## 2021-02-01 DIAGNOSIS — D72.829 ELEVATED WHITE BLOOD CELL COUNT, UNSPECIFIED: ICD-10-CM

## 2021-02-01 DIAGNOSIS — B96.89 OTHER SPECIFIED BACTERIAL AGENTS AS THE CAUSE OF DISEASES CLASSIFIED ELSEWHERE: ICD-10-CM

## 2021-02-01 DIAGNOSIS — I67.1 CEREBRAL ANEURYSM, NONRUPTURED: ICD-10-CM

## 2021-02-01 DIAGNOSIS — T82.518A BREAKDOWN (MECHANICAL) OF OTHER CARDIAC AND VASCULAR DEVICES AND IMPLANTS, INITIAL ENCOUNTER: ICD-10-CM
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
## 2021-02-01 DIAGNOSIS — J45.30 MILD PERSISTENT ASTHMA, UNCOMPLICATED: ICD-10-CM

## 2021-02-01 DIAGNOSIS — R29.702 NIHSS SCORE 2: ICD-10-CM

## 2021-02-01 DIAGNOSIS — A41.9 SEPSIS, UNSPECIFIED ORGANISM: ICD-10-CM

## 2021-02-01 DIAGNOSIS — Z51.5 ENCOUNTER FOR PALLIATIVE CARE: ICD-10-CM
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
## 2021-02-01 DIAGNOSIS — E11.9 TYPE 2 DIABETES MELLITUS WITHOUT COMPLICATIONS: ICD-10-CM

## 2021-02-01 DIAGNOSIS — I60.32 NONTRAUMATIC SUBARACHNOID HEMORRHAGE FROM LEFT POSTERIOR COMMUNICATING ARTERY: ICD-10-CM

## 2021-02-01 DIAGNOSIS — E61.1 IRON DEFICIENCY: ICD-10-CM

## 2021-02-01 DIAGNOSIS — B96.20 UNSPECIFIED ESCHERICHIA COLI [E. COLI] AS THE CAUSE OF DISEASES CLASSIFIED ELSEWHERE: ICD-10-CM

## 2021-02-01 DIAGNOSIS — I65.23 OCCLUSION AND STENOSIS OF BILATERAL CAROTID ARTERIES: ICD-10-CM

## 2021-02-01 DIAGNOSIS — I61.5 NONTRAUMATIC INTRACEREBRAL HEMORRHAGE, INTRAVENTRICULAR: ICD-10-CM

## 2021-02-01 DIAGNOSIS — I11.0 HYPERTENSIVE HEART DISEASE WITH HEART FAILURE: ICD-10-CM

## 2021-02-01 DIAGNOSIS — T85.695A OTHER MECHANICAL COMPLICATION OF OTHER NERVOUS SYSTEM DEVICE, IMPLANT OR GRAFT, INITIAL ENCOUNTER: ICD-10-CM

## 2021-02-01 DIAGNOSIS — I50.20 UNSPECIFIED SYSTOLIC (CONGESTIVE) HEART FAILURE: ICD-10-CM

## 2021-02-01 DIAGNOSIS — I63.81 OTHER CEREBRAL INFARCTION DUE TO OCCLUSION OR STENOSIS OF SMALL ARTERY: ICD-10-CM

## 2021-02-01 DIAGNOSIS — Z79.52 LONG TERM (CURRENT) USE OF SYSTEMIC STEROIDS: ICD-10-CM

## 2021-02-01 DIAGNOSIS — G93.6 CEREBRAL EDEMA: ICD-10-CM

## 2021-02-01 DIAGNOSIS — Z71.89 OTHER SPECIFIED COUNSELING: ICD-10-CM

## 2021-02-01 DIAGNOSIS — G93.5 COMPRESSION OF BRAIN: ICD-10-CM

## 2021-02-01 DIAGNOSIS — J43.2 CENTRILOBULAR EMPHYSEMA: ICD-10-CM

## 2021-02-01 DIAGNOSIS — G91.1 OBSTRUCTIVE HYDROCEPHALUS: ICD-10-CM
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
## 2021-02-01 DIAGNOSIS — I67.848 OTHER CEREBROVASCULAR VASOSPASM AND VASOCONSTRICTION: ICD-10-CM

## 2021-02-01 DIAGNOSIS — E78.5 HYPERLIPIDEMIA, UNSPECIFIED: ICD-10-CM

## 2021-02-01 DIAGNOSIS — I25.10 ATHEROSCLEROTIC HEART DISEASE OF NATIVE CORONARY ARTERY WITHOUT ANGINA PECTORIS: ICD-10-CM

## 2021-02-01 DIAGNOSIS — D62 ACUTE POSTHEMORRHAGIC ANEMIA: ICD-10-CM

## 2021-02-01 LAB
-  COAGULASE NEGATIVE STAPHYLOCOCCUS: SIGNIFICANT CHANGE UP
A1C WITH ESTIMATED AVERAGE GLUCOSE RESULT: 5.7 % — HIGH (ref 4–5.6)
ALBUMIN SERPL ELPH-MCNC: 2.2 G/DL — LOW (ref 3.3–5)
ALP SERPL-CCNC: 100 U/L — SIGNIFICANT CHANGE UP (ref 40–120)
ALT FLD-CCNC: 19 U/L — SIGNIFICANT CHANGE UP (ref 10–45)
ANION GAP SERPL CALC-SCNC: 13 MMOL/L — SIGNIFICANT CHANGE UP (ref 5–17)
AST SERPL-CCNC: 15 U/L — SIGNIFICANT CHANGE UP (ref 10–40)
BASOPHILS # BLD AUTO: 0 K/UL — SIGNIFICANT CHANGE UP (ref 0–0.2)
BASOPHILS NFR BLD AUTO: 0 % — SIGNIFICANT CHANGE UP (ref 0–2)
BILIRUB SERPL-MCNC: 0.4 MG/DL — SIGNIFICANT CHANGE UP (ref 0.2–1.2)
BUN SERPL-MCNC: 21 MG/DL — SIGNIFICANT CHANGE UP (ref 7–23)
BURR CELLS BLD QL SMEAR: PRESENT — SIGNIFICANT CHANGE UP
CALCIUM SERPL-MCNC: 8.1 MG/DL — LOW (ref 8.4–10.5)
CHLORIDE SERPL-SCNC: 111 MMOL/L — HIGH (ref 96–108)
CO2 SERPL-SCNC: 17 MMOL/L — LOW (ref 22–31)
CREAT SERPL-MCNC: 0.59 MG/DL — SIGNIFICANT CHANGE UP (ref 0.5–1.3)
EOSINOPHIL # BLD AUTO: 0.11 K/UL — SIGNIFICANT CHANGE UP (ref 0–0.5)
EOSINOPHIL NFR BLD AUTO: 0.9 % — SIGNIFICANT CHANGE UP (ref 0–6)
ESTIMATED AVERAGE GLUCOSE: 117 MG/DL — HIGH (ref 68–114)
FOLATE SERPL-MCNC: 5.3 NG/ML — SIGNIFICANT CHANGE UP
GLUCOSE BLDC GLUCOMTR-MCNC: 129 MG/DL — HIGH (ref 70–99)
GLUCOSE BLDC GLUCOMTR-MCNC: 134 MG/DL — HIGH (ref 70–99)
GLUCOSE BLDC GLUCOMTR-MCNC: 157 MG/DL — HIGH (ref 70–99)
GLUCOSE BLDC GLUCOMTR-MCNC: 189 MG/DL — HIGH (ref 70–99)
GLUCOSE SERPL-MCNC: 174 MG/DL — HIGH (ref 70–99)
GRAM STN FLD: SIGNIFICANT CHANGE UP
GRAM STN FLD: SIGNIFICANT CHANGE UP
HCT VFR BLD CALC: 28.8 % — LOW (ref 34.5–45)
HGB BLD-MCNC: 9 G/DL — LOW (ref 11.5–15.5)
LYMPHOCYTES # BLD AUTO: 0.43 K/UL — LOW (ref 1–3.3)
LYMPHOCYTES # BLD AUTO: 3.4 % — LOW (ref 13–44)
MAGNESIUM SERPL-MCNC: 1.9 MG/DL — SIGNIFICANT CHANGE UP (ref 1.6–2.6)
MANUAL SMEAR VERIFICATION: SIGNIFICANT CHANGE UP
MCHC RBC-ENTMCNC: 29.4 PG — SIGNIFICANT CHANGE UP (ref 27–34)
MCHC RBC-ENTMCNC: 31.3 GM/DL — LOW (ref 32–36)
MCV RBC AUTO: 94.1 FL — SIGNIFICANT CHANGE UP (ref 80–100)
METHOD TYPE: SIGNIFICANT CHANGE UP
MONOCYTES # BLD AUTO: 0.76 K/UL — SIGNIFICANT CHANGE UP (ref 0–0.9)
MONOCYTES NFR BLD AUTO: 6 % — SIGNIFICANT CHANGE UP (ref 2–14)
NEUTROPHILS # BLD AUTO: 11.31 K/UL — HIGH (ref 1.8–7.4)
NEUTROPHILS NFR BLD AUTO: 88.8 % — HIGH (ref 43–77)
NEUTS BAND # BLD: 0.9 % — SIGNIFICANT CHANGE UP (ref 0–8)
OVALOCYTES BLD QL SMEAR: SLIGHT — SIGNIFICANT CHANGE UP
PHOSPHATE SERPL-MCNC: 1.7 MG/DL — LOW (ref 2.5–4.5)
PLAT MORPH BLD: NORMAL — SIGNIFICANT CHANGE UP
PLATELET # BLD AUTO: 300 K/UL — SIGNIFICANT CHANGE UP (ref 150–400)
POIKILOCYTOSIS BLD QL AUTO: SLIGHT — SIGNIFICANT CHANGE UP
POTASSIUM SERPL-MCNC: 3.6 MMOL/L — SIGNIFICANT CHANGE UP (ref 3.5–5.3)
POTASSIUM SERPL-SCNC: 3.6 MMOL/L — SIGNIFICANT CHANGE UP (ref 3.5–5.3)
PROT SERPL-MCNC: 5.9 G/DL — LOW (ref 6–8.3)
RBC # BLD: 3.06 M/UL — LOW (ref 3.8–5.2)
RBC # FLD: 15.6 % — HIGH (ref 10.3–14.5)
RBC BLD AUTO: ABNORMAL
SODIUM SERPL-SCNC: 141 MMOL/L — SIGNIFICANT CHANGE UP (ref 135–145)
SPECIMEN SOURCE: SIGNIFICANT CHANGE UP
TSH SERPL-MCNC: 0.73 UIU/ML — SIGNIFICANT CHANGE UP (ref 0.35–4.94)
VIT B12 SERPL-MCNC: >2000 PG/ML — HIGH (ref 232–1245)
WBC # BLD: 12.61 K/UL — HIGH (ref 3.8–10.5)
WBC # FLD AUTO: 12.61 K/UL — HIGH (ref 3.8–10.5)

## 2021-02-01 PROCEDURE — 99233 SBSQ HOSP IP/OBS HIGH 50: CPT | Mod: GC

## 2021-02-01 PROCEDURE — 71045 X-RAY EXAM CHEST 1 VIEW: CPT | Mod: 26

## 2021-02-01 PROCEDURE — 99223 1ST HOSP IP/OBS HIGH 75: CPT

## 2021-02-01 PROCEDURE — 99497 ADVNCD CARE PLAN 30 MIN: CPT | Mod: 25

## 2021-02-01 RX ORDER — POTASSIUM PHOSPHATE, MONOBASIC POTASSIUM PHOSPHATE, DIBASIC 236; 224 MG/ML; MG/ML
30 INJECTION, SOLUTION INTRAVENOUS ONCE
Refills: 0 | Status: COMPLETED | OUTPATIENT
Start: 2021-02-01 | End: 2021-02-01

## 2021-02-01 RX ORDER — LACOSAMIDE 50 MG/1
150 TABLET ORAL
Refills: 0 | Status: DISCONTINUED | OUTPATIENT
Start: 2021-02-01 | End: 2021-02-05

## 2021-02-01 RX ORDER — POTASSIUM CHLORIDE 20 MEQ
20 PACKET (EA) ORAL ONCE
Refills: 0 | Status: COMPLETED | OUTPATIENT
Start: 2021-02-01 | End: 2021-02-01

## 2021-02-01 RX ORDER — ASCORBIC ACID 60 MG
500 TABLET,CHEWABLE ORAL DAILY
Refills: 0 | Status: DISCONTINUED | OUTPATIENT
Start: 2021-02-01 | End: 2021-02-05

## 2021-02-01 RX ORDER — NAFCILLIN 10 G/100ML
2 INJECTION, POWDER, FOR SOLUTION INTRAVENOUS EVERY 4 HOURS
Refills: 0 | Status: DISCONTINUED | OUTPATIENT
Start: 2021-02-01 | End: 2021-02-01

## 2021-02-01 RX ORDER — NAFCILLIN 10 G/100ML
INJECTION, POWDER, FOR SOLUTION INTRAVENOUS
Refills: 0 | Status: DISCONTINUED | OUTPATIENT
Start: 2021-02-01 | End: 2021-02-01

## 2021-02-01 RX ORDER — CEFTRIAXONE 500 MG/1
2000 INJECTION, POWDER, FOR SOLUTION INTRAMUSCULAR; INTRAVENOUS EVERY 24 HOURS
Refills: 0 | Status: DISCONTINUED | OUTPATIENT
Start: 2021-02-02 | End: 2021-02-02

## 2021-02-01 RX ORDER — POTASSIUM CHLORIDE 20 MEQ
40 PACKET (EA) ORAL
Refills: 0 | Status: DISCONTINUED | OUTPATIENT
Start: 2021-02-01 | End: 2021-02-01

## 2021-02-01 RX ORDER — CEFTRIAXONE 500 MG/1
1000 INJECTION, POWDER, FOR SOLUTION INTRAMUSCULAR; INTRAVENOUS EVERY 24 HOURS
Refills: 0 | Status: DISCONTINUED | OUTPATIENT
Start: 2021-02-01 | End: 2021-02-01

## 2021-02-01 RX ORDER — ENOXAPARIN SODIUM 100 MG/ML
40 INJECTION SUBCUTANEOUS EVERY 24 HOURS
Refills: 0 | Status: DISCONTINUED | OUTPATIENT
Start: 2021-02-01 | End: 2021-02-05

## 2021-02-01 RX ORDER — ZINC SULFATE TAB 220 MG (50 MG ZINC EQUIVALENT) 220 (50 ZN) MG
220 TAB ORAL DAILY
Refills: 0 | Status: DISCONTINUED | OUTPATIENT
Start: 2021-02-01 | End: 2021-02-05

## 2021-02-01 RX ORDER — NAFCILLIN 10 G/100ML
2 INJECTION, POWDER, FOR SOLUTION INTRAVENOUS ONCE
Refills: 0 | Status: COMPLETED | OUTPATIENT
Start: 2021-02-01 | End: 2021-02-01

## 2021-02-01 RX ADMIN — POTASSIUM PHOSPHATE, MONOBASIC POTASSIUM PHOSPHATE, DIBASIC 83.33 MILLIMOLE(S): 236; 224 INJECTION, SOLUTION INTRAVENOUS at 18:45

## 2021-02-01 RX ADMIN — SODIUM CHLORIDE 2 GRAM(S): 9 INJECTION INTRAMUSCULAR; INTRAVENOUS; SUBCUTANEOUS at 21:53

## 2021-02-01 RX ADMIN — Medication 3 MILLILITER(S): at 01:00

## 2021-02-01 RX ADMIN — NAFCILLIN 200 GRAM(S): 10 INJECTION, POWDER, FOR SOLUTION INTRAVENOUS at 13:18

## 2021-02-01 RX ADMIN — PANTOPRAZOLE SODIUM 40 MILLIGRAM(S): 20 TABLET, DELAYED RELEASE ORAL at 12:33

## 2021-02-01 RX ADMIN — SODIUM CHLORIDE 2 GRAM(S): 9 INJECTION INTRAMUSCULAR; INTRAVENOUS; SUBCUTANEOUS at 13:20

## 2021-02-01 RX ADMIN — Medication 3 MILLILITER(S): at 21:53

## 2021-02-01 RX ADMIN — LACOSAMIDE 50 MILLIGRAM(S): 50 TABLET ORAL at 05:03

## 2021-02-01 RX ADMIN — Medication 500 MILLIGRAM(S): at 18:02

## 2021-02-01 RX ADMIN — LACOSAMIDE 150 MILLIGRAM(S): 50 TABLET ORAL at 18:07

## 2021-02-01 RX ADMIN — SENNA PLUS 2 TABLET(S): 8.6 TABLET ORAL at 21:53

## 2021-02-01 RX ADMIN — Medication 325 MILLIGRAM(S): at 12:26

## 2021-02-01 RX ADMIN — PREGABALIN 1000 MICROGRAM(S): 225 CAPSULE ORAL at 12:26

## 2021-02-01 RX ADMIN — Medication 3 MILLILITER(S): at 04:41

## 2021-02-01 RX ADMIN — Medication 20 MILLIEQUIVALENT(S): at 18:02

## 2021-02-01 RX ADMIN — Medication 650 MILLIGRAM(S): at 21:53

## 2021-02-01 RX ADMIN — Medication 0.5 MILLIGRAM(S): at 21:53

## 2021-02-01 RX ADMIN — Medication 2: at 17:07

## 2021-02-01 RX ADMIN — MONTELUKAST 10 MILLIGRAM(S): 4 TABLET, CHEWABLE ORAL at 12:26

## 2021-02-01 RX ADMIN — Medication 3 MILLILITER(S): at 18:02

## 2021-02-01 RX ADMIN — PIPERACILLIN AND TAZOBACTAM 25 GRAM(S): 4; .5 INJECTION, POWDER, LYOPHILIZED, FOR SOLUTION INTRAVENOUS at 04:40

## 2021-02-01 RX ADMIN — CHLORHEXIDINE GLUCONATE 15 MILLILITER(S): 213 SOLUTION TOPICAL at 18:02

## 2021-02-01 RX ADMIN — Medication 3 MILLILITER(S): at 09:10

## 2021-02-01 RX ADMIN — SODIUM CHLORIDE 2 GRAM(S): 9 INJECTION INTRAMUSCULAR; INTRAVENOUS; SUBCUTANEOUS at 04:42

## 2021-02-01 RX ADMIN — Medication 2: at 06:35

## 2021-02-01 RX ADMIN — INSULIN GLARGINE 5 UNIT(S): 100 INJECTION, SOLUTION SUBCUTANEOUS at 06:36

## 2021-02-01 RX ADMIN — Medication 81 MILLIGRAM(S): at 12:26

## 2021-02-01 RX ADMIN — ATORVASTATIN CALCIUM 40 MILLIGRAM(S): 80 TABLET, FILM COATED ORAL at 21:53

## 2021-02-01 RX ADMIN — ENOXAPARIN SODIUM 40 MILLIGRAM(S): 100 INJECTION SUBCUTANEOUS at 16:34

## 2021-02-01 RX ADMIN — Medication 0.5 MILLIGRAM(S): at 09:10

## 2021-02-01 RX ADMIN — Medication 3 MILLILITER(S): at 12:27

## 2021-02-01 RX ADMIN — CHLORHEXIDINE GLUCONATE 15 MILLILITER(S): 213 SOLUTION TOPICAL at 04:41

## 2021-02-01 RX ADMIN — CEFTRIAXONE 100 MILLIGRAM(S): 500 INJECTION, POWDER, FOR SOLUTION INTRAMUSCULAR; INTRAVENOUS at 12:26

## 2021-02-01 NOTE — DIETITIAN INITIAL EVALUATION ADULT. - ADD RECOMMEND
1. Recommend starting at 10mL/hr and advancing by [id number] to goal as tolerated. Additional free H2O per team. Monitor for s/s intolerance; maintain aspiration precautions at all times. 2. Pain and bowel regimens per team. 3. Nutrition recs to align with GOC at all times. 1. Recommend starting at 10mL/hr and advancing by [id number] to goal as tolerated. Additional free H2O per team. Monitor for s/s intolerance; maintain aspiration precautions at all times. 2. Pain and bowel regimens per team. 3. Nutrition recs to align with GOC at all times. 4. Recommend Vit C, zinc (d/c after 10 days) to aid wound healing.

## 2021-02-01 NOTE — PROGRESS NOTE ADULT - PROBLEM SELECTOR PLAN 7
- on arrival, Hb 8.5 with MCV 94.5  - per chart review, baseline Hb 8-9  - no active s/s bleeding  - keep active T&S, transfuse Hb<7 - pt with known hx HLD, on atorvastatin 40mg daily  - c/w home med

## 2021-02-01 NOTE — DIETITIAN INITIAL EVALUATION ADULT. - MALNUTRITION
No noted wt loss. hx of poor intake PTA. Per ASPEN, pt meets guidelines for Mild PCM In the setting of Acute illness as evidenced by NFPE findings.

## 2021-02-01 NOTE — CONSULT NOTE ADULT - PROBLEM SELECTOR RECOMMENDATION 5
pt with hx SAH with PEG, baseline chronic encephalopathy with acute hypoxic respiratory failure with aspiration pneumonia with sepsis.  -medical and symptom management per primary team   -cpr with trial of intubation  -family may consider allowing pt a natural death, they will discuss tonight, palliative will fu tomorrow

## 2021-02-01 NOTE — CONSULT NOTE ADULT - PROBLEM SELECTOR RECOMMENDATION 9
pt p/w hypoxia to 60% per EMS. worsening thick secretions requiring frequent suctioning. tube feeds held per primary.  -abx and medical management per primary team  -goc and code status discussed as above  -for now, cpr, NRB/bipap, HFNC if indicated with trial of intubation

## 2021-02-01 NOTE — PROGRESS NOTE ADULT - PROBLEM SELECTOR PLAN 10
- F: s/p 3L NS bolus, no further fluids  - E: replete K<4, Mg<2  - N: NPO, consider tube feeds if secretions improve  - D: lovenox 40mg q24h  - G: protonix 40mg daily    Code: full  Dispo: 7L - F: s/p 3L NS bolus, no further fluids  - E: replete K<4, Mg<2  - N: Tube feeds restarted 2/1  - D: lovenox 40mg q24h  - G: protonix 40mg daily    Code: full (pending future discussions between family and palliative care team)  Dispo:

## 2021-02-01 NOTE — PROGRESS NOTE ADULT - PROBLEM SELECTOR PLAN 8
- pt with known hx HLD, on atorvastatin 40mg daily  - c/w home med - pt with known hx COPD  - magda q6h PRN

## 2021-02-01 NOTE — CONSULT NOTE ADULT - PROBLEM SELECTOR RECOMMENDATION 3
appears to be AOx0, non-participatory with exam, opens eyes when name stated in loud voice, but does not engage, withdraws to pain   -sp SAH, poor prognosis

## 2021-02-01 NOTE — PROGRESS NOTE ADULT - PROBLEM SELECTOR PLAN 9
- pt with known hx COPD  - magda q6h PRN RESOLVED  - on arrival with Cr 1.52, baseline 0.3-0.4  - s/p 3L NS bolus  => => 0.59 2/1  - urine lytes c/w pre-renal disease (FeNa 0.2%)  - Trend BMP and monitor UO

## 2021-02-01 NOTE — PROGRESS NOTE ADULT - PROBLEM SELECTOR PLAN 2
- on arrival, , RR 30, WBC 12.07, lactate 2.3  - source likely aspiration pneumonia  - s/p vanc/zosyn, c/w zosyn as above  - UA neg, BCx sent  - send sputum Cx  - s/p 3L NS bolus RESOLVING  - on arrival, , RR 30, WBC 12.07, lactate 2.3, s/p 3L NS  - source likely aspiration pneumonia  - s/p vanc/zosyn, c/w zosyn as above, now continuing Ceftriaxone  - UA neg, BCx sent, one bottle returning coag negative staph (thought contaminant), other UCX bottle NGTD, sputum culture negative; Ucx pending

## 2021-02-01 NOTE — PROGRESS NOTE ADULT - PROBLEM SELECTOR PLAN 1
- pt p/w hypoxia to 60% per EMS, improved to 100% on 10L NRB  - pt with worsening mental status though unclear baseline, not tolerating secretions and 1x episode vomiting with c/f aspiration  - CXR with RLL infiltrate   - s/p 2x aggressive suctioning with copious thick secretions, now comfortable on 4L NC  - s/p vanc/zosyn in ED  - c/w zosyn 3.375g q8h  - send sputum Cx  - duonebs q6h   - oral care and frequent suctioning  - if worsening hypoxia, attempt suctioning and can advance to NRB/BIPAP  - currently full code, multiple extensive GOC discussions with son *See h&P as needed for more information on initial assessment   -P/w respiratory failure in context of copious secretions in airway and c/f aspiration pneumonia, initially requiring NRB and then weaned with frequent suctioning to NC and then RA; CXR with RLL infiltrate. Received vanc/zosyn in ED. Sputum cx n/l   - 2/1 abx switched to ceftriaxone 1g Q24 hr for 5D course  - duonebs q6h   - oral care and frequent suctioning  - multiple extensive GOC discussions with son, f/u palliative recs *See h&P as needed for more information on initial assessment   -P/w respiratory failure in context of copious secretions in airway and c/f aspiration pneumonia, initially requiring NRB and then weaned with frequent suctioning to NC and then RA; CXR with RLL infiltrate. Received vanc/zosyn in ED. Sputum cx n/l   - 2/1 abx switched to ceftriaxone 1g Q24 hr for 5-7D course  - duonebs q6h   - oral care and frequent suctioning  - multiple extensive GOC discussions with son, f/u palliative recs

## 2021-02-01 NOTE — CONSULT NOTE ADULT - SUBJECTIVE AND OBJECTIVE BOX
HPI:  Pt is a 77 yo F with PMH COPD, asthma, HTN, HLD, subarachnoid hemorrhage (2020) who p/f [facility] NH for hypoxia and SOB. Recently admitted [facility] - for subarachnoid hemorrhage with IVH and obstructive hydrocephalus s/p cerebral angiogram for coil embolization 3mm L post comm aneurysm with external ventricular drain (EVD) placement 12/10 and removal  and replacement  c/b UTI with enterobacter and e coli with zosyn ending ; PEG placed . Since discharge has been AOx0-1, nonparticipatory with exam, and lethargic. Extensive GOC discussions have been held with family with decision for full code. Noted by NH staff to be hypoxic, per EMS O2sat 60% and hypotensive to SBP 80s on arrival with c/f aspiration events with thick secretions and ?episode of vomiting while lying down. Given 2L NS bolus en route to ER.    On arrival, T 100.1, , /64, RR 30 O2sat 100% NRB 10L/min. Labs with WBC 12.07, 88.9% neutrophils, Hb 8.5, MCV 94.5, Na 132, BUN 51, Cr 1.52, glu 247, lactate 2.3. VBG 7.42/32/20/78. UA neg, COVID neg, BCx sent. CXR with RLL infiltrate. CTH with min interval dec in ventrigulomegaly of lateral and third ventricles with unchanged R transfrontal  shunt, interval dec in trace intraventricular hemorrhage L occipital horn, no new or inc hemorrhage, and post-coil embolization of L post comm aneurysm. Soriano placed in ER. Pt given vanc/zosyn and 1L NS bolus. ICU consulted for hypoxia and respiratory distress. Admitted to  for further observation and management. (2021 11:06)      PAST MEDICAL & SURGICAL HISTORY:  Hydrocephalus  Diabetes mellitus, type 2  GERD (gastroesophageal reflux disease)  Seizures  Subarachnoid hemorrhage, nontraumatic  Hyperlipidemia  Hypertension  COPD (chronic obstructive pulmonary disease)  Asthma  Gastrostomy tube in place  S/P aneurysm repair  2019 subarachnoid hemorrhage, IVH, obstructive hydrocephalus s/p cerebral angiogram for coil embolization 3mm L post comm aneurysm with external ventricular drain (EVD) placement 12/10 and removal  and replacement     FAMILY HISTORY:   Reviewed and found non contributory in mother or father    SOCIAL HISTORY: four adult children: Andrea Iglesias, Brett, Margarita. HCP: Son Andrea Rodriguez [phone] and Son - Harris Kelley [phone]. Family makes decisions together.     ROS:    Unable to attain due to:  pt unable to report, AOx0        Allergies  No Known Allergies  Intolerances    Opiate Naive (Y/N): Y  -iStop reviewed (Y/N): Yes.   ambien, vimpat Rx found on iStop review (Ref#:    [id number]    Medications:      MEDICATIONS  (STANDING):  albuterol/ipratropium for Nebulization 3 milliLiter(s) Nebulizer every 4 hours  aspirin enteric coated 81 milliGRAM(s) Oral daily  atorvastatin 40 milliGRAM(s) Oral at bedtime  buDESOnide    Inhalation Suspension 0.5 milliGRAM(s) Inhalation every 12 hours  chlorhexidine 0.12% Liquid 15 milliLiter(s) Oral Mucosa two times a day  cyanocobalamin 1000 MICROGram(s) Oral daily  dextrose 40% Gel 15 Gram(s) Oral once  dextrose 5%. 1000 milliLiter(s) (50 mL/Hr) IV Continuous <Continuous>  dextrose 5%. 1000 milliLiter(s) (100 mL/Hr) IV Continuous <Continuous>  dextrose 50% Injectable 25 Gram(s) IV Push once  dextrose 50% Injectable 12.5 Gram(s) IV Push once  dextrose 50% Injectable 25 Gram(s) IV Push once  enoxaparin Injectable 30 milliGRAM(s) SubCutaneous every 24 hours  ferrous    sulfate 325 milliGRAM(s) Oral daily  glucagon  Injectable 1 milliGRAM(s) IntraMuscular once  insulin glargine Injectable (LANTUS) 5 Unit(s) SubCutaneous every morning  insulin lispro (ADMELOG) corrective regimen sliding scale   SubCutaneous Before meals and at bedtime  lacosamide Solution 50 milliGRAM(s) Oral two times a day  montelukast 10 milliGRAM(s) Oral daily  nafcillin  IVPB      nafcillin  IVPB 2 Gram(s) IV Intermittent every 4 hours  pantoprazole   Suspension 40 milliGRAM(s) Oral daily  senna 2 Tablet(s) Oral at bedtime  sodium chloride 2 Gram(s) Oral every 8 hours    MEDICATIONS  (PRN):  acetaminophen    Suspension .. 650 milliGRAM(s) Oral every 6 hours PRN Temp greater or equal to 38C (100.4F), Mild Pain (1 - 3)  acetylcysteine 20%  Inhalation 4 milliLiter(s) Inhalation three times a day PRN congestion    Labs:    CBC:                        9.0    12.61 )-----------( 300      ( 2021 06:17 )             28.8     CMP:        141  |  111<H>  |  21  ----------------------------<  174<H>  3.6   |  17<L>  |  0.59    Ca    8.1<L>      2021 06:53  Phos  1.7       Mg     1.9         TPro  5.9<L>  /  Alb  2.2<L>  /  TBili  0.4  /  DBili  x   /  AST  15  /  ALT  19  /  AlkPhos  100     Albumin, Serum: 2.2 g/dL (21 @ 06:53)    PT/INR - ( 2021 21:23 )   PT: 14.3 sec;   INR: 1.20          PTT - ( 2021 21:23 )  PTT:30.3 sec   Urinalysis Basic - ( 2021 06:17 )    Color: Yellow / Appearance: SL Cloudy / S.020 / pH: x  Gluc: x / Ketone: NEGATIVE  / Bili: Negative / Urobili: 0.2 E.U./dL   Blood: x / Protein: 30 mg/dL / Nitrite: NEGATIVE   Leuk Esterase: Trace / RBC: < 5 /HPF / WBC < 5 /HPF   Sq Epi: x / Non Sq Epi: 5-10 /HPF / Bacteria: Present /HPF    Imaging:  Reviewed    < from: CT Head No Cont (21 @ 05:28) >  IMPRESSION:  1.  Since 2021, minimal interval decrease in ventriculomegaly of lateral and third ventricles with unchanged right transfrontal  shunt.  2.  Interval decrease in now trace intraventricular hemorrhage left occipital horn. No new or increasing hemorrhage.  3.  Post coil embolization of left P-comm aneurysm.    < from: Xray Chest 1 View-PORTABLE IMMEDIATE (21 @ 06:23) >  IMPRESSION: Chronic interstitial changes. Patchy bilateral infiltrates    PEx:  T(C): 36.5 (21 @ 13:22), Max: 38.2 (21 @ 19:18)  HR: 110 (21 @ 12:41) (96 - 112)  BP: 145/66 (21 @ 12:41) (78/45 - 145/66)  RR: 20 (21 @ 08:11) (20 - 24)  SpO2: 100% (21 @ 12:41) (94% - 100%)  Wt(kg): --    Constitutional: elderly, frail chronically ill appearing  Head: NC/AT  Eyes: PERRL, does not open eyes to command or verbal stimulus, anicteric sclera  ENT: no nasal discharge; MMM  Neck: supple; no JVD  Respiratory: diffuse crackles most significant at RLL; significant copious thick secretions suctioned; on NC 4L without signs of respiratory distress  Cardiac: +S1/S2; RRR; no M/R/G  Gastrointestinal: soft, NT/ND; no rebound or guarding; +BSx4; PEG in place without drainage or overlying erythema  Extremities: WWP; no peripheral edema; BL UE contractures  Vascular: 2+ radial, DP/PT pulses B/L  Dermatologic: skin warm, dry and intact; no rashes, wounds, or scars  Neurologic: AOx0, non-participatory with exam, withdraws to pain, does not wake to verbal stimulus  Preadmit Karnofsky: 30 %             Current Karnofsky:   20  %  http://www.npcrc.org/files/news/karnofsky_performance_scale.pdf   http://www.npcrc.org/files/news/palliative_performance_scale_PPSv2.pdf      BMI: 22.4  Wt: 50.3  Cachexia (Y/N): N    ADVANCE DIRECTIVES  -CPR with trial of intubation   -Discussed code status with Shayne by phone. They are going to speak with other siblings tonight and consider allowing pt a natural death.     Decision maker: The patient is UNable to participate in complex medical decision making conversations.   Legal surrogate: four adult children: Harris, Andrea, Brett, Margarita. HCP: Son Andrea Rodriguez [phone] and Son Bob Kelley [phone]. Family makes decisions together.     GOALS OF CARE DISCUSSION  -Palliative care info/support provided	      -Advanced Directives addressed, see note below 	     	   HPI:  Pt is a 77 yo F with PMH COPD, asthma, HTN, HLD, subarachnoid hemorrhage (2020) who p/f [facility] NH for hypoxia and SOB. Recently admitted [facility] - for subarachnoid hemorrhage with IVH and obstructive hydrocephalus s/p cerebral angiogram for coil embolization 3mm L post comm aneurysm with external ventricular drain (EVD) placement 12/10 and removal  and replacement  c/b UTI with enterobacter and e coli with zosyn ending ; PEG placed . Since discharge has been AOx0-1, nonparticipatory with exam, and lethargic. Extensive GOC discussions have been held with family with decision for full code. Noted by NH staff to be hypoxic, per EMS O2sat 60% and hypotensive to SBP 80s on arrival with c/f aspiration events with thick secretions and ?episode of vomiting while lying down. Given 2L NS bolus en route to ER.    On arrival, T 100.1, , /64, RR 30 O2sat 100% NRB 10L/min. Labs with WBC 12.07, 88.9% neutrophils, Hb 8.5, MCV 94.5, Na 132, BUN 51, Cr 1.52, glu 247, lactate 2.3. VBG 7.42/32/20/78. UA neg, COVID neg, BCx sent. CXR with RLL infiltrate. CTH with min interval dec in ventrigulomegaly of lateral and third ventricles with unchanged R transfrontal  shunt, interval dec in trace intraventricular hemorrhage L occipital horn, no new or inc hemorrhage, and post-coil embolization of L post comm aneurysm. Soriano placed in ER. Pt given vanc/zosyn and 1L NS bolus. ICU consulted for hypoxia and respiratory distress. Admitted to  for further observation and management. (2021 11:06)      PAST MEDICAL & SURGICAL HISTORY:  Hydrocephalus  Diabetes mellitus, type 2  GERD (gastroesophageal reflux disease)  Seizures  Subarachnoid hemorrhage, nontraumatic  Hyperlipidemia  Hypertension  COPD (chronic obstructive pulmonary disease)  Asthma  Gastrostomy tube in place  S/P aneurysm repair  2019 subarachnoid hemorrhage, IVH, obstructive hydrocephalus s/p cerebral angiogram for coil embolization 3mm L post comm aneurysm with external ventricular drain (EVD) placement 12/10 and removal  and replacement     FAMILY HISTORY:   Reviewed and found non contributory in mother or father    SOCIAL HISTORY: four adult children: Andrea Iglesias, Brett, Margarita. HCP: Son Andrea Rodriguez [phone] and Son - Harris Kelley [phone]. Family makes decisions together.     ROS:    Unable to attain due to:  pt unable to report, AOx0        Allergies  No Known Allergies  Intolerances    Opiate Naive (Y/N): Y  -iStop reviewed (Y/N): Yes.   ambien, vimpat Rx found on iStop review (Ref#:    [id number]    Medications:      MEDICATIONS  (STANDING):  albuterol/ipratropium for Nebulization 3 milliLiter(s) Nebulizer every 4 hours  aspirin enteric coated 81 milliGRAM(s) Oral daily  atorvastatin 40 milliGRAM(s) Oral at bedtime  buDESOnide    Inhalation Suspension 0.5 milliGRAM(s) Inhalation every 12 hours  chlorhexidine 0.12% Liquid 15 milliLiter(s) Oral Mucosa two times a day  cyanocobalamin 1000 MICROGram(s) Oral daily  dextrose 40% Gel 15 Gram(s) Oral once  dextrose 5%. 1000 milliLiter(s) (50 mL/Hr) IV Continuous <Continuous>  dextrose 5%. 1000 milliLiter(s) (100 mL/Hr) IV Continuous <Continuous>  dextrose 50% Injectable 25 Gram(s) IV Push once  dextrose 50% Injectable 12.5 Gram(s) IV Push once  dextrose 50% Injectable 25 Gram(s) IV Push once  enoxaparin Injectable 30 milliGRAM(s) SubCutaneous every 24 hours  ferrous    sulfate 325 milliGRAM(s) Oral daily  glucagon  Injectable 1 milliGRAM(s) IntraMuscular once  insulin glargine Injectable (LANTUS) 5 Unit(s) SubCutaneous every morning  insulin lispro (ADMELOG) corrective regimen sliding scale   SubCutaneous Before meals and at bedtime  lacosamide Solution 50 milliGRAM(s) Oral two times a day  montelukast 10 milliGRAM(s) Oral daily  nafcillin  IVPB      nafcillin  IVPB 2 Gram(s) IV Intermittent every 4 hours  pantoprazole   Suspension 40 milliGRAM(s) Oral daily  senna 2 Tablet(s) Oral at bedtime  sodium chloride 2 Gram(s) Oral every 8 hours    MEDICATIONS  (PRN):  acetaminophen    Suspension .. 650 milliGRAM(s) Oral every 6 hours PRN Temp greater or equal to 38C (100.4F), Mild Pain (1 - 3)  acetylcysteine 20%  Inhalation 4 milliLiter(s) Inhalation three times a day PRN congestion    Labs:    CBC:                        9.0    12.61 )-----------( 300      ( 2021 06:17 )             28.8     CMP:        141  |  111<H>  |  21  ----------------------------<  174<H>  3.6   |  17<L>  |  0.59    Ca    8.1<L>      2021 06:53  Phos  1.7       Mg     1.9         TPro  5.9<L>  /  Alb  2.2<L>  /  TBili  0.4  /  DBili  x   /  AST  15  /  ALT  19  /  AlkPhos  100     Albumin, Serum: 2.2 g/dL (21 @ 06:53)    PT/INR - ( 2021 21:23 )   PT: 14.3 sec;   INR: 1.20          PTT - ( 2021 21:23 )  PTT:30.3 sec   Urinalysis Basic - ( 2021 06:17 )    Color: Yellow / Appearance: SL Cloudy / S.020 / pH: x  Gluc: x / Ketone: NEGATIVE  / Bili: Negative / Urobili: 0.2 E.U./dL   Blood: x / Protein: 30 mg/dL / Nitrite: NEGATIVE   Leuk Esterase: Trace / RBC: < 5 /HPF / WBC < 5 /HPF   Sq Epi: x / Non Sq Epi: 5-10 /HPF / Bacteria: Present /HPF    Imaging:  Reviewed    < from: CT Head No Cont (21 @ 05:28) >  IMPRESSION:  1.  Since 2021, minimal interval decrease in ventriculomegaly of lateral and third ventricles with unchanged right transfrontal  shunt.  2.  Interval decrease in now trace intraventricular hemorrhage left occipital horn. No new or increasing hemorrhage.  3.  Post coil embolization of left P-comm aneurysm.    < from: Xray Chest 1 View-PORTABLE IMMEDIATE (21 @ 06:23) >  IMPRESSION: Chronic interstitial changes. Patchy bilateral infiltrates    PEx:  T(C): 36.5 (21 @ 13:22), Max: 38.2 (21 @ 19:18)  HR: 110 (21 @ 12:41) (96 - 112)  BP: 145/66 (21 @ 12:41) (78/45 - 145/66)  RR: 20 (21 @ 08:11) (20 - 24)  SpO2: 100% (21 @ 12:41) (94% - 100%)  Wt(kg): --    Constitutional: elderly, frail chronically ill appearing  Head: NC/AT  Eyes: PERRL, does not open eyes to command or verbal stimulus, anicteric sclera  ENT: no nasal discharge; MMM  Neck: supple; no JVD  Respiratory: diffuse crackles most significant at RLL; significant copious thick secretions suctioned; on NC 4L without signs of respiratory distress  Cardiac: +S1/S2; RRR; no M/R/G  Gastrointestinal: soft, NT/ND; no rebound or guarding; +BSx4; PEG in place without drainage or overlying erythema  Extremities: WWP; no peripheral edema; BL UE contractures  Vascular: 2+ radial, DP/PT pulses B/L  Dermatologic: skin warm, dry and intact; no rashes, wounds, or scars  Neurologic: AOx0, non-participatory with exam, withdraws to pain, does not wake to verbal stimulus  Preadmit Karnofsky: 30 %             Current Karnofsky:   20  %  http://www.npcrc.org/files/news/karnofsky_performance_scale.pdf   http://www.npcrc.org/files/news/palliative_performance_scale_PPSv2.pdf    BMI: 22.4  Wt: 50.3  Cachexia (Y/N): N    ADVANCE DIRECTIVES  -CPR with trial of intubation   -Discussed code status with Shayne by phone. They are going to speak with other siblings tonight and consider allowing pt a natural death.    Decision maker: The patient is UNable to participate in complex medical decision making conversations.   Legal surrogate: four adult children: Harris, Andrea, Brett, Margarita. HCP: Son Andrea Rodriguez [phone] and Son Bob Kelley [phone]. Family makes decisions together.     GOALS OF CARE DISCUSSION  -Palliative care info/support provided	      -Advanced Directives addressed, see note below

## 2021-02-01 NOTE — DIETITIAN NUTRITION RISK NOTIFICATION - TREATMENT: THE FOLLOWING DIET HAS BEEN RECOMMENDED
1. Glucerna 1.2 @ 50mL/hr x 24hrs via PEG at goal provides 1200 mL TV, 1440 kcal, 72g pro, 966mL free water (96%RDI, 1.43gm pro/kg ABW)  2. Recommend starting at 10mL/hr and advancing by [id number] to goal as tolerated. Additional free H2O per team. Monitor for s/s intolerance; maintain aspiration precautions at all times.   3. Pain and bowel regimens per team. 3. Nutrition recs to align with GOC at all times.   4. Recommend Vit C, zinc (d/c after 10 days) to aid wound healing.  5. Monitor lytes and replete prn. POCT q6hrs, BMP.

## 2021-02-01 NOTE — DIETITIAN INITIAL EVALUATION ADULT. - PROBLEM SELECTOR PLAN 7
- on arrival, Hb 8.5 with MCV 94.5  - per chart review, baseline Hb 8-9  - no active s/s bleeding  - keep active T&S, transfuse Hb<7

## 2021-02-01 NOTE — DIETITIAN INITIAL EVALUATION ADULT. - OTHER CALCULATIONS
Ideal body weight used for calculations as pt >120% of IBW(100%). Nutrient needs based on [facility] standards of care for maintenance in adults, adjusted for age, pressure injuries/DTIs.

## 2021-02-01 NOTE — DIETITIAN INITIAL EVALUATION ADULT. - FEEDING SKILL
63 yo M with ischemic cardiomyopathy with prolonged hospital course 2/2 septic shock likely from cellulitis complicated by ATN and renal failure requiring dialysis, respiratory failure requiring intubation and ultimately trach/peg, HCAP s/p abx therapy, and AMS 2/2 brainstem infarct thought to be from LV thrombus develops tachypnea and significant respiratory alkalosis unable to assess at this time d/t pt's mental status

## 2021-02-01 NOTE — CONSULT NOTE ADULT - CONVERSATION DETAILS
In addition to the EM visit, an advance care planning meeting was performed  Start time: 02:00 p  End time: 02:30p  Total time: 30 min  A face to face meeting to discuss advance care planning was held today regarding: CARA CANCHOLA  Primary decision maker: Four children make decisions together as above. TC with Shayne today.   Discussed advance directives including, but not limited to, healthcare proxy and code status.  Decision regarding code status: CPR with trial of intubation     Pts chart thoroughly reviewed. TC to pt's adult sons Harris and Andrea, pt is UNable to make needs known at baseline following SAH. In addition to the EM visit, an advance care planning meeting was performed  Start time: 02:00 p  End time: 02:30p  Total time: 30 min  A telephone meeting to discuss advance care planning was held today regarding: CARA CANCHOLA  Primary decision maker: TC with Elliottdelisa and Harris today. Four children make decisions together as above.   Discussed advance directives including, but not limited to, healthcare proxy and code status.  Decision regarding code status: CPR with trial of intubation     Pts chart thoroughly reviewed. TC to pt's adult sons Harris and Andrea. pt is UNable to make needs known at baseline following SAH. Elliottdelisa is hoping that pt will one day be able to be interactive enough to make needs known clearly. This NP explained medical teams concern that this is an unlikely expectation.     Discussed that pt's respiratory status is tenuous and that pt is risk for rapid decompensation requiring intubation. Pt had expressed to sons in the past that she would NOT want to be "kept alive by machines if [she] was brain dead" or unable to make needs known in any capacity. She would want to be resuscitated/CPR if there was a "chance" she would be able to return to interactive capacity as described.     This NP explained that if pt was resuscitated, it is highly unlikely that pt would return to pre resuscitation baseline. Explained to sons the potentially fatal risks of CPR and the likely possibility that pt would not be able to be weaned from ventilator.     Son Harris verbalized that he doesn't want to "make her go through more suffering if there is no point." Brothers are going to discuss with siblings tonight.     Plan:  -continue CPR with trial of intubation   -Palliative to f/u tomorrow.

## 2021-02-01 NOTE — DIETITIAN INITIAL EVALUATION ADULT. - OTHER INFO
77 yo F with PMH COPD, asthma, HTN, HLD, subarachnoid hemorrhage (12/2020) who p/f [facility] for hypoxia and SOB. Recently admitted 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 Since discharge pt has been AxOx0-> Per chart review, pt noted to be non-participatory with exam, and lethargic. Extensive GOC discussions have been held with family with decision for full code. Noted by NH staff to be hypoxic, per EMS O2sat 60% and hypotensive to SBP 80s on arrival with c/f aspiration events with thick secretions and ?episode of vomiting while lying down. On 1/31: de anda in ED, vanc/zosyn, continued zosyn, sent MRSA nares, total of 3L NS, hypoxic/abd breathing on NC -- aggressive suctioning with copious secretions/thick clumps with improvement; urine lytes 0.2% prerenal, a1c 5.7, repeat Cr 0.94, repleted K, MRSA neg will not continue vanc. consulted palliative (family with false hope of return to baseline)  o/n: Weaned to room air, satting well. Tachy to 110s, rectal temp 100.8. Blood cultures sent. Given tylenol. Became hypotensive to 79/49, given 500cc bolus, improved to 123/70. Labs drawn. Lactate negative. CBC remarkable for hb 6.9. No signs of bleeding. Ordered 1 unit prbc, consent obtained from HCP Andrea (son). Post transfusion Hb 9 75 yo F with PMH COPD, asthma, HTN, HLD, subarachnoid hemorrhage (12/2020) who p/f [facility] for hypoxia and SOB. 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 Since discharge pt has been AxOx0-> Per chart review, pt noted to be non-participatory with exam, and lethargic. Extensive GOC discussions have been held with family with decision for full code. Noted by NH staff to be hypoxic, per EMS O2sat 60% and hypotensive to SBP 80s on arrival with c/f aspiration events with thick secretions and ?episode of vomiting while lying down. On 1/31, noted aggressive suctioning with copious secretions/thick clumps. Pt is MRSA neg. o/n: Weaned to room air, satting well. Tachy to 110s, rectal temp 100.8. Blood cultures sent. Given tylenol. Became hypotensive to 79/49, given 500cc bolus, improved to 123/70. Labs drawn. Lactate negative. CBC remarkable for hb 6.9. No signs of bleeding. Ordered 1 unit prbc, consent obtained from HCP Andrea (son). Post transfusion Hb 9 77 yo F with PMH COPD, asthma, HTN, HLD, subarachnoid hemorrhage (12/2020) who p/f [facility] for hypoxia and SOB. 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 Since discharge pt has been AxOx0. Per chart review, pt noted to be non-participatory with exam, and lethargic. Extensive GOC discussions have been held with family with decision for full code. Noted by NH staff to be hypoxic, per EMS O2sat 60% and hypotensive to SBP 80s on arrival with c/f aspiration events with thick secretions and ?episode of vomiting while lying down. On 1/31, aggressive suctioning with copious secretions/thick clumps noted. Pt noted to be febrile with rectal temp 100.8 on 1/31 PM; blood cultures were sent and pt given tylenol. Pt is MRSA neg and was weaned to room air overnight, satting well(%). CBC remarkable for hb 6.9. No signs of bleeding. Pt ordered 1 unit prbc; post transfusion Hb 9.     Visited pt in room, noted sleeping and hard to awaken to calling her name. Pt is currently NPO; noted to be on TF regimen via PEG of Glucerna 1.2 @50ml/hr at previous discharge(A1C:5.7% on 2/1/21). Per RN, no V noted this AM, + BM (fecal incontinence) noted on 1/31. Unable to assess N and pain d/t pt's mental status. Per chart review, noted previous admit wt:49.8kg(12/11/2020), consistent with current admit weight: 50.3kg. NFPE performed with findings below. Education deferred 2/2 pt's clinical status; will reassess at f/u. Skin noted with 1+ generalized edema, DTI: buttocks, L heel; Stage 1 pressure injury to R heel; Greg Score: 9. Please see below for full nutritional recommendations. RD to monitor and f/u per protocol. 75 yo F with PMH COPD, asthma, HTN, HLD, subarachnoid hemorrhage (12/2020) who p/f [facility] for hypoxia and SOB. Recently admitted 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 Since discharge pt has been AxOx0. Per chart review, pt noted to be non-participatory with exam, and lethargic. Extensive GOC discussions have been held with family with decision for full code. Noted by NH staff to be hypoxic, per EMS O2sat 60% and hypotensive to SBP 80s on arrival with c/f aspiration events with thick secretions and ?episode of vomiting while lying down. On 1/31, aggressive suctioning with copious secretions/thick clumps noted. Pt noted to be febrile with rectal temp 100.8 on 1/31 PM; blood cultures were sent and pt given tylenol. Pt is MRSA neg and was weaned to room air overnight, satting well(%). CBC remarkable for hb 6.9. No signs of bleeding. Pt ordered 1 unit prbc; post transfusion Hb 9.     Visited pt in room, noted sleeping and hard to awaken to calling her name. Pt is currently NPO, not ordered for TF. At previous discharge, pt noted to be on TF regimen via PEG of Glucerna 1.2 @50ml/hr(A1C:5.7% on 2/1/21). Per RN, no emesis noted this AM, + BM (fecal incontinence) noted on 1/31. Unable to assess nausea and pain d/t pt's mental status. Per chart review, noted previous admit wt:49.8kg(12/11/2020), consistent with current admit weight: 50.3kg. NFPE performed with findings below. Education deferred 2/2 pt's clinical status; will reassess at f/u. Skin noted with 1+ generalized edema, DTI: buttocks, L heel; Stage 1 pressure injury to R heel; Greg Score: 9. Please see below for full nutritional recommendations. RD to monitor and f/u per protocol.

## 2021-02-01 NOTE — DIETITIAN INITIAL EVALUATION ADULT. - ENTERAL
Glucerna 1.2 @ 50mL/hr x 24hrs via PEG at goal provides 1200 mL TV, 1440 kcal, 72g pro, 966mL free water (120%RDI, 1.43gm pro/kgABW) Glucerna 1.2 @ 50mL/hr x 24hrs via PEG at goal provides 1200 mL TV, 1440 kcal, 72g pro, 966mL free water (96%RDI, 1.43gm pro/kg ABW)

## 2021-02-01 NOTE — PROGRESS NOTE ADULT - PROBLEM SELECTOR PLAN 6
- pt with known hx HTN  - hypotensive on EMS arrival, currently normotensive  - continue to monitor - on arrival, Hb 8.5 with MCV 94.5  - per chart review, baseline Hb 8-9  - no active s/s bleeding  - keep active T&S, transfuse Hb<7

## 2021-02-01 NOTE — DIETITIAN INITIAL EVALUATION ADULT. - PROBLEM SELECTOR PLAN 4
- on arrival with Cr 1.52, baseline 0.3-0.4  - s/p 3L NS bolus   - urine lytes c/w pre-renal disease (FeNa 0.2%)  - recheck BMP and monitor UO

## 2021-02-01 NOTE — PROGRESS NOTE ADULT - SUBJECTIVE AND OBJECTIVE BOX
IN PROGRESS IN PROGRESS    OVERNIGHT EVENTS: Weaned to room air, satting well. Tachy to 110s, rectal temp 100.8. Blood cultures sent. Given tylenol. Became hypotensive to 79/49, given 500cc bolus, improved to 123/70. Labs drawn. Lactate negative. CBC remarkable for hb 6.9. No signs of bleeding. Ordered 1 unit prbc, consent obtained from HCP Andrea (son). Post transfusion Hb 9    SUBJECTIVE / INTERVAL HPI: Patient seen and examined at bedside.     VITAL SIGNS:  Vital Signs Last 24 Hrs  T(C): 36.5 (2021 13:22), Max: 38.2 (2021 19:18)  T(F): 97.7 (2021 13:22), Max: 100.8 (2021 19:18)  HR: 110 (2021 12:41) (96 - 112)  BP: 145/66 (2021 12:41) (78/45 - 145/66)  BP(mean): 95 (2021 12:41) (57 - 95)  RR: 20 (2021 08:11) (20 - 24)  SpO2: 100% (2021 12:41) (94% - 100%)    PHYSICAL EXAM:    General: WDWN  HEENT: NC/AT; PERRL, anicteric sclera; MMM  Neck: supple  Cardiovascular: +S1/S2; RRR  Respiratory: CTA B/L; no W/R/R  Gastrointestinal: soft, NT/ND; +BSx4  Extremities: WWP; no edema, clubbing or cyanosis  Vascular: 2+ radial, DP/PT pulses B/L  Neurological: AAOx3; no focal deficits    MEDICATIONS:  MEDICATIONS  (STANDING):  albuterol/ipratropium for Nebulization 3 milliLiter(s) Nebulizer every 4 hours  aspirin enteric coated 81 milliGRAM(s) Oral daily  atorvastatin 40 milliGRAM(s) Oral at bedtime  buDESOnide    Inhalation Suspension 0.5 milliGRAM(s) Inhalation every 12 hours  chlorhexidine 0.12% Liquid 15 milliLiter(s) Oral Mucosa two times a day  cyanocobalamin 1000 MICROGram(s) Oral daily  dextrose 40% Gel 15 Gram(s) Oral once  dextrose 5%. 1000 milliLiter(s) (50 mL/Hr) IV Continuous <Continuous>  dextrose 5%. 1000 milliLiter(s) (100 mL/Hr) IV Continuous <Continuous>  dextrose 50% Injectable 25 Gram(s) IV Push once  dextrose 50% Injectable 12.5 Gram(s) IV Push once  dextrose 50% Injectable 25 Gram(s) IV Push once  enoxaparin Injectable 30 milliGRAM(s) SubCutaneous every 24 hours  ferrous    sulfate 325 milliGRAM(s) Oral daily  glucagon  Injectable 1 milliGRAM(s) IntraMuscular once  insulin glargine Injectable (LANTUS) 5 Unit(s) SubCutaneous every morning  insulin lispro (ADMELOG) corrective regimen sliding scale   SubCutaneous Before meals and at bedtime  lacosamide Solution 50 milliGRAM(s) Oral two times a day  montelukast 10 milliGRAM(s) Oral daily  nafcillin  IVPB      nafcillin  IVPB 2 Gram(s) IV Intermittent every 4 hours  pantoprazole   Suspension 40 milliGRAM(s) Oral daily  senna 2 Tablet(s) Oral at bedtime  sodium chloride 2 Gram(s) Oral every 8 hours    MEDICATIONS  (PRN):  acetaminophen    Suspension .. 650 milliGRAM(s) Oral every 6 hours PRN Temp greater or equal to 38C (100.4F), Mild Pain (1 - 3)  acetylcysteine 20%  Inhalation 4 milliLiter(s) Inhalation three times a day PRN congestion    ALLERGIES:  Allergies    No Known Allergies    Intolerances    LABS:                        9.0    12.61 )-----------( 300      ( 2021 06:17 )             28.8     02-    141  |  111<H>  |  21  ----------------------------<  174<H>  3.6   |  17<L>  |  0.59    Ca    8.1<L>      2021 06:53  Phos  1.7     02-  Mg     1.9     02-    TPro  5.9<L>  /  Alb  2.2<L>  /  TBili  0.4  /  DBili  x   /  AST  15  /  ALT  19  /  AlkPhos  100  02-01    PT/INR - ( 2021 21:23 )   PT: 14.3 sec;   INR: 1.20          PTT - ( 2021 21:23 )  PTT:30.3 sec  Urinalysis Basic - ( 2021 06:17 )    Color: Yellow / Appearance: SL Cloudy / S.020 / pH: x  Gluc: x / Ketone: NEGATIVE  / Bili: Negative / Urobili: 0.2 E.U./dL   Blood: x / Protein: 30 mg/dL / Nitrite: NEGATIVE   Leuk Esterase: Trace / RBC: < 5 /HPF / WBC < 5 /HPF   Sq Epi: x / Non Sq Epi: 5-10 /HPF / Bacteria: Present /HPF    CAPILLARY BLOOD GLUCOSE    POCT Blood Glucose.: 134 mg/dL (2021 11:17)    RADIOLOGY & ADDITIONAL TESTS: Reviewed.   OVERNIGHT EVENTS: Weaned to room air, satting well. Tachy to 110s, rectal temp 100.8. Blood cultures sent. Given tylenol. Became hypotensive to 79/49, given 500cc bolus, improved to 123/70. Labs drawn. Lactate negative. CBC remarkable for hb 6.9. No signs of bleeding. Ordered 1 unit prbc, consent obtained from HCP Andrea (son). Post transfusion Hb 9    SUBJECTIVE / INTERVAL HPI: Patient seen and examined at bedside. Subjective portion of exam wasn't possible as patient was nonverbal this AM as she was on admission.     VITAL SIGNS:  Vital Signs Last 24 Hrs  T(C): 36.5 (2021 13:22), Max: 38.2 (2021 19:18)  T(F): 97.7 (2021 13:22), Max: 100.8 (2021 19:18)  HR: 110 (2021 12:41) (96 - 112)  BP: 145/66 (2021 12:41) (78/45 - 145/66)  BP(mean): 95 (2021 12:41) (57 - 95)  RR: 20 (2021 08:11) (20 - 24)  SpO2: 100% (2021 12:41) (94% - 100%)    PHYSICAL EXAM:  Constitutional: elderly, frail chronically ill appearing, laying in bed, NAD  Head: NC/AT  Eyes: PERRL, opening eyes later in AM in response to physical/ verbal stimulus, anicteric sclera  ENT: no nasal discharge; MMM  Neck: supple; no JVD  Respiratory: Some crackles at base, more significant at RLL; on RA  without signs of respiratory distress  Cardiac: +S1/S2; RRR; no M/R/G  Gastrointestinal: soft, NT/ND; no rebound or guarding; +BSx4; PEG in place without drainage or overlying erythema  Extremities: WWP; no peripheral edema; BL UE contractures; b/l boots on.   Vascular: 2+ radial, DP/PT pulses B/L  Dermatologic: skin warm, dry and intact; no rashes, wounds, or scars  Neurologic: AOx0, non-participatory with exam, withdraws to pain, spontaneously moving extremities     MEDICATIONS:  MEDICATIONS  (STANDING):  albuterol/ipratropium for Nebulization 3 milliLiter(s) Nebulizer every 4 hours  aspirin enteric coated 81 milliGRAM(s) Oral daily  atorvastatin 40 milliGRAM(s) Oral at bedtime  buDESOnide    Inhalation Suspension 0.5 milliGRAM(s) Inhalation every 12 hours  chlorhexidine 0.12% Liquid 15 milliLiter(s) Oral Mucosa two times a day  cyanocobalamin 1000 MICROGram(s) Oral daily  dextrose 40% Gel 15 Gram(s) Oral once  dextrose 5%. 1000 milliLiter(s) (50 mL/Hr) IV Continuous <Continuous>  dextrose 5%. 1000 milliLiter(s) (100 mL/Hr) IV Continuous <Continuous>  dextrose 50% Injectable 25 Gram(s) IV Push once  dextrose 50% Injectable 12.5 Gram(s) IV Push once  dextrose 50% Injectable 25 Gram(s) IV Push once  enoxaparin Injectable 30 milliGRAM(s) SubCutaneous every 24 hours  ferrous    sulfate 325 milliGRAM(s) Oral daily  glucagon  Injectable 1 milliGRAM(s) IntraMuscular once  insulin glargine Injectable (LANTUS) 5 Unit(s) SubCutaneous every morning  insulin lispro (ADMELOG) corrective regimen sliding scale   SubCutaneous Before meals and at bedtime  lacosamide Solution 50 milliGRAM(s) Oral two times a day  montelukast 10 milliGRAM(s) Oral daily  nafcillin  IVPB      nafcillin  IVPB 2 Gram(s) IV Intermittent every 4 hours  pantoprazole   Suspension 40 milliGRAM(s) Oral daily  senna 2 Tablet(s) Oral at bedtime  sodium chloride 2 Gram(s) Oral every 8 hours    MEDICATIONS  (PRN):  acetaminophen    Suspension .. 650 milliGRAM(s) Oral every 6 hours PRN Temp greater or equal to 38C (100.4F), Mild Pain (1 - 3)  acetylcysteine 20%  Inhalation 4 milliLiter(s) Inhalation three times a day PRN congestion    ALLERGIES:  Allergies    No Known Allergies    Intolerances    LABS:                        9.0    12.61 )-----------( 300      ( 2021 06:17 )             28.8     02-01    141  |  111<H>  |  21  ----------------------------<  174<H>  3.6   |  17<L>  |  0.59    Ca    8.1<L>      2021 06:53  Phos  1.7     02-  Mg     1.9     02-    TPro  5.9<L>  /  Alb  2.2<L>  /  TBili  0.4  /  DBili  x   /  AST  15  /  ALT  19  /  AlkPhos  100  02-01    PT/INR - ( 2021 21:23 )   PT: 14.3 sec;   INR: 1.20       PTT - ( 2021 21:23 )  PTT:30.3 sec  Urinalysis Basic - ( 2021 06:17 )    Color: Yellow / Appearance: SL Cloudy / S.020 / pH: x  Gluc: x / Ketone: NEGATIVE  / Bili: Negative / Urobili: 0.2 E.U./dL   Blood: x / Protein: 30 mg/dL / Nitrite: NEGATIVE   Leuk Esterase: Trace / RBC: < 5 /HPF / WBC < 5 /HPF   Sq Epi: x / Non Sq Epi: 5-10 /HPF / Bacteria: Present /HPF    CAPILLARY BLOOD GLUCOSE    POCT Blood Glucose.: 134 mg/dL (2021 11:17)    RADIOLOGY & ADDITIONAL TESTS: Reviewed.

## 2021-02-01 NOTE — DIETITIAN INITIAL EVALUATION ADULT. - PROBLEM SELECTOR PLAN 5
- unclear baseline, though appears AOx0-1  - currently AOx0, non-participatory with exam, withdraws to pain but does not open eyes to verbal stimulus  - continue to monitor

## 2021-02-01 NOTE — CONSULT NOTE ADULT - ASSESSMENT
75 yo F with PMH COPD, asthma, HTN, HLD, subarachnoid hemorrhage (12/2020) presented 1/31 from [facility] for hypoxia, SOB. Recently admitted [facility] 12/9-1/26 for SAH s/p cerebral angiogram for coil embolization with external ventricular drain (EVD) placement 12/10 and replacement 12/29 c/b UTI, sp PEG placed 1/7. Palliative care consulted for assistance with GOC.  75 yo F with PMH COPD, asthma, HTN, HLD, subarachnoid hemorrhage (12/2020) presented 1/31 from [facility] for hypoxia, SOB. Recently admitted [facility] 12/9-1/26 for SAH s/p cerebral angiogram for coil embolization with external ventricular drain (EVD) placement 12/10 and replacement 12/29 c/b UTI, sp PEG placed 1/7. Pt in respiratory failure with worsening secretions. Palliative care consulted for assistance with GOC.

## 2021-02-01 NOTE — DIETITIAN INITIAL EVALUATION ADULT. - PERTINENT MEDS FT
Insulin -SS  Insulin (Lantus)  Tylenol  Lipitor  Cyanocobalamin  Ferrous Sulphate  Senna  Sodium Chloride

## 2021-02-01 NOTE — PROGRESS NOTE ADULT - ASSESSMENT
Pt is a 75 yo F with PMH COPD, asthma, HTN, HLD, subarachnoid hemorrhage (12/2020) who p/f [facility] for hypoxia and SOB. Found to have RLL infiltrate on CXR. Admitted to  for further observation and management. Pt is a 77 yo F with PMH COPD, asthma, HTN, HLD, subarachnoid hemorrhage (12/2020) who p/f [facility] for hypoxia and SOB. Found to have RLL infiltrate on CXR, adm to 7L for further observation and management.

## 2021-02-01 NOTE — PROGRESS NOTE ADULT - PROBLEM SELECTOR PLAN 5
- unclear baseline, though appears AOx0-1  - currently AOx0, non-participatory with exam, withdraws to pain but does not open eyes to verbal stimulus  - continue to monitor - pt with known hx HTN  - hypotensive on EMS arrival, currently normotensive  - continue to monitor

## 2021-02-01 NOTE — PROGRESS NOTE ADULT - PROBLEM SELECTOR PLAN 4
- on arrival with Cr 1.52, baseline 0.3-0.4  - s/p 3L NS bolus   - urine lytes c/w pre-renal disease (FeNa 0.2%)  - recheck BMP and monitor UO - unclear baseline, though has been AOx0-1 after previous hospitalization for SAH in December  - currently AOx0, non-participatory with exam, withdraws to pain, and moving extremities spontaneously 2/1 and also opening eyes more  - continue to monitor  - 2/1 Increased vimpat to home dose 150 BID

## 2021-02-02 LAB
ALBUMIN SERPL ELPH-MCNC: 2.4 G/DL — LOW (ref 3.3–5)
ALP SERPL-CCNC: 125 U/L — HIGH (ref 40–120)
ALT FLD-CCNC: 18 U/L — SIGNIFICANT CHANGE UP (ref 10–45)
ANION GAP SERPL CALC-SCNC: 7 MMOL/L — SIGNIFICANT CHANGE UP (ref 5–17)
APPEARANCE UR: ABNORMAL
AST SERPL-CCNC: 14 U/L — SIGNIFICANT CHANGE UP (ref 10–40)
BACTERIA # UR AUTO: SIGNIFICANT CHANGE UP /HPF
BASE EXCESS BLDA CALC-SCNC: -0.4 MMOL/L — SIGNIFICANT CHANGE UP (ref -2–3)
BASOPHILS # BLD AUTO: 0.03 K/UL — SIGNIFICANT CHANGE UP (ref 0–0.2)
BASOPHILS NFR BLD AUTO: 0.3 % — SIGNIFICANT CHANGE UP (ref 0–2)
BILIRUB SERPL-MCNC: 0.2 MG/DL — SIGNIFICANT CHANGE UP (ref 0.2–1.2)
BILIRUB UR-MCNC: NEGATIVE — SIGNIFICANT CHANGE UP
BLD GP AB SCN SERPL QL: NEGATIVE — SIGNIFICANT CHANGE UP
BUN SERPL-MCNC: 11 MG/DL — SIGNIFICANT CHANGE UP (ref 7–23)
CALCIUM SERPL-MCNC: 8.2 MG/DL — LOW (ref 8.4–10.5)
CHLORIDE SERPL-SCNC: 116 MMOL/L — HIGH (ref 96–108)
CO2 SERPL-SCNC: 22 MMOL/L — SIGNIFICANT CHANGE UP (ref 22–31)
COLOR SPEC: YELLOW — SIGNIFICANT CHANGE UP
COMMENT - URINE: SIGNIFICANT CHANGE UP
CREAT SERPL-MCNC: 0.49 MG/DL — LOW (ref 0.5–1.3)
CULTURE RESULTS: SIGNIFICANT CHANGE UP
CULTURE RESULTS: SIGNIFICANT CHANGE UP
DIFF PNL FLD: ABNORMAL
EOSINOPHIL # BLD AUTO: 0.07 K/UL — SIGNIFICANT CHANGE UP (ref 0–0.5)
EOSINOPHIL NFR BLD AUTO: 0.6 % — SIGNIFICANT CHANGE UP (ref 0–6)
EPI CELLS # UR: SIGNIFICANT CHANGE UP /HPF (ref 0–5)
GLUCOSE BLDC GLUCOMTR-MCNC: 155 MG/DL — HIGH (ref 70–99)
GLUCOSE BLDC GLUCOMTR-MCNC: 166 MG/DL — HIGH (ref 70–99)
GLUCOSE BLDC GLUCOMTR-MCNC: 174 MG/DL — HIGH (ref 70–99)
GLUCOSE BLDC GLUCOMTR-MCNC: 182 MG/DL — HIGH (ref 70–99)
GLUCOSE SERPL-MCNC: 189 MG/DL — HIGH (ref 70–99)
GLUCOSE UR QL: NEGATIVE — SIGNIFICANT CHANGE UP
GRAN CASTS # UR COMP ASSIST: ABNORMAL /LPF
HCO3 BLDA-SCNC: 22 MMOL/L — SIGNIFICANT CHANGE UP (ref 21–28)
HCT VFR BLD CALC: 30.9 % — LOW (ref 34.5–45)
HGB BLD-MCNC: 9.4 G/DL — LOW (ref 11.5–15.5)
IMM GRANULOCYTES NFR BLD AUTO: 1.1 % — SIGNIFICANT CHANGE UP (ref 0–1.5)
KETONES UR-MCNC: NEGATIVE — SIGNIFICANT CHANGE UP
LACTATE SERPL-SCNC: 1.1 MMOL/L — SIGNIFICANT CHANGE UP (ref 0.5–2)
LEGIONELLA AG UR QL: NEGATIVE — SIGNIFICANT CHANGE UP
LEUKOCYTE ESTERASE UR-ACNC: NEGATIVE — SIGNIFICANT CHANGE UP
LYMPHOCYTES # BLD AUTO: 0.94 K/UL — LOW (ref 1–3.3)
LYMPHOCYTES # BLD AUTO: 8.3 % — LOW (ref 13–44)
MAGNESIUM SERPL-MCNC: 1.7 MG/DL — SIGNIFICANT CHANGE UP (ref 1.6–2.6)
MCHC RBC-ENTMCNC: 28.9 PG — SIGNIFICANT CHANGE UP (ref 27–34)
MCHC RBC-ENTMCNC: 30.4 GM/DL — LOW (ref 32–36)
MCV RBC AUTO: 95.1 FL — SIGNIFICANT CHANGE UP (ref 80–100)
MONOCYTES # BLD AUTO: 1.13 K/UL — HIGH (ref 0–0.9)
MONOCYTES NFR BLD AUTO: 10 % — SIGNIFICANT CHANGE UP (ref 2–14)
NEUTROPHILS # BLD AUTO: 8.98 K/UL — HIGH (ref 1.8–7.4)
NEUTROPHILS NFR BLD AUTO: 79.7 % — HIGH (ref 43–77)
NITRITE UR-MCNC: NEGATIVE — SIGNIFICANT CHANGE UP
NRBC # BLD: 0 /100 WBCS — SIGNIFICANT CHANGE UP (ref 0–0)
NT-PROBNP SERPL-SCNC: 1911 PG/ML — HIGH (ref 0–300)
PCO2 BLDA: 30 MMHG — LOW (ref 32–45)
PH BLDA: 7.49 — HIGH (ref 7.35–7.45)
PH UR: 6.5 — SIGNIFICANT CHANGE UP (ref 5–8)
PHOSPHATE SERPL-MCNC: 2.1 MG/DL — LOW (ref 2.5–4.5)
PLATELET # BLD AUTO: 315 K/UL — SIGNIFICANT CHANGE UP (ref 150–400)
PO2 BLDA: 103 MMHG — SIGNIFICANT CHANGE UP (ref 83–108)
POTASSIUM SERPL-MCNC: 3.2 MMOL/L — LOW (ref 3.5–5.3)
POTASSIUM SERPL-SCNC: 3.2 MMOL/L — LOW (ref 3.5–5.3)
PROT SERPL-MCNC: 6.3 G/DL — SIGNIFICANT CHANGE UP (ref 6–8.3)
PROT UR-MCNC: ABNORMAL MG/DL
RBC # BLD: 3.25 M/UL — LOW (ref 3.8–5.2)
RBC # FLD: 16.1 % — HIGH (ref 10.3–14.5)
RBC CASTS # UR COMP ASSIST: < 5 /HPF — SIGNIFICANT CHANGE UP
RH IG SCN BLD-IMP: POSITIVE — SIGNIFICANT CHANGE UP
S PNEUM AG UR QL: NEGATIVE — SIGNIFICANT CHANGE UP
SAO2 % BLDA: 98 % — SIGNIFICANT CHANGE UP (ref 95–100)
SODIUM SERPL-SCNC: 145 MMOL/L — SIGNIFICANT CHANGE UP (ref 135–145)
SP GR SPEC: 1.02 — SIGNIFICANT CHANGE UP (ref 1–1.03)
SPECIMEN SOURCE: SIGNIFICANT CHANGE UP
SPECIMEN SOURCE: SIGNIFICANT CHANGE UP
TROPONIN T SERPL-MCNC: <0.01 NG/ML — SIGNIFICANT CHANGE UP (ref 0–0.01)
UROBILINOGEN FLD QL: 0.2 E.U./DL — SIGNIFICANT CHANGE UP
WBC # BLD: 11.27 K/UL — HIGH (ref 3.8–10.5)
WBC # FLD AUTO: 11.27 K/UL — HIGH (ref 3.8–10.5)
WBC UR QL: ABNORMAL /HPF

## 2021-02-02 PROCEDURE — 74019 RADEX ABDOMEN 2 VIEWS: CPT | Mod: 26

## 2021-02-02 PROCEDURE — 99233 SBSQ HOSP IP/OBS HIGH 50: CPT | Mod: GC

## 2021-02-02 PROCEDURE — 99222 1ST HOSP IP/OBS MODERATE 55: CPT

## 2021-02-02 PROCEDURE — 71045 X-RAY EXAM CHEST 1 VIEW: CPT | Mod: 26

## 2021-02-02 PROCEDURE — 99233 SBSQ HOSP IP/OBS HIGH 50: CPT

## 2021-02-02 RX ORDER — POTASSIUM CHLORIDE 20 MEQ
40 PACKET (EA) ORAL EVERY 4 HOURS
Refills: 0 | Status: DISCONTINUED | OUTPATIENT
Start: 2021-02-02 | End: 2021-02-02

## 2021-02-02 RX ORDER — MORPHINE SULFATE 50 MG/1
1 CAPSULE, EXTENDED RELEASE ORAL ONCE
Refills: 0 | Status: DISCONTINUED | OUTPATIENT
Start: 2021-02-02 | End: 2021-02-02

## 2021-02-02 RX ORDER — ASPIRIN/CALCIUM CARB/MAGNESIUM 324 MG
81 TABLET ORAL DAILY
Refills: 0 | Status: DISCONTINUED | OUTPATIENT
Start: 2021-02-02 | End: 2021-02-05

## 2021-02-02 RX ORDER — ACETAMINOPHEN 500 MG
1000 TABLET ORAL ONCE
Refills: 0 | Status: DISCONTINUED | OUTPATIENT
Start: 2021-02-02 | End: 2021-02-02

## 2021-02-02 RX ORDER — POTASSIUM PHOSPHATE, MONOBASIC POTASSIUM PHOSPHATE, DIBASIC 236; 224 MG/ML; MG/ML
15 INJECTION, SOLUTION INTRAVENOUS ONCE
Refills: 0 | Status: COMPLETED | OUTPATIENT
Start: 2021-02-02 | End: 2021-02-02

## 2021-02-02 RX ORDER — MAGNESIUM SULFATE 500 MG/ML
1 VIAL (ML) INJECTION ONCE
Refills: 0 | Status: COMPLETED | OUTPATIENT
Start: 2021-02-02 | End: 2021-02-02

## 2021-02-02 RX ORDER — POTASSIUM CHLORIDE 20 MEQ
40 PACKET (EA) ORAL
Refills: 0 | Status: COMPLETED | OUTPATIENT
Start: 2021-02-02 | End: 2021-02-02

## 2021-02-02 RX ORDER — PIPERACILLIN AND TAZOBACTAM 4; .5 G/20ML; G/20ML
4.5 INJECTION, POWDER, LYOPHILIZED, FOR SOLUTION INTRAVENOUS EVERY 6 HOURS
Refills: 0 | Status: DISCONTINUED | OUTPATIENT
Start: 2021-02-02 | End: 2021-02-05

## 2021-02-02 RX ORDER — IOHEXOL 300 MG/ML
30 INJECTION, SOLUTION INTRAVENOUS ONCE
Refills: 0 | Status: DISCONTINUED | OUTPATIENT
Start: 2021-02-02 | End: 2021-02-02

## 2021-02-02 RX ADMIN — Medication 500 MILLIGRAM(S): at 11:21

## 2021-02-02 RX ADMIN — Medication 3 MILLILITER(S): at 05:32

## 2021-02-02 RX ADMIN — Medication 3 MILLILITER(S): at 22:21

## 2021-02-02 RX ADMIN — SODIUM CHLORIDE 2 GRAM(S): 9 INJECTION INTRAMUSCULAR; INTRAVENOUS; SUBCUTANEOUS at 22:22

## 2021-02-02 RX ADMIN — MORPHINE SULFATE 1 MILLIGRAM(S): 50 CAPSULE, EXTENDED RELEASE ORAL at 13:16

## 2021-02-02 RX ADMIN — Medication 100 GRAM(S): at 09:13

## 2021-02-02 RX ADMIN — Medication 2: at 18:03

## 2021-02-02 RX ADMIN — Medication 40 MILLIEQUIVALENT(S): at 11:21

## 2021-02-02 RX ADMIN — SODIUM CHLORIDE 2 GRAM(S): 9 INJECTION INTRAMUSCULAR; INTRAVENOUS; SUBCUTANEOUS at 13:27

## 2021-02-02 RX ADMIN — Medication 0.5 MILLIGRAM(S): at 09:16

## 2021-02-02 RX ADMIN — ZINC SULFATE TAB 220 MG (50 MG ZINC EQUIVALENT) 220 MILLIGRAM(S): 220 (50 ZN) TAB at 11:21

## 2021-02-02 RX ADMIN — Medication 3 MILLILITER(S): at 17:14

## 2021-02-02 RX ADMIN — Medication 81 MILLIGRAM(S): at 11:23

## 2021-02-02 RX ADMIN — CEFTRIAXONE 100 MILLIGRAM(S): 500 INJECTION, POWDER, FOR SOLUTION INTRAMUSCULAR; INTRAVENOUS at 11:49

## 2021-02-02 RX ADMIN — SODIUM CHLORIDE 2 GRAM(S): 9 INJECTION INTRAMUSCULAR; INTRAVENOUS; SUBCUTANEOUS at 05:33

## 2021-02-02 RX ADMIN — Medication 40 MILLIEQUIVALENT(S): at 09:14

## 2021-02-02 RX ADMIN — PANTOPRAZOLE SODIUM 40 MILLIGRAM(S): 20 TABLET, DELAYED RELEASE ORAL at 11:21

## 2021-02-02 RX ADMIN — Medication 650 MILLIGRAM(S): at 04:32

## 2021-02-02 RX ADMIN — CHLORHEXIDINE GLUCONATE 15 MILLILITER(S): 213 SOLUTION TOPICAL at 17:14

## 2021-02-02 RX ADMIN — PREGABALIN 1000 MICROGRAM(S): 225 CAPSULE ORAL at 11:21

## 2021-02-02 RX ADMIN — POTASSIUM PHOSPHATE, MONOBASIC POTASSIUM PHOSPHATE, DIBASIC 62.5 MILLIMOLE(S): 236; 224 INJECTION, SOLUTION INTRAVENOUS at 09:16

## 2021-02-02 RX ADMIN — INSULIN GLARGINE 5 UNIT(S): 100 INJECTION, SOLUTION SUBCUTANEOUS at 07:10

## 2021-02-02 RX ADMIN — LACOSAMIDE 150 MILLIGRAM(S): 50 TABLET ORAL at 05:32

## 2021-02-02 RX ADMIN — ENOXAPARIN SODIUM 40 MILLIGRAM(S): 100 INJECTION SUBCUTANEOUS at 17:14

## 2021-02-02 RX ADMIN — Medication 2: at 07:10

## 2021-02-02 RX ADMIN — Medication 3 MILLILITER(S): at 09:14

## 2021-02-02 RX ADMIN — Medication 325 MILLIGRAM(S): at 11:21

## 2021-02-02 RX ADMIN — ATORVASTATIN CALCIUM 40 MILLIGRAM(S): 80 TABLET, FILM COATED ORAL at 22:22

## 2021-02-02 RX ADMIN — PIPERACILLIN AND TAZOBACTAM 200 GRAM(S): 4; .5 INJECTION, POWDER, LYOPHILIZED, FOR SOLUTION INTRAVENOUS at 18:04

## 2021-02-02 RX ADMIN — Medication 0.5 MILLIGRAM(S): at 22:21

## 2021-02-02 RX ADMIN — Medication 2: at 11:22

## 2021-02-02 RX ADMIN — MONTELUKAST 10 MILLIGRAM(S): 4 TABLET, CHEWABLE ORAL at 11:21

## 2021-02-02 RX ADMIN — Medication 3 MILLILITER(S): at 02:16

## 2021-02-02 RX ADMIN — Medication 3 MILLILITER(S): at 13:40

## 2021-02-02 RX ADMIN — Medication 650 MILLIGRAM(S): at 10:24

## 2021-02-02 RX ADMIN — SENNA PLUS 2 TABLET(S): 8.6 TABLET ORAL at 22:22

## 2021-02-02 RX ADMIN — Medication 2: at 22:18

## 2021-02-02 RX ADMIN — CHLORHEXIDINE GLUCONATE 15 MILLILITER(S): 213 SOLUTION TOPICAL at 06:18

## 2021-02-02 RX ADMIN — LACOSAMIDE 150 MILLIGRAM(S): 50 TABLET ORAL at 17:17

## 2021-02-02 NOTE — PROGRESS NOTE ADULT - PROBLEM SELECTOR PLAN 5
pt with hx SAH with PEG, baseline chronic encephalopathy with acute hypoxic respiratory failure with aspiration pneumonia with sepsis.  -medical and symptom management per primary team   -cpr with trial of intubation  -family wishes to continue all interventions that are medically indicated   -all questions answered. emotional support provided.   -Palliative medicine will sign off for now  -Please reconsult if the patient's symptoms and/or goals of care change, need to be readdressed this admission pt with hx SAH with PEG, baseline chronic encephalopathy with acute hypoxic respiratory failure with aspiration pneumonia with sepsis.  -medical and symptom management per primary team   -multiple GOC conversations with family by various teams  -wishes are explicit: family wants to continue all interventions that are medically indicated   -cpr with trial of intubation  -all questions answered. emotional support provided.   -Palliative medicine will sign off for now  -Please reconsult if the patient's symptoms and/or goals of care change, need to be readdressed this admission

## 2021-02-02 NOTE — PROGRESS NOTE ADULT - PROBLEM SELECTOR PLAN 2
RESOLVING  - on arrival, , RR 30, WBC 12.07, lactate 2.3, s/p 3L NS  - source likely aspiration pneumonia  - s/p vanc/zosyn, c/w zosyn as above, now continuing Ceftriaxone  - UA neg, BCx sent, one bottle returning coag negative staph (thought contaminant), other UCX bottle NGTD, sputum culture negative; Ucx pending - on arrival, , RR 30, WBC 12.07, lactate 2.3, s/p 3L NS  - source likely aspiration pneumonia  - s/p vanc/zosyn, c/w zosyn as above, now continuing Ceftriaxone  - UA neg, BCx sent, one bottle returning coag negative staph (thought contaminant), other UCX bottle NGTD, sputum culture negative; urine legionella + strep negative  -2/2/21- spiked rectal temp 100.6 s/p tyl, blood cultures x2 sent. CXR performed showing incr interstit opac, possibly congestion/infiltrates and new small b/l pleural effusions, UA obtained, negative, f/u Uculture. O2 requirements not significantly changed. Will continue to monitor and f/u results, con/t with current abx

## 2021-02-02 NOTE — PROGRESS NOTE ADULT - ASSESSMENT
Pt is a 77 yo F with PMH COPD, asthma, HTN, HLD, subarachnoid hemorrhage (12/2020) who p/f [facility] for hypoxia and SOB. Found to have RLL infiltrate on CXR, adm to 7L for further observation and management. Pt is a 77 yo F with PMH COPD, asthma, HTN, HLD, subarachnoid hemorrhage (12/2020) who p/f [facility] for hypoxia and SOB. Found to have RLL infiltrate on CXR, adm to 7L for further observation and management, currently being treated for pneumonia, with continued GOC ongoing between palliative care and family regarding pt's poor prognosis i/s/o poor mental status since last hospitalization for SAH.

## 2021-02-02 NOTE — PROGRESS NOTE ADULT - PROBLEM SELECTOR PLAN 4
-multiple GOC and code status discussions with family by numerous teams  -would recommend DNR DNI  -follow up with family today: cw CPR with trial of intubation  -understand potentially fatal risks of CPR and that it is unlikely pt will return to pre resuscitation cognitive and functional baselines   -family understands that code status and GOC will continue to be readdressed

## 2021-02-02 NOTE — PROGRESS NOTE ADULT - ASSESSMENT
77 yo F with PMH COPD, asthma, HTN, HLD, subarachnoid hemorrhage (12/2020) presented 1/31 from [facility] for hypoxia, SOB. Recently admitted [facility] 12/9-1/26 for SAH s/p cerebral angiogram for coil embolization with external ventricular drain (EVD) placement 12/10 and replacement 12/29 c/b UTI, sp PEG placed 1/7. Pt in respiratory failure and at risk for rapid decompensation. Palliative care consulted for assistance with GOC.

## 2021-02-02 NOTE — CONSULT NOTE ADULT - SUBJECTIVE AND OBJECTIVE BOX
HISTORY OF PRESENT ILLNESS:     77 yo F with PMH COPD, asthma, HTN, HLD, subarachnoid hemorrhage (2020) who p/f [facility] NH for hypoxia and SOB. Pt is well-known to neurosurgery service, as she was recently admitted to [facility] - for SAH with IVH and obstructive hydrocephalus s/p cerebral angiogram for coil embolization 3mm L post comm aneurysm with external ventricular drain (EVD) placement 12/10 and removal  and replacement  c/b UTI with enterobacter and e coli with zosyn ending ; PEG placed . Since discharge has been AOx0-1 and lethargic. Admitted now to medicine service with RLL aspiration pneumonia being treated with ceftriaxone. Neurosurgery consulted given history of SAH and currently AMS.      PAST MEDICAL & SURGICAL HISTORY:  Hydrocephalus    Diabetes mellitus, type 2    GERD (gastroesophageal reflux disease)    Seizures    Subarachnoid hemorrhage, nontraumatic    Hyperlipidemia    Hypertension    COPD (chronic obstructive pulmonary disease)    Asthma    Gastrostomy tube in place    S/P aneurysm repair  2019 subarachnoid hemorrhage, IVH, obstructive hydrocephalus s/p cerebral angiogram for coil embolization 3mm L post comm aneurysm with external ventricular drain (EVD) placement 12/10 and removal  and replacement       FAMILY HISTORY:      SOCIAL HISTORY:  Tobacco Use:  EtOH use:   Substance:    Allergies    No Known Allergies    Intolerances        REVIEW OF SYSTEMS  General:	  Skin/Breast:  Ophthalmologic:  ENMT:	  Respiratory and Thorax:  Cardiovascular:	  Gastrointestinal:	  Genitourinary:	  Musculoskeletal:	  Neurological:	  Psychiatric:	  Hematology/Lymphatics:	  Endocrine:	  Allergic/Immunologic:	      MEDICATIONS:  Antibiotics:  cefTRIAXone   IVPB 2000 milliGRAM(s) IV Intermittent every 24 hours    Neuro:  acetaminophen    Suspension .. 650 milliGRAM(s) Oral every 6 hours PRN  lacosamide Solution 150 milliGRAM(s) Oral two times a day  morphine  - Injectable 1 milliGRAM(s) IV Push once    Anticoagulation:  aspirin  chewable 81 milliGRAM(s) Oral daily  enoxaparin Injectable 40 milliGRAM(s) SubCutaneous every 24 hours    OTHER:  acetylcysteine 20%  Inhalation 4 milliLiter(s) Inhalation three times a day PRN  albuterol/ipratropium for Nebulization 3 milliLiter(s) Nebulizer every 4 hours  atorvastatin 40 milliGRAM(s) Oral at bedtime  buDESOnide    Inhalation Suspension 0.5 milliGRAM(s) Inhalation every 12 hours  chlorhexidine 0.12% Liquid 15 milliLiter(s) Oral Mucosa two times a day  dextrose 40% Gel 15 Gram(s) Oral once  dextrose 50% Injectable 25 Gram(s) IV Push once  dextrose 50% Injectable 12.5 Gram(s) IV Push once  dextrose 50% Injectable 25 Gram(s) IV Push once  glucagon  Injectable 1 milliGRAM(s) IntraMuscular once  insulin glargine Injectable (LANTUS) 5 Unit(s) SubCutaneous every morning  insulin lispro (ADMELOG) corrective regimen sliding scale   SubCutaneous Before meals and at bedtime  montelukast 10 milliGRAM(s) Oral daily  pantoprazole   Suspension 40 milliGRAM(s) Oral daily  senna 2 Tablet(s) Oral at bedtime    IVF:  ascorbic acid 500 milliGRAM(s) Oral daily  cyanocobalamin 1000 MICROGram(s) Oral daily  dextrose 5%. 1000 milliLiter(s) IV Continuous <Continuous>  dextrose 5%. 1000 milliLiter(s) IV Continuous <Continuous>  ferrous    sulfate 325 milliGRAM(s) Oral daily  sodium chloride 2 Gram(s) Oral every 8 hours  zinc sulfate 220 milliGRAM(s) Oral daily      Vital Signs Last 24 Hrs  T(C): 38.1 (2021 10:03), Max: 38.1 (2021 10:03)  T(F): 100.6 (2021 10:03), Max: 100.6 (2021 10:03)  HR: 102 (2021 12:44) (96 - 108)  BP: 169/79 (2021 12:44) (127/64 - 169/79)  BP(mean): 114 (2021 12:44) (87 - 114)  RR: 22 (2021 12:44) (18 - 22)  SpO2: 100% (2021 12:44) (94% - 100%)    PHYSICAL EXAM:  Constitutional:  Eyes:  ENMT:  Neck:  Back:  Respiratory:  Cardiovascular:  Gastrointestinal:  Genitourinary:  Rectal:  Vascular:  Neurological:  Skin:    LABS:                        9.4    11.27 )-----------( 315      ( 2021 06:44 )             30.9     02-02    145  |  116<H>  |  11  ----------------------------<  189<H>  3.2<L>   |  22  |  0.49<L>    Ca    8.2<L>      2021 06:44  Phos  2.1       Mg     1.7         TPro  6.3  /  Alb  2.4<L>  /  TBili  0.2  /  DBili  x   /  AST  14  /  ALT  18  /  AlkPhos  125<H>      PT/INR - ( 2021 21:23 )   PT: 14.3 sec;   INR: 1.20          PTT - ( 2021 21:23 )  PTT:30.3 sec  Urinalysis Basic - ( 2021 11:12 )    Color: Yellow / Appearance: Hazy / S.020 / pH: x  Gluc: x / Ketone: NEGATIVE  / Bili: Negative / Urobili: 0.2 E.U./dL   Blood: x / Protein: Trace mg/dL / Nitrite: NEGATIVE   Leuk Esterase: NEGATIVE / RBC: < 5 /HPF / WBC 5-10 /HPF   Sq Epi: x / Non Sq Epi: 0-5 /HPF / Bacteria: None /HPF      CULTURES:  Culture Results:   No growth at 1 day. ( @ 21:59)  Culture Results:   Normal Respiratory Verna present ( @ 16:34)      RADIOLOGY & ADDITIONAL STUDIES:    Assessment:      Plan:     HISTORY OF PRESENT ILLNESS:     75 yo F with PMH COPD, asthma, HTN, HLD, subarachnoid hemorrhage (2020) who p/f [facility] NH for hypoxia and SOB. Pt is well-known to neurosurgery service, as she was recently admitted to [facility] - for SAH with IVH and obstructive hydrocephalus s/p cerebral angiogram for coil embolization 3mm L post comm aneurysm with external ventricular drain (EVD) placement 12/10 and removal  and replacement  c/b UTI with enterobacter and e coli with zosyn ending ; PEG placed . Since discharge has been AOx0-1 and lethargic. Admitted now to medicine service with RLL aspiration pneumonia being treated with ceftriaxone. Neurosurgery consulted given history of SAH and currently AMS.    Patient seen and examined at bedside. Phonating but nonverbal. Unable to obtain ROS.    PAST MEDICAL & SURGICAL HISTORY:  Hydrocephalus    Diabetes mellitus, type 2    GERD (gastroesophageal reflux disease)    Seizures    Subarachnoid hemorrhage, nontraumatic    Hyperlipidemia    Hypertension    COPD (chronic obstructive pulmonary disease)    Asthma    Gastrostomy tube in place    S/P aneurysm repair  2019 subarachnoid hemorrhage, IVH, obstructive hydrocephalus s/p cerebral angiogram for coil embolization 3mm L post comm aneurysm with external ventricular drain (EVD) placement 12/10 and removal  and replacement       Allergies    No Known Allergies    Intolerances        REVIEW OF SYSTEMS - unable to obtain 2/2 mental status      MEDICATIONS:  Antibiotics:  cefTRIAXone   IVPB 2000 milliGRAM(s) IV Intermittent every 24 hours    Neuro:  acetaminophen    Suspension .. 650 milliGRAM(s) Oral every 6 hours PRN  lacosamide Solution 150 milliGRAM(s) Oral two times a day  morphine  - Injectable 1 milliGRAM(s) IV Push once    Anticoagulation:  aspirin  chewable 81 milliGRAM(s) Oral daily  enoxaparin Injectable 40 milliGRAM(s) SubCutaneous every 24 hours    OTHER:  acetylcysteine 20%  Inhalation 4 milliLiter(s) Inhalation three times a day PRN  albuterol/ipratropium for Nebulization 3 milliLiter(s) Nebulizer every 4 hours  atorvastatin 40 milliGRAM(s) Oral at bedtime  buDESOnide    Inhalation Suspension 0.5 milliGRAM(s) Inhalation every 12 hours  chlorhexidine 0.12% Liquid 15 milliLiter(s) Oral Mucosa two times a day  dextrose 40% Gel 15 Gram(s) Oral once  dextrose 50% Injectable 25 Gram(s) IV Push once  dextrose 50% Injectable 12.5 Gram(s) IV Push once  dextrose 50% Injectable 25 Gram(s) IV Push once  glucagon  Injectable 1 milliGRAM(s) IntraMuscular once  insulin glargine Injectable (LANTUS) 5 Unit(s) SubCutaneous every morning  insulin lispro (ADMELOG) corrective regimen sliding scale   SubCutaneous Before meals and at bedtime  montelukast 10 milliGRAM(s) Oral daily  pantoprazole   Suspension 40 milliGRAM(s) Oral daily  senna 2 Tablet(s) Oral at bedtime    IVF:  ascorbic acid 500 milliGRAM(s) Oral daily  cyanocobalamin 1000 MICROGram(s) Oral daily  dextrose 5%. 1000 milliLiter(s) IV Continuous <Continuous>  dextrose 5%. 1000 milliLiter(s) IV Continuous <Continuous>  ferrous    sulfate 325 milliGRAM(s) Oral daily  sodium chloride 2 Gram(s) Oral every 8 hours  zinc sulfate 220 milliGRAM(s) Oral daily      Vital Signs Last 24 Hrs  T(C): 38.1 (2021 10:03), Max: 38.1 (2021 10:03)  T(F): 100.6 (2021 10:03), Max: 100.6 (2021 10:03)  HR: 102 (2021 12:44) (96 - 108)  BP: 169/79 (2021 12:44) (127/64 - 169/79)  BP(mean): 114 (2021 12:44) (87 - 114)  RR: 22 (2021 12:44) (18 - 22)  SpO2: 100% (2021 12:44) (94% - 100%)    PHYSICAL EXAM:  Constitutional: laying in bed, appears uncomfortable  Respiratory: tachypneic, on NC  Cardiovascular: tachycardic to 110s  Gastrointestinal: PEG in place, soft NT/ND  Neurological: A&Ox0, phonating but nonverbal, opens eyes and tracks, EOMI, PERRL. Moving all extremities spontaneously - UE b/l antigravity (3/5), LLE 2/5, RLE 1/5.     LABS:                        9.4    11.27 )-----------( 315      ( 2021 06:44 )             30.9         145  |  116<H>  |  11  ----------------------------<  189<H>  3.2<L>   |  22  |  0.49<L>    Ca    8.2<L>      2021 06:44  Phos  2.1       Mg     1.7         TPro  6.3  /  Alb  2.4<L>  /  TBili  0.2  /  DBili  x   /  AST  14  /  ALT  18  /  AlkPhos  125<H>      PT/INR - ( 2021 21:23 )   PT: 14.3 sec;   INR: 1.20          PTT - ( 2021 21:23 )  PTT:30.3 sec  Urinalysis Basic - ( 2021 11:12 )    Color: Yellow / Appearance: Hazy / S.020 / pH: x  Gluc: x / Ketone: NEGATIVE  / Bili: Negative / Urobili: 0.2 E.U./dL   Blood: x / Protein: Trace mg/dL / Nitrite: NEGATIVE   Leuk Esterase: NEGATIVE / RBC: < 5 /HPF / WBC 5-10 /HPF   Sq Epi: x / Non Sq Epi: 0-5 /HPF / Bacteria: None /HPF      CULTURES:  Culture Results:   No growth at 1 day. ( @ 21:59)  Culture Results:   Normal Respiratory Verna present ( @ 16:34)      RADIOLOGY & ADDITIONAL STUDIES:    [facility] :    IMPRESSION:  1.  Since 2021, minimal interval decrease in ventriculomegaly of lateral and third ventricles with unchanged right transfrontal  shunt.  2.  Interval decrease in now trace intraventricular hemorrhage left occipital horn. No new or increasing hemorrhage.  3.  Post coil embolization of left P-comm aneurysm.      Assessment: 76 F with PMH COPD, asthma, HTN, HLD, subarachnoid hemorrhage (2020) with IVH and obstructive hydrocephalus s/p cerebral angiogram for coil embolization 3mm L post comm aneurysm now admitted for aspiration pneumonia.        Plan:  - care per primary team  - CTH reviewed with Dr. Serrano - stable  - no neurosurgical intervention required at this time  - Dr. Serrano to discuss prognosis regarding neuro recovery as related to GOC discussions with attending  - please page with any questions or acute neuro changes ([phone])      Discussed with Dr. Serrano

## 2021-02-02 NOTE — PROGRESS NOTE ADULT - PROBLEM SELECTOR PLAN 3
- as above - as above    #Urinary Retention:  -Found to be retaining O/N after de anda (placed in ER) removed late day 2/1, requiring SC in early AM. Will continue Q6 BS and re-evaluate need for de anda

## 2021-02-02 NOTE — PROGRESS NOTE ADULT - PROBLEM SELECTOR PLAN 2
2/2 PEG. febrile overnight.   -tube feeds restarted 2/1   -abx and medical management per primary team  -continuous risk for aspiration

## 2021-02-02 NOTE — PROGRESS NOTE ADULT - PROBLEM SELECTOR PLAN 1
pt p/w hypoxia to 60% per EMS. previously on NRB, today on NC. thick secretions this admission requiring frequent suctioning. tube feeds restarted 2/1  -abx and medical management per primary team  -consider glycopyrrolate 0.2mg IV q8h ATC for secretions   -multiple goc discussions, ongoing   -for now, cpr, NRB/bipap, HFNC if indicated with trial of intubation

## 2021-02-02 NOTE — PROGRESS NOTE ADULT - PROBLEM SELECTOR PLAN 4
- unclear baseline, though has been AOx0-1 after previous hospitalization for SAH in December  - currently AOx0, non-participatory with exam, withdraws to pain, and moving extremities spontaneously 2/1 and also opening eyes more  - continue to monitor  - 2/1 Increased vimpat to home dose 150 BID - unclear baseline, though has been AOx0-1 after previous hospitalization for SAH in December  - currently AOx0, non-participatory with exam, withdraws to pain, and moving extremities spontaneously 2/1 and also opening eyes more  - continue to monitor  - 2/1 Increased vimpat to home dose 150 BID  - 2/2 got NSGY team involved w/ intention to provide further insight as to prognosis regarding neuro recovery (well-known to NSGY service), f/u recs

## 2021-02-02 NOTE — PROGRESS NOTE ADULT - SUBJECTIVE AND OBJECTIVE BOX
SUBJECTIVE: pt seen and examined. does not wake to name when stated loudly. unable to provide ROS, nonverbal at baseline. appears comfortable, pt RR shallow 20, on 2LNC, but is tachy. febrile overnight received tylenol.     DNR in chart: NO    PEx:  T(C): 36.7 (02-02-21 @ 13:15), Max: 38.1 (02-02-21 @ 10:03)  HR: 102 (02-02-21 @ 12:44) (96 - 108)  BP: 169/79 (02-02-21 @ 12:44) (127/64 - 169/79)  RR: 22 (02-02-21 @ 12:44) (18 - 22)  SpO2: 100% (02-02-21 @ 12:44) (94% - 100%)  Wt(kg): --    Constitutional: elderly, frail chronically ill appearing, lying in bed  Head: NC/AT  Eyes: PERRL, does not open eyes to command or verbal stimulus, anicteric sclera  ENT: no nasal discharge; MMM  Neck: supple; no JVD  Respiratory: diffuse crackles most significant at RLL; NC 2-3L without signs of respiratory distress on exam  Cardiac: +S1/S2; RRR; no M/R/G  Gastrointestinal: soft, NT/ND; no rebound or guarding; +BSx4; PEG in place without drainage or overlying erythema  Extremities: WWP; no peripheral edema; BL UE contractures  Vascular: 2+ radial, DP/PT pulses B/L  Dermatologic: skin warm, dry and intact; no rashes, wounds, or scars  Neurologic: AOx0, non-participatory with exam, withdraws to pain, does not wake to name when stated loudly     ALLERGIES: No Known Allergies    OPIATE NAÏVE (Y/N): Yes    MEDICATIONS: REVIEWED  MEDICATIONS  (STANDING):  albuterol/ipratropium for Nebulization 3 milliLiter(s) Nebulizer every 4 hours  ascorbic acid 500 milliGRAM(s) Oral daily  aspirin  chewable 81 milliGRAM(s) Oral daily  atorvastatin 40 milliGRAM(s) Oral at bedtime  buDESOnide    Inhalation Suspension 0.5 milliGRAM(s) Inhalation every 12 hours  cefTRIAXone   IVPB 2000 milliGRAM(s) IV Intermittent every 24 hours  chlorhexidine 0.12% Liquid 15 milliLiter(s) Oral Mucosa two times a day  cyanocobalamin 1000 MICROGram(s) Oral daily  dextrose 40% Gel 15 Gram(s) Oral once  dextrose 5%. 1000 milliLiter(s) (50 mL/Hr) IV Continuous <Continuous>  dextrose 5%. 1000 milliLiter(s) (100 mL/Hr) IV Continuous <Continuous>  dextrose 50% Injectable 25 Gram(s) IV Push once  dextrose 50% Injectable 12.5 Gram(s) IV Push once  dextrose 50% Injectable 25 Gram(s) IV Push once  enoxaparin Injectable 40 milliGRAM(s) SubCutaneous every 24 hours  ferrous    sulfate 325 milliGRAM(s) Oral daily  glucagon  Injectable 1 milliGRAM(s) IntraMuscular once  insulin glargine Injectable (LANTUS) 5 Unit(s) SubCutaneous every morning  insulin lispro (ADMELOG) corrective regimen sliding scale   SubCutaneous Before meals and at bedtime  iohexol 300 mG (iodine)/mL Oral Solution 30 milliLiter(s) Oral once  lacosamide Solution 150 milliGRAM(s) Oral two times a day  montelukast 10 milliGRAM(s) Oral daily  pantoprazole   Suspension 40 milliGRAM(s) Oral daily  senna 2 Tablet(s) Oral at bedtime  sodium chloride 2 Gram(s) Oral every 8 hours  zinc sulfate 220 milliGRAM(s) Oral daily    MEDICATIONS  (PRN):  acetaminophen    Suspension .. 650 milliGRAM(s) Oral every 6 hours PRN Temp greater or equal to 38C (100.4F), Mild Pain (1 - 3)  acetylcysteine 20%  Inhalation 4 milliLiter(s) Inhalation three times a day PRN congestion    LABS: REVIEWED  CBC:                        9.4    11.27 )-----------( 315      ( 02 Feb 2021 06:44 )             30.9     CMP:    02-02    145  |  116<H>  |  11  ----------------------------<  189<H>  3.2<L>   |  22  |  0.49<L>    Ca    8.2<L>      02 Feb 2021 06:44  Phos  2.1     02-02  Mg     1.7     02-02    TPro  6.3  /  Alb  2.4<L>  /  TBili  0.2  /  DBili  x   /  AST  14  /  ALT  18  /  AlkPhos  125<H>  02-02  Albumin, Serum: 2.4 g/dL (02-02-21 @ 06:44)      IMAGING: REVIEWED    ADVANCE DIRECTIVES  -CPR with trial of intubation     Decision maker: The patient is UNable to participate in complex medical decision making conversations.   Legal surrogate: four adult children: HarrisAndrea, Margarita West. HCP: Son Andrea Rodriguez [phone] and Son - Harris Kelley [phone]. Family makes decisions together.     GOALS OF CARE DISCUSSION  -Palliative care info/support provided to pt's adult sons   -continue all indicated medical interventions

## 2021-02-02 NOTE — PROGRESS NOTE ADULT - PROBLEM SELECTOR PLAN 3
AOx0, non-participatory with exam, opens eyes when name stated in loud voice, but does not engage, withdraws to pain   -sp SAH, poor prognosis History of SAH with baseline chronic encephalopathy, AOx0-1, non-participatory with exam, occasionally opens eyes when name stated in loud voice, but does not engage, withdraws to pain   -family believes pt will one day be able to verbalize needs, communicate

## 2021-02-02 NOTE — PROGRESS NOTE ADULT - SUBJECTIVE AND OBJECTIVE BOX
IN PROGRESS IN PROGRESS    OVERNIGHT EVENTS:    SUBJECTIVE / INTERVAL HPI: Patient seen and examined at bedside.     VITAL SIGNS:  Vital Signs Last 24 Hrs  T(C): 36.6 (02 Feb 2021 05:55), Max: 38 (01 Feb 2021 21:40)  T(F): 97.9 (02 Feb 2021 05:55), Max: 100.4 (01 Feb 2021 21:40)  HR: 100 (02 Feb 2021 04:11) (96 - 110)  BP: 136/64 (02 Feb 2021 04:11) (125/60 - 145/66)  BP(mean): 92 (02 Feb 2021 04:11) (87 - 98)  RR: 18 (02 Feb 2021 04:11) (18 - 20)  SpO2: 99% (02 Feb 2021 04:11) (94% - 100%)    PHYSICAL EXAM:  Constitutional: elderly, frail chronically ill appearing, laying in bed, NAD  Head: NC/AT  Eyes: PERRL, opening eyes later in AM in response to physical/ verbal stimulus, anicteric sclera  ENT: no nasal discharge; MMM  Neck: supple; no JVD  Respiratory: Some crackles at base, more significant at RLL; on RA  without signs of respiratory distress  Cardiac: +S1/S2; RRR; no M/R/G  Gastrointestinal: soft, NT/ND; no rebound or guarding; +BSx4; PEG in place without drainage or overlying erythema  Extremities: WWP; no peripheral edema; BL UE contractures; b/l boots on.   Vascular: 2+ radial, DP/PT pulses B/L  Dermatologic: skin warm, dry and intact; no rashes, wounds, or scars  Neurologic: AOx0, non-participatory with exam, withdraws to pain, spontaneously moving extremities     MEDICATIONS:  MEDICATIONS  (STANDING):  albuterol/ipratropium for Nebulization 3 milliLiter(s) Nebulizer every 4 hours  ascorbic acid 500 milliGRAM(s) Oral daily  aspirin enteric coated 81 milliGRAM(s) Oral daily  atorvastatin 40 milliGRAM(s) Oral at bedtime  buDESOnide    Inhalation Suspension 0.5 milliGRAM(s) Inhalation every 12 hours  cefTRIAXone   IVPB 2000 milliGRAM(s) IV Intermittent every 24 hours  chlorhexidine 0.12% Liquid 15 milliLiter(s) Oral Mucosa two times a day  cyanocobalamin 1000 MICROGram(s) Oral daily  dextrose 40% Gel 15 Gram(s) Oral once  dextrose 5%. 1000 milliLiter(s) (50 mL/Hr) IV Continuous <Continuous>  dextrose 5%. 1000 milliLiter(s) (100 mL/Hr) IV Continuous <Continuous>  dextrose 50% Injectable 25 Gram(s) IV Push once  dextrose 50% Injectable 12.5 Gram(s) IV Push once  dextrose 50% Injectable 25 Gram(s) IV Push once  enoxaparin Injectable 40 milliGRAM(s) SubCutaneous every 24 hours  ferrous    sulfate 325 milliGRAM(s) Oral daily  glucagon  Injectable 1 milliGRAM(s) IntraMuscular once  insulin glargine Injectable (LANTUS) 5 Unit(s) SubCutaneous every morning  insulin lispro (ADMELOG) corrective regimen sliding scale   SubCutaneous Before meals and at bedtime  lacosamide Solution 150 milliGRAM(s) Oral two times a day  montelukast 10 milliGRAM(s) Oral daily  pantoprazole   Suspension 40 milliGRAM(s) Oral daily  senna 2 Tablet(s) Oral at bedtime  sodium chloride 2 Gram(s) Oral every 8 hours  zinc sulfate 220 milliGRAM(s) Oral daily    MEDICATIONS  (PRN):  acetaminophen    Suspension .. 650 milliGRAM(s) Oral every 6 hours PRN Temp greater or equal to 38C (100.4F), Mild Pain (1 - 3)  acetylcysteine 20%  Inhalation 4 milliLiter(s) Inhalation three times a day PRN congestion    ALLERGIES:  Allergies    No Known Allergies    Intolerances    LABS:                        9.0    12.61 )-----------( 300      ( 01 Feb 2021 06:17 )             28.8     02-01    141  |  111<H>  |  21  ----------------------------<  174<H>  3.6   |  17<L>  |  0.59    Ca    8.1<L>      01 Feb 2021 06:53  Phos  1.7     02-01  Mg     1.9     02-01    TPro  5.9<L>  /  Alb  2.2<L>  /  TBili  0.4  /  DBili  x   /  AST  15  /  ALT  19  /  AlkPhos  100  02-01    PT/INR - ( 31 Jan 2021 21:23 )   PT: 14.3 sec;   INR: 1.20       PTT - ( 31 Jan 2021 21:23 )  PTT:30.3 sec    CAPILLARY BLOOD GLUCOSE    POCT Blood Glucose.: 129 mg/dL (01 Feb 2021 21:31)    RADIOLOGY & ADDITIONAL TESTS: Reviewed.   OVERNIGHT EVENTS: bladder scan 12am 160cc, patient without urinary output yet. Next bladder scan in AM 381cc, voided 250cc, but still retaining >300c, straight cathed 700cc. Sinus tachy to 100-108bpm, rectal temp 100.1, looked uncomfortable. Given tylenol.    SUBJECTIVE / INTERVAL HPI: Patient seen and examined at bedside. Subjective portion of exam not possible given patient being nonverbal.     VITAL SIGNS:  Vital Signs Last 24 Hrs  T(C): 36.6 (02 Feb 2021 05:55), Max: 38 (01 Feb 2021 21:40)  T(F): 97.9 (02 Feb 2021 05:55), Max: 100.4 (01 Feb 2021 21:40)  HR: 100 (02 Feb 2021 04:11) (96 - 110)  BP: 136/64 (02 Feb 2021 04:11) (125/60 - 145/66)  BP(mean): 92 (02 Feb 2021 04:11) (87 - 98)  RR: 18 (02 Feb 2021 04:11) (18 - 20)  SpO2: 99% (02 Feb 2021 04:11) (94% - 100%)    PHYSICAL EXAM:  Constitutional: elderly, frail chronically ill appearing, laying in bed, Looking more uncomfortable this AM  Head: NC/AT  Eyes: PERRL, opening eyes later in AM in response to physical/ verbal stimulus, anicteric sclera  ENT: no nasal discharge; MMM  Neck: supple; no JVD  Respiratory: Faint crackles at base, more significant at RLL; on 2L noted today more tachypneic to 30s, shallow breaths  Cardiac: +S1/S2; RR, tachy to 100s; no M/R/G  Gastrointestinal: soft, NT/ND; no rebound or guarding; +BSx4; PEG in place without drainage or overlying erythema  Extremities: WWP; no peripheral edema; BL UE contractures; b/l boots on.   Vascular: 2+ radial, DP/PT pulses B/L  Dermatologic: skin warm, dry and intact; no rashes, wounds, or scars  Neurologic: AOx0, non-participatory with exam, withdraws to pain, spontaneously moving extremities     MEDICATIONS:  MEDICATIONS  (STANDING):  albuterol/ipratropium for Nebulization 3 milliLiter(s) Nebulizer every 4 hours  ascorbic acid 500 milliGRAM(s) Oral daily  aspirin enteric coated 81 milliGRAM(s) Oral daily  atorvastatin 40 milliGRAM(s) Oral at bedtime  buDESOnide    Inhalation Suspension 0.5 milliGRAM(s) Inhalation every 12 hours  cefTRIAXone   IVPB 2000 milliGRAM(s) IV Intermittent every 24 hours  chlorhexidine 0.12% Liquid 15 milliLiter(s) Oral Mucosa two times a day  cyanocobalamin 1000 MICROGram(s) Oral daily  dextrose 40% Gel 15 Gram(s) Oral once  dextrose 5%. 1000 milliLiter(s) (50 mL/Hr) IV Continuous <Continuous>  dextrose 5%. 1000 milliLiter(s) (100 mL/Hr) IV Continuous <Continuous>  dextrose 50% Injectable 25 Gram(s) IV Push once  dextrose 50% Injectable 12.5 Gram(s) IV Push once  dextrose 50% Injectable 25 Gram(s) IV Push once  enoxaparin Injectable 40 milliGRAM(s) SubCutaneous every 24 hours  ferrous    sulfate 325 milliGRAM(s) Oral daily  glucagon  Injectable 1 milliGRAM(s) IntraMuscular once  insulin glargine Injectable (LANTUS) 5 Unit(s) SubCutaneous every morning  insulin lispro (ADMELOG) corrective regimen sliding scale   SubCutaneous Before meals and at bedtime  lacosamide Solution 150 milliGRAM(s) Oral two times a day  montelukast 10 milliGRAM(s) Oral daily  pantoprazole   Suspension 40 milliGRAM(s) Oral daily  senna 2 Tablet(s) Oral at bedtime  sodium chloride 2 Gram(s) Oral every 8 hours  zinc sulfate 220 milliGRAM(s) Oral daily    MEDICATIONS  (PRN):  acetaminophen    Suspension .. 650 milliGRAM(s) Oral every 6 hours PRN Temp greater or equal to 38C (100.4F), Mild Pain (1 - 3)  acetylcysteine 20%  Inhalation 4 milliLiter(s) Inhalation three times a day PRN congestion    ALLERGIES:  Allergies    No Known Allergies    Intolerances    LABS:                        9.0    12.61 )-----------( 300      ( 01 Feb 2021 06:17 )             28.8     02-01    141  |  111<H>  |  21  ----------------------------<  174<H>  3.6   |  17<L>  |  0.59    Ca    8.1<L>      01 Feb 2021 06:53  Phos  1.7     02-01  Mg     1.9     02-01    TPro  5.9<L>  /  Alb  2.2<L>  /  TBili  0.4  /  DBili  x   /  AST  15  /  ALT  19  /  AlkPhos  100  02-01    PT/INR - ( 31 Jan 2021 21:23 )   PT: 14.3 sec;   INR: 1.20       PTT - ( 31 Jan 2021 21:23 )  PTT:30.3 sec    CAPILLARY BLOOD GLUCOSE    POCT Blood Glucose.: 129 mg/dL (01 Feb 2021 21:31)    RADIOLOGY & ADDITIONAL TESTS: Reviewed.   OVERNIGHT EVENTS: bladder scan 12am 160cc, patient without urinary output yet. Next bladder scan in AM 381cc, voided 250cc, but still retaining >300c, straight cathed 700cc. Sinus tachy to 100-108bpm, rectal temp 100.1, looked uncomfortable. Given tylenol.    SUBJECTIVE / INTERVAL HPI: Patient seen and examined at bedside. Subjective portion of exam not possible given patient being nonverbal.     VITAL SIGNS:  Vital Signs Last 24 Hrs  T(C): 36.6 (02 Feb 2021 05:55), Max: 38 (01 Feb 2021 21:40)  T(F): 97.9 (02 Feb 2021 05:55), Max: 100.4 (01 Feb 2021 21:40)  HR: 100 (02 Feb 2021 04:11) (96 - 110)  BP: 136/64 (02 Feb 2021 04:11) (125/60 - 145/66)  BP(mean): 92 (02 Feb 2021 04:11) (87 - 98)  RR: 18 (02 Feb 2021 04:11) (18 - 20)  SpO2: 99% (02 Feb 2021 04:11) (94% - 100%)    PHYSICAL EXAM:  Constitutional: elderly, frail chronically ill appearing, laying in bed, Looking more uncomfortable this AM  Head: NC/AT  Eyes: PERRL, opening eyes later in AM in response to physical/ verbal stimulus, anicteric sclera  ENT: no nasal discharge; MMM  Neck: supple; no JVD  Respiratory: Faint crackles at base as before, more significant at RLL; on 2L noted today more tachypneic to 30s, shallow breaths  Cardiac: +S1/S2; RR, tachy to 100s; no M/R/G  Gastrointestinal: soft, NT/ND; no rebound or guarding; +BSx4; PEG in place without drainage or overlying erythema  Extremities: WWP; no peripheral edema; BL UE contractures; b/l boots on.   Vascular: 2+ radial, DP/PT pulses B/L  Dermatologic: skin warm, dry and intact; no rashes, wounds, or scars  Neurologic: AOx0, non-participatory with exam, withdraws to pain, spontaneously moving extremities     MEDICATIONS:  MEDICATIONS  (STANDING):  albuterol/ipratropium for Nebulization 3 milliLiter(s) Nebulizer every 4 hours  ascorbic acid 500 milliGRAM(s) Oral daily  aspirin enteric coated 81 milliGRAM(s) Oral daily  atorvastatin 40 milliGRAM(s) Oral at bedtime  buDESOnide    Inhalation Suspension 0.5 milliGRAM(s) Inhalation every 12 hours  cefTRIAXone   IVPB 2000 milliGRAM(s) IV Intermittent every 24 hours  chlorhexidine 0.12% Liquid 15 milliLiter(s) Oral Mucosa two times a day  cyanocobalamin 1000 MICROGram(s) Oral daily  dextrose 40% Gel 15 Gram(s) Oral once  dextrose 5%. 1000 milliLiter(s) (50 mL/Hr) IV Continuous <Continuous>  dextrose 5%. 1000 milliLiter(s) (100 mL/Hr) IV Continuous <Continuous>  dextrose 50% Injectable 25 Gram(s) IV Push once  dextrose 50% Injectable 12.5 Gram(s) IV Push once  dextrose 50% Injectable 25 Gram(s) IV Push once  enoxaparin Injectable 40 milliGRAM(s) SubCutaneous every 24 hours  ferrous    sulfate 325 milliGRAM(s) Oral daily  glucagon  Injectable 1 milliGRAM(s) IntraMuscular once  insulin glargine Injectable (LANTUS) 5 Unit(s) SubCutaneous every morning  insulin lispro (ADMELOG) corrective regimen sliding scale   SubCutaneous Before meals and at bedtime  lacosamide Solution 150 milliGRAM(s) Oral two times a day  montelukast 10 milliGRAM(s) Oral daily  pantoprazole   Suspension 40 milliGRAM(s) Oral daily  senna 2 Tablet(s) Oral at bedtime  sodium chloride 2 Gram(s) Oral every 8 hours  zinc sulfate 220 milliGRAM(s) Oral daily    MEDICATIONS  (PRN):  acetaminophen    Suspension .. 650 milliGRAM(s) Oral every 6 hours PRN Temp greater or equal to 38C (100.4F), Mild Pain (1 - 3)  acetylcysteine 20%  Inhalation 4 milliLiter(s) Inhalation three times a day PRN congestion    ALLERGIES:  Allergies    No Known Allergies    Intolerances    LABS:                        9.0    12.61 )-----------( 300      ( 01 Feb 2021 06:17 )             28.8     02-01    141  |  111<H>  |  21  ----------------------------<  174<H>  3.6   |  17<L>  |  0.59    Ca    8.1<L>      01 Feb 2021 06:53  Phos  1.7     02-01  Mg     1.9     02-01    TPro  5.9<L>  /  Alb  2.2<L>  /  TBili  0.4  /  DBili  x   /  AST  15  /  ALT  19  /  AlkPhos  100  02-01    PT/INR - ( 31 Jan 2021 21:23 )   PT: 14.3 sec;   INR: 1.20       PTT - ( 31 Jan 2021 21:23 )  PTT:30.3 sec    CAPILLARY BLOOD GLUCOSE    POCT Blood Glucose.: 129 mg/dL (01 Feb 2021 21:31)    RADIOLOGY & ADDITIONAL TESTS: Reviewed.

## 2021-02-02 NOTE — PROGRESS NOTE ADULT - PROBLEM SELECTOR PLAN 1
*See h&P as needed for more information on initial assessment   -P/w respiratory failure in context of copious secretions in airway and c/f aspiration pneumonia, initially requiring NRB and then weaned with frequent suctioning to NC and then RA; CXR with RLL infiltrate. Received vanc/zosyn in ED. Sputum cx n/l   - 2/1 abx switched to ceftriaxone 1g Q24 hr for 5-7D course  - duonebs q6h   - oral care and frequent suctioning  - multiple extensive GOC discussions with son, f/u palliative recs *See h&P as needed for more information on initial assessment   -P/w respiratory failure in context of copious secretions in airway and c/f aspiration pneumonia, initially requiring NRB and then weaned with frequent suctioning to NC and then RA; CXR with RLL infiltrate. Received vanc/zosyn in ED. Sputum cx n/l. 2/1 abx switched to ceftriaxone 1g Q24 hr for 5-7D course, w/ duonebs q6h   - 2/2 noted to   - oral care and frequent suctioning  - multiple extensive GOC discussions with son, f/u palliative recs *See h&P as needed for more information on initial assessment   -P/w respiratory failure in context of copious secretions in airway and c/f aspiration pneumonia, initially requiring NRB and then weaned with frequent suctioning to NC and then RA; CXR with RLL infiltrate. Received vanc/zosyn in ED. Sputum cx n/l. 2/1 abx switched to ceftriaxone 1g Q24 hr for 5-7D course, w/ duonebs q6h   - 2/2 noted to be more tachypneic and found with 100.6F rectal temp in AM. Giving morphine 1 mg IVP for sx control, doing fever w/u (mentioned below in "severe sepsis"), monitoring for pain (abd Xray performed and was n/l), and monitoring (02 requirements unchanged; lung exam not changed from yesterday).   - oral care and frequent suctioning  - multiple extensive GOC discussions with son, f/u palliative recs

## 2021-02-02 NOTE — PROGRESS NOTE ADULT - PROBLEM SELECTOR PLAN 9
RESOLVED  - on arrival with Cr 1.52, baseline 0.3-0.4  - s/p 3L NS bolus  => => 0.59 2/1  - urine lytes c/w pre-renal disease (FeNa 0.2%)  - Trend BMP and monitor UO

## 2021-02-02 NOTE — PROGRESS NOTE ADULT - PROBLEM SELECTOR PLAN 10
- F: s/p 3L NS bolus, no further fluids  - E: replete K<4, Mg<2  - N: Tube feeds restarted 2/1  - D: lovenox 40mg q24h  - G: protonix 40mg daily    Code: full (pending future discussions between family and palliative care team)  Dispo:

## 2021-02-03 LAB
-  CEFAZOLIN: SIGNIFICANT CHANGE UP
-  CLINDAMYCIN: SIGNIFICANT CHANGE UP
-  ERYTHROMYCIN: SIGNIFICANT CHANGE UP
-  LINEZOLID: SIGNIFICANT CHANGE UP
-  OXACILLIN: SIGNIFICANT CHANGE UP
-  RIFAMPIN: SIGNIFICANT CHANGE UP
-  TRIMETHOPRIM/SULFAMETHOXAZOLE: SIGNIFICANT CHANGE UP
-  VANCOMYCIN: SIGNIFICANT CHANGE UP
ALBUMIN SERPL ELPH-MCNC: 2.3 G/DL — LOW (ref 3.3–5)
ALP SERPL-CCNC: 129 U/L — HIGH (ref 40–120)
ALT FLD-CCNC: 15 U/L — SIGNIFICANT CHANGE UP (ref 10–45)
ANION GAP SERPL CALC-SCNC: 10 MMOL/L — SIGNIFICANT CHANGE UP (ref 5–17)
AST SERPL-CCNC: 17 U/L — SIGNIFICANT CHANGE UP (ref 10–40)
BASOPHILS # BLD AUTO: 0.03 K/UL — SIGNIFICANT CHANGE UP (ref 0–0.2)
BASOPHILS NFR BLD AUTO: 0.4 % — SIGNIFICANT CHANGE UP (ref 0–2)
BILIRUB SERPL-MCNC: 0.2 MG/DL — SIGNIFICANT CHANGE UP (ref 0.2–1.2)
BUN SERPL-MCNC: 13 MG/DL — SIGNIFICANT CHANGE UP (ref 7–23)
CALCIUM SERPL-MCNC: 8.4 MG/DL — SIGNIFICANT CHANGE UP (ref 8.4–10.5)
CHLORIDE SERPL-SCNC: 104 MMOL/L — SIGNIFICANT CHANGE UP (ref 96–108)
CO2 SERPL-SCNC: 26 MMOL/L — SIGNIFICANT CHANGE UP (ref 22–31)
CREAT SERPL-MCNC: 0.48 MG/DL — LOW (ref 0.5–1.3)
CULTURE RESULTS: NO GROWTH — SIGNIFICANT CHANGE UP
EOSINOPHIL # BLD AUTO: 0.21 K/UL — SIGNIFICANT CHANGE UP (ref 0–0.5)
EOSINOPHIL NFR BLD AUTO: 2.6 % — SIGNIFICANT CHANGE UP (ref 0–6)
GLUCOSE BLDC GLUCOMTR-MCNC: 126 MG/DL — HIGH (ref 70–99)
GLUCOSE BLDC GLUCOMTR-MCNC: 148 MG/DL — HIGH (ref 70–99)
GLUCOSE BLDC GLUCOMTR-MCNC: 169 MG/DL — HIGH (ref 70–99)
GLUCOSE BLDC GLUCOMTR-MCNC: 173 MG/DL — HIGH (ref 70–99)
GLUCOSE SERPL-MCNC: 196 MG/DL — HIGH (ref 70–99)
HCT VFR BLD CALC: 31.8 % — LOW (ref 34.5–45)
HGB BLD-MCNC: 9.5 G/DL — LOW (ref 11.5–15.5)
IMM GRANULOCYTES NFR BLD AUTO: 1.3 % — SIGNIFICANT CHANGE UP (ref 0–1.5)
LYMPHOCYTES # BLD AUTO: 1.43 K/UL — SIGNIFICANT CHANGE UP (ref 1–3.3)
LYMPHOCYTES # BLD AUTO: 17.4 % — SIGNIFICANT CHANGE UP (ref 13–44)
MAGNESIUM SERPL-MCNC: 1.7 MG/DL — SIGNIFICANT CHANGE UP (ref 1.6–2.6)
MCHC RBC-ENTMCNC: 29.2 PG — SIGNIFICANT CHANGE UP (ref 27–34)
MCHC RBC-ENTMCNC: 29.9 GM/DL — LOW (ref 32–36)
MCV RBC AUTO: 97.8 FL — SIGNIFICANT CHANGE UP (ref 80–100)
METHOD TYPE: SIGNIFICANT CHANGE UP
MONOCYTES # BLD AUTO: 0.8 K/UL — SIGNIFICANT CHANGE UP (ref 0–0.9)
MONOCYTES NFR BLD AUTO: 9.7 % — SIGNIFICANT CHANGE UP (ref 2–14)
NEUTROPHILS # BLD AUTO: 5.64 K/UL — SIGNIFICANT CHANGE UP (ref 1.8–7.4)
NEUTROPHILS NFR BLD AUTO: 68.6 % — SIGNIFICANT CHANGE UP (ref 43–77)
NRBC # BLD: 0 /100 WBCS — SIGNIFICANT CHANGE UP (ref 0–0)
PHOSPHATE SERPL-MCNC: 2.5 MG/DL — SIGNIFICANT CHANGE UP (ref 2.5–4.5)
PLATELET # BLD AUTO: 313 K/UL — SIGNIFICANT CHANGE UP (ref 150–400)
POTASSIUM SERPL-MCNC: 4.1 MMOL/L — SIGNIFICANT CHANGE UP (ref 3.5–5.3)
POTASSIUM SERPL-SCNC: 4.1 MMOL/L — SIGNIFICANT CHANGE UP (ref 3.5–5.3)
PROT SERPL-MCNC: 6.3 G/DL — SIGNIFICANT CHANGE UP (ref 6–8.3)
RBC # BLD: 3.25 M/UL — LOW (ref 3.8–5.2)
RBC # FLD: 16.2 % — HIGH (ref 10.3–14.5)
SODIUM SERPL-SCNC: 140 MMOL/L — SIGNIFICANT CHANGE UP (ref 135–145)
SPECIMEN SOURCE: SIGNIFICANT CHANGE UP
WBC # BLD: 8.22 K/UL — SIGNIFICANT CHANGE UP (ref 3.8–10.5)
WBC # FLD AUTO: 8.22 K/UL — SIGNIFICANT CHANGE UP (ref 3.8–10.5)

## 2021-02-03 PROCEDURE — 99233 SBSQ HOSP IP/OBS HIGH 50: CPT | Mod: GC

## 2021-02-03 PROCEDURE — 99232 SBSQ HOSP IP/OBS MODERATE 35: CPT | Mod: GC

## 2021-02-03 RX ORDER — LISINOPRIL 2.5 MG/1
10 TABLET ORAL DAILY
Refills: 0 | Status: DISCONTINUED | OUTPATIENT
Start: 2021-02-03 | End: 2021-02-05

## 2021-02-03 RX ADMIN — INSULIN GLARGINE 5 UNIT(S): 100 INJECTION, SOLUTION SUBCUTANEOUS at 06:37

## 2021-02-03 RX ADMIN — PREGABALIN 1000 MICROGRAM(S): 225 CAPSULE ORAL at 11:01

## 2021-02-03 RX ADMIN — PIPERACILLIN AND TAZOBACTAM 200 GRAM(S): 4; .5 INJECTION, POWDER, LYOPHILIZED, FOR SOLUTION INTRAVENOUS at 18:44

## 2021-02-03 RX ADMIN — Medication 2: at 06:37

## 2021-02-03 RX ADMIN — Medication 3 MILLILITER(S): at 18:43

## 2021-02-03 RX ADMIN — LACOSAMIDE 150 MILLIGRAM(S): 50 TABLET ORAL at 19:00

## 2021-02-03 RX ADMIN — Medication 650 MILLIGRAM(S): at 00:04

## 2021-02-03 RX ADMIN — PANTOPRAZOLE SODIUM 40 MILLIGRAM(S): 20 TABLET, DELAYED RELEASE ORAL at 11:06

## 2021-02-03 RX ADMIN — Medication 3 MILLILITER(S): at 09:09

## 2021-02-03 RX ADMIN — LACOSAMIDE 150 MILLIGRAM(S): 50 TABLET ORAL at 06:20

## 2021-02-03 RX ADMIN — PIPERACILLIN AND TAZOBACTAM 200 GRAM(S): 4; .5 INJECTION, POWDER, LYOPHILIZED, FOR SOLUTION INTRAVENOUS at 00:04

## 2021-02-03 RX ADMIN — Medication 3 MILLILITER(S): at 00:04

## 2021-02-03 RX ADMIN — Medication 3 MILLILITER(S): at 06:21

## 2021-02-03 RX ADMIN — Medication 3 MILLILITER(S): at 22:10

## 2021-02-03 RX ADMIN — Medication 325 MILLIGRAM(S): at 11:01

## 2021-02-03 RX ADMIN — PIPERACILLIN AND TAZOBACTAM 200 GRAM(S): 4; .5 INJECTION, POWDER, LYOPHILIZED, FOR SOLUTION INTRAVENOUS at 11:04

## 2021-02-03 RX ADMIN — Medication 3 MILLILITER(S): at 12:45

## 2021-02-03 RX ADMIN — PIPERACILLIN AND TAZOBACTAM 200 GRAM(S): 4; .5 INJECTION, POWDER, LYOPHILIZED, FOR SOLUTION INTRAVENOUS at 06:20

## 2021-02-03 RX ADMIN — SODIUM CHLORIDE 2 GRAM(S): 9 INJECTION INTRAMUSCULAR; INTRAVENOUS; SUBCUTANEOUS at 06:20

## 2021-02-03 RX ADMIN — ZINC SULFATE TAB 220 MG (50 MG ZINC EQUIVALENT) 220 MILLIGRAM(S): 220 (50 ZN) TAB at 11:04

## 2021-02-03 RX ADMIN — LISINOPRIL 10 MILLIGRAM(S): 2.5 TABLET ORAL at 18:43

## 2021-02-03 RX ADMIN — CHLORHEXIDINE GLUCONATE 15 MILLILITER(S): 213 SOLUTION TOPICAL at 18:43

## 2021-02-03 RX ADMIN — MONTELUKAST 10 MILLIGRAM(S): 4 TABLET, CHEWABLE ORAL at 11:01

## 2021-02-03 RX ADMIN — Medication 81 MILLIGRAM(S): at 11:01

## 2021-02-03 RX ADMIN — CHLORHEXIDINE GLUCONATE 15 MILLILITER(S): 213 SOLUTION TOPICAL at 06:21

## 2021-02-03 RX ADMIN — ATORVASTATIN CALCIUM 40 MILLIGRAM(S): 80 TABLET, FILM COATED ORAL at 22:10

## 2021-02-03 RX ADMIN — PIPERACILLIN AND TAZOBACTAM 200 GRAM(S): 4; .5 INJECTION, POWDER, LYOPHILIZED, FOR SOLUTION INTRAVENOUS at 23:31

## 2021-02-03 RX ADMIN — Medication 0.5 MILLIGRAM(S): at 22:09

## 2021-02-03 RX ADMIN — ENOXAPARIN SODIUM 40 MILLIGRAM(S): 100 INJECTION SUBCUTANEOUS at 18:43

## 2021-02-03 RX ADMIN — Medication 500 MILLIGRAM(S): at 11:01

## 2021-02-03 RX ADMIN — Medication 0.5 MILLIGRAM(S): at 10:58

## 2021-02-03 RX ADMIN — SODIUM CHLORIDE 2 GRAM(S): 9 INJECTION INTRAMUSCULAR; INTRAVENOUS; SUBCUTANEOUS at 14:25

## 2021-02-03 RX ADMIN — Medication 2: at 18:40

## 2021-02-03 NOTE — PROGRESS NOTE ADULT - SUBJECTIVE AND OBJECTIVE BOX
IN PROGRESS    HOSPITAL COURSE:  Pt is a 75 yo F with PMH COPD, asthma, HTN, HLD, subarachnoid hemorrhage ([facility] 12/9-1/26 for subarachnoid hemorrhage with IVH and obstructive hydrocephalus s/p cerebral angiogram for coil embolization 3mm L post comm aneurysm with external ventricular drain (EVD) placement 12/10 and removal 12/25 and replacement 12/29 c/b UTI with enterobacter and e coli with zosyn ending 1/24; PEG placed 1/7)), with mental status AAO-1 on previous hospital discharge who p/f rehab two days following previous hospital discharge w/ acute hypoxemic respiratory failure from presumed aspiration pneumonia i/s/o  RLL infiltrate on CXR and significant secretions on adm requiring hourly suctioning, adm to 7L for further observation and management, currently being treated for pneumonia, with continued GOC ongoing between palliative care and family regarding pt's poor prognosis i/s/o poor mental status since last hospitalization for SAH. GOC conversations broached with family..showing.... .Course c/b low-grade fever while on abx, wiht fever workup showing..., urinary retention,..... **Transfer Note from [facility] to [facility]**  Hospital Course:  Pt is a 75 yo F with PMH COPD, asthma, HTN, HLD, subarachnoid hemorrhage ([facility] - for subarachnoid hemorrhage with IVH and obstructive hydrocephalus s/p cerebral angiogram for coil embolization 3mm L post comm aneurysm with external ventricular drain (EVD) placement 12/10 and removal  and replacement  c/b UTI with enterobacter and e coli with zosyn ending ; PEG placed )), with mental status AAO-1 on previous hospital discharge who p/f rehab two days following previous hospital discharge w/ acute hypoxemic respiratory failure from presumed aspiration pneumonia i/s/o  RLL infiltrate on CXR and significant secretions on adm requiring hourly suctioning, adm to  for further observation and management, currently being treated for pneumonia, with continued GOC ongoing between palliative care and family regarding pt's poor prognosis i/s/o poor mental status since last hospitalization for SAH. Palliative Care has reached an impasse regarding GOC an pt will remain full code at this time. Given that she has been weaned off of supplemental O2 and is no longer meeting sepsis criteria, pt will be stepped down to [facility] to continue management of her RLL PNA.     OVERNIGHT EVENTS:    SUBJECTIVE / INTERVAL HPI: Patient seen and examined at bedside.     VITAL SIGNS:  Vital Signs Last 24 Hrs  T(C): 36.4 (2021 09:47), Max: 37.8 (2021 22:15)  T(F): 97.6 (2021 09:47), Max: 100.1 (2021 22:15)  HR: 90 (2021 11:43) (82 - 102)  BP: 169/78 (2021 11:43) (127/61 - 169/78)  BP(mean): 112 (2021 11:43) (86 - 112)  RR: 20 (2021 11:43) (20 - 23)  SpO2: 96% (2021 11:43) (95% - 100%)    PHYSICAL EXAM:    General: WDWN  HEENT: NC/AT; PERRL, anicteric sclera; MMM  Neck: supple  Cardiovascular: +S1/S2, RRR  Respiratory: CTA B/L; no W/R/R  Gastrointestinal: soft, NT/ND; +BSx4  Extremities: WWP; no edema, clubbing or cyanosis  Vascular: 2+ radial, DP/PT pulses B/L  Neurological: AAOx3; no focal deficits    MEDICATIONS:  MEDICATIONS  (STANDING):  albuterol/ipratropium for Nebulization 3 milliLiter(s) Nebulizer every 4 hours  ascorbic acid 500 milliGRAM(s) Oral daily  aspirin  chewable 81 milliGRAM(s) Oral daily  atorvastatin 40 milliGRAM(s) Oral at bedtime  buDESOnide    Inhalation Suspension 0.5 milliGRAM(s) Inhalation every 12 hours  chlorhexidine 0.12% Liquid 15 milliLiter(s) Oral Mucosa two times a day  cyanocobalamin 1000 MICROGram(s) Oral daily  dextrose 40% Gel 15 Gram(s) Oral once  dextrose 5%. 1000 milliLiter(s) (50 mL/Hr) IV Continuous <Continuous>  dextrose 5%. 1000 milliLiter(s) (100 mL/Hr) IV Continuous <Continuous>  dextrose 50% Injectable 25 Gram(s) IV Push once  dextrose 50% Injectable 12.5 Gram(s) IV Push once  dextrose 50% Injectable 25 Gram(s) IV Push once  enoxaparin Injectable 40 milliGRAM(s) SubCutaneous every 24 hours  ferrous    sulfate 325 milliGRAM(s) Oral daily  glucagon  Injectable 1 milliGRAM(s) IntraMuscular once  insulin glargine Injectable (LANTUS) 5 Unit(s) SubCutaneous every morning  insulin lispro (ADMELOG) corrective regimen sliding scale   SubCutaneous Before meals and at bedtime  lacosamide Solution 150 milliGRAM(s) Oral two times a day  montelukast 10 milliGRAM(s) Oral daily  pantoprazole   Suspension 40 milliGRAM(s) Oral daily  piperacillin/tazobactam IVPB.. 4.5 Gram(s) IV Intermittent every 6 hours  senna 2 Tablet(s) Oral at bedtime  sodium chloride 2 Gram(s) Oral every 8 hours  zinc sulfate 220 milliGRAM(s) Oral daily    MEDICATIONS  (PRN):  acetaminophen    Suspension .. 650 milliGRAM(s) Oral every 6 hours PRN Temp greater or equal to 38C (100.4F), Mild Pain (1 - 3)  acetylcysteine 20%  Inhalation 4 milliLiter(s) Inhalation three times a day PRN congestion      ALLERGIES:  Allergies    No Known Allergies    Intolerances        LABS:                        9.5    8.22  )-----------( 313      ( 2021 06:44 )             31.8         140  |  104  |  13  ----------------------------<  196<H>  4.1   |  26  |  0.48<L>    Ca    8.4      2021 06:44  Phos  2.5       Mg     1.7         TPro  6.3  /  Alb  2.3<L>  /  TBili  0.2  /  DBili  x   /  AST  17  /  ALT  15  /  AlkPhos  129<H>        Urinalysis Basic - ( 2021 11:12 )    Color: Yellow / Appearance: Hazy / S.020 / pH: x  Gluc: x / Ketone: NEGATIVE  / Bili: Negative / Urobili: 0.2 E.U./dL   Blood: x / Protein: Trace mg/dL / Nitrite: NEGATIVE   Leuk Esterase: NEGATIVE / RBC: < 5 /HPF / WBC 5-10 /HPF   Sq Epi: x / Non Sq Epi: 0-5 /HPF / Bacteria: None /HPF      CAPILLARY BLOOD GLUCOSE      POCT Blood Glucose.: 148 mg/dL (2021 11:08)      RADIOLOGY & ADDITIONAL TESTS: Reviewed. **Transfer Note from [facility] to [facility]**  Hospital Course:  Pt is a 75 yo F with PMH COPD, asthma, HTN, HLD, subarachnoid hemorrhage ([facility] - for subarachnoid hemorrhage with IVH and obstructive hydrocephalus s/p cerebral angiogram for coil embolization 3mm L post comm aneurysm with external ventricular drain (EVD) placement 12/10 and removal  and replacement  c/b UTI with enterobacter and e coli with zosyn ending ; PEG placed )), with mental status AAO-1 on previous hospital discharge who p/f rehab two days following previous hospital discharge w/ acute hypoxemic respiratory failure from presumed aspiration pneumonia i/s/o  RLL infiltrate on CXR and significant secretions on adm requiring hourly suctioning, adm to  for further observation and management, currently being treated for pneumonia, with continued GOC ongoing between palliative care and family regarding pt's poor prognosis i/s/o poor mental status since last hospitalization for SAH. Palliative Care has reached an impasse regarding GOC an pt will remain full code at this time. Given that she has been weaned off of supplemental O2 and is no longer meeting sepsis criteria, pt will be stepped down to [facility] to continue management of her RLL PNA.     OVERNIGHT EVENTS: SVT for 6 beats, self-resolved. VIVIEN otherwise.    SUBJECTIVE / INTERVAL HPI: Patient seen and examined at bedside. Unable to obtain subjective hx due to mental status.     VITAL SIGNS:  Vital Signs Last 24 Hrs  T(C): 36.4 (2021 09:47), Max: 37.8 (2021 22:15)  T(F): 97.6 (2021 09:47), Max: 100.1 (2021 22:15)  HR: 90 (2021 11:43) (82 - 102)  BP: 169/78 (2021 11:43) (127/61 - 169/78)  BP(mean): 112 (2021 11:43) (86 - 112)  RR: 20 (2021 11:43) (20 - 23)  SpO2: 96% (2021 11:43) (95% - 100%)    PHYSICAL EXAM:    General: Elderly female pt in NAD  HEENT: NC/AT; PERRL, anicteric sclera; mucous membranes dry  Neck: supple  Cardiovascular: +S1/S2, RRR, mild crackles over R posterior lung field  Respiratory: CTA B/L; no W/R/R  Gastrointestinal: soft, NT/ND; +BSx4, PEG tube in place w/o erythema or drainage  Extremities: WWP; no edema, clubbing or cyanosis  Vascular: 2+ radial, DP pulses B/L  Neurological: AAOx0; no focal deficits    MEDICATIONS:  MEDICATIONS  (STANDING):  albuterol/ipratropium for Nebulization 3 milliLiter(s) Nebulizer every 4 hours  ascorbic acid 500 milliGRAM(s) Oral daily  aspirin  chewable 81 milliGRAM(s) Oral daily  atorvastatin 40 milliGRAM(s) Oral at bedtime  buDESOnide    Inhalation Suspension 0.5 milliGRAM(s) Inhalation every 12 hours  chlorhexidine 0.12% Liquid 15 milliLiter(s) Oral Mucosa two times a day  cyanocobalamin 1000 MICROGram(s) Oral daily  dextrose 40% Gel 15 Gram(s) Oral once  dextrose 5%. 1000 milliLiter(s) (50 mL/Hr) IV Continuous <Continuous>  dextrose 5%. 1000 milliLiter(s) (100 mL/Hr) IV Continuous <Continuous>  dextrose 50% Injectable 25 Gram(s) IV Push once  dextrose 50% Injectable 12.5 Gram(s) IV Push once  dextrose 50% Injectable 25 Gram(s) IV Push once  enoxaparin Injectable 40 milliGRAM(s) SubCutaneous every 24 hours  ferrous    sulfate 325 milliGRAM(s) Oral daily  glucagon  Injectable 1 milliGRAM(s) IntraMuscular once  insulin glargine Injectable (LANTUS) 5 Unit(s) SubCutaneous every morning  insulin lispro (ADMELOG) corrective regimen sliding scale   SubCutaneous Before meals and at bedtime  lacosamide Solution 150 milliGRAM(s) Oral two times a day  montelukast 10 milliGRAM(s) Oral daily  pantoprazole   Suspension 40 milliGRAM(s) Oral daily  piperacillin/tazobactam IVPB.. 4.5 Gram(s) IV Intermittent every 6 hours  senna 2 Tablet(s) Oral at bedtime  sodium chloride 2 Gram(s) Oral every 8 hours  zinc sulfate 220 milliGRAM(s) Oral daily    MEDICATIONS  (PRN):  acetaminophen    Suspension .. 650 milliGRAM(s) Oral every 6 hours PRN Temp greater or equal to 38C (100.4F), Mild Pain (1 - 3)  acetylcysteine 20%  Inhalation 4 milliLiter(s) Inhalation three times a day PRN congestion      ALLERGIES:  Allergies    No Known Allergies    Intolerances        LABS:                        9.5    8.22  )-----------( 313      ( 2021 06:44 )             31.8     02-    140  |  104  |  13  ----------------------------<  196<H>  4.1   |  26  |  0.48<L>    Ca    8.4      2021 06:44  Phos  2.5     02-  Mg     1.7     -    TPro  6.3  /  Alb  2.3<L>  /  TBili  0.2  /  DBili  x   /  AST  17  /  ALT  15  /  AlkPhos  129<H>  02-      Urinalysis Basic - ( 2021 11:12 )    Color: Yellow / Appearance: Hazy / S.020 / pH: x  Gluc: x / Ketone: NEGATIVE  / Bili: Negative / Urobili: 0.2 E.U./dL   Blood: x / Protein: Trace mg/dL / Nitrite: NEGATIVE   Leuk Esterase: NEGATIVE / RBC: < 5 /HPF / WBC 5-10 /HPF   Sq Epi: x / Non Sq Epi: 0-5 /HPF / Bacteria: None /HPF      CAPILLARY BLOOD GLUCOSE      POCT Blood Glucose.: 148 mg/dL (2021 11:08)      RADIOLOGY & ADDITIONAL TESTS: Reviewed.

## 2021-02-03 NOTE — PROGRESS NOTE ADULT - PROBLEM SELECTOR PLAN 2
- on arrival, , RR 30, WBC 12.07, lactate 2.3, s/p 3L NS  - source likely aspiration pneumonia  - s/p vanc/zosyn, c/w zosyn as above, now continuing Ceftriaxone  - UA neg, BCx sent, one bottle returning coag negative staph (thought contaminant), other UCX bottle NGTD, sputum culture negative; urine legionella + strep negative  -2/2/21- spiked rectal temp 100.6 s/p tyl, blood cultures x2 sent. CXR performed showing incr interstit opac, possibly congestion/infiltrates and new small b/l pleural effusions, UA obtained, negative, f/u Uculture. O2 requirements not significantly changed. Will continue to monitor and f/u results, con/t with current abx (Resolved)  - on arrival, , RR 30, WBC 12.07, lactate 2.3, s/p 3L NS  - source likely aspiration pneumonia  - s/p vanc/zosyn in ED  - UA neg, BCx sent, one bottle returning coag negative staph (thought contaminant), other UCX bottle NGTD, sputum culture negative; urine legionella + strep negative  -2/2/21- spiked rectal temp 100.6 s/p tyl, blood cultures x2 sent. CXR performed showing incr interstit opac, possibly congestion/infiltrates and new small b/l pleural effusions, UA obtained, negative, Urine cx NGTD  - c/w Zosyn 3.375g through 2/9

## 2021-02-03 NOTE — PROGRESS NOTE ADULT - ASSESSMENT
Pt is a 77 yo F with PMH COPD, asthma, HTN, HLD, subarachnoid hemorrhage (12/2020) who p/f [facility] NH for hypoxia and SOB. Found to have RLL infiltrate on CXR, adm to 7L for further observation and management, currently being treated for pneumonia, with continued GOC ongoing between palliative care and family regarding pt's poor prognosis i/s/o poor mental status since last hospitalization for SAH.

## 2021-02-03 NOTE — PROGRESS NOTE ADULT - PROBLEM SELECTOR PLAN 4
- unclear baseline, though has been AOx0-1 after previous hospitalization for SAH in December  - currently AOx0, non-participatory with exam, withdraws to pain, and moving extremities spontaneously 2/1 and also opening eyes more  - continue to monitor  - 2/1 Increased vimpat to home dose 150 BID  - 2/2 got NSGY team involved w/ intention to provide further insight as to prognosis regarding neuro recovery (well-known to NSGY service)  - Per Neurosurgery, no intervention at this time  - c/w Vimpat 150mg BID

## 2021-02-03 NOTE — PHYSICAL THERAPY INITIAL EVALUATION ADULT - MODALITIES TREATMENT COMMENTS
pt able to make eye contact and turn head ~45deg b/l ; pt claims sensation in same b/l intact ; pt noted to have confusion, decreased command following, decreased safety awareness, and delayed response times.

## 2021-02-03 NOTE — PROGRESS NOTE ADULT - ASSESSMENT
Pt is a 77 yo F with PMH COPD, asthma, HTN, HLD, subarachnoid hemorrhage (12/2020) who p/f [facility] for hypoxia and SOB. Found to have RLL infiltrate on CXR, adm to 7L for further observation and management, currently being treated for pneumonia, with continued GOC ongoing between palliative care and family regarding pt's poor prognosis i/s/o poor mental status since last hospitalization for SAH.

## 2021-02-03 NOTE — CHART NOTE - NSCHARTNOTEFT_GEN_A_CORE
Admitting Diagnosis:   Patient is a 76y old  Female who presents with a chief complaint of hypoxia, PNA (2021 07:05)      PAST MEDICAL & SURGICAL HISTORY:  Hydrocephalus    Diabetes mellitus, type 2    GERD (gastroesophageal reflux disease)    Seizures    Subarachnoid hemorrhage, nontraumatic    Hyperlipidemia    Hypertension    COPD (chronic obstructive pulmonary disease)    Asthma    Gastrostomy tube in place    S/P aneurysm repair  2019 subarachnoid hemorrhage, IVH, obstructive hydrocephalus s/p cerebral angiogram for coil embolization 3mm L post comm aneurysm with external ventricular drain (EVD) placement 12/10 and removal  and replacement         Current Nutrition Order:  Glucerna 1.2 @ 50mL/hr x 24hrs via PE mL TV, 1440 kcal, 72g pro, 966mL free water (96%RDI, 1.43gm pro/kg ABW)   +150ml free H2O q6hr    PO Intake: Good (%) [   ]  Fair (50-75%) [   ] Poor (<25%) [   ]- NA NPO     GI Issues: Unable to assess at this time 2/ AMS  No vomiting or residuals recorded overnight  BM     Pain: Unable to assess at this time 2/2 AMS; non-verbal complaints of pain apparent, looks uncomfortable     Skin Integrity: Greg 9; generalized edema  Sacrum DTI  R. heel stage I pressure injury  L. heel unstageable pressure injury     Labs:       140  |  104  |  13  ----------------------------<  196<H>  4.1   |  26  |  0.48<L>    Ca    8.4      2021 06:44  Phos  2.5       Mg     1.7         TPro  6.3  /  Alb  2.3<L>  /  TBili  0.2  /  DBili  x   /  AST  17  /  ALT  15  /  AlkPhos  129<H>      CAPILLARY BLOOD GLUCOSE      POCT Blood Glucose.: 148 mg/dL (2021 11:08)  POCT Blood Glucose.: 169 mg/dL (2021 06:18)  POCT Blood Glucose.: 155 mg/dL (2021 21:25)  POCT Blood Glucose.: 182 mg/dL (2021 17:35)      Medications:  MEDICATIONS  (STANDING):  albuterol/ipratropium for Nebulization 3 milliLiter(s) Nebulizer every 4 hours  ascorbic acid 500 milliGRAM(s) Oral daily  aspirin  chewable 81 milliGRAM(s) Oral daily  atorvastatin 40 milliGRAM(s) Oral at bedtime  buDESOnide    Inhalation Suspension 0.5 milliGRAM(s) Inhalation every 12 hours  chlorhexidine 0.12% Liquid 15 milliLiter(s) Oral Mucosa two times a day  cyanocobalamin 1000 MICROGram(s) Oral daily  dextrose 40% Gel 15 Gram(s) Oral once  dextrose 5%. 1000 milliLiter(s) (50 mL/Hr) IV Continuous <Continuous>  dextrose 5%. 1000 milliLiter(s) (100 mL/Hr) IV Continuous <Continuous>  dextrose 50% Injectable 25 Gram(s) IV Push once  dextrose 50% Injectable 12.5 Gram(s) IV Push once  dextrose 50% Injectable 25 Gram(s) IV Push once  enoxaparin Injectable 40 milliGRAM(s) SubCutaneous every 24 hours  ferrous    sulfate 325 milliGRAM(s) Oral daily  glucagon  Injectable 1 milliGRAM(s) IntraMuscular once  insulin glargine Injectable (LANTUS) 5 Unit(s) SubCutaneous every morning  insulin lispro (ADMELOG) corrective regimen sliding scale   SubCutaneous Before meals and at bedtime  lacosamide Solution 150 milliGRAM(s) Oral two times a day  montelukast 10 milliGRAM(s) Oral daily  pantoprazole   Suspension 40 milliGRAM(s) Oral daily  piperacillin/tazobactam IVPB.. 4.5 Gram(s) IV Intermittent every 6 hours  senna 2 Tablet(s) Oral at bedtime  sodium chloride 2 Gram(s) Oral every 8 hours  zinc sulfate 220 milliGRAM(s) Oral daily    MEDICATIONS  (PRN):  acetaminophen    Suspension .. 650 milliGRAM(s) Oral every 6 hours PRN Temp greater or equal to 38C (100.4F), Mild Pain (1 - 3)  acetylcysteine 20%  Inhalation 4 milliLiter(s) Inhalation three times a day PRN congestion      Weight: 50.3kg   Weight Change: 52kg (2/3, bedscale)    Nutrition Focused Physical Exam: Completed [ X  ]  Not Pertinent [   ]    Estimated energy needs: Height 62"; ABW 50.3kg; IBW 50kg; 100%IBW; BMI 20.2  Ideal body weight used for calculations as pt >120% of IBW(100%). Nutrient needs based on [facility] standards of care for maintenance in adults, adjusted for age, pressure injuries/DTIs.  Calories: 25-30 kcal/kg = 1933-0188 kcal/day  Protein: 1.2-1.4 g/kg = 60-70g protein/day  Fluids: 30-35 ml/kg = 5959-7428 ml/day    Subjective: 77 yo F with PMH COPD, asthma, HTN, HLD, subarachnoid hemorrhage (2020) who p/f [facility] NH for hypoxia and SOB. Recently admitted [facility] - for subarachnoid hemorrhage with IVH and obstructive hydrocephalus s/p cerebral angiogram for coil embolization 3mm L post comm aneurysm with external ventricular drain (EVD) placement 12/10 and removal  and replacement  c/b UTI with enterobacter and e coli with zosyn ending ; PEG placed . Since discharge pt has been AxOx0. Extensive GOC discussions have been held with family with decision for full code. Noted by NH staff to be hypoxic, per EMS O2sat 60% and hypotensive to SBP 80s on arrival with c/f aspiration events with thick secretions and ?episode of vomiting while lying down. On , aggressive suctioning with copious secretions/thick clumps noted. Neurosurgery consulted- no intervention warranted at this time.    Pt seen in room, asleep, appearing uncomfortable/grimacing. Did not open eyes to name or light physical stimuli. Mental status unchanged per RN. EN running at goal of 50cc/hr w/no regurgitation or residuals recorded. Last BM . Afebrile this AM. Pending step down to RMF. Lytes WNL, POC , 155mg/dL. Will continue to follow per RD protocol.     Previous Nutrition Diagnosis:  Inadequate Energy Intake r/t intake < EER AEB pt not meeting EER d/t NPO status.   Active [   ]  Resolved [ X  ]    If resolved, new PES:  Increased protein-calorie needs RT increased demand for protein-calorie intake AEB wound healing, malnutrition, malnutrition     Goal: Pt will continue to meet >75% of EER via tolerated route     Recommendations:  1. Continue with current EN order. Monitor for s/s intolerance; maintain aspiration precautions at all times. Additional free H2O flushes per team   2. Continue w/micronutrient supplementation   3. Monitor lytes and replete prn. POC BG q6hrs (goal 140-180mg/dL)  4. Pain and bowel regimens per team     Education: N/A- not appropriate based on mental status     Risk Level: High [   ] Moderate [ X  ] Low [   ]

## 2021-02-03 NOTE — PHYSICAL THERAPY INITIAL EVALUATION ADULT - MANUAL MUSCLE TESTING RESULTS, REHAB EVAL
[place] 2/2 decreased command following and confusion; observed that pt is able to lift all 4 limbs against gravity in seated position (>3/5)

## 2021-02-03 NOTE — PHYSICAL THERAPY INITIAL EVALUATION ADULT - ADDITIONAL COMMENTS
pt admitted from rehab facility where she was d/c after prior admission at [facility] ; at prior baseline, pt lived with son who is her caregiver in an elevator apt building. Pt had [facility] 5 days a week for 6 hours and 4 hours on Sunday.

## 2021-02-03 NOTE — PROGRESS NOTE ADULT - PROBLEM SELECTOR PLAN 1
-P/w respiratory failure in context of copious secretions in airway and c/f aspiration pneumonia, initially requiring NRB and then weaned with frequent suctioning to NC and then RA; CXR with RLL infiltrate. Received vanc/zosyn in ED. Sputum cx n/l. 2/1 abx switched to ceftriaxone 1g Q24 hr for 5-7D course, w/ duonebs q6h   - 2/2 noted to be more tachypneic and found with 100.6F rectal temp in AM. Giving morphine 1 mg IVP for sx control, doing fever w/u (mentioned below in "severe sepsis"), monitoring for pain (abd Xray performed and was n/l), and monitoring (02 requirements unchanged; lung exam not changed from yesterday).   - switched back to zosyn to finish out 7 day course  - oral care and frequent suctioning  - multiple extensive GOC discussions with son, per Palliative pt to remain full code at this time  - Now weaned to RA, breathing comfortably as of 2/3

## 2021-02-03 NOTE — PROGRESS NOTE ADULT - PROBLEM SELECTOR PLAN 3
- as above    #Urinary Retention:  -Found to be retaining O/N after de anda (placed in ER) removed late day 2/1, requiring SC in early AM. Will continue Q6 BS and re-evaluate need for de anda - as above    #Urinary Retention:  -Found to be retaining O/N after de anda (placed in ER) removed late day 2/1, requiring SC in early AM  -c/w Primafit and Bladder scan q6h

## 2021-02-03 NOTE — PHYSICAL THERAPY INITIAL EVALUATION ADULT - IMPAIRMENTS CONTRIBUTING IMPAIRED BED MOBILITY, REHAB EVAL
impaired balance/cognition/impaired coordination/impaired motor control/impaired postural control/decreased strength

## 2021-02-03 NOTE — PHYSICAL THERAPY INITIAL EVALUATION ADULT - GENERAL OBSERVATIONS, REHAB EVAL
Pt received semi-supine no acute distress +PEG (feeds held) +b/l CAIR boots, cleared for PT by CASEY Calhoun, agreeable to PT Eval, son present at bedside. Left as found +call bell, RN aware. FIM 0

## 2021-02-03 NOTE — PROGRESS NOTE ADULT - PROBLEM SELECTOR PLAN 4
- unclear baseline, though has been AOx0-1 after previous hospitalization for SAH in December  - currently AOx0, non-participatory with exam, withdraws to pain, and moving extremities spontaneously 2/1 and also opening eyes more  - continue to monitor  - 2/1 Increased vimpat to home dose 150 BID  - 2/2 got NSGY team involved w/ intention to provide further insight as to prognosis regarding neuro recovery (well-known to NSGY service), f/u recs - unclear baseline, though has been AOx0-1 after previous hospitalization for SAH in December  - currently AOx0, non-participatory with exam, withdraws to pain, and moving extremities spontaneously 2/1 and also opening eyes more  - continue to monitor  - 2/1 Increased vimpat to home dose 150 BID  - 2/2 got NSGY team involved w/ intention to provide further insight as to prognosis regarding neuro recovery (well-known to NSGY service)  - Per Neurosurgery, no intervention at this time  - c/w Vimpat 150mg BID

## 2021-02-03 NOTE — PROGRESS NOTE ADULT - PROBLEM SELECTOR PLAN 10
- F: s/p 3L NS bolus, no further fluids  - E: replete K<4, Mg<2  - N: Tube feeds restarted 2/1  - D: lovenox 40mg q24h  - G: protonix 40mg daily    Code: full (pending future discussions between family and palliative care team)  Dispo: [facility]

## 2021-02-03 NOTE — PHYSICAL THERAPY INITIAL EVALUATION ADULT - PERTINENT HX OF CURRENT PROBLEM, REHAB EVAL
Pt is a 75 yo F with PMH COPD, asthma, HTN, HLD, subarachnoid hemorrhage (12/2020) who p/f [facility] NH for hypoxia and SOB. Recently admitted [facility] 12/9-1/26 for subarachnoid hemorrhage with IVH and obstructive hydrocephalus; Noted by NH staff to be hypoxic, per EMS O2sat 60% and hypotensive to SBP 80s on arrival with c/f aspiration events with thick secretions and ?episode of vomiting while lying down.

## 2021-02-03 NOTE — PROGRESS NOTE ADULT - PROBLEM SELECTOR PLAN 3
- as above    #Urinary Retention:  -Found to be retaining O/N after de anda (placed in ER) removed late day 2/1, requiring SC in early AM  -c/w Primafit and Bladder scan q6h  - failed TOV x3

## 2021-02-03 NOTE — PROGRESS NOTE ADULT - PROBLEM SELECTOR PLAN 2
(Resolved)  - on arrival, , RR 30, WBC 12.07, lactate 2.3, s/p 3L NS  - source likely aspiration pneumonia  - s/p vanc/zosyn in ED  - UA neg, BCx sent, one bottle returning coag negative staph (thought contaminant), other UCX bottle NGTD, sputum culture negative; urine legionella + strep negative  -2/2/21- spiked rectal temp 100.6 s/p tyl, blood cultures x2 sent. CXR performed showing incr interstit opac, possibly congestion/infiltrates and new small b/l pleural effusions, UA obtained, negative, Urine cx NGTD  - c/w Zosyn 3.375g through 2/9

## 2021-02-03 NOTE — PROGRESS NOTE ADULT - PROBLEM SELECTOR PLAN 1
*See h&P as needed for more information on initial assessment   -P/w respiratory failure in context of copious secretions in airway and c/f aspiration pneumonia, initially requiring NRB and then weaned with frequent suctioning to NC and then RA; CXR with RLL infiltrate. Received vanc/zosyn in ED. Sputum cx n/l. 2/1 abx switched to ceftriaxone 1g Q24 hr for 5-7D course, w/ duonebs q6h   - 2/2 noted to be more tachypneic and found with 100.6F rectal temp in AM. Giving morphine 1 mg IVP for sx control, doing fever w/u (mentioned below in "severe sepsis"), monitoring for pain (abd Xray performed and was n/l), and monitoring (02 requirements unchanged; lung exam not changed from yesterday).   - oral care and frequent suctioning  - multiple extensive GOC discussions with son, f/u palliative recs *See h&P as needed for more information on initial assessment   -P/w respiratory failure in context of copious secretions in airway and c/f aspiration pneumonia, initially requiring NRB and then weaned with frequent suctioning to NC and then RA; CXR with RLL infiltrate. Received vanc/zosyn in ED. Sputum cx n/l. 2/1 abx switched to ceftriaxone 1g Q24 hr for 5-7D course, w/ duonebs q6h   - 2/2 noted to be more tachypneic and found with 100.6F rectal temp in AM. Giving morphine 1 mg IVP for sx control, doing fever w/u (mentioned below in "severe sepsis"), monitoring for pain (abd Xray performed and was n/l), and monitoring (02 requirements unchanged; lung exam not changed from yesterday).   - oral care and frequent suctioning  - multiple extensive GOC discussions with son, per Palliative pt to remain full code at this time  - Now weaned to RA, breathing comfortably as of 2/3

## 2021-02-03 NOTE — PROGRESS NOTE ADULT - SUBJECTIVE AND OBJECTIVE BOX
Hospital Course: Pt is a 75 yo F with PMH COPD, asthma, HTN, HLD, subarachnoid hemorrhage (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 with external ventricular drain (EVD) placement 12/10 and removal  and replacement  c/b UTI with enterobacter and e coli with zosyn ending ; PEG placed )), with mental status AAO-1 on previous hospital discharge who p/f rehab two days following previous hospital discharge w/ acute hypoxemic respiratory failure from presumed aspiration pneumonia i/s/o  RLL infiltrate on CXR and significant secretions on adm requiring hourly suctioning, adm to  for further observation and management, currently being treated for pneumonia, with continued GOC ongoing between palliative care and family regarding pt's poor prognosis i/s/o poor mental status since last hospitalization for SAH. Palliative Care has reached an impasse regarding GOC an pt will remain full code at this time. Given that she has been weaned off of supplemental O2 and is no longer meeting sepsis criteria, pt will be stepped down to F to continue management of her RLL PNA.       SUBJECTIVE / INTERVAL HPI: Patient seen and examined at bedside. Patient is able to intermittently open eyes and minimally respond to questions. Able to state  and name but not oriented to place or time.     VITAL SIGNS:  Vital Signs Last 24 Hrs  T(C): 36.7 (2021 13:26), Max: 37.8 (2021 22:15)  T(F): 98.1 (2021 13:26), Max: 100.1 (2021 22:15)  HR: 90 (2021 11:43) (82 - 102)  BP: 169/78 (2021 11:43) (127/61 - 169/78)  BP(mean): 112 (2021 11:43) (86 - 112)  RR: 20 (2021 11:43) (20 - 23)  SpO2: 96% (2021 11:43) (95% - 100%)    PHYSICAL EXAM:    General: WDWN  HEENT: NC/AT; PERRL, anicteric sclera; MMM  Neck: supple  Cardiovascular: +S1/S2; RRR  Respiratory: CTA B/L; no W/R/R  Gastrointestinal: soft, NT/ND; +BSx4  Extremities: WWP; no edema, clubbing or cyanosis  Vascular: 2+ radial, DP/PT pulses B/L  Neurological: AAOx3; no focal deficits    MEDICATIONS:  MEDICATIONS  (STANDING):  albuterol/ipratropium for Nebulization 3 milliLiter(s) Nebulizer every 4 hours  ascorbic acid 500 milliGRAM(s) Oral daily  aspirin  chewable 81 milliGRAM(s) Oral daily  atorvastatin 40 milliGRAM(s) Oral at bedtime  buDESOnide    Inhalation Suspension 0.5 milliGRAM(s) Inhalation every 12 hours  chlorhexidine 0.12% Liquid 15 milliLiter(s) Oral Mucosa two times a day  cyanocobalamin 1000 MICROGram(s) Oral daily  dextrose 40% Gel 15 Gram(s) Oral once  dextrose 5%. 1000 milliLiter(s) (50 mL/Hr) IV Continuous <Continuous>  dextrose 5%. 1000 milliLiter(s) (100 mL/Hr) IV Continuous <Continuous>  dextrose 50% Injectable 25 Gram(s) IV Push once  dextrose 50% Injectable 12.5 Gram(s) IV Push once  dextrose 50% Injectable 25 Gram(s) IV Push once  enoxaparin Injectable 40 milliGRAM(s) SubCutaneous every 24 hours  ferrous    sulfate 325 milliGRAM(s) Oral daily  glucagon  Injectable 1 milliGRAM(s) IntraMuscular once  insulin glargine Injectable (LANTUS) 5 Unit(s) SubCutaneous every morning  insulin lispro (ADMELOG) corrective regimen sliding scale   SubCutaneous Before meals and at bedtime  lacosamide Solution 150 milliGRAM(s) Oral two times a day  montelukast 10 milliGRAM(s) Oral daily  pantoprazole   Suspension 40 milliGRAM(s) Oral daily  piperacillin/tazobactam IVPB.. 4.5 Gram(s) IV Intermittent every 6 hours  senna 2 Tablet(s) Oral at bedtime  sodium chloride 2 Gram(s) Oral every 8 hours  zinc sulfate 220 milliGRAM(s) Oral daily    MEDICATIONS  (PRN):  acetaminophen    Suspension .. 650 milliGRAM(s) Oral every 6 hours PRN Temp greater or equal to 38C (100.4F), Mild Pain (1 - 3)  acetylcysteine 20%  Inhalation 4 milliLiter(s) Inhalation three times a day PRN congestion      ALLERGIES:  Allergies    No Known Allergies    Intolerances        LABS:                        9.5    8.22  )-----------( 313      ( 2021 06:44 )             31.8     02-03    140  |  104  |  13  ----------------------------<  196<H>  4.1   |  26  |  0.48<L>    Ca    8.4      2021 06:44  Phos  2.5     02-  Mg     1.7     -    TPro  6.3  /  Alb  2.3<L>  /  TBili  0.2  /  DBili  x   /  AST  17  /  ALT  15  /  AlkPhos  129<H>  -      Urinalysis Basic - ( 2021 11:12 )    Color: Yellow / Appearance: Hazy / S.020 / pH: x  Gluc: x / Ketone: NEGATIVE  / Bili: Negative / Urobili: 0.2 E.U./dL   Blood: x / Protein: Trace mg/dL / Nitrite: NEGATIVE   Leuk Esterase: NEGATIVE / RBC: < 5 /HPF / WBC 5-10 /HPF   Sq Epi: x / Non Sq Epi: 0-5 /HPF / Bacteria: None /HPF      CAPILLARY BLOOD GLUCOSE      POCT Blood Glucose.: 148 mg/dL (2021 11:08)      RADIOLOGY & ADDITIONAL TESTS: Reviewed.   **Transfer Acceptance from [facility] to [facility]**    Hospital Course: Pt is a 77 yo F with PMH COPD, asthma, HTN, HLD, subarachnoid hemorrhage (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 with external ventricular drain (EVD) placement 12/10 and removal  and replacement  c/b UTI with enterobacter and e coli with zosyn ending ; PEG placed )), with mental status AAO-1 on previous hospital discharge who p/f rehab two days following previous hospital discharge w/ acute hypoxemic respiratory failure from presumed aspiration pneumonia i/s/o  RLL infiltrate on CXR and significant secretions on adm requiring hourly suctioning, adm to 7L for further observation and management, currently being treated for pneumonia, with continued GOC ongoing between palliative care and family regarding pt's poor prognosis i/s/o poor mental status since last hospitalization for SAH. Palliative Care has reached an impasse regarding GOC an pt will remain full code at this time. Given that she has been weaned off of supplemental O2 and is no longer meeting sepsis criteria, pt will be stepped down to [facility] to continue management of her RLL PNA.       SUBJECTIVE / INTERVAL HPI: Patient seen and examined at bedside. Patient is able to intermittently open eyes and minimally respond to questions. Able to state  and name but not oriented to place or time. Patient expressed diffuse tenderness at any palpation. Waxes and wanes in consciousness.     VITAL SIGNS:  Vital Signs Last 24 Hrs  T(C): 36.7 (2021 13:26), Max: 37.8 (2021 22:15)  T(F): 98.1 (2021 13:26), Max: 100.1 (2021 22:15)  HR: 90 (2021 11:43) (82 - 102)  BP: 169/78 (2021 11:43) (127/61 - 169/78)  BP(mean): 112 (2021 11:43) (86 - 112)  RR: 20 (2021 11:43) (20 - 23)  SpO2: 96% (2021 11:43) (95% - 100%)    PHYSICAL EXAM:    General: Elderly female pt in NAD; screams out at any palpation or contact  HEENT: NC/AT; PERRL, anicteric sclera; mucous membranes dry, poor oral hygiene  Neck: supple  Cardiovascular: +S1/S2, RRR, mild crackles over R posterior lung field  Respiratory: CTA B/L; no W/R/R  Gastrointestinal: soft, NT/ND; +BSx4, PEG tube in place w/o erythema or drainage  Extremities: WWP; no edema, clubbing or cyanosis  Vascular: 2+ radial, DP pulses B/L  Neurological: AAOx1; dysarthric and difficult to understand      MEDICATIONS:  MEDICATIONS  (STANDING):  albuterol/ipratropium for Nebulization 3 milliLiter(s) Nebulizer every 4 hours  ascorbic acid 500 milliGRAM(s) Oral daily  aspirin  chewable 81 milliGRAM(s) Oral daily  atorvastatin 40 milliGRAM(s) Oral at bedtime  buDESOnide    Inhalation Suspension 0.5 milliGRAM(s) Inhalation every 12 hours  chlorhexidine 0.12% Liquid 15 milliLiter(s) Oral Mucosa two times a day  cyanocobalamin 1000 MICROGram(s) Oral daily  dextrose 40% Gel 15 Gram(s) Oral once  dextrose 5%. 1000 milliLiter(s) (50 mL/Hr) IV Continuous <Continuous>  dextrose 5%. 1000 milliLiter(s) (100 mL/Hr) IV Continuous <Continuous>  dextrose 50% Injectable 25 Gram(s) IV Push once  dextrose 50% Injectable 12.5 Gram(s) IV Push once  dextrose 50% Injectable 25 Gram(s) IV Push once  enoxaparin Injectable 40 milliGRAM(s) SubCutaneous every 24 hours  ferrous    sulfate 325 milliGRAM(s) Oral daily  glucagon  Injectable 1 milliGRAM(s) IntraMuscular once  insulin glargine Injectable (LANTUS) 5 Unit(s) SubCutaneous every morning  insulin lispro (ADMELOG) corrective regimen sliding scale   SubCutaneous Before meals and at bedtime  lacosamide Solution 150 milliGRAM(s) Oral two times a day  montelukast 10 milliGRAM(s) Oral daily  pantoprazole   Suspension 40 milliGRAM(s) Oral daily  piperacillin/tazobactam IVPB.. 4.5 Gram(s) IV Intermittent every 6 hours  senna 2 Tablet(s) Oral at bedtime  sodium chloride 2 Gram(s) Oral every 8 hours  zinc sulfate 220 milliGRAM(s) Oral daily    MEDICATIONS  (PRN):  acetaminophen    Suspension .. 650 milliGRAM(s) Oral every 6 hours PRN Temp greater or equal to 38C (100.4F), Mild Pain (1 - 3)  acetylcysteine 20%  Inhalation 4 milliLiter(s) Inhalation three times a day PRN congestion      ALLERGIES:  Allergies    No Known Allergies    Intolerances        LABS:                        9.5    8.22  )-----------( 313      ( 2021 06:44 )             31.8     02-03    140  |  104  |  13  ----------------------------<  196<H>  4.1   |  26  |  0.48<L>    Ca    8.4      2021 06:44  Phos  2.5     02-  Mg     1.7     -    TPro  6.3  /  Alb  2.3<L>  /  TBili  0.2  /  DBili  x   /  AST  17  /  ALT  15  /  AlkPhos  129<H>  02-03      Urinalysis Basic - ( 2021 11:12 )    Color: Yellow / Appearance: Hazy / S.020 / pH: x  Gluc: x / Ketone: NEGATIVE  / Bili: Negative / Urobili: 0.2 E.U./dL   Blood: x / Protein: Trace mg/dL / Nitrite: NEGATIVE   Leuk Esterase: NEGATIVE / RBC: < 5 /HPF / WBC 5-10 /HPF   Sq Epi: x / Non Sq Epi: 0-5 /HPF / Bacteria: None /HPF      CAPILLARY BLOOD GLUCOSE      POCT Blood Glucose.: 148 mg/dL (2021 11:08)      RADIOLOGY & ADDITIONAL TESTS: Reviewed.

## 2021-02-03 NOTE — PROGRESS NOTE ADULT - PROBLEM SELECTOR PLAN 5
- pt with known hx HTN  - hypotensive on EMS arrival  - currently hypertensive-will restart home meds  - continue to monitor

## 2021-02-04 LAB
ALBUMIN SERPL ELPH-MCNC: 2.6 G/DL — LOW (ref 3.3–5)
ALP SERPL-CCNC: 126 U/L — HIGH (ref 40–120)
ALT FLD-CCNC: 19 U/L — SIGNIFICANT CHANGE UP (ref 10–45)
ANION GAP SERPL CALC-SCNC: 12 MMOL/L — SIGNIFICANT CHANGE UP (ref 5–17)
APPEARANCE UR: CLEAR — SIGNIFICANT CHANGE UP
AST SERPL-CCNC: 18 U/L — SIGNIFICANT CHANGE UP (ref 10–40)
BASOPHILS # BLD AUTO: 0.04 K/UL — SIGNIFICANT CHANGE UP (ref 0–0.2)
BASOPHILS NFR BLD AUTO: 0.4 % — SIGNIFICANT CHANGE UP (ref 0–2)
BILIRUB SERPL-MCNC: 0.3 MG/DL — SIGNIFICANT CHANGE UP (ref 0.2–1.2)
BILIRUB UR-MCNC: NEGATIVE — SIGNIFICANT CHANGE UP
BUN SERPL-MCNC: 13 MG/DL — SIGNIFICANT CHANGE UP (ref 7–23)
CALCIUM SERPL-MCNC: 9 MG/DL — SIGNIFICANT CHANGE UP (ref 8.4–10.5)
CHLORIDE SERPL-SCNC: 98 MMOL/L — SIGNIFICANT CHANGE UP (ref 96–108)
CO2 SERPL-SCNC: 27 MMOL/L — SIGNIFICANT CHANGE UP (ref 22–31)
COLOR SPEC: YELLOW — SIGNIFICANT CHANGE UP
CREAT SERPL-MCNC: 0.45 MG/DL — LOW (ref 0.5–1.3)
DIFF PNL FLD: NEGATIVE — SIGNIFICANT CHANGE UP
EOSINOPHIL # BLD AUTO: 0.29 K/UL — SIGNIFICANT CHANGE UP (ref 0–0.5)
EOSINOPHIL NFR BLD AUTO: 3 % — SIGNIFICANT CHANGE UP (ref 0–6)
GLUCOSE BLDC GLUCOMTR-MCNC: 151 MG/DL — HIGH (ref 70–99)
GLUCOSE BLDC GLUCOMTR-MCNC: 155 MG/DL — HIGH (ref 70–99)
GLUCOSE BLDC GLUCOMTR-MCNC: 157 MG/DL — HIGH (ref 70–99)
GLUCOSE BLDC GLUCOMTR-MCNC: 157 MG/DL — HIGH (ref 70–99)
GLUCOSE SERPL-MCNC: 162 MG/DL — HIGH (ref 70–99)
GLUCOSE UR QL: NEGATIVE — SIGNIFICANT CHANGE UP
HCT VFR BLD CALC: 32.4 % — LOW (ref 34.5–45)
HGB BLD-MCNC: 9.9 G/DL — LOW (ref 11.5–15.5)
IMM GRANULOCYTES NFR BLD AUTO: 1.9 % — HIGH (ref 0–1.5)
KETONES UR-MCNC: NEGATIVE — SIGNIFICANT CHANGE UP
LEUKOCYTE ESTERASE UR-ACNC: NEGATIVE — SIGNIFICANT CHANGE UP
LYMPHOCYTES # BLD AUTO: 1.87 K/UL — SIGNIFICANT CHANGE UP (ref 1–3.3)
LYMPHOCYTES # BLD AUTO: 19.2 % — SIGNIFICANT CHANGE UP (ref 13–44)
MAGNESIUM SERPL-MCNC: 1.6 MG/DL — SIGNIFICANT CHANGE UP (ref 1.6–2.6)
MCHC RBC-ENTMCNC: 29.1 PG — SIGNIFICANT CHANGE UP (ref 27–34)
MCHC RBC-ENTMCNC: 30.6 GM/DL — LOW (ref 32–36)
MCV RBC AUTO: 95.3 FL — SIGNIFICANT CHANGE UP (ref 80–100)
MONOCYTES # BLD AUTO: 0.82 K/UL — SIGNIFICANT CHANGE UP (ref 0–0.9)
MONOCYTES NFR BLD AUTO: 8.4 % — SIGNIFICANT CHANGE UP (ref 2–14)
NEUTROPHILS # BLD AUTO: 6.55 K/UL — SIGNIFICANT CHANGE UP (ref 1.8–7.4)
NEUTROPHILS NFR BLD AUTO: 67.1 % — SIGNIFICANT CHANGE UP (ref 43–77)
NITRITE UR-MCNC: NEGATIVE — SIGNIFICANT CHANGE UP
NRBC # BLD: 0 /100 WBCS — SIGNIFICANT CHANGE UP (ref 0–0)
PH UR: 6.5 — SIGNIFICANT CHANGE UP (ref 5–8)
PHOSPHATE SERPL-MCNC: 2.9 MG/DL — SIGNIFICANT CHANGE UP (ref 2.5–4.5)
PLATELET # BLD AUTO: 358 K/UL — SIGNIFICANT CHANGE UP (ref 150–400)
POTASSIUM SERPL-MCNC: 3.8 MMOL/L — SIGNIFICANT CHANGE UP (ref 3.5–5.3)
POTASSIUM SERPL-SCNC: 3.8 MMOL/L — SIGNIFICANT CHANGE UP (ref 3.5–5.3)
PROT SERPL-MCNC: 6.4 G/DL — SIGNIFICANT CHANGE UP (ref 6–8.3)
PROT UR-MCNC: NEGATIVE MG/DL — SIGNIFICANT CHANGE UP
RBC # BLD: 3.4 M/UL — LOW (ref 3.8–5.2)
RBC # FLD: 15.7 % — HIGH (ref 10.3–14.5)
SODIUM SERPL-SCNC: 137 MMOL/L — SIGNIFICANT CHANGE UP (ref 135–145)
SP GR SPEC: 1.02 — SIGNIFICANT CHANGE UP (ref 1–1.03)
UROBILINOGEN FLD QL: 0.2 E.U./DL — SIGNIFICANT CHANGE UP
WBC # BLD: 9.76 K/UL — SIGNIFICANT CHANGE UP (ref 3.8–10.5)
WBC # FLD AUTO: 9.76 K/UL — SIGNIFICANT CHANGE UP (ref 3.8–10.5)

## 2021-02-04 PROCEDURE — 99233 SBSQ HOSP IP/OBS HIGH 50: CPT | Mod: GC

## 2021-02-04 RX ORDER — MAGNESIUM SULFATE 500 MG/ML
2 VIAL (ML) INJECTION ONCE
Refills: 0 | Status: COMPLETED | OUTPATIENT
Start: 2021-02-04 | End: 2021-02-04

## 2021-02-04 RX ADMIN — Medication 0.5 MILLIGRAM(S): at 22:32

## 2021-02-04 RX ADMIN — CHLORHEXIDINE GLUCONATE 15 MILLILITER(S): 213 SOLUTION TOPICAL at 05:45

## 2021-02-04 RX ADMIN — LISINOPRIL 10 MILLIGRAM(S): 2.5 TABLET ORAL at 05:45

## 2021-02-04 RX ADMIN — CHLORHEXIDINE GLUCONATE 15 MILLILITER(S): 213 SOLUTION TOPICAL at 17:11

## 2021-02-04 RX ADMIN — Medication 2: at 22:33

## 2021-02-04 RX ADMIN — ATORVASTATIN CALCIUM 40 MILLIGRAM(S): 80 TABLET, FILM COATED ORAL at 22:35

## 2021-02-04 RX ADMIN — PIPERACILLIN AND TAZOBACTAM 200 GRAM(S): 4; .5 INJECTION, POWDER, LYOPHILIZED, FOR SOLUTION INTRAVENOUS at 17:10

## 2021-02-04 RX ADMIN — ZINC SULFATE TAB 220 MG (50 MG ZINC EQUIVALENT) 220 MILLIGRAM(S): 220 (50 ZN) TAB at 11:39

## 2021-02-04 RX ADMIN — LACOSAMIDE 150 MILLIGRAM(S): 50 TABLET ORAL at 05:45

## 2021-02-04 RX ADMIN — Medication 3 MILLILITER(S): at 17:11

## 2021-02-04 RX ADMIN — LACOSAMIDE 150 MILLIGRAM(S): 50 TABLET ORAL at 19:25

## 2021-02-04 RX ADMIN — Medication 500 MILLIGRAM(S): at 11:39

## 2021-02-04 RX ADMIN — PREGABALIN 1000 MICROGRAM(S): 225 CAPSULE ORAL at 11:39

## 2021-02-04 RX ADMIN — Medication 325 MILLIGRAM(S): at 11:39

## 2021-02-04 RX ADMIN — SENNA PLUS 2 TABLET(S): 8.6 TABLET ORAL at 22:35

## 2021-02-04 RX ADMIN — ENOXAPARIN SODIUM 40 MILLIGRAM(S): 100 INJECTION SUBCUTANEOUS at 17:10

## 2021-02-04 RX ADMIN — MONTELUKAST 10 MILLIGRAM(S): 4 TABLET, CHEWABLE ORAL at 11:40

## 2021-02-04 RX ADMIN — Medication 50 GRAM(S): at 09:40

## 2021-02-04 RX ADMIN — Medication 3 MILLILITER(S): at 22:32

## 2021-02-04 RX ADMIN — Medication 0.5 MILLIGRAM(S): at 08:54

## 2021-02-04 RX ADMIN — Medication 3 MILLILITER(S): at 05:45

## 2021-02-04 RX ADMIN — Medication 3 MILLILITER(S): at 09:07

## 2021-02-04 RX ADMIN — Medication 3 MILLILITER(S): at 12:57

## 2021-02-04 RX ADMIN — Medication 81 MILLIGRAM(S): at 11:40

## 2021-02-04 RX ADMIN — PANTOPRAZOLE SODIUM 40 MILLIGRAM(S): 20 TABLET, DELAYED RELEASE ORAL at 11:40

## 2021-02-04 RX ADMIN — PIPERACILLIN AND TAZOBACTAM 200 GRAM(S): 4; .5 INJECTION, POWDER, LYOPHILIZED, FOR SOLUTION INTRAVENOUS at 05:44

## 2021-02-04 RX ADMIN — Medication 2: at 08:53

## 2021-02-04 RX ADMIN — PIPERACILLIN AND TAZOBACTAM 200 GRAM(S): 4; .5 INJECTION, POWDER, LYOPHILIZED, FOR SOLUTION INTRAVENOUS at 11:40

## 2021-02-04 RX ADMIN — Medication 2: at 12:58

## 2021-02-04 RX ADMIN — Medication 3 MILLILITER(S): at 01:15

## 2021-02-04 RX ADMIN — Medication 2: at 17:40

## 2021-02-04 RX ADMIN — INSULIN GLARGINE 5 UNIT(S): 100 INJECTION, SOLUTION SUBCUTANEOUS at 08:54

## 2021-02-04 NOTE — OCCUPATIONAL THERAPY INITIAL EVALUATION ADULT - LEVEL OF INDEPENDENCE: GROOMING, OT EVAL
Patient was able to use wet cloth to wipe hands and face with hand over hand assistance and Max Verbal cueing/maximum assist (25% patients effort)

## 2021-02-04 NOTE — OCCUPATIONAL THERAPY INITIAL EVALUATION ADULT - DIAGNOSIS, OT EVAL
Patient Beaver on L ear, presents with dysphasia, lethargy, difficulty initiating movements with BUE/BLE, spastic BUE/BLE, difficulty turning head towards stimulus,  able to make needs know by nodding head and facial expressions.

## 2021-02-04 NOTE — PROGRESS NOTE ADULT - PROBLEM SELECTOR PLAN 1
-P/w respiratory failure in context of copious secretions in airway and c/f aspiration pneumonia, initially requiring NRB and then weaned with frequent suctioning to NC and then RA; CXR with RLL infiltrate. Received vanc/zosyn in ED. Sputum cx n/l. 2/1 abx switched to ceftriaxone 1g Q24 hr for 5-7D course, w/ duonebs q6h   - 2/2 noted to be more tachypneic and found with 100.6F rectal temp in AM. Giving morphine 1 mg IVP for sx control, doing fever w/u (mentioned below in "severe sepsis"), monitoring for pain (abd Xray performed and was n/l), and monitoring (02 requirements unchanged; lung exam not changed from yesterday).   - switched back to zosyn to finish out 7 day course  - oral care and frequent suctioning  - multiple extensive GOC discussions with son, per Palliative pt to remain full code at this time- family understands prognosis but would like trial of life-sustaining measures  - Now weaned to RA, breathing comfortably as of 2/3  - Pending JOSE tomorrow

## 2021-02-04 NOTE — OCCUPATIONAL THERAPY INITIAL EVALUATION ADULT - RANGE OF MOTION EXAMINATION, LOWER EXTREMITY
Patient was able to perform BLE AAROM 20 degrees/bilateral LE Passive ROM was WNL (within normal limits)

## 2021-02-04 NOTE — OCCUPATIONAL THERAPY INITIAL EVALUATION ADULT - MD ORDER
Pt is a 77 yo F with PMH COPD, asthma, HTN, HLD, subarachnoid hemorrhage (12/2020) who p/f [facility] NH for hypoxia and SOB. Found to have RLL infiltrate on CXR. Admitted to  for further observation and management.

## 2021-02-04 NOTE — PROGRESS NOTE ADULT - PROBLEM SELECTOR PLAN 3
- as above    #Urinary Retention:  -Found to be retaining O/N after de anda (placed in ER) removed late day 2/1, requiring SC in early AM  -c/w Primafit and Bladder scan q6h  - failed TOV x3  - De Anda catheter placed back for urinary retention

## 2021-02-04 NOTE — PROGRESS NOTE ADULT - PROBLEM SELECTOR PROBLEM 3
Altered mental status
Aspiration pneumonia

## 2021-02-04 NOTE — PROGRESS NOTE ADULT - SUBJECTIVE AND OBJECTIVE BOX
OVERNIGHT EVENTS: Suctioned x3 ON.     SUBJECTIVE / INTERVAL HPI: Patient seen and examined at bedside. Patient less responsive and awake in AM but woke up more in afternoon. Minimally responds to question. AOx1.     VITAL SIGNS:  Vital Signs Last 24 Hrs  T(C): 36.5 (2021 14:00), Max: 36.6 (2021 06:33)  T(F): 97.7 (2021 14:00), Max: 97.8 (2021 06:33)  HR: 95 (2021 14:00) (84 - 95)  BP: 125/65 (2021 14:35) (110/66 - 132/72)  BP(mean): --  RR: 19 (2021 14:00) (19 - 20)  SpO2: 99% (2021 14:35) (97% - 99%)    PHYSICAL EXAM:    General: Elderly female pt in NAD; screams out at any palpation or contact  HEENT: NC/AT; PERRL, anicteric sclera; mucous membranes dry, poor oral hygiene  Neck: supple  Cardiovascular: +S1/S2, RRR, mild crackles over R posterior lung field  Respiratory: CTA B/L; no W/R/R  Gastrointestinal: soft, NT/ND; +BSx4, PEG tube in place w/o erythema or drainage  Extremities: WWP; no edema, clubbing or cyanosis  Vascular: 2+ radial, DP pulses B/L  Neurological: AAOx1; dysarthric and difficult to understand    MEDICATIONS:  MEDICATIONS  (STANDING):  albuterol/ipratropium for Nebulization 3 milliLiter(s) Nebulizer every 4 hours  ascorbic acid 500 milliGRAM(s) Oral daily  aspirin  chewable 81 milliGRAM(s) Oral daily  atorvastatin 40 milliGRAM(s) Oral at bedtime  buDESOnide    Inhalation Suspension 0.5 milliGRAM(s) Inhalation every 12 hours  chlorhexidine 0.12% Liquid 15 milliLiter(s) Oral Mucosa two times a day  cyanocobalamin 1000 MICROGram(s) Oral daily  dextrose 40% Gel 15 Gram(s) Oral once  dextrose 5%. 1000 milliLiter(s) (50 mL/Hr) IV Continuous <Continuous>  dextrose 5%. 1000 milliLiter(s) (100 mL/Hr) IV Continuous <Continuous>  dextrose 50% Injectable 25 Gram(s) IV Push once  dextrose 50% Injectable 12.5 Gram(s) IV Push once  dextrose 50% Injectable 25 Gram(s) IV Push once  enoxaparin Injectable 40 milliGRAM(s) SubCutaneous every 24 hours  ferrous    sulfate 325 milliGRAM(s) Oral daily  glucagon  Injectable 1 milliGRAM(s) IntraMuscular once  insulin glargine Injectable (LANTUS) 5 Unit(s) SubCutaneous every morning  insulin lispro (ADMELOG) corrective regimen sliding scale   SubCutaneous Before meals and at bedtime  lacosamide Solution 150 milliGRAM(s) Oral two times a day  lisinopril 10 milliGRAM(s) Oral daily  montelukast 10 milliGRAM(s) Oral daily  pantoprazole   Suspension 40 milliGRAM(s) Oral daily  piperacillin/tazobactam IVPB.. 4.5 Gram(s) IV Intermittent every 6 hours  senna 2 Tablet(s) Oral at bedtime  zinc sulfate 220 milliGRAM(s) Oral daily    MEDICATIONS  (PRN):  acetaminophen    Suspension .. 650 milliGRAM(s) Oral every 6 hours PRN Temp greater or equal to 38C (100.4F), Mild Pain (1 - 3)  acetylcysteine 20%  Inhalation 4 milliLiter(s) Inhalation three times a day PRN congestion      ALLERGIES:  Allergies    No Known Allergies    Intolerances        LABS:                        9.9    9.76  )-----------( 358      ( 2021 07:21 )             32.4     02-04    137  |  98  |  13  ----------------------------<  162<H>  3.8   |  27  |  0.45<L>    Ca    9.0      2021 07:21  Phos  2.9     02-04  Mg     1.6     02-04    TPro  6.4  /  Alb  2.6<L>  /  TBili  0.3  /  DBili  x   /  AST  18  /  ALT  19  /  AlkPhos  126<H>  02-04      Urinalysis Basic - ( 2021 02:35 )    Color: Yellow / Appearance: Clear / S.020 / pH: x  Gluc: x / Ketone: NEGATIVE  / Bili: Negative / Urobili: 0.2 E.U./dL   Blood: x / Protein: NEGATIVE mg/dL / Nitrite: NEGATIVE   Leuk Esterase: NEGATIVE / RBC: x / WBC x   Sq Epi: x / Non Sq Epi: x / Bacteria: x      CAPILLARY BLOOD GLUCOSE      POCT Blood Glucose.: 155 mg/dL (2021 12:15)      RADIOLOGY & ADDITIONAL TESTS: Reviewed.

## 2021-02-04 NOTE — OCCUPATIONAL THERAPY INITIAL EVALUATION ADULT - PLANNED THERAPY INTERVENTIONS, OT EVAL
ADL retraining/balance training/bed mobility training/cognitive, visual perceptual/fine motor coordination training/joint mobilization/motor coordination training/neuromuscular re-education/parent/caregiver training.../ROM/strengthening/transfer training

## 2021-02-04 NOTE — OCCUPATIONAL THERAPY INITIAL EVALUATION ADULT - GENERAL OBSERVATIONS, REHAB EVAL
Patient A&Ox1, received semisupine in bed + PEG, + Heplock IV, + b/l CAIRE Boots, +b/l SCD's (disconnected).

## 2021-02-04 NOTE — PROGRESS NOTE ADULT - ATTENDING COMMENTS
76F w COPD, Asthma, HTN, HLD, recent admission in 12/2020-01/2021 w SAH c/b PEG placement, discharged to [facility], presented for dyspnea and found to have acute hypoxemic respiratory failure likely 2/2 HAP vs Aspiration pna - transferred to [facility], now on RA, overnight w urinary retention s/p Soriano pending [facility]  Pt alert, unable to assess ROS. Moving all extremities.   #Acute hypoxemic respiratory failure due to HAP  #Normocytic anemia  #Dysphagia - w PEG  #Acute urinary retention  #HTN  #HLD  --Complete 7d course of zosyn thru 2/9  --ensure BMs  --Will need to f/u w urology as outpatient for urinary retention  --Oral care w damp swabbing and suction  DISPO: JOSE
pt seen and examined 2/3. A/O x1 - can identify son. Unable to complete full ROS. However moving BUE and sticking out her tongue at request. Dry mucous membranes.  complete 7d course of IV Zosyn for HAP. F/U Sputum Cx. Family states pt would wish to remain FULL CODE - with trial of CPR and Intubation.  DISPO: JOSE
Coagulase negative staph in blood is likely a contaminant.

## 2021-02-04 NOTE — PROGRESS NOTE ADULT - PROBLEM SELECTOR PLAN 10
- F: s/p 3L NS bolus, no further fluids  - E: replete K<4, Mg<2  - N: Tube feeds restarted 2/1  - D: lovenox 40mg q24h  - G: protonix 40mg daily    Code: full (pending future discussions between family and palliative care team)  Dispo: JOSE

## 2021-02-04 NOTE — OCCUPATIONAL THERAPY INITIAL EVALUATION ADULT - RANGE OF MOTION EXAMINATION, UPPER EXTREMITY
Patient was able to perform BUE AAROM 30 degrees/bilateral UE Passive ROM was WNL (within normal limits)

## 2021-02-04 NOTE — PROGRESS NOTE ADULT - NUTRITIONAL ASSESSMENT
This patient has been assessed with a concern for Malnutrition and has been determined to have a diagnosis/diagnoses of Mild protein-calorie malnutrition.    This patient is being managed with:   Diet NPO with Tube Feed-  Tube Feeding Modality: Gastrostomy  Glucerna 1.2 Edward (GLUCERNARTH)  Total Volume for 24 Hours (mL): 1200  Total Number of Cans: 5  Continuous  Starting Tube Feed Rate {mL per Hour}: 10  Increase Tube Feed Rate by (mL): 10     Every 4 hours  Until Goal Tube Feed Rate (mL per Hour): 50  Tube Feed Duration (in Hours): 24  Tube Feed Start Time: 14:00  Entered: Feb 1 2021  1:43PM    

## 2021-02-04 NOTE — OCCUPATIONAL THERAPY INITIAL EVALUATION ADULT - BALANCE DISTURBANCE, IDENTIFIED IMPAIRMENT CONTRIBUTE, REHAB EVAL
impaired coordination/impaired motor control/abnormal muscle tone/impaired postural control/decreased ROM/decreased strength

## 2021-02-04 NOTE — OCCUPATIONAL THERAPY INITIAL EVALUATION ADULT - PERTINENT HX OF CURRENT PROBLEM, REHAB EVAL
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

## 2021-02-04 NOTE — PROGRESS NOTE ADULT - ASSESSMENT
Pt is a 75 yo F with PMH COPD, asthma, HTN, HLD, subarachnoid hemorrhage (12/2020) who p/f [facility] for hypoxia and SOB. Found to have RLL infiltrate on CXR, adm to 7L for further observation and management, currently being treated for pneumonia, with continued GOC ongoing between palliative care and family regarding pt's poor prognosis i/s/o poor mental status since last hospitalization for SAH.

## 2021-02-04 NOTE — PROGRESS NOTE ADULT - PROBLEM SELECTOR PROBLEM 1
Respiratory failure with hypoxia, unspecified chronicity

## 2021-02-04 NOTE — PROGRESS NOTE ADULT - PROVIDER SPECIALTY LIST ADULT
Internal Medicine
Palliative Care
Internal Medicine
Internal Medicine

## 2021-02-04 NOTE — OCCUPATIONAL THERAPY INITIAL EVALUATION ADULT - IMPAIRMENTS CONTRIBUTING IMPAIRED BED MOBILITY, REHAB EVAL
impaired balance/cognition/impaired coordination/decreased flexibility/impaired motor control/abnormal muscle tone/impaired postural control/decreased ROM/decreased strength

## 2021-02-05 ENCOUNTER — TRANSCRIPTION ENCOUNTER (OUTPATIENT)
Age: 76
End: 2021-02-05

## 2021-02-05 VITALS
TEMPERATURE: 98 F | HEART RATE: 91 BPM | OXYGEN SATURATION: 96 % | DIASTOLIC BLOOD PRESSURE: 71 MMHG | SYSTOLIC BLOOD PRESSURE: 145 MMHG | RESPIRATION RATE: 19 BRPM

## 2021-02-05 PROBLEM — R56.9 UNSPECIFIED CONVULSIONS: Chronic | Status: ACTIVE | Noted: 2021-01-31

## 2021-02-05 PROBLEM — E11.9 TYPE 2 DIABETES MELLITUS WITHOUT COMPLICATIONS: Chronic | Status: ACTIVE | Noted: 2021-01-31

## 2021-02-05 PROBLEM — I60.9 NONTRAUMATIC SUBARACHNOID HEMORRHAGE, UNSPECIFIED: Chronic | Status: ACTIVE | Noted: 2021-01-31

## 2021-02-05 PROBLEM — G91.9 HYDROCEPHALUS, UNSPECIFIED: Chronic | Status: ACTIVE | Noted: 2021-01-31

## 2021-02-05 PROBLEM — K21.9 GASTRO-ESOPHAGEAL REFLUX DISEASE WITHOUT ESOPHAGITIS: Chronic | Status: ACTIVE | Noted: 2021-01-31

## 2021-02-05 LAB
ALBUMIN SERPL ELPH-MCNC: 2.1 G/DL — LOW (ref 3.3–5)
ALP SERPL-CCNC: 109 U/L — SIGNIFICANT CHANGE UP (ref 40–120)
ALT FLD-CCNC: 19 U/L — SIGNIFICANT CHANGE UP (ref 10–45)
ANION GAP SERPL CALC-SCNC: 14 MMOL/L — SIGNIFICANT CHANGE UP (ref 5–17)
AST SERPL-CCNC: 18 U/L — SIGNIFICANT CHANGE UP (ref 10–40)
BASOPHILS # BLD AUTO: 0.03 K/UL — SIGNIFICANT CHANGE UP (ref 0–0.2)
BASOPHILS NFR BLD AUTO: 0.4 % — SIGNIFICANT CHANGE UP (ref 0–2)
BILIRUB SERPL-MCNC: 0.2 MG/DL — SIGNIFICANT CHANGE UP (ref 0.2–1.2)
BUN SERPL-MCNC: 12 MG/DL — SIGNIFICANT CHANGE UP (ref 7–23)
CALCIUM SERPL-MCNC: 8.1 MG/DL — LOW (ref 8.4–10.5)
CHLORIDE SERPL-SCNC: 101 MMOL/L — SIGNIFICANT CHANGE UP (ref 96–108)
CO2 SERPL-SCNC: 26 MMOL/L — SIGNIFICANT CHANGE UP (ref 22–31)
CREAT SERPL-MCNC: 0.49 MG/DL — LOW (ref 0.5–1.3)
CULTURE RESULTS: SIGNIFICANT CHANGE UP
EOSINOPHIL # BLD AUTO: 0.14 K/UL — SIGNIFICANT CHANGE UP (ref 0–0.5)
EOSINOPHIL NFR BLD AUTO: 1.7 % — SIGNIFICANT CHANGE UP (ref 0–6)
GLUCOSE BLDC GLUCOMTR-MCNC: 171 MG/DL — HIGH (ref 70–99)
GLUCOSE BLDC GLUCOMTR-MCNC: 176 MG/DL — HIGH (ref 70–99)
GLUCOSE SERPL-MCNC: 176 MG/DL — HIGH (ref 70–99)
HCT VFR BLD CALC: 30 % — LOW (ref 34.5–45)
HGB BLD-MCNC: 9 G/DL — LOW (ref 11.5–15.5)
IMM GRANULOCYTES NFR BLD AUTO: 1.9 % — HIGH (ref 0–1.5)
LYMPHOCYTES # BLD AUTO: 1.52 K/UL — SIGNIFICANT CHANGE UP (ref 1–3.3)
LYMPHOCYTES # BLD AUTO: 18.4 % — SIGNIFICANT CHANGE UP (ref 13–44)
MAGNESIUM SERPL-MCNC: 1.6 MG/DL — SIGNIFICANT CHANGE UP (ref 1.6–2.6)
MCHC RBC-ENTMCNC: 28.4 PG — SIGNIFICANT CHANGE UP (ref 27–34)
MCHC RBC-ENTMCNC: 30 GM/DL — LOW (ref 32–36)
MCV RBC AUTO: 94.6 FL — SIGNIFICANT CHANGE UP (ref 80–100)
MONOCYTES # BLD AUTO: 0.71 K/UL — SIGNIFICANT CHANGE UP (ref 0–0.9)
MONOCYTES NFR BLD AUTO: 8.6 % — SIGNIFICANT CHANGE UP (ref 2–14)
NEUTROPHILS # BLD AUTO: 5.69 K/UL — SIGNIFICANT CHANGE UP (ref 1.8–7.4)
NEUTROPHILS NFR BLD AUTO: 69 % — SIGNIFICANT CHANGE UP (ref 43–77)
NRBC # BLD: 0 /100 WBCS — SIGNIFICANT CHANGE UP (ref 0–0)
ORGANISM # SPEC MICROSCOPIC CNT: SIGNIFICANT CHANGE UP
PHOSPHATE SERPL-MCNC: 3.7 MG/DL — SIGNIFICANT CHANGE UP (ref 2.5–4.5)
PLATELET # BLD AUTO: 314 K/UL — SIGNIFICANT CHANGE UP (ref 150–400)
POTASSIUM SERPL-MCNC: 3.6 MMOL/L — SIGNIFICANT CHANGE UP (ref 3.5–5.3)
POTASSIUM SERPL-SCNC: 3.6 MMOL/L — SIGNIFICANT CHANGE UP (ref 3.5–5.3)
PROT SERPL-MCNC: 5.9 G/DL — LOW (ref 6–8.3)
RBC # BLD: 3.17 M/UL — LOW (ref 3.8–5.2)
RBC # FLD: 15.6 % — HIGH (ref 10.3–14.5)
SODIUM SERPL-SCNC: 141 MMOL/L — SIGNIFICANT CHANGE UP (ref 135–145)
SPECIMEN SOURCE: SIGNIFICANT CHANGE UP
WBC # BLD: 8.25 K/UL — SIGNIFICANT CHANGE UP (ref 3.8–10.5)
WBC # FLD AUTO: 8.25 K/UL — SIGNIFICANT CHANGE UP (ref 3.8–10.5)

## 2021-02-05 PROCEDURE — 83605 ASSAY OF LACTIC ACID: CPT

## 2021-02-05 PROCEDURE — 93005 ELECTROCARDIOGRAM TRACING: CPT | Mod: XU

## 2021-02-05 PROCEDURE — 87070 CULTURE OTHR SPECIMN AEROBIC: CPT

## 2021-02-05 PROCEDURE — 84100 ASSAY OF PHOSPHORUS: CPT

## 2021-02-05 PROCEDURE — 84443 ASSAY THYROID STIM HORMONE: CPT

## 2021-02-05 PROCEDURE — 85730 THROMBOPLASTIN TIME PARTIAL: CPT

## 2021-02-05 PROCEDURE — 80053 COMPREHEN METABOLIC PANEL: CPT

## 2021-02-05 PROCEDURE — 84540 ASSAY OF URINE/UREA-N: CPT

## 2021-02-05 PROCEDURE — 70450 CT HEAD/BRAIN W/O DYE: CPT

## 2021-02-05 PROCEDURE — 71045 X-RAY EXAM CHEST 1 VIEW: CPT

## 2021-02-05 PROCEDURE — 86923 COMPATIBILITY TEST ELECTRIC: CPT

## 2021-02-05 PROCEDURE — 86850 RBC ANTIBODY SCREEN: CPT

## 2021-02-05 PROCEDURE — 83880 ASSAY OF NATRIURETIC PEPTIDE: CPT

## 2021-02-05 PROCEDURE — 51702 INSERT TEMP BLADDER CATH: CPT

## 2021-02-05 PROCEDURE — 84132 ASSAY OF SERUM POTASSIUM: CPT

## 2021-02-05 PROCEDURE — 74018 RADEX ABDOMEN 1 VIEW: CPT

## 2021-02-05 PROCEDURE — 82570 ASSAY OF URINE CREATININE: CPT

## 2021-02-05 PROCEDURE — 84300 ASSAY OF URINE SODIUM: CPT

## 2021-02-05 PROCEDURE — 82803 BLOOD GASES ANY COMBINATION: CPT

## 2021-02-05 PROCEDURE — 96368 THER/DIAG CONCURRENT INF: CPT | Mod: XU

## 2021-02-05 PROCEDURE — 36430 TRANSFUSION BLD/BLD COMPNT: CPT

## 2021-02-05 PROCEDURE — 82330 ASSAY OF CALCIUM: CPT

## 2021-02-05 PROCEDURE — 87449 NOS EACH ORGANISM AG IA: CPT

## 2021-02-05 PROCEDURE — 87150 DNA/RNA AMPLIFIED PROBE: CPT

## 2021-02-05 PROCEDURE — 83036 HEMOGLOBIN GLYCOSYLATED A1C: CPT

## 2021-02-05 PROCEDURE — 87641 MR-STAPH DNA AMP PROBE: CPT

## 2021-02-05 PROCEDURE — 83519 RIA NONANTIBODY: CPT

## 2021-02-05 PROCEDURE — 87086 URINE CULTURE/COLONY COUNT: CPT

## 2021-02-05 PROCEDURE — 83735 ASSAY OF MAGNESIUM: CPT

## 2021-02-05 PROCEDURE — 81003 URINALYSIS AUTO W/O SCOPE: CPT

## 2021-02-05 PROCEDURE — 86901 BLOOD TYPING SEROLOGIC RH(D): CPT

## 2021-02-05 PROCEDURE — 94640 AIRWAY INHALATION TREATMENT: CPT

## 2021-02-05 PROCEDURE — 74018 RADEX ABDOMEN 1 VIEW: CPT | Mod: 26

## 2021-02-05 PROCEDURE — 36415 COLL VENOUS BLD VENIPUNCTURE: CPT

## 2021-02-05 PROCEDURE — 86900 BLOOD TYPING SEROLOGIC ABO: CPT

## 2021-02-05 PROCEDURE — 85025 COMPLETE CBC W/AUTO DIFF WBC: CPT

## 2021-02-05 PROCEDURE — 84484 ASSAY OF TROPONIN QUANT: CPT

## 2021-02-05 PROCEDURE — 82962 GLUCOSE BLOOD TEST: CPT

## 2021-02-05 PROCEDURE — 99239 HOSP IP/OBS DSCHRG MGMT >30: CPT | Mod: GC

## 2021-02-05 PROCEDURE — U0005: CPT

## 2021-02-05 PROCEDURE — 87899 AGENT NOS ASSAY W/OPTIC: CPT

## 2021-02-05 PROCEDURE — 97161 PT EVAL LOW COMPLEX 20 MIN: CPT

## 2021-02-05 PROCEDURE — 82746 ASSAY OF FOLIC ACID SERUM: CPT

## 2021-02-05 PROCEDURE — 84295 ASSAY OF SERUM SODIUM: CPT

## 2021-02-05 PROCEDURE — 80048 BASIC METABOLIC PNL TOTAL CA: CPT

## 2021-02-05 PROCEDURE — P9016: CPT

## 2021-02-05 PROCEDURE — 74019 RADEX ABDOMEN 2 VIEWS: CPT

## 2021-02-05 PROCEDURE — 87040 BLOOD CULTURE FOR BACTERIA: CPT

## 2021-02-05 PROCEDURE — 87186 SC STD MICRODIL/AGAR DIL: CPT

## 2021-02-05 PROCEDURE — 96365 THER/PROPH/DIAG IV INF INIT: CPT | Mod: XU

## 2021-02-05 PROCEDURE — 82607 VITAMIN B-12: CPT

## 2021-02-05 PROCEDURE — 87640 STAPH A DNA AMP PROBE: CPT

## 2021-02-05 PROCEDURE — 81001 URINALYSIS AUTO W/SCOPE: CPT

## 2021-02-05 PROCEDURE — U0003: CPT

## 2021-02-05 PROCEDURE — 99285 EMERGENCY DEPT VISIT HI MDM: CPT | Mod: 25

## 2021-02-05 PROCEDURE — 85610 PROTHROMBIN TIME: CPT

## 2021-02-05 PROCEDURE — 83935 ASSAY OF URINE OSMOLALITY: CPT

## 2021-02-05 RX ORDER — CHLORHEXIDINE GLUCONATE 213 G/1000ML
15 SOLUTION TOPICAL
Qty: 0 | Refills: 0 | DISCHARGE
Start: 2021-02-05

## 2021-02-05 RX ORDER — PIPERACILLIN AND TAZOBACTAM 4; .5 G/20ML; G/20ML
4500 INJECTION, POWDER, LYOPHILIZED, FOR SOLUTION INTRAVENOUS
Qty: 0 | Refills: 0 | DISCHARGE
Start: 2021-02-05 | End: 2021-02-07

## 2021-02-05 RX ORDER — ASCORBIC ACID 60 MG
1 TABLET,CHEWABLE ORAL
Qty: 0 | Refills: 0 | DISCHARGE
Start: 2021-02-05

## 2021-02-05 RX ORDER — PIPERACILLIN AND TAZOBACTAM 4; .5 G/20ML; G/20ML
4500 INJECTION, POWDER, LYOPHILIZED, FOR SOLUTION INTRAVENOUS
Qty: 0 | Refills: 0 | DISCHARGE
Start: 2021-02-05 | End: 2021-02-09

## 2021-02-05 RX ORDER — ZINC SULFATE TAB 220 MG (50 MG ZINC EQUIVALENT) 220 (50 ZN) MG
1 TAB ORAL
Qty: 0 | Refills: 0 | DISCHARGE
Start: 2021-02-05

## 2021-02-05 RX ORDER — POTASSIUM CHLORIDE 20 MEQ
40 PACKET (EA) ORAL ONCE
Refills: 0 | Status: COMPLETED | OUTPATIENT
Start: 2021-02-05 | End: 2021-02-05

## 2021-02-05 RX ORDER — MAGNESIUM SULFATE 500 MG/ML
4 VIAL (ML) INJECTION ONCE
Refills: 0 | Status: COMPLETED | OUTPATIENT
Start: 2021-02-05 | End: 2021-02-05

## 2021-02-05 RX ADMIN — Medication 325 MILLIGRAM(S): at 12:09

## 2021-02-05 RX ADMIN — MONTELUKAST 10 MILLIGRAM(S): 4 TABLET, CHEWABLE ORAL at 12:09

## 2021-02-05 RX ADMIN — LACOSAMIDE 150 MILLIGRAM(S): 50 TABLET ORAL at 07:31

## 2021-02-05 RX ADMIN — PREGABALIN 1000 MICROGRAM(S): 225 CAPSULE ORAL at 12:09

## 2021-02-05 RX ADMIN — Medication 2: at 12:11

## 2021-02-05 RX ADMIN — Medication 3 MILLILITER(S): at 00:32

## 2021-02-05 RX ADMIN — Medication 3 MILLILITER(S): at 06:05

## 2021-02-05 RX ADMIN — Medication 2: at 07:43

## 2021-02-05 RX ADMIN — Medication 40 MILLIEQUIVALENT(S): at 12:09

## 2021-02-05 RX ADMIN — CHLORHEXIDINE GLUCONATE 15 MILLILITER(S): 213 SOLUTION TOPICAL at 06:07

## 2021-02-05 RX ADMIN — Medication 3 MILLILITER(S): at 12:09

## 2021-02-05 RX ADMIN — PIPERACILLIN AND TAZOBACTAM 200 GRAM(S): 4; .5 INJECTION, POWDER, LYOPHILIZED, FOR SOLUTION INTRAVENOUS at 00:32

## 2021-02-05 RX ADMIN — Medication 0.5 MILLIGRAM(S): at 09:03

## 2021-02-05 RX ADMIN — INSULIN GLARGINE 5 UNIT(S): 100 INJECTION, SOLUTION SUBCUTANEOUS at 07:43

## 2021-02-05 RX ADMIN — Medication 3 MILLILITER(S): at 09:03

## 2021-02-05 RX ADMIN — LISINOPRIL 10 MILLIGRAM(S): 2.5 TABLET ORAL at 06:07

## 2021-02-05 RX ADMIN — Medication 500 MILLIGRAM(S): at 12:09

## 2021-02-05 RX ADMIN — ZINC SULFATE TAB 220 MG (50 MG ZINC EQUIVALENT) 220 MILLIGRAM(S): 220 (50 ZN) TAB at 12:09

## 2021-02-05 RX ADMIN — Medication 100 GRAM(S): at 09:08

## 2021-02-05 RX ADMIN — PIPERACILLIN AND TAZOBACTAM 200 GRAM(S): 4; .5 INJECTION, POWDER, LYOPHILIZED, FOR SOLUTION INTRAVENOUS at 06:06

## 2021-02-05 RX ADMIN — Medication 81 MILLIGRAM(S): at 12:09

## 2021-02-05 RX ADMIN — PIPERACILLIN AND TAZOBACTAM 200 GRAM(S): 4; .5 INJECTION, POWDER, LYOPHILIZED, FOR SOLUTION INTRAVENOUS at 12:08

## 2021-02-05 RX ADMIN — PANTOPRAZOLE SODIUM 40 MILLIGRAM(S): 20 TABLET, DELAYED RELEASE ORAL at 12:09

## 2021-02-05 NOTE — DISCHARGE NOTE PROVIDER - INSTRUCTIONS
Since that hospitalization, patient has a PEG tube for feeds. Current feeds are Glucerna 1.2cal at rate of 50ml/hr for total of 1200 mL per day (5 cans

## 2021-02-05 NOTE — DISCHARGE NOTE NURSING/CASE MANAGEMENT/SOCIAL WORK - FLU SEASON?
Needs):  · Estimated Daily Total Kcal: 1575  · Estimated Daily Protein (g): 74-80    Estimated Intake vs Estimated Needs: Intake Less Than Needs    Nutrition Risk Level: High    Nutrition Interventions:   Continue NPO  Continued Inpatient Monitoring    Nutrition Evaluation:   · Evaluation: Goals set   · Goals: Meet greater than 75% of estimated nutrition needs    · Monitoring: Weight, NPO Status, Diet Progression, Pertinent Labs, Nausea or Vomiting, Skin Integrity    See Adult Nutrition Doc Flowsheet for more detail.      Beverley PEREZ, R.D, L.D,  Clinical Dietitian  Pager # [phone] Yes...

## 2021-02-05 NOTE — DISCHARGE NOTE PROVIDER - PROVIDER TOKENS
PROVIDER:[TOKEN:[[id number]],FOLLOWUP:[1 month]] PROVIDER:[TOKEN:[[id number]],SCHEDULEDAPPT:[03/05/2021],SCHEDULEDAPPTTIME:[11:30 AM]]

## 2021-02-05 NOTE — DISCHARGE NOTE NURSING/CASE MANAGEMENT/SOCIAL WORK - NSTOBACCONEVERSMOKERY/N_GEN_A
No
Pt c/o suicidal ideations and depression, denies plan.  Pt AAOx3, respirations even and unlabored.  Pt cleared medically and psychiatrically by MD Patel and MD Masterson.  Awaiting discharge.

## 2021-02-05 NOTE — DISCHARGE NOTE PROVIDER - NSDCCPCAREPLAN_GEN_ALL_CORE_FT
PRINCIPAL DISCHARGE DIAGNOSIS  Diagnosis: Aspiration pneumonia  Assessment and Plan of Treatment: You presented to the hospital because you were having trouble breathing. You were found to have a pneumonia called aspiration pneumonia, likely due to      SECONDARY DISCHARGE DIAGNOSES  Diagnosis: Subarachnoid hemorrhage, nontraumatic  Assessment and Plan of Treatment: Subarachnoid hemorrhage, nontraumatic     PRINCIPAL DISCHARGE DIAGNOSIS  Diagnosis: Aspiration pneumonia  Assessment and Plan of Treatment: You presented to the hospital because you were having trouble breathing. You were found to have a pneumonia called aspiration pneumonia. Aspiration pneumonia is a type of lung infection that is due to a relatively large amount of material from the stomach or mouth entering the lungs. Signs and symptoms often include fever and cough of relatively rapid onset. You were      SECONDARY DISCHARGE DIAGNOSES  Diagnosis: Subarachnoid hemorrhage, nontraumatic  Assessment and Plan of Treatment: Subarachnoid hemorrhage, nontraumatic    Diagnosis: Oral hygiene poor  Assessment and Plan of Treatment:      PRINCIPAL DISCHARGE DIAGNOSIS  Diagnosis: Aspiration pneumonia  Assessment and Plan of Treatment: Patient presented to the hospital because she having trouble breathing. You were found to have a pneumonia called aspiration pneumonia. Treated with Zosyn 4.5g every 6 hours. Patient should complete her course of Zosyn on discharge. Last day of treatment course 02/09/2021. Patient needs deep suctioning of secretions 2-3 times per day, or as needed per increasing secretions.      SECONDARY DISCHARGE DIAGNOSES  Diagnosis: Subarachnoid hemorrhage, nontraumatic  Assessment and Plan of Treatment: Patient was hospitalized for a SAH on 12/9/2020 with IVH and obstructive hydrocephalus. She had a cerebral angiogram for coil embolization 3mm L post comm aneurysm with external ventricular drain (EVD) placement 12/10 and removal 12/25 and replacement 12/29. Patient should continue to take vimpat 150mg every 12 hours and follow up with neurosurgery on 03/05/2021 at 11:30AM.   Since that hospitalization, patient has a PEG tube for feeds. Current feeds are Glucerna 1.2cal at rate of 50ml/hr for total of 1200 mL per day (5 cans).    Diagnosis: Oral hygiene poor  Assessment and Plan of Treatment: Patient has severely poor oral hygiene. Will need vigorous oral care to clean out residue during suctioning. As above, will need deep suctioning of secretions 2-3 times per day or as needed with increasing secretions. Make sure mouth is kept hydrated.    Diagnosis: Urinary retention  Assessment and Plan of Treatment: Patient has failed trial of void x3 times after de anda was removed. De Anda was placed back. Patient can continue unless deemed appropriate for trial of void again.

## 2021-02-05 NOTE — DISCHARGE NOTE PROVIDER - NSDCMRMEDTOKEN_GEN_ALL_CORE_FT
acetaminophen 160 mg/5 mL oral suspension: 20.31 milliliter(s) orally every 6 hours, As needed, Temp greater or equal to 38C (100.4F), Mild Pain (1 - 3)  acetylcysteine 20% inhalation solution: 4 milliliter(s) inhaled 3 times a day, As needed, congestion  amLODIPine 10 mg oral tablet: 1 tab(s) orally every 24 hours  aspirin 81 mg oral delayed release tablet: 1 tab(s) orally once a day  atorvastatin 40 mg oral tablet: 1 tab(s) orally once a day (at bedtime)  budesonide: 0.5 milligram(s) inhaled every 12 hours  cyanocobalamin 1000 mcg oral tablet: 1 tab(s) orally once a day  enoxaparin: 40 milligram(s) subcutaneous once a day (at bedtime)  ferrous sulfate 325 mg (65 mg elemental iron) oral tablet: 1 tab(s) orally once a day  insulin glargine: 5units at morning time  insulin lispro 100 units/mL injectable solution:  injectable   insulin lispro 100 units/mL injectable solution:  injectable   ipratropium-albuterol 0.5 mg-2.5 mg/3 mLinhalation solution: 3 milliliter(s) inhaled every 6 hours  lacosamide 10 mg/mL oral solution: 15 milliliter(s) orally every 12 hours  lisinopril 10 mg oral tablet: 1 tab(s) orally once a day  montelukast 10 mg oral tablet: 1 tab(s) orally once a day  pantoprazole 40 mg oral granule, delayed release: 40 milligram(s) orally once a day  senna oral tablet: 2 tab(s) orally once a day (at bedtime)  sodium chloride 1 g oral tablet: 2 tab(s) orally every 8 hours   acetaminophen 160 mg/5 mL oral suspension: 20.31 milliliter(s) orally every 6 hours, As needed, Temp greater or equal to 38C (100.4F), Mild Pain (1 - 3)  acetylcysteine 20% inhalation solution: 4 milliliter(s) inhaled 3 times a day, As needed, congestion  amLODIPine 10 mg oral tablet: 1 tab(s) orally every 24 hours  ascorbic acid 500 mg oral tablet: 1 tab(s) orally once a day  aspirin 81 mg oral delayed release tablet: 1 tab(s) orally once a day  atorvastatin 40 mg oral tablet: 1 tab(s) orally once a day (at bedtime)  budesonide: 0.5 milligram(s) inhaled every 12 hours  chlorhexidine 0.12% mucous membrane liquid: 15 milliliter(s) mucous membrane 2 times a day  cyanocobalamin 1000 mcg oral tablet: 1 tab(s) orally once a day  enoxaparin: 40 milligram(s) subcutaneous once a day (at bedtime)  ferrous sulfate 325 mg (65 mg elemental iron) oral tablet: 1 tab(s) orally once a day  insulin glargine: 5units at morning time  insulin lispro 100 units/mL injectable solution:  injectable   insulin lispro 100 units/mL injectable solution:  injectable   ipratropium-albuterol 0.5 mg-2.5 mg/3 mLinhalation solution: 3 milliliter(s) inhaled every 6 hours  lacosamide 10 mg/mL oral solution: 15 milliliter(s) orally every 12 hours  lisinopril 10 mg oral tablet: 1 tab(s) orally once a day  montelukast 10 mg oral tablet: 1 tab(s) orally once a day  pantoprazole 40 mg oral granule, delayed release: 40 milligram(s) orally once a day  piperacillin-tazobactam: 4500 milligram(s) intravenous every 6 hours  senna oral tablet: 2 tab(s) orally once a day (at bedtime)  zinc sulfate 220 mg oral capsule: 1 cap(s) orally once a day

## 2021-02-05 NOTE — DISCHARGE NOTE NURSING/CASE MANAGEMENT/SOCIAL WORK - PATIENT PORTAL LINK FT
You can access the FollowMyHealth Patient Portal offered by [facility] by registering at the following website: [email or web address]. By joining [employer]’s FollowMyHealth portal, you will also be able to view your health information using other applications (apps) compatible with our system.

## 2021-02-05 NOTE — DISCHARGE NOTE PROVIDER - CARE PROVIDER_API CALL
Bebeto Serrano)  Neurosurgery  [address] [place] [address]  Phone: [phone]  Fax: [phone]  Follow Up Time: 1 month   Bebeto Serrano)  Neurosurgery  [address] [place] [address]  Phone: [phone]  Fax: [phone]  Scheduled Appointment: 03/05/2021 11:30 AM

## 2021-02-05 NOTE — DISCHARGE NOTE PROVIDER - HOSPITAL COURSE
#Discharge: do not delete    Patient is __ yo M/F with past medical history of _____ admitted for _____, found to have _____      Problem List/Main Diagnoses (system-based):       Inpatient treatment course:      New medications:     Labs to be followed outpatient:     Exam to be followed outpatient: #Discharge: do not delete    Pt is a 75 yo F with PMH COPD, asthma, HTN, HLD, subarachnoid hemorrhage ([facility] 12/9-1/26 for subarachnoid hemorrhage with IVH and obstructive hydrocephalus s/p cerebral angiogram for coil embolization 3mm L post comm aneurysm with external ventricular drain (EVD) placement 12/10 and removal 12/25 and replacement 12/29 c/b UTI with enterobacter and e coli with zosyn ending 1/24; PEG placed 1/7)), with mental status AAO-1 on previous hospital discharge who p/f rehab two days following previous hospital discharge w/ acute hypoxemic respiratory failure from presumed aspiration pneumonia i/s/o  RLL infiltrate on CXR and significant secretions on adm requiring hourly suctioning, adm to 7L for further observation and management    Problem List/Main Diagnoses (system-based):   1. Hypoxic Respiratory Failure: -P/w respiratory failure in context of copious secretions in airway and c/f aspiration pneumonia, initially requiring NRB and then weaned with frequent suctioning to NC and then RA; CXR with RLL infiltrate. Received vanc/zosyn in ED. Sputum cx n/l. 2/1 abx switched to ceftriaxone 1g Q24 hr for 5-7D course, w/ duonebs q6h   - 2/2 noted to be more tachypneic and found with 100.6F rectal temp in AM. Giving morphine 1 mg IVP for sx control, doing fever w/u (mentioned below in "severe sepsis"), monitoring for pain (abd Xray performed and was n/l), and monitoring (02 requirements unchanged; lung exam not changed from yesterday).   - switched back to zosyn to finish out 7 day course  - oral care and frequent suctioning  - multiple extensive GOC discussions with son, per Palliative pt to remain full code at this time- family understands prognosis but would like trial of life-sustaining measures  - Now weaned to RA, breathing comfortably as of 2/3    2. Sepsis 2/2 aspiration pneumonia  -c/w zosyn 4.5 (pseudomonal dosing for HAP coverage since pt was hospitalized for 1.5 mo)    3. Urinary retention- de anda placed in ED.   -Tried to TOV but pt failed   -De Anda placed again- d/c with de anda    Inpatient treatment course: Pt is a 75 yo F with PMH COPD, asthma, HTN, HLD, subarachnoid hemorrhage ([facility] 12/9-1/26 for subarachnoid hemorrhage with IVH and obstructive hydrocephalus s/p cerebral angiogram for coil embolization 3mm L post comm aneurysm with external ventricular drain (EVD) placement 12/10 and removal 12/25 and replacement 12/29 c/b UTI with enterobacter and e coli with zosyn ending 1/24; PEG placed 1/7)), with mental status AAO-1 on previous hospital discharge who p/f rehab two days following previous hospital discharge w/ acute hypoxemic respiratory failure from presumed aspiration pneumonia i/s/o  RLL infiltrate on CXR and significant secretions on adm requiring hourly suctioning, adm to 7L for further observation and management, treated for pneumonia, with continued GOC ongoing between palliative care and family regarding pt's poor prognosis i/s/o poor mental status since last hospitalization for SAH. Palliative Care has reached an impasse regarding GOC an pt will remain full code at this time. Given that she has been weaned off of supplemental O2 and is no longer meeting sepsis criteria, pt will be stepped down to [facility] to continue management of her RLL PNA. Finishing up 7 day zosyn course. Per family , the ywant trial of life-sustaining measures but are understanding of prognosis. Patient with stool burden on abd exam but had BM. Stable for discharge to [facility]     New medications: Zosyn 4.5g x7 day course (last day 2/7)     Labs to be followed outpatient: N/A    Exam to be followed outpatient: N/A

## 2021-02-05 NOTE — DISCHARGE NOTE PROVIDER - DETAILS OF MALNUTRITION DIAGNOSIS/DIAGNOSES
This patient has been assessed with a concern for Malnutrition and was treated during this hospitalization for the following Nutrition diagnosis/diagnoses:     -  02/01/2021: Mild protein-calorie malnutrition   This patient has been assessed with a concern for Malnutrition and was treated during this hospitalization for the following Nutrition diagnosis/diagnoses:     -  02/01/2021: Mild protein-calorie malnutrition    This patient has been assessed with a concern for Malnutrition and was treated during this hospitalization for the following Nutrition diagnosis/diagnoses:     -  02/01/2021: Mild protein-calorie malnutrition   This patient has been assessed with a concern for Malnutrition and was treated during this hospitalization for the following Nutrition diagnosis/diagnoses:     -  02/01/2021: Mild protein-calorie malnutrition    This patient has been assessed with a concern for Malnutrition and was treated during this hospitalization for the following Nutrition diagnosis/diagnoses:     -  02/01/2021: Mild protein-calorie malnutrition    This patient has been assessed with a concern for Malnutrition and was treated during this hospitalization for the following Nutrition diagnosis/diagnoses:     -  02/01/2021: Mild protein-calorie malnutrition

## 2021-02-07 LAB
CULTURE RESULTS: SIGNIFICANT CHANGE UP
CULTURE RESULTS: SIGNIFICANT CHANGE UP
SPECIMEN SOURCE: SIGNIFICANT CHANGE UP
SPECIMEN SOURCE: SIGNIFICANT CHANGE UP

## 2021-02-10 DIAGNOSIS — K21.9 GASTRO-ESOPHAGEAL REFLUX DISEASE WITHOUT ESOPHAGITIS: ICD-10-CM

## 2021-02-10 DIAGNOSIS — J44.9 CHRONIC OBSTRUCTIVE PULMONARY DISEASE, UNSPECIFIED: ICD-10-CM

## 2021-02-10 DIAGNOSIS — R33.9 RETENTION OF URINE, UNSPECIFIED: ICD-10-CM

## 2021-02-10 DIAGNOSIS — D64.9 ANEMIA, UNSPECIFIED: ICD-10-CM

## 2021-02-10 DIAGNOSIS — E11.9 TYPE 2 DIABETES MELLITUS WITHOUT COMPLICATIONS: ICD-10-CM

## 2021-02-10 DIAGNOSIS — Z51.5 ENCOUNTER FOR PALLIATIVE CARE: ICD-10-CM

## 2021-02-10 DIAGNOSIS — Z79.4 LONG TERM (CURRENT) USE OF INSULIN: ICD-10-CM

## 2021-02-10 DIAGNOSIS — J96.01 ACUTE RESPIRATORY FAILURE WITH HYPOXIA: ICD-10-CM

## 2021-02-10 DIAGNOSIS — R06.02 SHORTNESS OF BREATH: ICD-10-CM

## 2021-02-10 DIAGNOSIS — R65.20 SEVERE SEPSIS WITHOUT SEPTIC SHOCK: ICD-10-CM

## 2021-02-10 DIAGNOSIS — J69.0 PNEUMONITIS DUE TO INHALATION OF FOOD AND VOMIT: ICD-10-CM

## 2021-02-10 DIAGNOSIS — Z79.82 LONG TERM (CURRENT) USE OF ASPIRIN: ICD-10-CM

## 2021-02-10 DIAGNOSIS — E44.1 MILD PROTEIN-CALORIE MALNUTRITION: ICD-10-CM

## 2021-02-10 DIAGNOSIS — G93.49 OTHER ENCEPHALOPATHY: ICD-10-CM

## 2021-02-10 DIAGNOSIS — A41.9 SEPSIS, UNSPECIFIED ORGANISM: ICD-10-CM

## 2021-02-10 DIAGNOSIS — J45.909 UNSPECIFIED ASTHMA, UNCOMPLICATED: ICD-10-CM

## 2021-02-10 DIAGNOSIS — N17.9 ACUTE KIDNEY FAILURE, UNSPECIFIED: ICD-10-CM

## 2021-02-11 LAB — PTH RELATED PROT SERPL-MCNC: <2 PMOL/L — SIGNIFICANT CHANGE UP

## 2021-03-02 NOTE — HISTORY OF PRESENT ILLNESS
[FreeTextEntry1] : 76 year old woman with past medical history COPD, asthma, HLD, HTN who presented to [facility] ED on 12/9/2020 after patient was found down at home in the bathroom. CT head demonstrated diffuse subarachnoid hemorrhage mainly at basilar cisterns with IVH and obstructive hydrocephalus. \par \par CTA head with and without contrast demonstrated a 3 mm left posterior communicating aneurysm and a 1.6 mm outpouching of distal cavernous segment of the left internal carotid artery, likely aneurysm. 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  Her hospitalization was complicated by vasospasm, seizures and hydrocephalus.\par \par She underwent RIght frontal  shunt placement on 12/30/2020 with Dr. Serrano.\par \par She had PEF tube placement on 1/7/21. \par \par She was discharged to [facility] and comes to the office today for follow up.

## 2021-03-05 ENCOUNTER — APPOINTMENT (OUTPATIENT)
Dept: NEUROSURGERY | Facility: CLINIC | Age: 76
End: 2021-03-05

## 2021-03-18 NOTE — PROCEDURE NOTE - NSASSISTBY_GEN_A_CORE
Final Anesthesia Post-op Assessment    Patient: Araceli Bourgeois  Procedure(s) Performed: EXTRACAPSULAR CATARACT EXTRACTION WITH PHACOEMULSIFICATION, INSERTION OF POSTERIOR CHAMBER INTRAOCULAR LENS IN RIGHT EYE WITH OMNI; TRANSLUMINAL DILATION OF AQUEOUS OUTFLOW CANAL, WITHOUT RETENTION OF DEVICE OR STENT IN RIGHT EYE; GONIOTMY OR AB INTERNO TABECULOTOMY IN RIGHT EYE - RIGHT. - RIGHT  Anesthesia type: MAC    Vitals Value Taken Time   Temp 36 °C (96.8 °F) 03/18/21 1049   Pulse 57 03/18/21 1049   Resp 16 03/18/21 1049   SpO2 99 % 03/18/21 1049   /55 03/18/21 1049         Patient Location: PACU Phase 1  Post-op Vital Signs:stable  Level of Consciousness: awake, alert, participates in exam and oriented  Respiratory Status: spontaneous ventilation  Cardiovascular stable and blood pressure returned to baseline  Hydration: euvolemic  Pain Management: well controlled  Handoff: Handoff to receiving nurse was performed and questions were answered  Vomiting: none   Nausea: None  Airway Patency:patent  Post-op Assessment: awake, alert, appropriately conversant, or baseline, no complications, patient tolerated procedure well with no complications, no evidence of recall, dentition within defined limits, moving all extremities and No Corneal Abrasion      No complications documented.   
Myself
Myself
Tomas Barakat/PA
PA/Giselle Patterson
PA/Jorge Luis Whitaker

## 2022-01-17 NOTE — PATIENT PROFILE ADULT - SBIRT REFERRAL
Disc with  , ok to treat , can put on omnicef 300mg bid for 10 days , called pt and left msg   
Patient called stating she has had a headache for a few days and believes she has a sinus infection. She would like to speak with RN.  
SBIRT referral

## 2023-04-16 NOTE — PROCEDURE NOTE - NSINDICATIONS_GEN_A_CORE
Patient complains of left side pain for two weeks. Pain is getting worse. Patient states mouth was watering a lot yesterday. Has had urinary frequency.  
cannulation purposes
antibiotic therapy/fluid administration
feeding tube replacement
hemodynamic monitoring/venous access/critical illness
monitoring purposes
blood cultures
monitoring purposes

## 2024-10-15 NOTE — PROGRESS NOTE ADULT - ASSESSMENT
75y/Female with:  aneursymal subarachnoid hemorrhage, L pcomm aneurysm, L parophthalmic aneurysm; brain compression, cerebral edema  osbtructive hydrocephalus  lacunar infarcts R caudate, B thalami, cerebellar hemispheres ?artery to artery vs cardioembolic?  atherosclerotic cardiovascular disease; mod to severe bilateral ICA stenosis (R>L), R VA stenosis  Hypertension dyslipidemia   COPD asthma  newly diagnosed Diabetes Mellitus?  troponin leak    PLAN: Day 1 = 12-09 ; Day 4 =  12/12; Day 21 = 12/29  NEURO: neurochecks q2h, VS q1, PRN pain meds with Tylenol   SAH:  off nimodipine   Hydrocephalus:  s/p VPS   Seizure treatment (cortical ICH, associated SDH, unclear etiology):  lacosamide 150mg PO BID   REHAB:  physical therapy evaluation and management    EARLY MOB:  OOB to chair    PULM:  Room air, incentive spirometry, continue montelukast, ipratropium / albuterol PRN   CARDIO:  SBP goal 150-200mm Hg, TTE EF 70%; midodrine 5mg PO q8h   ENDO:  Blood sugar goals 140-180 mg/dL, cont insulin sliding scale, atorvastatin 40mg  GI:  bowel regimen  DIET: insert NGT, start tube feeds, ?PEG next week  RENAL: maintain euvolemia, accurate Is and Os  HEM/ONC: Hb stable, FOBT  VTE Prophylaxis: SCDs, SQL  ID: febrile, leukocytosis improving; piperacillin/tazobactam (last day 01/04/2021)  Social: will update family    CORE MEASURES  1.  Nath and Jacobo Score = 3  2.  VTE prophylaxis:  [ ] administered within 24 hours of admission OR [X] reason not done: fresh bleed, unsecured aneurysm.  3.  Dysphagia screening:   [X] performed before any oral meds / liquids / food  4.  Nimodipine treatment:  [X] administered within 24 hours of admission OR [ ] reason not done:    ATTENDING ATTESTATION:  I was physically present for the key portions of the evaluation and management (E/M) service provided.  I agree with the above history, physical and plan, which I have reviewed and edited where appropriate.    Patient at high risk for neurological deterioration or death due to:  ICU delirium, aspiration PNA, DVT / PE.  Critical care time:  I have personally provided 30 minutes of critical care time, excluding time spent on separate procedures.      Plan discussed with RN, house staff.   Raudel called and c/o \"itching all over\" x2 days. He stated his swelling has not gone done since starting the lasix either. Message sent to RN and Dr. Reynoso. Patient need to contact his PCP regarding these issues. Called and LVM with Raudel.    75y/Female with:  aneursymal subarachnoid hemorrhage, L pcomm aneurysm, L parophthalmic aneurysm; brain compression, cerebral edema  osbtructive hydrocephalus  lacunar infarcts R caudate, B thalami, cerebellar hemispheres ?artery to artery vs cardioembolic?  atherosclerotic cardiovascular disease; mod to severe bilateral ICA stenosis (R>L), R VA stenosis  Hypertension dyslipidemia   COPD asthma  newly diagnosed Diabetes Mellitus?  troponin leak    PLAN: Day 1 = 12-09 ; Day 4 =  12/12; Day 21 = 12/29  NEURO: neurochecks q2h, VS q1, PRN pain meds with Tylenol   SAH:  off nimodipine   Hydrocephalus:  s/p VPS   Seizure treatment (cortical ICH, associated SDH, unclear etiology):  lacosamide 150mg PO BID   REHAB:  physical therapy evaluation and management    EARLY MOB:  OOB to chair    PULM:  Room air, incentive spirometry, continue montelukast, ipratropium / albuterol PRN   CARDIO:  SBP goal 120-200mm Hg, TTE EF 70%; midodrine 5mg PO q8h   ENDO:  Blood sugar goals 140-180 mg/dL, cont insulin sliding scale, atorvastatin 40mg  GI:  d/c bowel regimen; started on BID protonix for GIB  DIET: s/s, insert NGT, cont tube feeds, ?PEG next week  RENAL: maintain euvolemia, accurate Is and Os  HEM/ONC: Hb stable  VTE Prophylaxis: SCDs, SQL  ID: febrile, leukocytosis improving; piperacillin/tazobactam (last day 01/04/2021)  Social: will update family    CORE MEASURES  1.  Nath and Jacobo Score = 3  2.  VTE prophylaxis:  [ ] administered within 24 hours of admission OR [X] reason not done: fresh bleed, unsecured aneurysm.  3.  Dysphagia screening:   [X] performed before any oral meds / liquids / food  4.  Nimodipine treatment:  [X] administered within 24 hours of admission OR [ ] reason not done:    ATTENDING ATTESTATION:  I was physically present for the key portions of the evaluation and management (E/M) service provided.  I agree with the above history, physical and plan, which I have reviewed and edited where appropriate.    Patient at high risk for neurological deterioration or death due to:  ICU delirium, aspiration PNA, DVT / PE.  Critical care time:  I have personally provided 30 minutes of critical care time, excluding time spent on separate procedures.      Plan discussed with RN, house staff.